# Patient Record
Sex: FEMALE | Race: WHITE | NOT HISPANIC OR LATINO | Employment: OTHER | URBAN - METROPOLITAN AREA
[De-identification: names, ages, dates, MRNs, and addresses within clinical notes are randomized per-mention and may not be internally consistent; named-entity substitution may affect disease eponyms.]

---

## 2017-01-16 ENCOUNTER — HOSPITAL ENCOUNTER (OUTPATIENT)
Dept: MAMMOGRAPHY | Facility: HOSPITAL | Age: 82
Discharge: HOME | End: 2017-01-16
Admitting: INTERNAL MEDICINE

## 2017-01-16 DIAGNOSIS — Z12.31 VISIT FOR SCREENING MAMMOGRAM: ICD-10-CM

## 2017-01-16 PROCEDURE — G0202 SCR MAMMO BI INCL CAD: HCPCS

## 2017-01-16 PROCEDURE — 77063 BREAST TOMOSYNTHESIS BI: CPT | Performed by: RADIOLOGY

## 2017-01-16 PROCEDURE — G0202 SCR MAMMO BI INCL CAD: HCPCS | Performed by: RADIOLOGY

## 2017-01-16 PROCEDURE — 77063 BREAST TOMOSYNTHESIS BI: CPT

## 2017-06-09 ENCOUNTER — OFFICE VISIT (OUTPATIENT)
Dept: ORTHOPEDIC SURGERY | Facility: CLINIC | Age: 82
End: 2017-06-09

## 2017-06-09 VITALS
HEART RATE: 82 BPM | SYSTOLIC BLOOD PRESSURE: 141 MMHG | BODY MASS INDEX: 36.96 KG/M2 | HEIGHT: 66 IN | WEIGHT: 230 LBS | DIASTOLIC BLOOD PRESSURE: 83 MMHG

## 2017-06-09 DIAGNOSIS — E66.9 OBESITY (BMI 30-39.9): ICD-10-CM

## 2017-06-09 DIAGNOSIS — M17.0 PRIMARY OSTEOARTHRITIS OF BOTH KNEES: ICD-10-CM

## 2017-06-09 DIAGNOSIS — M25.561 RIGHT KNEE PAIN, UNSPECIFIED CHRONICITY: Primary | ICD-10-CM

## 2017-06-09 PROCEDURE — 20610 DRAIN/INJ JOINT/BURSA W/O US: CPT | Performed by: ORTHOPAEDIC SURGERY

## 2017-06-09 PROCEDURE — 99204 OFFICE O/P NEW MOD 45 MIN: CPT | Performed by: ORTHOPAEDIC SURGERY

## 2017-06-09 RX ORDER — BUPIVACAINE HYDROCHLORIDE 2.5 MG/ML
3 INJECTION, SOLUTION INFILTRATION; PERINEURAL
Status: COMPLETED | OUTPATIENT
Start: 2017-06-09 | End: 2017-06-09

## 2017-06-09 RX ORDER — LIDOCAINE HYDROCHLORIDE 10 MG/ML
3 INJECTION, SOLUTION INFILTRATION; PERINEURAL
Status: COMPLETED | OUTPATIENT
Start: 2017-06-09 | End: 2017-06-09

## 2017-06-09 RX ORDER — TRIAMCINOLONE ACETONIDE 40 MG/ML
80 INJECTION, SUSPENSION INTRA-ARTICULAR; INTRAMUSCULAR
Status: COMPLETED | OUTPATIENT
Start: 2017-06-09 | End: 2017-06-09

## 2017-06-09 RX ADMIN — BUPIVACAINE HYDROCHLORIDE 3 ML: 2.5 INJECTION, SOLUTION INFILTRATION; PERINEURAL at 10:39

## 2017-06-09 RX ADMIN — LIDOCAINE HYDROCHLORIDE 3 ML: 10 INJECTION, SOLUTION INFILTRATION; PERINEURAL at 10:39

## 2017-06-09 RX ADMIN — TRIAMCINOLONE ACETONIDE 80 MG: 40 INJECTION, SUSPENSION INTRA-ARTICULAR; INTRAMUSCULAR at 10:39

## 2017-06-09 NOTE — PROGRESS NOTES
Procedure   Large Joint Arthrocentesis  Date/Time: 6/9/2017 10:39 AM  Consent given by: patient  Site marked: site marked  Timeout: Immediately prior to procedure a time out was called to verify the correct patient, procedure, equipment, support staff and site/side marked as required   Supporting Documentation  Indications: pain   Procedure Details  Location: knee - R knee  Preparation: Patient was prepped and draped in the usual sterile fashion  Needle size: 22 G  Approach: anterolateral  Medications administered: 3 mL bupivacaine; 3 mL lidocaine 1 %; 80 mg triamcinolone acetonide 40 MG/ML  Patient tolerance: patient tolerated the procedure well with no immediate complications

## 2017-06-09 NOTE — PROGRESS NOTES
"Orthopaedic Clinic Note: Knee New Patient    Chief Complaint   Patient presents with   • Right Knee - Pain        HPI    Vonnie Coppola is a 81 y.o. female who presents with right knee pain for 6 month(s). Onset Began back in January when she was maneuvering a car and hit her knee on the car seat and resulting in pain in the knee.  She was subsequently referred to physical therapy and was noticing significant improvement in her right knee pain up until she was performing squats and felt a sharp pain in her knee.  She states it \"hasn't been the same since\".. Pain is localized to the medial joint line, anterior knee, popliteal region and is a 6/10 on the pain scale. The pain is worse with walking and standing for long periods; resting, sitting and elevating the extremity make it better. Previous treatments have included: bracing, walker and physical therapy since symptom onset. Although some transient relief was reported with these interventions, these conservative measures have failed and symptoms have persisted. The patient is limited in daily activities and has had a significant decrease in quality of life as a result. She denies fevers, chills, or constitutional symptoms.    Past Medical History:   Diagnosis Date   • Arthritis    • Asthma    • Breast cancer 1999    LEFT BREAST CA, LUMPECTOMY & RADS   • Hx of radiation therapy 1999    APPROXIMATELY 40 TREATMENTS FOR LEFT BREAST CA   • Hypertension    • Pneumonia    • Stroke       Past Surgical History:   Procedure Laterality Date   • APPENDECTOMY     • BLADDER SURGERY     • BREAST BIOPSY Left 1999    MALIGNANT   • BREAST LUMPECTOMY Left 1999    MALIGNANT   • GALLBLADDER SURGERY     • HYSTERECTOMY        Social History     Social History   • Marital status:      Spouse name: N/A   • Number of children: N/A   • Years of education: N/A     Occupational History   • Not on file.     Social History Main Topics   • Smoking status: Former Smoker     Packs/day: " "0.50     Years: 2.00     Types: Cigarettes   • Smokeless tobacco: Never Used   • Alcohol use No   • Drug use: No   • Sexual activity: Defer     Other Topics Concern   • Not on file     Social History Narrative      Current Outpatient Prescriptions on File Prior to Visit   Medication Sig Dispense Refill   • Ascorbic Acid (VITAMIN C ER PO) Take  by mouth.     • Cholecalciferol (VITAMIN D-3 PO) Take  by mouth.     • coenzyme Q10 50 MG capsule capsule Take  by mouth Daily.     • Cyanocobalamin (VITAMIN B 12 PO) Take  by mouth.     • glucosamine-chondroitin 500-400 MG capsule capsule Take  by mouth 3 (Three) Times a Day With Meals.     • losartan-hydrochlorothiazide (HYZAAR) 100-12.5 MG per tablet      • LYCOPENE PO Take  by mouth.     • meclizine (ANTIVERT) 32 MG tablet Take 32 mg by mouth 3 (Three) Times a Day As Needed for dizziness.     • OLIVE LEAF EXTRACT PO Take  by mouth.     • Selenium (SELENIMIN PO) Take  by mouth.     • Zinc Sulfate (ZINC 15 PO) Take  by mouth.       No current facility-administered medications on file prior to visit.       Allergies   Allergen Reactions   • Cortisone Hives   • Penicillins Hives        Review of Systems   Constitutional: Negative.    HENT: Negative.    Eyes: Negative.    Respiratory: Negative.    Cardiovascular: Negative.    Gastrointestinal: Negative.    Endocrine: Negative.    Genitourinary: Negative.    Musculoskeletal: Positive for arthralgias.   Skin: Negative.    Allergic/Immunologic: Negative.    Neurological: Negative.    Hematological: Negative.    Psychiatric/Behavioral: Negative.         The following portions of the patient's history were reviewed and updated as appropriate: allergies, current medications, past family history, past medical history, past social history, past surgical history and problem list.    Physical Exam  Blood pressure 141/83, pulse 82, height 65.5\" (166.4 cm), weight 230 lb (104 kg).    Body mass index is 37.69 kg/(m^2).    GENERAL " APPEARANCE: awake, alert & oriented x 3, in no acute distress, well developed, well nourished and obese  PSYCH: normal affect  LUNGS:  breathing nonlabored  EYES: sclera anicteric  CARDIOVASCULAR: palpable dorsalis pedis, palpable posterior tibial bilaterally. Capillary refill less than 2 seconds  EXTREMITIES: no clubbing, cyanosis  GAIT:  Not assessed, patient presented to clinic in wheelchair             Right Lower Extremity Exam:   ----------  Hip Exam  ----------  FLEXION CONTRACTURE: None  FLEXION: 110 degrees  INTERNAL ROTATION: 20 degrees at 90 degrees of flexion   EXTERNAL ROTATION: 40 degrees at 90 degrees of flexion    PAIN WITH HIP MOTION: no  ----------  Knee Exam  ----------  ALIGNMENT: 3 degrees varus, correctible to neutral    RANGE OF MOTION:  Decreased (5 - 110 degrees)   LIGAMENTOUS STABILITY:   stable to varus and valgus stress at 0 and 30 degrees; slight retensioning of the MCL is appreciated with valgus stress at 30 degrees consistent with medial compartment degeneration     STRENGTH:  4/5 knee flexion, extension. 5/5 ankle dorsiflexion and plantarflexion.     PAIN WITH PALPATION: medial joint line, pes bursa, anterior knee and popliteal region  KNEE EFFUSION: yes, mild effusion  PAIN WITH KNEE ROM: yes, medially and anteriorly   PATELLAR CREPITUS: yes, painful and symptomatic  SPECIAL EXAM FINDINGS:  painful patellar compression    REFLEXES:  PATELLAR 2+/4  ACHILLES 2+/4    CLONUS: no  STRAIGHT LEG TEST:   negative    SENSATION TO LIGHT TOUCH:  DEEP PERONEAL/SUPERFICIAL PERONEAL/SURAL/SAPHENOUS/TIBIAL:   intact    EDEMA:  no  ERYTHEMA:  no  WOUNDS/INCISIONS: no        Left Lower Extremity Exam:   ----------  Hip Exam  ----------  FLEXION CONTRACTURE: None  FLEXION: 110 degrees  INTERNAL ROTATION: 20 degrees at 90 degrees of flexion   EXTERNAL ROTATION: 40 degrees at 90 degrees of flexion    PAIN WITH HIP MOTION: no  ----------  Knee Exam  ----------  ALIGNMENT: 3 degrees varus, correctible to  neutral    RANGE OF MOTION:  Decreased (2 - 120 degrees)   LIGAMENTOUS STABILITY:   stable to varus and valgus stress at 0 and 30 degrees; slight retensioning of the MCL is appreciated with valgus stress at 30 degrees consistent with medial compartment degeneration     STRENGTH:  4/5 knee flexion, extension. 5/5 ankle dorsiflexion and plantarflexion.     PAIN WITH PALPATION: medial joint line and pes bursa  KNEE EFFUSION: yes, mild effusion  PAIN WITH KNEE ROM: yes, mild medial joint line pain   PATELLAR CREPITUS: yes, asymptomatic  SPECIAL EXAM FINDINGS:  none    REFLEXES:  PATELLAR 2+/4  ACHILLES 2+/4    CLONUS: no  STRAIGHT LEG TEST:   negative    SENSATION TO LIGHT TOUCH:  DEEP PERONEAL/SUPERFICIAL PERONEAL/SURAL/SAPHENOUS/TIBIAL:   intact    EDEMA:  no  ERYTHEMA:  no  WOUNDS/INCISIONS: no      ______________________________________________________________________  ______________________________________________________________________    RADIOGRAPHIC FINDINGS:   Outside radiographs from 4/10/2017 4 views of bilateral knees were reviewed by myself.  Radiographs reveal the following: Right:  advanced varus osteoarthritis with medial compartment bone on bone articulation, subchondral sclerosis, and periarticular osteophytes; left:  advanced varus osteoarthritis with medial compartment bone on bone articulation, subchondral sclerosis, and periarticular osteophytes    Assessment/Plan:   Diagnosis Plan   1. Right knee pain, unspecified chronicity  Large Joint Arthrocentesis   2. Primary osteoarthritis of both knees     3. Obesity (BMI 30-39.9)       I discussed with patient the findings of end-stage osteoarthritis of the bilateral knees.  Currently, she has a 5° flexion contracture of the right knee.  I explained that this flexion contracture secondary to the arthritis.  Given the advanced stage of her arthritis and limitations in mobility and range of motion, I recommended a total joint arthroplasty area however, the  patient is wishing to exhaust all conservative measures and wishes to avoid surgery at all costs secondary to her medical comorbidities including severe asthma and lung issues.  As a result, I will offer her a corticosteroid injection into the right knee.  She was agreeable to this today.  I explained to her that these injections could be performed every 3 months but not more frequently.  I cannot guarantee her the duration of symptom relief with the injection.  I could also not guarantee her any improvement in knee range of motion with the injection.  She understood but wished to proceed with the injection.  I also talked to her about the importance of weight loss and decreasing joint reaction forces through the knee which could improve knee pain.  I will see her back in 3 months for repeat assessment.     Procedure Note:  I discussed with the patient the potential benefits of performing a therapeutic injection of the right knee as well as potential risks including but not limited to infection, swelling, pain, bleeding, bruising, nerve/vessel damage, skin color changes, transient elevation in blood glucose levels, and fat atrophy. After informed consent and after the area was prepped with alcohol, ethyl chloride was used to numb the skin. Via the superolateral approach, 3cc of 1% lidocaine, 3cc of 0.25% marcaine and 2 cc of 40mg/ml of Kenalog were injected into the right knee. The patient tolerated the procedure well. There were no complications. A sterile dressing was placed over the injection site.        Christopher Anderson MD  06/09/17  10:51 AM

## 2017-10-24 ENCOUNTER — CLINICAL SUPPORT (OUTPATIENT)
Dept: ORTHOPEDIC SURGERY | Facility: CLINIC | Age: 82
End: 2017-10-24

## 2017-10-24 VITALS
WEIGHT: 230 LBS | BODY MASS INDEX: 38.32 KG/M2 | SYSTOLIC BLOOD PRESSURE: 137 MMHG | HEART RATE: 90 BPM | HEIGHT: 65 IN | DIASTOLIC BLOOD PRESSURE: 89 MMHG

## 2017-10-24 DIAGNOSIS — E66.9 OBESITY (BMI 30-39.9): ICD-10-CM

## 2017-10-24 DIAGNOSIS — M17.0 PRIMARY OSTEOARTHRITIS OF BOTH KNEES: ICD-10-CM

## 2017-10-24 DIAGNOSIS — M25.561 RIGHT KNEE PAIN, UNSPECIFIED CHRONICITY: Primary | ICD-10-CM

## 2017-10-24 PROCEDURE — 20610 DRAIN/INJ JOINT/BURSA W/O US: CPT | Performed by: ORTHOPAEDIC SURGERY

## 2017-10-24 PROCEDURE — 99213 OFFICE O/P EST LOW 20 MIN: CPT | Performed by: ORTHOPAEDIC SURGERY

## 2017-10-24 RX ORDER — BUPIVACAINE HYDROCHLORIDE 2.5 MG/ML
3 INJECTION, SOLUTION INFILTRATION; PERINEURAL
Status: COMPLETED | OUTPATIENT
Start: 2017-10-24 | End: 2017-10-24

## 2017-10-24 RX ORDER — TRIAMCINOLONE ACETONIDE 40 MG/ML
80 INJECTION, SUSPENSION INTRA-ARTICULAR; INTRAMUSCULAR
Status: COMPLETED | OUTPATIENT
Start: 2017-10-24 | End: 2017-10-24

## 2017-10-24 RX ORDER — DIAZEPAM 2 MG/1
TABLET ORAL
COMMUNITY
Start: 2017-10-05

## 2017-10-24 RX ORDER — LIDOCAINE HYDROCHLORIDE 10 MG/ML
3 INJECTION, SOLUTION INFILTRATION; PERINEURAL
Status: COMPLETED | OUTPATIENT
Start: 2017-10-24 | End: 2017-10-24

## 2017-10-24 RX ADMIN — LIDOCAINE HYDROCHLORIDE 3 ML: 10 INJECTION, SOLUTION INFILTRATION; PERINEURAL at 11:01

## 2017-10-24 RX ADMIN — TRIAMCINOLONE ACETONIDE 80 MG: 40 INJECTION, SUSPENSION INTRA-ARTICULAR; INTRAMUSCULAR at 11:01

## 2017-10-24 RX ADMIN — BUPIVACAINE HYDROCHLORIDE 3 ML: 2.5 INJECTION, SOLUTION INFILTRATION; PERINEURAL at 11:01

## 2017-10-24 NOTE — PROGRESS NOTES
Procedure   Large Joint Arthrocentesis  Date/Time: 10/24/2017 11:01 AM  Consent given by: patient  Site marked: site marked  Timeout: Immediately prior to procedure a time out was called to verify the correct patient, procedure, equipment, support staff and site/side marked as required   Supporting Documentation  Indications: pain   Procedure Details  Location: knee - R knee  Preparation: Patient was prepped and draped in the usual sterile fashion  Needle size: 22 G  Approach: anterolateral  Medications administered: 3 mL bupivacaine; 3 mL lidocaine 1 %; 80 mg triamcinolone acetonide 40 MG/ML  Patient tolerance: patient tolerated the procedure well with no immediate complications

## 2017-10-24 NOTE — PROGRESS NOTES
Orthopaedic Clinic Note: Knee Established Patient    Chief Complaint   Patient presents with   • Left Knee - Follow-up     4 month   • Right Knee - Follow-up     4 month        HPI    It has been 4  month(s) since Ms. Coppola's last visit. She returns to clinic today for Follow-up of bilateral knee pain.  She received corticosteroid injection 4 months ago in the bilateral knees.  She states these injections provided significant pain relief, however her right knee pain is gradually returned.  Her pain has been ongoing now at his current level for approximately 2 weeks in the right knee.  She describes the pain as a global source of pain about the knee and rates it a 7/10 on the pain scale.  She is ambulating with the assistance of a walker today.  She continues a home exercise program and attempt to lose weight.  Overall she states she is doing better, but her pain has returned in the right knee.    Past Medical History:   Diagnosis Date   • Arthritis    • Asthma    • Breast cancer 1999    LEFT BREAST CA, LUMPECTOMY & RADS   • Hx of radiation therapy 1999    APPROXIMATELY 40 TREATMENTS FOR LEFT BREAST CA   • Hypertension    • Pneumonia    • Stroke       Past Surgical History:   Procedure Laterality Date   • APPENDECTOMY     • BLADDER SURGERY     • BREAST BIOPSY Left 1999    MALIGNANT   • BREAST LUMPECTOMY Left 1999    MALIGNANT   • GALLBLADDER SURGERY     • HYSTERECTOMY        Family History   Problem Relation Age of Onset   • Ovarian cancer Sister      70'S   • Cancer Sister    • Diabetes Sister    • Hypertension Sister    • Osteoarthritis Sister    • Colon cancer Maternal Grandmother    • Osteoarthritis Mother    • Hypertension Father    • Cancer Brother    • Hypertension Brother    • Osteoarthritis Brother    • Cancer Other    • Stroke Other    • Breast cancer Neg Hx      Social History     Social History   • Marital status:      Spouse name: N/A   • Number of children: N/A   • Years of education: N/A  "    Occupational History   • Not on file.     Social History Main Topics   • Smoking status: Former Smoker     Packs/day: 0.50     Years: 2.00     Types: Cigarettes   • Smokeless tobacco: Never Used   • Alcohol use No   • Drug use: No   • Sexual activity: Defer     Other Topics Concern   • Not on file     Social History Narrative      Current Outpatient Prescriptions on File Prior to Visit   Medication Sig Dispense Refill   • Ascorbic Acid (VITAMIN C ER PO) Take  by mouth.     • Cholecalciferol (VITAMIN D-3 PO) Take  by mouth.     • coenzyme Q10 50 MG capsule capsule Take  by mouth Daily.     • Cyanocobalamin (VITAMIN B 12 PO) Take  by mouth.     • glucosamine-chondroitin 500-400 MG capsule capsule Take  by mouth 3 (Three) Times a Day With Meals.     • losartan-hydrochlorothiazide (HYZAAR) 100-12.5 MG per tablet      • LYCOPENE PO Take  by mouth.     • meclizine (ANTIVERT) 32 MG tablet Take 32 mg by mouth 3 (Three) Times a Day As Needed for dizziness.     • OLIVE LEAF EXTRACT PO Take  by mouth.     • Selenium (SELENIMIN PO) Take  by mouth.     • Zinc Sulfate (ZINC 15 PO) Take  by mouth.       No current facility-administered medications on file prior to visit.       Allergies   Allergen Reactions   • Cortisone Hives   • Penicillins Hives        Review of Systems     Physical Exam  Blood pressure 137/89, pulse 90, height 65\" (165.1 cm), weight 230 lb (104 kg).    Body mass index is 38.27 kg/(m^2).    GENERAL APPEARANCE: awake, alert, oriented, in no acute distress  LUNGS:  breathing nonlabored  EXTREMITIES: no clubbing, cyanosis  PERIPHERAL PULSES: palpable dorsalis pedis and posterior tibial pulses bilaterally.    GAIT:  Antalgic with cane  ----------  Right Knee Exam:  ----------  ALIGNMENT: 3 degrees varus, correctible to neutral  ----------  RANGE OF MOTION:  Decreased (2 - 120 degrees)   LIGAMENTOUS STABILITY:   stable to varus and valgus stress at terminal extension and 30 degrees; slight retensioning of the " MCL is appreciated with valgus stress at 30 degrees consistent with medial compartment degeneration  ----------  STRENGTH:  KNEE FLEXION 4/5  KNEE EXTENSION  4/5  ANKLE DORSIFLEXION  5/5  ANKLE PLANTARFLEXION  5/5  ----------  PAIN WITH PALPATION:medial joint line  KNEE EFFUSION: yes, mild effusion  PAIN WITH KNEE ROM: yes  PATELLAR CREPITUS:  yes, asymptomatic  ----------  SENSATION TO LIGHT TOUCH:  DEEP PERONEAL/SUPERFICIAL PERONEAL/SURAL/SAPHENOUS/TIBIAL:    intact  ----------  EDEMA:  no  ERYTHEMA:    no  WOUNDS/INCISIONS:  no  _____________________________________________________________________  _____________________________________________________________________    RADIOGRAPHIC FINDINGS:   No new imaging today    Assessment/Plan:   Diagnosis Plan   1. Right knee pain, unspecified chronicity  Large Joint Arthrocentesis   2. Primary osteoarthritis of both knees     3. Obesity (BMI 30-39.9)       I discussed with patient that she has failed conservative treatment with the right knee steroid injection in her pain has now returned.  I discussed the possibility of proceeding with total knee arthroplasty.  Patient declined surgical intervention at this time after extensive discussion regarding what surgery entails including the recovery process.  Instead she wishes to continue with conservative treatment.  Therefore we'll proceed with repeat corticosteroid injection in the right knee today and have her follow-up in 3 months for repeat assessment.    Procedure Note:  I discussed with the patient the potential benefits of performing a therapeutic injection of the right knee as well as potential risks including but not limited to infection, swelling, pain, bleeding, bruising, nerve/vessel damage, skin color changes, transient elevation in blood glucose levels, and fat atrophy. After informed consent and after the area was prepped with alcohol, ethyl chloride was used to numb the skin. Via the superolateral approach,  3cc of 1% lidocaine, 3cc of 0.25% marcaine and 2 cc of 40mg/ml of Kenalog were injected into the right knee. The patient tolerated the procedure well. There were no complications. A sterile dressing was placed over the injection site.      Christopher Anderson MD  10/24/17  5:00 PM

## 2018-02-26 ENCOUNTER — TRANSCRIBE ORDERS (OUTPATIENT)
Dept: ADMINISTRATIVE | Facility: HOSPITAL | Age: 83
End: 2018-02-26

## 2018-02-26 DIAGNOSIS — Z12.31 VISIT FOR SCREENING MAMMOGRAM: Primary | ICD-10-CM

## 2018-03-23 ENCOUNTER — HOSPITAL ENCOUNTER (OUTPATIENT)
Dept: MAMMOGRAPHY | Facility: HOSPITAL | Age: 83
Discharge: HOME | End: 2018-03-23

## 2018-04-20 ENCOUNTER — HOSPITAL ENCOUNTER (OUTPATIENT)
Age: 83
End: 2018-04-20
Payer: MEDICARE

## 2018-04-20 DIAGNOSIS — Z12.31: Primary | ICD-10-CM

## 2018-04-20 PROCEDURE — 77067 SCR MAMMO BI INCL CAD: CPT

## 2019-03-27 ENCOUNTER — TRANSCRIBE ORDERS (OUTPATIENT)
Dept: ADMINISTRATIVE | Facility: HOSPITAL | Age: 84
End: 2019-03-27

## 2019-03-27 DIAGNOSIS — Z12.31 VISIT FOR SCREENING MAMMOGRAM: Primary | ICD-10-CM

## 2019-05-06 ENCOUNTER — HOSPITAL ENCOUNTER (OUTPATIENT)
Dept: MAMMOGRAPHY | Facility: HOSPITAL | Age: 84
Discharge: HOME | End: 2019-05-06
Admitting: INTERNAL MEDICINE

## 2019-05-06 ENCOUNTER — APPOINTMENT (OUTPATIENT)
Dept: OTHER | Facility: HOSPITAL | Age: 84
End: 2019-05-06

## 2019-05-06 DIAGNOSIS — Z92.89 HISTORY OF MAMMOGRAM: ICD-10-CM

## 2019-05-06 DIAGNOSIS — Z12.31 VISIT FOR SCREENING MAMMOGRAM: ICD-10-CM

## 2019-05-06 PROCEDURE — 77067 SCR MAMMO BI INCL CAD: CPT

## 2019-05-06 PROCEDURE — 77063 BREAST TOMOSYNTHESIS BI: CPT | Performed by: RADIOLOGY

## 2019-05-06 PROCEDURE — 77067 SCR MAMMO BI INCL CAD: CPT | Performed by: RADIOLOGY

## 2019-05-06 PROCEDURE — 77063 BREAST TOMOSYNTHESIS BI: CPT

## 2019-11-14 ENCOUNTER — OFFICE VISIT (OUTPATIENT)
Dept: PULMONOLOGY | Facility: CLINIC | Age: 84
End: 2019-11-14

## 2019-11-14 VITALS
HEART RATE: 94 BPM | DIASTOLIC BLOOD PRESSURE: 80 MMHG | SYSTOLIC BLOOD PRESSURE: 160 MMHG | OXYGEN SATURATION: 95 % | TEMPERATURE: 98.2 F | WEIGHT: 246 LBS | BODY MASS INDEX: 40.98 KG/M2 | HEIGHT: 65 IN

## 2019-11-14 DIAGNOSIS — K21.9 GERD WITHOUT ESOPHAGITIS: ICD-10-CM

## 2019-11-14 DIAGNOSIS — J42 CHRONIC BRONCHITIS, UNSPECIFIED CHRONIC BRONCHITIS TYPE (HCC): ICD-10-CM

## 2019-11-14 DIAGNOSIS — J43.9 PULMONARY EMPHYSEMA, UNSPECIFIED EMPHYSEMA TYPE (HCC): ICD-10-CM

## 2019-11-14 DIAGNOSIS — J45.40 MODERATE PERSISTENT ASTHMA WITHOUT COMPLICATION: Primary | ICD-10-CM

## 2019-11-14 DIAGNOSIS — J30.9 ALLERGIC RHINITIS, UNSPECIFIED SEASONALITY, UNSPECIFIED TRIGGER: ICD-10-CM

## 2019-11-14 PROCEDURE — 99205 OFFICE O/P NEW HI 60 MIN: CPT | Performed by: INTERNAL MEDICINE

## 2019-11-14 RX ORDER — FLUTICASONE PROPIONATE 50 MCG
2 SPRAY, SUSPENSION (ML) NASAL 2 TIMES DAILY
Qty: 18.2 ML | Refills: 11 | Status: SHIPPED | OUTPATIENT
Start: 2019-11-14

## 2019-11-14 RX ORDER — OMEPRAZOLE 40 MG/1
40 CAPSULE, DELAYED RELEASE ORAL DAILY
Qty: 30 CAPSULE | Refills: 11 | Status: SHIPPED | OUTPATIENT
Start: 2019-11-14

## 2019-11-14 RX ORDER — BUDESONIDE 0.5 MG/2ML
0.5 INHALANT ORAL 2 TIMES DAILY
Qty: 120 ML | Refills: 11 | Status: SHIPPED | OUTPATIENT
Start: 2019-11-14 | End: 2019-12-09 | Stop reason: SDUPTHER

## 2019-11-14 RX ORDER — ARFORMOTEROL TARTRATE 15 UG/2ML
15 SOLUTION RESPIRATORY (INHALATION)
Qty: 120 ML | Refills: 11 | Status: SHIPPED | OUTPATIENT
Start: 2019-11-14

## 2019-11-14 NOTE — PROGRESS NOTES
CC:    Shortness of breath    HPI:    85 y/o WF w/ h/o lifetime non-smoking, GERD, allergic rhinitis, asthma, DDD, osteoarthritis, prior CVA, h/o Left Sided Breast CA treated w/ surgery and radiation in 2000 (reportedly had radiation pneumonitis) referred for evaluation of SOA.  Patient also has a h/o of NICHOL and is unable to tolerate CPAP.  She worked at the [employer] for many years and was exposed to dust, chemicals, etc.  Patient reports years of intermittent dyspnea.  She gets triggered by changes in weather, smoke, and strong chemical smells.  Bronchodilators relieve her symptoms.  Does report some ongoing symptomatic acid reflux and post nasal gtt.  Using albuterol and duoneb prn.  No chest pain.    PMH:    Past Medical History:   Diagnosis Date   • Arthritis    • Asthma    • Breast cancer (CMS/HCC) 1999    LEFT BREAST CA, LUMPECTOMY & RADS   • Hx of radiation therapy 1999    APPROXIMATELY 40 TREATMENTS FOR LEFT BREAST CA   • Hypertension    • Pneumonia    • Stroke (CMS/HCC)      PSH:    Past Surgical History:   Procedure Laterality Date   • APPENDECTOMY     • BLADDER SURGERY     • BREAST BIOPSY Left 1999    MALIGNANT   • BREAST LUMPECTOMY Left 1999    MALIGNANT   • GALLBLADDER SURGERY     • HYSTERECTOMY     • OOPHORECTOMY       FH:    Family History   Problem Relation Age of Onset   • Ovarian cancer Sister         70'S   • Cancer Sister    • Diabetes Sister    • Hypertension Sister    • Osteoarthritis Sister    • Colon cancer Maternal Grandmother    • Osteoarthritis Mother    • Hypertension Father    • Cancer Brother    • Hypertension Brother    • Osteoarthritis Brother    • Cancer Other    • Stroke Other    • Ovarian cancer Paternal Grandmother         unknown   • Breast cancer Neg Hx      SH:    Social History     Socioeconomic History   • Marital status:      Spouse name: Not on file   • Number of children: Not on file   • Years of education: Not on file   • Highest education level: Not on  "file   Tobacco Use   • Smoking status: Former Smoker     Packs/day: 0.50     Years: 2.00     Pack years: 1.00     Types: Cigarettes   • Smokeless tobacco: Never Used   • Tobacco comment: quit 40 years ago   Substance and Sexual Activity   • Alcohol use: No   • Drug use: No   • Sexual activity: Defer     ALLERGIES:    Allergies   Allergen Reactions   • Cortisone Hives   • Penicillins Hives     MEDICATIONS:      Current Outpatient Medications:   •  Ascorbic Acid (VITAMIN C ER PO), Take  by mouth., Disp: , Rfl:   •  Cholecalciferol (VITAMIN D-3 PO), Take  by mouth., Disp: , Rfl:   •  coenzyme Q10 50 MG capsule capsule, Take  by mouth Daily., Disp: , Rfl:   •  Cyanocobalamin (VITAMIN B 12 PO), Take  by mouth., Disp: , Rfl:   •  diazePAM (VALIUM) 2 MG tablet, , Disp: , Rfl:   •  glucosamine-chondroitin 500-400 MG capsule capsule, Take  by mouth 3 (Three) Times a Day With Meals., Disp: , Rfl:   •  losartan-hydrochlorothiazide (HYZAAR) 100-12.5 MG per tablet, , Disp: , Rfl:   •  LYCOPENE PO, Take  by mouth., Disp: , Rfl:   •  meclizine (ANTIVERT) 32 MG tablet, Take 32 mg by mouth 3 (Three) Times a Day As Needed for dizziness., Disp: , Rfl:   •  OLIVE LEAF EXTRACT PO, Take  by mouth., Disp: , Rfl:   •  Selenium (SELENIMIN PO), Take  by mouth., Disp: , Rfl:   •  Zinc Sulfate (ZINC 15 PO), Take  by mouth., Disp: , Rfl:   ROS:  Per HPI, otherwise all systems reviewed and negative.    DIAGNOSTIC DATA (Reviewed and interpreted by me unless otherwise specified):    PFT 10/22/19 - no obstruction by strict ATS criteria, no restriction, +air trapping, moderate reduction in DLCO overcorrects for alveolar volume, elevated Raw    CT Chest 10/22/19 (report only, no images, done at [facility]) - \"basilar bronchiectasis, w/ associated atelectasis versus scarring, mild infiltrative type changes are seen with the lateral left upper lobe which could be infectious or inflammatory, 1.3 cm pre-carinal LN, small hiatal " "hernia\".    Vitals:    11/14/19 0924   BP: 160/80   Pulse: 94   Temp: 98.2 °F (36.8 °C)   SpO2: 95%       Physical Exam   Constitutional: Oriented to person, place, and time. Appears well-developed and well-nourished.   Head: Normocephalic and atraumatic.   Nose: Nose normal.   Mouth/Throat: Oropharynx is clear and moist.   Eyes: Conjunctivae are normal.  Pupils normal.  Neck: No tracheal deviation present.   Cardiovascular: Normal rate, regular rhythm, normal heart sounds and intact distal pulses.  Exam reveals no gallop and no friction rub.  No thrill.  No JVD.  No edema.  No murmur heard.  Pulmonary/Chest: Effort normal and breath sounds normal.  No tenderness to palpation.  No clubbing.   Abdominal: Soft. Bowel sounds are normal. No distension. No tenderness. There is no guarding.   Musculoskeletal: Normal range of motion.  No tenderness.  Lymphadenopathy:  No cervical adenopathy.   Neurological:  No new focal neurological deficits observed   Skin: Skin is warm and dry. No rash noted.   Psychiatric: Normal mood and affect.  Behavior is normal. Judgment normal.    Assessment/Plan     1)  Moderate Persistent Asthma - add nebulized pulmicort and brovana.  Continue prn duoneb.  She is worried about the cardiac effects of brovana, I told her she could try half a vial.  Pulmicort is the more important medicine.  Check markers of allergic inflammation (eos, ige, allergy panel).    2) GERD - start prilosec 40 mg daily    3) Allergic Rhinitis - start flonase 2 sprays each nostril bid    4) Abnormal CT - request images, I only have the report    RTC 3 months w/ alejandro    Lalito Webb MD  Pulmonology and Critical Care Medicine  11/14/19 10:28 AM  Electronically Signed    C.C.:  Christopher Leyva,*, Christopher Leyva, DO        "

## 2019-12-09 ENCOUNTER — TELEPHONE (OUTPATIENT)
Dept: PULMONOLOGY | Facility: CLINIC | Age: 84
End: 2019-12-09

## 2019-12-09 RX ORDER — BUDESONIDE 0.5 MG/2ML
0.5 INHALANT ORAL 2 TIMES DAILY
Qty: 360 ML | Refills: 2 | Status: SHIPPED | OUTPATIENT
Start: 2019-12-09

## 2020-02-13 ENCOUNTER — OFFICE (OUTPATIENT)
Dept: URBAN - METROPOLITAN AREA CLINIC 4 | Facility: CLINIC | Age: 85
End: 2020-02-13

## 2020-02-13 VITALS — WEIGHT: 239 LBS | DIASTOLIC BLOOD PRESSURE: 75 MMHG | SYSTOLIC BLOOD PRESSURE: 126 MMHG | HEIGHT: 65 IN

## 2020-02-13 DIAGNOSIS — R10.30 LOWER ABDOMINAL PAIN, UNSPECIFIED: ICD-10-CM

## 2020-02-13 DIAGNOSIS — R11.0 NAUSEA: ICD-10-CM

## 2020-02-13 DIAGNOSIS — R13.10 DYSPHAGIA, UNSPECIFIED: ICD-10-CM

## 2020-02-13 PROCEDURE — 99203 OFFICE O/P NEW LOW 30 MIN: CPT | Performed by: INTERNAL MEDICINE

## 2020-03-11 ENCOUNTER — TRANSCRIBE ORDERS (OUTPATIENT)
Dept: NUTRITION | Facility: HOSPITAL | Age: 85
End: 2020-03-11

## 2020-03-11 DIAGNOSIS — E74.31 SUCRASE-ISOMALTASE DEFICIENCY: Primary | ICD-10-CM

## 2020-06-18 ENCOUNTER — HOSPITAL ENCOUNTER (OUTPATIENT)
Age: 85
End: 2020-06-18
Payer: MEDICARE

## 2020-06-18 DIAGNOSIS — Z01.818: Primary | ICD-10-CM

## 2020-06-18 PROCEDURE — 88305 TISSUE EXAM BY PATHOLOGIST: CPT

## 2020-06-18 PROCEDURE — 36415 COLL VENOUS BLD VENIPUNCTURE: CPT

## 2020-06-18 PROCEDURE — 86328 IA NFCT AB SARSCOV2 COVID19: CPT

## 2020-06-19 ENCOUNTER — HOSPITAL ENCOUNTER (OUTPATIENT)
Dept: HOSPITAL 22 - OUTP | Age: 85
Discharge: HOME | End: 2020-06-19
Payer: MEDICARE

## 2020-06-19 ENCOUNTER — ON CAMPUS - OUTPATIENT (OUTPATIENT)
Dept: RURAL HOSPITAL 6 | Facility: HOSPITAL | Age: 85
End: 2020-06-19
Payer: MEDICARE

## 2020-06-19 VITALS
SYSTOLIC BLOOD PRESSURE: 105 MMHG | OXYGEN SATURATION: 100 % | RESPIRATION RATE: 18 BRPM | DIASTOLIC BLOOD PRESSURE: 78 MMHG | HEART RATE: 74 BPM

## 2020-06-19 VITALS
OXYGEN SATURATION: 92 % | HEART RATE: 89 BPM | DIASTOLIC BLOOD PRESSURE: 78 MMHG | SYSTOLIC BLOOD PRESSURE: 150 MMHG | RESPIRATION RATE: 18 BRPM | TEMPERATURE: 98 F

## 2020-06-19 VITALS
SYSTOLIC BLOOD PRESSURE: 120 MMHG | HEART RATE: 71 BPM | OXYGEN SATURATION: 94 % | DIASTOLIC BLOOD PRESSURE: 68 MMHG | RESPIRATION RATE: 18 BRPM

## 2020-06-19 VITALS
HEART RATE: 71 BPM | OXYGEN SATURATION: 92 % | DIASTOLIC BLOOD PRESSURE: 80 MMHG | RESPIRATION RATE: 18 BRPM | SYSTOLIC BLOOD PRESSURE: 139 MMHG

## 2020-06-19 VITALS
HEART RATE: 71 BPM | SYSTOLIC BLOOD PRESSURE: 122 MMHG | DIASTOLIC BLOOD PRESSURE: 71 MMHG | OXYGEN SATURATION: 92 % | RESPIRATION RATE: 18 BRPM

## 2020-06-19 VITALS
SYSTOLIC BLOOD PRESSURE: 102 MMHG | RESPIRATION RATE: 18 BRPM | DIASTOLIC BLOOD PRESSURE: 68 MMHG | HEART RATE: 74 BPM | OXYGEN SATURATION: 98 % | TEMPERATURE: 98.2 F

## 2020-06-19 VITALS
OXYGEN SATURATION: 94 % | RESPIRATION RATE: 18 BRPM | DIASTOLIC BLOOD PRESSURE: 66 MMHG | SYSTOLIC BLOOD PRESSURE: 131 MMHG | HEART RATE: 72 BPM

## 2020-06-19 VITALS — BODY MASS INDEX: 38.6 KG/M2

## 2020-06-19 VITALS — OXYGEN SATURATION: 97 %

## 2020-06-19 DIAGNOSIS — Z79.899: ICD-10-CM

## 2020-06-19 DIAGNOSIS — Z88.0: ICD-10-CM

## 2020-06-19 DIAGNOSIS — R11.0 NAUSEA: ICD-10-CM

## 2020-06-19 DIAGNOSIS — K22.2: ICD-10-CM

## 2020-06-19 DIAGNOSIS — K21.0 GASTRO-ESOPHAGEAL REFLUX DISEASE WITH ESOPHAGITIS: ICD-10-CM

## 2020-06-19 DIAGNOSIS — K44.9 DIAPHRAGMATIC HERNIA WITHOUT OBSTRUCTION OR GANGRENE: ICD-10-CM

## 2020-06-19 DIAGNOSIS — K21.0: Primary | ICD-10-CM

## 2020-06-19 DIAGNOSIS — Z85.3: ICD-10-CM

## 2020-06-19 DIAGNOSIS — Z86.73: ICD-10-CM

## 2020-06-19 DIAGNOSIS — D64.9: ICD-10-CM

## 2020-06-19 DIAGNOSIS — I49.9: ICD-10-CM

## 2020-06-19 DIAGNOSIS — I10: ICD-10-CM

## 2020-06-19 DIAGNOSIS — K31.9: ICD-10-CM

## 2020-06-19 DIAGNOSIS — Z79.82: ICD-10-CM

## 2020-06-19 DIAGNOSIS — K44.9: ICD-10-CM

## 2020-06-19 DIAGNOSIS — K29.70 GASTRITIS, UNSPECIFIED, WITHOUT BLEEDING: ICD-10-CM

## 2020-06-19 PROCEDURE — 43239 EGD BIOPSY SINGLE/MULTIPLE: CPT | Mod: 59 | Performed by: INTERNAL MEDICINE

## 2020-06-19 PROCEDURE — 43249 ESOPH EGD DILATION <30 MM: CPT | Performed by: INTERNAL MEDICINE

## 2020-06-19 PROCEDURE — 43239 EGD BIOPSY SINGLE/MULTIPLE: CPT

## 2020-06-19 PROCEDURE — 0DJ08ZZ INSPECTION OF UPPER INTESTINAL TRACT, VIA NATURAL OR ARTIFICIAL OPENING ENDOSCOPIC: ICD-10-PCS | Performed by: INTERNAL MEDICINE

## 2020-06-19 PROCEDURE — 43249 ESOPH EGD DILATION <30 MM: CPT

## 2020-06-19 PROCEDURE — 88305 TISSUE EXAM BY PATHOLOGIST: CPT

## 2020-06-19 PROCEDURE — C1726 CATH, BAL DIL, NON-VASCULAR: HCPCS

## 2021-05-10 ENCOUNTER — HOSPITAL ENCOUNTER (EMERGENCY)
Facility: HOSPITAL | Age: 86
Discharge: HOME | End: 2021-05-10
Attending: EMERGENCY MEDICINE | Admitting: EMERGENCY MEDICINE

## 2021-05-10 VITALS
WEIGHT: 235 LBS | BODY MASS INDEX: 39.15 KG/M2 | RESPIRATION RATE: 18 BRPM | OXYGEN SATURATION: 92 % | DIASTOLIC BLOOD PRESSURE: 90 MMHG | HEIGHT: 65 IN | HEART RATE: 91 BPM | TEMPERATURE: 98.5 F | SYSTOLIC BLOOD PRESSURE: 153 MMHG

## 2021-05-10 DIAGNOSIS — S40.021A CONTUSION OF RIGHT UPPER ARM, INITIAL ENCOUNTER: Primary | ICD-10-CM

## 2021-05-10 DIAGNOSIS — Z04.1 ENCOUNTER FOR EXAMINATION FOLLOWING MOTOR VEHICLE COLLISION (MVC): ICD-10-CM

## 2021-05-10 PROCEDURE — 99283 EMERGENCY DEPT VISIT LOW MDM: CPT

## 2021-05-10 PROCEDURE — 99282 EMERGENCY DEPT VISIT SF MDM: CPT

## 2021-07-09 ENCOUNTER — TRANSCRIBE ORDERS (OUTPATIENT)
Dept: ADMINISTRATIVE | Facility: HOSPITAL | Age: 86
End: 2021-07-09

## 2021-07-09 DIAGNOSIS — Z12.31 VISIT FOR SCREENING MAMMOGRAM: Primary | ICD-10-CM

## 2021-07-12 ENCOUNTER — HOSPITAL ENCOUNTER (OUTPATIENT)
Dept: MAMMOGRAPHY | Facility: HOSPITAL | Age: 86
Discharge: HOME | End: 2021-07-12
Admitting: INTERNAL MEDICINE

## 2021-07-12 DIAGNOSIS — Z12.31 VISIT FOR SCREENING MAMMOGRAM: ICD-10-CM

## 2021-07-12 PROCEDURE — 77067 SCR MAMMO BI INCL CAD: CPT

## 2021-07-12 PROCEDURE — 77063 BREAST TOMOSYNTHESIS BI: CPT | Performed by: RADIOLOGY

## 2021-07-12 PROCEDURE — 77063 BREAST TOMOSYNTHESIS BI: CPT

## 2021-07-12 PROCEDURE — 77067 SCR MAMMO BI INCL CAD: CPT | Performed by: RADIOLOGY

## 2021-08-04 ENCOUNTER — APPOINTMENT (OUTPATIENT)
Dept: MAMMOGRAPHY | Facility: HOSPITAL | Age: 86
End: 2021-08-04

## 2022-09-09 ENCOUNTER — TRANSCRIBE ORDERS (OUTPATIENT)
Dept: ADMINISTRATIVE | Facility: HOSPITAL | Age: 87
End: 2022-09-09

## 2022-09-09 DIAGNOSIS — Z12.31 VISIT FOR SCREENING MAMMOGRAM: Primary | ICD-10-CM

## 2022-09-22 ENCOUNTER — HOSPITAL ENCOUNTER (OUTPATIENT)
Dept: MAMMOGRAPHY | Facility: HOSPITAL | Age: 87
End: 2022-09-22

## 2022-11-04 ENCOUNTER — APPOINTMENT (OUTPATIENT)
Dept: MAMMOGRAPHY | Facility: HOSPITAL | Age: 87
End: 2022-11-04

## 2022-12-07 ENCOUNTER — APPOINTMENT (OUTPATIENT)
Dept: MAMMOGRAPHY | Facility: HOSPITAL | Age: 87
End: 2022-12-07

## 2024-01-16 ENCOUNTER — HOSPITAL ENCOUNTER (INPATIENT)
Facility: HOSPITAL | Age: 89
LOS: 8 days | Discharge: HOME HEALTH | DRG: 377 | End: 2024-01-28
Attending: STUDENT IN AN ORGANIZED HEALTH CARE EDUCATION/TRAINING PROGRAM | Admitting: INTERNAL MEDICINE
Payer: MEDICARE

## 2024-01-16 ENCOUNTER — APPOINTMENT (OUTPATIENT)
Dept: CT IMAGING | Facility: HOSPITAL | Age: 89
DRG: 377 | End: 2024-01-16
Payer: MEDICARE

## 2024-01-16 DIAGNOSIS — I26.94 MULTIPLE SUBSEGMENTAL PULMONARY EMBOLI WITHOUT ACUTE COR PULMONALE: ICD-10-CM

## 2024-01-16 DIAGNOSIS — D64.9 ANEMIA, UNSPECIFIED TYPE: ICD-10-CM

## 2024-01-16 DIAGNOSIS — R11.0 NAUSEA: ICD-10-CM

## 2024-01-16 DIAGNOSIS — R91.1 PULMONARY NODULE: ICD-10-CM

## 2024-01-16 DIAGNOSIS — N83.209 CYST OF OVARY, UNSPECIFIED LATERALITY: ICD-10-CM

## 2024-01-16 DIAGNOSIS — G47.34 NOCTURNAL HYPOXIA: ICD-10-CM

## 2024-01-16 DIAGNOSIS — K92.2 GASTROINTESTINAL HEMORRHAGE, UNSPECIFIED GASTROINTESTINAL HEMORRHAGE TYPE: Primary | ICD-10-CM

## 2024-01-16 DIAGNOSIS — R09.02 HYPOXIA: ICD-10-CM

## 2024-01-16 DIAGNOSIS — K92.1 GASTROINTESTINAL HEMORRHAGE WITH MELENA: ICD-10-CM

## 2024-01-16 PROBLEM — Z85.3 HISTORY OF BREAST CANCER: Status: ACTIVE | Noted: 2024-01-16

## 2024-01-16 PROBLEM — I10 HTN (HYPERTENSION): Status: ACTIVE | Noted: 2024-01-16

## 2024-01-16 PROBLEM — J45.909 ASTHMA: Status: ACTIVE | Noted: 2024-01-16

## 2024-01-16 LAB
ALBUMIN SERPL-MCNC: 3.5 G/DL (ref 3.5–5.2)
ALBUMIN/GLOB SERPL: 1.7 G/DL
ALP SERPL-CCNC: 58 U/L (ref 39–117)
ALT SERPL W P-5'-P-CCNC: 17 U/L (ref 1–33)
ANION GAP SERPL CALCULATED.3IONS-SCNC: 9 MMOL/L (ref 5–15)
APTT PPP: 26.7 SECONDS (ref 22.7–35.4)
AST SERPL-CCNC: 17 U/L (ref 1–32)
BASOPHILS # BLD AUTO: 0.04 10*3/MM3 (ref 0–0.2)
BASOPHILS NFR BLD AUTO: 0.4 % (ref 0–1.5)
BILIRUB SERPL-MCNC: 0.3 MG/DL (ref 0–1.2)
BILIRUB UR QL STRIP: NEGATIVE
BUN SERPL-MCNC: 17 MG/DL (ref 8–23)
BUN/CREAT SERPL: 16.8 (ref 7–25)
CALCIUM SPEC-SCNC: 9.1 MG/DL (ref 8.6–10.5)
CHLORIDE SERPL-SCNC: 109 MMOL/L (ref 98–107)
CLARITY UR: CLEAR
CO2 SERPL-SCNC: 28 MMOL/L (ref 22–29)
COLOR UR: YELLOW
CREAT SERPL-MCNC: 1.01 MG/DL (ref 0.57–1)
DEPRECATED RDW RBC AUTO: 45.6 FL (ref 37–54)
EGFRCR SERPLBLD CKD-EPI 2021: 54.3 ML/MIN/1.73
EOSINOPHIL # BLD AUTO: 0.13 10*3/MM3 (ref 0–0.4)
EOSINOPHIL NFR BLD AUTO: 1.4 % (ref 0.3–6.2)
ERYTHROCYTE [DISTWIDTH] IN BLOOD BY AUTOMATED COUNT: 13.5 % (ref 12.3–15.4)
FERRITIN SERPL-MCNC: 37.8 NG/ML (ref 13–150)
FOLATE SERPL-MCNC: >20 NG/ML (ref 4.78–24.2)
GLOBULIN UR ELPH-MCNC: 2.1 GM/DL
GLUCOSE SERPL-MCNC: 86 MG/DL (ref 65–99)
GLUCOSE UR STRIP-MCNC: NEGATIVE MG/DL
HCT VFR BLD AUTO: 33.4 % (ref 34–46.6)
HGB BLD-MCNC: 10.8 G/DL (ref 12–15.9)
HGB UR QL STRIP.AUTO: NEGATIVE
IMM GRANULOCYTES # BLD AUTO: 0.03 10*3/MM3 (ref 0–0.05)
IMM GRANULOCYTES NFR BLD AUTO: 0.3 % (ref 0–0.5)
INR PPP: 1.1 (ref 0.9–1.1)
IRON 24H UR-MRATE: 43 MCG/DL (ref 37–145)
IRON SATN MFR SERPL: 13 % (ref 20–50)
KETONES UR QL STRIP: NEGATIVE
LEUKOCYTE ESTERASE UR QL STRIP.AUTO: NEGATIVE
LIPASE SERPL-CCNC: 16 U/L (ref 13–60)
LYMPHOCYTES # BLD AUTO: 1.74 10*3/MM3 (ref 0.7–3.1)
LYMPHOCYTES NFR BLD AUTO: 19 % (ref 19.6–45.3)
MCH RBC QN AUTO: 29.5 PG (ref 26.6–33)
MCHC RBC AUTO-ENTMCNC: 32.3 G/DL (ref 31.5–35.7)
MCV RBC AUTO: 91.3 FL (ref 79–97)
MONOCYTES # BLD AUTO: 1 10*3/MM3 (ref 0.1–0.9)
MONOCYTES NFR BLD AUTO: 10.9 % (ref 5–12)
NEUTROPHILS NFR BLD AUTO: 6.22 10*3/MM3 (ref 1.7–7)
NEUTROPHILS NFR BLD AUTO: 68 % (ref 42.7–76)
NITRITE UR QL STRIP: NEGATIVE
NRBC BLD AUTO-RTO: 0 /100 WBC (ref 0–0.2)
PH UR STRIP.AUTO: 8 [PH] (ref 5–8)
PLATELET # BLD AUTO: 177 10*3/MM3 (ref 140–450)
PMV BLD AUTO: 9.6 FL (ref 6–12)
POTASSIUM SERPL-SCNC: 3.6 MMOL/L (ref 3.5–5.2)
PROT SERPL-MCNC: 5.6 G/DL (ref 6–8.5)
PROT UR QL STRIP: NEGATIVE
PROTHROMBIN TIME: 14.4 SECONDS (ref 11.7–14.2)
RBC # BLD AUTO: 3.66 10*6/MM3 (ref 3.77–5.28)
RETICS # AUTO: 0.09 10*6/MM3 (ref 0.02–0.13)
RETICS/RBC NFR AUTO: 2.45 % (ref 0.7–1.9)
SODIUM SERPL-SCNC: 146 MMOL/L (ref 136–145)
SP GR UR STRIP: 1.02 (ref 1–1.03)
TIBC SERPL-MCNC: 340 MCG/DL (ref 298–536)
TRANSFERRIN SERPL-MCNC: 228 MG/DL (ref 200–360)
UROBILINOGEN UR QL STRIP: NORMAL
VIT B12 BLD-MCNC: 1060 PG/ML (ref 211–946)
WBC NRBC COR # BLD AUTO: 9.16 10*3/MM3 (ref 3.4–10.8)

## 2024-01-16 PROCEDURE — 82746 ASSAY OF FOLIC ACID SERUM: CPT | Performed by: INTERNAL MEDICINE

## 2024-01-16 PROCEDURE — 85025 COMPLETE CBC W/AUTO DIFF WBC: CPT | Performed by: STUDENT IN AN ORGANIZED HEALTH CARE EDUCATION/TRAINING PROGRAM

## 2024-01-16 PROCEDURE — 25510000001 IOPAMIDOL 61 % SOLUTION: Performed by: STUDENT IN AN ORGANIZED HEALTH CARE EDUCATION/TRAINING PROGRAM

## 2024-01-16 PROCEDURE — 25810000003 SODIUM CHLORIDE 0.9 % SOLUTION: Performed by: INTERNAL MEDICINE

## 2024-01-16 PROCEDURE — 83690 ASSAY OF LIPASE: CPT | Performed by: STUDENT IN AN ORGANIZED HEALTH CARE EDUCATION/TRAINING PROGRAM

## 2024-01-16 PROCEDURE — 81003 URINALYSIS AUTO W/O SCOPE: CPT | Performed by: STUDENT IN AN ORGANIZED HEALTH CARE EDUCATION/TRAINING PROGRAM

## 2024-01-16 PROCEDURE — 36415 COLL VENOUS BLD VENIPUNCTURE: CPT

## 2024-01-16 PROCEDURE — 80053 COMPREHEN METABOLIC PANEL: CPT | Performed by: STUDENT IN AN ORGANIZED HEALTH CARE EDUCATION/TRAINING PROGRAM

## 2024-01-16 PROCEDURE — 84466 ASSAY OF TRANSFERRIN: CPT | Performed by: INTERNAL MEDICINE

## 2024-01-16 PROCEDURE — G0378 HOSPITAL OBSERVATION PER HR: HCPCS

## 2024-01-16 PROCEDURE — 82728 ASSAY OF FERRITIN: CPT | Performed by: INTERNAL MEDICINE

## 2024-01-16 PROCEDURE — 85045 AUTOMATED RETICULOCYTE COUNT: CPT | Performed by: INTERNAL MEDICINE

## 2024-01-16 PROCEDURE — 85730 THROMBOPLASTIN TIME PARTIAL: CPT | Performed by: STUDENT IN AN ORGANIZED HEALTH CARE EDUCATION/TRAINING PROGRAM

## 2024-01-16 PROCEDURE — 83540 ASSAY OF IRON: CPT | Performed by: INTERNAL MEDICINE

## 2024-01-16 PROCEDURE — 85018 HEMOGLOBIN: CPT | Performed by: INTERNAL MEDICINE

## 2024-01-16 PROCEDURE — 74177 CT ABD & PELVIS W/CONTRAST: CPT

## 2024-01-16 PROCEDURE — 85610 PROTHROMBIN TIME: CPT | Performed by: STUDENT IN AN ORGANIZED HEALTH CARE EDUCATION/TRAINING PROGRAM

## 2024-01-16 PROCEDURE — 85014 HEMATOCRIT: CPT | Performed by: INTERNAL MEDICINE

## 2024-01-16 PROCEDURE — 82607 VITAMIN B-12: CPT | Performed by: INTERNAL MEDICINE

## 2024-01-16 PROCEDURE — 99285 EMERGENCY DEPT VISIT HI MDM: CPT

## 2024-01-16 RX ORDER — POTASSIUM CHLORIDE 750 MG/1
40 TABLET, FILM COATED, EXTENDED RELEASE ORAL EVERY 4 HOURS
Status: COMPLETED | OUTPATIENT
Start: 2024-01-16 | End: 2024-01-16

## 2024-01-16 RX ORDER — ONDANSETRON 2 MG/ML
4 INJECTION INTRAMUSCULAR; INTRAVENOUS EVERY 6 HOURS PRN
Status: DISCONTINUED | OUTPATIENT
Start: 2024-01-16 | End: 2024-01-28 | Stop reason: HOSPADM

## 2024-01-16 RX ORDER — ASPIRIN 81 MG/1
81 TABLET, CHEWABLE ORAL DAILY
COMMUNITY
End: 2024-01-28 | Stop reason: HOSPADM

## 2024-01-16 RX ORDER — ACETAMINOPHEN 650 MG/1
650 SUPPOSITORY RECTAL EVERY 4 HOURS PRN
Status: DISCONTINUED | OUTPATIENT
Start: 2024-01-16 | End: 2024-01-28 | Stop reason: HOSPADM

## 2024-01-16 RX ORDER — SODIUM CHLORIDE 0.9 % (FLUSH) 0.9 %
10 SYRINGE (ML) INJECTION EVERY 12 HOURS SCHEDULED
Status: DISCONTINUED | OUTPATIENT
Start: 2024-01-16 | End: 2024-01-28 | Stop reason: HOSPADM

## 2024-01-16 RX ORDER — ACETAMINOPHEN 325 MG/1
650 TABLET ORAL EVERY 4 HOURS PRN
Status: DISCONTINUED | OUTPATIENT
Start: 2024-01-16 | End: 2024-01-28 | Stop reason: HOSPADM

## 2024-01-16 RX ORDER — LOSARTAN POTASSIUM 100 MG/1
100 TABLET ORAL
Status: DISCONTINUED | OUTPATIENT
Start: 2024-01-16 | End: 2024-01-22

## 2024-01-16 RX ORDER — PANTOPRAZOLE SODIUM 40 MG/10ML
40 INJECTION, POWDER, LYOPHILIZED, FOR SOLUTION INTRAVENOUS EVERY 12 HOURS SCHEDULED
Status: DISCONTINUED | OUTPATIENT
Start: 2024-01-16 | End: 2024-01-19

## 2024-01-16 RX ORDER — SODIUM CHLORIDE 0.9 % (FLUSH) 0.9 %
10 SYRINGE (ML) INJECTION AS NEEDED
Status: DISCONTINUED | OUTPATIENT
Start: 2024-01-16 | End: 2024-01-28 | Stop reason: HOSPADM

## 2024-01-16 RX ORDER — HYDROCHLOROTHIAZIDE 12.5 MG/1
12.5 TABLET ORAL
Status: DISCONTINUED | OUTPATIENT
Start: 2024-01-16 | End: 2024-01-17

## 2024-01-16 RX ORDER — FUROSEMIDE 20 MG/1
20 TABLET ORAL DAILY
COMMUNITY
End: 2024-01-28 | Stop reason: HOSPADM

## 2024-01-16 RX ORDER — SODIUM CHLORIDE 9 MG/ML
100 INJECTION, SOLUTION INTRAVENOUS CONTINUOUS
Status: DISCONTINUED | OUTPATIENT
Start: 2024-01-16 | End: 2024-01-19

## 2024-01-16 RX ORDER — ACETAMINOPHEN 160 MG/5ML
650 SOLUTION ORAL EVERY 4 HOURS PRN
Status: DISCONTINUED | OUTPATIENT
Start: 2024-01-16 | End: 2024-01-28 | Stop reason: HOSPADM

## 2024-01-16 RX ORDER — NITROGLYCERIN 0.4 MG/1
0.4 TABLET SUBLINGUAL
Status: DISCONTINUED | OUTPATIENT
Start: 2024-01-16 | End: 2024-01-28 | Stop reason: HOSPADM

## 2024-01-16 RX ORDER — DOCUSATE SODIUM 100 MG/1
100 CAPSULE, LIQUID FILLED ORAL DAILY
COMMUNITY
Start: 2024-01-29

## 2024-01-16 RX ORDER — DIAZEPAM 2 MG/1
2 TABLET ORAL 2 TIMES DAILY
Status: DISCONTINUED | OUTPATIENT
Start: 2024-01-16 | End: 2024-01-22

## 2024-01-16 RX ORDER — ONDANSETRON 4 MG/1
2 TABLET, FILM COATED ORAL DAILY
COMMUNITY
Start: 2024-01-29

## 2024-01-16 RX ORDER — POLYETHYLENE GLYCOL 3350 17 G/17G
17 POWDER, FOR SOLUTION ORAL DAILY
COMMUNITY
Start: 2024-01-29

## 2024-01-16 RX ORDER — SODIUM CHLORIDE 9 MG/ML
40 INJECTION, SOLUTION INTRAVENOUS AS NEEDED
Status: DISCONTINUED | OUTPATIENT
Start: 2024-01-16 | End: 2024-01-28 | Stop reason: HOSPADM

## 2024-01-16 RX ADMIN — POTASSIUM CHLORIDE 40 MEQ: 750 TABLET, EXTENDED RELEASE ORAL at 19:52

## 2024-01-16 RX ADMIN — SODIUM CHLORIDE 100 ML/HR: 9 INJECTION, SOLUTION INTRAVENOUS at 19:52

## 2024-01-16 RX ADMIN — HYDROCHLOROTHIAZIDE 12.5 MG: 12.5 TABLET ORAL at 20:56

## 2024-01-16 RX ADMIN — DIAZEPAM 2 MG: 2 TABLET ORAL at 20:56

## 2024-01-16 RX ADMIN — IOPAMIDOL 85 ML: 612 INJECTION, SOLUTION INTRAVENOUS at 13:07

## 2024-01-16 RX ADMIN — PANTOPRAZOLE SODIUM 40 MG: 40 INJECTION, POWDER, FOR SOLUTION INTRAVENOUS at 20:56

## 2024-01-16 RX ADMIN — POTASSIUM CHLORIDE 40 MEQ: 750 TABLET, EXTENDED RELEASE ORAL at 23:09

## 2024-01-16 RX ADMIN — LOSARTAN POTASSIUM 100 MG: 100 TABLET, FILM COATED ORAL at 20:56

## 2024-01-16 NOTE — H&P
Internal medicine history and physical  INTERNAL MEDICINE   [facility]       Patient Identification:  Name: Vonnie Coppola  Age: 88 y.o.  Sex: female  :  1935  MRN: [id number]                   Primary Care Physician: Christopher Leyva DO                               Date of admission:2024    Chief Complaint: Came to the emergency room after passing bright red blood in the stool today.    History of Present Illness:   Patient is an 88-year-old female with past medical history markable for breast cancer, history of hypertension, prior history of stroke for which she is taking small dose of aspirin, history of appendectomy when she was 15 followed by formation of adhesions requiring bowel surgery 25 years ago as well as prior history of gallbladder surgery hysterectomy nephrectomy was in her usual state of her health until  patient developed significant abdominal cramps consider constipation.  Patient went to the emergency room where she was admitted overnight for severe constipation and was treated with bowel management program with improvement in her constipation.  Patient was discharged on MiraLAX and laxatives and was doing well until this past Thursday when she had bloody bowel movements without any abdominal pain.  She had 3 bowel movements with blood in the stool at that time.  She had few more episodes afterwards but was doing okay until today when she had couple of dark bloody stools with abdominal cramps.  Because of that EMS was called and patient was brought to the emergency room for further evaluation.  Patient denies any fever or chills.  She denies any symptoms of dizziness or passing out spell.      Past Medical History:  Past Medical History:   Diagnosis Date    Arthritis     Asthma     Breast cancer     LEFT BREAST CA, LUMPECTOMY & RADS    Hx of radiation therapy     APPROXIMATELY 40 TREATMENTS FOR LEFT BREAST CA    Hypertension      Pneumonia     Stroke      Past Surgical History:  Past Surgical History:   Procedure Laterality Date    APPENDECTOMY      BLADDER SURGERY      BREAST BIOPSY Left 1999    MALIGNANT    BREAST LUMPECTOMY Left 1999    MALIGNANT    GALLBLADDER SURGERY      HYSTERECTOMY      OOPHORECTOMY        Home Meds:  (Not in a hospital admission)    Current Meds:   No current facility-administered medications for this encounter.    Current Outpatient Medications:     Ascorbic Acid (VITAMIN C ER PO), Take 1 tablet by mouth Daily., Disp: , Rfl:     Cholecalciferol (VITAMIN D-3 PO), Take 1 tablet by mouth Daily., Disp: , Rfl:     coenzyme Q10 50 MG capsule capsule, Take 1 capsule by mouth Daily., Disp: , Rfl:     Cyanocobalamin (VITAMIN B 12 PO), Take 1 tablet by mouth Daily., Disp: , Rfl:     diazePAM (VALIUM) 2 MG tablet, Take 1 tablet by mouth 2 (Two) Times a Day., Disp: , Rfl:     docusate sodium (COLACE) 100 MG capsule, Take 1 capsule by mouth Daily., Disp: , Rfl:     glucosamine-chondroitin 500-400 MG capsule capsule, Take 1 capsule by mouth 3 (Three) Times a Day With Meals., Disp: , Rfl:     losartan-hydrochlorothiazide (HYZAAR) 100-12.5 MG per tablet, Take 1 tablet by mouth Daily., Disp: , Rfl:     ondansetron (ZOFRAN) 4 MG tablet, Take 2 tablets by mouth Daily., Disp: , Rfl:     polyethylene glycol (MIRALAX) 17 g packet, Take 17 g by mouth Daily., Disp: , Rfl:     arformoterol (BROVANA) 15 MCG/2ML nebulizer solution, Take 2 mL by nebulization 2 (Two) Times a Day., Disp: 120 mL, Rfl: 11    budesonide (PULMICORT) 0.5 MG/2ML nebulizer solution, Take 2 mL by nebulization 2 (Two) Times a Day., Disp: 360 mL, Rfl: 2    fluticasone (FLONASE) 50 MCG/ACT nasal spray, 2 sprays by Each Nare route 2 (Two) Times a Day., Disp: 18.2 mL, Rfl: 11    furosemide (LASIX) 20 MG tablet, Take 1 tablet by mouth Daily., Disp: , Rfl:     LYCOPENE PO, Take  by mouth., Disp: , Rfl:     meclizine (ANTIVERT) 32 MG tablet, Take 1 tablet by mouth 3 (Three)  Times a Day As Needed for Dizziness., Disp: , Rfl:     OLIVE LEAF EXTRACT PO, Take  by mouth., Disp: , Rfl:     omeprazole (priLOSEC) 40 MG capsule, Take 1 capsule by mouth Daily., Disp: 30 capsule, Rfl: 11    Selenium (SELENIMIN PO), Take  by mouth., Disp: , Rfl:     Zinc Sulfate (ZINC 15 PO), Take  by mouth., Disp: , Rfl:   Allergies:  Allergies   Allergen Reactions    Cortisone Hives    Penicillins Hives     Social History:   Social History     Tobacco Use    Smoking status: Former     Packs/day: 0.50     Years: 2.00     Additional pack years: 0.00     Total pack years: 1.00     Types: Cigarettes    Smokeless tobacco: Never    Tobacco comments:     quit 40 years ago   Substance Use Topics    Alcohol use: No      Family History:  Family History   Problem Relation Age of Onset    Ovarian cancer Sister         70'S    Cancer Sister     Diabetes Sister     Hypertension Sister     Osteoarthritis Sister     Colon cancer Maternal Grandmother     Osteoarthritis Mother     Hypertension Father     Cancer Brother     Hypertension Brother     Osteoarthritis Brother     Cancer Other     Stroke Other     Ovarian cancer Paternal Grandmother         unknown    Breast cancer Neg Hx           Review of Systems  See history of present illness and past medical history.  Constitutional: Remarkable for no fever or chills  Cardiovascular: Remarkable for no chest pain or shortness of breath  GI: Remarkable for what has been described in history of present illness patient denies any hematemesis or change in appetite or vomiting.  : Remarkable for urination frequency urgency  Musculoskeletal: Remarkable for no new joint aches and pain  Neurological: Remarkable for no loss of consciousness or continence.      Vitals:   /77   Pulse 71   Temp 97.7 °F (36.5 °C) (Oral)   Resp 16   SpO2 96%   I/O: No intake or output data in the 24 hours ending 01/16/24 1531  Exam:  Patient is examined using the personal protective equipment as per  guidelines from infection control for this particular patient as enacted.  Hand washing was performed before and after patient interaction.  General Appearance:    Alert, cooperative, no distress, appears stated age   Head:    Normocephalic, without obvious abnormality, atraumatic   Eyes:    PERRL, conjunctiva/corneas clear, EOM's intact, both eyes   Ears:    Normal external ear canals, both ears   Nose:   Nares normal, septum midline, mucosa normal, no drainage    or sinus tenderness   Throat:   Lips, tongue, gums normal; oral mucosa pink and moist   Neck: Supple and no adenopathy noted   Back:     Symmetric, no curvature, ROM normal, no CVA tenderness   Lungs:     Clear to auscultation bilaterally, respirations unlabored   Chest Wall:    No tenderness or deformity    Heart:    Regular rate and rhythm, S1 and S2 normal, no murmur, rub   or gallop   Abdomen:   Morbidly obese with mild generalized tenderness but no guarding or rebound noted  Rectal examination performed in the emergency room revealed external hemorrhoids no gross blood present and fecal Hemoccult positive.   Extremities:   Extremities normal, atraumatic, no cyanosis or edema   Pulses:   Pulses palpable in all extremities; symmetric all extremities   Skin:   Skin color normal, Skin is warm and dry,  no rashes or palpable lesions   Neurologic: Alert and oriented  Nonfocal examination       Data Review:      I reviewed the patient's new clinical results.  Results from last 7 days   Lab Units 01/16/24  1221   WBC 10*3/mm3 9.16   HEMOGLOBIN g/dL 10.8*   PLATELETS 10*3/mm3 177     Results from last 7 days   Lab Units 01/16/24  1221   SODIUM mmol/L 146*   POTASSIUM mmol/L 3.6   CHLORIDE mmol/L 109*   CO2 mmol/L 28.0   BUN mg/dL 17   CREATININE mg/dL 1.01*   CALCIUM mg/dL 9.1   GLUCOSE mg/dL 86     CT Abdomen Pelvis With Contrast    Result Date: 1/16/2024  1. Very extensive diverticulosis throughout the length of the colon. No acute inflammatory process is  identified to account for the reported history of pain. Likewise, no lesion is identified to account for GI bleeding other than the numerous diverticula. 2. Incidentally noted noncalcified pulmonary nodules in the peripleural right lung base are not evident on previous imaging. Follow up with CT of the entire chest for survey of the chest is suggested before pursuing further management of this finding. 3. A 26 mm left adnexal cyst appears to be simple but further characterization with pelvic ultrasound is suggested. This may be technically challenging due to its location high in the pelvis.   Radiation dose reduction techniques were utilized, including automated exposure control and exposure modulation based on body size.   This report was finalized on 1/16/2024 2:34 PM by Dr. Adán Acosta M.D on Workstation: [id number]       Assessment:  Active Hospital Problems    Diagnosis  POA    **GI bleed [K92.2]  Yes    Anemia [D64.9]  Unknown    HTN (hypertension) [I10]  Unknown    History of breast cancer [Z85.3]  Not Applicable    Asthma [J45.909]  Unknown       Medical decision making/care plan: See admitting orders  GI bleed with description of melena as well as bright red blood in the stool in the setting of ongoing use of aspirin with mild anemia and no hemodynamic instability because of this presentation could very well be upper GI bleed due to duodenal or gastric ulcer versus diverticular bleed versus hemorrhoidal bleed.  Plan is to admit the patient for serial H&H transfuse if hemoglobin is 7 or less and if the start her on IV Protonix.  Check basic anemia studies.  Severe constipation requiring ER visit with hemorrhoids-continue with her home regimen for constipation after seen by GI service while monitoring her H&H.  Hypertension-continue antihypertensive regimen and avoid hypotensive episodes.  History of asthma-continue as needed nebulizer treatment and provide her with pulse ox monitoring.  Renal  insufficiency-monitor, provide nephrotoxic agents and hypotensive episodes.  Abnormal CT scan of the abdomen and pelvis revealing extensive diverticulosis involving the entire colon explaining her pain is daily, incidental pulmonary nodule and 2.6 cm left adnexal cyst.    Tiffani Arnold MD   1/16/2024  15:31 EST    Parts of this note may be an electronic transcription/translation of spoken language to printed text using the Dragon dictation system.

## 2024-01-16 NOTE — ED NOTES
.Nursing report ED to floor  Vonnie Coppola  88 y.o.  female    HPI :   Chief Complaint   Patient presents with    Abdominal Pain    Black or Bloody Stool    Knee Pain       Admitting doctor:   Tiffani Arnold MD    Admitting diagnosis:   The primary encounter diagnosis was Gastrointestinal hemorrhage, unspecified gastrointestinal hemorrhage type. Diagnoses of Pulmonary nodule and Cyst of ovary, unspecified laterality were also pertinent to this visit.    Code status:   Current Code Status       Date Active Code Status Order ID Comments User Context       Not on file            Allergies:   Cortisone and Penicillins    Isolation:   No active isolations    Intake and Output  No intake or output data in the 24 hours ending 01/16/24 1524    Weight:   There were no vitals filed for this visit.    Most recent vitals:   Vitals:    01/16/24 1142 01/16/24 1201 01/16/24 1503   BP: 136/66 135/75 157/77   Pulse: 77 71 71   Resp: 16     Temp: 97.7 °F (36.5 °C)     TempSrc: Oral     SpO2: 94% 95% 96%       Active LDAs/IV Access:   Lines, Drains & Airways       Active LDAs       Name Placement date Placement time Site Days    Peripheral IV   Right Antecubital --  unknown  --  unknown  Antecubital  --                    Labs (abnormal labs have a star):   Labs Reviewed   COMPREHENSIVE METABOLIC PANEL - Abnormal; Notable for the following components:       Result Value    Creatinine 1.01 (*)     Sodium 146 (*)     Chloride 109 (*)     Total Protein 5.6 (*)     eGFR 54.3 (*)     All other components within normal limits    Narrative:     GFR Normal >60  Chronic Kidney Disease <60  Kidney Failure <15    The GFR formula is only valid for adults with stable renal function between ages 18 and 70.   PROTIME-INR - Abnormal; Notable for the following components:    Protime 14.4 (*)     All other components within normal limits   CBC WITH AUTO DIFFERENTIAL - Abnormal; Notable for the following components:    RBC 3.66 (*)     Hemoglobin  10.8 (*)     Hematocrit 33.4 (*)     Lymphocyte % 19.0 (*)     Monocytes, Absolute 1.00 (*)     All other components within normal limits   APTT - Normal   URINALYSIS W/ MICROSCOPIC IF INDICATED (NO CULTURE) - Normal    Narrative:     Urine microscopic not indicated.   LIPASE - Normal   CBC AND DIFFERENTIAL    Narrative:     The following orders were created for panel order CBC & Differential.  Procedure                               Abnormality         Status                     ---------                               -----------         ------                     CBC Auto Differential[[id number]]        Abnormal            Final result                 Please view results for these tests on the individual orders.       EKG:   No orders to display       Meds given in ED:   Medications   iopamidol (ISOVUE-300) 61 % injection 85 mL (85 mL Intravenous Given 1/16/24 1307)       Imaging results:  CT Abdomen Pelvis With Contrast    Result Date: 1/16/2024  1. Very extensive diverticulosis throughout the length of the colon. No acute inflammatory process is identified to account for the reported history of pain. Likewise, no lesion is identified to account for GI bleeding other than the numerous diverticula. 2. Incidentally noted noncalcified pulmonary nodules in the peripleural right lung base are not evident on previous imaging. Follow up with CT of the entire chest for survey of the chest is suggested before pursuing further management of this finding. 3. A 26 mm left adnexal cyst appears to be simple but further characterization with pelvic ultrasound is suggested. This may be technically challenging due to its location high in the pelvis.   Radiation dose reduction techniques were utilized, including automated exposure control and exposure modulation based on body size.   This report was finalized on 1/16/2024 2:34 PM by Dr. Adán Acosta M.D on Workstation: [id number]       Ambulatory status:   - Assist  1-2    Social issues:   Social History     Socioeconomic History    Marital status:    Tobacco Use    Smoking status: Former     Packs/day: 0.50     Years: 2.00     Additional pack years: 0.00     Total pack years: 1.00     Types: Cigarettes    Smokeless tobacco: Never    Tobacco comments:     quit 40 years ago   Substance and Sexual Activity    Alcohol use: No    Drug use: No    Sexual activity: Hudson Gibson RN  01/16/24 15:24 EST

## 2024-01-16 NOTE — PROGRESS NOTES
Clinical Pharmacy Services: Medication History    Vonnie Coppola is a 88 y.o. female presenting to [facility] for   Chief Complaint   Patient presents with    Abdominal Pain    Black or Bloody Stool    Knee Pain       She  has a past medical history of Arthritis, Asthma, Breast cancer (1999), radiation therapy (1999), Hypertension, Pneumonia, and Stroke.    Allergies as of 01/16/2024 - Reviewed 01/16/2024   Allergen Reaction Noted    Cortisone Hives 05/30/2017    Penicillins Hives 05/30/2017       Medication information was obtained from: Patient   Pharmacy and Phone Number:     Prior to Admission Medications       Prescriptions Last Dose Informant Patient Reported? Taking?    Ascorbic Acid (VITAMIN C ER PO)  Self Yes Yes    Take 1 tablet by mouth Daily.    Cholecalciferol (VITAMIN D-3 PO)  Self Yes Yes    Take 1 tablet by mouth Daily.    coenzyme Q10 50 MG capsule capsule  Self Yes Yes    Take 1 capsule by mouth Daily.    Cyanocobalamin (VITAMIN B 12 PO)  Self Yes Yes    Take 1 tablet by mouth Daily.    diazePAM (VALIUM) 2 MG tablet 1/15/2024 Self Yes Yes    Take 1 tablet by mouth 2 (Two) Times a Day.    docusate sodium (COLACE) 100 MG capsule  Self Yes Yes    Take 1 capsule by mouth Daily.    glucosamine-chondroitin 500-400 MG capsule capsule  Self Yes Yes    Take 1 capsule by mouth 3 (Three) Times a Day With Meals.    losartan-hydrochlorothiazide (HYZAAR) 100-12.5 MG per tablet 1/15/2024 Self Yes Yes    Take 1 tablet by mouth Daily.    ondansetron (ZOFRAN) 4 MG tablet  Self Yes Yes    Take 2 tablets by mouth Daily.    polyethylene glycol (MIRALAX) 17 g packet  Self Yes Yes    Take 17 g by mouth Daily.    arformoterol (BROVANA) 15 MCG/2ML nebulizer solution   No No    Take 2 mL by nebulization 2 (Two) Times a Day.    budesonide (PULMICORT) 0.5 MG/2ML nebulizer solution   No No    Take 2 mL by nebulization 2 (Two) Times a Day.    fluticasone (FLONASE) 50 MCG/ACT nasal spray   No No    2 sprays by  Each Nare route 2 (Two) Times a Day.    furosemide (LASIX) 20 MG tablet  Self Yes No    Take 1 tablet by mouth Daily.    LYCOPENE PO   Yes No    Take  by mouth.    meclizine (ANTIVERT) 32 MG tablet  Self Yes No    Take 1 tablet by mouth 3 (Three) Times a Day As Needed for Dizziness.    OLIVE LEAF EXTRACT PO   Yes No    Take  by mouth.    omeprazole (priLOSEC) 40 MG capsule   No No    Take 1 capsule by mouth Daily.    Selenium (SELENIMIN PO)   Yes No    Take  by mouth.    Zinc Sulfate (ZINC 15 PO)   Yes No    Take  by mouth.              Medication notes: Patient stated she is prescribed lasix but has not started taking medication. Apt also stated she uses a compound cream for her knee but was able to remember the name of medication. I was unable to locate the name of cream in pt chart.     This medication list is complete to the best of my knowledge as of 1/16/2024    Please call if questions.    Prosper Friedman  Medication History Technician   [phone]    1/16/2024 14:59 EST

## 2024-01-16 NOTE — ED PROVIDER NOTES
EMERGENCY DEPARTMENT ENCOUNTER  Room Number:  13/13  PCP: Christopher Leyva DO  Independent Historians: Patient      HPI:  Chief Complaint: had concerns including Abdominal Pain, Black or Bloody Stool, and Knee Pain.     Context: Vonnie Coppola is a 88 y.o. female with a medical history of CVA, asthma history of breast cancer who presents to the ED c/o acute GI bleed.  Patient states starting last Thursday she began noticing bright red blood in her stools.  Patient states this was on again off again event over the last week.  Starting this morning she had copious amounts of dark tarry stools this morning with mild associated abdominal pain.  Patient denies use of blood thinners.      Review of prior external notes (non-ED) -and- Review of prior external test results outside of this encounter: Office visit with pulmonology from 11/14/2019 reviewed and notable for history of GERD, rhinitis, asthma, osteoarthritis, previous CVA, left-sided breast cancer.  She was therefore shortness of air.  Persistent asthma.  Was to treat Brovon I and return to clinic in 3 months    PAST MEDICAL HISTORY  Active Ambulatory Problems     Diagnosis Date Noted    No Active Ambulatory Problems     Resolved Ambulatory Problems     Diagnosis Date Noted    No Resolved Ambulatory Problems     Past Medical History:   Diagnosis Date    Arthritis     Asthma     Breast cancer 1999    Hx of radiation therapy 1999    Hypertension     Pneumonia     Stroke          PAST SURGICAL HISTORY  Past Surgical History:   Procedure Laterality Date    APPENDECTOMY      BLADDER SURGERY      BREAST BIOPSY Left 1999    MALIGNANT    BREAST LUMPECTOMY Left 1999    MALIGNANT    GALLBLADDER SURGERY      HYSTERECTOMY      OOPHORECTOMY           FAMILY HISTORY  Family History   Problem Relation Age of Onset    Ovarian cancer Sister         70'S    Cancer Sister     Diabetes Sister     Hypertension Sister     Osteoarthritis Sister     Colon cancer Maternal  Grandmother     Osteoarthritis Mother     Hypertension Father     Cancer Brother     Hypertension Brother     Osteoarthritis Brother     Cancer Other     Stroke Other     Ovarian cancer Paternal Grandmother         unknown    Breast cancer Neg Hx          SOCIAL HISTORY  Social History     Socioeconomic History    Marital status:    Tobacco Use    Smoking status: Former     Packs/day: 0.50     Years: 2.00     Additional pack years: 0.00     Total pack years: 1.00     Types: Cigarettes    Smokeless tobacco: Never    Tobacco comments:     quit 40 years ago   Substance and Sexual Activity    Alcohol use: No    Drug use: No    Sexual activity: Defer         ALLERGIES  Cortisone and Penicillins      REVIEW OF SYSTEMS  Review of Systems  Included in HPI  All systems reviewed and negative except for those discussed in HPI.      PHYSICAL EXAM    I have reviewed the triage vital signs and nursing notes.    ED Triage Vitals [01/16/24 1142]   Temp Heart Rate Resp BP SpO2   97.7 °F (36.5 °C) 77 16 136/66 94 %      Temp src Heart Rate Source Patient Position BP Location FiO2 (%)   Oral -- -- -- --       Physical Exam  GENERAL: alert, no acute distress  SKIN: Warm, dry  HENT: Normocephalic, atraumatic  EYES: no scleral icterus  CV: regular rhythm, regular rate  RESPIRATORY: normal effort, lungs clear  ABDOMEN: soft, nontender, nondistended  MUSCULOSKELETAL: no deformity  NEURO: alert, moves all extremities, follows commands  Rectal: External hemorrhoids noted, no gross blood present on exam with positive fecal occult blood test    LAB RESULTS  Recent Results (from the past 24 hour(s))   Comprehensive Metabolic Panel    Collection Time: 01/16/24 12:21 PM    Specimen: Blood   Result Value Ref Range    Glucose 86 65 - 99 mg/dL    BUN 17 8 - 23 mg/dL    Creatinine 1.01 (H) 0.57 - 1.00 mg/dL    Sodium 146 (H) 136 - 145 mmol/L    Potassium 3.6 3.5 - 5.2 mmol/L    Chloride 109 (H) 98 - 107 mmol/L    CO2 28.0 22.0 - 29.0 mmol/L     Calcium 9.1 8.6 - 10.5 mg/dL    Total Protein 5.6 (L) 6.0 - 8.5 g/dL    Albumin 3.5 3.5 - 5.2 g/dL    ALT (SGPT) 17 1 - 33 U/L    AST (SGOT) 17 1 - 32 U/L    Alkaline Phosphatase 58 39 - 117 U/L    Total Bilirubin 0.3 0.0 - 1.2 mg/dL    Globulin 2.1 gm/dL    A/G Ratio 1.7 g/dL    BUN/Creatinine Ratio 16.8 7.0 - 25.0    Anion Gap 9.0 5.0 - 15.0 mmol/L    eGFR 54.3 (L) >60.0 mL/min/1.73   aPTT    Collection Time: 01/16/24 12:21 PM    Specimen: Blood   Result Value Ref Range    PTT 26.7 22.7 - 35.4 seconds   Protime-INR    Collection Time: 01/16/24 12:21 PM    Specimen: Blood   Result Value Ref Range    Protime 14.4 (H) 11.7 - 14.2 Seconds    INR 1.10 0.90 - 1.10   Lipase    Collection Time: 01/16/24 12:21 PM    Specimen: Blood   Result Value Ref Range    Lipase 16 13 - 60 U/L   CBC Auto Differential    Collection Time: 01/16/24 12:21 PM    Specimen: Blood   Result Value Ref Range    WBC 9.16 3.40 - 10.80 10*3/mm3    RBC 3.66 (L) 3.77 - 5.28 10*6/mm3    Hemoglobin 10.8 (L) 12.0 - 15.9 g/dL    Hematocrit 33.4 (L) 34.0 - 46.6 %    MCV 91.3 79.0 - 97.0 fL    MCH 29.5 26.6 - 33.0 pg    MCHC 32.3 31.5 - 35.7 g/dL    RDW 13.5 12.3 - 15.4 %    RDW-SD 45.6 37.0 - 54.0 fl    MPV 9.6 6.0 - 12.0 fL    Platelets 177 140 - 450 10*3/mm3    Neutrophil % 68.0 42.7 - 76.0 %    Lymphocyte % 19.0 (L) 19.6 - 45.3 %    Monocyte % 10.9 5.0 - 12.0 %    Eosinophil % 1.4 0.3 - 6.2 %    Basophil % 0.4 0.0 - 1.5 %    Immature Grans % 0.3 0.0 - 0.5 %    Neutrophils, Absolute 6.22 1.70 - 7.00 10*3/mm3    Lymphocytes, Absolute 1.74 0.70 - 3.10 10*3/mm3    Monocytes, Absolute 1.00 (H) 0.10 - 0.90 10*3/mm3    Eosinophils, Absolute 0.13 0.00 - 0.40 10*3/mm3    Basophils, Absolute 0.04 0.00 - 0.20 10*3/mm3    Immature Grans, Absolute 0.03 0.00 - 0.05 10*3/mm3    nRBC 0.0 0.0 - 0.2 /100 WBC   Urinalysis With Microscopic If Indicated (No Culture) - Urine, Clean Catch    Collection Time: 01/16/24  1:37 PM    Specimen: Urine, Clean Catch   Result Value Ref  Range    Color, UA Yellow Yellow, Straw    Appearance, UA Clear Clear    pH, UA 8.0 5.0 - 8.0    Specific Gravity, UA 1.018 1.005 - 1.030    Glucose, UA Negative Negative    Ketones, UA Negative Negative    Bilirubin, UA Negative Negative    Blood, UA Negative Negative    Protein, UA Negative Negative    Leuk Esterase, UA Negative Negative    Nitrite, UA Negative Negative    Urobilinogen, UA 0.2 E.U./dL 0.2 - 1.0 E.U./dL         RADIOLOGY  CT Abdomen Pelvis With Contrast    Result Date: 1/16/2024  ABDOMEN AND PELVIS CT WITH CONTRAST  HISTORY: Abdominal pain, GI bleeding. Prior appendectomy, hysterectomy with oophorectomy and cholecystectomy as well as bladder surgery.  TECHNIQUE: Abdomen and pelvis CT was performed with 85 mL Isovue-300 IV contrast. Correlation with remote prior abdomen and pelvis CT 03/02/2010 and chest CT 10/22/2019.  FINDINGS: There is a small sliding hiatal hernia. Otherwise, the stomach and the small bowel appears normal. There is very extensive diverticulosis throughout the entire length of the colon. No focal inflammation or mass lesion is identified.  A 24 mm fluid density lesion in the spleen is unchanged since 2019. There is a, second, 13 mm low-density lesion along the lateral aspect of the spleen. The adrenals, pancreas, liver, and kidneys appear normal. The gallbladder is absent. There is no abnormal biliary or pancreatic ductal dilatation. No hydronephrosis. The urinary bladder appears normal.  The uterus and the right ovary are absent. The left ovary lies in the upper left pelvis anterior to the psoas muscle and contains a 26 mm x 25 mm homogeneous fluid density focus.  The urinary bladder appears normal. There is a right inguinal hernia through which protrudes normal-appearing omental fat into the upper portion of the right inguinal canal. There is no involved bowel.  Lower medial structures are remarkable for a small sliding hiatal hernia. There are 2 noncalcified pulmonary nodules  along the caudad aspect of the right middle lobe on image 13, 7 mm and image 15, 14 mm.  There is degenerative disc and facet change in the lumbar spine and mild degenerative change at the hips. No bone lesion.  The abdominal aorta is partly calcified but normal in caliber. There is no lymphadenopathy or free fluid.      1. Very extensive diverticulosis throughout the length of the colon. No acute inflammatory process is identified to account for the reported history of pain. Likewise, no lesion is identified to account for GI bleeding other than the numerous diverticula. 2. Incidentally noted noncalcified pulmonary nodules in the peripleural right lung base are not evident on previous imaging. Follow up with CT of the entire chest for survey of the chest is suggested before pursuing further management of this finding. 3. A 26 mm left adnexal cyst appears to be simple but further characterization with pelvic ultrasound is suggested. This may be technically challenging due to its location high in the pelvis.   Radiation dose reduction techniques were utilized, including automated exposure control and exposure modulation based on body size.   This report was finalized on 1/16/2024 2:34 PM by Dr. Adán Acosta M.D on Workstation: [id number]         MEDICATIONS GIVEN IN ER  Medications   iopamidol (ISOVUE-300) 61 % injection 85 mL (85 mL Intravenous Given 1/16/24 1307)         ORDERS PLACED DURING THIS VISIT:  Orders Placed This Encounter   Procedures    CT Abdomen Pelvis With Contrast    Comprehensive Metabolic Panel    aPTT    Protime-INR    Urinalysis With Microscopic If Indicated (No Culture) - Urine, Clean Catch    Lipase    CBC Auto Differential    [facility] (on-call MD unless specified) Details    Initiate Observation Status    CBC & Differential         OUTPATIENT MEDICATION MANAGEMENT:  No current Epic-ordered facility-administered medications on file.     Current Outpatient Medications Ordered in Epic    Medication Sig Dispense Refill    arformoterol (BROVANA) 15 MCG/2ML nebulizer solution Take 2 mL by nebulization 2 (Two) Times a Day. 120 mL 11    Ascorbic Acid (VITAMIN C ER PO) Take  by mouth.      budesonide (PULMICORT) 0.5 MG/2ML nebulizer solution Take 2 mL by nebulization 2 (Two) Times a Day. 360 mL 2    Cholecalciferol (VITAMIN D-3 PO) Take  by mouth.      coenzyme Q10 50 MG capsule capsule Take  by mouth Daily.      Cyanocobalamin (VITAMIN B 12 PO) Take  by mouth.      diazePAM (VALIUM) 2 MG tablet       fluticasone (FLONASE) 50 MCG/ACT nasal spray 2 sprays by Each Nare route 2 (Two) Times a Day. 18.2 mL 11    glucosamine-chondroitin 500-400 MG capsule capsule Take  by mouth 3 (Three) Times a Day With Meals.      losartan-hydrochlorothiazide (HYZAAR) 100-12.5 MG per tablet       LYCOPENE PO Take  by mouth.      meclizine (ANTIVERT) 32 MG tablet Take 32 mg by mouth 3 (Three) Times a Day As Needed for dizziness.      OLIVE LEAF EXTRACT PO Take  by mouth.      omeprazole (priLOSEC) 40 MG capsule Take 1 capsule by mouth Daily. 30 capsule 11    Selenium (SELENIMIN PO) Take  by mouth.      Zinc Sulfate (ZINC 15 PO) Take  by mouth.             PROGRESS, DATA ANALYSIS, CONSULTS, AND MEDICAL DECISION MAKING  All labs have been independently interpreted by me.  All radiology studies have been reviewed by me. All EKG's have been independently viewed and interpreted by me.  Discussion below represents my analysis of pertinent findings related to patient's condition, differential diagnosis, treatment plan and final disposition.    Differential diagnosis includes but is not limited to diverticular bleed, upper GI bleed, hemorrhoids.    ED Course as of 01/16/24 1449   Tue Jan 16, 2024   1343 Hemoglobin(!): 10.8 [MW]   1413 Fecal occult blood test positive [MW]   1449 Fecal occult blood test is positive, patient noted to have decreased hemoglobin.  CT abdomen is overall unremarkable outside of incidental findings such as  pulmonary nodule and ovarian cyst.  Will admit for further evaluation and management of GI bleed. [MW]   1443 Discussed with Dr. Arnold who agrees to admit [MW]      ED Course User Index  [MW] Christopher Crocker MD             AS OF 14:49 EST VITALS:    BP - 135/75  HR - 71  TEMP - 97.7 °F (36.5 °C) (Oral)  O2 SATS - 95%    COMPLEXITY OF CARE  The patient requires admission.      DIAGNOSIS  Final diagnoses:   Gastrointestinal hemorrhage, unspecified gastrointestinal hemorrhage type   Pulmonary nodule   Cyst of ovary, unspecified laterality         DISPOSITION  ED Disposition       ED Disposition   Decision to Admit    Condition   --    Comment   Level of Care: Telemetry [5]   Diagnosis: GI bleed [[id number]]   Admitting Physician: VALENTINE ARNOLD [[id number]]   Attending Physician: VALENTINE ARNOLD [[id number]]                  Please note that portions of this document were completed with a voice recognition program.    Note Disclaimer: At [facility], we believe that sharing information builds trust and better relationships. You are receiving this note because you recently visited [facility]. It is possible you will see health information before a provider has talked with you about it. This kind of information can be easy to misunderstand. To help you fully understand what it means for your health, we urge you to discuss this note with your provider.         Christopher Crocker MD  01/16/24 6007

## 2024-01-16 NOTE — ED TRIAGE NOTES
Patient to er from home per [facility] ems with c/o dark mary and noticed some bright red blood in the stool that started a few days ago. Patient reported chronic knee  pain. Patient alert x 4. No blood thinners reported.

## 2024-01-17 ENCOUNTER — APPOINTMENT (OUTPATIENT)
Dept: CT IMAGING | Facility: HOSPITAL | Age: 89
DRG: 377 | End: 2024-01-17
Payer: MEDICARE

## 2024-01-17 ENCOUNTER — APPOINTMENT (OUTPATIENT)
Dept: ULTRASOUND IMAGING | Facility: HOSPITAL | Age: 89
DRG: 377 | End: 2024-01-17
Payer: MEDICARE

## 2024-01-17 PROBLEM — R11.0 NAUSEA: Status: ACTIVE | Noted: 2024-01-16

## 2024-01-17 PROBLEM — Z86.73 HISTORY OF CVA (CEREBROVASCULAR ACCIDENT): Status: ACTIVE | Noted: 2024-01-17

## 2024-01-17 PROBLEM — R91.1 PULMONARY NODULE: Status: ACTIVE | Noted: 2024-01-17

## 2024-01-17 PROBLEM — E86.0 DEHYDRATION: Status: ACTIVE | Noted: 2024-01-17

## 2024-01-17 PROBLEM — N94.9 ADNEXAL CYST: Status: ACTIVE | Noted: 2024-01-17

## 2024-01-17 PROBLEM — N28.9 RENAL INSUFFICIENCY: Status: ACTIVE | Noted: 2024-01-17

## 2024-01-17 LAB
ALBUMIN SERPL-MCNC: 3.1 G/DL (ref 3.5–5.2)
ALBUMIN/GLOB SERPL: 1.2 G/DL
ALP SERPL-CCNC: 57 U/L (ref 39–117)
ALT SERPL W P-5'-P-CCNC: 14 U/L (ref 1–33)
ANION GAP SERPL CALCULATED.3IONS-SCNC: 9.4 MMOL/L (ref 5–15)
AST SERPL-CCNC: 16 U/L (ref 1–32)
BASOPHILS # BLD AUTO: 0.05 10*3/MM3 (ref 0–0.2)
BASOPHILS NFR BLD AUTO: 0.6 % (ref 0–1.5)
BILIRUB SERPL-MCNC: 0.4 MG/DL (ref 0–1.2)
BUN SERPL-MCNC: 16 MG/DL (ref 8–23)
BUN/CREAT SERPL: 17.8 (ref 7–25)
CALCIUM SPEC-SCNC: 8.3 MG/DL (ref 8.6–10.5)
CHLORIDE SERPL-SCNC: 110 MMOL/L (ref 98–107)
CO2 SERPL-SCNC: 21.6 MMOL/L (ref 22–29)
CREAT SERPL-MCNC: 0.9 MG/DL (ref 0.57–1)
DEPRECATED RDW RBC AUTO: 43.3 FL (ref 37–54)
EGFRCR SERPLBLD CKD-EPI 2021: 62.4 ML/MIN/1.73
EOSINOPHIL # BLD AUTO: 0.28 10*3/MM3 (ref 0–0.4)
EOSINOPHIL NFR BLD AUTO: 3.3 % (ref 0.3–6.2)
ERYTHROCYTE [DISTWIDTH] IN BLOOD BY AUTOMATED COUNT: 13.2 % (ref 12.3–15.4)
GLOBULIN UR ELPH-MCNC: 2.5 GM/DL
GLUCOSE SERPL-MCNC: 69 MG/DL (ref 65–99)
HCT VFR BLD AUTO: 32.2 % (ref 34–46.6)
HCT VFR BLD AUTO: 32.5 % (ref 34–46.6)
HCT VFR BLD AUTO: 32.9 % (ref 34–46.6)
HCT VFR BLD AUTO: 33.2 % (ref 34–46.6)
HGB BLD-MCNC: 10.3 G/DL (ref 12–15.9)
HGB BLD-MCNC: 10.5 G/DL (ref 12–15.9)
HGB BLD-MCNC: 10.6 G/DL (ref 12–15.9)
HGB BLD-MCNC: 10.6 G/DL (ref 12–15.9)
IMM GRANULOCYTES # BLD AUTO: 0.02 10*3/MM3 (ref 0–0.05)
IMM GRANULOCYTES NFR BLD AUTO: 0.2 % (ref 0–0.5)
LYMPHOCYTES # BLD AUTO: 2.18 10*3/MM3 (ref 0.7–3.1)
LYMPHOCYTES NFR BLD AUTO: 25.3 % (ref 19.6–45.3)
MCH RBC QN AUTO: 29.4 PG (ref 26.6–33)
MCHC RBC AUTO-ENTMCNC: 32.9 G/DL (ref 31.5–35.7)
MCV RBC AUTO: 89.4 FL (ref 79–97)
MONOCYTES # BLD AUTO: 1.01 10*3/MM3 (ref 0.1–0.9)
MONOCYTES NFR BLD AUTO: 11.7 % (ref 5–12)
NEUTROPHILS NFR BLD AUTO: 5.07 10*3/MM3 (ref 1.7–7)
NEUTROPHILS NFR BLD AUTO: 58.9 % (ref 42.7–76)
NRBC BLD AUTO-RTO: 0 /100 WBC (ref 0–0.2)
PLATELET # BLD AUTO: 188 10*3/MM3 (ref 140–450)
PMV BLD AUTO: 10.1 FL (ref 6–12)
POTASSIUM SERPL-SCNC: 3.9 MMOL/L (ref 3.5–5.2)
PROT SERPL-MCNC: 5.6 G/DL (ref 6–8.5)
RBC # BLD AUTO: 3.6 10*6/MM3 (ref 3.77–5.28)
SODIUM SERPL-SCNC: 141 MMOL/L (ref 136–145)
WBC NRBC COR # BLD AUTO: 8.61 10*3/MM3 (ref 3.4–10.8)

## 2024-01-17 PROCEDURE — 25810000003 SODIUM CHLORIDE 0.9 % SOLUTION: Performed by: INTERNAL MEDICINE

## 2024-01-17 PROCEDURE — 85025 COMPLETE CBC W/AUTO DIFF WBC: CPT | Performed by: INTERNAL MEDICINE

## 2024-01-17 PROCEDURE — 85018 HEMOGLOBIN: CPT | Performed by: INTERNAL MEDICINE

## 2024-01-17 PROCEDURE — 97166 OT EVAL MOD COMPLEX 45 MIN: CPT

## 2024-01-17 PROCEDURE — 76856 US EXAM PELVIC COMPLETE: CPT

## 2024-01-17 PROCEDURE — 94761 N-INVAS EAR/PLS OXIMETRY MLT: CPT

## 2024-01-17 PROCEDURE — 94664 DEMO&/EVAL PT USE INHALER: CPT

## 2024-01-17 PROCEDURE — 94640 AIRWAY INHALATION TREATMENT: CPT

## 2024-01-17 PROCEDURE — 94799 UNLISTED PULMONARY SVC/PX: CPT

## 2024-01-17 PROCEDURE — G0378 HOSPITAL OBSERVATION PER HR: HCPCS

## 2024-01-17 PROCEDURE — 99204 OFFICE O/P NEW MOD 45 MIN: CPT

## 2024-01-17 PROCEDURE — 97535 SELF CARE MNGMENT TRAINING: CPT

## 2024-01-17 PROCEDURE — 85014 HEMATOCRIT: CPT | Performed by: INTERNAL MEDICINE

## 2024-01-17 PROCEDURE — 25510000001 IOPAMIDOL 61 % SOLUTION: Performed by: INTERNAL MEDICINE

## 2024-01-17 PROCEDURE — 97162 PT EVAL MOD COMPLEX 30 MIN: CPT

## 2024-01-17 PROCEDURE — 97110 THERAPEUTIC EXERCISES: CPT

## 2024-01-17 PROCEDURE — 71260 CT THORAX DX C+: CPT

## 2024-01-17 PROCEDURE — 80053 COMPREHEN METABOLIC PANEL: CPT | Performed by: INTERNAL MEDICINE

## 2024-01-17 RX ORDER — DOCUSATE SODIUM 100 MG/1
100 CAPSULE, LIQUID FILLED ORAL 2 TIMES DAILY
Status: DISCONTINUED | OUTPATIENT
Start: 2024-01-17 | End: 2024-01-28 | Stop reason: HOSPADM

## 2024-01-17 RX ORDER — POLYETHYLENE GLYCOL 3350 17 G/17G
17 POWDER, FOR SOLUTION ORAL DAILY
Status: DISCONTINUED | OUTPATIENT
Start: 2024-01-17 | End: 2024-01-17

## 2024-01-17 RX ORDER — POLYETHYLENE GLYCOL 3350 17 G/17G
0.5 POWDER, FOR SOLUTION ORAL 2 TIMES DAILY
Status: DISCONTINUED | OUTPATIENT
Start: 2024-01-17 | End: 2024-01-17

## 2024-01-17 RX ORDER — POLYETHYLENE GLYCOL 3350 17 G/17G
17 POWDER, FOR SOLUTION ORAL 2 TIMES DAILY
Status: DISCONTINUED | OUTPATIENT
Start: 2024-01-17 | End: 2024-01-18

## 2024-01-17 RX ORDER — BISACODYL 5 MG/1
10 TABLET, DELAYED RELEASE ORAL ONCE
Status: COMPLETED | OUTPATIENT
Start: 2024-01-17 | End: 2024-01-17

## 2024-01-17 RX ORDER — BUDESONIDE 0.5 MG/2ML
0.5 INHALANT ORAL
Status: DISCONTINUED | OUTPATIENT
Start: 2024-01-17 | End: 2024-01-28 | Stop reason: HOSPADM

## 2024-01-17 RX ORDER — IPRATROPIUM BROMIDE AND ALBUTEROL SULFATE 2.5; .5 MG/3ML; MG/3ML
3 SOLUTION RESPIRATORY (INHALATION)
Status: DISCONTINUED | OUTPATIENT
Start: 2024-01-17 | End: 2024-01-18

## 2024-01-17 RX ADMIN — IPRATROPIUM BROMIDE AND ALBUTEROL SULFATE 3 ML: 2.5; .5 SOLUTION RESPIRATORY (INHALATION) at 09:20

## 2024-01-17 RX ADMIN — BISACODYL 10 MG: 5 TABLET, COATED ORAL at 12:40

## 2024-01-17 RX ADMIN — PANTOPRAZOLE SODIUM 40 MG: 40 INJECTION, POWDER, FOR SOLUTION INTRAVENOUS at 09:29

## 2024-01-17 RX ADMIN — POLYETHYLENE GLYCOL 3350 17 G: 17 POWDER, FOR SOLUTION ORAL at 09:28

## 2024-01-17 RX ADMIN — IPRATROPIUM BROMIDE AND ALBUTEROL SULFATE 3 ML: 2.5; .5 SOLUTION RESPIRATORY (INHALATION) at 13:46

## 2024-01-17 RX ADMIN — ACETAMINOPHEN 650 MG: 325 TABLET, FILM COATED ORAL at 20:57

## 2024-01-17 RX ADMIN — IOPAMIDOL 75 ML: 612 INJECTION, SOLUTION INTRAVENOUS at 11:58

## 2024-01-17 RX ADMIN — IPRATROPIUM BROMIDE AND ALBUTEROL SULFATE 3 ML: 2.5; .5 SOLUTION RESPIRATORY (INHALATION) at 20:01

## 2024-01-17 RX ADMIN — Medication 10 ML: at 20:59

## 2024-01-17 RX ADMIN — DOCUSATE SODIUM 100 MG: 100 CAPSULE, LIQUID FILLED ORAL at 20:57

## 2024-01-17 RX ADMIN — ACETAMINOPHEN 650 MG: 325 TABLET, FILM COATED ORAL at 06:15

## 2024-01-17 RX ADMIN — PANTOPRAZOLE SODIUM 40 MG: 40 INJECTION, POWDER, FOR SOLUTION INTRAVENOUS at 20:58

## 2024-01-17 RX ADMIN — DIAZEPAM 2 MG: 2 TABLET ORAL at 09:29

## 2024-01-17 RX ADMIN — BUDESONIDE 0.5 MG: 0.5 INHALANT ORAL at 20:01

## 2024-01-17 RX ADMIN — DIAZEPAM 2 MG: 2 TABLET ORAL at 20:57

## 2024-01-17 RX ADMIN — LOSARTAN POTASSIUM 100 MG: 100 TABLET, FILM COATED ORAL at 09:28

## 2024-01-17 RX ADMIN — POLYETHYLENE GLYCOL 3350 17 G: 17 POWDER, FOR SOLUTION ORAL at 20:57

## 2024-01-17 RX ADMIN — SODIUM CHLORIDE 125 ML/HR: 9 INJECTION, SOLUTION INTRAVENOUS at 17:51

## 2024-01-17 RX ADMIN — DOCUSATE SODIUM 100 MG: 100 CAPSULE, LIQUID FILLED ORAL at 09:32

## 2024-01-17 RX ADMIN — Medication 10 ML: at 09:29

## 2024-01-17 RX ADMIN — BUDESONIDE 0.5 MG: 0.5 INHALANT ORAL at 09:19

## 2024-01-17 NOTE — PLAN OF CARE
"  Problem: Adult Inpatient Plan of Care  Goal: Plan of Care Review  Outcome: Ongoing, Progressing   Goal Outcome Evaluation:   Vss.Had CT and US pelvis today.Up to bsc, very weak, pt states that her left knee \"gives out\" so pt requests purewick to remain in place.No c/o pain or n/v.                                           "

## 2024-01-17 NOTE — PROGRESS NOTES
Name: Vonnie Coppola ADMIT: 2024   : 1935  PCP: Christopher Leyva DO    MRN: [id number] LOS: 0 days   AGE/SEX: 88 y.o. female  ROOM: [facility]     Subjective   Subjective   Positive left-sided lower abdominal pain that has resolved now.  No bowel movement since admission.  No nausea or vomiting.  No dysphagia or odynophagia.    Review of Systems  Cardiovascular/respiratory.  Positive shortness of breath and left-sided chest pain that has both resolved.  No cough.  Positive occasional wheeze.  No hemoptysis.  No palpitation.  .  No dysuria or hematuria     Objective   Objective   Vital Signs  Temp:  [97.5 °F (36.4 °C)-98.4 °F (36.9 °C)] 97.5 °F (36.4 °C)  Heart Rate:  [67-77] 70  Resp:  [16-18] 18  BP: (109-157)/(66-93) 129/92  SpO2:  [94 %-99 %] 98 %  on  Flow (L/min):  [5] 5;   Device (Oxygen Therapy): nasal cannula    Intake/Output Summary (Last 24 hours) at 2024 0854  Last data filed at 2024 0500  Gross per 24 hour   Intake 0 ml   Output 1000 ml   Net -1000 ml     Body mass index is 39.36 kg/m².      24  1643   Weight: 107 kg (236 lb 8.9 oz)     Physical Exam  General.  Elderly female.  Obese.  Alert and oriented x 3.  No apparent pain/distress/diaphoresis.  Normal mood and affect.  Eyes.  Pupils equal round and reactive.  Intact extraocular musculature.  No pallor or jaundice.  Oral cavity.  Dry mucous membrane.  Neck.  Supple.  No JVD.  No lymphadenopathy or thyromegaly.  Cardiovascular.  Regular rate and rhythm with no gallops or murmurs.  Chest.  Clear to auscultation bilaterally with bilateral expiratory wheeze.  Abdomen.  Soft lax.  No tenderness.  No organomegaly.  No guarding or rebound.  Normal bowel sounds.  Extremities.  No clubbing/cyanosis/edema.  CNS.  No acute focal neurological deficits.    Results Review:      Results from last 7 days   Lab Units 24  0357 24  1221   SODIUM mmol/L 141 146*   POTASSIUM mmol/L 3.9 3.6   CHLORIDE mmol/L 110* 109*  "  CO2 mmol/L 21.6* 28.0   BUN mg/dL 16 17   CREATININE mg/dL 0.90 1.01*   GLUCOSE mg/dL 69 86   CALCIUM mg/dL 8.3* 9.1   AST (SGOT) U/L 16 17   ALT (SGPT) U/L 14 17     Estimated Creatinine Clearance: 54.5 mL/min (by C-G formula based on SCr of 0.9 mg/dL).                                Invalid input(s): \"LDLCALC\"  Results from last 7 days   Lab Units 01/17/24  0737 01/17/24  0357 01/16/24  2346 01/16/24  1221   WBC 10*3/mm3  --  8.61  --  9.16   HEMOGLOBIN g/dL 10.5* 10.6* 10.3* 10.8*   HEMATOCRIT % 32.9* 32.2* 32.5* 33.4*   PLATELETS 10*3/mm3  --  188  --  177   MCV fL  --  89.4  --  91.3   MCH pg  --  29.4  --  29.5   MCHC g/dL  --  32.9  --  32.3   RDW %  --  13.2  --  13.5   RDW-SD fl  --  43.3  --  45.6   MPV fL  --  10.1  --  9.6   NEUTROPHIL % %  --  58.9  --  68.0   LYMPHOCYTE % %  --  25.3  --  19.0*   MONOCYTES % %  --  11.7  --  10.9   EOSINOPHIL % %  --  3.3  --  1.4   BASOPHIL % %  --  0.6  --  0.4   IMM GRAN % %  --  0.2  --  0.3   NEUTROS ABS 10*3/mm3  --  5.07  --  6.22   LYMPHS ABS 10*3/mm3  --  2.18  --  1.74   MONOS ABS 10*3/mm3  --  1.01*  --  1.00*   EOS ABS 10*3/mm3  --  0.28  --  0.13   BASOS ABS 10*3/mm3  --  0.05  --  0.04   IMMATURE GRANS (ABS) 10*3/mm3  --  0.02  --  0.03   NRBC /100 WBC  --  0.0  --  0.0     Results from last 7 days   Lab Units 01/16/24  1221   INR  1.10   APTT seconds 26.7                 Results from last 7 days   Lab Units 01/16/24  1221   LIPASE U/L 16             Results from last 7 days   Lab Units 01/16/24  1337   NITRITE UA  Negative           Imaging:  Imaging Results (Last 24 Hours)       Procedure Component Value Units Date/Time    CT Abdomen Pelvis With Contrast [[id number]] Collected: 01/16/24 1401     Updated: 01/16/24 1437    Narrative:      ABDOMEN AND PELVIS CT WITH CONTRAST     HISTORY: Abdominal pain, GI bleeding. Prior appendectomy, hysterectomy  with oophorectomy and cholecystectomy as well as bladder surgery.     TECHNIQUE: Abdomen and pelvis CT was " performed with 85 mL Isovue-300 IV  contrast. Correlation with remote prior abdomen and pelvis CT 03/02/2010  and chest CT 10/22/2019.     FINDINGS: There is a small sliding hiatal hernia. Otherwise, the stomach  and the small bowel appears normal. There is very extensive  diverticulosis throughout the entire length of the colon. No focal  inflammation or mass lesion is identified.     A 24 mm fluid density lesion in the spleen is unchanged since 2019.  There is a, second, 13 mm low-density lesion along the lateral aspect of  the spleen. The adrenals, pancreas, liver, and kidneys appear normal.  The gallbladder is absent. There is no abnormal biliary or pancreatic  ductal dilatation. No hydronephrosis. The urinary bladder appears  normal.     The uterus and the right ovary are absent. The left ovary lies in the  upper left pelvis anterior to the psoas muscle and contains a 26 mm x 25  mm homogeneous fluid density focus.     The urinary bladder appears normal. There is a right inguinal hernia  through which protrudes normal-appearing omental fat into the upper  portion of the right inguinal canal. There is no involved bowel.     Lower medial structures are remarkable for a small sliding hiatal  hernia. There are 2 noncalcified pulmonary nodules along the caudad  aspect of the right middle lobe on image 13, 7 mm and image 15, 14 mm.     There is degenerative disc and facet change in the lumbar spine and mild  degenerative change at the hips. No bone lesion.     The abdominal aorta is partly calcified but normal in caliber. There is  no lymphadenopathy or free fluid.       Impression:      1. Very extensive diverticulosis throughout the length of the colon. No  acute inflammatory process is identified to account for the reported  history of pain. Likewise, no lesion is identified to account for GI  bleeding other than the numerous diverticula.  2. Incidentally noted noncalcified pulmonary nodules in the  peripleural  right lung base are not evident on previous imaging. Follow up with CT  of the entire chest for survey of the chest is suggested before pursuing  further management of this finding.  3. A 26 mm left adnexal cyst appears to be simple but further  characterization with pelvic ultrasound is suggested. This may be  technically challenging due to its location high in the pelvis.        Radiation dose reduction techniques were utilized, including automated  exposure control and exposure modulation based on body size.        This report was finalized on 1/16/2024 2:34 PM by Dr. Adán Acosta M.D on Workstation: [id number]                  I reviewed the patient's new clinical results / labs / tests / procedures      Assessment/Plan     Active Hospital Problems    Diagnosis  POA    **GI bleed [K92.2]  Yes    Anemia [D64.9]  Unknown    HTN (hypertension) [I10]  Unknown    History of breast cancer [Z85.3]  Not Applicable    Asthma [J45.909]  Unknown      Resolved Hospital Problems   No resolved problems to display.           GI bleed in a patient with a history of peptic ulcer disease/diverticular disease/hemorrhoids/constipation.  Differential diet peptic ulcer disease/ diverticular bleed/hemorrhoidal bleed.  Patient had EGD 2 years ago with peptic ulcer disease and colonoscopy 3 years ago with diverticular disease and hemorrhoids.  Hemodynamically stable.  Hemoglobin is stable.  N.p.o. and IV Protonix and consult GI.  Continue Colace and MiraLAX.  Monitor hemoglobin and transfuse if hemoglobin is less than 7.  Hold aspirin.  GI blood loss anemia/iron deficiency anemia.  Possibly secondary to GI bleed.  Monitor hemoglobin and transfuse if hemoglobin is less than 7.  Discharge.  Hypertension.  Good blood pressure controlled continue Cozaar.  Stop diuresis dehydration.  Dehydration/renal insufficiency/hypernatremia.  Resolved renal insufficiency and hypernatremia on IV fluid.  Increase IV fluid.  Stop  diuretics.  Acute asthma exacerbation/hypoxemia/pulmonary nodule.  Will check CT scan of the chest and continue Symbicort and DuoNebs.  History of CVA.  Holding aspirin secondary to GI bleed..  Left adnexal cyst.  Check pelvic ultrasound.  GYN follow-up as an outpatient.  VTE prophylaxis.  Sequential compression devices.      Discussed my findings and plan of treatment with the patient/nurses at multidisciplinary rounds.  Disposition.  To be determined based on clinical course        Jak Zhong MD  [facility]  01/17/24  08:54 EST

## 2024-01-17 NOTE — PLAN OF CARE
Problem: Adult Inpatient Plan of Care  Goal: Plan of Care Review  Outcome: Ongoing, Progressing  Flowsheets (Taken 1/17/2024 0415)  Plan of Care Reviewed With: patient  Outcome Evaluation: Pt in bed at lowest position, brakes on, 2 side rails up, call light within reach, bed alarm on.  Goal: Patient-Specific Goal (Individualized)  Outcome: Ongoing, Progressing  Goal: Absence of Hospital-Acquired Illness or Injury  Outcome: Ongoing, Progressing  Intervention: Identify and Manage Fall Risk  Recent Flowsheet Documentation  Taken 1/17/2024 0201 by Cuco Fitzgerald RN  Safety Promotion/Fall Prevention: safety round/check completed  Taken 1/17/2024 0010 by Cuco Fitzgerald RN  Safety Promotion/Fall Prevention: safety round/check completed  Taken 1/16/2024 2210 by Cuco Fitzgerald RN  Safety Promotion/Fall Prevention: safety round/check completed  Taken 1/16/2024 2010 by Cuco Fitzgerald RN  Safety Promotion/Fall Prevention: safety round/check completed  Intervention: Prevent Skin Injury  Recent Flowsheet Documentation  Taken 1/17/2024 0201 by Cuco Fitzgerald RN  Body Position: supine, legs elevated  Taken 1/17/2024 0010 by Cuco Fitzgerald RN  Body Position: supine, legs elevated  Taken 1/16/2024 2210 by Cuco Fitzgerald RN  Body Position: supine, legs elevated  Taken 1/16/2024 2010 by Cuco Fitzgerald RN  Body Position:   supine   legs elevated  Intervention: Prevent and Manage VTE (Venous Thromboembolism) Risk  Recent Flowsheet Documentation  Taken 1/17/2024 0201 by Cuco Fitzgerald RN  Activity Management: activity encouraged  Taken 1/17/2024 0010 by Cuco Fitzgerald RN  Activity Management: activity encouraged  Taken 1/16/2024 2210 by Cuco Ftizgerald RN  Activity Management: activity encouraged  Taken 1/16/2024 2010 by Cuco Fitzgerald RN  Activity Management: activity encouraged  VTE Prevention/Management: sequential compression devices off  Range of Motion: active ROM (range of motion) encouraged  Intervention: Prevent  Infection  Recent Flowsheet Documentation  Taken 1/17/2024 0201 by Cuco Fitzgerald RN  Infection Prevention: rest/sleep promoted  Taken 1/17/2024 0010 by Cuco Fitzgerald RN  Infection Prevention: rest/sleep promoted  Taken 1/16/2024 2210 by Cuco Fitzgerald RN  Infection Prevention: rest/sleep promoted  Taken 1/16/2024 2010 by Cuco Fitzgerald RN  Infection Prevention: rest/sleep promoted  Goal: Optimal Comfort and Wellbeing  Outcome: Ongoing, Progressing  Intervention: Provide Person-Centered Care  Recent Flowsheet Documentation  Taken 1/17/2024 0010 by Cuco Fitzgerald RN  Trust Relationship/Rapport:   choices provided   care explained  Taken 1/16/2024 2010 by Cuco Fitzgerald RN  Trust Relationship/Rapport:   care explained   choices provided  Goal: Readiness for Transition of Care  Outcome: Ongoing, Progressing  Goal: Plan of Care Review  Outcome: Ongoing, Progressing  Flowsheets (Taken 1/17/2024 0415)  Plan of Care Reviewed With: patient  Outcome Evaluation: Pt in bed at lowest position, brakes on, 2 side rails up, call light within reach, bed alarm on.  Goal: Patient-Specific Goal (Individualized)  Outcome: Ongoing, Progressing  Goal: Absence of Hospital-Acquired Illness or Injury  Outcome: Ongoing, Progressing  Intervention: Identify and Manage Fall Risk  Recent Flowsheet Documentation  Taken 1/17/2024 0201 by Cuco Fitzgerald RN  Safety Promotion/Fall Prevention: safety round/check completed  Taken 1/17/2024 0010 by Cuco Fitzgerald RN  Safety Promotion/Fall Prevention: safety round/check completed  Taken 1/16/2024 2210 by Cuco Fitzgerald RN  Safety Promotion/Fall Prevention: safety round/check completed  Taken 1/16/2024 2010 by Cuco Fitzgerald RN  Safety Promotion/Fall Prevention: safety round/check completed  Intervention: Prevent Skin Injury  Recent Flowsheet Documentation  Taken 1/17/2024 0201 by Cuco Fitzgerald RN  Body Position: supine, legs elevated  Taken 1/17/2024 0010 by Cuco Fitzgerald RN  Body Position: supine,  legs elevated  Taken 1/16/2024 2210 by Cuco Fitzgerald RN  Body Position: supine, legs elevated  Taken 1/16/2024 2010 by Cuco Fitzgerald RN  Body Position:   supine   legs elevated  Intervention: Prevent and Manage VTE (Venous Thromboembolism) Risk  Recent Flowsheet Documentation  Taken 1/17/2024 0201 by Cuco Fitzgerald RN  Activity Management: activity encouraged  Taken 1/17/2024 0010 by Cuco Fitzgerald RN  Activity Management: activity encouraged  Taken 1/16/2024 2210 by Cuco Fitzgerald RN  Activity Management: activity encouraged  Taken 1/16/2024 2010 by Cuco Fitzgerald RN  Activity Management: activity encouraged  VTE Prevention/Management: sequential compression devices off  Range of Motion: active ROM (range of motion) encouraged  Intervention: Prevent Infection  Recent Flowsheet Documentation  Taken 1/17/2024 0201 by Cuco Fitzgerald RN  Infection Prevention: rest/sleep promoted  Taken 1/17/2024 0010 by Cuco Fitzgerald RN  Infection Prevention: rest/sleep promoted  Taken 1/16/2024 2210 by Cuco Fitzgerald RN  Infection Prevention: rest/sleep promoted  Taken 1/16/2024 2010 by Cuco Fitzgerald RN  Infection Prevention: rest/sleep promoted  Goal: Optimal Comfort and Wellbeing  Outcome: Ongoing, Progressing  Intervention: Provide Person-Centered Care  Recent Flowsheet Documentation  Taken 1/17/2024 0010 by Cuco Fitzgerald RN  Trust Relationship/Rapport:   choices provided   care explained  Taken 1/16/2024 2010 by Cuco Fitzgerald RN  Trust Relationship/Rapport:   care explained   choices provided  Goal: Readiness for Transition of Care  Outcome: Ongoing, Progressing     Problem: Skin Injury Risk Increased  Goal: Skin Health and Integrity  Outcome: Ongoing, Progressing  Intervention: Optimize Skin Protection  Recent Flowsheet Documentation  Taken 1/17/2024 0201 by Cuco Fitzgerald RN  Head of Bed (HOB) Positioning: HOB at 30-45 degrees  Taken 1/17/2024 0010 by Cuco Fitzgerald RN  Head of Bed (HOB) Positioning: HOB at 30-45  degrees  Taken 1/16/2024 2210 by Cuco Fitzgerald RN  Head of Bed (HOB) Positioning: HOB at 30-45 degrees  Taken 1/16/2024 2010 by Cuco Fitzgerald RN  Head of Bed (HOB) Positioning: HOB at 30-45 degrees     Problem: Fall Injury Risk  Goal: Absence of Fall and Fall-Related Injury  Outcome: Ongoing, Progressing  Intervention: Identify and Manage Contributors  Recent Flowsheet Documentation  Taken 1/17/2024 0201 by Cuco Fitzgerald RN  Medication Review/Management: medications reviewed  Taken 1/17/2024 0010 by Cuco Fitzgerald RN  Medication Review/Management: medications reviewed  Taken 1/16/2024 2210 by Cuco Fitzgerald RN  Medication Review/Management: medications reviewed  Taken 1/16/2024 2010 by Cuco Fitzgerald RN  Medication Review/Management: medications reviewed  Intervention: Promote Injury-Free Environment  Recent Flowsheet Documentation  Taken 1/17/2024 0201 by Cuco Fitzgerald RN  Safety Promotion/Fall Prevention: safety round/check completed  Taken 1/17/2024 0010 by Cuco Fitzgerald RN  Safety Promotion/Fall Prevention: safety round/check completed  Taken 1/16/2024 2210 by Cuco Fitzgerald RN  Safety Promotion/Fall Prevention: safety round/check completed  Taken 1/16/2024 2010 by Cuco Fitzgerald RN  Safety Promotion/Fall Prevention: safety round/check completed     Problem: Adjustment to Illness (Gastrointestinal Bleeding)  Goal: Optimal Coping with Acute Illness  Outcome: Ongoing, Progressing     Problem: Bleeding (Gastrointestinal Bleeding)  Goal: Hemostasis  Outcome: Ongoing, Progressing   Goal Outcome Evaluation:  Plan of Care Reviewed With: patient           Outcome Evaluation: Pt in bed at lowest position, brakes on, 2 side rails up, call light within reach, bed alarm on.

## 2024-01-17 NOTE — PLAN OF CARE
Goal Outcome Evaluation:  Plan of Care Reviewed With: (P) patient           Outcome Evaluation: (P) Vonnie is a pleasant, 88 y.o. female, admitted following GI bleed, with findings of diverticulosis. She has a hx of HTN, breast cancer, asthma, pneumonia and CVA. Pt supine in bed and agreeable for therapy at arrival of PT for evaluation this AM. She currently is living with her daughter in a home with a ramp to enter. She reports being independent with household mobility and ADLs with the use of a rollator. She uses her rollator all the time. She also reports receiving PT the past 6 weeks at home. She experienced a fall approx. 6 months prior to admission and has trouble with knee pain bilaterally along with L foot pain. Today, she was able to transition supine > EOB with SBA, STS with CGA/Susan x 1 and walk 15' to the chair with use of RWx and CGA. She remained UIC at end of session with call light in reach, chair alarm activated and nursing notified. Recommend DC to home with assist. She would benefit from continued skilled acute PT in order to improve activity tolerance, strength and independence with transfers. PT will continue to follow and progress plan as omar.      Anticipated Discharge Disposition (PT): (P) home with assist, home with home health, home with outpatient therapy services

## 2024-01-17 NOTE — CONSULTS
Gastroenterology   Initial Inpatient Consult Note    Referring Provider: Dr. Arnold    Reason for Consultation: GI Bleed    Subjective     History of present illness:      88 y.o. f pt unfamiliar to our service who we are asked to see for rectal bleeding. she has a significant past medical history of diverticulosis, constipation, GERD hypertension, bilateral knee osteoarthritis, and breast cancer s/p mastectomy who presented to the ER on 1/16/2024 with concerns of copious amounts of dark tarry stools.     Prior to seeking ER evaluation states that she had a single firm bowel movement yesterday morning that was melanotic in appearance mixed with bright red blood.  Within 2 hours she experiences subsequent bowel movement with severe lower abdominal pain that was watery in consistency and described as black in color.  Denies bowel movement since this time.    She has a longstanding history of heartburn however over the past couple months she is experienced intermittent nausea without vomiting.  Denies dysphagia or upper abdominal pain.  Previous EGD approximately 3 years ago at [facility] was negative per patient.    Denies recent NSAID use however was prescribed oral corticosteroids by orthopedist for bilateral knee pain.    On 12/25/2023 patient was admitted to [facility] with similar concerns as well as lower abdominal pain and rectum.  Review of [facility] documentation states that she has had a recent colonoscopy within the past 2 years that was overall unremarkable.  CT completed during this admission revealed significant colonic and rectal predominant stool burden with evidence of mild rectal wall thickening.    She was treated conservatively with lactulose enemas, MiraLAX, and oral lactulose every 8 hours which helped to pass large stool ball.  Stool remained watery/loose since this time except for the above-mentioned episode.    Subsequent CT imaging the abdomen and pelvis completed during  116/2024 ER evaluation at [facility] revealed extensive diverticulosis throughout the length of the colon, no evidence of acute inflammatory processes, additionally, no evidence of rectal wall thickening on this exam.    Labs Significant for:    Hgb 10.8, FOBT (+), iron 43, vitamin B12 1060, folate > 20    Family history includes non-Hodgkin's lymphoma in brother, pancreatic cancer and second brother    Past Medical History:  Past Medical History:   Diagnosis Date    Arthritis     Asthma     Breast cancer 1999    LEFT BREAST CA, LUMPECTOMY & RADS    Hx of radiation therapy 1999    APPROXIMATELY 40 TREATMENTS FOR LEFT BREAST CA    Hypertension     Pneumonia     Stroke      Past Surgical History:  Past Surgical History:   Procedure Laterality Date    APPENDECTOMY      BLADDER SURGERY      BREAST BIOPSY Left 1999    MALIGNANT    BREAST LUMPECTOMY Left 1999    MALIGNANT    GALLBLADDER SURGERY      HYSTERECTOMY      OOPHORECTOMY        Social History:   Social History     Tobacco Use    Smoking status: Former     Packs/day: 0.50     Years: 2.00     Additional pack years: 0.00     Total pack years: 1.00     Types: Cigarettes    Smokeless tobacco: Never    Tobacco comments:     quit 40 years ago   Substance Use Topics    Alcohol use: No      Family History:  Family History   Problem Relation Age of Onset    Ovarian cancer Sister         70'S    Cancer Sister     Diabetes Sister     Hypertension Sister     Osteoarthritis Sister     Colon cancer Maternal Grandmother     Osteoarthritis Mother     Hypertension Father     Cancer Brother     Hypertension Brother     Osteoarthritis Brother     Cancer Other     Stroke Other     Ovarian cancer Paternal Grandmother         unknown    Breast cancer Neg Hx        Home Meds:  Medications Prior to Admission   Medication Sig Dispense Refill Last Dose    Ascorbic Acid (VITAMIN C ER PO) Take 1 tablet by mouth Daily.   1/15/2024    aspirin 81 MG chewable tablet Chew 1 tablet  Daily.   1/15/2024    Cholecalciferol (VITAMIN D-3 PO) Take 1 tablet by mouth Daily.   1/15/2024    coenzyme Q10 50 MG capsule capsule Take 1 capsule by mouth Daily.   1/15/2024    Cyanocobalamin (VITAMIN B 12 PO) Take 1 tablet by mouth Daily.   1/15/2024    diazePAM (VALIUM) 2 MG tablet Take 1 tablet by mouth 2 (Two) Times a Day.   1/15/2024    furosemide (LASIX) 20 MG tablet Take 1 tablet by mouth Daily.   Past Month    glucosamine-chondroitin 500-400 MG capsule capsule Take 1 capsule by mouth 3 (Three) Times a Day With Meals.   1/15/2024    losartan-hydrochlorothiazide (HYZAAR) 100-12.5 MG per tablet Take 1 tablet by mouth Daily.   1/15/2024    OLIVE LEAF EXTRACT PO Take  by mouth.   1/15/2024    ondansetron (ZOFRAN) 4 MG tablet Take 2 tablets by mouth Daily.   1/15/2024    polyethylene glycol (MIRALAX) 17 g packet Take 17 g by mouth Daily.   1/15/2024    Selenium (SELENIMIN PO) Take  by mouth.   Past Week    Zinc Sulfate (ZINC 15 PO) Take  by mouth.   Past Week    arformoterol (BROVANA) 15 MCG/2ML nebulizer solution Take 2 mL by nebulization 2 (Two) Times a Day. 120 mL 11 Unknown    budesonide (PULMICORT) 0.5 MG/2ML nebulizer solution Take 2 mL by nebulization 2 (Two) Times a Day. 360 mL 2 Unknown    docusate sodium (COLACE) 100 MG capsule Take 1 capsule by mouth Daily. (Patient not taking: Reported on 1/16/2024)   Not Taking    fluticasone (FLONASE) 50 MCG/ACT nasal spray 2 sprays by Each Nare route 2 (Two) Times a Day. 18.2 mL 11 More than a month    LYCOPENE PO Take  by mouth. (Patient not taking: Reported on 1/16/2024)   Not Taking    meclizine (ANTIVERT) 32 MG tablet Take 1 tablet by mouth 3 (Three) Times a Day As Needed for Dizziness.   More than a month    omeprazole (priLOSEC) 40 MG capsule Take 1 capsule by mouth Daily. (Patient not taking: Reported on 1/16/2024) 30 capsule 11 Not Taking     Current Meds:   budesonide, 0.5 mg, Nebulization, BID - RT  diazePAM, 2 mg, Oral, BID  losartan, 100 mg, Oral,  Q24H   And  hydroCHLOROthiazide, 12.5 mg, Oral, Q24H  pantoprazole, 40 mg, Intravenous, Q12H  sodium chloride, 10 mL, Intravenous, Q12H      Allergies:  Allergies   Allergen Reactions    Cortisone Hives    Penicillins Hives     Review of Systems  Pertinent items are noted in HPI, all other systems reviewed and negative    Objective     Vital Signs  Temp:  [97.5 °F (36.4 °C)-98.4 °F (36.9 °C)] 97.5 °F (36.4 °C)  Heart Rate:  [67-77] 70  Resp:  [16-18] 18  BP: (109-157)/(66-93) 129/92    Physical Exam:  CONSTITUTIONAL:  today's vital signs reviewed  EARS NOSE THROAT: trachea midline and no deformity of the nares  EYES: no scleral icterus  GASTROINTESTINAL: abdomen is soft, nontender, nondistended with normal active bowel sounds, no masses are appreciated  PSYCHIATRIC: appropriate mood and affect  RESPIRATORY: normal inspiratory effort with no increased work of breathing  NEUROLOGIC: patient is awake and alert  DERMATOLOGIC: skin is warm with no cyanosis  LYMPHATIC: no periumbilical lymphadenopathy     Results Review:              I reviewed the patient's new clinical results.    Results from last 7 days   Lab Units 01/17/24  0737 01/17/24  0357 01/16/24  2346 01/16/24  1221   WBC 10*3/mm3  --  8.61  --  9.16   HEMOGLOBIN g/dL 10.5* 10.6* 10.3* 10.8*   HEMATOCRIT % 32.9* 32.2* 32.5* 33.4*   PLATELETS 10*3/mm3  --  188  --  177     Results from last 7 days   Lab Units 01/17/24  0357 01/16/24  1221   SODIUM mmol/L 141 146*   POTASSIUM mmol/L 3.9 3.6   CHLORIDE mmol/L 110* 109*   CO2 mmol/L 21.6* 28.0   BUN mg/dL 16 17   CREATININE mg/dL 0.90 1.01*   CALCIUM mg/dL 8.3* 9.1   BILIRUBIN mg/dL 0.4 0.3   ALK PHOS U/L 57 58   ALT (SGPT) U/L 14 17   AST (SGOT) U/L 16 17   GLUCOSE mg/dL 69 86     Results from last 7 days   Lab Units 01/16/24  1221   INR  1.10     Lab Results   Lab Value Date/Time    LIPASE 16 01/16/2024 1221    LIPASE 26 12/25/2023 1517       Radiology:  CT Abdomen Pelvis With Contrast   Final Result   1. Very  extensive diverticulosis throughout the length of the colon. No   acute inflammatory process is identified to account for the reported   history of pain. Likewise, no lesion is identified to account for GI   bleeding other than the numerous diverticula.   2. Incidentally noted noncalcified pulmonary nodules in the peripleural   right lung base are not evident on previous imaging. Follow up with CT   of the entire chest for survey of the chest is suggested before pursuing   further management of this finding.   3. A 26 mm left adnexal cyst appears to be simple but further   characterization with pelvic ultrasound is suggested. This may be   technically challenging due to its location high in the pelvis.           Radiation dose reduction techniques were utilized, including automated   exposure control and exposure modulation based on body size.           This report was finalized on 1/16/2024 2:34 PM by Dr. Adán Acosta M.D on Workstation: [id number]              Assessment & Plan   Active Hospital Problems    Diagnosis     **GI bleed     Anemia     HTN (hypertension)     History of breast cancer     Asthma        Assessment:  GI bleed   Acute blood loss anemia  melena and hematochezia  Evidence of rectal wall thickening on 12/25/2023 CT A/P  Osteoarthritis with recent oral corticosteroid use  Nausea without vomiting  Hypertension  History of breast cancer    These problems are new to me    Plan:  Nursing staff to notify GI service on call if any change in clinical status.  Continue to monitor H&H and transfuse PRN  Check Iron studies, B12, Folate  Pantoprazole IV every 12 hours  Clear liquid diet now  Bowel prep with plans to complete bidirectional endoscopy tomorrow 1/18 to assess for GI source to anemia as well as further assessment of nausea and melena  N.p.o. after midnight    Informed patient of possible differential that includes gastritis, peptic ulcer or gastric ulcer secondary to history of aspirin  use as well as recent corticosteroids.  At time of EGD we will also plan for colonoscopy evaluation to assess for hemorrhoidal versus diverticular versus angioectasia etiology to anemia.  Patient is agreeable with plan.     Thank you for allowing us to participate in the care of this patient.   Please call us with questions or if we can be of further assistance.     I discussed the patients findings and my recommendations with patient and nursing staff.             CHAPO Friedman  [facility]  [address] [place] [id number]

## 2024-01-17 NOTE — DISCHARGE PLACEMENT REQUEST
"Vonnie Hitchcock (88 y.o. Female)       Date of Birth   1935    Social Security Number       Address   [address] [facility] [address]    Home Phone       MRN   [id number]       Scientology   None    Marital Status                               Admission Date   1/16/24    Admission Type   Emergency    Admitting Provider   Tiffani Arnold MD    Attending Provider   Jak Zhong MD    Department, Room/Bed   [address], [address]       Discharge Date       Discharge Disposition       Discharge Destination                                 Attending Provider: Jak Zhong MD    Allergies: Cortisone, Penicillins    Isolation: None   Infection: None   Code Status: CPR    Ht: 165.1 cm (65\")   Wt: 107 kg (236 lb 8.9 oz)    Admission Cmt: None   Principal Problem: GI bleed [K92.2]                   Active Insurance as of 1/16/2024       Primary Coverage       Payor Plan Insurance Group Employer/Plan Group    HUMANA MEDICARE REPLACEMENT HUMANA MED [id number] PPO [id number]       Payor Plan Address Payor Plan Phone Number Payor Plan Fax Number Effective Dates    [address] [address] [phone]  3/1/2023 - None Entered    [facility] [address]         Subscriber Name Subscriber Birth Date Member ID       VONNIE HITCHCOCK 1935 [id number]                     Emergency Contacts        (Rel.) Home Phone Work Phone Mobile Phone    KELLY ZUÑIGA (Sister) [phone] -- --    PAULETTEMARY (Daughter) -- -- [phone]              "

## 2024-01-17 NOTE — THERAPY EVALUATION
Patient Name: Vonnie Coppola  : 1935    MRN: [id number]                              Today's Date: 2024       Admit Date: 2024    Visit Dx:     ICD-10-CM ICD-9-CM   1. Gastrointestinal hemorrhage, unspecified gastrointestinal hemorrhage type  K92.2 578.9   2. Pulmonary nodule  R91.1 793.11   3. Cyst of ovary, unspecified laterality  N83.209 620.2   4. Anemia, unspecified type  D64.9 285.9   5. Gastrointestinal hemorrhage with melena  K92.1 578.1   6. Nausea  R11.0 787.02     Patient Active Problem List   Diagnosis    GI bleed    Anemia    HTN (hypertension)    History of breast cancer    Asthma    History of CVA (cerebrovascular accident)    Dehydration    Renal insufficiency    Pulmonary nodule    Adnexal cyst    Nausea     Past Medical History:   Diagnosis Date    Arthritis     Asthma     Breast cancer     LEFT BREAST CA, LUMPECTOMY & RADS    Hx of radiation therapy     APPROXIMATELY 40 TREATMENTS FOR LEFT BREAST CA    Hypertension     Pneumonia     Stroke      Past Surgical History:   Procedure Laterality Date    APPENDECTOMY      BLADDER SURGERY      BREAST BIOPSY Left     MALIGNANT    BREAST LUMPECTOMY Left     MALIGNANT    GALLBLADDER SURGERY      HYSTERECTOMY      OOPHORECTOMY        General Information       Row Name 24 0951          Physical Therapy Time and Intention    Document Type evaluation (P)   -AK     Mode of Treatment physical therapy;individual therapy (P)   -AK       Row Name 24 0951          General Information    Patient Profile Reviewed yes (P)   -AK     Prior Level of Function independent:;ADL's;all household mobility (P)   Pt reported independence with ADLs/mobility with use of rollator. She has been living with her daughter.  -AK     Existing Precautions/Restrictions fall;oxygen therapy device and L/min (P)   hx of fall 6 months prior  -AK       Row Name 24 0951          Living Environment    People in Home child(christine), adult (P)   -AK        Row Name 01/17/24 0951          Home Main Entrance    Number of Stairs, Main Entrance none (P)   Ramp available and used by pt.  -AK       Row Name 01/17/24 0951          Stairs Within Home, Primary    Number of Stairs, Within Home, Primary none (P)   -AK       Row Name 01/17/24 0951          Cognition    Orientation Status (Cognition) oriented x 3 (P)   -AK       Row Name 01/17/24 0951          Safety Issues, Functional Mobility    Impairments Affecting Function (Mobility) endurance/activity tolerance;strength;shortness of breath (P)   -AK               User Key  (r) = Recorded By, (t) = Taken By, (c) = Cosigned By      Initials Name Provider Type    Erica Champagne, PT Student PT Student                   Mobility       Row Name 01/17/24 0953          Bed Mobility    Bed Mobility supine-sit (P)   -AK     Supine-Sit [place] (Bed Mobility) standby assist;1 person assist (P)   -AK     Assistive Device (Bed Mobility) head of bed elevated;bed rails (P)   -AK       Row Name 01/17/24 0953          Sit-Stand Transfer    Sit-Stand [place] (Transfers) contact guard;minimum assist (75% patient effort);1 person assist (P)   -AK     Assistive Device (Sit-Stand Transfers) walker, front-wheeled (P)   -AK       Row Name 01/17/24 0953          Gait/Stairs (Locomotion)    [place] Level (Gait) contact guard;1 person assist (P)   -AK     Assistive Device (Gait) walker, front-wheeled (P)   -AK     Distance in Feet (Gait) 15 (P)   -AK     Deviations/Abnormal Patterns (Gait) gait speed decreased;stride length decreased (P)   -AK     Bilateral Gait Deviations heel strike decreased (P)   -AK     [place] Level (Stairs) not tested (P)   -AK     Comment, (Gait/Stairs) Pt walked 15' with CGA and RWx. Demonstrated minor SOB once UIC. (P)   -AK               User Key  (r) = Recorded By, (t) = Taken By, (c) = Cosigned By      Initials Name Provider Type    Erica Champagne PT Student PT Student                    Obj/Interventions       Row Name 01/17/24 0956          Range of Motion Comprehensive    General Range of Motion no range of motion deficits identified (P)   -AK       Row Name 01/17/24 0956          Strength Comprehensive (MMT)    General Manual Muscle Testing (MMT) Assessment lower extremity strength deficits identified (P)   -AK     Comment, General Manual Muscle Testing (MMT) Assessment Pt demonstrated 4/5 strength with ariel knee extension, 3+/5 strength with hip flexion and 4/5 strength ankle DF/PF with DF being painful on L. (P)   -AK       Row Name 01/17/24 0956          Motor Skills    Motor Skills functional endurance (P)   -AK     Functional Endurance Fair functional endurance. Pt demonstrated slight SOB following walking 15' with CGA and RWx. (P)   -AK       Row Name 01/17/24 0956          Balance    Balance Assessment sitting static balance;standing static balance (P)   -AK     Static Sitting Balance standby assist (P)   -AK     Position, Sitting Balance unsupported;sitting edge of bed (P)   -AK     Static Standing Balance standby assist (P)   -AK     Position/Device Used, Standing Balance supported;walker, front-wheeled (P)   -AK       [facility] Name 01/17/24 0956          Lower Extremity (Manual Muscle Testing)    Lower Extremity: Manual Muscle Testing (MMT) -- (P)   -AK               User Key  (r) = Recorded By, (t) = Taken By, (c) = Cosigned By      Initials Name Provider Type    Erica Champagne, PT Student PT Student                   Goals/Plan       Row Name 01/17/24 1004          Bed Mobility Goal 1 (PT)    Activity/Assistive Device (Bed Mobility Goal 1, PT) bed mobility activities, all (P)   -AK     [place] Level/Cues Needed (Bed Mobility Goal 1, PT) independent (P)   -AK     Time Frame (Bed Mobility Goal 1, PT) 2 weeks (P)   -AK       Row Name 01/17/24 1004          Transfer Goal 1 (PT)    Activity/Assistive Device (Transfer Goal 1, PT) transfers, all;walker, rolling (P)   -AK     [place]  Level/Cues Needed (Transfer Goal 1, PT) independent;modified independence (P)   -AK     Time Frame (Transfer Goal 1, PT) 2 weeks (P)   -AK       Row Name 01/17/24 1004          Gait Training Goal 1 (PT)    Activity/Assistive Device (Gait Training Goal 1, PT) gait (walking locomotion);walker, rolling;assistive device use (P)   walker or rollator  -AK     [place] Level (Gait Training Goal 1, PT) modified independence;independent (P)   -AK     Distance (Gait Training Goal 1, PT) 150 (P)   -AK     Time Frame (Gait Training Goal 1, PT) 2 weeks (P)   -AK       Row Name 01/17/24 1004          Therapy Assessment/Plan (PT)    Planned Therapy Interventions (PT) balance training;motor coordination training;bed mobility training;gait training;home exercise program;patient/family education;neuromuscular re-education;ROM (range of motion);strengthening;stretching;transfer training (P)   -AK               User Key  (r) = Recorded By, (t) = Taken By, (c) = Cosigned By      Initials Name Provider Type    Erica Champagne, PT Student PT Student                   Clinical Impression       Row Name 01/17/24 0958          Pain    Pretreatment Pain Rating 0/10 - no pain (P)   -AK     Posttreatment Pain Rating 0/10 - no pain (P)   -AK       Row Name 01/17/24 0958          Plan of Care Review    Plan of Care Reviewed With patient (P)   -AK     Outcome Evaluation Vonnie is a pleasant, 88 y.o. female, admitted following GI bleed, with findings of diverticulosis. She has a hx of HTN, breast cancer, asthma, pneumonia and CVA. Pt supine in bed and agreeable for therapy at arrival of PT for evaluation this AM. She currently is living with her daughter in a home with a ramp to enter. She reports being independent with household mobility and ADLs with the use of a rollator. She uses her rollator all the time. She also reports receiving PT the past 6 weeks at home. She experienced a fall approx. 6 months prior to admission and has trouble with  knee pain bilaterally along with L foot pain. Today, she was able to transition supine > EOB with SBA, STS with CGA/Susan x 1 and walk 15' to the chair with use of RWx and CGA. She remained UIC at end of session with call light in reach, chair alarm activated and nursing notified. Recommend DC to home with assist. She would benefit from continued skilled acute PT in order to improve activity tolerance, strength and independence with transfers. PT will continue to follow and progress plan as omar. (P)   -AK       Row Name 01/17/24 0958          Therapy Assessment/Plan (PT)    Rehab Potential (PT) good, to achieve stated therapy goals (P)   -AK     Criteria for Skilled Interventions Met (PT) skilled treatment is necessary (P)   -AK     Therapy Frequency (PT) 5 times/wk (P)   -AK       Row Name 01/17/24 0958          Vital Signs    Pre SpO2 (%) 100 (P)   -AK     O2 Delivery Pre Treatment supplemental O2 (P)   -AK       Row Name 01/17/24 0958          Positioning and Restraints    Pre-Treatment Position in bed (P)   -AK     Post Treatment Position chair (P)   -AK     In Chair notified nsg;reclined;call light within reach;encouraged to call for assist;exit alarm on (P)   -AK               User Key  (r) = Recorded By, (t) = Taken By, (c) = Cosigned By      Initials Name Provider Type    Erica Champagne, PT Student PT Student                   Outcome Measures       Row Name 01/17/24 1005          How much help from another person do you currently need...    Turning from your back to your side while in flat bed without using bedrails? 3 (P)   -AK     Moving from lying on back to sitting on the side of a flat bed without bedrails? 3 (P)   -AK     Moving to and from a bed to a chair (including a wheelchair)? 3 (P)   -AK     Standing up from a chair using your arms (e.g., wheelchair, bedside chair)? 3 (P)   -AK     Climbing 3-5 steps with a railing? 2 (P)   -AK     To walk in hospital room? 3 (P)   -AK     AM-PAC 6 Clicks Score  (PT) 17 (P)   -AK     Highest Level of Mobility Goal 5 --> Static standing (P)   -AK       Row Name 01/17/24 1005          Functional Assessment    Outcome Measure Options AM-PAC 6 Clicks Basic Mobility (PT) (P)   -AK               User Key  (r) = Recorded By, (t) = Taken By, (c) = Cosigned By      Initials Name Provider Type    AK Erica Alexis, PT Student PT Student                                 Physical Therapy Education       Title: PT OT SLP Therapies (In Progress)       Topic: Physical Therapy (In Progress)       Point: Mobility training (Done)       Learning Progress Summary             Patient Acceptance, E,TB,D, VU,DU by AK at 1/17/2024 1006                         Point: Home exercise program (Not Started)       Learner Progress:  Not documented in this visit.              Point: Body mechanics (Not Started)       Learner Progress:  Not documented in this visit.              Point: Precautions (Not Started)       Learner Progress:  Not documented in this visit.                              User Key       Initials Effective Dates Name Provider Type Discipline    AK 12/28/23 -  Erica Alexis, PT Student PT Student PT                  PT Recommendation and Plan  Planned Therapy Interventions (PT): (P) balance training, motor coordination training, bed mobility training, gait training, home exercise program, patient/family education, neuromuscular re-education, ROM (range of motion), strengthening, stretching, transfer training  Plan of Care Reviewed With: (P) patient  Outcome Evaluation: (P) Vonnie is a pleasant, 88 y.o. female, admitted following GI bleed, with findings of diverticulosis. She has a hx of HTN, breast cancer, asthma, pneumonia and CVA. Pt supine in bed and agreeable for therapy at arrival of PT for evaluation this AM. She currently is living with her daughter in a home with a ramp to enter. She reports being independent with household mobility and ADLs with the use of a rollator. She uses her  rollator all the time. She also reports receiving PT the past 6 weeks at home. She experienced a fall approx. 6 months prior to admission and has trouble with knee pain bilaterally along with L foot pain. Today, she was able to transition supine > EOB with SBA, STS with CGA/Susan x 1 and walk 15' to the chair with use of RWx and CGA. She remained UIC at end of session with call light in reach, chair alarm activated and nursing notified. Recommend DC to home with assist. She would benefit from continued skilled acute PT in order to improve activity tolerance, strength and independence with transfers. PT will continue to follow and progress plan as omar.     Time Calculation:         PT Charges       Row Name 01/17/24 1006             Time Calculation    Start Time 0848 (P)   -AK      Stop Time 0907 (P)   -AK      Time Calculation (min) 19 min (P)   -AK      PT Received On 01/17/24 (P)   -AK      PT - Next Appointment 01/18/24 (P)   -AK      PT Goal Re-Cert Due Date 01/31/24 (P)   -AK         Time Calculation- PT    Total Timed Code Minutes- PT 12 minute(s) (P)   -AK         Timed Charges    98350 - PT Therapeutic Exercise Minutes 12 (P)   -AK         Total Minutes    Timed Charges Total Minutes 12 (P)   -AK       Total Minutes 12 (P)   -AK                User Key  (r) = Recorded By, (t) = Taken By, (c) = Cosigned By      Initials Name Provider Type    Erica Champagne, PT Student PT Student                  Therapy Charges for Today       Code Description Service Date Service Provider Modifiers Qty    82392595719 HC PT THER PROC EA 15 MIN 1/17/2024 Erica Alexis, PT Student GP 1    22002052404 HC PT EVAL MOD COMPLEXITY 3 1/17/2024 Erica Alexis, PT Student GP 1            PT G-Codes  Outcome Measure Options: (P) AM-PAC 6 Clicks Basic Mobility (PT)  AM-PAC 6 Clicks Score (PT): (P) 17  PT Discharge Summary  Anticipated Discharge Disposition (PT): (P) home with assist, home with home health, home with outpatient therapy  services    Erica Alexis, PT Student  1/17/2024

## 2024-01-17 NOTE — PLAN OF CARE
Goal Outcome Evaluation:  Plan of Care Reviewed With: patient           Outcome Evaluation: Pt is a 88 year old female admitted to [facility] on 1/16 for GI bleed. Pt reports that she lives with her daughter, grandchildren, and son in law. Pt reports that she is independent with ADLs at baseline and uses a rollator for mobility. Pt reports that she is generally seated in the shower however she does have to step over to get in. I educated pt on the possiblity of getting a tub tf bench to improve safety within the bathroom. Today pt UIC upon arrival to room following PT session. Pt require MCKEON A for LB dressing, CGA for grooming task, and CGA for STS using RW. Pt will benefit from skilled OT services to improve overall safety to DC home.      Anticipated Discharge Disposition (OT): home with home health, home with assist

## 2024-01-17 NOTE — THERAPY EVALUATION
Patient Name: Vonnie Coppola  : 1935    MRN: [id number]                              Today's Date: 2024       Admit Date: 2024    Visit Dx:     ICD-10-CM ICD-9-CM   1. Gastrointestinal hemorrhage, unspecified gastrointestinal hemorrhage type  K92.2 578.9   2. Pulmonary nodule  R91.1 793.11   3. Cyst of ovary, unspecified laterality  N83.209 620.2   4. Anemia, unspecified type  D64.9 285.9   5. Gastrointestinal hemorrhage with melena  K92.1 578.1   6. Nausea  R11.0 787.02     Patient Active Problem List   Diagnosis    GI bleed    Anemia    HTN (hypertension)    History of breast cancer    Asthma    History of CVA (cerebrovascular accident)    Dehydration    Renal insufficiency    Pulmonary nodule    Adnexal cyst    Nausea     Past Medical History:   Diagnosis Date    Arthritis     Asthma     Breast cancer     LEFT BREAST CA, LUMPECTOMY & RADS    Hx of radiation therapy     APPROXIMATELY 40 TREATMENTS FOR LEFT BREAST CA    Hypertension     Pneumonia     Stroke      Past Surgical History:   Procedure Laterality Date    APPENDECTOMY      BLADDER SURGERY      BREAST BIOPSY Left     MALIGNANT    BREAST LUMPECTOMY Left     MALIGNANT    GALLBLADDER SURGERY      HYSTERECTOMY      OOPHORECTOMY        General Information       Row Name 24 1007          OT Time and Intention    Document Type evaluation  -KN     Mode of Treatment individual therapy;occupational therapy  -KN       Row Name 24 1007          General Information    Patient Profile Reviewed yes  -KN     Prior Level of Function independent:;ADL's  requires help with shower tf and don/doff L sock/shoe  -KN     Existing Precautions/Restrictions fall  -KN       Row Name 24 1007          Living Environment    People in Home child(christine), adult;grandchild(christine)  -KN       Row Name 24 1007          Cognition    Orientation Status (Cognition) oriented x 3  -KN               User Key  (r) = Recorded By, (t) = Taken  By, (c) = Cosigned By      Initials Name Provider Type    Chay Christianson OT Occupational Therapist                     Mobility/ADL's       Row Name 01/17/24 1327          Bed Mobility    Comment, (Bed Mobility) UIC at arrival  -       Row Name 01/17/24 1327          Transfers    Transfers sit-stand transfer;stand-sit transfer  -       Row Name 01/17/24 1327          Sit-Stand Transfer    Sit-Stand [place] (Transfers) contact guard;1 person assist  -     Assistive Device (Sit-Stand Transfers) walker, front-wheeled  -       Row Name 01/17/24 1327          Stand-Sit Transfer    Stand-Sit [place] (Transfers) contact guard;1 person assist  -     Assistive Device (Stand-Sit Transfers) walker, front-wheeled  -KN       Row Name 01/17/24 1327          Activities of Daily Living    BADL Assessment/Intervention lower body dressing;grooming  -KN       Row Name 01/17/24 1327          Lower Body Dressing Assessment/Training    [place] Level (Lower Body Dressing) lower body dressing skills;doff;don;socks;set up  -KN     Position (Lower Body Dressing) supported sitting  -[facility] Name 01/17/24 1327          Grooming Assessment/Training    [place] Level (Grooming) grooming skills;oral care regimen;wash face, hands  -KN     Position (Grooming) supported sitting  -               User Key  (r) = Recorded By, (t) = Taken By, (c) = Cosigned By      Initials Name Provider Type    Chay Christianson OT Occupational Therapist                   Obj/Interventions       Row Name 01/17/24 1331          Sensory Assessment (Somatosensory)    Sensory Assessment (Somatosensory) UE sensation intact  -       Row Name 01/17/24 1331          Range of Motion Comprehensive    General Range of Motion no range of motion deficits identified  -       Row Name 01/17/24 1331          Strength Comprehensive (MMT)    General Manual Muscle Testing (MMT) Assessment no strength deficits identified  -               User Key   (r) = Recorded By, (t) = Taken By, (c) = Cosigned By      Initials Name Provider Type    Chay Christianson, OT Occupational Therapist                   Goals/Plan       Row Name 01/17/24 1336          Bed Mobility Goal 1 (OT)    Activity/Assistive Device (Bed Mobility Goal 1, OT) bed mobility activities, all  -KN     [facility] Level/Cues Needed (Bed Mobility Goal 1, OT) modified independence  -KN     Time Frame (Bed Mobility Goal 1, OT) short term goal (STG);2 weeks  -       Row Name 01/17/24 1336          Transfer Goal 1 (OT)    Activity/Assistive Device (Transfer Goal 1, OT) transfers, all  -KN     [facility] Level/Cues Needed (Transfer Goal 1, OT) modified independence  -KN     Time Frame (Transfer Goal 1, OT) short term goal (STG);2 weeks  -       Row Name 01/17/24 1336          Bathing Goal 1 (OT)    Activity/Device (Bathing Goal 1, OT) bathing skills, all  -KN     [facility] Level/Cues Needed (Bathing Goal 1, OT) modified independence  -KN     Time Frame (Bathing Goal 1, OT) 2 weeks;short term goal (STG)  -       Row Name 01/17/24 1336          Dressing Goal 1 (OT)    Activity/Device (Dressing Goal 1, OT) dressing skills, all  -KN     [facility]/Cues Needed (Dressing Goal 1, OT) modified independence  -KN     Time Frame (Dressing Goal 1, OT) short term goal (STG);2 weeks  -       Row Name 01/17/24 1336          Therapy Assessment/Plan (OT)    Planned Therapy Interventions (OT) activity tolerance training;BADL retraining;occupation/activity based interventions;patient/caregiver education/training;strengthening exercise;transfer/mobility retraining;ROM/therapeutic exercise  -KN               User Key  (r) = Recorded By, (t) = Taken By, (c) = Cosigned By      Initials Name Provider Type    Chay Christianson OT Occupational Therapist                   Clinical Impression       Row Name 01/17/24 1331          Pain Assessment    Pretreatment Pain Rating 0/10 - no pain  -KN     Posttreatment Pain  Rating 0/10 - no pain  -       Row Name 01/17/24 1331          Plan of Care Review    Plan of Care Reviewed With patient  -     Outcome Evaluation Pt is a 88 year old female admitted to [facility] on 1/16 for GI bleed. Pt reports that she lives with her daughter, grandchildren, and son in law. Pt reports that she is independent with ADLs at baseline and uses a rollator for mobility. Pt reports that she is generally seated in the shower however she does have to step over to get in. I educated pt on the possiblity of getting a tub tf bench to improve safety within the bathroom. Today pt UIC upon arrival to room following PT session. Pt require MCKEON A for LB dressing, CGA for grooming task, and CGA for STS using RW. Pt will benefit from skilled OT services to improve overall safety to [place] home.  -       Row Name 01/17/24 0726          Therapy Assessment/Plan (OT)    Rehab Potential (OT) good, to achieve stated therapy goals  -     Criteria for Skilled Therapeutic Interventions Met (OT) yes;meets criteria;skilled treatment is necessary  -KALA     Therapy Frequency (OT) 3 times/wk  -KALA       Row Name 01/17/24 4785          Therapy Plan Review/Discharge Plan (OT)    Equipment Needs Upon Discharge (OT) other (see comments)  tub transfer bench  -     Anticipated Discharge Disposition (OT) home with home health;home with assist  -       Row Name 01/17/24 6100          Positioning and Restraints    Pre-Treatment Position sitting in chair/recliner  -     Post Treatment Position chair  -KN     In Chair with nsg;reclined;exit alarm on;encouraged to call for assist;call light within reach  -KALA               User Key  (r) = Recorded By, (t) = Taken By, (c) = Cosigned By      Initials Name Provider Type    Chay Christianson, MARTIN Occupational Therapist                   Outcome Measures       Row Name 01/17/24 8411          How much help from another is currently needed...    Putting on and taking off regular lower body clothing? 3   -KN     Bathing (including washing, rinsing, and drying) 3  -KN     Toileting (which includes using toilet bed pan or urinal) 3  -KN     Putting on and taking off regular upper body clothing 4  -KN     Taking care of personal grooming (such as brushing teeth) 4  -KN     Eating meals 4  -KN     AM-PAC 6 Clicks Score (OT) 21  -KN       Row Name 01/17/24 1005          How much help from another person do you currently need...    Turning from your back to your side while in flat bed without using bedrails? 3 (P)   -AK     Moving from lying on back to sitting on the side of a flat bed without bedrails? 3 (P)   -AK     Moving to and from a bed to a chair (including a wheelchair)? 3 (P)   -AK     Standing up from a chair using your arms (e.g., wheelchair, bedside chair)? 3 (P)   -AK     Climbing 3-5 steps with a railing? 2 (P)   -AK     To walk in hospital room? 3 (P)   -AK     AM-PAC 6 Clicks Score (PT) 17 (P)   -AK     Highest Level of Mobility Goal 5 --> Static standing (P)   -AK       Row Name 01/17/24 1337 01/17/24 1005       Functional Assessment    Outcome Measure Options AM-PAC 6 Clicks Daily Activity (OT)  -KN AM-PAC 6 Clicks Basic Mobility (PT) (P)   -AK              User Key  (r) = Recorded By, (t) = Taken By, (c) = Cosigned By      Initials Name Provider Type    Chay Christianson, OT Occupational Therapist    Erica Champagne, PT Student PT Student                    Occupational Therapy Education       Title: PT OT SLP Therapies (Done)       Topic: Occupational Therapy (Done)       Point: ADL training (Done)       Description:   Instruct learner(s) on proper safety adaptation and remediation techniques during self care or transfers.   Instruct in proper use of assistive devices.                  Learning Progress Summary             Patient Acceptance, E, VU by KALA at 1/17/2024 1337                                         User Key       Initials Effective Dates Name Provider Type Johnson ARMENDARIZ 08/02/22 -   Chay Moss, OT Occupational Therapist OT                  OT Recommendation and Plan  Planned Therapy Interventions (OT): activity tolerance training, BADL retraining, occupation/activity based interventions, patient/caregiver education/training, strengthening exercise, transfer/mobility retraining, ROM/therapeutic exercise  Therapy Frequency (OT): 3 times/wk  Plan of Care Review  Plan of Care Reviewed With: patient  Outcome Evaluation: Pt is a 88 year old female admitted to [facility] on 1/16 for GI bleed. Pt reports that she lives with her daughter, grandchildren, and son in law. Pt reports that she is independent with ADLs at baseline and uses a rollator for mobility. Pt reports that she is generally seated in the shower however she does have to step over to get in. I educated pt on the possiblity of getting a tub tf bench to improve safety within the bathroom. Today pt UIC upon arrival to room following PT session. Pt require MCKEON A for LB dressing, CGA for grooming task, and CGA for STS using RW. Pt will benefit from skilled OT services to improve overall safety to [place] home.     Time Calculation:   Evaluation Complexity (OT)  Review Occupational Profile/Medical/Therapy History Complexity: expanded/moderate complexity  Assessment, Occupational Performance/Identification of Deficit Complexity: 3-5 performance deficits  Clinical Decision Making Complexity (OT): detailed assessment/moderate complexity  Overall Complexity of Evaluation (OT): moderate complexity     Time Calculation- OT       Row Name 01/17/24 1338             Time Calculation- OT    OT Start Time 0916  -KN      OT Stop Time 0934  -KN      OT Time Calculation (min) 18 min  -KN      OT Received On 01/17/24  -KN      OT - Next Appointment 01/19/24  -KN      OT Goal Re-Cert Due Date 01/31/24  -KN         Timed Charges    11149 - OT Self Care/Mgmt Minutes 10  -KN         Untimed Charges    OT Eval/Re-eval Minutes 8  -KN         Total Minutes    Timed Charges  Total Minutes 10  -KN      Untimed Charges Total Minutes 8  -KN       Total Minutes 18  -KN                User Key  (r) = Recorded By, (t) = Taken By, (c) = Cosigned By      Initials Name Provider Type    Chay Christianson OT Occupational Therapist                  Therapy Charges for Today       Code Description Service Date Service Provider Modifiers Qty    38279234484 HC OT SELF CARE/MGMT/TRAIN EA 15 MIN 1/17/2024 Chay Moss OT GO 1    [id number]  OT EVAL MOD COMPLEXITY 2 1/17/2024 Chay Moss OT GO 1                 Chay Moss OT  1/17/2024

## 2024-01-17 NOTE — PROGRESS NOTES
Discharge Planning Assessment  [facility]     Patient Name: Vonnie Coppola  MRN: [id number]  Today's Date: 1/17/2024    Admit Date: 1/16/2024    Plan: Home with daughter pending PT eval and progress   Discharge Needs Assessment       Row Name 01/17/24 1100       Living Environment    People in Home child(christine), adult;other (see comments)  son in law    Potentially Unsafe Housing Conditions none    Primary Care Provided by self;child(christine)    Provides Primary Care For no one    Family Caregiver if Needed child(christine), adult    Family Caregiver Names Elizabeth Rodríguez daughter [phone]    Quality of Family Relationships supportive;involved;helpful    Able to Return to Prior Arrangements yes       Resource/Environmental Concerns    Resource/Environmental Concerns none       Transition Planning    Patient/Family Anticipates Transition to home with family    Transportation Anticipated family or friend will provide       Discharge Needs Assessment    Equipment Currently Used at Home rollator;nebulizer                   Discharge Plan       Row Name 01/17/24 1102       Plan    Plan Home with daughter pending PT eval and progress    Plan Comments Spoke with pt at bedside and pt's daughter  by phone [phone] to screen for DCp/needs.  Pt confirmed that she lives with ehr daughter  at [facility] address.  Pt stated that she felipe suse a rollator to assist with ambulation which is limited by her knees. Pt stated that she  recently used [facility] for in home PT which helps her keep her mobility.  Pt's daughter stated that pt is able to get up and down the 1 step to enter the home.  Pt's daughter also stated that pt does use a shower chair inside the tub/shower but is able to bath her self.  Pt's daughter stated pt is indpenedent  but does have some limitation on days when her knees are really bad.  Informed pt and pt's daughter that PT/OT evals have been ordered and CCP would be following pt's progress  to assist with any referrals needed at DC.  Pt denies ever being in a SNF for rehab in the past.  Pt reports that someone in thier family is usually always home with her.  CCP will follow pt's progress for on going assess ment of pt's DC needs.                  Continued Care and Services - Admitted Since 1/16/2024    Coordination has not been started for this encounter.          Demographic Summary       Row Name 01/17/24 1059       General Information    Admission Type observation    Arrived From home    Referral Source admission list    Reason for Consult discharge planning    Preferred Language English                   Functional Status       Row Name 01/17/24 1059       Functional Status    Usual Activity Tolerance fair    Current Activity Tolerance fair       Functional Status, IADL    Medications independent;assistive person    Meal Preparation assistive person;independent    Housekeeping assistive person    Laundry assistive person;independent    Shopping assistive person                   Psychosocial    No documentation.                  Abuse/Neglect    No documentation.                  Legal    No documentation.                  Substance Abuse    No documentation.                  Patient Forms    No documentation.                     SARAH Garcia

## 2024-01-18 ENCOUNTER — APPOINTMENT (OUTPATIENT)
Dept: GENERAL RADIOLOGY | Facility: HOSPITAL | Age: 89
DRG: 377 | End: 2024-01-18
Payer: MEDICARE

## 2024-01-18 LAB
ANION GAP SERPL CALCULATED.3IONS-SCNC: 9 MMOL/L (ref 5–15)
BASOPHILS # BLD AUTO: 0.05 10*3/MM3 (ref 0–0.2)
BASOPHILS NFR BLD AUTO: 0.7 % (ref 0–1.5)
BUN SERPL-MCNC: 12 MG/DL (ref 8–23)
BUN/CREAT SERPL: 14.3 (ref 7–25)
CALCIUM SPEC-SCNC: 8 MG/DL (ref 8.6–10.5)
CHLORIDE SERPL-SCNC: 112 MMOL/L (ref 98–107)
CO2 SERPL-SCNC: 21 MMOL/L (ref 22–29)
CREAT SERPL-MCNC: 0.84 MG/DL (ref 0.57–1)
DEPRECATED RDW RBC AUTO: 44.4 FL (ref 37–54)
EGFRCR SERPLBLD CKD-EPI 2021: 67.8 ML/MIN/1.73
EOSINOPHIL # BLD AUTO: 0.32 10*3/MM3 (ref 0–0.4)
EOSINOPHIL NFR BLD AUTO: 4.2 % (ref 0.3–6.2)
ERYTHROCYTE [DISTWIDTH] IN BLOOD BY AUTOMATED COUNT: 13.4 % (ref 12.3–15.4)
GLUCOSE SERPL-MCNC: 72 MG/DL (ref 65–99)
HCT VFR BLD AUTO: 31 % (ref 34–46.6)
HCT VFR BLD AUTO: 33.5 % (ref 34–46.6)
HGB BLD-MCNC: 10.1 G/DL (ref 12–15.9)
HGB BLD-MCNC: 10.1 G/DL (ref 12–15.9)
HGB BLD-MCNC: 10.3 G/DL (ref 12–15.9)
HGB BLD-MCNC: 10.9 G/DL (ref 12–15.9)
IMM GRANULOCYTES # BLD AUTO: 0.02 10*3/MM3 (ref 0–0.05)
IMM GRANULOCYTES NFR BLD AUTO: 0.3 % (ref 0–0.5)
LYMPHOCYTES # BLD AUTO: 2.12 10*3/MM3 (ref 0.7–3.1)
LYMPHOCYTES NFR BLD AUTO: 28.2 % (ref 19.6–45.3)
MCH RBC QN AUTO: 29.6 PG (ref 26.6–33)
MCHC RBC AUTO-ENTMCNC: 32.6 G/DL (ref 31.5–35.7)
MCV RBC AUTO: 90.9 FL (ref 79–97)
MONOCYTES # BLD AUTO: 0.93 10*3/MM3 (ref 0.1–0.9)
MONOCYTES NFR BLD AUTO: 12.4 % (ref 5–12)
NEUTROPHILS NFR BLD AUTO: 4.09 10*3/MM3 (ref 1.7–7)
NEUTROPHILS NFR BLD AUTO: 54.2 % (ref 42.7–76)
NRBC BLD AUTO-RTO: 0 /100 WBC (ref 0–0.2)
PLATELET # BLD AUTO: 157 10*3/MM3 (ref 140–450)
PMV BLD AUTO: 10 FL (ref 6–12)
POTASSIUM SERPL-SCNC: 4 MMOL/L (ref 3.5–5.2)
RBC # BLD AUTO: 3.41 10*6/MM3 (ref 3.77–5.28)
SODIUM SERPL-SCNC: 142 MMOL/L (ref 136–145)
WBC NRBC COR # BLD AUTO: 7.53 10*3/MM3 (ref 3.4–10.8)

## 2024-01-18 PROCEDURE — 85018 HEMOGLOBIN: CPT | Performed by: INTERNAL MEDICINE

## 2024-01-18 PROCEDURE — 74018 RADEX ABDOMEN 1 VIEW: CPT

## 2024-01-18 PROCEDURE — 85025 COMPLETE CBC W/AUTO DIFF WBC: CPT | Performed by: INTERNAL MEDICINE

## 2024-01-18 PROCEDURE — 99232 SBSQ HOSP IP/OBS MODERATE 35: CPT

## 2024-01-18 PROCEDURE — 80048 BASIC METABOLIC PNL TOTAL CA: CPT | Performed by: INTERNAL MEDICINE

## 2024-01-18 PROCEDURE — 85014 HEMATOCRIT: CPT | Performed by: INTERNAL MEDICINE

## 2024-01-18 PROCEDURE — 94799 UNLISTED PULMONARY SVC/PX: CPT

## 2024-01-18 PROCEDURE — 94664 DEMO&/EVAL PT USE INHALER: CPT

## 2024-01-18 PROCEDURE — 25810000003 SODIUM CHLORIDE 0.9 % SOLUTION: Performed by: INTERNAL MEDICINE

## 2024-01-18 PROCEDURE — 94761 N-INVAS EAR/PLS OXIMETRY MLT: CPT

## 2024-01-18 PROCEDURE — 97110 THERAPEUTIC EXERCISES: CPT

## 2024-01-18 RX ORDER — IPRATROPIUM BROMIDE AND ALBUTEROL SULFATE 2.5; .5 MG/3ML; MG/3ML
3 SOLUTION RESPIRATORY (INHALATION) 3 TIMES DAILY
Status: DISCONTINUED | OUTPATIENT
Start: 2024-01-18 | End: 2024-01-28 | Stop reason: HOSPADM

## 2024-01-18 RX ORDER — POLYETHYLENE GLYCOL 3350 17 G/17G
0.5 POWDER, FOR SOLUTION ORAL 2 TIMES DAILY
Status: COMPLETED | OUTPATIENT
Start: 2024-01-18 | End: 2024-01-18

## 2024-01-18 RX ADMIN — SODIUM CHLORIDE 100 ML/HR: 9 INJECTION, SOLUTION INTRAVENOUS at 11:32

## 2024-01-18 RX ADMIN — DOCUSATE SODIUM 100 MG: 100 CAPSULE, LIQUID FILLED ORAL at 11:22

## 2024-01-18 RX ADMIN — POLYETHYLENE GLYCOL 3350 0.5 BOTTLE: 17 POWDER, FOR SOLUTION ORAL at 16:43

## 2024-01-18 RX ADMIN — BUDESONIDE 0.5 MG: 0.5 INHALANT ORAL at 19:07

## 2024-01-18 RX ADMIN — PANTOPRAZOLE SODIUM 40 MG: 40 INJECTION, POWDER, FOR SOLUTION INTRAVENOUS at 11:22

## 2024-01-18 RX ADMIN — IPRATROPIUM BROMIDE AND ALBUTEROL SULFATE 3 ML: 2.5; .5 SOLUTION RESPIRATORY (INHALATION) at 06:33

## 2024-01-18 RX ADMIN — IPRATROPIUM BROMIDE AND ALBUTEROL SULFATE 3 ML: 2.5; .5 SOLUTION RESPIRATORY (INHALATION) at 11:16

## 2024-01-18 RX ADMIN — PANTOPRAZOLE SODIUM 40 MG: 40 INJECTION, POWDER, FOR SOLUTION INTRAVENOUS at 21:52

## 2024-01-18 RX ADMIN — IPRATROPIUM BROMIDE AND ALBUTEROL SULFATE 3 ML: 2.5; .5 SOLUTION RESPIRATORY (INHALATION) at 16:24

## 2024-01-18 RX ADMIN — POLYETHYLENE GLYCOL 3350 0.5 BOTTLE: 17 POWDER, FOR SOLUTION ORAL at 21:52

## 2024-01-18 RX ADMIN — BUDESONIDE 0.5 MG: 0.5 INHALANT ORAL at 06:34

## 2024-01-18 RX ADMIN — Medication 10 ML: at 11:24

## 2024-01-18 RX ADMIN — IPRATROPIUM BROMIDE AND ALBUTEROL SULFATE 3 ML: 2.5; .5 SOLUTION RESPIRATORY (INHALATION) at 19:07

## 2024-01-18 RX ADMIN — DIAZEPAM 2 MG: 2 TABLET ORAL at 21:51

## 2024-01-18 RX ADMIN — DIAZEPAM 2 MG: 2 TABLET ORAL at 11:22

## 2024-01-18 RX ADMIN — ACETAMINOPHEN 650 MG: 325 TABLET, FILM COATED ORAL at 21:51

## 2024-01-18 RX ADMIN — LOSARTAN POTASSIUM 100 MG: 100 TABLET, FILM COATED ORAL at 11:22

## 2024-01-18 RX ADMIN — Medication 10 ML: at 21:52

## 2024-01-18 RX ADMIN — DOCUSATE SODIUM 100 MG: 100 CAPSULE, LIQUID FILLED ORAL at 21:51

## 2024-01-18 NOTE — THERAPY TREATMENT NOTE
Patient Name: Vonnie Coppola  : 1935    MRN: [id number]                              Today's Date: 2024       Admit Date: 2024    Visit Dx:     ICD-10-CM ICD-9-CM   1. Gastrointestinal hemorrhage, unspecified gastrointestinal hemorrhage type  K92.2 578.9   2. Pulmonary nodule  R91.1 793.11   3. Cyst of ovary, unspecified laterality  N83.209 620.2   4. Anemia, unspecified type  D64.9 285.9   5. Gastrointestinal hemorrhage with melena  K92.1 578.1   6. Nausea  R11.0 787.02     Patient Active Problem List   Diagnosis    GI bleed    Anemia    HTN (hypertension)    History of breast cancer    Asthma    History of CVA (cerebrovascular accident)    Dehydration    Renal insufficiency    Pulmonary nodule    Adnexal cyst    Nausea     Past Medical History:   Diagnosis Date    Arthritis     Asthma     Breast cancer     LEFT BREAST CA, LUMPECTOMY & RADS    Hx of radiation therapy     APPROXIMATELY 40 TREATMENTS FOR LEFT BREAST CA    Hypertension     Pneumonia     Stroke      Past Surgical History:   Procedure Laterality Date    APPENDECTOMY      BLADDER SURGERY      BREAST BIOPSY Left     MALIGNANT    BREAST LUMPECTOMY Left     MALIGNANT    GALLBLADDER SURGERY      HYSTERECTOMY      OOPHORECTOMY        General Information       Row Name 24 1621          Physical Therapy Time and Intention    Document Type therapy note (daily note)  -EJ     Mode of Treatment physical therapy  -EJ       Row Name 24 162          General Information    Existing Precautions/Restrictions fall  -EJ               User Key  (r) = Recorded By, (t) = Taken By, (c) = Cosigned By      Initials Name Provider Type    EJ Ivory Adams, PT Physical Therapist                   Mobility       Row Name 24 1623          Bed Mobility    Supine-Sit [place] (Bed Mobility) verbal cues;contact guard  -EJ     Assistive Device (Bed Mobility) head of bed elevated;bed rails  -       Row Name 24 1622           Sit-Stand Transfer    Sit-Stand [place] (Transfers) verbal cues;minimum assist (75% patient effort)  -EJ     Assistive Device (Sit-Stand Transfers) walker, front-wheeled  -EJ       Row Name 01/18/24 1623          Gait/Stairs (Locomotion)    [place] Level (Gait) verbal cues;contact guard;minimum assist (75% patient effort)  -EJ     Assistive Device (Gait) walker, front-wheeled  -EJ     Distance in Feet (Gait) 15  -EJ     Deviations/Abnormal Patterns (Gait) gait speed decreased;stride length decreased;prashanth decreased  -EJ     Bilateral Gait Deviations forward flexed posture;heel strike decreased  -EJ     Comment, (Gait/Stairs) slow pace, forward posture, limited by fatigue  -EJ               User Key  (r) = Recorded By, (t) = Taken By, (c) = Cosigned By      Initials Name Provider Type    Ivory Velasquez, PT Physical Therapist                   Obj/Interventions    No documentation.                  Goals/Plan    No documentation.                  Clinical Impression       Row Name 01/18/24 1629          Pain    Pretreatment Pain Rating 0/10 - no pain  -EJ       Row Name 01/18/24 1629          Plan of Care Review    Plan of Care Reviewed With patient  -EJ     Outcome Evaluation Pt agreeable to PT this afternoon. She states she feels tired and weak, but agreeable.Pt able to transition to EOB w CGA. She stood w min A using Rwx and was able to ambulate approx 15 ft in room w CGA/min A and Rwx. She does exhibit forward posture and decreased prashanth. She is limited by fatigue. Pt agreeable to sit up in chair at end of session. Plans for colonoscopy and EGD tomorrow. Will continue to progress activity as tolerated.  -EJ       Row Name 01/18/24 1629          Positioning and Restraints    Pre-Treatment Position in bed  -EJ     Post Treatment Position chair  -EJ     In Chair notified nsg;reclined;call light within reach;encouraged to call for assist;exit alarm on  -EJ               User Key  (r) =  Recorded By, (t) = Taken By, (c) = Cosigned By      Initials Name Provider Type    Ivory Velasquez, PT Physical Therapist                   Outcome Measures       Row Name 01/18/24 1632          How much help from another person do you currently need...    Turning from your back to your side while in flat bed without using bedrails? 3  -EJ     Moving from lying on back to sitting on the side of a flat bed without bedrails? 3  -EJ     Moving to and from a bed to a chair (including a wheelchair)? 3  -EJ     Standing up from a chair using your arms (e.g., wheelchair, bedside chair)? 3  -EJ     Climbing 3-5 steps with a railing? 1  -EJ     To walk in hospital room? 3  -EJ     AM-PAC 6 Clicks Score (PT) 16  -EJ     Highest Level of Mobility Goal 5 --> Static standing  -EJ               User Key  (r) = Recorded By, (t) = Taken By, (c) = Cosigned By      Initials Name Provider Type    Ivory Velasquez, PT Physical Therapist                                 Physical Therapy Education       Title: PT OT SLP Therapies (Done)       Topic: Physical Therapy (Done)       Point: Mobility training (Done)       Learning Progress Summary             Patient Acceptance, E, VU by KALA at 1/17/2024 1337    Acceptance, E,TB,D, VU,DU by AK at 1/17/2024 1006                         Point: Home exercise program (Done)       Learning Progress Summary             Patient Acceptance, E, VU by KALA at 1/17/2024 1337                         Point: Body mechanics (Done)       Learning Progress Summary             Patient Acceptance, E, VU by KALA at 1/17/2024 1337                         Point: Precautions (Done)       Learning Progress Summary             Patient Acceptance, E, VU by KALA at 1/17/2024 1337                                         User Key       Initials Effective Dates Name Provider Type Discipline    KALA 08/02/22 -  Chay Moss, OT Occupational Therapist OT    AK 12/28/23 -  Erica Alexis, PT Student PT Student PT                   PT Recommendation and Plan     Plan of Care Reviewed With: patient  Outcome Evaluation: Pt agreeable to PT this afternoon. She states she feels tired and weak, but agreeable.Pt able to transition to EOB w CGA. She stood w min A using Rwx and was able to ambulate approx 15 ft in room w CGA/min A and Rwx. She does exhibit forward posture and decreased prashanth. She is limited by fatigue. Pt agreeable to sit up in chair at end of session. Plans for colonoscopy and EGD tomorrow. Will continue to progress activity as tolerated.     Time Calculation:         PT Charges       Row Name 01/18/24 1632             Time Calculation    Start Time 1544  -EJ      Stop Time 1600  -EJ      Time Calculation (min) 16 min  -EJ      PT Received On 01/18/24  -EJ      PT - Next Appointment 01/19/24  -EJ                User Key  (r) = Recorded By, (t) = Taken By, (c) = Cosigned By      Initials Name Provider Type    Ivory Velasquez, PT Physical Therapist                  Therapy Charges for Today       Code Description Service Date Service Provider Modifiers Qty    35537371317  PT THER PROC EA 15 MIN 1/18/2024 Ivory Adams, PT GP 1            PT G-Codes  Outcome Measure Options: AM-PAC 6 Clicks Daily Activity (OT)  AM-PAC 6 Clicks Score (PT): 16  AM-PAC 6 Clicks Score (OT): 21       Ivory Adams PT  1/18/2024

## 2024-01-18 NOTE — PLAN OF CARE
Goal Outcome Evaluation:  Plan of Care Reviewed With: patient        Progress: no change    Outcome Evaluation: Pt admitted 1/16/2024 for GI bleed. Vital stable . Pt have generalized weakness. Pt's denies GI bleed over night. NPO from mid night. Pt going  today for EGD and colonoscopy to assess for hemorrhoidal versus diverticular.No new compliant or concerns per pt. Safety maintained.

## 2024-01-18 NOTE — NURSING NOTE
GI paged due to ineffective bowel prep. Pt states she has not had a BM all night despite finishing prep. MD notified. KUB ordered per Dr. Gaston. Keep pt NPO until xray obtained.

## 2024-01-18 NOTE — CONSULTS
Patient Identification:  Vonnie Coppola  88 y.o.  female  1935  [id number]          LOS 0    Requesting physician:   Jak Zhong MD    Reason for Consultation:    Pulmonary nodules    History of Present Illness:   88-year-old female with a history of breast cancer (1989), hypertension, history of CVA who presented to the hospital with bright red blood per rectum.  Ongoing evaluation by gastroenterology with plans for bidirectional endoscopy tomorrow.  Additionally patient had a CT chest performed for dyspnea and found to have pulmonary nodules in the right middle and right lower lobe.      On discussion with patient states that her breathing is stable.  Is on room air tolerating well.  Denies any cough or sputum production.  Denies any recent pulmonary infection.  Minimal smoking history of half a pack a day for 2 years.  Quit over 4 decades ago.  Does not follow with a pulmonologist.  Remote history of frequent lung infections for few years, this does not occur anymore.  Is on Pulmicort at home.    Past Medical History:  Past Medical History:   Diagnosis Date    Arthritis     Asthma     Breast cancer 1999    LEFT BREAST CA, LUMPECTOMY & RADS    Hx of radiation therapy 1999    APPROXIMATELY 40 TREATMENTS FOR LEFT BREAST CA    Hypertension     Pneumonia     Stroke        Past Surgical History:  Past Surgical History:   Procedure Laterality Date    APPENDECTOMY      BLADDER SURGERY      BREAST BIOPSY Left 1999    MALIGNANT    BREAST LUMPECTOMY Left 1999    MALIGNANT    GALLBLADDER SURGERY      HYSTERECTOMY      OOPHORECTOMY          Home Meds:  Medications Prior to Admission   Medication Sig Dispense Refill Last Dose    Ascorbic Acid (VITAMIN C ER PO) Take 1 tablet by mouth Daily.   1/15/2024    aspirin 81 MG chewable tablet Chew 1 tablet Daily.   1/15/2024    Cholecalciferol (VITAMIN D-3 PO) Take 1 tablet by mouth Daily.   1/15/2024    coenzyme Q10 50 MG capsule capsule Take 1 capsule by mouth  Daily.   1/15/2024    Cyanocobalamin (VITAMIN B 12 PO) Take 1 tablet by mouth Daily.   1/15/2024    diazePAM (VALIUM) 2 MG tablet Take 1 tablet by mouth 2 (Two) Times a Day.   1/15/2024    furosemide (LASIX) 20 MG tablet Take 1 tablet by mouth Daily.   Past Month    glucosamine-chondroitin 500-400 MG capsule capsule Take 1 capsule by mouth 3 (Three) Times a Day With Meals.   1/15/2024    losartan-hydrochlorothiazide (HYZAAR) 100-12.5 MG per tablet Take 1 tablet by mouth Daily.   1/15/2024    OLIVE LEAF EXTRACT PO Take  by mouth.   1/15/2024    ondansetron (ZOFRAN) 4 MG tablet Take 2 tablets by mouth Daily.   1/15/2024    polyethylene glycol (MIRALAX) 17 g packet Take 17 g by mouth Daily.   1/15/2024    Selenium (SELENIMIN PO) Take  by mouth.   Past Week    Zinc Sulfate (ZINC 15 PO) Take  by mouth.   Past Week    arformoterol (BROVANA) 15 MCG/2ML nebulizer solution Take 2 mL by nebulization 2 (Two) Times a Day. 120 mL 11 Unknown    budesonide (PULMICORT) 0.5 MG/2ML nebulizer solution Take 2 mL by nebulization 2 (Two) Times a Day. 360 mL 2 Unknown    docusate sodium (COLACE) 100 MG capsule Take 1 capsule by mouth Daily. (Patient not taking: Reported on 1/16/2024)   Not Taking    fluticasone (FLONASE) 50 MCG/ACT nasal spray 2 sprays by Each Nare route 2 (Two) Times a Day. 18.2 mL 11 More than a month    LYCOPENE PO Take  by mouth. (Patient not taking: Reported on 1/16/2024)   Not Taking    meclizine (ANTIVERT) 32 MG tablet Take 1 tablet by mouth 3 (Three) Times a Day As Needed for Dizziness.   More than a month    omeprazole (priLOSEC) 40 MG capsule Take 1 capsule by mouth Daily. (Patient not taking: Reported on 1/16/2024) 30 capsule 11 Not Taking         Allergies:  Allergies   Allergen Reactions    Cortisone Hives    Penicillins Hives       Social History:   Social History     Socioeconomic History    Marital status:    Tobacco Use    Smoking status: Former     Packs/day: 0.50     Years: 2.00     Additional  "pack years: 0.00     Total pack years: 1.00     Types: Cigarettes    Smokeless tobacco: Never    Tobacco comments:     quit 40 years ago   Vaping Use    Vaping Use: Never used   Substance and Sexual Activity    Alcohol use: No    Drug use: No    Sexual activity: Defer       Family History:  Family History   Problem Relation Age of Onset    Ovarian cancer Sister         70'S    Cancer Sister     Diabetes Sister     Hypertension Sister     Osteoarthritis Sister     Colon cancer Maternal Grandmother     Osteoarthritis Mother     Hypertension Father     Cancer Brother     Hypertension Brother     Osteoarthritis Brother     Cancer Other     Stroke Other     Ovarian cancer Paternal Grandmother         unknown    Breast cancer Neg Hx        Review of Systems:  12 point review of systems performed and all else negative except as per HPI above.    Objective:    PHYSICAL EXAM:    /73 (BP Location: Right arm, Patient Position: Lying)   Pulse 86   Temp 97.9 °F (36.6 °C) (Oral)   Resp 18   Ht 165.1 cm (65\")   Wt 107 kg (236 lb 8.9 oz)   SpO2 98%   BMI 39.36 kg/m²  Body mass index is 39.36 kg/m². 98% 107 kg (236 lb 8.9 oz)    General: Alert, nontoxic, NAD, elderly  HEENT: NC/AT, EOMI, MMM  Neck: Supple, trachea midline  Cardiac: RRR, no murmur, gallops, rubs  Pulmonary: Clear to auscultation bilaterally, no adventitious breath sounds, normal respiratory effort  GI: Soft, non-tender, non-distended, normal bowel sounds  Extremities: Warm, well perfused, no LE edema  Skin: no visible rash  Neuro: CN II - XII grossly intact  Psychiatry: Normal mood and affect    Lab Review:   Results from last 7 days   Lab Units 01/18/24  0752 01/18/24  0010 01/17/24  1609 01/17/24  0737 01/17/24  0357 01/16/24  2346 01/16/24  1221   WBC 10*3/mm3 7.53  --   --   --  8.61  --  9.16   HEMOGLOBIN g/dL 10.1*  10.1* 10.3* 10.6* 10.5* 10.6* 10.3* 10.8*   PLATELETS 10*3/mm3 157  --   --   --  188  --  177     Results from last 7 days   Lab " Units 01/18/24  0752 01/17/24  0357 01/16/24  1221   SODIUM mmol/L 142 141 146*   POTASSIUM mmol/L 4.0 3.9 3.6   CHLORIDE mmol/L 112* 110* 109*   CO2 mmol/L 21.0* 21.6* 28.0   BUN mg/dL 12 16 17   CREATININE mg/dL 0.84 0.90 1.01*   GLUCOSE mg/dL 72 69 86   CALCIUM mg/dL 8.0* 8.3* 9.1   Estimated Creatinine Clearance: 58.4 mL/min (by C-G formula based on SCr of 0.84 mg/dL).    Results from last 7 days   Lab Units 01/18/24  0752 01/17/24  0357 01/16/24 2023 01/16/24  1221   AST (SGOT) U/L  --  16  --  17   ALT (SGPT) U/L  --  14  --  17   FERRITIN ng/mL  --   --  37.80  --    PLATELETS 10*3/mm3 157 188  --  177              Imaging reviewed  Chest imaging from this hospitalization reviewed.       Assessment / Recommendations:    Pulmonary nodules  Asthma  Gastrointestinal bleed  Acute blood loss anemia  Osteoarthritis  Hypertension  History of breast cancer    -Patient found to have very small pulmonary nodules in the right middle lobe and right lower lobe which are not accessible by bronchoscopy.  Patient is overall asymptomatic.  My suspicion for malignancy for these remains low however will need continued follow-up.  She does have a remote history of breast cancer however it is almost 25 years ago.   patient will need repeat CT chest in 3 months to evaluate pulmonary nodules.  We will set her up for follow-up appointment in clinic.  -Stable respiratory status on room air, can continue home Pulmicort and DuoNebs while inpatient.  -Remainder of care as per hospitalist primary.    Thank you for allowing me to participate in the care of this patient.  Pulmonary will sign off at this time.  Please contact us if further questions or concerns arise.    Blaine Toscano MD  [facility], [facility]  Pulmonary and Critical Care Medicine, Interventional Pulmonology    1/18/2024  15:58 EST    Parts of this note may be an electronic transcription/translation of spoken language to printed text using the Dragon dictation  system.

## 2024-01-18 NOTE — PROGRESS NOTES
Gastroenterology   Inpatient Progress Note    Reason for Follow Up: GI bleed    Subjective  Interval History:   No bowel movement overnight.  Reports intermittent left lower quadrant abdominal pain described as a cramping nonradiating sensation.  Agreeable to repeating bowel prep and proceeding with EGD and colonoscopy tomorrow to further assess anemia.    Hgb stable at 10.1    Current Facility-Administered Medications:     acetaminophen (TYLENOL) tablet 650 mg, 650 mg, Oral, Q4H PRN, 650 mg at 01/17/24 2057 **OR** acetaminophen (TYLENOL) 160 MG/5ML oral solution 650 mg, 650 mg, Oral, Q4H PRN **OR** acetaminophen (TYLENOL) suppository 650 mg, 650 mg, Rectal, Q4H PRN, Tiffani Arnold MD    budesonide (PULMICORT) nebulizer solution 0.5 mg, 0.5 mg, Nebulization, BID - RT, Jak Zhong MD, 0.5 mg at 01/18/24 0634    Calcium Replacement - Follow Nurse / BPA Driven Protocol, , Does not apply, PRN, Tiffani Arnold MD    diazePAM (VALIUM) tablet 2 mg, 2 mg, Oral, BID, Tiffani Arnold MD, 2 mg at 01/17/24 2057    docusate sodium (COLACE) capsule 100 mg, 100 mg, Oral, BID, Jak Zhong MD, 100 mg at 01/17/24 2057    ipratropium-albuterol (DUO-NEB) nebulizer solution 3 mL, 3 mL, Nebulization, 4x Daily - RT, Jak Zhong MD, 3 mL at 01/18/24 0633    losartan (COZAAR) tablet 100 mg, 100 mg, Oral, Q24H, 100 mg at 01/17/24 0928 **AND** [DISCONTINUED] hydroCHLOROthiazide (HYDRODIURIL) tablet 12.5 mg, 12.5 mg, Oral, Q24H, Tiffani Arnold MD, 12.5 mg at 01/16/24 2056    Magnesium Standard Dose Replacement - Follow Nurse / BPA Driven Protocol, , Does not apply, PRN, Tiffani Arnold MD    nitroglycerin (NITROSTAT) SL tablet 0.4 mg, 0.4 mg, Sublingual, Q5 Min PRN, Clayton, Jawed, MD    ondansetron (ZOFRAN) injection 4 mg, 4 mg, Intravenous, Q6H PRN, Tiffani Arnold MD    pantoprazole (PROTONIX) injection 40 mg, 40 mg, Intravenous, Q12H, Tiffani Arnold MD, 40 mg at 01/17/24 2058    Phosphorus Replacement - Follow Nurse / BPA Driven  Protocol, , Does not apply, PRNClayton Jawed, MD    polyethylene glycol (MIRALAX) packet 17 g, 17 g, Oral, BID, Jak Zhong MD, 17 g at 01/17/24 2057    Potassium Replacement - Follow Nurse / BPA Driven Protocol, , Does not apply, PRNClayton Jawed, MD    sodium chloride 0.9 % flush 10 mL, 10 mL, Intravenous, Q12H, Tiffani Arnold MD, 10 mL at 01/17/24 2059    sodium chloride 0.9 % flush 10 mL, 10 mL, Intravenous, PRNClayton Jawed, MD    sodium chloride 0.9 % infusion 40 mL, 40 mL, Intravenous, PRNClayton Jawed, MD    sodium chloride 0.9 % infusion, 100 mL/hr, Intravenous, Continuous, Jak Zhong MD, Last Rate: 125 mL/hr at 01/17/24 1751, 125 mL/hr at 01/17/24 1751  Review of Systems:               All systems were reviewed and negative except for:  Constitution:  positive for See HPI  Gastrointestinal: positive for  See HPI    Objective     Vital Signs  Temp:  [97.5 °F (36.4 °C)-98.4 °F (36.9 °C)] 98.4 °F (36.9 °C)  Heart Rate:  [62-81] 75  Resp:  [18-20] 18  BP: (126-144)/(69-83) 143/75  Body mass index is 39.36 kg/m².                  General Appearance:  awake, alert, oriented, in no acute distress  Abdomen:  Soft, non-tender, normal bowel sounds; no bruits, organomegaly or masses.                Results Review:                I reviewed the patient's new clinical results.    Results from last 7 days   Lab Units 01/18/24  0752 01/18/24  0010 01/17/24  1609 01/17/24  0737 01/17/24  0357 01/16/24  2346 01/16/24  1221   WBC 10*3/mm3 7.53  --   --   --  8.61  --  9.16   HEMOGLOBIN g/dL 10.1*  10.1* 10.3* 10.6*   < > 10.6*   < > 10.8*   HEMATOCRIT % 31.0*  31.0* 31.0* 33.2*   < > 32.2*   < > 33.4*   PLATELETS 10*3/mm3 157  --   --   --  188  --  177    < > = values in this interval not displayed.     Results from last 7 days   Lab Units 01/18/24  0752 01/17/24  0357 01/16/24  1221   SODIUM mmol/L 142 141 146*   POTASSIUM mmol/L 4.0 3.9 3.6   CHLORIDE mmol/L 112* 110* 109*   CO2 mmol/L 21.0* 21.6* 28.0    BUN mg/dL 12 16 17   CREATININE mg/dL 0.84 0.90 1.01*   CALCIUM mg/dL 8.0* 8.3* 9.1   BILIRUBIN mg/dL  --  0.4 0.3   ALK PHOS U/L  --  57 58   ALT (SGPT) U/L  --  14 17   AST (SGOT) U/L  --  16 17   GLUCOSE mg/dL 72 69 86     Results from last 7 days   Lab Units 01/16/24  1221   INR  1.10     Lab Results   Lab Value Date/Time    LIPASE 16 01/16/2024 1221    LIPASE 26 12/25/2023 1517       Radiology:  US Pelvis Complete   Final Result   FINDINGS/impression: The uterus and ovaries are surgically absent. A   left adnexal cystic lesion measures 2.8 x 2.5 x 2.3 cm, indeterminate.   Consider 3-month follow-up if clinically indicated.           This report was finalized on 1/17/2024 12:31 PM by Dr. Jasmeet Morrison M.D   on Workstation: [id number]          CT Chest With Contrast Diagnostic   Final Result       Right middle and lower lobe lobe nodules, possibility of neoplasm not   excluded, PET/CT correlation can be obtained for further evaluation of   the largest, continued CT follow-up is also advised in 3 months.       This report was finalized on 1/17/2024 12:59 PM by Dr. Rod Villavicencio M.D on Workstation: [id number]          CT Abdomen Pelvis With Contrast   Final Result   1. Very extensive diverticulosis throughout the length of the colon. No   acute inflammatory process is identified to account for the reported   history of pain. Likewise, no lesion is identified to account for GI   bleeding other than the numerous diverticula.   2. Incidentally noted noncalcified pulmonary nodules in the peripleural   right lung base are not evident on previous imaging. Follow up with CT   of the entire chest for survey of the chest is suggested before pursuing   further management of this finding.   3. A 26 mm left adnexal cyst appears to be simple but further   characterization with pelvic ultrasound is suggested. This may be   technically challenging due to its location high in the pelvis.           Radiation dose reduction  techniques were utilized, including automated   exposure control and exposure modulation based on body size.           This report was finalized on 1/16/2024 2:34 PM by Dr. Adán Acosta M.D on Workstation: [id number]          XR Abdomen KUB    (Results Pending)       Assessment & Plan     Active Hospital Problems    Diagnosis     **GI bleed     History of CVA (cerebrovascular accident)     Dehydration     Renal insufficiency     Pulmonary nodule     Adnexal cyst     Anemia     HTN (hypertension)     History of breast cancer     Asthma     Nausea        Assessment:  GI bleed   Acute blood loss anemia  melena and hematochezia  Evidence of rectal wall thickening on 12/25/2023 CT A/P  Osteoarthritis with recent oral corticosteroid use  Nausea without vomiting  Hypertension  History of breast cancer      Plan:  Nursing staff to notify GI service on call if any change in clinical status.  Abdominal KUB today to assess current stool burden.  If evidence of rectal fecal burden would recommend administration of Dulcolax suppository as I have concerns over her ability to retain an enema.  Clear liquid diet now  Complete subsequent bowel prep later today  Bidirectional endoscopy tomorrow 1/19    I discussed the patients findings and my recommendations with patient, nursing staff, primary care team, and DR. Gaston  .          CHAPO Friedman  [facility] [id number] [place] [address]

## 2024-01-18 NOTE — PLAN OF CARE
Goal Outcome Evaluation:  Plan of Care Reviewed With: patient           Outcome Evaluation: Pt agreeable to PT this afternoon. She states she feels tired and weak, but agreeable.Pt able to transition to EOB w CGA. She stood w min A using Rwx and was able to ambulate approx 15 ft in room w CGA/min A and Rwx. She does exhibit forward posture and decreased prashanth. She is limited by fatigue. Pt agreeable to sit up in chair at end of session. Plans for colonoscopy and EGD tomorrow. Will continue to progress activity as tolerated.

## 2024-01-18 NOTE — PROGRESS NOTES
Name: Vonnie Coppola ADMIT: 2024   : 1935  PCP: Christopher Leyva DO    MRN: [id number] LOS: 0 days   AGE/SEX: 88 y.o. female  ROOM: [facility]     Subjective   Subjective   No abdominal pain.  No bowel movement Still nince admission (despite  bowel prep.).  No nausea or vomiting.  No dysphagia or odynophagia.    Review of Systems  Cardiovascular/respiratory.  No chest pain.  No shortness of breath.  Positive occasional cough productive of clear sputum.  No wheezing.  No hemoptysis.  No palpitation..    .  No dysuria or hematuria     Objective   Objective   Vital Signs  Temp:  [97.5 °F (36.4 °C)-98.4 °F (36.9 °C)] 98.4 °F (36.9 °C)  Heart Rate:  [62-81] 75  Resp:  [18-20] 18  BP: (126-144)/(69-83) 143/75  SpO2:  [96 %-99 %] 98 %  on  Flow (L/min):  [1-3] 1;   Device (Oxygen Therapy): nasal cannula    Intake/Output Summary (Last 24 hours) at 2024 0856  Last data filed at 2024 0747  Gross per 24 hour   Intake 1000 ml   Output 2550 ml   Net -1550 ml     Body mass index is 39.36 kg/m².      24  1643   Weight: 107 kg (236 lb 8.9 oz)     Physical Exam  General.  Elderly female.  Obese.  Alert and oriented x 3.  No apparent pain/distress/diaphoresis.  Normal mood and affect.  Eyes.  Pupils equal round and reactive.  Intact extraocular musculature.  No pallor or jaundice.  Oral cavity.  Moist mucous membrane.  Neck.  Supple.  No JVD.  No lymphadenopathy or thyromegaly.  Cardiovascular.  Regular rate and rhythm with no gallops or murmurs.  Chest.  Poor bilateral air entry with scattered bilateral rhonchi and resolved bilateral expiratory wheeze.  Abdomen.  Soft lax.  No tenderness.  No organomegaly.  No guarding or rebound.  Normal bowel sounds.  Extremities.  No clubbing/cyanosis/edema.  CNS.  No acute focal neurological deficits.    Results Review:      Results from last 7 days   Lab Units 24  0752 24  0357 24  1221   SODIUM mmol/L 142 141 146*   POTASSIUM mmol/L  "4.0 3.9 3.6   CHLORIDE mmol/L 112* 110* 109*   CO2 mmol/L 21.0* 21.6* 28.0   BUN mg/dL 12 16 17   CREATININE mg/dL 0.84 0.90 1.01*   GLUCOSE mg/dL 72 69 86   CALCIUM mg/dL 8.0* 8.3* 9.1   AST (SGOT) U/L  --  16 17   ALT (SGPT) U/L  --  14 17     Estimated Creatinine Clearance: 58.4 mL/min (by C-G formula based on SCr of 0.84 mg/dL).                                Invalid input(s): \"LDLCALC\"  Results from last 7 days   Lab Units 01/18/24  0752 01/18/24  0010 01/17/24  1609 01/17/24  0737 01/17/24  0357 01/16/24  2346 01/16/24  1221   WBC 10*3/mm3 7.53  --   --   --  8.61  --  9.16   HEMOGLOBIN g/dL 10.1*  10.1* 10.3* 10.6* 10.5* 10.6* 10.3* 10.8*   HEMATOCRIT % 31.0*  31.0* 31.0* 33.2* 32.9* 32.2* 32.5* 33.4*   PLATELETS 10*3/mm3 157  --   --   --  188  --  177   MCV fL 90.9  --   --   --  89.4  --  91.3   MCH pg 29.6  --   --   --  29.4  --  29.5   MCHC g/dL 32.6  --   --   --  32.9  --  32.3   RDW % 13.4  --   --   --  13.2  --  13.5   RDW-SD fl 44.4  --   --   --  43.3  --  45.6   MPV fL 10.0  --   --   --  10.1  --  9.6   NEUTROPHIL % % 54.2  --   --   --  58.9  --  68.0   LYMPHOCYTE % % 28.2  --   --   --  25.3  --  19.0*   MONOCYTES % % 12.4*  --   --   --  11.7  --  10.9   EOSINOPHIL % % 4.2  --   --   --  3.3  --  1.4   BASOPHIL % % 0.7  --   --   --  0.6  --  0.4   IMM GRAN % % 0.3  --   --   --  0.2  --  0.3   NEUTROS ABS 10*3/mm3 4.09  --   --   --  5.07  --  6.22   LYMPHS ABS 10*3/mm3 2.12  --   --   --  2.18  --  1.74   MONOS ABS 10*3/mm3 0.93*  --   --   --  1.01*  --  1.00*   EOS ABS 10*3/mm3 0.32  --   --   --  0.28  --  0.13   BASOS ABS 10*3/mm3 0.05  --   --   --  0.05  --  0.04   IMMATURE GRANS (ABS) 10*3/mm3 0.02  --   --   --  0.02  --  0.03   NRBC /100 WBC 0.0  --   --   --  0.0  --  0.0     Results from last 7 days   Lab Units 01/16/24  1221   INR  1.10   APTT seconds 26.7                 Results from last 7 days   Lab Units 01/16/24  1221   LIPASE U/L 16             Results from last 7 days "   Lab Units 01/16/24  1337   NITRITE UA  Negative           Imaging:  Imaging Results (Last 24 Hours)       Procedure Component Value Units Date/Time    CT Chest With Contrast Diagnostic [[id number]] Collected: 01/17/24 1238     Updated: 01/17/24 1303    Narrative:      CT CHEST W CONTRAST DIAGNOSTIC-     INDICATIONS: Dyspnea, pulmonary nodule     TECHNIQUE: Radiation dose reduction techniques were utilized, including  automated exposure control and exposure modulation based on body size.  ENHANCED CHEST CT     COMPARISON: 10/22/2019           FINDINGS:           The heart size is borderline without pericardial effusion. A few small  subcentimeter short axis mediastinal lymph nodes are seen that are not  significant by size criteria.     The airways appear clear.     No pleural effusion or pneumothorax.     The lungs show no focal pulmonary consolidation or mass. Mild subpleural  fibrotic changes are seen, predominantly at the lung bases. Right middle  lobe nodules are noted on axial image 64, 1 more laterally measuring 7  mm (not definitely present on the prior exam), one more medially  measuring 1.1 cm, previously 8 mm. Small nodules in the right lower lobe  do not appear significantly changed. Small pleural-based nodularity at  the right lower lobe, for example axial image 62, axial image 65 may be  nodular atelectasis, scarring, nodules, attention to this area on  follow-up advised.        Upper abdominal structures show no acute findings. The gallbladder is  surgically absent. Cystic foci in the spleen appear stable in the  abdomen and pelvis CT from 1/16/2024. Colonic diverticula are seen that  do not appear inflamed.     Degenerative changes are seen in the spine. No acute fracture is  identified.       Impression:         Right middle and lower lobe lobe nodules, possibility of neoplasm not  excluded, PET/CT correlation can be obtained for further evaluation of  the largest, continued CT follow-up is also  advised in 3 months.     This report was finalized on 1/17/2024 12:59 PM by Dr. Rod Villavicencio M.D on Workstation: [id number]       US Pelvis Complete [[id number]] Collected: 01/17/24 1230     Updated: 01/17/24 1234    Narrative:      Examination: Transabdominal pelvic sonogram        TECHNIQUE: Sonographic images of the pelvis were obtained from the  transabdominal approach     HISTORY: Left adnexal cyst     COMPARISON: CT of 1/16/2024       Impression:      FINDINGS/impression: The uterus and ovaries are surgically absent. A  left adnexal cystic lesion measures 2.8 x 2.5 x 2.3 cm, indeterminate.  Consider 3-month follow-up if clinically indicated.        This report was finalized on 1/17/2024 12:31 PM by Dr. Jasmeet Morrison M.D  on Workstation: [id number]                  I reviewed the patient's new clinical results / labs / tests / procedures      Assessment/Plan     Active Hospital Problems    Diagnosis  POA    **GI bleed [K92.2]  Yes    History of CVA (cerebrovascular accident) [Z86.73]  Not Applicable    Dehydration [E86.0]  Yes    Renal insufficiency [N28.9]  Yes    Pulmonary nodule [R91.1]  Yes    Adnexal cyst [N94.9]  Yes    Anemia [D64.9]  Yes    HTN (hypertension) [I10]  Yes    History of breast cancer [Z85.3]  Not Applicable    Asthma [J45.909]  Yes    Nausea [R11.0]  Unknown      Resolved Hospital Problems   No resolved problems to display.           GI bleed in a patient with a history of peptic ulcer disease/diverticular disease/hemorrhoids/constipation.  Differential diet peptic ulcer disease/ diverticular bleed/hemorrhoidal bleed.  .  Hemodynamically stable.  Hemoglobin is stable.  Tolerating clear liquids well.  On IV Protonix.  GI planning bidirectional endoscopies today but since no bowel movement colonoscopy might be postponed at the.  Monitor hemoglobin and transfuse if hemoglobin is less than 7.  Hold aspirin.  GI blood loss anemia/iron deficiency anemia.  Possibly secondary to GI bleed.   Monitor hemoglobin and transfuse if hemoglobin is less than 7.  P.o. iron at discharge.  Hypertension.  Good blood pressure controlled continue Cozaar.  Diuretics on hold.    Dehydration/renal insufficiency/hypernatremia.  Resolved renal insufficiency and hypernatremia on IV fluid.  Continue holding diuretics.  Resolved dehydration and I will decrease IV fluids.    Acute asthma exacerbation/hypoxemia/pulmonary nodule.  CT of the chest with enlarging pulmonary nodules on the right middle and lower lobe.  Improving asthma exacerbation on Pulmicort and DuoNebs.  Consult pulmonary for enlarging pulmonary nodules.    History of CVA.  Holding aspirin secondary to GI bleed..  Left adnexal cyst.  Pelvic ultrasound with absent uterus and ovaries.  GYN follow-up as an outpatient with repeat pelvic ultrasound..  VTE prophylaxis.  Sequential compression devices.      Discussed my findings and plan of treatment with the patient/nurses at multidisciplinary rounds.  Disposition.  To be determined based on clinical course.  Anticipate discharge home with home health in 2 days.        Jak Zhong MD  [facility]  01/18/24  08:56 EST

## 2024-01-18 NOTE — CASE MANAGEMENT/SOCIAL WORK
Continued Stay Note  [facility]     Patient Name: Vonnie Coppola  MRN: [id number]  Today's Date: 1/18/2024    Admit Date: 1/16/2024    Plan: Home with daughter.  [facility] has accepted for nursing and PT/OT. Follow for possible home O2 need. Family transport at D/C.   Discharge Plan       Row Name 01/18/24 1331       Plan    Plan Home with daughter.  [facility] has accepted for nursing and PT/OT. Follow for possible home O2 need. Family transport at D/C.    Patient/Family in Agreement with Plan yes    Plan Comments No BM with prep given yesterday. Receiving prep again today with plans for EGD and C-scope tomorrow. [facility] has accepted for nursing and PT/OT. Israel Kitchen RN-BC                   Discharge Codes    No documentation.                 Expected Discharge Date and Time       Expected Discharge Date Expected Discharge Time    Jan 19, 2024               Israel Kitchen RN

## 2024-01-18 NOTE — PLAN OF CARE
Goal Outcome Evaluation:              Outcome Evaluation: VSS. Plan for upper and lower scopes tomorrow. Start bowel prep tonight. NPO at midnight. No signs of bleeding this shift. No BM so far. Daughter at bedside. Needs met at this time.

## 2024-01-19 ENCOUNTER — ANESTHESIA EVENT (OUTPATIENT)
Dept: GASTROENTEROLOGY | Facility: HOSPITAL | Age: 89
End: 2024-01-19
Payer: MEDICARE

## 2024-01-19 ENCOUNTER — ANESTHESIA (OUTPATIENT)
Dept: GASTROENTEROLOGY | Facility: HOSPITAL | Age: 89
End: 2024-01-19
Payer: MEDICARE

## 2024-01-19 LAB
ANION GAP SERPL CALCULATED.3IONS-SCNC: 11 MMOL/L (ref 5–15)
BASOPHILS # BLD AUTO: 0.04 10*3/MM3 (ref 0–0.2)
BASOPHILS NFR BLD AUTO: 0.5 % (ref 0–1.5)
BUN SERPL-MCNC: 11 MG/DL (ref 8–23)
BUN/CREAT SERPL: 12.1 (ref 7–25)
CALCIUM SPEC-SCNC: 8.5 MG/DL (ref 8.6–10.5)
CHLORIDE SERPL-SCNC: 112 MMOL/L (ref 98–107)
CO2 SERPL-SCNC: 22 MMOL/L (ref 22–29)
CREAT SERPL-MCNC: 0.91 MG/DL (ref 0.57–1)
DEPRECATED RDW RBC AUTO: 43.2 FL (ref 37–54)
EGFRCR SERPLBLD CKD-EPI 2021: 61.6 ML/MIN/1.73
EOSINOPHIL # BLD AUTO: 0.32 10*3/MM3 (ref 0–0.4)
EOSINOPHIL NFR BLD AUTO: 4.3 % (ref 0.3–6.2)
ERYTHROCYTE [DISTWIDTH] IN BLOOD BY AUTOMATED COUNT: 13.3 % (ref 12.3–15.4)
GLUCOSE SERPL-MCNC: 91 MG/DL (ref 65–99)
HCT VFR BLD AUTO: 31.6 % (ref 34–46.6)
HCT VFR BLD AUTO: 31.8 % (ref 34–46.6)
HCT VFR BLD AUTO: 34.2 % (ref 34–46.6)
HCT VFR BLD AUTO: 34.2 % (ref 34–46.6)
HGB BLD-MCNC: 10.5 G/DL (ref 12–15.9)
HGB BLD-MCNC: 10.6 G/DL (ref 12–15.9)
HGB BLD-MCNC: 11.1 G/DL (ref 12–15.9)
HGB BLD-MCNC: 11.1 G/DL (ref 12–15.9)
IMM GRANULOCYTES # BLD AUTO: 0.04 10*3/MM3 (ref 0–0.05)
IMM GRANULOCYTES NFR BLD AUTO: 0.5 % (ref 0–0.5)
LYMPHOCYTES # BLD AUTO: 1.79 10*3/MM3 (ref 0.7–3.1)
LYMPHOCYTES NFR BLD AUTO: 24.1 % (ref 19.6–45.3)
MCH RBC QN AUTO: 29.1 PG (ref 26.6–33)
MCHC RBC AUTO-ENTMCNC: 32.5 G/DL (ref 31.5–35.7)
MCV RBC AUTO: 89.5 FL (ref 79–97)
MONOCYTES # BLD AUTO: 0.94 10*3/MM3 (ref 0.1–0.9)
MONOCYTES NFR BLD AUTO: 12.7 % (ref 5–12)
NEUTROPHILS NFR BLD AUTO: 4.3 10*3/MM3 (ref 1.7–7)
NEUTROPHILS NFR BLD AUTO: 57.9 % (ref 42.7–76)
NRBC BLD AUTO-RTO: 0 /100 WBC (ref 0–0.2)
PLATELET # BLD AUTO: 182 10*3/MM3 (ref 140–450)
PMV BLD AUTO: 10.2 FL (ref 6–12)
POTASSIUM SERPL-SCNC: 3.6 MMOL/L (ref 3.5–5.2)
POTASSIUM SERPL-SCNC: 4.3 MMOL/L (ref 3.5–5.2)
RBC # BLD AUTO: 3.82 10*6/MM3 (ref 3.77–5.28)
SODIUM SERPL-SCNC: 145 MMOL/L (ref 136–145)
WBC NRBC COR # BLD AUTO: 7.43 10*3/MM3 (ref 3.4–10.8)

## 2024-01-19 PROCEDURE — 0DBK8ZZ EXCISION OF ASCENDING COLON, VIA NATURAL OR ARTIFICIAL OPENING ENDOSCOPIC: ICD-10-PCS | Performed by: INTERNAL MEDICINE

## 2024-01-19 PROCEDURE — 0DB68ZX EXCISION OF STOMACH, VIA NATURAL OR ARTIFICIAL OPENING ENDOSCOPIC, DIAGNOSTIC: ICD-10-PCS | Performed by: INTERNAL MEDICINE

## 2024-01-19 PROCEDURE — 43239 EGD BIOPSY SINGLE/MULTIPLE: CPT | Performed by: INTERNAL MEDICINE

## 2024-01-19 PROCEDURE — 25010000002 PROPOFOL 10 MG/ML EMULSION: Performed by: REGISTERED NURSE

## 2024-01-19 PROCEDURE — 85025 COMPLETE CBC W/AUTO DIFF WBC: CPT | Performed by: INTERNAL MEDICINE

## 2024-01-19 PROCEDURE — 87081 CULTURE SCREEN ONLY: CPT | Performed by: INTERNAL MEDICINE

## 2024-01-19 PROCEDURE — 85014 HEMATOCRIT: CPT | Performed by: INTERNAL MEDICINE

## 2024-01-19 PROCEDURE — 45385 COLONOSCOPY W/LESION REMOVAL: CPT | Performed by: INTERNAL MEDICINE

## 2024-01-19 PROCEDURE — 94799 UNLISTED PULMONARY SVC/PX: CPT

## 2024-01-19 PROCEDURE — 84132 ASSAY OF SERUM POTASSIUM: CPT | Performed by: INTERNAL MEDICINE

## 2024-01-19 PROCEDURE — 94761 N-INVAS EAR/PLS OXIMETRY MLT: CPT

## 2024-01-19 PROCEDURE — 85018 HEMOGLOBIN: CPT | Performed by: INTERNAL MEDICINE

## 2024-01-19 PROCEDURE — 94664 DEMO&/EVAL PT USE INHALER: CPT

## 2024-01-19 PROCEDURE — 88305 TISSUE EXAM BY PATHOLOGIST: CPT | Performed by: INTERNAL MEDICINE

## 2024-01-19 PROCEDURE — 0DBM8ZZ EXCISION OF DESCENDING COLON, VIA NATURAL OR ARTIFICIAL OPENING ENDOSCOPIC: ICD-10-PCS | Performed by: INTERNAL MEDICINE

## 2024-01-19 PROCEDURE — 25010000002 PHENYLEPHRINE 10 MG/ML SOLUTION: Performed by: REGISTERED NURSE

## 2024-01-19 PROCEDURE — 80048 BASIC METABOLIC PNL TOTAL CA: CPT | Performed by: INTERNAL MEDICINE

## 2024-01-19 RX ORDER — LIDOCAINE HYDROCHLORIDE 20 MG/ML
INJECTION, SOLUTION INFILTRATION; PERINEURAL AS NEEDED
Status: DISCONTINUED | OUTPATIENT
Start: 2024-01-19 | End: 2024-01-19 | Stop reason: SURG

## 2024-01-19 RX ORDER — PANTOPRAZOLE SODIUM 40 MG/1
40 TABLET, DELAYED RELEASE ORAL
Status: DISCONTINUED | OUTPATIENT
Start: 2024-01-19 | End: 2024-01-28 | Stop reason: HOSPADM

## 2024-01-19 RX ORDER — SODIUM CHLORIDE 9 MG/ML
1000 INJECTION, SOLUTION INTRAVENOUS CONTINUOUS
Status: DISCONTINUED | OUTPATIENT
Start: 2024-01-19 | End: 2024-01-19

## 2024-01-19 RX ORDER — PHENYLEPHRINE HYDROCHLORIDE 10 MG/ML
INJECTION INTRAVENOUS AS NEEDED
Status: DISCONTINUED | OUTPATIENT
Start: 2024-01-19 | End: 2024-01-19 | Stop reason: SURG

## 2024-01-19 RX ORDER — ONDANSETRON 2 MG/ML
4 INJECTION INTRAMUSCULAR; INTRAVENOUS ONCE AS NEEDED
Status: DISCONTINUED | OUTPATIENT
Start: 2024-01-19 | End: 2024-01-19 | Stop reason: HOSPADM

## 2024-01-19 RX ORDER — PROPOFOL 10 MG/ML
VIAL (ML) INTRAVENOUS AS NEEDED
Status: DISCONTINUED | OUTPATIENT
Start: 2024-01-19 | End: 2024-01-19 | Stop reason: SURG

## 2024-01-19 RX ORDER — POTASSIUM CHLORIDE 750 MG/1
40 TABLET, FILM COATED, EXTENDED RELEASE ORAL EVERY 4 HOURS
Status: COMPLETED | OUTPATIENT
Start: 2024-01-19 | End: 2024-01-19

## 2024-01-19 RX ADMIN — POTASSIUM CHLORIDE 40 MEQ: 750 TABLET, EXTENDED RELEASE ORAL at 13:03

## 2024-01-19 RX ADMIN — LIDOCAINE HYDROCHLORIDE 100 MG: 20 INJECTION, SOLUTION INFILTRATION; PERINEURAL at 11:37

## 2024-01-19 RX ADMIN — POTASSIUM CHLORIDE 40 MEQ: 750 TABLET, EXTENDED RELEASE ORAL at 17:21

## 2024-01-19 RX ADMIN — ACETAMINOPHEN 650 MG: 325 TABLET, FILM COATED ORAL at 20:45

## 2024-01-19 RX ADMIN — Medication 10 ML: at 08:15

## 2024-01-19 RX ADMIN — PHENYLEPHRINE HYDROCHLORIDE 100 MCG: 10 INJECTION INTRAVENOUS at 12:12

## 2024-01-19 RX ADMIN — BUDESONIDE 0.5 MG: 0.5 INHALANT ORAL at 21:12

## 2024-01-19 RX ADMIN — IPRATROPIUM BROMIDE AND ALBUTEROL SULFATE 3 ML: 2.5; .5 SOLUTION RESPIRATORY (INHALATION) at 16:44

## 2024-01-19 RX ADMIN — LOSARTAN POTASSIUM 100 MG: 100 TABLET, FILM COATED ORAL at 08:15

## 2024-01-19 RX ADMIN — PROPOFOL 140 MCG/KG/MIN: 10 INJECTION, EMULSION INTRAVENOUS at 11:37

## 2024-01-19 RX ADMIN — PANTOPRAZOLE SODIUM 40 MG: 40 TABLET, DELAYED RELEASE ORAL at 17:21

## 2024-01-19 RX ADMIN — PANTOPRAZOLE SODIUM 40 MG: 40 INJECTION, POWDER, FOR SOLUTION INTRAVENOUS at 08:15

## 2024-01-19 RX ADMIN — DOCUSATE SODIUM 100 MG: 100 CAPSULE, LIQUID FILLED ORAL at 20:45

## 2024-01-19 RX ADMIN — DIAZEPAM 2 MG: 2 TABLET ORAL at 08:15

## 2024-01-19 RX ADMIN — Medication 10 ML: at 20:46

## 2024-01-19 RX ADMIN — BUDESONIDE 0.5 MG: 0.5 INHALANT ORAL at 07:48

## 2024-01-19 RX ADMIN — PROPOFOL 100 MG: 10 INJECTION, EMULSION INTRAVENOUS at 11:37

## 2024-01-19 RX ADMIN — IPRATROPIUM BROMIDE AND ALBUTEROL SULFATE 3 ML: 2.5; .5 SOLUTION RESPIRATORY (INHALATION) at 07:47

## 2024-01-19 RX ADMIN — IPRATROPIUM BROMIDE AND ALBUTEROL SULFATE 3 ML: 2.5; .5 SOLUTION RESPIRATORY (INHALATION) at 21:12

## 2024-01-19 RX ADMIN — DIAZEPAM 2 MG: 2 TABLET ORAL at 20:46

## 2024-01-19 NOTE — CASE MANAGEMENT/SOCIAL WORK
Continued Stay Note  [facility]     Patient Name: Vonnie Coppola  MRN: [id number]  Today's Date: 1/19/2024    Admit Date: 1/16/2024    Plan: Home with daughter. [facility] to see for nursing and PT/OT. Family transport. Follow for possible home O2 need.   Discharge Plan       Row Name 01/19/24 1725       Plan    Plan Home with daughter. [facility] to see for nursing and PT/OT. Family transport. Follow for possible home O2 need.    Patient/Family in Agreement with Plan yes    Plan Comments C-scope and EGD completed showing gastritis and 2 colon polyps. Pt currently on O2@3LNC (does not use home O2). Will need to follow for possible home O2 at D/C. [facility] to see for nursing and PT/OT. Israel Kitchen RN-BC                   Discharge Codes    No documentation.                 Expected Discharge Date and Time       Expected Discharge Date Expected Discharge Time    Jan 20, 2024               Israel Kitchen RN

## 2024-01-19 NOTE — PLAN OF CARE
Problem: Adult Inpatient Plan of Care  Goal: Plan of Care Review  Outcome: Ongoing, Progressing  Goal: Patient-Specific Goal (Individualized)  Outcome: Ongoing, Progressing  Goal: Absence of Hospital-Acquired Illness or Injury  Outcome: Ongoing, Progressing  Intervention: Prevent Skin Injury  Description: Perform a screening for skin injury risk, such as pressure or moisture associated skin damage on admission and at regular intervals throughout hospital stay.  Keep all areas of skin (especially folds) clean and dry.  Maintain adequate skin hydration.  Relieve and redistribute pressure and protect bony prominences; implement measures based on patient-specific risk factors.  Match turning and repositioning schedule to clinical condition.  Encourage weight shift frequently; assist with reposition if unable to complete independently.  Float heels off bed; avoid pressure on the Achilles tendon.  Keep skin free from extended contact with medical devices.  Encourage functional activity and mobility, as early as tolerated.  Use aids (e.g., slide boards, mechanical lift) during transfer.  Recent Flowsheet Documentation  Taken 1/18/2024 2002 by Radha Lugo, RN  Skin Protection: incontinence pads utilized  Intervention: Prevent and Manage VTE (Venous Thromboembolism) Risk  Description: Assess for VTE (venous thromboembolism) risk.  Encourage and assist with early ambulation.  Initiate and maintain compression or other therapy, as indicated, based on identified risk in accordance with organizational protocol and provider order.  Encourage both active and passive leg exercises while in bed, if unable to ambulate.  Recent Flowsheet Documentation  Taken 1/18/2024 2002 by Radha Lugo, RN  Range of Motion:   active ROM (range of motion) encouraged   ROM (range of motion) performed  Intervention: Prevent Infection  Description: Maintain skin and mucous membrane integrity; promote hand, oral and pulmonary  hygiene.  Optimize fluid balance, nutrition, sleep and glycemic control to maximize infection resistance.  Identify potential sources of infection early to prevent or mitigate progression of infection (e.g., wound, lines, devices).  Evaluate ongoing need for invasive devices; remove promptly when no longer indicated.  Recent Flowsheet Documentation  Taken 1/18/2024 2002 by Radha Lugo, RN  Infection Prevention:   single patient room provided   hand hygiene promoted   equipment surfaces disinfected  Goal: Optimal Comfort and Wellbeing  Outcome: Ongoing, Progressing  Goal: Readiness for Transition of Care  Outcome: Ongoing, Progressing  Goal: Plan of Care Review  Outcome: Ongoing, Progressing  Goal: Patient-Specific Goal (Individualized)  Outcome: Ongoing, Progressing  Goal: Absence of Hospital-Acquired Illness or Injury  Outcome: Ongoing, Progressing  Intervention: Prevent Skin Injury  Description: Perform a screening for skin injury risk, such as pressure or moisture associated skin damage on admission and at regular intervals throughout hospital stay.  Keep all areas of skin (especially folds) clean and dry.  Maintain adequate skin hydration.  Relieve and redistribute pressure and protect bony prominences; implement measures based on patient-specific risk factors.  Match turning and repositioning schedule to clinical condition.  Encourage weight shift frequently; assist with reposition if unable to complete independently.  Float heels off bed; avoid pressure on the Achilles tendon.  Keep skin free from extended contact with medical devices.  Encourage functional activity and mobility, as early as tolerated.  Use aids (e.g., slide boards, mechanical lift) during transfer.  Recent Flowsheet Documentation  Taken 1/18/2024 2002 by Radha Lugo, RN  Skin Protection: incontinence pads utilized  Intervention: Prevent and Manage VTE (Venous Thromboembolism) Risk  Description: Assess for VTE (venous  thromboembolism) risk.  Encourage and assist with early ambulation.  Initiate and maintain compression or other therapy, as indicated, based on identified risk in accordance with organizational protocol and provider order.  Encourage both active and passive leg exercises while in bed, if unable to ambulate.  Recent Flowsheet Documentation  Taken 1/18/2024 2002 by Radha Lugo, RN  Range of Motion:   active ROM (range of motion) encouraged   ROM (range of motion) performed  Intervention: Prevent Infection  Description: Maintain skin and mucous membrane integrity; promote hand, oral and pulmonary hygiene.  Optimize fluid balance, nutrition, sleep and glycemic control to maximize infection resistance.  Identify potential sources of infection early to prevent or mitigate progression of infection (e.g., wound, lines, devices).  Evaluate ongoing need for invasive devices; remove promptly when no longer indicated.  Recent Flowsheet Documentation  Taken 1/18/2024 2002 by Radha Lugo, RN  Infection Prevention:   single patient room provided   hand hygiene promoted   equipment surfaces disinfected  Goal: Optimal Comfort and Wellbeing  Outcome: Ongoing, Progressing  Goal: Readiness for Transition of Care  Outcome: Ongoing, Progressing   Goal Outcome Evaluation:

## 2024-01-19 NOTE — ANESTHESIA POSTPROCEDURE EVALUATION
"Patient: Vonnie Coppola    Procedure Summary       Date: 01/19/24 Room / Location:  [facility] 5 /  [facility]    Anesthesia Start: 1131 Anesthesia Stop: 1211    Procedures:       COLONOSCOPY to cecum with cold snare polypectomies      ESOPHAGOGASTRODUODENOSCOPY with biospy (Esophagus) Diagnosis:       Gastrointestinal hemorrhage, unspecified gastrointestinal hemorrhage type      Anemia, unspecified type      Gastrointestinal hemorrhage with melena      Nausea      (Gastrointestinal hemorrhage, unspecified gastrointestinal hemorrhage type [K92.2])      (Anemia, unspecified type [D64.9])      (Gastrointestinal hemorrhage with melena [K92.1])      (Nausea [R11.0])    Surgeons: Harshad Gaston MD Provider: Carter Taylor MD    Anesthesia Type: MAC ASA Status: 3            Anesthesia Type: MAC    Vitals  Vitals Value Taken Time   /71 01/19/24 1220   Temp     Pulse 95 01/19/24 1224   Resp 18 01/19/24 1218   SpO2 91 % 01/19/24 1224   Vitals shown include unfiled device data.        Post Anesthesia Care and Evaluation    Patient location during evaluation: bedside  Patient participation: complete - patient participated  Level of consciousness: awake and alert  Pain management: adequate    Airway patency: patent  Anesthetic complications: No anesthetic complications    Cardiovascular status: acceptable  Respiratory status: acceptable  Hydration status: acceptable    Comments: /71 (BP Location: Left arm, Patient Position: Lying)   Pulse 96   Temp 36.5 °C (97.7 °F) (Oral)   Resp 18   Ht 165.1 cm (65\")   Wt 107 kg (236 lb 8.9 oz)   SpO2 96%   BMI 39.36 kg/m²     "

## 2024-01-19 NOTE — PLAN OF CARE
Goal Outcome Evaluation:     Patient A&Ox4. Complaints of intermittent knee pain from a fall x3 months ago. EGD and Colonoscopy done this shift. Medicated per orders. Plan of care ongoing.

## 2024-01-19 NOTE — SIGNIFICANT NOTE
01/19/24 1048   OTHER   Discipline physical therapist   Rehab Time/Intention   Session Not Performed patient unavailable for treatment  (pt just left floor for ENDO for colonoscopy and EGD, will follow up tomorrow)   Recommendation   PT - Next Appointment 01/20/24

## 2024-01-19 NOTE — PROGRESS NOTES
Name: Vonnie Coppola ADMIT: 2024   : 1935  PCP: Christopher Leyva DO    MRN: [id number] LOS: 1 days   AGE/SEX: 88 y.o. female  ROOM: [facility]     Subjective   Subjective   Patient seen after bidirectional endoscopy.  No abdominal pain.  Loose bowel movement today because of the bowel prep prior to endoscopy without fresh bright blood per rectum or melena.  No nausea or vomiting.  No dysphagia or odynophagia.    Review of Systems  Cardiovascular/respiratory.  No chest pain.  No shortness of breath.  Positive occasional cough productive of clear sputum (old)..  No wheezing.  No hemoptysis.  No palpitation..    .  No dysuria or hematuria     Objective   Objective   Vital Signs  Temp:  [97.7 °F (36.5 °C)-98.7 °F (37.1 °C)] 97.9 °F (36.6 °C)  Heart Rate:  [76-96] 93  Resp:  [18-22] 18  BP: ()/(50-88) 141/87  SpO2:  [90 %-97 %] 95 %  on  Flow (L/min):  [2-10] 3;   Device (Oxygen Therapy): nasal cannula    Intake/Output Summary (Last 24 hours) at 2024 1351  Last data filed at 2024 1202  Gross per 24 hour   Intake 200 ml   Output 1000 ml   Net -800 ml     Body mass index is 39.36 kg/m².      24  1643   Weight: 107 kg (236 lb 8.9 oz)     Physical Exam  General.  Elderly female.  Obese.  Alert and oriented x 4.  No apparent pain/distress/diaphoresis.  Normal mood and affect.  Eyes.  Pupils equal round and reactive.  Intact extraocular musculature.  No pallor or jaundice.  Oral cavity.  Moist mucous membrane.  Neck.  Supple.  No JVD.  No lymphadenopathy or thyromegaly.  Cardiovascular.  Regular rate and rhythm with no gallops or murmurs.  Distant S1 and S2  Chest.  Poor bilateral air entry with scattered bilateral expiratory wheeze.  Abdomen.  Soft lax.  No tenderness.  No organomegaly.  No guarding or rebound.  Normal bowel sounds.  Extremities.  No clubbing/cyanosis/edema.  CNS.  No acute focal neurological deficits.    Results Review:      Results from last 7 days   Lab  "Units 01/19/24  0542 01/18/24  0752 01/17/24  0357 01/16/24  1221   SODIUM mmol/L 145 142 141 146*   POTASSIUM mmol/L 3.6 4.0 3.9 3.6   CHLORIDE mmol/L 112* 112* 110* 109*   CO2 mmol/L 22.0 21.0* 21.6* 28.0   BUN mg/dL 11 12 16 17   CREATININE mg/dL 0.91 0.84 0.90 1.01*   GLUCOSE mg/dL 91 72 69 86   CALCIUM mg/dL 8.5* 8.0* 8.3* 9.1   AST (SGOT) U/L  --   --  16 17   ALT (SGPT) U/L  --   --  14 17     Estimated Creatinine Clearance: 53.9 mL/min (by C-G formula based on SCr of 0.91 mg/dL).                                Invalid input(s): \"LDLCALC\"  Results from last 7 days   Lab Units 01/19/24  0542 01/18/24  2358 01/18/24  1616 01/18/24  0752 01/18/24  0010 01/17/24  1609 01/17/24  0737 01/17/24  0357 01/16/24  2346 01/16/24  1221   WBC 10*3/mm3 7.43  --   --  7.53  --   --   --  8.61  --  9.16   HEMOGLOBIN g/dL 11.1*  11.1* 10.5* 10.9* 10.1*  10.1* 10.3* 10.6* 10.5* 10.6*   < > 10.8*   HEMATOCRIT % 34.2  34.2 31.6* 33.5* 31.0*  31.0* 31.0* 33.2* 32.9* 32.2*   < > 33.4*   PLATELETS 10*3/mm3 182  --   --  157  --   --   --  188  --  177   MCV fL 89.5  --   --  90.9  --   --   --  89.4  --  91.3   MCH pg 29.1  --   --  29.6  --   --   --  29.4  --  29.5   MCHC g/dL 32.5  --   --  32.6  --   --   --  32.9  --  32.3   RDW % 13.3  --   --  13.4  --   --   --  13.2  --  13.5   RDW-SD fl 43.2  --   --  44.4  --   --   --  43.3  --  45.6   MPV fL 10.2  --   --  10.0  --   --   --  10.1  --  9.6   NEUTROPHIL % % 57.9  --   --  54.2  --   --   --  58.9  --  68.0   LYMPHOCYTE % % 24.1  --   --  28.2  --   --   --  25.3  --  19.0*   MONOCYTES % % 12.7*  --   --  12.4*  --   --   --  11.7  --  10.9   EOSINOPHIL % % 4.3  --   --  4.2  --   --   --  3.3  --  1.4   BASOPHIL % % 0.5  --   --  0.7  --   --   --  0.6  --  0.4   IMM GRAN % % 0.5  --   --  0.3  --   --   --  0.2  --  0.3   NEUTROS ABS 10*3/mm3 4.30  --   --  4.09  --   --   --  5.07  --  6.22   LYMPHS ABS 10*3/mm3 1.79  --   --  2.12  --   --   --  2.18  --  1.74 "   MONOS ABS 10*3/mm3 0.94*  --   --  0.93*  --   --   --  1.01*  --  1.00*   EOS ABS 10*3/mm3 0.32  --   --  0.32  --   --   --  0.28  --  0.13   BASOS ABS 10*3/mm3 0.04  --   --  0.05  --   --   --  0.05  --  0.04   IMMATURE GRANS (ABS) 10*3/mm3 0.04  --   --  0.02  --   --   --  0.02  --  0.03   NRBC /100 WBC 0.0  --   --  0.0  --   --   --  0.0  --  0.0    < > = values in this interval not displayed.     Results from last 7 days   Lab Units 01/16/24  1221   INR  1.10   APTT seconds 26.7                 Results from last 7 days   Lab Units 01/16/24  1221   LIPASE U/L 16             Results from last 7 days   Lab Units 01/16/24  1337   NITRITE UA  Negative           Imaging:  Imaging Results (Last 24 Hours)       ** No results found for the last 24 hours. **               I reviewed the patient's new clinical results / labs / tests / procedures      Assessment/Plan     Active Hospital Problems    Diagnosis  POA    **GI bleed [K92.2]  Yes    History of CVA (cerebrovascular accident) [Z86.73]  Not Applicable    Dehydration [E86.0]  Yes    Renal insufficiency [N28.9]  Yes    Pulmonary nodule [R91.1]  Yes    Adnexal cyst [N94.9]  Yes    Anemia [D64.9]  Yes    HTN (hypertension) [I10]  Yes    History of breast cancer [Z85.3]  Not Applicable    Asthma [J45.909]  Yes    Nausea [R11.0]  Unknown      Resolved Hospital Problems   No resolved problems to display.           GI bleed in a patient with a history of peptic ulcer disease/diverticular disease/hemorrhoids/constipation.  EGD with hiatal hernia and gastritis status post sleeve.  Colonoscopy with in the ascending and descending colon status post removal and diverticulosis.  No active or recent bleeding stigmata Differential diet peptic ulcer disease with a transient bleed/transient diverticular bleed/hemorrhoidal bleed.  .  Hemodynamically stable.  Hemoglobin is stable.  Switch to p.o. Protonix.  Advance diet.   Monitor hemoglobin and transfuse if hemoglobin is less  than 7.  Continue holding aspirin.  Will need GI follow-up as an outpatient for capsule enterography.  GI blood loss anemia/iron deficiency anemia.  Possibly secondary to GI bleed.  Monitor hemoglobin and transfuse if hemoglobin is less than 7.  P.o. iron at discharge.  Hypertension.  Good blood pressure controlled continue Cozaar.  Diuretics on hold.    Dehydration/renal insufficiency/hypernatremia.  Resolved renal insufficiency and hypernatremia on IV fluid.  Continue holding diuretics.  Resolved dehydration.  DC IV fluid.  Acute asthma exacerbation/hypoxemia/pulmonary nodule.  CT of the chest with enlarging pulmonary nodules on the right middle and lower lobe.  Improving asthma exacerbation on Pulmicort and DuoNebs.  Pulmonary saw the patient recommends chest CT follow-up on the pulmonary nodules in 3 months.   History of CVA.  Holding aspirin secondary to GI bleed..  Left adnexal cyst.  Pelvic ultrasound with absent uterus and ovaries.  GYN follow-up as an outpatient with repeat pelvic ultrasound..  VTE prophylaxis.  Sequential compression devices.      Discussed my findings and plan of treatment with the patient/nurses at multidisciplinary rounds.  Disposition.  Anticipate discharge tomorrow if hemodynamically stable and no further drop in the hemoglobin      Jak Zhong MD  [facility]  01/19/24  13:51 EST

## 2024-01-19 NOTE — ANESTHESIA PREPROCEDURE EVALUATION
Anesthesia Evaluation                  Airway   Mallampati: II  TM distance: >3 FB  Neck ROM: full  Dental - normal exam     Pulmonary    (+) pneumonia , asthma,  (-) decreased breath sounds, wheezes  Cardiovascular - normal exam    (+) hypertension      Neuro/Psych  (+) CVA  GI/Hepatic/Renal/Endo    (+) obesity, GI bleeding , renal disease-    Musculoskeletal     Abdominal    Substance History      OB/GYN          Other   arthritis,   history of cancer                  Anesthesia Plan    ASA 3     MAC       Anesthetic plan, risks, benefits, and alternatives have been provided, discussed and informed consent has been obtained with: patient.      CODE STATUS:    Code Status (Patient has no pulse and is not breathing): CPR (Attempt to Resuscitate)  Medical Interventions (Patient has pulse or is breathing): Full Support

## 2024-01-20 ENCOUNTER — APPOINTMENT (OUTPATIENT)
Dept: GENERAL RADIOLOGY | Facility: HOSPITAL | Age: 89
DRG: 377 | End: 2024-01-20
Payer: MEDICARE

## 2024-01-20 LAB
ANION GAP SERPL CALCULATED.3IONS-SCNC: 7 MMOL/L (ref 5–15)
BASOPHILS # BLD AUTO: 0.03 10*3/MM3 (ref 0–0.2)
BASOPHILS NFR BLD AUTO: 0.3 % (ref 0–1.5)
BUN SERPL-MCNC: 11 MG/DL (ref 8–23)
BUN/CREAT SERPL: 12.8 (ref 7–25)
CALCIUM SPEC-SCNC: 8.1 MG/DL (ref 8.6–10.5)
CHLORIDE SERPL-SCNC: 113 MMOL/L (ref 98–107)
CO2 SERPL-SCNC: 22 MMOL/L (ref 22–29)
CREAT SERPL-MCNC: 0.86 MG/DL (ref 0.57–1)
DEPRECATED RDW RBC AUTO: 43.7 FL (ref 37–54)
EGFRCR SERPLBLD CKD-EPI 2021: 65.9 ML/MIN/1.73
EOSINOPHIL # BLD AUTO: 0.33 10*3/MM3 (ref 0–0.4)
EOSINOPHIL NFR BLD AUTO: 3.7 % (ref 0.3–6.2)
ERYTHROCYTE [DISTWIDTH] IN BLOOD BY AUTOMATED COUNT: 13.5 % (ref 12.3–15.4)
GLUCOSE SERPL-MCNC: 82 MG/DL (ref 65–99)
HCT VFR BLD AUTO: 30.2 % (ref 34–46.6)
HGB BLD-MCNC: 9.9 G/DL (ref 12–15.9)
IMM GRANULOCYTES # BLD AUTO: 0.05 10*3/MM3 (ref 0–0.05)
IMM GRANULOCYTES NFR BLD AUTO: 0.6 % (ref 0–0.5)
LYMPHOCYTES # BLD AUTO: 1.82 10*3/MM3 (ref 0.7–3.1)
LYMPHOCYTES NFR BLD AUTO: 20.6 % (ref 19.6–45.3)
MCH RBC QN AUTO: 29.2 PG (ref 26.6–33)
MCHC RBC AUTO-ENTMCNC: 32.8 G/DL (ref 31.5–35.7)
MCV RBC AUTO: 89.1 FL (ref 79–97)
MONOCYTES # BLD AUTO: 1.05 10*3/MM3 (ref 0.1–0.9)
MONOCYTES NFR BLD AUTO: 11.9 % (ref 5–12)
NEUTROPHILS NFR BLD AUTO: 5.55 10*3/MM3 (ref 1.7–7)
NEUTROPHILS NFR BLD AUTO: 62.9 % (ref 42.7–76)
NRBC BLD AUTO-RTO: 0 /100 WBC (ref 0–0.2)
PLATELET # BLD AUTO: 151 10*3/MM3 (ref 140–450)
PMV BLD AUTO: 10 FL (ref 6–12)
POTASSIUM SERPL-SCNC: 4.1 MMOL/L (ref 3.5–5.2)
RBC # BLD AUTO: 3.39 10*6/MM3 (ref 3.77–5.28)
SODIUM SERPL-SCNC: 142 MMOL/L (ref 136–145)
UREASE TISS QL: NEGATIVE
WBC NRBC COR # BLD AUTO: 8.83 10*3/MM3 (ref 3.4–10.8)

## 2024-01-20 PROCEDURE — 93010 ELECTROCARDIOGRAM REPORT: CPT | Performed by: INTERNAL MEDICINE

## 2024-01-20 PROCEDURE — 93005 ELECTROCARDIOGRAM TRACING: CPT | Performed by: INTERNAL MEDICINE

## 2024-01-20 PROCEDURE — 71046 X-RAY EXAM CHEST 2 VIEWS: CPT

## 2024-01-20 PROCEDURE — 99213 OFFICE O/P EST LOW 20 MIN: CPT | Performed by: INTERNAL MEDICINE

## 2024-01-20 PROCEDURE — 94799 UNLISTED PULMONARY SVC/PX: CPT

## 2024-01-20 PROCEDURE — 85025 COMPLETE CBC W/AUTO DIFF WBC: CPT | Performed by: INTERNAL MEDICINE

## 2024-01-20 PROCEDURE — 80048 BASIC METABOLIC PNL TOTAL CA: CPT | Performed by: INTERNAL MEDICINE

## 2024-01-20 PROCEDURE — 94664 DEMO&/EVAL PT USE INHALER: CPT

## 2024-01-20 PROCEDURE — G0378 HOSPITAL OBSERVATION PER HR: HCPCS

## 2024-01-20 RX ADMIN — ACETAMINOPHEN 650 MG: 325 TABLET, FILM COATED ORAL at 20:49

## 2024-01-20 RX ADMIN — DOCUSATE SODIUM 100 MG: 100 CAPSULE, LIQUID FILLED ORAL at 20:50

## 2024-01-20 RX ADMIN — Medication 10 ML: at 08:59

## 2024-01-20 RX ADMIN — LOSARTAN POTASSIUM 100 MG: 100 TABLET, FILM COATED ORAL at 08:58

## 2024-01-20 RX ADMIN — PANTOPRAZOLE SODIUM 40 MG: 40 TABLET, DELAYED RELEASE ORAL at 08:58

## 2024-01-20 RX ADMIN — PANTOPRAZOLE SODIUM 40 MG: 40 TABLET, DELAYED RELEASE ORAL at 17:35

## 2024-01-20 RX ADMIN — DIAZEPAM 2 MG: 2 TABLET ORAL at 20:50

## 2024-01-20 RX ADMIN — DIAZEPAM 2 MG: 2 TABLET ORAL at 08:58

## 2024-01-20 RX ADMIN — IPRATROPIUM BROMIDE AND ALBUTEROL SULFATE 3 ML: 2.5; .5 SOLUTION RESPIRATORY (INHALATION) at 21:53

## 2024-01-20 RX ADMIN — DOCUSATE SODIUM 100 MG: 100 CAPSULE, LIQUID FILLED ORAL at 08:58

## 2024-01-20 RX ADMIN — IPRATROPIUM BROMIDE AND ALBUTEROL SULFATE 3 ML: 2.5; .5 SOLUTION RESPIRATORY (INHALATION) at 15:24

## 2024-01-20 RX ADMIN — ACETAMINOPHEN 650 MG: 325 TABLET, FILM COATED ORAL at 14:03

## 2024-01-20 RX ADMIN — BUDESONIDE 0.5 MG: 0.5 INHALANT ORAL at 21:53

## 2024-01-20 RX ADMIN — Medication 10 ML: at 20:50

## 2024-01-20 NOTE — SIGNIFICANT NOTE
01/20/24 1554   OTHER   Discipline physical therapist   Rehab Time/Intention   Session Not Performed patient/family declined, not feeling well  (Declined therapy this afternoon, states she had just gotten back from X-ray and did not feel like she could participate. PT will attempt f/u next service date as time permits.)   Therapy Assessment/Plan (PT)   Criteria for Skilled Interventions Met (PT) skilled treatment is necessary   Recommendation   PT - Next Appointment 01/21/24

## 2024-01-20 NOTE — PLAN OF CARE
Problem: Adult Inpatient Plan of Care  Goal: Plan of Care Review  Outcome: Ongoing, Progressing   Goal Outcome Evaluation:      Vss.Had cxr today.Remains on o2 @ 3l n/c.To have walking oximetry.Prn tylenol given for c/o abd pain.Up to chair.No c/o n/v.

## 2024-01-20 NOTE — PROGRESS NOTES
Pt refused to do a six minute walking oximetry test. Pt only use four wheel rollator while waking. Pt c/o of not liking the walker that we have. Pt states her daughter will be bringing her rollator. Pt not sure about what  time her daughter will be bringing the rollater.  [place]. Pt is willing to perform 6 minute walking test after her rollator arrives. RN notified about pt refusing the walking oximetry. RN will call RT when the rollator arrives and RT will perform the walking oximetry. Pt is placed on RA at this time. NC on bedside. Breathing tx given.   Sat 93 HR 90

## 2024-01-20 NOTE — PROGRESS NOTES
Name: Vonnie Coppola ADMIT: 2024   : 1935  PCP: Christopher Leyva DO    MRN: [id number] LOS: 2 days   AGE/SEX: 88 y.o. female  ROOM: [facility]     Subjective.  Subjective.  Positive for right-sided abdominal pain.  No nausea or vomiting.  No bowel movement since yesterday.  No fever or chills.  Positive wheezing and dry cough for more than yesterday.  Positive shortness of breath.  No chest pain.  No palpitation.  No hemoptysis    Review of Systems  .  No dysuria or hematuria     Objective   Objective   Vital Signs  Temp:  [97.2 °F (36.2 °C)-98.1 °F (36.7 °C)] 97.9 °F (36.6 °C)  Heart Rate:  [84-88] 84  Resp:  [18-20] 18  BP: (116-148)/(77-83) 116/78  SpO2:  [91 %-97 %] 93 %  on  Flow (L/min):  [1-3] 3;   Device (Oxygen Therapy): nasal cannula;humidified    Intake/Output Summary (Last 24 hours) at 2024 1400  Last data filed at 2024 0928  Gross per 24 hour   Intake 240 ml   Output 1150 ml   Net -910 ml     Body mass index is 40.47 kg/m².      24  1643 24  0448   Weight: 107 kg (236 lb 8.9 oz) 110 kg (243 lb 2.7 oz)     Physical Exam  General.  Elderly female.  Obese.  Alert and oriented x 4.  No apparent pain/distress/diaphoresis.  Normal mood and affect.  Eyes.  Pupils equal round and reactive.  Intact extraocular musculature.  No pallor or jaundice.  Oral cavity.  Moist mucous membrane.  Neck.  Supple.  No JVD.  No lymphadenopathy or thyromegaly.  Cardiovascular.  Regular rate and rhythm with no gallops or murmurs.  Distant S1 and S2  Chest.  Poor bilateral air entry with scattered bilateral expiratory wheeze.    Abdomen.  Soft lax.  No tenderness.  No organomegaly.  No guarding or rebound.  Normal bowel sounds.  Extremities.  No clubbing/cyanosis/edema.  CNS.  No acute focal neurological deficits.    Results Review:      Results from last 7 days   Lab Units 24  0349 24  2243 24  0542 24  0752 24  0357 24  1221   SODIUM mmol/L 142   "--  145 142 141 146*   POTASSIUM mmol/L 4.1 4.3 3.6 4.0 3.9 3.6   CHLORIDE mmol/L 113*  --  112* 112* 110* 109*   CO2 mmol/L 22.0  --  22.0 21.0* 21.6* 28.0   BUN mg/dL 11  --  11 12 16 17   CREATININE mg/dL 0.86  --  0.91 0.84 0.90 1.01*   GLUCOSE mg/dL 82  --  91 72 69 86   CALCIUM mg/dL 8.1*  --  8.5* 8.0* 8.3* 9.1   AST (SGOT) U/L  --   --   --   --  16 17   ALT (SGPT) U/L  --   --   --   --  14 17     Estimated Creatinine Clearance: 58 mL/min (by C-G formula based on SCr of 0.86 mg/dL).                                Invalid input(s): \"LDLCALC\"  Results from last 7 days   Lab Units 01/20/24  0349 01/19/24  1559 01/19/24  0542 01/18/24  2358 01/18/24  1616 01/18/24  0752 01/18/24  0010 01/17/24  0737 01/17/24  0357 01/16/24  2346 01/16/24  1221   WBC 10*3/mm3 8.83  --  7.43  --   --  7.53  --   --  8.61  --  9.16   HEMOGLOBIN g/dL 9.9* 10.6* 11.1*  11.1* 10.5* 10.9* 10.1*  10.1* 10.3*   < > 10.6*   < > 10.8*   HEMATOCRIT % 30.2* 31.8* 34.2  34.2 31.6* 33.5* 31.0*  31.0* 31.0*   < > 32.2*   < > 33.4*   PLATELETS 10*3/mm3 151  --  182  --   --  157  --   --  188  --  177   MCV fL 89.1  --  89.5  --   --  90.9  --   --  89.4  --  91.3   MCH pg 29.2  --  29.1  --   --  29.6  --   --  29.4  --  29.5   MCHC g/dL 32.8  --  32.5  --   --  32.6  --   --  32.9  --  32.3   RDW % 13.5  --  13.3  --   --  13.4  --   --  13.2  --  13.5   RDW-SD fl 43.7  --  43.2  --   --  44.4  --   --  43.3  --  45.6   MPV fL 10.0  --  10.2  --   --  10.0  --   --  10.1  --  9.6   NEUTROPHIL % % 62.9  --  57.9  --   --  54.2  --   --  58.9  --  68.0   LYMPHOCYTE % % 20.6  --  24.1  --   --  28.2  --   --  25.3  --  19.0*   MONOCYTES % % 11.9  --  12.7*  --   --  12.4*  --   --  11.7  --  10.9   EOSINOPHIL % % 3.7  --  4.3  --   --  4.2  --   --  3.3  --  1.4   BASOPHIL % % 0.3  --  0.5  --   --  0.7  --   --  0.6  --  0.4   IMM GRAN % % 0.6*  --  0.5  --   --  0.3  --   --  0.2  --  0.3   NEUTROS ABS 10*3/mm3 5.55  --  4.30  --   --  " 4.09  --   --  5.07  --  6.22   LYMPHS ABS 10*3/mm3 1.82  --  1.79  --   --  2.12  --   --  2.18  --  1.74   MONOS ABS 10*3/mm3 1.05*  --  0.94*  --   --  0.93*  --   --  1.01*  --  1.00*   EOS ABS 10*3/mm3 0.33  --  0.32  --   --  0.32  --   --  0.28  --  0.13   BASOS ABS 10*3/mm3 0.03  --  0.04  --   --  0.05  --   --  0.05  --  0.04   IMMATURE GRANS (ABS) 10*3/mm3 0.05  --  0.04  --   --  0.02  --   --  0.02  --  0.03   NRBC /100 WBC 0.0  --  0.0  --   --  0.0  --   --  0.0  --  0.0    < > = values in this interval not displayed.     Results from last 7 days   Lab Units 01/16/24  1221   INR  1.10   APTT seconds 26.7                 Results from last 7 days   Lab Units 01/16/24  1221   LIPASE U/L 16             Results from last 7 days   Lab Units 01/16/24  1337   NITRITE UA  Negative           Imaging:  Imaging Results (Last 24 Hours)       ** No results found for the last 24 hours. **               I reviewed the patient's new clinical results / labs / tests / procedures      Assessment/Plan     Active Hospital Problems    Diagnosis  POA    **GI bleed [K92.2]  Yes    History of CVA (cerebrovascular accident) [Z86.73]  Not Applicable    Dehydration [E86.0]  Yes    Renal insufficiency [N28.9]  Yes    Pulmonary nodule [R91.1]  Yes    Adnexal cyst [N94.9]  Yes    Anemia [D64.9]  Yes    HTN (hypertension) [I10]  Yes    History of breast cancer [Z85.3]  Not Applicable    Asthma [J45.909]  Yes    Nausea [R11.0]  Unknown      Resolved Hospital Problems   No resolved problems to display.           GI bleed in a patient with a history of peptic ulcer disease/diverticular disease/hemorrhoids/constipation.  EGD with hiatal hernia and gastritis status post biopsy.  Colonoscopy with in the ascending and descending colon status post removal and diverticulosis.  No active or recent bleeding stigmata Differential diagnoses include peptic ulcer disease with a transient bleed/transient diverticular bleed/hemorrhoidal bleed.  .   Hemodynamically stable.  Hemoglobin is stable but with mild drop today..  Continue p.o. Protonix.  Tolerating regular diet well.  Monitor hemoglobin and transfuse if hemoglobin is less than 7.  Continue holding aspirin.  Will need GI follow-up as an outpatient for capsule enterography.  GI blood loss anemia/iron deficiency anemia.  Possibly secondary to GI bleed.  Monitor hemoglobin and transfuse if hemoglobin is less than 7.  P.o. iron at discharge.  Hypertension.  Good blood pressure controlled continue Cozaar.  Diuretics on hold.    Dehydration/renal insufficiency/hypernatremia.  Resolved renal insufficiency and hypernatremia on IV fluid.  Continue holding diuretics.  Resolved dehydration.  DC IV fluid.  Acute asthma exacerbation/hypoxemia/pulmonary nodule.  CT of the chest with enlarging pulmonary nodules on the right middle and lower lobe.  More symptomatic today with increasing FiO2 demands.  Continue Pulmicort and DuoNebs.  Check chest x-ray and add incentive spirometry.  Check walking pulse oximetry.  Pulmonary saw the patient recommends chest CT follow-up on the pulmonary nodules in 3 months.   History of CVA.  Holding aspirin secondary to GI bleed..  Left adnexal cyst.  Pelvic ultrasound with absent uterus and ovaries.  GYN follow-up as an outpatient with repeat pelvic ultrasound..  VTE prophylaxis.  Sequential compression devices.      Discussed my findings and plan of treatment with the patient/nurses at multidisciplinary rounds.  Disposition.  Anticipate discharge tomorrow if hemodynamically stable and no further drop in the hemoglobin and her pulmonary status improves.  She will be discharged home with home health physical therapy and nursing staff      Jak Zhong MD  [facility]  01/20/24  14:00 EST

## 2024-01-20 NOTE — PROGRESS NOTES
[facility]  Inpatient Progress Note    Reason for Follow Up:  hematochezia    Subjective     Interval History:   Pt feeling better, hgb stable. No acute events overnight. Eating.     Current Facility-Administered Medications:     acetaminophen (TYLENOL) tablet 650 mg, 650 mg, Oral, Q4H PRN, 650 mg at 01/20/24 1403 **OR** acetaminophen (TYLENOL) 160 MG/5ML oral solution 650 mg, 650 mg, Oral, Q4H PRN **OR** acetaminophen (TYLENOL) suppository 650 mg, 650 mg, Rectal, Q4H PRN, Harshad Gaston MD    budesonide (PULMICORT) nebulizer solution 0.5 mg, 0.5 mg, Nebulization, BID - RT, Harshad Gaston MD, 0.5 mg at 01/19/24 2112    Calcium Replacement - Follow Nurse / BPA Driven Protocol, , Does not apply, PRMarilin KWAN Frederick J, MD    diazePAM (VALIUM) tablet 2 mg, 2 mg, Oral, BID, Harshad Gaston MD, 2 mg at 01/20/24 0858    docusate sodium (COLACE) capsule 100 mg, 100 mg, Oral, BID, Harshad Gaston MD, 100 mg at 01/20/24 0858    ipratropium-albuterol (DUO-NEB) nebulizer solution 3 mL, 3 mL, Nebulization, TID, Harshad Gaston MD, 3 mL at 01/20/24 1524    losartan (COZAAR) tablet 100 mg, 100 mg, Oral, Q24H, 100 mg at 01/20/24 0858 **AND** [DISCONTINUED] hydroCHLOROthiazide (HYDRODIURIL) tablet 12.5 mg, 12.5 mg, Oral, Q24H, Tiffani Arnold MD, 12.5 mg at 01/16/24 2056    Magnesium Standard Dose Replacement - Follow Nurse / BPA Driven Protocol, , Does not apply, Marilin RODRIGUEZ Frederick J, MD    nitroglycerin (NITROSTAT) SL tablet 0.4 mg, 0.4 mg, Sublingual, Q5 Min PRN, Harshad Gaston MD    ondansetron (ZOFRAN) injection 4 mg, 4 mg, Intravenous, Q6H PRN, Harshad Gaston MD    pantoprazole (PROTONIX) EC tablet 40 mg, 40 mg, Oral, BID AC, Jak Zhong MD, 40 mg at 01/20/24 6472    Phosphorus Replacement - Follow Nurse / BPA Driven Protocol, , Does not apply, PRN, Harshad Gaston MD    Potassium Replacement - Follow Nurse / BPA Driven Protocol, , Does not apply,  Marilin RODRIGUEZ Frederick J, MD    sodium chloride 0.9 % flush 10 mL, 10 mL, Intravenous, Q12H, Harshad Gaston MD, 10 mL at 01/20/24 0859    sodium chloride 0.9 % flush 10 mL, 10 mL, Intravenous, Marilin RODRIGUEZ Frederick J, MD    sodium chloride 0.9 % infusion 40 mL, 40 mL, Intravenous, Marilin RODRIGUEZ Frederick J, MD  Review of Systems:    The following systems were reviewed and negative;  gastrointestinal    Objective     Vital Signs  Temp:  [97.2 °F (36.2 °C)-98.2 °F (36.8 °C)] 98.2 °F (36.8 °C)  Heart Rate:  [84-88] 87  Resp:  [18-20] 18  BP: (103-148)/(72-83) 103/72  Body mass index is 40.47 kg/m².    Intake/Output Summary (Last 24 hours) at 1/20/2024 1848  Last data filed at 1/20/2024 1300  Gross per 24 hour   Intake 480 ml   Output 1150 ml   Net -670 ml     I/O this shift:  In: 480 [P.O.:480]  Out: 850 [Urine:850]     Physical Exam:   General: patient awake, alert and cooperative   Eyes: Normal lids and lashes, no scleral icterus   Neck: supple, normal ROM   Skin: warm and dry, not jaundiced   Cardiovascular: regular rhythm and rate, no murmurs auscultated   Pulm: clear to auscultation bilaterally, regular and unlabored   Abdomen: soft, nontender, nondistended; normal bowel sounds   Extremities: no rash or edema   Psychiatric: Normal mood and behavior; memory intact     Results Review:     I reviewed the patient's new clinical results.    Results from last 7 days   Lab Units 01/20/24  0349 01/19/24  1559 01/19/24  0542 01/18/24  1616 01/18/24  0752   WBC 10*3/mm3 8.83  --  7.43  --  7.53   HEMOGLOBIN g/dL 9.9* 10.6* 11.1*  11.1*   < > 10.1*  10.1*   HEMATOCRIT % 30.2* 31.8* 34.2  34.2   < > 31.0*  31.0*   PLATELETS 10*3/mm3 151  --  182  --  157    < > = values in this interval not displayed.     Results from last 7 days   Lab Units 01/20/24  0349 01/19/24  2243 01/19/24  0542 01/18/24  0752 01/17/24  0357 01/16/24  1221   SODIUM mmol/L 142  --  145 142 141 146*   POTASSIUM mmol/L 4.1 4.3 3.6 4.0 3.9 3.6    CHLORIDE mmol/L 113*  --  112* 112* 110* 109*   CO2 mmol/L 22.0  --  22.0 21.0* 21.6* 28.0   BUN mg/dL 11  --  11 12 16 17   CREATININE mg/dL 0.86  --  0.91 0.84 0.90 1.01*   CALCIUM mg/dL 8.1*  --  8.5* 8.0* 8.3* 9.1   BILIRUBIN mg/dL  --   --   --   --  0.4 0.3   ALK PHOS U/L  --   --   --   --  57 58   ALT (SGPT) U/L  --   --   --   --  14 17   AST (SGOT) U/L  --   --   --   --  16 17   GLUCOSE mg/dL 82  --  91 72 69 86     Results from last 7 days   Lab Units 01/16/24  1221   INR  1.10     Lab Results   Lab Value Date/Time    LIPASE 16 01/16/2024 1221    LIPASE 26 12/25/2023 1517       Radiology:  XR Chest PA & Lateral   Final Result   Negative.       This report was finalized on 1/20/2024 2:35 PM by Dr. Adán Acosta M.D on Workstation: [id number]          XR Abdomen KUB   Final Result   There is some fecal material in the colon but not an unusual   volume. No abnormality identified to account for pain.       This report was finalized on 1/18/2024 11:41 AM by Dr. Adán Acosta M.D on Workstation: [id number]          US Pelvis Complete   Final Result   FINDINGS/impression: The uterus and ovaries are surgically absent. A   left adnexal cystic lesion measures 2.8 x 2.5 x 2.3 cm, indeterminate.   Consider 3-month follow-up if clinically indicated.           This report was finalized on 1/17/2024 12:31 PM by Dr. Jasmeet Morrison M.D   on Workstation: [id number]          CT Chest With Contrast Diagnostic   Final Result       Right middle and lower lobe lobe nodules, possibility of neoplasm not   excluded, PET/CT correlation can be obtained for further evaluation of   the largest, continued CT follow-up is also advised in 3 months.       This report was finalized on 1/17/2024 12:59 PM by Dr. Rod Villavicencio M.D on Workstation: [id number]          CT Abdomen Pelvis With Contrast   Final Result   1. Very extensive diverticulosis throughout the length of the colon. No   acute inflammatory process is identified  to account for the reported   history of pain. Likewise, no lesion is identified to account for GI   bleeding other than the numerous diverticula.   2. Incidentally noted noncalcified pulmonary nodules in the peripleural   right lung base are not evident on previous imaging. Follow up with CT   of the entire chest for survey of the chest is suggested before pursuing   further management of this finding.   3. A 26 mm left adnexal cyst appears to be simple but further   characterization with pelvic ultrasound is suggested. This may be   technically challenging due to its location high in the pelvis.           Radiation dose reduction techniques were utilized, including automated   exposure control and exposure modulation based on body size.           This report was finalized on 1/16/2024 2:34 PM by Dr. Adán Acosta M.D on Workstation: [id number]              Assessment & Plan     Active Hospital Problems    Diagnosis     **GI bleed     History of CVA (cerebrovascular accident)     Dehydration     Renal insufficiency     Pulmonary nodule     Adnexal cyst     Anemia     HTN (hypertension)     History of breast cancer     Asthma     Nausea        Assessment:  GI bleed   Acute blood loss anemia  melena and hematochezia  Evidence of rectal wall thickening on 12/25/2023 CT A/P  Osteoarthritis with recent oral corticosteroid use  Nausea without vomiting  Hypertension  History of breast cancer     -EGD/Colon 01/19, gastric erosion, two polyps removed, poor prep otherwise.      Plan:   Stable hgb, no further overt bleeding, brown stool on colonoscopy. GI will sign off, please call back if any additional questions. If shows evidence of recurrent GI bleed will recommend Video Capsule for further evaluation.       I discussed the patients findings and my recommendations with patient.    Danisha Recinos MD

## 2024-01-21 ENCOUNTER — APPOINTMENT (OUTPATIENT)
Dept: CARDIOLOGY | Facility: HOSPITAL | Age: 89
DRG: 377 | End: 2024-01-21
Payer: MEDICARE

## 2024-01-21 PROBLEM — I48.91 ATRIAL FIBRILLATION: Status: ACTIVE | Noted: 2024-01-21

## 2024-01-21 LAB
ANION GAP SERPL CALCULATED.3IONS-SCNC: 8 MMOL/L (ref 5–15)
BASOPHILS # BLD AUTO: 0.05 10*3/MM3 (ref 0–0.2)
BASOPHILS NFR BLD AUTO: 0.5 % (ref 0–1.5)
BUN SERPL-MCNC: 11 MG/DL (ref 8–23)
BUN/CREAT SERPL: 13.6 (ref 7–25)
CALCIUM SPEC-SCNC: 8 MG/DL (ref 8.6–10.5)
CHLORIDE SERPL-SCNC: 109 MMOL/L (ref 98–107)
CO2 SERPL-SCNC: 22 MMOL/L (ref 22–29)
CREAT SERPL-MCNC: 0.81 MG/DL (ref 0.57–1)
D DIMER PPP FEU-MCNC: 18.15 MCGFEU/ML (ref 0–0.86)
DEPRECATED RDW RBC AUTO: 45.5 FL (ref 37–54)
EGFRCR SERPLBLD CKD-EPI 2021: 70.8 ML/MIN/1.73
EOSINOPHIL # BLD AUTO: 0.39 10*3/MM3 (ref 0–0.4)
EOSINOPHIL NFR BLD AUTO: 4.1 % (ref 0.3–6.2)
ERYTHROCYTE [DISTWIDTH] IN BLOOD BY AUTOMATED COUNT: 13.6 % (ref 12.3–15.4)
GLUCOSE SERPL-MCNC: 89 MG/DL (ref 65–99)
HCT VFR BLD AUTO: 33 % (ref 34–46.6)
HGB BLD-MCNC: 10.6 G/DL (ref 12–15.9)
IMM GRANULOCYTES # BLD AUTO: 0.05 10*3/MM3 (ref 0–0.05)
IMM GRANULOCYTES NFR BLD AUTO: 0.5 % (ref 0–0.5)
LYMPHOCYTES # BLD AUTO: 2.15 10*3/MM3 (ref 0.7–3.1)
LYMPHOCYTES NFR BLD AUTO: 22.6 % (ref 19.6–45.3)
MAGNESIUM SERPL-MCNC: 2 MG/DL (ref 1.6–2.4)
MCH RBC QN AUTO: 29.3 PG (ref 26.6–33)
MCHC RBC AUTO-ENTMCNC: 32.1 G/DL (ref 31.5–35.7)
MCV RBC AUTO: 91.2 FL (ref 79–97)
MONOCYTES # BLD AUTO: 1.08 10*3/MM3 (ref 0.1–0.9)
MONOCYTES NFR BLD AUTO: 11.3 % (ref 5–12)
NEUTROPHILS NFR BLD AUTO: 5.81 10*3/MM3 (ref 1.7–7)
NEUTROPHILS NFR BLD AUTO: 61 % (ref 42.7–76)
NRBC BLD AUTO-RTO: 0 /100 WBC (ref 0–0.2)
NT-PROBNP SERPL-MCNC: 1099 PG/ML (ref 0–1800)
PLATELET # BLD AUTO: 139 10*3/MM3 (ref 140–450)
PMV BLD AUTO: 10.4 FL (ref 6–12)
POTASSIUM SERPL-SCNC: 4.1 MMOL/L (ref 3.5–5.2)
QT INTERVAL: 341 MS
QTC INTERVAL: 433 MS
RBC # BLD AUTO: 3.62 10*6/MM3 (ref 3.77–5.28)
SODIUM SERPL-SCNC: 139 MMOL/L (ref 136–145)
TSH SERPL DL<=0.05 MIU/L-ACNC: 2.1 UIU/ML (ref 0.27–4.2)
WBC NRBC COR # BLD AUTO: 9.53 10*3/MM3 (ref 3.4–10.8)

## 2024-01-21 PROCEDURE — 84443 ASSAY THYROID STIM HORMONE: CPT | Performed by: INTERNAL MEDICINE

## 2024-01-21 PROCEDURE — 93306 TTE W/DOPPLER COMPLETE: CPT | Performed by: INTERNAL MEDICINE

## 2024-01-21 PROCEDURE — 25010000002 FUROSEMIDE PER 20 MG: Performed by: INTERNAL MEDICINE

## 2024-01-21 PROCEDURE — 94664 DEMO&/EVAL PT USE INHALER: CPT

## 2024-01-21 PROCEDURE — 99222 1ST HOSP IP/OBS MODERATE 55: CPT | Performed by: INTERNAL MEDICINE

## 2024-01-21 PROCEDURE — 83735 ASSAY OF MAGNESIUM: CPT | Performed by: INTERNAL MEDICINE

## 2024-01-21 PROCEDURE — 85379 FIBRIN DEGRADATION QUANT: CPT | Performed by: INTERNAL MEDICINE

## 2024-01-21 PROCEDURE — 80048 BASIC METABOLIC PNL TOTAL CA: CPT | Performed by: INTERNAL MEDICINE

## 2024-01-21 PROCEDURE — 94799 UNLISTED PULMONARY SVC/PX: CPT

## 2024-01-21 PROCEDURE — 25010000002 DIGOXIN PER 500 MCG: Performed by: INTERNAL MEDICINE

## 2024-01-21 PROCEDURE — 25510000001 PERFLUTREN (DEFINITY) 8.476 MG IN SODIUM CHLORIDE (PF) 0.9 % 10 ML INJECTION: Performed by: INTERNAL MEDICINE

## 2024-01-21 PROCEDURE — 85025 COMPLETE CBC W/AUTO DIFF WBC: CPT | Performed by: INTERNAL MEDICINE

## 2024-01-21 PROCEDURE — 94761 N-INVAS EAR/PLS OXIMETRY MLT: CPT

## 2024-01-21 PROCEDURE — 83880 ASSAY OF NATRIURETIC PEPTIDE: CPT | Performed by: INTERNAL MEDICINE

## 2024-01-21 PROCEDURE — 93306 TTE W/DOPPLER COMPLETE: CPT

## 2024-01-21 PROCEDURE — G0378 HOSPITAL OBSERVATION PER HR: HCPCS

## 2024-01-21 RX ORDER — FUROSEMIDE 10 MG/ML
40 INJECTION INTRAMUSCULAR; INTRAVENOUS ONCE
Status: COMPLETED | OUTPATIENT
Start: 2024-01-21 | End: 2024-01-21

## 2024-01-21 RX ORDER — DIGOXIN 0.25 MG/ML
250 INJECTION INTRAMUSCULAR; INTRAVENOUS ONCE
Status: COMPLETED | OUTPATIENT
Start: 2024-01-21 | End: 2024-01-21

## 2024-01-21 RX ADMIN — IPRATROPIUM BROMIDE AND ALBUTEROL SULFATE 3 ML: 2.5; .5 SOLUTION RESPIRATORY (INHALATION) at 07:42

## 2024-01-21 RX ADMIN — LOSARTAN POTASSIUM 100 MG: 100 TABLET, FILM COATED ORAL at 08:36

## 2024-01-21 RX ADMIN — FUROSEMIDE 40 MG: 10 INJECTION, SOLUTION INTRAMUSCULAR; INTRAVENOUS at 11:58

## 2024-01-21 RX ADMIN — IPRATROPIUM BROMIDE AND ALBUTEROL SULFATE 3 ML: 2.5; .5 SOLUTION RESPIRATORY (INHALATION) at 20:04

## 2024-01-21 RX ADMIN — BUDESONIDE 0.5 MG: 0.5 INHALANT ORAL at 20:08

## 2024-01-21 RX ADMIN — PANTOPRAZOLE SODIUM 40 MG: 40 TABLET, DELAYED RELEASE ORAL at 08:36

## 2024-01-21 RX ADMIN — IPRATROPIUM BROMIDE AND ALBUTEROL SULFATE 3 ML: 2.5; .5 SOLUTION RESPIRATORY (INHALATION) at 15:38

## 2024-01-21 RX ADMIN — BUDESONIDE 0.5 MG: 0.5 INHALANT ORAL at 07:45

## 2024-01-21 RX ADMIN — DOCUSATE SODIUM 100 MG: 100 CAPSULE, LIQUID FILLED ORAL at 08:36

## 2024-01-21 RX ADMIN — ACETAMINOPHEN 325 MG: 325 TABLET, FILM COATED ORAL at 13:22

## 2024-01-21 RX ADMIN — METOPROLOL TARTRATE 2.5 MG: 1 INJECTION, SOLUTION INTRAVENOUS at 10:22

## 2024-01-21 RX ADMIN — Medication 10 ML: at 20:44

## 2024-01-21 RX ADMIN — DIAZEPAM 2 MG: 2 TABLET ORAL at 20:44

## 2024-01-21 RX ADMIN — DOCUSATE SODIUM 100 MG: 100 CAPSULE, LIQUID FILLED ORAL at 20:44

## 2024-01-21 RX ADMIN — ACETAMINOPHEN 650 MG: 325 TABLET, FILM COATED ORAL at 20:44

## 2024-01-21 RX ADMIN — PERFLUTREN 2 ML: 6.52 INJECTION, SUSPENSION INTRAVENOUS at 16:05

## 2024-01-21 RX ADMIN — DIGOXIN 250 MCG: 0.25 INJECTION INTRAMUSCULAR; INTRAVENOUS at 11:27

## 2024-01-21 RX ADMIN — Medication 10 ML: at 08:37

## 2024-01-21 RX ADMIN — PANTOPRAZOLE SODIUM 40 MG: 40 TABLET, DELAYED RELEASE ORAL at 16:32

## 2024-01-21 RX ADMIN — DIAZEPAM 2 MG: 2 TABLET ORAL at 08:36

## 2024-01-21 NOTE — NURSING NOTE
Dr. Zhong aware pt c/o pain under right rib.Aware she states it's 3/10 pain now but states it was a 10/10 early this morning.No new orders rec'd at this time.

## 2024-01-21 NOTE — NURSING NOTE
Spoke with pt's daughter Elizabeth Rodríguez, updated on pt's current status. Pt's daughter states pt has hx of a-fib in the past when stressed or tired. Dr. Sullivan here and aware. MD aware pt rec'd 2.5 mg iv lopressor x's one and hr now 80- low 100's.

## 2024-01-21 NOTE — CONSULTS
Cardiology History & Physical / Consultation      Patient Name: Vonnie Coppola  Age/Sex: 88 y.o. female  : 1935  MRN: [id number]    Date of Admission: 2024  Date of Encounter Visit: 24  Encounter Provider: Benita Khan RN  Referring Provider: Tiffani Arnold MD  Place of Service: [facility]  Patient Care Team:  Christopher Leyva DO as PCP - General  Christopher Leyva DO as PCP - Family Medicine          Subjective:     Chief Complaint: GI bleed    Reason for consultation: atrial fibrillation     History of Present Illness:  Vonnie Coppola is a 88 y.o. female with a past medical history significant for breast cancer, hypertension, and prior stroke.    She presented to the emergency department on 2024 with complaints of GI Bleed. She said that about a week prior to arrival, she noticed some bright red blood in her stool that would come and go. The morning she decided to come to the ER, she had copious amount of dark tarry stools with associated abdominal pain.   On 2024, she had an upper and lower GI scope. It showed gastitc erosion and 2 polyps were removed, overall she had poor bowel prep, but brown stool noted on colonoscopy, so GI signed off.     Patient said she is very short of breath.  Earlier today she was in the hallway she said she could not get her breath at all and she felt like she was going to die.  Currently she looks ill.  She was wheezing bilaterally on physical exam but she was not using accessory muscles to breathe.  Daughter told her nurse that she had atrial fibrillation in the past when she has gotten very stressed.      She went into atrial fibrillation on 2024. With any type exertion, she goes into rapid atrial fibrillation, and with this, she is symptomatic. She gets extremely short of breath and her oxygen requirements increase. Potassium 4.1, magnesium 2.0, calcium 8.0. Hemoglobin is  now stable at 10.6        Cardiology has been asked to see this patient for atrial fibrillation.         Past Medical History:  Past Medical History:   Diagnosis Date    Arthritis     Asthma     Breast cancer 1999    LEFT BREAST CA, LUMPECTOMY & RADS    Hx of radiation therapy 1999    APPROXIMATELY 40 TREATMENTS FOR LEFT BREAST CA    Hypertension     Pneumonia     Stroke        Past Surgical History:   Procedure Laterality Date    APPENDECTOMY      BLADDER SURGERY      BREAST BIOPSY Left 1999    MALIGNANT    BREAST LUMPECTOMY Left 1999    MALIGNANT    COLONOSCOPY N/A 1/19/2024    Procedure: COLONOSCOPY to cecum with cold snare polypectomies;  Surgeon: Harshad Gaston MD;  Location: [facility] ENDOSCOPY;  Service: Gastroenterology;  Laterality: N/A;  pre- gi bleed, anemia  post- diverticulosis, polyps    ENDOSCOPY N/A 1/19/2024    Procedure: ESOPHAGOGASTRODUODENOSCOPY with biospy;  Surgeon: Harshad Gaston MD;  Location: [facility] ENDOSCOPY;  Service: Gastroenterology;  Laterality: N/A;  pre- gi bleed, anemia  post- erosive gastirits    GALLBLADDER SURGERY      HYSTERECTOMY      OOPHORECTOMY         Home Medications:   Medications Prior to Admission   Medication Sig Dispense Refill Last Dose    Ascorbic Acid (VITAMIN C ER PO) Take 1 tablet by mouth Daily.   1/15/2024    aspirin 81 MG chewable tablet Chew 1 tablet Daily.   1/15/2024    Cholecalciferol (VITAMIN D-3 PO) Take 1 tablet by mouth Daily.   1/15/2024    coenzyme Q10 50 MG capsule capsule Take 1 capsule by mouth Daily.   1/15/2024    Cyanocobalamin (VITAMIN B 12 PO) Take 1 tablet by mouth Daily.   1/15/2024    diazePAM (VALIUM) 2 MG tablet Take 1 tablet by mouth 2 (Two) Times a Day.   1/15/2024    furosemide (LASIX) 20 MG tablet Take 1 tablet by mouth Daily.   Past Month    glucosamine-chondroitin 500-400 MG capsule capsule Take 1 capsule by mouth 3 (Three) Times a Day With Meals.   1/15/2024    losartan-hydrochlorothiazide (HYZAAR) 100-12.5 MG per tablet  Take 1 tablet by mouth Daily.   1/15/2024    OLIVE LEAF EXTRACT PO Take  by mouth.   1/15/2024    ondansetron (ZOFRAN) 4 MG tablet Take 2 tablets by mouth Daily.   1/15/2024    polyethylene glycol (MIRALAX) 17 g packet Take 17 g by mouth Daily.   1/15/2024    Selenium (SELENIMIN PO) Take  by mouth.   Past Week    Zinc Sulfate (ZINC 15 PO) Take  by mouth.   Past Week    arformoterol (BROVANA) 15 MCG/2ML nebulizer solution Take 2 mL by nebulization 2 (Two) Times a Day. 120 mL 11 Unknown    budesonide (PULMICORT) 0.5 MG/2ML nebulizer solution Take 2 mL by nebulization 2 (Two) Times a Day. 360 mL 2 Unknown    docusate sodium (COLACE) 100 MG capsule Take 1 capsule by mouth Daily. (Patient not taking: Reported on 1/16/2024)   Not Taking    fluticasone (FLONASE) 50 MCG/ACT nasal spray 2 sprays by Each Nare route 2 (Two) Times a Day. 18.2 mL 11 More than a month    LYCOPENE PO Take  by mouth. (Patient not taking: Reported on 1/16/2024)   Not Taking    meclizine (ANTIVERT) 32 MG tablet Take 1 tablet by mouth 3 (Three) Times a Day As Needed for Dizziness.   More than a month    omeprazole (priLOSEC) 40 MG capsule Take 1 capsule by mouth Daily. (Patient not taking: Reported on 1/16/2024) 30 capsule 11 Not Taking       Allergies:  Allergies   Allergen Reactions    Cortisone Hives    Penicillins Hives       Past Social History:  Social History     Socioeconomic History    Marital status:    Tobacco Use    Smoking status: Former     Packs/day: 0.50     Years: 2.00     Additional pack years: 0.00     Total pack years: 1.00     Types: Cigarettes    Smokeless tobacco: Never    Tobacco comments:     quit 40 years ago   Vaping Use    Vaping Use: Never used   Substance and Sexual Activity    Alcohol use: No    Drug use: No    Sexual activity: Defer       Past Family History: History reviewed. No pertinent family history.   Family History   Problem Relation Age of Onset    Ovarian cancer Sister         70'S    Cancer Sister      Diabetes Sister     Hypertension Sister     Osteoarthritis Sister     Colon cancer Maternal Grandmother     Osteoarthritis Mother     Hypertension Father     Cancer Brother     Hypertension Brother     Osteoarthritis Brother     Cancer Other     Stroke Other     Ovarian cancer Paternal Grandmother         unknown    Breast cancer Neg Hx        Review of Systems        Objective:     Objective:  Temp:  [97.7 °F (36.5 °C)-98.2 °F (36.8 °C)] 97.7 °F (36.5 °C)  Heart Rate:  [76-99] 91  Resp:  [16-20] 18  BP: (103-145)/() 125/75    Intake/Output Summary (Last 24 hours) at 1/21/2024 0919  Last data filed at 1/21/2024 0734  Gross per 24 hour   Intake 690 ml   Output 1650 ml   Net -960 ml     Body mass index is 40.47 kg/m².      01/16/24  1643 01/20/24  0448   Weight: 107 kg (236 lb 8.9 oz) 110 kg (243 lb 2.7 oz)           Physical Exam:   Constitutional:       Appearance: Acutely ill-appearing.   Pulmonary:      Breath sounds: Scattered Wheezing present.   Cardiovascular:      PMI at left midclavicular line. Normal rate. Irregularly irregular rhythm. Normal S1. Normal S2.       Murmurs: There is no murmur.      No gallop.  No click. No rub.   Pulses:     Intact distal pulses.   Edema:     Peripheral edema absent.   Neurological:      Mental Status: Alert.          Labs:   Lab Review:     Results from last 7 days   Lab Units 01/21/24  0812 01/20/24  0349 01/19/24  2243 01/19/24  0542 01/18/24  0752 01/17/24  0357 01/16/24  1221   SODIUM mmol/L 139 142  --  145 142 141 146*   POTASSIUM mmol/L 4.1 4.1 4.3 3.6 4.0 3.9 3.6   CHLORIDE mmol/L 109* 113*  --  112* 112* 110* 109*   CO2 mmol/L 22.0 22.0  --  22.0 21.0* 21.6* 28.0   BUN mg/dL 11 11  --  11 12 16 17   CREATININE mg/dL 0.81 0.86  --  0.91 0.84 0.90 1.01*   GLUCOSE mg/dL 89 82  --  91 72 69 86   CALCIUM mg/dL 8.0* 8.1*  --  8.5* 8.0* 8.3* 9.1   AST (SGOT) U/L  --   --   --   --   --  16 17   ALT (SGPT) U/L  --   --   --   --   --  14 17         Results from last 7  days   Lab Units 24  0812   WBC 10*3/mm3 9.53   HEMOGLOBIN g/dL 10.6*   HEMATOCRIT % 33.0*   PLATELETS 10*3/mm3 139*     Results from last 7 days   Lab Units 24  1221   INR  1.10   APTT seconds 26.7         Results from last 7 days   Lab Units 24  0812   MAGNESIUM mg/dL 2.0         Results from last 7 days   Lab Units 24  0812   PROBNP pg/mL 1,099.0         Results from last 7 days   Lab Units 24  0812   TSH uIU/mL 2.100         [id number]                  Assessment:       GI bleed    Anemia    HTN (hypertension)    History of breast cancer    Asthma    History of CVA (cerebrovascular accident)    Dehydration    Renal insufficiency    Pulmonary nodule    Adnexal cyst    Nausea    Atrial fibrillation        Plan:     1.  Paroxysmal atrial fibrillation.  Initially this was thought to be a new diagnosis but daughter confirms she has had this in the past.  Heart rates are a little bit better after receiving some IV Lopressor.  2.  Patient is very short of breath.  She is wheezing bilaterally and is not moving very much air.  3.  GI bleed  4.  History of CVA [facility] in .  5.  Patient is very short of breath and wheezing.  Echocardiogram has been ordered.  Will also give some IV Lasix to see if it helps her clinical status.  Would also give her dose of digoxin to see if we can rate control a little bit better and hopefully she will convert back to sinus rhythm.    Thank you for allowing me to participate in the care of Vonnie Coppola. Feel free to contact me directly with any further questions or concerns.    Benita Khan RN  [facility]  24  09:19 EST

## 2024-01-21 NOTE — PROGRESS NOTES
Name: Vonnie Coppola ADMIT: 2024   : 1935  PCP: Christopher Leyva DO    MRN: [id number] LOS: 2 days   AGE/SEX: 88 y.o. female  ROOM: [facility]     Subjective.  Subjective.  Noted by nursing on the cardiac monitor to have new onset rate controlled atrial fibrillation confirmed by EKG.  Patient herself denies any chest pain or palpitation.  Continues with old shortness of breath/dry cough/wheezing.  Positive old lower extremity edema.  No hemoptysis.  Positive for occasional right-sided abdominal pain.  Positive nausea that resolves with Zofran.  No vomiting.  Tolerating regular diet well..  No bowel movement since yesterday without fresh bright blood per rectum or melena..  No fever or chills.     Review of Systems  .  No dysuria or hematuria     Objective   Objective   Vital Signs  Temp:  [97.7 °F (36.5 °C)-98.2 °F (36.8 °C)] 97.7 °F (36.5 °C)  Heart Rate:  [76-99] 91  Resp:  [16-20] 18  BP: (103-145)/() 125/75  SpO2:  [91 %-98 %] 96 %  on  Flow (L/min):  [2-3] 2;   Device (Oxygen Therapy): humidified;nasal cannula    Intake/Output Summary (Last 24 hours) at 2024 0842  Last data filed at 2024 0734  Gross per 24 hour   Intake 690 ml   Output 1650 ml   Net -960 ml     Body mass index is 40.47 kg/m².      24  1643 24  0448   Weight: 107 kg (236 lb 8.9 oz) 110 kg (243 lb 2.7 oz)     Physical Exam  General.  Elderly female.  Obese.  Alert and oriented x 4.  No apparent pain/distress/diaphoresis.  Normal mood and affect.  Eyes.  Pupils equal round and reactive.  Intact extraocular musculature.  No pallor or jaundice.  Oral cavity.  Moist mucous membrane.  Neck.  Supple.  No JVD.  No lymphadenopathy or thyromegaly.  Cardiovascular.  Controlled rate atrial fibrillation and grade 2 systolic murmur.    Chest.  Poor bilateral air entry with scattered bilateral expiratory wheeze.    Abdomen.  Soft lax.  No tenderness.  No organomegaly.  No guarding or rebound.  Normal bowel  "sounds.  Extremities.  No clubbing/cyanosis/edema.  CNS.  No acute focal neurological deficits.    Results Review:      Results from last 7 days   Lab Units 01/20/24  0349 01/19/24  2243 01/19/24  0542 01/18/24  0752 01/17/24  0357 01/16/24  1221   SODIUM mmol/L 142  --  145 142 141 146*   POTASSIUM mmol/L 4.1 4.3 3.6 4.0 3.9 3.6   CHLORIDE mmol/L 113*  --  112* 112* 110* 109*   CO2 mmol/L 22.0  --  22.0 21.0* 21.6* 28.0   BUN mg/dL 11  --  11 12 16 17   CREATININE mg/dL 0.86  --  0.91 0.84 0.90 1.01*   GLUCOSE mg/dL 82  --  91 72 69 86   CALCIUM mg/dL 8.1*  --  8.5* 8.0* 8.3* 9.1   AST (SGOT) U/L  --   --   --   --  16 17   ALT (SGPT) U/L  --   --   --   --  14 17     Estimated Creatinine Clearance: 58 mL/min (by C-G formula based on SCr of 0.86 mg/dL).                                Invalid input(s): \"LDLCALC\"  Results from last 7 days   Lab Units 01/21/24  0812 01/20/24  0349 01/19/24  1559 01/19/24  0542 01/18/24  2358 01/18/24  1616 01/18/24  0752 01/17/24  0737 01/17/24  0357 01/16/24  2346 01/16/24  1221   WBC 10*3/mm3 9.53 8.83  --  7.43  --   --  7.53  --  8.61  --  9.16   HEMOGLOBIN g/dL 10.6* 9.9* 10.6* 11.1*  11.1* 10.5* 10.9* 10.1*  10.1*   < > 10.6*   < > 10.8*   HEMATOCRIT % 33.0* 30.2* 31.8* 34.2  34.2 31.6* 33.5* 31.0*  31.0*   < > 32.2*   < > 33.4*   PLATELETS 10*3/mm3 139* 151  --  182  --   --  157  --  188  --  177   MCV fL 91.2 89.1  --  89.5  --   --  90.9  --  89.4  --  91.3   MCH pg 29.3 29.2  --  29.1  --   --  29.6  --  29.4  --  29.5   MCHC g/dL 32.1 32.8  --  32.5  --   --  32.6  --  32.9  --  32.3   RDW % 13.6 13.5  --  13.3  --   --  13.4  --  13.2  --  13.5   RDW-SD fl 45.5 43.7  --  43.2  --   --  44.4  --  43.3  --  45.6   MPV fL 10.4 10.0  --  10.2  --   --  10.0  --  10.1  --  9.6   NEUTROPHIL % % 61.0 62.9  --  57.9  --   --  54.2  --  58.9  --  68.0   LYMPHOCYTE % % 22.6 20.6  --  24.1  --   --  28.2  --  25.3  --  19.0*   MONOCYTES % % 11.3 11.9  --  12.7*  --   --  12.4*  " --  11.7  --  10.9   EOSINOPHIL % % 4.1 3.7  --  4.3  --   --  4.2  --  3.3  --  1.4   BASOPHIL % % 0.5 0.3  --  0.5  --   --  0.7  --  0.6  --  0.4   IMM GRAN % % 0.5 0.6*  --  0.5  --   --  0.3  --  0.2  --  0.3   NEUTROS ABS 10*3/mm3 5.81 5.55  --  4.30  --   --  4.09  --  5.07  --  6.22   LYMPHS ABS 10*3/mm3 2.15 1.82  --  1.79  --   --  2.12  --  2.18  --  1.74   MONOS ABS 10*3/mm3 1.08* 1.05*  --  0.94*  --   --  0.93*  --  1.01*  --  1.00*   EOS ABS 10*3/mm3 0.39 0.33  --  0.32  --   --  0.32  --  0.28  --  0.13   BASOS ABS 10*3/mm3 0.05 0.03  --  0.04  --   --  0.05  --  0.05  --  0.04   IMMATURE GRANS (ABS) 10*3/mm3 0.05 0.05  --  0.04  --   --  0.02  --  0.02  --  0.03   NRBC /100 WBC 0.0 0.0  --  0.0  --   --  0.0  --  0.0  --  0.0    < > = values in this interval not displayed.     Results from last 7 days   Lab Units 01/16/24  1221   INR  1.10   APTT seconds 26.7                 Results from last 7 days   Lab Units 01/16/24  1221   LIPASE U/L 16             Results from last 7 days   Lab Units 01/16/24  1337   NITRITE UA  Negative           Imaging:  Imaging Results (Last 24 Hours)       Procedure Component Value Units Date/Time    XR Chest PA & Lateral [[id number]] Collected: 01/20/24 1434     Updated: 01/20/24 1438    Narrative:      PA AND LATERAL CHEST X-RAY     HISTORY: Hypoxemia and wheezing prior radiation therapy for breast  cancer.     Chest x-ray consisting of 4 images is provided. Correlation: Chest CT  January 17, 2024.     FINDINGS: Clips at the left axilla. The cardiac silhouette is mildly  prominent but unchanged. The lungs are clear. The costophrenic sulci are  dry and the bones appear normal. There is no pneumothorax.       Impression:      Negative.     This report was finalized on 1/20/2024 2:35 PM by Dr. Adán Acosta M.D on Workstation: [id number]                  I reviewed the patient's new clinical results / labs / tests / procedures      Assessment/Plan     Active Hospital  Problems    Diagnosis  POA    **GI bleed [K92.2]  Yes    Atrial fibrillation [I48.91]  No    History of CVA (cerebrovascular accident) [Z86.73]  Not Applicable    Dehydration [E86.0]  Yes    Renal insufficiency [N28.9]  Yes    Pulmonary nodule [R91.1]  Yes    Adnexal cyst [N94.9]  Yes    Anemia [D64.9]  Yes    HTN (hypertension) [I10]  Yes    History of breast cancer [Z85.3]  Not Applicable    Asthma [J45.909]  Yes    Nausea [R11.0]  Yes      Resolved Hospital Problems   No resolved problems to display.           GI bleed in a patient with a history of peptic ulcer disease/diverticular disease/hemorrhoids/constipation.  EGD with hiatal hernia and gastritis status post biopsy.  Colonoscopy with in the ascending and descending colon status post removal and diverticulosis.  No active or recent bleeding stigmata Differential diagnoses include peptic ulcer disease with a transient bleed/transient diverticular bleed/hemorrhoidal bleed.  .  Hemodynamically stable.  Hemoglobin is stable   Continue p.o. Protonix.  Tolerating regular diet well.  Monitor hemoglobin and transfuse if hemoglobin is less than 7.  Continue holding aspirin.  Will need GI follow-up as an outpatient for capsule enterography.  GI signed off.  GI blood loss anemia/iron deficiency anemia.  Possibly secondary to GI bleed.  Monitor hemoglobin and transfuse if hemoglobin is less than 7.  P.o. iron at discharge.  Hypertension/new onset A-fib.  Good blood pressure control.  A-fib rate is controlled.  Ike vascular is 4.  Continue Cozaar.  Awaiting potassium level.  Will check magnesium and TSH.  Will check 2D echo and D-dimer.  Reluctant to start anticoagulation in view of recent GI bleed.  Consult cardiology.  Dehydration/renal insufficiency/hypernatremia.  Resolved renal insufficiency and hypernatremia on IV fluid.  Continue holding diuretics.  Resolved dehydration.  IV fluid DC'd.  Acute asthma exacerbation/hypoxemia/pulmonary nodule.  CT of the chest with  enlarging pulmonary nodules on the right middle and lower lobe.  Continues to be unable to wean her off the oxygen.  Chest x-ray with no acute disease.  Continue Pulmicort and DuoNebs.  And spirometry.  Awaiting walking pulse oximetry (patient refused yesterday).    Pulmonary saw the patient recommends chest CT follow-up on the pulmonary nodules in 3 months.   History of CVA.  Holding aspirin secondary to GI bleed..  Left adnexal cyst.  Pelvic ultrasound with absent uterus and ovaries.  GYN follow-up as an outpatient with repeat pelvic ultrasound..  VTE prophylaxis.  Sequential compression devices.      Discussed my findings and plan of treatment with the patient/nurse  Disposition.  Was supposed to be discharged today however in view of new onset atrial fibrillation this will be postponed awaiting cardiology consultation and workup for the A-fib.    Jak Zhong MD  [facility]  01/21/24  08:42 EST

## 2024-01-21 NOTE — PLAN OF CARE
Goal Outcome Evaluation:  Plan of Care Reviewed With: patient        Progress: no change  Outcome Evaluation: Pt admitted 1/16/2024 for GI bleed. Vital stable . A & O X 4 . Pt's use 2 li NC. Denies chest pain . Denies N/V . No complaints or concerns per pt . Safety maintained.

## 2024-01-21 NOTE — SIGNIFICANT NOTE
01/21/24 1424   OTHER   Discipline physical therapist   Rehab Time/Intention   Session Not Performed unable to treat, medical status change  (Per chart and NSG patient had an episode of rapid afib this morning while txfr'ing to transport bed. Requested PT hold this date. PT will attempt follow-up when appropriate.)   Therapy Assessment/Plan (PT)   Criteria for Skilled Interventions Met (PT) skilled treatment is necessary;yes   Recommendation   PT - Next Appointment 01/22/24

## 2024-01-21 NOTE — PLAN OF CARE
Problem: Adult Inpatient Plan of Care  Goal: Plan of Care Review  Outcome: Ongoing, Progressing   Goal Outcome Evaluation:   Vss.Pt had episode of rapid a-fib, see previous notes.On o2 @ 3 liters humidified n/c.Rec'd iv lopressor, iv digoxin, and iv lasix as ordered.Will continue to monitor.

## 2024-01-21 NOTE — NURSING NOTE
Dr. Zhong aware pt went into rapid a-fib when up to stretcher for 2D echo. Hr as high as 160's. Pt on o2 @3 l n/c at this time, o2 sat 91%.MD aware that this RN notified cardiology RN who is to notify Dr. Sullivan. No new orders rec'd at this time.

## 2024-01-21 NOTE — NURSING NOTE
Pt had A new A-fib a day shift -  [facility] (Sina caldwell ) called me around 2000. new order to consult cardiology. Pt denies chest pain . No N/V. Asymptomatic.

## 2024-01-21 NOTE — NURSING NOTE
Pt went into rapid a-fib when up to stretcher for 2D echo. Hr as high as 160's. Pt on o2 @4 l n/c at this time, 02 sat 93%.Bp 118/102.Assisted pt from stretcher back to bed (slid with assist x's 4).Will notify MD and will continue to monitor.

## 2024-01-21 NOTE — NURSING NOTE
Spoke with Benita RN ( Cardiology),  aware pt went into rapid a-fib when up to stretcher for 2D echo.Aware hr as high as 160's.Aware pt on o2 @ 3 l n/c at this time, o2 sat 91%.Benita states will notify Dr. Sullivan.

## 2024-01-22 ENCOUNTER — APPOINTMENT (OUTPATIENT)
Dept: CT IMAGING | Facility: HOSPITAL | Age: 89
DRG: 377 | End: 2024-01-22
Payer: MEDICARE

## 2024-01-22 PROBLEM — I26.99 PULMONARY EMBOLUS: Status: ACTIVE | Noted: 2024-01-22

## 2024-01-22 LAB
ANION GAP SERPL CALCULATED.3IONS-SCNC: 10.8 MMOL/L (ref 5–15)
AORTIC ARCH: 3.2 CM
AORTIC DIMENSIONLESS INDEX: 0.8 (DI)
APTT PPP: <20 SECONDS (ref 22.7–35.4)
ASCENDING AORTA: 3.6 CM
BASOPHILS # BLD AUTO: 0.07 10*3/MM3 (ref 0–0.2)
BASOPHILS NFR BLD AUTO: 0.6 % (ref 0–1.5)
BH CV ECHO MEAS - ACS: 1.63 CM
BH CV ECHO MEAS - AO MAX PG: 8.6 MMHG
BH CV ECHO MEAS - AO MEAN PG: 5.7 MMHG
BH CV ECHO MEAS - AO ROOT DIAM: 3.3 CM
BH CV ECHO MEAS - AO V2 MAX: 146.8 CM/SEC
BH CV ECHO MEAS - AO V2 VTI: 25.2 CM
BH CV ECHO MEAS - AVA(I,D): 2.31 CM2
BH CV ECHO MEAS - EDV(CUBED): 40.8 ML
BH CV ECHO MEAS - EDV(MOD-SP2): 48 ML
BH CV ECHO MEAS - EDV(MOD-SP4): 41 ML
BH CV ECHO MEAS - EF(MOD-BP): 78.7 %
BH CV ECHO MEAS - EF(MOD-SP2): 81.3 %
BH CV ECHO MEAS - EF(MOD-SP4): 78 %
BH CV ECHO MEAS - ESV(CUBED): 13.8 ML
BH CV ECHO MEAS - ESV(MOD-SP2): 9 ML
BH CV ECHO MEAS - ESV(MOD-SP4): 9 ML
BH CV ECHO MEAS - FS: 30.4 %
BH CV ECHO MEAS - IVS/LVPW: 1.08 CM
BH CV ECHO MEAS - IVSD: 1.03 CM
BH CV ECHO MEAS - LV DIASTOLIC VOL/BSA (35-75): 19.1 CM2
BH CV ECHO MEAS - LV MASS(C)D: 100.1 GRAMS
BH CV ECHO MEAS - LV MAX PG: 6.8 MMHG
BH CV ECHO MEAS - LV MEAN PG: 3.9 MMHG
BH CV ECHO MEAS - LV SYSTOLIC VOL/BSA (12-30): 4.2 CM2
BH CV ECHO MEAS - LV V1 MAX: 130.1 CM/SEC
BH CV ECHO MEAS - LV V1 VTI: 19.6 CM
BH CV ECHO MEAS - LVIDD: 3.4 CM
BH CV ECHO MEAS - LVIDS: 2.4 CM
BH CV ECHO MEAS - LVOT AREA: 3 CM2
BH CV ECHO MEAS - LVOT DIAM: 1.95 CM
BH CV ECHO MEAS - LVPWD: 0.96 CM
BH CV ECHO MEAS - MV A DUR: 0.13 SEC
BH CV ECHO MEAS - MV A MAX VEL: 94.5 CM/SEC
BH CV ECHO MEAS - MV DEC SLOPE: 179.6 CM/SEC2
BH CV ECHO MEAS - MV DEC TIME: 0.24 SEC
BH CV ECHO MEAS - MV E MAX VEL: 44.6 CM/SEC
BH CV ECHO MEAS - MV E/A: 0.47
BH CV ECHO MEAS - MV MAX PG: 3.2 MMHG
BH CV ECHO MEAS - MV MEAN PG: 1.63 MMHG
BH CV ECHO MEAS - MV P1/2T: 70.8 MSEC
BH CV ECHO MEAS - MV V2 VTI: 15.4 CM
BH CV ECHO MEAS - MVA(P1/2T): 3.1 CM2
BH CV ECHO MEAS - MVA(VTI): 3.8 CM2
BH CV ECHO MEAS - PA ACC TIME: 0.05 SEC
BH CV ECHO MEAS - PA V2 MAX: 108.6 CM/SEC
BH CV ECHO MEAS - RAP SYSTOLE: 3 MMHG
BH CV ECHO MEAS - RV MAX PG: 3.6 MMHG
BH CV ECHO MEAS - RV V1 MAX: 94.5 CM/SEC
BH CV ECHO MEAS - RV V1 VTI: 16 CM
BH CV ECHO MEAS - RVSP: 74.7 MMHG
BH CV ECHO MEAS - SI(MOD-SP2): 18.1 ML/M2
BH CV ECHO MEAS - SI(MOD-SP4): 14.9 ML/M2
BH CV ECHO MEAS - SUP REN AO DIAM: 2 CM
BH CV ECHO MEAS - SV(LVOT): 58.3 ML
BH CV ECHO MEAS - SV(MOD-SP2): 39 ML
BH CV ECHO MEAS - SV(MOD-SP4): 32 ML
BH CV ECHO MEAS - TAPSE (>1.6): 1.22 CM
BH CV ECHO MEAS - TR MAX PG: 71.7 MMHG
BH CV ECHO MEAS - TR MAX VEL: 423.5 CM/SEC
BH CV XLRA - RV BASE: 3.2 CM
BH CV XLRA - RV LENGTH: 6.6 CM
BH CV XLRA - RV MID: 2.15 CM
BUN SERPL-MCNC: 15 MG/DL (ref 8–23)
BUN/CREAT SERPL: 17.2 (ref 7–25)
CALCIUM SPEC-SCNC: 8.2 MG/DL (ref 8.6–10.5)
CHLORIDE SERPL-SCNC: 107 MMOL/L (ref 98–107)
CO2 SERPL-SCNC: 23.2 MMOL/L (ref 22–29)
CREAT SERPL-MCNC: 0.87 MG/DL (ref 0.57–1)
DEPRECATED RDW RBC AUTO: 43.8 FL (ref 37–54)
EGFRCR SERPLBLD CKD-EPI 2021: 65 ML/MIN/1.73
EOSINOPHIL # BLD AUTO: 0.39 10*3/MM3 (ref 0–0.4)
EOSINOPHIL NFR BLD AUTO: 3.6 % (ref 0.3–6.2)
ERYTHROCYTE [DISTWIDTH] IN BLOOD BY AUTOMATED COUNT: 13.4 % (ref 12.3–15.4)
GLUCOSE SERPL-MCNC: 93 MG/DL (ref 65–99)
HCT VFR BLD AUTO: 31.8 % (ref 34–46.6)
HGB BLD-MCNC: 10.7 G/DL (ref 12–15.9)
IMM GRANULOCYTES # BLD AUTO: 0.09 10*3/MM3 (ref 0–0.05)
IMM GRANULOCYTES NFR BLD AUTO: 0.8 % (ref 0–0.5)
INR PPP: 1.19 (ref 0.9–1.1)
LAB AP CASE REPORT: NORMAL
LAB AP CLINICAL INFORMATION: NORMAL
LEFT ATRIUM VOLUME INDEX: 16.2 ML/M2
LYMPHOCYTES # BLD AUTO: 1.81 10*3/MM3 (ref 0.7–3.1)
LYMPHOCYTES NFR BLD AUTO: 16.6 % (ref 19.6–45.3)
MCH RBC QN AUTO: 29.8 PG (ref 26.6–33)
MCHC RBC AUTO-ENTMCNC: 33.6 G/DL (ref 31.5–35.7)
MCV RBC AUTO: 88.6 FL (ref 79–97)
MONOCYTES # BLD AUTO: 1.37 10*3/MM3 (ref 0.1–0.9)
MONOCYTES NFR BLD AUTO: 12.6 % (ref 5–12)
NEUTROPHILS NFR BLD AUTO: 65.8 % (ref 42.7–76)
NEUTROPHILS NFR BLD AUTO: 7.16 10*3/MM3 (ref 1.7–7)
NRBC BLD AUTO-RTO: 0 /100 WBC (ref 0–0.2)
PATH REPORT.FINAL DX SPEC: NORMAL
PATH REPORT.GROSS SPEC: NORMAL
PLATELET # BLD AUTO: 155 10*3/MM3 (ref 140–450)
PMV BLD AUTO: 10.5 FL (ref 6–12)
POTASSIUM SERPL-SCNC: 3.6 MMOL/L (ref 3.5–5.2)
POTASSIUM SERPL-SCNC: 4.5 MMOL/L (ref 3.5–5.2)
PROTHROMBIN TIME: 15.3 SECONDS (ref 11.7–14.2)
RBC # BLD AUTO: 3.59 10*6/MM3 (ref 3.77–5.28)
SINUS: 3 CM
SODIUM SERPL-SCNC: 141 MMOL/L (ref 136–145)
STJ: 2.9 CM
WBC NRBC COR # BLD AUTO: 10.89 10*3/MM3 (ref 3.4–10.8)

## 2024-01-22 PROCEDURE — 94799 UNLISTED PULMONARY SVC/PX: CPT

## 2024-01-22 PROCEDURE — 94761 N-INVAS EAR/PLS OXIMETRY MLT: CPT

## 2024-01-22 PROCEDURE — 80048 BASIC METABOLIC PNL TOTAL CA: CPT | Performed by: INTERNAL MEDICINE

## 2024-01-22 PROCEDURE — 99232 SBSQ HOSP IP/OBS MODERATE 35: CPT | Performed by: INTERNAL MEDICINE

## 2024-01-22 PROCEDURE — 25010000002 HEPARIN (PORCINE) 25000-0.45 UT/250ML-% SOLUTION: Performed by: STUDENT IN AN ORGANIZED HEALTH CARE EDUCATION/TRAINING PROGRAM

## 2024-01-22 PROCEDURE — 25010000002 FUROSEMIDE PER 20 MG: Performed by: INTERNAL MEDICINE

## 2024-01-22 PROCEDURE — 85730 THROMBOPLASTIN TIME PARTIAL: CPT | Performed by: STUDENT IN AN ORGANIZED HEALTH CARE EDUCATION/TRAINING PROGRAM

## 2024-01-22 PROCEDURE — 85610 PROTHROMBIN TIME: CPT | Performed by: STUDENT IN AN ORGANIZED HEALTH CARE EDUCATION/TRAINING PROGRAM

## 2024-01-22 PROCEDURE — 25010000002 HEPARIN (PORCINE) PER 1000 UNITS: Performed by: STUDENT IN AN ORGANIZED HEALTH CARE EDUCATION/TRAINING PROGRAM

## 2024-01-22 PROCEDURE — 85025 COMPLETE CBC W/AUTO DIFF WBC: CPT | Performed by: INTERNAL MEDICINE

## 2024-01-22 PROCEDURE — 94664 DEMO&/EVAL PT USE INHALER: CPT

## 2024-01-22 PROCEDURE — 94760 N-INVAS EAR/PLS OXIMETRY 1: CPT

## 2024-01-22 PROCEDURE — 25510000001 IOPAMIDOL PER 1 ML: Performed by: STUDENT IN AN ORGANIZED HEALTH CARE EDUCATION/TRAINING PROGRAM

## 2024-01-22 PROCEDURE — 71275 CT ANGIOGRAPHY CHEST: CPT

## 2024-01-22 PROCEDURE — 84132 ASSAY OF SERUM POTASSIUM: CPT | Performed by: STUDENT IN AN ORGANIZED HEALTH CARE EDUCATION/TRAINING PROGRAM

## 2024-01-22 RX ORDER — HEPARIN SODIUM 10000 [USP'U]/100ML
18 INJECTION, SOLUTION INTRAVENOUS
Status: DISCONTINUED | OUTPATIENT
Start: 2024-01-22 | End: 2024-01-23

## 2024-01-22 RX ORDER — POTASSIUM CHLORIDE 750 MG/1
40 TABLET, FILM COATED, EXTENDED RELEASE ORAL EVERY 4 HOURS
Status: COMPLETED | OUTPATIENT
Start: 2024-01-22 | End: 2024-01-22

## 2024-01-22 RX ORDER — HYDROCODONE POLISTIREX AND CHLORPHENIRAMINE POLISTIREX 10; 8 MG/5ML; MG/5ML
5 SUSPENSION, EXTENDED RELEASE ORAL EVERY 12 HOURS PRN
Status: ACTIVE | OUTPATIENT
Start: 2024-01-22 | End: 2024-01-27

## 2024-01-22 RX ORDER — HEPARIN SODIUM 5000 [USP'U]/ML
80 INJECTION, SOLUTION INTRAVENOUS; SUBCUTANEOUS ONCE
Status: COMPLETED | OUTPATIENT
Start: 2024-01-22 | End: 2024-01-22

## 2024-01-22 RX ORDER — LOSARTAN POTASSIUM 50 MG/1
50 TABLET ORAL
Status: DISCONTINUED | OUTPATIENT
Start: 2024-01-23 | End: 2024-01-26

## 2024-01-22 RX ORDER — DILTIAZEM HYDROCHLORIDE 120 MG/1
120 CAPSULE, COATED, EXTENDED RELEASE ORAL
Status: DISCONTINUED | OUTPATIENT
Start: 2024-01-22 | End: 2024-01-28 | Stop reason: HOSPADM

## 2024-01-22 RX ORDER — HEPARIN SODIUM 5000 [USP'U]/ML
40-80 INJECTION, SOLUTION INTRAVENOUS; SUBCUTANEOUS EVERY 6 HOURS PRN
Status: DISCONTINUED | OUTPATIENT
Start: 2024-01-22 | End: 2024-01-23

## 2024-01-22 RX ORDER — DIAZEPAM 2 MG/1
2 TABLET ORAL 2 TIMES DAILY PRN
Status: DISCONTINUED | OUTPATIENT
Start: 2024-01-22 | End: 2024-01-28 | Stop reason: HOSPADM

## 2024-01-22 RX ORDER — FUROSEMIDE 10 MG/ML
40 INJECTION INTRAMUSCULAR; INTRAVENOUS ONCE
Status: COMPLETED | OUTPATIENT
Start: 2024-01-22 | End: 2024-01-22

## 2024-01-22 RX ADMIN — IPRATROPIUM BROMIDE AND ALBUTEROL SULFATE 3 ML: 2.5; .5 SOLUTION RESPIRATORY (INHALATION) at 07:42

## 2024-01-22 RX ADMIN — DIAZEPAM 2 MG: 2 TABLET ORAL at 09:22

## 2024-01-22 RX ADMIN — DIAZEPAM 2 MG: 2 TABLET ORAL at 23:16

## 2024-01-22 RX ADMIN — PANTOPRAZOLE SODIUM 40 MG: 40 TABLET, DELAYED RELEASE ORAL at 17:16

## 2024-01-22 RX ADMIN — Medication 10 ML: at 09:22

## 2024-01-22 RX ADMIN — PANTOPRAZOLE SODIUM 40 MG: 40 TABLET, DELAYED RELEASE ORAL at 09:22

## 2024-01-22 RX ADMIN — FUROSEMIDE 40 MG: 10 INJECTION, SOLUTION INTRAMUSCULAR; INTRAVENOUS at 14:46

## 2024-01-22 RX ADMIN — BUDESONIDE 0.5 MG: 0.5 INHALANT ORAL at 07:43

## 2024-01-22 RX ADMIN — POTASSIUM CHLORIDE 40 MEQ: 750 TABLET, EXTENDED RELEASE ORAL at 14:47

## 2024-01-22 RX ADMIN — HEPARIN SODIUM 8800 UNITS: 5000 INJECTION INTRAVENOUS; SUBCUTANEOUS at 15:02

## 2024-01-22 RX ADMIN — POTASSIUM CHLORIDE 40 MEQ: 750 TABLET, EXTENDED RELEASE ORAL at 09:22

## 2024-01-22 RX ADMIN — BUDESONIDE 0.5 MG: 0.5 INHALANT ORAL at 19:56

## 2024-01-22 RX ADMIN — IPRATROPIUM BROMIDE AND ALBUTEROL SULFATE 3 ML: 2.5; .5 SOLUTION RESPIRATORY (INHALATION) at 19:56

## 2024-01-22 RX ADMIN — DILTIAZEM HYDROCHLORIDE 120 MG: 120 CAPSULE, EXTENDED RELEASE ORAL at 14:46

## 2024-01-22 RX ADMIN — Medication 10 ML: at 20:54

## 2024-01-22 RX ADMIN — LOSARTAN POTASSIUM 100 MG: 100 TABLET, FILM COATED ORAL at 09:22

## 2024-01-22 RX ADMIN — HEPARIN SODIUM 18 UNITS/KG/HR: 10000 INJECTION, SOLUTION INTRAVENOUS at 15:00

## 2024-01-22 RX ADMIN — DOCUSATE SODIUM 100 MG: 100 CAPSULE, LIQUID FILLED ORAL at 09:22

## 2024-01-22 RX ADMIN — IPRATROPIUM BROMIDE AND ALBUTEROL SULFATE 3 ML: 2.5; .5 SOLUTION RESPIRATORY (INHALATION) at 15:41

## 2024-01-22 RX ADMIN — IOPAMIDOL 95 ML: 755 INJECTION, SOLUTION INTRAVENOUS at 13:34

## 2024-01-22 NOTE — SIGNIFICANT NOTE
Pt reports that she is not feeling well. Expressed that she is upset due to no one letting her tf to bedside commode yesterday to have bowel movement

## 2024-01-22 NOTE — PLAN OF CARE
Goal Outcome Evaluation:  Plan of Care Reviewed With: patient        Progress: improving  Outcome Evaluation: Pt admitted 1/16/2024 for GI bleed. Pt A&O X4 . 3 lit NC with humidifier . Pt suppose to DC yesterday but pt had episode of rapid A-fib about 0900- new order Iv digoxin , Iv lasix and Iv lopressor. Pt denies chest pain. No N/V .Pt converted  sinus rhythm over night . Pt wheezing bilaterally .  Right hand pain managed with one time tylenol. Safety maintained.

## 2024-01-22 NOTE — PROGRESS NOTES
"      [facility]         Dr Rangel Sayied   LOS: 2 days   Patient Care Team:  Christopher Leyva DO as PCP - General  Christopher Leyva DO as PCP - Family Medicine    Chief Complaint: Bilateral PE with acute pulmonary nodule history of asthma other issues as listed below    Interval History: Patient seen by my partner Dr. Thompson 1/18/2023.  Noted acute bilateral PE with GI bleed status post EGD with hiatal hernia and gastritis.  Currently on heparin drip no active GI bleed reported.  Complains of ongoing cough.    REVIEW OF SYSTEMS:   CARDIOVASCULAR: No chest pain, chest pressure or chest discomfort. No palpitations or edema.   RESPIRATORY: Shortness of breath with activity with ongoing cough   GASTROINTESTINAL: No anorexia, nausea, vomiting or diarrhea. No abdominal pain or blood.   HEMATOLOGIC: No bleeding or bruising.     Ventilator/Non-Invasive Ventilation Settings (From admission, onward)      None              Vital Signs  Temp:  [97.7 °F (36.5 °C)-98.8 °F (37.1 °C)] 97.9 °F (36.6 °C)  Heart Rate:  [87-97] 87  Resp:  [16-20] 17  BP: (113-130)/() 130/104    Intake/Output Summary (Last 24 hours) at 1/22/2024 1617  Last data filed at 1/22/2024 0922  Gross per 24 hour   Intake --   Output 800 ml   Net -800 ml     Flowsheet Rows      Flowsheet Row First Filed Value   Admission Height 165.1 cm (65\") Documented at 01/16/2024 1643   Admission Weight 107 kg (236 lb 8.9 oz) Documented at 01/16/2024 1643            Physical Exam:  Patient is examined using the personal protective equipment as per guidelines from infection control for this particular patient as enacted.  Hand hygiene was performed before and after patient interaction.   General Appearance:    Alert, cooperative, in no acute distress.  Following simple commands  ENT Mallampati between 3 and 4 no nasal congestion  Neck midline trachea, no thyromegaly   Lungs:   Diminished breath sounds bilaterally    Heart:    Regular " rhythm and normal rate, normal S1 and S2, no            murmur, no gallop, no rub, no click   Chest Wall:    No abnormalities observed   Abdomen:     Normal bowel sounds, no masses, no organomegaly, soft        nontender, nondistended, no guarding, no rebound                tenderness   Extremities:   Moves all extremities well, no edema, no cyanosis, no             redness  CNS no focal neurological deficits normal sensory exam  Skin no rashes no nodules  Musculoskeletal no cyanosis no clubbing normal range of motion     Results Review:        Results from last 7 days   Lab Units 01/22/24 0417 01/21/24  0812 01/20/24  0349   SODIUM mmol/L 141 139 142   POTASSIUM mmol/L 3.6 4.1 4.1   CHLORIDE mmol/L 107 109* 113*   CO2 mmol/L 23.2 22.0 22.0   BUN mg/dL 15 11 11   CREATININE mg/dL 0.87 0.81 0.86   GLUCOSE mg/dL 93 89 82   CALCIUM mg/dL 8.2* 8.0* 8.1*         Results from last 7 days   Lab Units 01/22/24 0417 01/21/24  0812 01/20/24  0349   WBC 10*3/mm3 10.89* 9.53 8.83   HEMOGLOBIN g/dL 10.7* 10.6* 9.9*   HEMATOCRIT % 31.8* 33.0* 30.2*   PLATELETS 10*3/mm3 155 139* 151     Results from last 7 days   Lab Units 01/22/24  1428 01/16/24  1221   INR  1.19* 1.10   APTT seconds <20.0* 26.7         Results from last 7 days   Lab Units 01/21/24  0812   MAGNESIUM mg/dL 2.0               I reviewed the patient's new clinical results.  I personally viewed and interpreted the patient's chest x-ray.        Medication Review:   budesonide, 0.5 mg, Nebulization, BID - RT  dilTIAZem CD, 120 mg, Oral, Q24H  docusate sodium, 100 mg, Oral, BID  ipratropium-albuterol, 3 mL, Nebulization, TID  [START ON 1/23/2024] losartan, 50 mg, Oral, Q24H  pantoprazole, 40 mg, Oral, BID AC  sodium chloride, 10 mL, Intravenous, Q12H        heparin, 18 Units/kg/hr, Last Rate: 18 Units/kg/hr (01/22/24 1500)        ASSESSMENT:   Pulmonary nodules  Bilateral PE  Asthma  Gastrointestinal bleed  A-fib RVR  Acute blood loss  anemia  Osteoarthritis  Hypertension  History of breast cancer    PLAN:  Heparin drip to be continued per protocol.  Transition to Eliquis at some point making sure no active GI bleed  Will check Doppler lower extremity rule out DVT  Pulmonary nodule will need follow-up CT chest in 3 months.  Continue bronchodilators for asthma.  Clinically stable with no active wheezing.  As needed cough medicine  Will need outpatient PFTs  Continue to monitor clinical course closely.      Juan Carlos Lam MD  01/22/24  16:17 EST

## 2024-01-22 NOTE — SIGNIFICANT NOTE
01/22/24 1423   OTHER   Discipline physical therapy assistant   Rehab Time/Intention   Session Not Performed unable to treat, medical status change  (PT will hold today by RN request. RN stated that PEs have been discovered and pt will be placed on heparin drip soon. PT will follow up tomorrow.)   Recommendation   PT - Next Appointment 01/23/24

## 2024-01-22 NOTE — PROGRESS NOTES
"   LOS: 2 days   Patient Care Team:  Christopher Leyva DO as PCP - General  Christopher Leyva DO as PCP - Family Medicine    Chief Complaint: PAF     Interval History:  Converted to SR. Still weak, chronically SOA, wheezy. Constipated.  Echo showed normal LVSF and severe PHTN.     Objective   Vital Signs  Temp:  [97.7 °F (36.5 °C)-98.8 °F (37.1 °C)] 97.7 °F (36.5 °C)  Heart Rate:  [] 87  Resp:  [16-] 18  BP: (113-142)/(64-87) 113/64    Intake/Output Summary (Last 24 hours) at 2024 1300  Last data filed at 2024 0922  Gross per 24 hour   Intake 240 ml   Output 1350 ml   Net -1110 ml       Last Weight and Admission Weight        24  1605   Weight: 110 kg (243 lb)     Flowsheet Rows      Flowsheet Row First Filed Value   Admission Height 165.1 cm (65\") Documented at 2024 1643   Admission Weight 107 kg (236 lb 8.9 oz) Documented at 2024 1643                    Physical Exam  Constitutional:       General: She is not in acute distress.  HENT:      Head: Normocephalic.      Mouth/Throat:      Pharynx: Oropharynx is clear.   Eyes:      Conjunctiva/sclera: Conjunctivae normal.   Cardiovascular:      Rate and Rhythm: Normal rate. Occasional      Heart sounds: Heart sounds are distant.   Pulmonary:      Effort: Pulmonary effort is normal.      Breath sounds: Wheezing present.   Abdominal:      Palpations: Abdomen is soft.      Tenderness: There is no abdominal tenderness.   Musculoskeletal:      Right lower le+      Left lower le+   Skin:     Coloration: Skin is pale.   Neurological:      General: No focal deficit present.         Results Review:      Results from last 7 days   Lab Units 24  0417 24  0812 24  0349   SODIUM mmol/L 141 139 142   POTASSIUM mmol/L 3.6 4.1 4.1   CHLORIDE mmol/L 107 109* 113*   CO2 mmol/L 23.2 22.0 22.0   BUN mg/dL 15 11 11   CREATININE mg/dL 0.87 0.81 0.86   GLUCOSE mg/dL 93 89 82   CALCIUM mg/dL 8.2* 8.0* 8.1*       " "  Results from last 7 days   Lab Units 01/22/24  0417 01/21/24  0812 01/20/24  0349   WBC 10*3/mm3 10.89* 9.53 8.83   HEMOGLOBIN g/dL 10.7* 10.6* 9.9*   HEMATOCRIT % 31.8* 33.0* 30.2*   PLATELETS 10*3/mm3 155 139* 151     Results from last 7 days   Lab Units 01/16/24  1221   INR  1.10   APTT seconds 26.7         Results from last 7 days   Lab Units 01/21/24  0812   MAGNESIUM mg/dL 2.0           I reviewed the patient's new clinical results.  I personally viewed and interpreted the patient's EKG/Telemetry data        Medication Review:   budesonide, 0.5 mg, Nebulization, BID - RT  diazePAM, 2 mg, Oral, BID  docusate sodium, 100 mg, Oral, BID  ipratropium-albuterol, 3 mL, Nebulization, TID  losartan, 100 mg, Oral, Q24H  pantoprazole, 40 mg, Oral, BID AC  potassium chloride ER, 40 mEq, Oral, Q4H  sodium chloride, 10 mL, Intravenous, Q12H           Assessment & Plan     1. PAF -- known history of PAF, now in SR with PACs  2. Acute hypoxemic respiratory distress  3. Acute GIB  4. History of stroke  5. HTN    *Tough situation, as CHADSVASC = 7, but with recent GIB, OAC seems very tricky. GI has siged off, and Hgb is stable at 10; may need to consider starting apiaxban.    *She's back in SR, will decrease losartan and add CCB (avoiding BB due to wheezing). TSH is normal.     *She reports a history of lung cancer and COPD and chest radiation. Her wheezing is diffuse and seems too prominent to be \"cardiac wheeze.\" Consider bronchodilators, will defer to primary team.    *Consider thiazide vs spironolactone vs loop diuretic at discharge for PHTN (which is multifactorial) and presumed diastolic dysfunction. Will give another dose of IV furosemide today.     Will follow.     ADD:    CT shows bilateral PEs, mostly segmental/subsegmental. Her echo does not show right heart strain. I suspect the appearance of the RV on the CT is due to chronic PHTN. At this point, there is no indication for intervention for her PEs. Agree with " heparin.     Saulo Gonzalez MD  01/22/24  13:00 EST       Treatment Number (Optional): 2

## 2024-01-22 NOTE — PROGRESS NOTES
"    Name: Vonnie Coppola ADMIT: 2024   : 1935  PCP: Christopher Leyva DO    MRN: [id number] LOS: 2 days   AGE/SEX: 88 y.o. female  ROOM: [facility] [place]     Subjective.  Subjective.  Went into Afib with RVR yesterday. She felt like she was 'going to die\"       Objective   Objective   Vital Signs  Temp:  [97.7 °F (36.5 °C)-98.8 °F (37.1 °C)] 97.7 °F (36.5 °C)  Heart Rate:  [] 87  Resp:  [16-20] 17  BP: (113-142)/(64-87) 113/64  SpO2:  [90 %-95 %] 95 %  on  Flow (L/min):  [2-3] 2;   Device (Oxygen Therapy): humidified;nasal cannula    Intake/Output Summary (Last 24 hours) at 2024 1551  Last data filed at 2024 0922  Gross per 24 hour   Intake 240 ml   Output 800 ml   Net -560 ml     Body mass index is 40.44 kg/m².      24  1643 24  0448 24  1605   Weight: 107 kg (236 lb 8.9 oz) 110 kg (243 lb 2.7 oz) 110 kg (243 lb)     Physical Exam  Constitutional:       General: She is not in acute distress.  HENT:      Head: Normocephalic and atraumatic.   Cardiovascular:      Rate and Rhythm: Normal rate and regular rhythm.   Pulmonary:      Effort: Pulmonary effort is normal. No respiratory distress.   Abdominal:      General: There is no distension.      Palpations: Abdomen is soft.      Tenderness: There is no abdominal tenderness.   Neurological:      Mental Status: She is alert.           Results Review:      Results from last 7 days   Lab Units 24  0417 24  0812 24  0349 24  2243 24  0542 24  0752 24  0357 24  1221   SODIUM mmol/L 141 139 142  --  145 142 141 146*   POTASSIUM mmol/L 3.6 4.1 4.1 4.3 3.6 4.0 3.9 3.6   CHLORIDE mmol/L 107 109* 113*  --  112* 112* 110* 109*   CO2 mmol/L 23.2 22.0 22.0  --  22.0 21.0* 21.6* 28.0   BUN mg/dL 15 11 11  --  11 12 16 17   CREATININE mg/dL 0.87 0.81 0.86  --  0.91 0.84 0.90 1.01*   GLUCOSE mg/dL 93 89 82  --  91 72 69 86   CALCIUM mg/dL 8.2* 8.0* 8.1*  --  8.5* 8.0* 8.3* 9.1   AST (SGOT) " "U/L  --   --   --   --   --   --  16 17   ALT (SGPT) U/L  --   --   --   --   --   --  14 17     Estimated Creatinine Clearance: 57.3 mL/min (by C-G formula based on SCr of 0.87 mg/dL).              Results from last 7 days   Lab Units 01/21/24  0812   PROBNP pg/mL 1,099.0     Results from last 7 days   Lab Units 01/21/24  0812   TSH uIU/mL 2.100     Results from last 7 days   Lab Units 01/21/24  0812   MAGNESIUM mg/dL 2.0           Invalid input(s): \"LDLCALC\"  Results from last 7 days   Lab Units 01/22/24  0417 01/21/24  0812 01/20/24  0349 01/19/24  1559 01/19/24  0542 01/18/24  2358 01/18/24  1616 01/18/24  0752 01/17/24  0737 01/17/24  0357 01/16/24  2346 01/16/24  1221   WBC 10*3/mm3 10.89* 9.53 8.83  --  7.43  --   --  7.53  --  8.61  --  9.16   HEMOGLOBIN g/dL 10.7* 10.6* 9.9* 10.6* 11.1*  11.1* 10.5* 10.9* 10.1*  10.1*   < > 10.6*   < > 10.8*   HEMATOCRIT % 31.8* 33.0* 30.2* 31.8* 34.2  34.2 31.6* 33.5* 31.0*  31.0*   < > 32.2*   < > 33.4*   PLATELETS 10*3/mm3 155 139* 151  --  182  --   --  157  --  188  --  177   MCV fL 88.6 91.2 89.1  --  89.5  --   --  90.9  --  89.4  --  91.3   MCH pg 29.8 29.3 29.2  --  29.1  --   --  29.6  --  29.4  --  29.5   MCHC g/dL 33.6 32.1 32.8  --  32.5  --   --  32.6  --  32.9  --  32.3   RDW % 13.4 13.6 13.5  --  13.3  --   --  13.4  --  13.2  --  13.5   RDW-SD fl 43.8 45.5 43.7  --  43.2  --   --  44.4  --  43.3  --  45.6   MPV fL 10.5 10.4 10.0  --  10.2  --   --  10.0  --  10.1  --  9.6   NEUTROPHIL % % 65.8 61.0 62.9  --  57.9  --   --  54.2  --  58.9  --  68.0   LYMPHOCYTE % % 16.6* 22.6 20.6  --  24.1  --   --  28.2  --  25.3  --  19.0*   MONOCYTES % % 12.6* 11.3 11.9  --  12.7*  --   --  12.4*  --  11.7  --  10.9   EOSINOPHIL % % 3.6 4.1 3.7  --  4.3  --   --  4.2  --  3.3  --  1.4   BASOPHIL % % 0.6 0.5 0.3  --  0.5  --   --  0.7  --  0.6  --  0.4   IMM GRAN % % 0.8* 0.5 0.6*  --  0.5  --   --  0.3  --  0.2  --  0.3   NEUTROS ABS 10*3/mm3 7.16* 5.81 5.55  --  " 4.30  --   --  4.09  --  5.07  --  6.22   LYMPHS ABS 10*3/mm3 1.81 2.15 1.82  --  1.79  --   --  2.12  --  2.18  --  1.74   MONOS ABS 10*3/mm3 1.37* 1.08* 1.05*  --  0.94*  --   --  0.93*  --  1.01*  --  1.00*   EOS ABS 10*3/mm3 0.39 0.39 0.33  --  0.32  --   --  0.32  --  0.28  --  0.13   BASOS ABS 10*3/mm3 0.07 0.05 0.03  --  0.04  --   --  0.05  --  0.05  --  0.04   IMMATURE GRANS (ABS) 10*3/mm3 0.09* 0.05 0.05  --  0.04  --   --  0.02  --  0.02  --  0.03   NRBC /100 WBC 0.0 0.0 0.0  --  0.0  --   --  0.0  --  0.0  --  0.0    < > = values in this interval not displayed.     Results from last 7 days   Lab Units 01/22/24  1428 01/16/24  1221   INR  1.19* 1.10   APTT seconds <20.0* 26.7                 Results from last 7 days   Lab Units 01/16/24  1221   LIPASE U/L 16             Results from last 7 days   Lab Units 01/16/24  1337   NITRITE UA  Negative           Imaging:  Imaging Results (Last 24 Hours)       Procedure Component Value Units Date/Time    CT Angiogram Chest [[id number]] Collected: 01/22/24 1358     Updated: 01/22/24 1515    Narrative:      CT ANGIOGRAM OF THE CHEST. MULTIPLE CORONAL, SAGITTAL, AND 3-D  RECONSTRUCTIONS.     HISTORY: Shortness of air.     TECHNIQUE: Radiation dose reduction techniques were utilized, including  automated exposure control and exposure modulation based on body size.   CT angiogram of the chest was performed following the administration of  IV contrast. Coronal, sagittal, and 3-D reconstruction images were  obtained.     COMPARISON: CT chest with IV contrast 01/17/2024.     FINDINGS: There are pulmonary emboli within both upper and lower lobe  and right middle lobe segmental and subsegmental pulmonary arteries. The  largest, most central embolus is present within the right interlobar  pulmonary artery at the level of the origin of the right middle lobe  pulmonary artery and this embolus also extends into the right middle  lobe segmental artery. There is also an embolus  within the left main  pulmonary artery extending into left upper and lower lobe segmental  arteries. There is evidence for right heart strain with elevated RV:LV  ratio.     No acute aortic abnormality is evident. There is no evidence for  mediastinal kale enlargement. There is bilateral lower lobe  atelectasis. Mild basilar peripheral prominent pulm interstitial disease  is present. Within the anterior inferior right middle lobe there is a 5  mm pulmonary nodule on image 100. There is also a 1.1 cm nodular opacity  within the far anterior inferior right middle lobe. Recommend followup  with chest CT.     Imaging to the upper abdomen demonstrates a low-density lesion within  the central, inferior spleen measuring 2.2 cm and this is without  change. Cholecystectomy clips are present.       Impression:      1. Bilateral pulmonary thromboembolic disease with evidence for right  heart strain.  2. Dependent lower lobe atelectasis.  3. 5 mm, 11 mm right middle lobe pulmonary nodules for which followup  with chest CT is recommended to evaluate for stability.     Findings pertaining to the pulmonary emboli and right heart strain  discussed with Sydnie, the nurse caring for the patient on [address] on  01/22/2024 at 1:50 p.m.     This report was finalized on 1/22/2024 3:12 PM by Dr. Archie Ortiz M.D on Workstation: [id number]                  I reviewed the patient's new clinical results / labs / tests / procedures      Assessment/Plan     Active Hospital Problems    Diagnosis  POA    **GI bleed [K92.2]  Yes    Pulmonary embolus [I26.99]  Yes    Atrial fibrillation [I48.91]  No    History of CVA (cerebrovascular accident) [Z86.73]  Not Applicable    Dehydration [E86.0]  Yes    Renal insufficiency [N28.9]  Yes    Pulmonary nodule [R91.1]  Yes    Adnexal cyst [N94.9]  Yes    Anemia [D64.9]  Yes    HTN (hypertension) [I10]  Yes    History of breast cancer [Z85.3]  Not Applicable    Asthma [J45.909]  Yes    Nausea [R11.0]   Yes      Resolved Hospital Problems   No resolved problems to display.           GI bleed in a patient with a history of peptic ulcer disease/diverticular disease/hemorrhoids/constipation.  EGD with hiatal hernia and gastritis status post biopsy.  Colonoscopy with in the ascending and descending colon status post removal and diverticulosis.  No active or recent bleeding stigmata Monitor hemoglobin and transfuse if hemoglobin is less than 7.  Continue holding aspirin.  Will need GI follow-up as an outpatient for capsule enterography.      Hypertension/new onset A-fib.  Now back in sinus. Cards following. Starting Heparin due to new dx of bilateral Pes    Bilateral PE- on heparin ggt. No RV strain, no need for any intervention presently    Dehydration/renal insufficiency/hypernatremia.  Resolved renal insufficiency and hypernatremia on IV fluid.  Continue holding diuretics.  Resolved dehydration.  IV fluid DC'd.    Acute asthma exacerbation/hypoxemia/pulmonary nodule.  CT of the chest with enlarging pulmonary nodules on the right middle and lower lobe.  Continues to be unable to wean her off the oxygen.  Chest x-ray with no acute disease.  Continue Pulmicort and DuoNebs.  And spirometry.  Awaiting walking pulse oximetry (patient refused yesterday).    Pulmonary saw the patient recommends chest CT follow-up on the pulmonary nodules in 3 months.    History of CVA.  Holding aspirin secondary to GI bleed.    Left adnexal cyst.  Pelvic ultrasound with absent uterus and ovaries.  GYN follow-up as an outpatient with repeat pelvic ultrasound.    VTE prophylaxis.  Sequential compression devices.    Heparin GGT for dvt ppx  Full Code    Edilberto Patel MD  [facility]  01/22/24  15:51 EST

## 2024-01-22 NOTE — PLAN OF CARE
AAOX4. SR. 2L NC. Elevated D-dimer reported to MD Patel. CTA chest completed. Radiologist notified this RN of Bilateral PE. MD Patel notified. Cardiology notified. Started on IV Heparin. Next pTT at 2100. IV lasix given. Adequate urine output observed. Large BM this shift. No blood observed. Hgb stable. BLLE doppler ordered. Not completed this shift.  Patient is very upset and tearful. Was able to offer some comfort. Offered PO valium but patient decided she wanted to wait and take it at bedtime. PO Cardizem started. Daughter at bedside and given updates.    Goal Outcome Evaluation:  Plan of Care Reviewed With: patient, daughter        Progress: no change         Problem: Adult Inpatient Plan of Care  Goal: Plan of Care Review  Outcome: Ongoing, Progressing  Flowsheets (Taken 1/22/2024 1845)  Progress: no change  Plan of Care Reviewed With:   patient   daughter  Goal: Patient-Specific Goal (Individualized)  Outcome: Ongoing, Progressing  Goal: Absence of Hospital-Acquired Illness or Injury  Outcome: Ongoing, Progressing  Intervention: Identify and Manage Fall Risk  Recent Flowsheet Documentation  Taken 1/22/2024 1800 by Sydnie Webb, RN  Safety Promotion/Fall Prevention:   safety round/check completed   room organization consistent   nonskid shoes/slippers when out of bed   lighting adjusted   clutter free environment maintained   assistive device/personal items within reach   activity supervised  Taken 1/22/2024 1630 by Sydnie Webb, RN  Safety Promotion/Fall Prevention:   safety round/check completed   room organization consistent   nonskid shoes/slippers when out of bed   lighting adjusted   clutter free environment maintained   assistive device/personal items within reach   activity supervised  Taken 1/22/2024 1445 by Sydnie Webb, RN  Safety Promotion/Fall Prevention:   safety round/check completed   room organization consistent   nonskid shoes/slippers when out of bed   lighting adjusted   clutter  free environment maintained   assistive device/personal items within reach   activity supervised  Taken 1/22/2024 1205 by Sydnie Webb RN  Safety Promotion/Fall Prevention:   safety round/check completed   room organization consistent   nonskid shoes/slippers when out of bed   lighting adjusted   clutter free environment maintained   assistive device/personal items within reach   activity supervised  Taken 1/22/2024 1030 by Sydnie Webb RN  Safety Promotion/Fall Prevention:   safety round/check completed   room organization consistent   nonskid shoes/slippers when out of bed   lighting adjusted   assistive device/personal items within reach   clutter free environment maintained   activity supervised  Taken 1/22/2024 0922 by Sydnie Webb RN  Safety Promotion/Fall Prevention:   room organization consistent   safety round/check completed   nonskid shoes/slippers when out of bed   lighting adjusted   assistive device/personal items within reach   clutter free environment maintained   activity supervised  Taken 1/22/2024 0810 by Sydnie Webb RN  Safety Promotion/Fall Prevention:   safety round/check completed   room organization consistent   nonskid shoes/slippers when out of bed   lighting adjusted   clutter free environment maintained   assistive device/personal items within reach   activity supervised  Intervention: Prevent Skin Injury  Recent Flowsheet Documentation  Taken 1/22/2024 1805 by Sydnie Webb RN  Body Position: supine, legs elevated  Taken 1/22/2024 1630 by Sydnie Webb RN  Body Position: position changed independently  Taken 1/22/2024 1445 by Sydnie Webb RN  Body Position: supine, legs elevated  Skin Protection:   incontinence pads utilized   adhesive use limited   tubing/devices free from skin contact   transparent dressing maintained   skin-to-skin areas padded   skin-to-device areas padded   protective footwear used  Taken 1/22/2024 1205 by Sydnie Webb RN  Body  Position: (over to stretcher)   weight shifting   other (see comments)  Taken 1/22/2024 1030 by Sydnie Webb RN  Body Position: position changed independently  Taken 1/22/2024 0922 by Sydnie Webb RN  Body Position:   weight shifting   supine, legs elevated  Skin Protection:   adhesive use limited   incontinence pads utilized   tubing/devices free from skin contact   transparent dressing maintained   skin-to-skin areas padded   skin-to-device areas padded   pulse oximeter probe site changed   protective footwear used  Taken 1/22/2024 0810 by Sydnie Webb RN  Body Position: supine, legs elevated  Intervention: Prevent and Manage VTE (Venous Thromboembolism) Risk  Recent Flowsheet Documentation  Taken 1/22/2024 1805 by Sydnie Webb RN  Activity Management: back to bed  Taken 1/22/2024 1800 by Sydnie Webb RN  Activity Management: up to bedside commode  Taken 1/22/2024 1630 by Sydnie Webb RN  Activity Management: bedrest  Taken 1/22/2024 1445 by Sydnie Webb RN  Activity Management: bedrest  VTE Prevention/Management:   bilateral   sequential compression devices off   patient refused intervention  Range of Motion: active ROM (range of motion) encouraged  Taken 1/22/2024 1205 by Sydnie Webb RN  Activity Management: (up to stretcher)   ambulated in room   other (see comments)  Taken 1/22/2024 1030 by Sydnie Webb RN  Activity Management: activity encouraged  Taken 1/22/2024 0922 by Sydnie Webb RN  Activity Management: activity encouraged  VTE Prevention/Management:   bilateral   sequential compression devices off  Range of Motion: active ROM (range of motion) encouraged  Intervention: Prevent Infection  Recent Flowsheet Documentation  Taken 1/22/2024 1800 by Sydnie Webb RN  Infection Prevention:   single patient room provided   rest/sleep promoted   personal protective equipment utilized   hand hygiene promoted  Taken 1/22/2024 1630 by Sydnie Webb  RN  Infection Prevention:   single patient room provided   rest/sleep promoted   personal protective equipment utilized   hand hygiene promoted  Taken 1/22/2024 1445 by Sydnie Webb RN  Infection Prevention:   single patient room provided   rest/sleep promoted   personal protective equipment utilized   hand hygiene promoted  Taken 1/22/2024 1205 by Sydnie Webb RN  Infection Prevention:   single patient room provided   rest/sleep promoted   personal protective equipment utilized   hand hygiene promoted  Taken 1/22/2024 1030 by Sydnie Webb RN  Infection Prevention:   single patient room provided   rest/sleep promoted   hand hygiene promoted   personal protective equipment utilized  Taken 1/22/2024 0922 by Sydnie Webb RN  Infection Prevention:   single patient room provided   rest/sleep promoted   personal protective equipment utilized   hand hygiene promoted  Taken 1/22/2024 0810 by Sydnie Webb RN  Infection Prevention:   single patient room provided   rest/sleep promoted   personal protective equipment utilized   hand hygiene promoted  Goal: Optimal Comfort and Wellbeing  Outcome: Ongoing, Progressing  Intervention: Monitor Pain and Promote Comfort  Recent Flowsheet Documentation  Taken 1/22/2024 0922 by Sydnie Webb RN  Pain Management Interventions:   pillow support provided   care clustered  Intervention: Provide Person-Centered Care  Recent Flowsheet Documentation  Taken 1/22/2024 1445 by Sydnie Webb RN  Trust Relationship/Rapport:   care explained   choices provided   emotional support provided   empathic listening provided   questions answered   reassurance provided   questions encouraged   thoughts/feelings acknowledged  Taken 1/22/2024 0922 by Sydnie Webb RN  Trust Relationship/Rapport:   care explained   choices provided   emotional support provided   empathic listening provided   questions answered   questions encouraged   reassurance provided    thoughts/feelings acknowledged  Goal: Readiness for Transition of Care  Outcome: Ongoing, Progressing  Goal: Plan of Care Review  Outcome: Ongoing, Progressing  Flowsheets (Taken 1/22/2024 1845)  Progress: no change  Plan of Care Reviewed With:   patient   daughter  Goal: Patient-Specific Goal (Individualized)  Outcome: Ongoing, Progressing  Goal: Absence of Hospital-Acquired Illness or Injury  Outcome: Ongoing, Progressing  Intervention: Identify and Manage Fall Risk  Recent Flowsheet Documentation  Taken 1/22/2024 1800 by ySdnie Webb RN  Safety Promotion/Fall Prevention:   safety round/check completed   room organization consistent   nonskid shoes/slippers when out of bed   lighting adjusted   clutter free environment maintained   assistive device/personal items within reach   activity supervised  Taken 1/22/2024 1630 by Sydnie Webb, RN  Safety Promotion/Fall Prevention:   safety round/check completed   room organization consistent   nonskid shoes/slippers when out of bed   lighting adjusted   clutter free environment maintained   assistive device/personal items within reach   activity supervised  Taken 1/22/2024 1445 by Sydnie Webb, RN  Safety Promotion/Fall Prevention:   safety round/check completed   room organization consistent   nonskid shoes/slippers when out of bed   lighting adjusted   clutter free environment maintained   assistive device/personal items within reach   activity supervised  Taken 1/22/2024 1205 by Sydnie Webb, RN  Safety Promotion/Fall Prevention:   safety round/check completed   room organization consistent   nonskid shoes/slippers when out of bed   lighting adjusted   clutter free environment maintained   assistive device/personal items within reach   activity supervised  Taken 1/22/2024 1030 by Sydnie Webb, RN  Safety Promotion/Fall Prevention:   safety round/check completed   room organization consistent   nonskid shoes/slippers when out of bed   lighting  adjusted   assistive device/personal items within reach   clutter free environment maintained   activity supervised  Taken 1/22/2024 0922 by Sydnie Webb RN  Safety Promotion/Fall Prevention:   room organization consistent   safety round/check completed   nonskid shoes/slippers when out of bed   lighting adjusted   assistive device/personal items within reach   clutter free environment maintained   activity supervised  Taken 1/22/2024 0810 by Sydnie Webb RN  Safety Promotion/Fall Prevention:   safety round/check completed   room organization consistent   nonskid shoes/slippers when out of bed   lighting adjusted   clutter free environment maintained   assistive device/personal items within reach   activity supervised  Intervention: Prevent Skin Injury  Recent Flowsheet Documentation  Taken 1/22/2024 1805 by Sydnie Webb RN  Body Position: supine, legs elevated  Taken 1/22/2024 1630 by Sydnie Webb RN  Body Position: position changed independently  Taken 1/22/2024 1445 by Sydnie Webb RN  Body Position: supine, legs elevated  Skin Protection:   incontinence pads utilized   adhesive use limited   tubing/devices free from skin contact   transparent dressing maintained   skin-to-skin areas padded   skin-to-device areas padded   protective footwear used  Taken 1/22/2024 1205 by Sydnie Webb RN  Body Position: (over to stretcher)   weight shifting   other (see comments)  Taken 1/22/2024 1030 by Sydnie Webb RN  Body Position: position changed independently  Taken 1/22/2024 0922 by Sydnie Webb RN  Body Position:   weight shifting   supine, legs elevated  Skin Protection:   adhesive use limited   incontinence pads utilized   tubing/devices free from skin contact   transparent dressing maintained   skin-to-skin areas padded   skin-to-device areas padded   pulse oximeter probe site changed   protective footwear used  Taken 1/22/2024 0810 by Sydnie Webb RN  Body Position: supine,  legs elevated  Intervention: Prevent and Manage VTE (Venous Thromboembolism) Risk  Recent Flowsheet Documentation  Taken 1/22/2024 1805 by Sydnie Webb RN  Activity Management: back to bed  Taken 1/22/2024 1800 by Sydnie Webb RN  Activity Management: up to bedside commode  Taken 1/22/2024 1630 by Sydnie Webb RN  Activity Management: bedrest  Taken 1/22/2024 1445 by Sydnie Webb RN  Activity Management: bedrest  VTE Prevention/Management:   bilateral   sequential compression devices off   patient refused intervention  Range of Motion: active ROM (range of motion) encouraged  Taken 1/22/2024 1205 by Sydnie Webb RN  Activity Management: (up to stretcher)   ambulated in room   other (see comments)  Taken 1/22/2024 1030 by Sydnie Webb RN  Activity Management: activity encouraged  Taken 1/22/2024 0922 by Sydnie Webb RN  Activity Management: activity encouraged  VTE Prevention/Management:   bilateral   sequential compression devices off  Range of Motion: active ROM (range of motion) encouraged  Intervention: Prevent Infection  Recent Flowsheet Documentation  Taken 1/22/2024 1800 by Sydnie Webb RN  Infection Prevention:   single patient room provided   rest/sleep promoted   personal protective equipment utilized   hand hygiene promoted  Taken 1/22/2024 1630 by Sydnie Webb RN  Infection Prevention:   single patient room provided   rest/sleep promoted   personal protective equipment utilized   hand hygiene promoted  Taken 1/22/2024 1445 by Sydnie Webb RN  Infection Prevention:   single patient room provided   rest/sleep promoted   personal protective equipment utilized   hand hygiene promoted  Taken 1/22/2024 1205 by Sydnie Webb RN  Infection Prevention:   single patient room provided   rest/sleep promoted   personal protective equipment utilized   hand hygiene promoted  Taken 1/22/2024 1030 by Sydnie Webb RN  Infection Prevention:   single patient room  provided   rest/sleep promoted   hand hygiene promoted   personal protective equipment utilized  Taken 1/22/2024 0922 by Sdynie Webb RN  Infection Prevention:   single patient room provided   rest/sleep promoted   personal protective equipment utilized   hand hygiene promoted  Taken 1/22/2024 0810 by Sydnie Webb RN  Infection Prevention:   single patient room provided   rest/sleep promoted   personal protective equipment utilized   hand hygiene promoted  Goal: Optimal Comfort and Wellbeing  Outcome: Ongoing, Progressing  Intervention: Monitor Pain and Promote Comfort  Recent Flowsheet Documentation  Taken 1/22/2024 0922 by Sydnie Webb RN  Pain Management Interventions:   pillow support provided   care clustered  Intervention: Provide Person-Centered Care  Recent Flowsheet Documentation  Taken 1/22/2024 1445 by Sydnie Webb RN  Trust Relationship/Rapport:   care explained   choices provided   emotional support provided   empathic listening provided   questions answered   reassurance provided   questions encouraged   thoughts/feelings acknowledged  Taken 1/22/2024 0922 by Sydnie Webb RN  Trust Relationship/Rapport:   care explained   choices provided   emotional support provided   empathic listening provided   questions answered   questions encouraged   reassurance provided   thoughts/feelings acknowledged  Goal: Readiness for Transition of Care  Outcome: Ongoing, Progressing     Problem: Skin Injury Risk Increased  Goal: Skin Health and Integrity  Outcome: Ongoing, Progressing  Intervention: Optimize Skin Protection  Recent Flowsheet Documentation  Taken 1/22/2024 1805 by Sydnie Webb RN  Head of Bed (HOB) Positioning: HOB lowered  Taken 1/22/2024 1630 by Sydnie Webb RN  Head of Bed (HOB) Positioning: HOB lowered  Taken 1/22/2024 1445 by Sydnie Webb RN  Pressure Reduction Techniques:   frequent weight shift encouraged   heels elevated off bed   pressure points protected    weight shift assistance provided  Head of Bed (\[facility]\"") Positioning: \[facility]\"" elevated  Pressure Reduction Devices:   alternating pressure pump (ADD)   pressure-redistributing mattress utilized  Skin Protection:   incontinence pads utilized   adhesive use limited   tubing/devices free from skin contact   transparent dressing maintained   skin-to-skin areas padded   skin-to-device areas padded   protective footwear used  Taken 1/22/2024 1205 by Sydnie Webb RN  Head of Bed (\[facility]\"") Positioning: HOB elevated  Taken 1/22/2024 1030 by Sydnie Webb RN  Head of Bed (\[facility]\"") Positioning: HOB lowered  Taken 1/22/2024 0922 by Sydnie Webb RN  Pressure Reduction Techniques:   heels elevated off bed   weight shift assistance provided  Head of Bed (\[facility]\"") Positioning: \[facility]\"" elevated  Pressure Reduction Devices:   pressure-redistributing mattress utilized   alternating pressure pump (ADD)  Skin Protection:   adhesive use limited   incontinence pads utilized   tubing/devices free from skin contact   transparent dressing maintained   skin-to-skin areas padded   skin-to-device areas padded   pulse oximeter probe site changed   protective footwear used  Taken 1/22/2024 0810 by Sydnie Webb RN  Head of Bed (\[facility]\"") Positioning: HOB lowered     Problem: Fall Injury Risk  Goal: Absence of Fall and Fall-Related Injury  Outcome: Ongoing, Progressing  Intervention: Identify and Manage Contributors  Recent Flowsheet Documentation  Taken 1/22/2024 1800 by Sydnie Webb RN  Medication Review/Management: medications reviewed  Taken 1/22/2024 1630 by Sydnie Webb RN  Medication Review/Management: medications reviewed  Taken 1/22/2024 1445 by Sydnie Webb RN  Medication Review/Management: medications reviewed  Taken 1/22/2024 1205 by Sydnie Webb RN  Medication Review/Management: medications reviewed  Taken 1/22/2024 1030 by Sydnie Webb RN  Medication Review/Management: medications reviewed  Taken 1/22/2024 0922 by  Sydnie Webb RN  Medication Review/Management: medications reviewed  Taken 1/22/2024 0810 by Sydnie Webb RN  Medication Review/Management: medications reviewed  Intervention: Promote Injury-Free Environment  Recent Flowsheet Documentation  Taken 1/22/2024 1800 by Sydnie Webb RN  Safety Promotion/Fall Prevention:   safety round/check completed   room organization consistent   nonskid shoes/slippers when out of bed   lighting adjusted   clutter free environment maintained   assistive device/personal items within reach   activity supervised  Taken 1/22/2024 1630 by Sydnie Webb RN  Safety Promotion/Fall Prevention:   safety round/check completed   room organization consistent   nonskid shoes/slippers when out of bed   lighting adjusted   clutter free environment maintained   assistive device/personal items within reach   activity supervised  Taken 1/22/2024 1445 by Sydnie Webb RN  Safety Promotion/Fall Prevention:   safety round/check completed   room organization consistent   nonskid shoes/slippers when out of bed   lighting adjusted   clutter free environment maintained   assistive device/personal items within reach   activity supervised  Taken 1/22/2024 1205 by Sydnie Webb RN  Safety Promotion/Fall Prevention:   safety round/check completed   room organization consistent   nonskid shoes/slippers when out of bed   lighting adjusted   clutter free environment maintained   assistive device/personal items within reach   activity supervised  Taken 1/22/2024 1030 by Sydnie Webb RN  Safety Promotion/Fall Prevention:   safety round/check completed   room organization consistent   nonskid shoes/slippers when out of bed   lighting adjusted   assistive device/personal items within reach   clutter free environment maintained   activity supervised  Taken 1/22/2024 0922 by Sydnie Webb RN  Safety Promotion/Fall Prevention:   room organization consistent   safety round/check completed    nonskid shoes/slippers when out of bed   lighting adjusted   assistive device/personal items within reach   clutter free environment maintained   activity supervised  Taken 1/22/2024 0810 by Sydnie Webb, RN  Safety Promotion/Fall Prevention:   safety round/check completed   room organization consistent   nonskid shoes/slippers when out of bed   lighting adjusted   clutter free environment maintained   assistive device/personal items within reach   activity supervised     Problem: Adjustment to Illness (Gastrointestinal Bleeding)  Goal: Optimal Coping with Acute Illness  Outcome: Ongoing, Progressing  Intervention: Optimize Psychosocial Response  Recent Flowsheet Documentation  Taken 1/22/2024 1445 by Sydnie Webb, RN  Supportive Measures:   active listening utilized   decision-making supported   goal-setting facilitated   verbalization of feelings encouraged  Taken 1/22/2024 0922 by Sydnie Webb, RN  Supportive Measures:   active listening utilized   decision-making supported   goal-setting facilitated   verbalization of feelings encouraged     Problem: Bleeding (Gastrointestinal Bleeding)  Goal: Hemostasis  Outcome: Ongoing, Progressing  Intervention: Manage Gastrointestinal Bleeding  Recent Flowsheet Documentation  Taken 1/22/2024 0922 by Sydnie Webb, RN  Environmental Support: calm environment promoted

## 2024-01-23 ENCOUNTER — APPOINTMENT (OUTPATIENT)
Dept: CARDIOLOGY | Facility: HOSPITAL | Age: 89
DRG: 377 | End: 2024-01-23
Payer: MEDICARE

## 2024-01-23 LAB
ANION GAP SERPL CALCULATED.3IONS-SCNC: 15 MMOL/L (ref 5–15)
APTT PPP: 142.9 SECONDS (ref 22.7–35.4)
APTT PPP: 74.4 SECONDS (ref 22.7–35.4)
BASOPHILS # BLD AUTO: 0.11 10*3/MM3 (ref 0–0.2)
BASOPHILS NFR BLD AUTO: 1 % (ref 0–1.5)
BH CV LOW VAS LEFT GASTRONEMIUS VESSEL: 1
BH CV LOW VAS LEFT PERONEAL VESSEL: 1
BH CV LOW VAS LEFT POPLITEAL SPONT: 1
BH CV LOW VAS LEFT POSTERIOR TIBIAL VESSEL: 1
BH CV LOW VAS LEFT SOLEAL VESSEL: 1
BH CV LOW VAS RIGHT PERONEAL VESSEL: 1
BH CV LOW VAS RIGHT POSTERIOR TIBIAL VESSEL: 1
BH CV LOW VAS RIGHT SOLEAL VESSEL: 1
BH CV LOWER VASCULAR LEFT COMMON FEMORAL AUGMENT: NORMAL
BH CV LOWER VASCULAR LEFT COMMON FEMORAL COMPETENT: NORMAL
BH CV LOWER VASCULAR LEFT COMMON FEMORAL COMPRESS: NORMAL
BH CV LOWER VASCULAR LEFT COMMON FEMORAL PHASIC: NORMAL
BH CV LOWER VASCULAR LEFT COMMON FEMORAL SPONT: NORMAL
BH CV LOWER VASCULAR LEFT DISTAL FEMORAL COMPRESS: NORMAL
BH CV LOWER VASCULAR LEFT GASTRONEMIUS COMPRESS: NORMAL
BH CV LOWER VASCULAR LEFT GASTRONEMIUS THROMBUS: NORMAL
BH CV LOWER VASCULAR LEFT GREATER SAPH AK COMPRESS: NORMAL
BH CV LOWER VASCULAR LEFT GREATER SAPH BK COMPRESS: NORMAL
BH CV LOWER VASCULAR LEFT LESSER SAPH COMPRESS: NORMAL
BH CV LOWER VASCULAR LEFT MID FEMORAL AUGMENT: NORMAL
BH CV LOWER VASCULAR LEFT MID FEMORAL COMPETENT: NORMAL
BH CV LOWER VASCULAR LEFT MID FEMORAL COMPRESS: NORMAL
BH CV LOWER VASCULAR LEFT MID FEMORAL PHASIC: NORMAL
BH CV LOWER VASCULAR LEFT MID FEMORAL SPONT: NORMAL
BH CV LOWER VASCULAR LEFT PERONEAL COMPRESS: NORMAL
BH CV LOWER VASCULAR LEFT PERONEAL THROMBUS: NORMAL
BH CV LOWER VASCULAR LEFT POPLITEAL AUGMENT: NORMAL
BH CV LOWER VASCULAR LEFT POPLITEAL COMPETENT: NORMAL
BH CV LOWER VASCULAR LEFT POPLITEAL COMPRESS: NORMAL
BH CV LOWER VASCULAR LEFT POPLITEAL THROMBUS: NORMAL
BH CV LOWER VASCULAR LEFT POSTERIOR TIBIAL COMPRESS: NORMAL
BH CV LOWER VASCULAR LEFT POSTERIOR TIBIAL THROMBUS: NORMAL
BH CV LOWER VASCULAR LEFT PROFUNDA FEMORAL COMPRESS: NORMAL
BH CV LOWER VASCULAR LEFT PROXIMAL FEMORAL COMPRESS: NORMAL
BH CV LOWER VASCULAR LEFT SAPHENOFEMORAL JUNCTION COMPRESS: NORMAL
BH CV LOWER VASCULAR LEFT SOLEAL COMPRESS: NORMAL
BH CV LOWER VASCULAR LEFT SOLEAL THROMBUS: NORMAL
BH CV LOWER VASCULAR RIGHT COMMON FEMORAL AUGMENT: NORMAL
BH CV LOWER VASCULAR RIGHT COMMON FEMORAL COMPETENT: NORMAL
BH CV LOWER VASCULAR RIGHT COMMON FEMORAL COMPRESS: NORMAL
BH CV LOWER VASCULAR RIGHT COMMON FEMORAL PHASIC: NORMAL
BH CV LOWER VASCULAR RIGHT COMMON FEMORAL SPONT: NORMAL
BH CV LOWER VASCULAR RIGHT DISTAL FEMORAL COMPRESS: NORMAL
BH CV LOWER VASCULAR RIGHT GASTRONEMIUS COMPRESS: NORMAL
BH CV LOWER VASCULAR RIGHT GREATER SAPH AK COMPRESS: NORMAL
BH CV LOWER VASCULAR RIGHT GREATER SAPH BK COMPRESS: NORMAL
BH CV LOWER VASCULAR RIGHT LESSER SAPH COMPRESS: NORMAL
BH CV LOWER VASCULAR RIGHT MID FEMORAL AUGMENT: NORMAL
BH CV LOWER VASCULAR RIGHT MID FEMORAL COMPETENT: NORMAL
BH CV LOWER VASCULAR RIGHT MID FEMORAL COMPRESS: NORMAL
BH CV LOWER VASCULAR RIGHT MID FEMORAL PHASIC: NORMAL
BH CV LOWER VASCULAR RIGHT MID FEMORAL SPONT: NORMAL
BH CV LOWER VASCULAR RIGHT PERONEAL COMPRESS: NORMAL
BH CV LOWER VASCULAR RIGHT PERONEAL THROMBUS: NORMAL
BH CV LOWER VASCULAR RIGHT POPLITEAL AUGMENT: NORMAL
BH CV LOWER VASCULAR RIGHT POPLITEAL COMPETENT: NORMAL
BH CV LOWER VASCULAR RIGHT POPLITEAL COMPRESS: NORMAL
BH CV LOWER VASCULAR RIGHT POPLITEAL PHASIC: NORMAL
BH CV LOWER VASCULAR RIGHT POPLITEAL SPONT: NORMAL
BH CV LOWER VASCULAR RIGHT POSTERIOR TIBIAL COMPRESS: NORMAL
BH CV LOWER VASCULAR RIGHT POSTERIOR TIBIAL THROMBUS: NORMAL
BH CV LOWER VASCULAR RIGHT PROFUNDA FEMORAL COMPRESS: NORMAL
BH CV LOWER VASCULAR RIGHT PROXIMAL FEMORAL COMPRESS: NORMAL
BH CV LOWER VASCULAR RIGHT SAPHENOFEMORAL JUNCTION COMPRESS: NORMAL
BH CV LOWER VASCULAR RIGHT SOLEAL COMPRESS: NORMAL
BH CV LOWER VASCULAR RIGHT SOLEAL THROMBUS: NORMAL
BH CV VAS PRELIMINARY FINDINGS SCRIPTING: 1
BUN SERPL-MCNC: 16 MG/DL (ref 8–23)
BUN/CREAT SERPL: 14.8 (ref 7–25)
CALCIUM SPEC-SCNC: 8.6 MG/DL (ref 8.6–10.5)
CHLORIDE SERPL-SCNC: 107 MMOL/L (ref 98–107)
CO2 SERPL-SCNC: 23 MMOL/L (ref 22–29)
CREAT SERPL-MCNC: 1.08 MG/DL (ref 0.57–1)
DEPRECATED RDW RBC AUTO: 45.6 FL (ref 37–54)
EGFRCR SERPLBLD CKD-EPI 2021: 50.1 ML/MIN/1.73
EOSINOPHIL # BLD AUTO: 0.32 10*3/MM3 (ref 0–0.4)
EOSINOPHIL NFR BLD AUTO: 2.8 % (ref 0.3–6.2)
ERYTHROCYTE [DISTWIDTH] IN BLOOD BY AUTOMATED COUNT: 14.1 % (ref 12.3–15.4)
GLUCOSE SERPL-MCNC: 73 MG/DL (ref 65–99)
HCT VFR BLD AUTO: 32.9 % (ref 34–46.6)
HGB BLD-MCNC: 11 G/DL (ref 12–15.9)
IMM GRANULOCYTES # BLD AUTO: 0.1 10*3/MM3 (ref 0–0.05)
IMM GRANULOCYTES NFR BLD AUTO: 0.9 % (ref 0–0.5)
LYMPHOCYTES # BLD AUTO: 2.01 10*3/MM3 (ref 0.7–3.1)
LYMPHOCYTES NFR BLD AUTO: 17.6 % (ref 19.6–45.3)
MCH RBC QN AUTO: 29.9 PG (ref 26.6–33)
MCHC RBC AUTO-ENTMCNC: 33.4 G/DL (ref 31.5–35.7)
MCV RBC AUTO: 89.4 FL (ref 79–97)
MONOCYTES # BLD AUTO: 1.6 10*3/MM3 (ref 0.1–0.9)
MONOCYTES NFR BLD AUTO: 14 % (ref 5–12)
NEUTROPHILS NFR BLD AUTO: 63.7 % (ref 42.7–76)
NEUTROPHILS NFR BLD AUTO: 7.28 10*3/MM3 (ref 1.7–7)
NRBC BLD AUTO-RTO: 0 /100 WBC (ref 0–0.2)
PLATELET # BLD AUTO: 149 10*3/MM3 (ref 140–450)
PMV BLD AUTO: 11.3 FL (ref 6–12)
POTASSIUM SERPL-SCNC: 5 MMOL/L (ref 3.5–5.2)
RBC # BLD AUTO: 3.68 10*6/MM3 (ref 3.77–5.28)
SODIUM SERPL-SCNC: 145 MMOL/L (ref 136–145)
WBC NRBC COR # BLD AUTO: 11.42 10*3/MM3 (ref 3.4–10.8)

## 2024-01-23 PROCEDURE — 25010000002 ONDANSETRON PER 1 MG: Performed by: INTERNAL MEDICINE

## 2024-01-23 PROCEDURE — 25010000002 HEPARIN (PORCINE) 25000-0.45 UT/250ML-% SOLUTION: Performed by: STUDENT IN AN ORGANIZED HEALTH CARE EDUCATION/TRAINING PROGRAM

## 2024-01-23 PROCEDURE — 97530 THERAPEUTIC ACTIVITIES: CPT

## 2024-01-23 PROCEDURE — 25010000002 FUROSEMIDE PER 20 MG: Performed by: NURSE PRACTITIONER

## 2024-01-23 PROCEDURE — 94799 UNLISTED PULMONARY SVC/PX: CPT

## 2024-01-23 PROCEDURE — 80048 BASIC METABOLIC PNL TOTAL CA: CPT | Performed by: INTERNAL MEDICINE

## 2024-01-23 PROCEDURE — 85730 THROMBOPLASTIN TIME PARTIAL: CPT | Performed by: STUDENT IN AN ORGANIZED HEALTH CARE EDUCATION/TRAINING PROGRAM

## 2024-01-23 PROCEDURE — 94760 N-INVAS EAR/PLS OXIMETRY 1: CPT

## 2024-01-23 PROCEDURE — 99232 SBSQ HOSP IP/OBS MODERATE 35: CPT | Performed by: NURSE PRACTITIONER

## 2024-01-23 PROCEDURE — 94664 DEMO&/EVAL PT USE INHALER: CPT

## 2024-01-23 PROCEDURE — 85025 COMPLETE CBC W/AUTO DIFF WBC: CPT | Performed by: INTERNAL MEDICINE

## 2024-01-23 PROCEDURE — 93970 EXTREMITY STUDY: CPT

## 2024-01-23 RX ORDER — FUROSEMIDE 10 MG/ML
40 INJECTION INTRAMUSCULAR; INTRAVENOUS ONCE
Status: COMPLETED | OUTPATIENT
Start: 2024-01-23 | End: 2024-01-23

## 2024-01-23 RX ORDER — BENZONATATE 100 MG/1
200 CAPSULE ORAL 3 TIMES DAILY PRN
Status: DISCONTINUED | OUTPATIENT
Start: 2024-01-23 | End: 2024-01-28 | Stop reason: HOSPADM

## 2024-01-23 RX ADMIN — BENZONATATE 200 MG: 100 CAPSULE ORAL at 14:19

## 2024-01-23 RX ADMIN — FUROSEMIDE 40 MG: 40 INJECTION, SOLUTION INTRAMUSCULAR; INTRAVENOUS at 11:36

## 2024-01-23 RX ADMIN — IPRATROPIUM BROMIDE AND ALBUTEROL SULFATE 3 ML: 2.5; .5 SOLUTION RESPIRATORY (INHALATION) at 11:17

## 2024-01-23 RX ADMIN — Medication 10 ML: at 08:07

## 2024-01-23 RX ADMIN — APIXABAN 5 MG: 5 TABLET, FILM COATED ORAL at 10:29

## 2024-01-23 RX ADMIN — ACETAMINOPHEN 650 MG: 325 TABLET, FILM COATED ORAL at 10:28

## 2024-01-23 RX ADMIN — APIXABAN 5 MG: 5 TABLET, FILM COATED ORAL at 20:51

## 2024-01-23 RX ADMIN — BUDESONIDE 0.5 MG: 0.5 INHALANT ORAL at 07:13

## 2024-01-23 RX ADMIN — ACETAMINOPHEN 650 MG: 325 TABLET, FILM COATED ORAL at 20:54

## 2024-01-23 RX ADMIN — Medication 10 ML: at 20:51

## 2024-01-23 RX ADMIN — DOCUSATE SODIUM 100 MG: 100 CAPSULE, LIQUID FILLED ORAL at 08:07

## 2024-01-23 RX ADMIN — LOSARTAN POTASSIUM 50 MG: 50 TABLET, FILM COATED ORAL at 08:07

## 2024-01-23 RX ADMIN — PANTOPRAZOLE SODIUM 40 MG: 40 TABLET, DELAYED RELEASE ORAL at 06:43

## 2024-01-23 RX ADMIN — BUDESONIDE 0.5 MG: 0.5 INHALANT ORAL at 19:43

## 2024-01-23 RX ADMIN — DIAZEPAM 2 MG: 2 TABLET ORAL at 20:56

## 2024-01-23 RX ADMIN — IPRATROPIUM BROMIDE AND ALBUTEROL SULFATE 3 ML: 2.5; .5 SOLUTION RESPIRATORY (INHALATION) at 07:13

## 2024-01-23 RX ADMIN — DOCUSATE SODIUM 100 MG: 100 CAPSULE, LIQUID FILLED ORAL at 20:51

## 2024-01-23 RX ADMIN — PANTOPRAZOLE SODIUM 40 MG: 40 TABLET, DELAYED RELEASE ORAL at 17:30

## 2024-01-23 RX ADMIN — ONDANSETRON HYDROCHLORIDE 4 MG: 2 INJECTION, SOLUTION INTRAMUSCULAR; INTRAVENOUS at 09:23

## 2024-01-23 RX ADMIN — HEPARIN SODIUM 15 UNITS/KG/HR: 10000 INJECTION, SOLUTION INTRAVENOUS at 07:14

## 2024-01-23 RX ADMIN — DILTIAZEM HYDROCHLORIDE 120 MG: 120 CAPSULE, EXTENDED RELEASE ORAL at 08:07

## 2024-01-23 RX ADMIN — IPRATROPIUM BROMIDE AND ALBUTEROL SULFATE 3 ML: 2.5; .5 SOLUTION RESPIRATORY (INHALATION) at 19:42

## 2024-01-23 RX ADMIN — ONDANSETRON HYDROCHLORIDE 4 MG: 2 INJECTION, SOLUTION INTRAMUSCULAR; INTRAVENOUS at 21:03

## 2024-01-23 NOTE — PLAN OF CARE
Goal Outcome Evaluation:  Plan of Care Reviewed With: patient        Progress: improving  Outcome Evaluation: Tried to wean off NC, pt doing ok on room air when awake, 2L for sleep. No afib, still in SR. VSS, PRN valium given per request. Hep gtt maintained, rate adjusted per PTT level. All needs met.

## 2024-01-23 NOTE — PLAN OF CARE
Goal Outcome Evaluation:  Plan of Care Reviewed With: patient        Progress: no change  Outcome Evaluation: Vitals stable. BLE Doppler completed this am. Pt worked w PT this afternoon. Heparin gtt dc, pt started on eliquis this am. Pt needs met and questions answered. Will continue to monitor.

## 2024-01-23 NOTE — THERAPY TREATMENT NOTE
Patient Name: Vonnie Coppola  : 1935    MRN: [id number]                              Today's Date: 2024       Admit Date: 2024    Visit Dx:     ICD-10-CM ICD-9-CM   1. Gastrointestinal hemorrhage, unspecified gastrointestinal hemorrhage type  K92.2 578.9   2. Pulmonary nodule  R91.1 793.11   3. Cyst of ovary, unspecified laterality  N83.209 620.2   4. Anemia, unspecified type  D64.9 285.9   5. Gastrointestinal hemorrhage with melena  K92.1 578.1   6. Nausea  R11.0 787.02     Patient Active Problem List   Diagnosis    GI bleed    Anemia    HTN (hypertension)    History of breast cancer    Asthma    History of CVA (cerebrovascular accident)    Dehydration    Renal insufficiency    Pulmonary nodule    Adnexal cyst    Nausea    Atrial fibrillation    Pulmonary embolus     Past Medical History:   Diagnosis Date    Arthritis     Asthma     Atrial fibrillation     per pt's daughter.States hx of a-fib in the past when stressed or tired.    Breast cancer     LEFT BREAST CA, LUMPECTOMY & RADS    Hx of radiation therapy     APPROXIMATELY 40 TREATMENTS FOR LEFT BREAST CA    Hypertension     Pneumonia     Stroke      Past Surgical History:   Procedure Laterality Date    APPENDECTOMY      BLADDER SURGERY      BREAST BIOPSY Left     MALIGNANT    BREAST LUMPECTOMY Left     MALIGNANT    COLONOSCOPY N/A 2024    Procedure: COLONOSCOPY to cecum with cold snare polypectomies;  Surgeon: Harshad Gaston MD;  Location: [facility] ENDOSCOPY;  Service: Gastroenterology;  Laterality: N/A;  pre- gi bleed, anemia  post- diverticulosis, polyps    ENDOSCOPY N/A 2024    Procedure: ESOPHAGOGASTRODUODENOSCOPY with biospy;  Surgeon: Harshad Gaston MD;  Location: [facility] ENDOSCOPY;  Service: Gastroenterology;  Laterality: N/A;  pre- gi bleed, anemia  post- erosive gastirits    GALLBLADDER SURGERY      HYSTERECTOMY      OOPHORECTOMY        General Information       Row Name 24 [id number]           Physical Therapy Time and Intention    Document Type therapy note (daily note)  -EJ     Mode of Treatment physical therapy  -       Row Name 01/23/24 1550          General Information    Existing Precautions/Restrictions fall;oxygen therapy device and L/min  -EJ               User Key  (r) = Recorded By, (t) = Taken By, (c) = Cosigned By      Initials Name Provider Type    EJ Ivory Adams, PT Physical Therapist                   Mobility       Row Name 01/23/24 1551          Bed Mobility    Bed Mobility sit-supine  -EJ     Supine-Sit [place] (Bed Mobility) verbal cues;minimum assist (75% patient effort);moderate assist (50% patient effort);2 person assist  -EJ     Sit-Supine [place] (Bed Mobility) moderate assist (50% patient effort);2 person assist  -EJ     Assistive Device (Bed Mobility) head of bed elevated;bed rails  -       Row Name 01/23/24 1551          Sit-Stand Transfer    Sit-Stand [place] (Transfers) verbal cues;nonverbal cues (demo/gesture);moderate assist (50% patient effort);maximum assist (25% patient effort);2 person assist  -EJ     Assistive Device (Sit-Stand Transfers) --  HHA x 2  -EJ     Comment, (Sit-Stand Transfer) performed x 2, required more assist on 2nd attempt, unable to take steps  -       Row Name 01/23/24 1551          Gait/Stairs (Locomotion)    [place] Level (Gait) unable to assess  -               User Key  (r) = Recorded By, (t) = Taken By, (c) = Cosigned By      Initials Name Provider Type     Ivory Adams, PT Physical Therapist                   Obj/Interventions    No documentation.                  Goals/Plan    No documentation.                  Clinical Impression       Row Name 01/23/24 1552          Pain    Pretreatment Pain Rating 0/10 - no pain  -       Row Name 01/23/24 1552          Plan of Care Review    Plan of Care Reviewed With patient;family  -     Outcome Evaluation Pt agreeable to PT this afternoon. She is sleepy upon  entry to room, but able to awaken and agreeable to therapy. Pt w B PE's as well as BLE DVTs.  Pt does reports some nausea. She is requiring mod A x 2 for supine <> sit. SHe needs assistance positioning herself at EOB. She did tolerate 2 standing trials requiring mod A x 2 on 1st attempt and max A x 2 on 2nd attempt. She is limited by fatigue and weakness. Unable to take steps at this time. Pt assisted back to bed at end of session. Pt may benefit from SNU upon DC pending progress. Will continue to progress activity as tolerated.  -EJ       Row Name 01/23/24 0292          Positioning and Restraints    Pre-Treatment Position in bed  -EJ     Post Treatment Position bed  -EJ     In Bed notified nsg;supine;call light within reach;encouraged to call for assist;with family/caregiver;with nsg  -EJ               User Key  (r) = Recorded By, (t) = Taken By, (c) = Cosigned By      Initials Name Provider Type    Ivory Velasquez, PT Physical Therapist                   Outcome Measures       Row Name 01/23/24 4969          How much help from another person do you currently need...    Turning from your back to your side while in flat bed without using bedrails? 3  -EJ     Moving from lying on back to sitting on the side of a flat bed without bedrails? 2  -EJ     Moving to and from a bed to a chair (including a wheelchair)? 2  -EJ     Standing up from a chair using your arms (e.g., wheelchair, bedside chair)? 2  -EJ     Climbing 3-5 steps with a railing? 1  -EJ     To walk in hospital room? 1  -EJ     AM-PAC 6 Clicks Score (PT) 11  -EJ     Highest Level of Mobility Goal 4 --> Transfer to chair/commode  -EJ               User Key  (r) = Recorded By, (t) = Taken By, (c) = Cosigned By      Initials Name Provider Type    Ivory Velasquez, PT Physical Therapist                                 Physical Therapy Education       Title: PT OT SLP Therapies (Done)       Topic: Physical Therapy (Done)       Point: Mobility training  (Done)       Learning Progress Summary             Patient Acceptance, E, VU by KALA at 1/17/2024 1337    Acceptance, E,TB,D, VU,DU by AK at 1/17/2024 1006                         Point: Home exercise program (Done)       Learning Progress Summary             Patient Acceptance, E, VU by KALA at 1/17/2024 1337                         Point: Body mechanics (Done)       Learning Progress Summary             Patient Acceptance, E, VU by KALA at 1/17/2024 1337                         Point: Precautions (Done)       Learning Progress Summary             Patient Acceptance, E, VU by KALA at 1/17/2024 1337                                         User Key       Initials Effective Dates Name Provider Type Discipline    KALA 08/02/22 -  Chay Moss, OT Occupational Therapist OT    AK 12/28/23 -  Erica Alexis, PT Student PT Student PT                  PT Recommendation and Plan     Plan of Care Reviewed With: patient, family  Outcome Evaluation: Pt agreeable to PT this afternoon. She is sleepy upon entry to room, but able to awaken and agreeable to therapy. Pt w B PE's as well as BLE DVTs.  Pt does reports some nausea. She is requiring mod A x 2 for supine <> sit. SHe needs assistance positioning herself at EOB. She did tolerate 2 standing trials requiring mod A x 2 on 1st attempt and max A x 2 on 2nd attempt. She is limited by fatigue and weakness. Unable to take steps at this time. Pt assisted back to bed at end of session. Pt may benefit from SNU upon DC pending progress. Will continue to progress activity as tolerated.     Time Calculation:         PT Charges       Row Name 01/23/24 1556 01/23/24 1331          Time Calculation    Start Time 1524  -EJ --     Stop Time 1541  -EJ --     Time Calculation (min) 17 min  -EJ --     PT Received On 01/23/24  -EJ --     PT - Next Appointment 01/24/24  -EJ 01/24/24  -EJ               User Key  (r) = Recorded By, (t) = Taken By, (c) = Cosigned By      Initials Name Provider Type    EJ  Ivory Adams, PT Physical Therapist                  Therapy Charges for Today       Code Description Service Date Service Provider Modifiers Qty    86019862030 HC PT THER SUPP EA 15 MIN 1/23/2024 Ivory Adams, PT GP 1    13294425377 HC PT THERAPEUTIC ACT EA 15 MIN 1/23/2024 Ivory Adams, PT GP 1            PT G-Codes  Outcome Measure Options: AM-PAC 6 Clicks Daily Activity (OT)  AM-PAC 6 Clicks Score (PT): 11  AM-PAC 6 Clicks Score (OT): 21  PT Discharge Summary  Anticipated Discharge Disposition (PT): skilled nursing facility    Ivory Adams, PT  1/23/2024

## 2024-01-23 NOTE — PROGRESS NOTES
Name: Vonnie Coppola ADMIT: 2024   : 1935  PCP: Christopher Leyva DO    MRN: [id number] LOS: 3 days   AGE/SEX: 88 y.o. female  ROOM: [address]      CT angiogram yesterday revealed bilateral pulmonary emboli.  Venous duplex also showed bilateral DVTs.  She had close started on heparin drip yesterday and is now on Eliquis.     Objective   Objective   Vital Signs  Temp:  [97.9 °F (36.6 °C)-99.3 °F (37.4 °C)] 98.8 °F (37.1 °C)  Heart Rate:  [77-99] 77  Resp:  [17-20] 18  BP: (108-152)/() 152/78  SpO2:  [86 %-100 %] 92 %  on  Flow (L/min):  [2-3] 3;   Device (Oxygen Therapy): nasal cannula    Intake/Output Summary (Last 24 hours) at 2024 1230  Last data filed at 2024 0807  Gross per 24 hour   Intake 240 ml   Output 2100 ml   Net -1860 ml     Body mass index is 40.44 kg/m².      24  1643 24  0448 24  1605   Weight: 107 kg (236 lb 8.9 oz) 110 kg (243 lb 2.7 oz) 110 kg (243 lb)     Physical Exam  Constitutional:       General: She is not in acute distress.  HENT:      Head: Normocephalic and atraumatic.   Eyes:      Extraocular Movements: Extraocular movements intact.      Pupils: Pupils are equal, round, and reactive to light.   Cardiovascular:      Rate and Rhythm: Normal rate and regular rhythm.   Pulmonary:      Effort: Pulmonary effort is normal. No respiratory distress.   Abdominal:      General: There is no distension.      Palpations: Abdomen is soft.      Tenderness: There is no abdominal tenderness.   Neurological:      Mental Status: She is alert.           Results Review:      Results from last 7 days   Lab Units 24  0353 24  1634 24  0417 24  0812 24  0349 24  2243 24  0542 24  0752 24  0357   SODIUM mmol/L 145  --  141 139 142  --  145 142 141   POTASSIUM mmol/L 5.0 4.5 3.6 4.1 4.1 4.3 3.6 4.0 3.9   CHLORIDE mmol/L 107  --  107 109* 113*  --  112* 112* 110*   CO2 mmol/L 23.0  --   "23.2 22.0 22.0  --  22.0 21.0* 21.6*   BUN mg/dL 16  --  15 11 11  --  11 12 16   CREATININE mg/dL 1.08*  --  0.87 0.81 0.86  --  0.91 0.84 0.90   GLUCOSE mg/dL 73  --  93 89 82  --  91 72 69   CALCIUM mg/dL 8.6  --  8.2* 8.0* 8.1*  --  8.5* 8.0* 8.3*   AST (SGOT) U/L  --   --   --   --   --   --   --   --  16   ALT (SGPT) U/L  --   --   --   --   --   --   --   --  14     Estimated Creatinine Clearance: 46.2 mL/min (A) (by C-G formula based on SCr of 1.08 mg/dL (H)).              Results from last 7 days   Lab Units 01/21/24  0812   PROBNP pg/mL 1,099.0     Results from last 7 days   Lab Units 01/21/24  0812   TSH uIU/mL 2.100     Results from last 7 days   Lab Units 01/21/24  0812   MAGNESIUM mg/dL 2.0           Invalid input(s): \"LDLCALC\"  Results from last 7 days   Lab Units 01/23/24  0353 01/22/24  0417 01/21/24  0812 01/20/24  0349 01/19/24  1559 01/19/24  0542 01/18/24  2358 01/18/24  1616 01/18/24  0752 01/17/24  0737 01/17/24  0357   WBC 10*3/mm3 11.42* 10.89* 9.53 8.83  --  7.43  --   --  7.53  --  8.61   HEMOGLOBIN g/dL 11.0* 10.7* 10.6* 9.9* 10.6* 11.1*  11.1* 10.5*   < > 10.1*  10.1*   < > 10.6*   HEMATOCRIT % 32.9* 31.8* 33.0* 30.2* 31.8* 34.2  34.2 31.6*   < > 31.0*  31.0*   < > 32.2*   PLATELETS 10*3/mm3 149 155 139* 151  --  182  --   --  157  --  188   MCV fL 89.4 88.6 91.2 89.1  --  89.5  --   --  90.9  --  89.4   MCH pg 29.9 29.8 29.3 29.2  --  29.1  --   --  29.6  --  29.4   MCHC g/dL 33.4 33.6 32.1 32.8  --  32.5  --   --  32.6  --  32.9   RDW % 14.1 13.4 13.6 13.5  --  13.3  --   --  13.4  --  13.2   RDW-SD fl 45.6 43.8 45.5 43.7  --  43.2  --   --  44.4  --  43.3   MPV fL 11.3 10.5 10.4 10.0  --  10.2  --   --  10.0  --  10.1   NEUTROPHIL % % 63.7 65.8 61.0 62.9  --  57.9  --   --  54.2  --  58.9   LYMPHOCYTE % % 17.6* 16.6* 22.6 20.6  --  24.1  --   --  28.2  --  25.3   MONOCYTES % % 14.0* 12.6* 11.3 11.9  --  12.7*  --   --  12.4*  --  11.7   EOSINOPHIL % % 2.8 3.6 4.1 3.7  --  4.3  --  "  --  4.2  --  3.3   BASOPHIL % % 1.0 0.6 0.5 0.3  --  0.5  --   --  0.7  --  0.6   IMM GRAN % % 0.9* 0.8* 0.5 0.6*  --  0.5  --   --  0.3  --  0.2   NEUTROS ABS 10*3/mm3 7.28* 7.16* 5.81 5.55  --  4.30  --   --  4.09  --  5.07   LYMPHS ABS 10*3/mm3 2.01 1.81 2.15 1.82  --  1.79  --   --  2.12  --  2.18   MONOS ABS 10*3/mm3 1.60* 1.37* 1.08* 1.05*  --  0.94*  --   --  0.93*  --  1.01*   EOS ABS 10*3/mm3 0.32 0.39 0.39 0.33  --  0.32  --   --  0.32  --  0.28   BASOS ABS 10*3/mm3 0.11 0.07 0.05 0.03  --  0.04  --   --  0.05  --  0.05   IMMATURE GRANS (ABS) 10*3/mm3 0.10* 0.09* 0.05 0.05  --  0.04  --   --  0.02  --  0.02   NRBC /100 WBC 0.0 0.0 0.0 0.0  --  0.0  --   --  0.0  --  0.0    < > = values in this interval not displayed.     Results from last 7 days   Lab Units 01/23/24  0846 01/22/24  2318 01/22/24  1428   INR   --   --  1.19*   APTT seconds 74.4* 142.9* <20.0*                               Results from last 7 days   Lab Units 01/16/24  1337   NITRITE UA  Negative           Imaging:  Imaging Results (Last 24 Hours)       Procedure Component Value Units Date/Time    CT Angiogram Chest [[id number]] Collected: 01/22/24 1358     Updated: 01/22/24 1515    Narrative:      CT ANGIOGRAM OF THE CHEST. MULTIPLE CORONAL, SAGITTAL, AND 3-D  RECONSTRUCTIONS.     HISTORY: Shortness of air.     TECHNIQUE: Radiation dose reduction techniques were utilized, including  automated exposure control and exposure modulation based on body size.   CT angiogram of the chest was performed following the administration of  IV contrast. Coronal, sagittal, and 3-D reconstruction images were  obtained.     COMPARISON: CT chest with IV contrast 01/17/2024.     FINDINGS: There are pulmonary emboli within both upper and lower lobe  and right middle lobe segmental and subsegmental pulmonary arteries. The  largest, most central embolus is present within the right interlobar  pulmonary artery at the level of the origin of the right middle  lobe  pulmonary artery and this embolus also extends into the right middle  lobe segmental artery. There is also an embolus within the left main  pulmonary artery extending into left upper and lower lobe segmental  arteries. There is evidence for right heart strain with elevated RV:LV  ratio.     No acute aortic abnormality is evident. There is no evidence for  mediastinal kael enlargement. There is bilateral lower lobe  atelectasis. Mild basilar peripheral prominent pulm interstitial disease  is present. Within the anterior inferior right middle lobe there is a 5  mm pulmonary nodule on image 100. There is also a 1.1 cm nodular opacity  within the far anterior inferior right middle lobe. Recommend followup  with chest CT.     Imaging to the upper abdomen demonstrates a low-density lesion within  the central, inferior spleen measuring 2.2 cm and this is without  change. Cholecystectomy clips are present.       Impression:      1. Bilateral pulmonary thromboembolic disease with evidence for right  heart strain.  2. Dependent lower lobe atelectasis.  3. 5 mm, 11 mm right middle lobe pulmonary nodules for which followup  with chest CT is recommended to evaluate for stability.     Findings pertaining to the pulmonary emboli and right heart strain  discussed with Sydnie, the nurse caring for the patient on [address] on  01/22/2024 at 1:50 p.m.     This report was finalized on 1/22/2024 3:12 PM by Dr. Archie Ortiz M.D on Workstation: [id number]                  I reviewed the patient's new clinical results / labs / tests / procedures      Assessment/Plan     Active Hospital Problems    Diagnosis  POA    **GI bleed [K92.2]  Yes    Pulmonary embolus [I26.99]  Yes    Atrial fibrillation [I48.91]  No    History of CVA (cerebrovascular accident) [Z86.73]  Not Applicable    Dehydration [E86.0]  Yes    Renal insufficiency [N28.9]  Yes    Pulmonary nodule [R91.1]  Yes    Adnexal cyst [N94.9]  Yes    Anemia [D64.9]  Yes    HTN  (hypertension) [I10]  Yes    History of breast cancer [Z85.3]  Not Applicable    Asthma [J45.909]  Yes    Nausea [R11.0]  Yes      Resolved Hospital Problems   No resolved problems to display.           GI bleed in a patient with a history of peptic ulcer disease/diverticular disease/hemorrhoids/constipation.  EGD with hiatal hernia and gastritis status post biopsy.  Colonoscopy with in the ascending and descending colon status post removal and diverticulosis.  No active or recent bleeding stigmata Monitor hemoglobin and transfuse if hemoglobin is less than 7.  Continue holding aspirin.  Will need GI follow-up as an outpatient for capsule enterography.      Hypertension/new onset A-fib.  Now back in sinus. Cards following. Starting Heparin due to new dx of bilateral Pes    Bilateral PE/DVT - Transitioned to eliquis 5mg bid.  No RV strain, no need for any intervention presently.  Cards managing AC    Dehydration/renal insufficiency/hypernatremia.  Resolved renal insufficiency and hypernatremia on IV fluid.  Continue holding diuretics.  Resolved dehydration.  IV fluid DC'd.    Acute asthma exacerbation/hypoxemia/pulmonary nodule.  CT of the chest with enlarging pulmonary nodules on the right middle and lower lobe.  Continues to be unable to wean her off the oxygen.  Chest x-ray with no acute disease.  Continue Pulmicort and DuoNebs.  And spirometry.  Awaiting walking pulse oximetry (patient refused yesterday).    Pulmonary saw the patient recommends chest CT follow-up on the pulmonary nodules in 3 months.    History of CVA.  Holding aspirin secondary to GI bleed.    Left adnexal cyst.  Pelvic ultrasound with absent uterus and ovaries.  GYN follow-up as an outpatient with repeat pelvic ultrasound.    VTE prophylaxis.  Sequential compression devices.    Eliquis for dvt ppx  Full Code    Edilberto Patel MD  [facility]  01/23/24  12:30 EST

## 2024-01-23 NOTE — PROGRESS NOTES
"[facility]    Patient Name: Vonnie Coppola  :1935  88 y.o.  LOS: 3  Encounter Provider: CHAPO Lees      Patient Care Team:  Christopher Leyva DO as PCP - General  Christopher Leyva DO as PCP - Family Medicine    Chief Complaint: Follow-up bilateral PE, PAF, recent GI bleed    Interval History: Patient denies any further episodes of GI bleeding.  Reports that she just became very nauseous after eating breakfast.  Continues to have bilateral lower extremity edema.  Overall feels weak.       Objective   Vital Signs  Temp:  [97.9 °F (36.6 °C)-99.3 °F (37.4 °C)] 98.8 °F (37.1 °C)  Heart Rate:  [84-99] 87  Resp:  [17-20] 18  BP: (108-152)/() 152/78    Intake/Output Summary (Last 24 hours) at 2024 0835  Last data filed at 2024 0500  Gross per 24 hour   Intake 0 ml   Output 2300 ml   Net -2300 ml     Flowsheet Rows      Flowsheet Row First Filed Value   Admission Height 165.1 cm (65\") Documented at 2024 1643   Admission Weight 107 kg (236 lb 8.9 oz) Documented at 2024 1643              Vitals and nursing note reviewed.   Constitutional:       Appearance: Normal appearance. Well-developed. Obese.   Eyes:      Conjunctiva/sclera: Conjunctivae normal.   Neck:      Vascular: No carotid bruit.   Pulmonary:      Breath sounds: Normal breath sounds. Decreased breath sounds present.   Cardiovascular:      Normal rate. Regular rhythm. Normal S1 with normal intensity. Normal S2 with normal intensity.       Murmurs: There is no murmur.      No gallop.  No click. No rub.   Edema:     Pretibial: bilateral 1+ edema of the pretibial area.     Ankle: bilateral 1+ edema of the ankle.     Feet: bilateral 1+ edema of the feet.  Musculoskeletal: Normal range of motion. Skin:     General: Skin is warm and dry.   Neurological:      Mental Status: Alert and oriented to person, place, and time.      GCS: GCS eye subscore is 4. GCS verbal subscore is 5. GCS " Addended by: Ivonne Echeverria on: 6/1/2023 12:45 PM     Modules accepted: Orders "motor subscore is 6.   Psychiatric:         Speech: Speech normal.         Behavior: Behavior normal.         Thought Content: Thought content normal.         Judgment: Judgment normal.           Pertinent Test Results:  Results from last 7 days   Lab Units 01/23/24  0353 01/22/24  1634 01/22/24  0417 01/21/24  0812 01/20/24  0349 01/19/24  2243 01/19/24  0542 01/18/24  0752 01/17/24  0357 01/16/24  1221   SODIUM mmol/L 145  --  141 139 142  --  145 142 141 146*   POTASSIUM mmol/L 5.0 4.5 3.6 4.1 4.1 4.3 3.6 4.0 3.9 3.6   CHLORIDE mmol/L 107  --  107 109* 113*  --  112* 112* 110* 109*   CO2 mmol/L 23.0  --  23.2 22.0 22.0  --  22.0 21.0* 21.6* 28.0   BUN mg/dL 16  --  15 11 11  --  11 12 16 17   CREATININE mg/dL 1.08*  --  0.87 0.81 0.86  --  0.91 0.84 0.90 1.01*   GLUCOSE mg/dL 73  --  93 89 82  --  91 72 69 86   CALCIUM mg/dL 8.6  --  8.2* 8.0* 8.1*  --  8.5* 8.0* 8.3* 9.1   AST (SGOT) U/L  --   --   --   --   --   --   --   --  16 17   ALT (SGPT) U/L  --   --   --   --   --   --   --   --  14 17         Results from last 7 days   Lab Units 01/23/24  0353 01/22/24  0417 01/21/24  0812 01/20/24  0349 01/19/24  1559 01/19/24  0542 01/18/24  2358 01/18/24  1616 01/18/24  0752 01/17/24  0737 01/17/24  0357   WBC 10*3/mm3 11.42* 10.89* 9.53 8.83  --  7.43  --   --  7.53  --  8.61   HEMOGLOBIN g/dL 11.0* 10.7* 10.6* 9.9* 10.6* 11.1*  11.1* 10.5*   < > 10.1*  10.1*   < > 10.6*   HEMATOCRIT % 32.9* 31.8* 33.0* 30.2* 31.8* 34.2  34.2 31.6*   < > 31.0*  31.0*   < > 32.2*   PLATELETS 10*3/mm3 149 155 139* 151  --  182  --   --  157  --  188    < > = values in this interval not displayed.     Results from last 7 days   Lab Units 01/22/24  2318 01/22/24  1428 01/16/24  1221   INR   --  1.19* 1.10   APTT seconds 142.9* <20.0* 26.7     Results from last 7 days   Lab Units 01/21/24  0812   MAGNESIUM mg/dL 2.0           Invalid input(s): \"LDLCALC\"  Results from last 7 days   Lab Units 01/21/24  0812   PROBNP pg/mL 1,099.0 "     Results from last 7 days   Lab Units 01/21/24  0812   TSH uIU/mL 2.100           Medication Review:   budesonide, 0.5 mg, Nebulization, BID - RT  dilTIAZem CD, 120 mg, Oral, Q24H  docusate sodium, 100 mg, Oral, BID  ipratropium-albuterol, 3 mL, Nebulization, TID  losartan, 50 mg, Oral, Q24H  pantoprazole, 40 mg, Oral, BID AC  sodium chloride, 10 mL, Intravenous, Q12H         heparin, 18 Units/kg/hr, Last Rate: 15 Units/kg/hr (01/23/24 0714)        Assessment & Plan     Active Hospital Problems    Diagnosis  POA    **GI bleed [K92.2]  Yes    Pulmonary embolus [I26.99]  Yes    Atrial fibrillation [I48.91]  No    History of CVA (cerebrovascular accident) [Z86.73]  Not Applicable    Dehydration [E86.0]  Yes    Renal insufficiency [N28.9]  Yes    Pulmonary nodule [R91.1]  Yes    Adnexal cyst [N94.9]  Yes    Anemia [D64.9]  Yes    HTN (hypertension) [I10]  Yes    History of breast cancer [Z85.3]  Not Applicable    Asthma [J45.909]  Yes    Nausea [R11.0]  Yes      Resolved Hospital Problems   No resolved problems to display.        Bilateral PE: Mostly segmental/subsegmental.  No evidence of RV strain  PAF  Acute hypoxemic respiratory distress: History of lung cancer and COPD  Pulmonary hypertension  Acute GI bleed  History of CVA  HTN    Patient currently on heparin drip for treatment of PE.  She has no further evidence of bleeding.  GI has signed off.  Hemoglobin stable.  Would attempt to transition to apixaban, would start with 5 mg BID given recent GI bleeding.  Monitor for bleeding and trend Hgb.  ASA on hold given recent GIB  Will give additional dose of furosemide 40 mg IV x 1 today.  Patient remains with bilateral lower extremity edema.  Hopeful that we can transition to oral furosemide tomorrow  Transport at bedside to take patient for lower extremity venous Doppler      CHAPO Lees  [facility]  01/23/24  08:35 EST

## 2024-01-23 NOTE — PROGRESS NOTES
"      [facility]         Dr Rangel Sayied   LOS: 3 days   Patient Care Team:  Christopher Leyva DO as PCP - General  Christopher Leyva DO as PCP - Family Medicine    Chief Complaint: Bilateral PE with acute pulmonary nodule history of asthma other issues as listed below    Interval History: Heparin drip ongoing tolerating well.  No reports of GI bleed with anticoagulation reported.  Remains stable with 2 L oxygen nasal cannula.    REVIEW OF SYSTEMS:   CARDIOVASCULAR: No chest pain, chest pressure or chest discomfort. No palpitations or edema.   RESPIRATORY: Shortness of breath with activity with ongoing cough   GASTROINTESTINAL: No anorexia, nausea, vomiting or diarrhea. No abdominal pain or blood.   HEMATOLOGIC: No bleeding or bruising.     Ventilator/Non-Invasive Ventilation Settings (From admission, onward)      None              Vital Signs  Temp:  [97.9 °F (36.6 °C)-99.3 °F (37.4 °C)] 98.8 °F (37.1 °C)  Heart Rate:  [77-99] 77  Resp:  [17-20] 18  BP: (108-152)/() 152/78    Intake/Output Summary (Last 24 hours) at 1/23/2024 1230  Last data filed at 1/23/2024 0807  Gross per 24 hour   Intake 240 ml   Output 2100 ml   Net -1860 ml     Flowsheet Rows      Flowsheet Row First Filed Value   Admission Height 165.1 cm (65\") Documented at 01/16/2024 1643   Admission Weight 107 kg (236 lb 8.9 oz) Documented at 01/16/2024 1643            Physical Exam:  Patient is examined using the personal protective equipment as per guidelines from infection control for this particular patient as enacted.  Hand hygiene was performed before and after patient interaction.   General Appearance:    Alert, cooperative, in no acute distress.  Following simple commands  ENT Mallampati between 3 and 4 no nasal congestion  Neck midline trachea, no thyromegaly   Lungs:   Diminished breath sounds bilaterally    Heart:    Regular rhythm and normal rate, normal S1 and S2, no            murmur, no gallop, no rub, " no click   Chest Wall:    No abnormalities observed   Abdomen:     Normal bowel sounds, no masses, no organomegaly, soft        nontender, nondistended, no guarding, no rebound                tenderness   Extremities:   Moves all extremities well, no edema, no cyanosis, no             redness  CNS no focal neurological deficits normal sensory exam  Skin no rashes no nodules  Musculoskeletal no cyanosis no clubbing normal range of motion     Results Review:        Results from last 7 days   Lab Units 01/23/24  0353 01/22/24  1634 01/22/24  0417 01/21/24  0812   SODIUM mmol/L 145  --  141 139   POTASSIUM mmol/L 5.0 4.5 3.6 4.1   CHLORIDE mmol/L 107  --  107 109*   CO2 mmol/L 23.0  --  23.2 22.0   BUN mg/dL 16  --  15 11   CREATININE mg/dL 1.08*  --  0.87 0.81   GLUCOSE mg/dL 73  --  93 89   CALCIUM mg/dL 8.6  --  8.2* 8.0*         Results from last 7 days   Lab Units 01/23/24  0353 01/22/24  0417 01/21/24  0812   WBC 10*3/mm3 11.42* 10.89* 9.53   HEMOGLOBIN g/dL 11.0* 10.7* 10.6*   HEMATOCRIT % 32.9* 31.8* 33.0*   PLATELETS 10*3/mm3 149 155 139*     Results from last 7 days   Lab Units 01/23/24  0846 01/22/24  2318 01/22/24  1428   INR   --   --  1.19*   APTT seconds 74.4* 142.9* <20.0*         Results from last 7 days   Lab Units 01/21/24  0812   MAGNESIUM mg/dL 2.0               I reviewed the patient's new clinical results.  I personally viewed and interpreted the patient's chest x-ray.        Medication Review:   apixaban, 5 mg, Oral, Q12H  budesonide, 0.5 mg, Nebulization, BID - RT  dilTIAZem CD, 120 mg, Oral, Q24H  docusate sodium, 100 mg, Oral, BID  ipratropium-albuterol, 3 mL, Nebulization, TID  losartan, 50 mg, Oral, Q24H  pantoprazole, 40 mg, Oral, BID AC  sodium chloride, 10 mL, Intravenous, Q12H               ASSESSMENT:   Pulmonary nodules  Bilateral PE  Bilateral DVT  Asthma  Gastrointestinal bleed  A-fib RVR  Acute blood loss anemia  Osteoarthritis  Hypertension  History of breast  cancer    PLAN:  Heparin drip to be continued per protocol.  Transition to Eliquis at some point at this point no active GI bleed reported.  Doppler confirms bilateral lower extremity DVT.  Pulmonary nodule will need follow-up CT chest in 3 months.  Continue bronchodilators for asthma.  Clinically stable with no active wheezing.  Mobilize ambulate  As needed cough medicine  Will need outpatient PFTs  Continue to monitor clinical course closely.        Juan Carlos Lam MD  01/23/24  12:30 EST

## 2024-01-23 NOTE — CASE MANAGEMENT/SOCIAL WORK
Continued Stay Note  [facility]     Patient Name: Vonnie Coppola  MRN: [id number]  Today's Date: 1/23/2024    Admit Date: 1/16/2024    Plan: Home with daughter. Uses rollator at baseline. [facility] to see for nursing and PT/OT. Refuses SNF. Follow for possible Home O2 need   Discharge Plan       Row Name 01/23/24 1282       Plan    Plan Home with daughter. Uses rollator at baseline. [facility] to see for nursing and PT/OT. Refuses SNF. Follow for possible Home O2 need    Patient/Family in Agreement with Plan yes    Plan Comments Met with pt, daughter and sister at bedside. Discussed that PT recommends SNF at D/C. Refuses SNF but agreeable to . [facility] has accepted and will see at D/C. Daughter states she thinks pt will do better at home and she has been caring for her at home prior to admission. On O2@2LNC. Will need to follow for possible home O2 need at D/C. Israel Kitchen RN-BC                   Discharge Codes    No documentation.                 Expected Discharge Date and Time       Expected Discharge Date Expected Discharge Time    Jan 26, 2024               Israel Kitchen RN

## 2024-01-23 NOTE — PLAN OF CARE
Goal Outcome Evaluation:  Plan of Care Reviewed With: patient, family           Outcome Evaluation: Pt agreeable to PT this afternoon. She is sleepy upon entry to room, but able to awaken and agreeable to therapy. Pt w B PE's as well as BLE DVTs.  Pt does reports some nausea. She is requiring mod A x 2 for supine <> sit. SHe needs assistance positioning herself at EOB. She did tolerate 2 standing trials requiring mod A x 2 on 1st attempt and max A x 2 on 2nd attempt. She is limited by fatigue and weakness. Unable to take steps at this time. Pt assisted back to bed at end of session. Pt may benefit from SNU upon DC pending progress. Will continue to progress activity as tolerated.      Anticipated Discharge Disposition (PT): skilled nursing facility

## 2024-01-24 LAB
ANION GAP SERPL CALCULATED.3IONS-SCNC: 8.2 MMOL/L (ref 5–15)
BASOPHILS # BLD AUTO: 0.03 10*3/MM3 (ref 0–0.2)
BASOPHILS NFR BLD AUTO: 0.2 % (ref 0–1.5)
BUN SERPL-MCNC: 30 MG/DL (ref 8–23)
BUN/CREAT SERPL: 16.2 (ref 7–25)
CALCIUM SPEC-SCNC: 8.2 MG/DL (ref 8.6–10.5)
CHLORIDE SERPL-SCNC: 103 MMOL/L (ref 98–107)
CO2 SERPL-SCNC: 25.8 MMOL/L (ref 22–29)
CREAT SERPL-MCNC: 1.85 MG/DL (ref 0.57–1)
DEPRECATED RDW RBC AUTO: 43.3 FL (ref 37–54)
EGFRCR SERPLBLD CKD-EPI 2021: 26.3 ML/MIN/1.73
EOSINOPHIL # BLD AUTO: 0.58 10*3/MM3 (ref 0–0.4)
EOSINOPHIL NFR BLD AUTO: 4.8 % (ref 0.3–6.2)
ERYTHROCYTE [DISTWIDTH] IN BLOOD BY AUTOMATED COUNT: 13.6 % (ref 12.3–15.4)
GLUCOSE SERPL-MCNC: 97 MG/DL (ref 65–99)
HCT VFR BLD AUTO: 29.8 % (ref 34–46.6)
HGB BLD-MCNC: 9.9 G/DL (ref 12–15.9)
IMM GRANULOCYTES # BLD AUTO: 0.12 10*3/MM3 (ref 0–0.05)
IMM GRANULOCYTES NFR BLD AUTO: 1 % (ref 0–0.5)
LYMPHOCYTES # BLD AUTO: 1.63 10*3/MM3 (ref 0.7–3.1)
LYMPHOCYTES NFR BLD AUTO: 13.5 % (ref 19.6–45.3)
MCH RBC QN AUTO: 29.4 PG (ref 26.6–33)
MCHC RBC AUTO-ENTMCNC: 33.2 G/DL (ref 31.5–35.7)
MCV RBC AUTO: 88.4 FL (ref 79–97)
MONOCYTES # BLD AUTO: 1.47 10*3/MM3 (ref 0.1–0.9)
MONOCYTES NFR BLD AUTO: 12.2 % (ref 5–12)
NEUTROPHILS NFR BLD AUTO: 68.3 % (ref 42.7–76)
NEUTROPHILS NFR BLD AUTO: 8.26 10*3/MM3 (ref 1.7–7)
NRBC BLD AUTO-RTO: 0 /100 WBC (ref 0–0.2)
PLATELET # BLD AUTO: 173 10*3/MM3 (ref 140–450)
PMV BLD AUTO: 10.3 FL (ref 6–12)
POTASSIUM SERPL-SCNC: 4.4 MMOL/L (ref 3.5–5.2)
RBC # BLD AUTO: 3.37 10*6/MM3 (ref 3.77–5.28)
SODIUM SERPL-SCNC: 137 MMOL/L (ref 136–145)
WBC NRBC COR # BLD AUTO: 12.09 10*3/MM3 (ref 3.4–10.8)

## 2024-01-24 PROCEDURE — 99232 SBSQ HOSP IP/OBS MODERATE 35: CPT | Performed by: INTERNAL MEDICINE

## 2024-01-24 PROCEDURE — 85025 COMPLETE CBC W/AUTO DIFF WBC: CPT | Performed by: INTERNAL MEDICINE

## 2024-01-24 PROCEDURE — 94664 DEMO&/EVAL PT USE INHALER: CPT

## 2024-01-24 PROCEDURE — 25010000002 ONDANSETRON PER 1 MG: Performed by: INTERNAL MEDICINE

## 2024-01-24 PROCEDURE — 97530 THERAPEUTIC ACTIVITIES: CPT

## 2024-01-24 PROCEDURE — 94799 UNLISTED PULMONARY SVC/PX: CPT

## 2024-01-24 PROCEDURE — 94760 N-INVAS EAR/PLS OXIMETRY 1: CPT

## 2024-01-24 PROCEDURE — 25810000003 SODIUM CHLORIDE 0.9 % SOLUTION: Performed by: STUDENT IN AN ORGANIZED HEALTH CARE EDUCATION/TRAINING PROGRAM

## 2024-01-24 PROCEDURE — 94761 N-INVAS EAR/PLS OXIMETRY MLT: CPT

## 2024-01-24 PROCEDURE — 80048 BASIC METABOLIC PNL TOTAL CA: CPT | Performed by: INTERNAL MEDICINE

## 2024-01-24 RX ORDER — SODIUM CHLORIDE 9 MG/ML
100 INJECTION, SOLUTION INTRAVENOUS CONTINUOUS
Status: ACTIVE | OUTPATIENT
Start: 2024-01-24 | End: 2024-01-24

## 2024-01-24 RX ADMIN — PANTOPRAZOLE SODIUM 40 MG: 40 TABLET, DELAYED RELEASE ORAL at 16:36

## 2024-01-24 RX ADMIN — APIXABAN 5 MG: 5 TABLET, FILM COATED ORAL at 09:15

## 2024-01-24 RX ADMIN — DOCUSATE SODIUM 100 MG: 100 CAPSULE, LIQUID FILLED ORAL at 21:36

## 2024-01-24 RX ADMIN — SODIUM CHLORIDE 100 ML/HR: 9 INJECTION, SOLUTION INTRAVENOUS at 13:50

## 2024-01-24 RX ADMIN — Medication 10 ML: at 09:16

## 2024-01-24 RX ADMIN — BUDESONIDE 0.5 MG: 0.5 INHALANT ORAL at 07:02

## 2024-01-24 RX ADMIN — IPRATROPIUM BROMIDE AND ALBUTEROL SULFATE 3 ML: 2.5; .5 SOLUTION RESPIRATORY (INHALATION) at 07:00

## 2024-01-24 RX ADMIN — DILTIAZEM HYDROCHLORIDE 120 MG: 120 CAPSULE, EXTENDED RELEASE ORAL at 09:15

## 2024-01-24 RX ADMIN — LOSARTAN POTASSIUM 50 MG: 50 TABLET, FILM COATED ORAL at 09:15

## 2024-01-24 RX ADMIN — BENZONATATE 200 MG: 100 CAPSULE ORAL at 13:52

## 2024-01-24 RX ADMIN — ONDANSETRON HYDROCHLORIDE 4 MG: 2 INJECTION, SOLUTION INTRAMUSCULAR; INTRAVENOUS at 13:56

## 2024-01-24 RX ADMIN — PANTOPRAZOLE SODIUM 40 MG: 40 TABLET, DELAYED RELEASE ORAL at 06:17

## 2024-01-24 RX ADMIN — IPRATROPIUM BROMIDE AND ALBUTEROL SULFATE 3 ML: 2.5; .5 SOLUTION RESPIRATORY (INHALATION) at 21:10

## 2024-01-24 RX ADMIN — APIXABAN 5 MG: 5 TABLET, FILM COATED ORAL at 21:36

## 2024-01-24 RX ADMIN — BUDESONIDE 0.5 MG: 0.5 INHALANT ORAL at 21:10

## 2024-01-24 RX ADMIN — Medication 10 ML: at 21:36

## 2024-01-24 RX ADMIN — IPRATROPIUM BROMIDE AND ALBUTEROL SULFATE 3 ML: 2.5; .5 SOLUTION RESPIRATORY (INHALATION) at 11:54

## 2024-01-24 RX ADMIN — ACETAMINOPHEN 650 MG: 325 TABLET, FILM COATED ORAL at 11:00

## 2024-01-24 RX ADMIN — BENZONATATE 200 MG: 100 CAPSULE ORAL at 06:17

## 2024-01-24 NOTE — THERAPY TREATMENT NOTE
Patient Name: Vonnie Coppola  : 1935    MRN: [id number]                              Today's Date: 2024       Admit Date: 2024    Visit Dx:     ICD-10-CM ICD-9-CM   1. Gastrointestinal hemorrhage, unspecified gastrointestinal hemorrhage type  K92.2 578.9   2. Pulmonary nodule  R91.1 793.11   3. Cyst of ovary, unspecified laterality  N83.209 620.2   4. Anemia, unspecified type  D64.9 285.9   5. Gastrointestinal hemorrhage with melena  K92.1 578.1   6. Nausea  R11.0 787.02     Patient Active Problem List   Diagnosis    GI bleed    Anemia    HTN (hypertension)    History of breast cancer    Asthma    History of CVA (cerebrovascular accident)    Dehydration    Renal insufficiency    Pulmonary nodule    Adnexal cyst    Nausea    Atrial fibrillation    Pulmonary embolus     Past Medical History:   Diagnosis Date    Arthritis     Asthma     Atrial fibrillation     per pt's daughter.States hx of a-fib in the past when stressed or tired.    Breast cancer     LEFT BREAST CA, LUMPECTOMY & RADS    Hx of radiation therapy     APPROXIMATELY 40 TREATMENTS FOR LEFT BREAST CA    Hypertension     Pneumonia     Stroke      Past Surgical History:   Procedure Laterality Date    APPENDECTOMY      BLADDER SURGERY      BREAST BIOPSY Left     MALIGNANT    BREAST LUMPECTOMY Left     MALIGNANT    COLONOSCOPY N/A 2024    Procedure: COLONOSCOPY to cecum with cold snare polypectomies;  Surgeon: Harshad Gaston MD;  Location: [facility] ENDOSCOPY;  Service: Gastroenterology;  Laterality: N/A;  pre- gi bleed, anemia  post- diverticulosis, polyps    ENDOSCOPY N/A 2024    Procedure: ESOPHAGOGASTRODUODENOSCOPY with biospy;  Surgeon: Harshad Gaston MD;  Location: [facility] ENDOSCOPY;  Service: Gastroenterology;  Laterality: N/A;  pre- gi bleed, anemia  post- erosive gastirits    GALLBLADDER SURGERY      HYSTERECTOMY      OOPHORECTOMY        General Information       Row Name 24 1611          OT  Time and Intention    Document Type therapy note (daily note)  -LINUS     Mode of Treatment occupational therapy;physical therapy;co-treatment  -LINUS       Row Name 01/24/24 1611          General Information    Patient Profile Reviewed yes  -LINUS     Existing Precautions/Restrictions fall;oxygen therapy device and L/min  -LINUS       Row Name 01/24/24 1611          Living Environment    People in Home child(christine), adult  -LINUS       Row Name 01/24/24 1611          Cognition    Orientation Status (Cognition) oriented x 3  -LINUS       Row Name 01/24/24 1611          Safety Issues, Functional Mobility    Impairments Affecting Function (Mobility) endurance/activity tolerance;strength;shortness of breath  -LINUS     Comment, Safety Issues/Impairments (Mobility) PT/OT cotreatment medically appropriate and necessary due to patient acuity level, to maximize therapeutic benefit due to impaired act tolerance, and for safety of patient and staff. Treatment focused on progression of care and goals established in POC. Gait belt and non skid socks worn  -LINUS               User Key  (r) = Recorded By, (t) = Taken By, (c) = Cosigned By      Initials Name Provider Type    LINUS Genevieve Crabtree OT Occupational Therapist                     Mobility/ADL's       Row Name 01/24/24 1612          Bed Mobility    Bed Mobility supine-sit  -LINUS     Supine-Sit [place] (Bed Mobility) verbal cues;nonverbal cues (demo/gesture);minimum assist (75% patient effort);moderate assist (50% patient effort);2 person assist  -LINUS     Assistive Device (Bed Mobility) head of bed elevated;bed rails  -LINUS     Comment, (Bed Mobility) UIC on departure  -LINUS       Row Name 01/24/24 1612          Transfers    Transfers sit-stand transfer;bed-chair transfer  -LINUS       Row Name 01/24/24 1612          Bed-Chair Transfer    Bed-Chair [place] (Transfers) minimum assist (75% patient effort);verbal cues;2 person assist  -LINUS     Assistive Device (Bed-Chair Transfers) walker, 4-wheeled   -LINUS     Comment, (Bed-Chair Transfer) Cues for sequencing  -LINUS       Row Name 01/24/24 1612          Sit-Stand Transfer    Sit-Stand [place] (Transfers) verbal cues;nonverbal cues (demo/gesture);moderate assist (50% patient effort);2 person assist  -LINUS     Assistive Device (Sit-Stand Transfers) walker, 4-wheeled  -LINUS       Row Name 01/24/24 1612          Functional Mobility    Functional Mobility- Ind. Level minimum assist (75% patient effort);moderate assist (50% patient effort);verbal cues required;2 person assist required  -LINUS     Functional Mobility- Device walker, 4-wheeled  -LINUS     Functional Mobility- Safety Issues sequencing ability decreased  -LINUS     Functional Mobility- Comment took few steps from EOB to chair in room  -LINUS       Row Name 01/24/24 1612          Activities of Daily Living    BADL Assessment/Intervention grooming  -LINUS       Row Name 01/24/24 1612          Grooming Assessment/Training    [place] Level (Grooming) grooming skills;wash face, hands;set up  -LINUS     Position (Grooming) edge of bed sitting  -LINUS               User Key  (r) = Recorded By, (t) = Taken By, (c) = Cosigned By      Initials Name Provider Type    LINUSGenevieve Cullen OT Occupational Therapist                   Obj/Interventions       Row Name 01/24/24 1615          Sensory Assessment (Somatosensory)    Sensory Assessment (Somatosensory) sensation intact  -LINUS       Row Name 01/24/24 1615          Vision Assessment/Intervention    Visual Impairment/Limitations WFL  -LINUS       Row Name 01/24/24 1615          Motor Skills    Motor Skills functional endurance  -LINUS     Functional Endurance Fair-; fatigues quickly with act  -LINUS       Row Name 01/24/24 1615          Balance    Balance Assessment sitting static balance;sitting dynamic balance;standing static balance;standing dynamic balance  -LINUS     Static Sitting Balance standby assist  -LINUS     Dynamic Sitting Balance contact guard  -LINUS     Position, Sitting Balance  supported;sitting edge of bed  -LINUS     Static Standing Balance minimal assist;moderate assist;verbal cues;2-person assist  -LINUS     Dynamic Standing Balance 2-person assist;minimal assist;moderate assist;verbal cues  -LINUS     Position/Device Used, Standing Balance supported;walker, 4-wheeled  -LINUS     Balance Interventions sitting;standing;occupation based/functional task;sit to stand;supported  -LINUS               User Key  (r) = Recorded By, (t) = Taken By, (c) = Cosigned By      Initials Name Provider Type    Genevieve Foss OT Occupational Therapist                   Goals/Plan    No documentation.                  Clinical Impression       Row Name 01/24/24 1616          Pain Assessment    Pretreatment Pain Rating 0/10 - no pain  -LINUS     Posttreatment Pain Rating 0/10 - no pain  -LINUS       Row Name 01/24/24 1616          Plan of Care Review    Plan of Care Reviewed With patient  -LINUS     Outcome Evaluation Pt seen for OT/PT co-tx this AM and fowlers in bed on arrival. Pt MIN/MOD A x 2 bed mobility to sit EOB and wash face with set up and cues for initiation. Pt requires increased time and rest breaks. Pt MOD A x2 STS from EOB and took steps to chair MIN A x2 with rollator wx. Pt fatigues quickly and UIC on departure. OT will continue to progress pt as omar.  -LINUS       Row Name 01/24/24 1616          Therapy Plan Review/Discharge Plan (OT)    Anticipated Discharge Disposition (OT) home with home health;home with assist  -LINUS       Row Name 01/24/24 1616          Vital Signs    Pre SpO2 (%) 100  -LINUS     O2 Delivery Pre Treatment supplemental O2  -LINUS     O2 Delivery Intra Treatment supplemental O2  -LINUS     Post SpO2 (%) 95  -LINUS     O2 Delivery Post Treatment supplemental O2  -LINUS       Row Name 01/24/24 1616          Positioning and Restraints    Pre-Treatment Position in bed  -LINUS     Post Treatment Position chair  -LINUS     In Chair notified nsg;reclined;call light within reach;encouraged to call for assist;exit alarm  on;legs elevated  -LINUS               User Key  (r) = Recorded By, (t) = Taken By, (c) = Cosigned By      Initials Name Provider Type    Genevieve Foss OT Occupational Therapist                   Outcome Measures       Row Name 01/24/24 1035 01/24/24 0751       How much help from another person do you currently need...    Turning from your back to your side while in flat bed without using bedrails? 3  -EJ 3  -KS    Moving from lying on back to sitting on the side of a flat bed without bedrails? 2  -EJ 2  -KS    Moving to and from a bed to a chair (including a wheelchair)? 3  -EJ 2  -KS    Standing up from a chair using your arms (e.g., wheelchair, bedside chair)? 2  -EJ 2  -KS    Climbing 3-5 steps with a railing? 1  -EJ 1  -KS    To walk in hospital room? 3  -EJ 1  -KS    AM-PAC 6 Clicks Score (PT) 14  -EJ 11  -KS    Highest Level of Mobility Goal 4 --> Transfer to chair/commode  -EJ 4 --> Transfer to chair/commode  -KS              User Key  (r) = Recorded By, (t) = Taken By, (c) = Cosigned By      Initials Name Provider Type    Ivory Velasquez, PT Physical Therapist    Katarina Mccabe, RN Registered Nurse                    Occupational Therapy Education       Title: PT OT SLP Therapies (Done)       Topic: Occupational Therapy (Done)       Point: ADL training (Done)       Description:   Instruct learner(s) on proper safety adaptation and remediation techniques during self care or transfers.   Instruct in proper use of assistive devices.                  Learning Progress Summary             Patient Acceptance, E, VU by AB at 1/23/2024 1619    Acceptance, E, VU by KALA at 1/17/2024 1337                                         User Key       Initials Effective Dates Name Provider Type Johnson ARMENDARIZ 08/02/22 -  Chay Moss OT Occupational Therapist OT    AB 12/15/23 -  Jessi Cano, RN Registered Nurse Nurse                  OT Recommendation and Plan     Plan of Care Review  Plan of Care Reviewed  With: patient  Outcome Evaluation: Pt seen for OT/PT co-tx this AM and fowlers in bed on arrival. Pt MIN/MOD A x 2 bed mobility to sit EOB and wash face with set up and cues for initiation. Pt requires increased time and rest breaks. Pt MOD A x2 STS from EOB and took steps to chair MIN A x2 with rollator wx. Pt fatigues quickly and UIC on departure. OT will continue to progress pt as omar.     Time Calculation:         Time Calculation- OT       Row Name 01/24/24 1618             Time Calculation- OT    OT Start Time 0952  -LINUS      OT Stop Time 1005  -LINUS      OT Time Calculation (min) 13 min  -LINUS      Total Timed Code Minutes- OT 13 minute(s)  -LINUS      OT Received On 01/24/24  -LINUS      OT - Next Appointment 01/25/24  -LINUS         Timed Charges    59184 - OT Therapeutic Activity Minutes 13  -LINUS         Total Minutes    Timed Charges Total Minutes 13  -LINUS       Total Minutes 13  -LINUS                User Key  (r) = Recorded By, (t) = Taken By, (c) = Cosigned By      Initials Name Provider Type    LINUSGenevieve Cullen OT Occupational Therapist                  Therapy Charges for Today       Code Description Service Date Service Provider Modifiers Qty    [id number] HC OT THERAPEUTIC ACT EA 15 MIN 1/24/2024 Genevieve Crabtree OT GO 1                 Genevieve Crabtree OT  1/24/2024

## 2024-01-24 NOTE — THERAPY TREATMENT NOTE
Patient Name: Vonnie Coppola  : 1935    MRN: [id number]                              Today's Date: 2024       Admit Date: 2024    Visit Dx:     ICD-10-CM ICD-9-CM   1. Gastrointestinal hemorrhage, unspecified gastrointestinal hemorrhage type  K92.2 578.9   2. Pulmonary nodule  R91.1 793.11   3. Cyst of ovary, unspecified laterality  N83.209 620.2   4. Anemia, unspecified type  D64.9 285.9   5. Gastrointestinal hemorrhage with melena  K92.1 578.1   6. Nausea  R11.0 787.02     Patient Active Problem List   Diagnosis    GI bleed    Anemia    HTN (hypertension)    History of breast cancer    Asthma    History of CVA (cerebrovascular accident)    Dehydration    Renal insufficiency    Pulmonary nodule    Adnexal cyst    Nausea    Atrial fibrillation    Pulmonary embolus     Past Medical History:   Diagnosis Date    Arthritis     Asthma     Atrial fibrillation     per pt's daughter.States hx of a-fib in the past when stressed or tired.    Breast cancer     LEFT BREAST CA, LUMPECTOMY & RADS    Hx of radiation therapy     APPROXIMATELY 40 TREATMENTS FOR LEFT BREAST CA    Hypertension     Pneumonia     Stroke      Past Surgical History:   Procedure Laterality Date    APPENDECTOMY      BLADDER SURGERY      BREAST BIOPSY Left     MALIGNANT    BREAST LUMPECTOMY Left     MALIGNANT    COLONOSCOPY N/A 2024    Procedure: COLONOSCOPY to cecum with cold snare polypectomies;  Surgeon: Harshad Gaston MD;  Location: [facility] ENDOSCOPY;  Service: Gastroenterology;  Laterality: N/A;  pre- gi bleed, anemia  post- diverticulosis, polyps    ENDOSCOPY N/A 2024    Procedure: ESOPHAGOGASTRODUODENOSCOPY with biospy;  Surgeon: Harshad Gaston MD;  Location: [facility] ENDOSCOPY;  Service: Gastroenterology;  Laterality: N/A;  pre- gi bleed, anemia  post- erosive gastirits    GALLBLADDER SURGERY      HYSTERECTOMY      OOPHORECTOMY        General Information       Row Name 24 1031           Physical Therapy Time and Intention    Document Type therapy note (daily note)  -EJ     Mode of Treatment co-treatment;occupational therapy;physical therapy;other (see comments)  -       Row Name 01/24/24 1031          General Information    Existing Precautions/Restrictions fall;oxygen therapy device and L/min  -       Row Name 01/24/24 1031          Safety Issues, Functional Mobility    Comment, Safety Issues/Impairments (Mobility) Co treatment medically appropriate and necessary due to patient acuity level, activity tolerance and safety of patient and staff. Treatment is focusing on progression of care and goals established in the POC.  -EJ               User Key  (r) = Recorded By, (t) = Taken By, (c) = Cosigned By      Initials Name Provider Type     Ivory Adams, PT Physical Therapist                   Mobility       Row Name 01/24/24 1031          Bed Mobility    Supine-Sit Reagan (Bed Mobility) verbal cues;nonverbal cues (demo/gesture);minimum assist (75% patient effort);moderate assist (50% patient effort);2 person assist  -EJ     Sit-Supine Reagan (Bed Mobility) not tested  -EJ     Assistive Device (Bed Mobility) head of bed elevated;bed rails  -       Row Name 01/24/24 1031          Sit-Stand Transfer    Sit-Stand Reagan (Transfers) verbal cues;nonverbal cues (demo/gesture);moderate assist (50% patient effort);2 person assist  -EJ     Assistive Device (Sit-Stand Transfers) walker, 4-wheeled  -EJ       Row Name 01/24/24 1031          Gait/Stairs (Locomotion)    Reagan Level (Gait) verbal cues;nonverbal cues (demo/gesture);minimum assist (75% patient effort);2 person assist  -EJ     Assistive Device (Gait) walker, 4-wheeled  -EJ     Distance in Feet (Gait) 3  -EJ     Deviations/Abnormal Patterns (Gait) prashanth decreased;stride length decreased  -EJ     Bilateral Gait Deviations forward flexed posture;heel strike decreased  -EJ     Comment, (Gait/Stairs) several steps  from bed to chair, slow pace, limited by fatigue and weakness, no overt LOB noted  -EJ               User Key  (r) = Recorded By, (t) = Taken By, (c) = Cosigned By      Initials Name Provider Type    Ivory Velasquez, PT Physical Therapist                   Obj/Interventions    No documentation.                  Goals/Plan    No documentation.                  Clinical Impression       Row Name 01/24/24 1032          Pain    Pretreatment Pain Rating 0/10 - no pain  -EJ     Posttreatment Pain Rating 0/10 - no pain  -EJ       Row Name 01/24/24 1032          Plan of Care Review    Plan of Care Reviewed With patient  -EJ     Progress improving  -EJ     Outcome Evaluation Pt agreeable to PT this am. She states she isn't doing great, but is agreeable to try to get up. Pt is showing some improvement w mobility today as compared to yesterday.  She is requiring min/mod A x 2 to transition to EOB. She stood w mod A x 2 using rollator and then was able to take several small steps from bed to chair. SHe exhibits slow pace, but no overt LOB noted. Pt is limited by fatigue and weakness. SHe plans home at [place] and does not want to go to SNU at [place]. Will continue to progress activity as tolerated.  -EJ       Row Name 01/24/24 1032          Vital Signs    Pre SpO2 (%) 100  -EJ     O2 Delivery Pre Treatment supplemental O2  -EJ     O2 Delivery Intra Treatment supplemental O2  -EJ     Post SpO2 (%) 95  -EJ     O2 Delivery Post Treatment supplemental O2  -EJ     Pre Patient Position Supine  -EJ     Intra Patient Position Standing  -EJ     Post Patient Position Sitting  -EJ       Row Name 01/24/24 1032          Positioning and Restraints    Pre-Treatment Position in bed  -EJ     Post Treatment Position chair  -EJ     In Chair notified nsg;reclined;call light within reach;encouraged to call for assist;exit alarm on  -EJ               User Key  (r) = Recorded By, (t) = Taken By, (c) = Cosigned By      Initials Name Provider Type    VERONICA  Ivory Adams, PT Physical Therapist                   Outcome Measures       Row Name 01/24/24 1035 01/24/24 0751       How much help from another person do you currently need...    Turning from your back to your side while in flat bed without using bedrails? 3  -EJ 3  -KS    Moving from lying on back to sitting on the side of a flat bed without bedrails? 2  -EJ 2  -KS    Moving to and from a bed to a chair (including a wheelchair)? 3  -EJ 2  -KS    Standing up from a chair using your arms (e.g., wheelchair, bedside chair)? 2  -EJ 2  -KS    Climbing 3-5 steps with a railing? 1  -EJ 1  -KS    To walk in hospital room? 3  -EJ 1  -KS    AM-PAC 6 Clicks Score (PT) 14  -EJ 11  -KS    Highest Level of Mobility Goal 4 --> Transfer to chair/commode  -EJ 4 --> Transfer to chair/commode  -KS      Row Name 01/24/24 0141          How much help from another person do you currently need...    Turning from your back to your side while in flat bed without using bedrails? 3  -ZL     Moving from lying on back to sitting on the side of a flat bed without bedrails? 2  -ZL     Moving to and from a bed to a chair (including a wheelchair)? 2  -ZL     Standing up from a chair using your arms (e.g., wheelchair, bedside chair)? 2  -ZL     Climbing 3-5 steps with a railing? 1  -ZL     To walk in hospital room? 1  -ZL     AM-PAC 6 Clicks Score (PT) 11  -ZL     Highest Level of Mobility Goal 4 --> Transfer to chair/commode  -ZL               User Key  (r) = Recorded By, (t) = Taken By, (c) = Cosigned By      Initials Name Provider Type    Ivory Velasquez, PT Physical Therapist    Katarina Mccabe, RN Registered Nurse    Sonny Madison RN Registered Nurse                                 Physical Therapy Education       Title: PT OT SLP Therapies (Done)       Topic: Physical Therapy (Done)       Point: Mobility training (Done)       Learning Progress Summary             Patient Acceptance, E, VU by AB at 1/23/2024 5789    Acceptance,  E, VU by KN at 1/17/2024 1337    Acceptance, E,TB,D, VU,DU by AK at 1/17/2024 1006                         Point: Home exercise program (Done)       Learning Progress Summary             Patient Acceptance, E, VU by AB at 1/23/2024 1619    Acceptance, E, VU by KN at 1/17/2024 1337                         Point: Body mechanics (Done)       Learning Progress Summary             Patient Acceptance, E, VU by AB at 1/23/2024 1619    Acceptance, E, VU by KN at 1/17/2024 1337                         Point: Precautions (Done)       Learning Progress Summary             Patient Acceptance, E, VU by AB at 1/23/2024 1619    Acceptance, E, VU by KN at 1/17/2024 1337                                         User Key       Initials Effective Dates Name Provider Type Discipline    KALA 08/02/22 -  Chay Moss OT Occupational Therapist OT    AB 12/15/23 -  Jessi Cano RN Registered Nurse Nurse    AK 12/28/23 -  Erica Alexis, ROLO Student PT Student PT                  PT Recommendation and Plan     Plan of Care Reviewed With: patient  Progress: improving  Outcome Evaluation: Pt agreeable to PT this am. She states she isn't doing great, but is agreeable to try to get up. Pt is showing some improvement w mobility today as compared to yesterday.  She is requiring min/mod A x 2 to transition to EOB. She stood w mod A x 2 using rollator and then was able to take several small steps from bed to chair. SHe exhibits slow pace, but no overt LOB noted. Pt is limited by fatigue and weakness. SHe plans home at [place] and does not want to go to SNU at [place]. Will continue to progress activity as tolerated.     Time Calculation:         PT Charges       Row Name 01/24/24 1036             Time Calculation    Start Time 0953  -EJ      Stop Time 1005  -EJ      Time Calculation (min) 12 min  -EJ      PT Received On 01/24/24  -EJ      PT - Next Appointment 01/25/24  -EJ                User Key  (r) = Recorded By, (t) = Taken By, (c) = Cosigned By       Initials Name Provider Type    EJ Ivory Adams, PT Physical Therapist                  Therapy Charges for Today       Code Description Service Date Service Provider Modifiers Qty    60230097305 HC PT THER SUPP EA 15 MIN 1/23/2024 Ivory Adams, PT GP 1    49963256447 HC PT THERAPEUTIC ACT EA 15 MIN 1/23/2024 Ivory Adams, PT GP 1    67359237225 HC PT THERAPEUTIC ACT EA 15 MIN 1/24/2024 Ivory Adams, PT GP 1            PT G-Codes  Outcome Measure Options: AM-PAC 6 Clicks Daily Activity (OT)  AM-PAC 6 Clicks Score (PT): 14  AM-PAC 6 Clicks Score (OT): 21  PT Discharge Summary  Anticipated Discharge Disposition (PT): home with 24/7 care, home with home health, skilled nursing facility    Ivory Adams, PT  1/24/2024

## 2024-01-24 NOTE — PROGRESS NOTES
"      [facility]         Dr Rangel Sayied   LOS: 4 days   Patient Care Team:  Christopher Leyva DO as PCP - General  Christopher Leyva DO as PCP - Family Medicine    Chief Complaint: Bilateral PE with acute pulmonary nodule history of asthma other issues as listed below    Interval History: Off heparin drip sitting up in a chair on nasal cannula oxygen.  Resting comfortably.  No overnight issues reported.    REVIEW OF SYSTEMS:   CARDIOVASCULAR: No chest pain, chest pressure or chest discomfort. No palpitations or edema.   RESPIRATORY: Shortness of breath with activity with ongoing cough   GASTROINTESTINAL: No anorexia, nausea, vomiting or diarrhea. No abdominal pain or blood.   HEMATOLOGIC: No bleeding or bruising.     Ventilator/Non-Invasive Ventilation Settings (From admission, onward)      None              Vital Signs  Temp:  [97.7 °F (36.5 °C)-98.6 °F (37 °C)] 98.2 °F (36.8 °C)  Heart Rate:  [77-80] 78  Resp:  [16-18] 17  BP: ()/() 115/58    Intake/Output Summary (Last 24 hours) at 1/24/2024 1121  Last data filed at 1/24/2024 0300  Gross per 24 hour   Intake 360 ml   Output --   Net 360 ml     Flowsheet Rows      Flowsheet Row First Filed Value   Admission Height 165.1 cm (65\") Documented at 01/16/2024 1643   Admission Weight 107 kg (236 lb 8.9 oz) Documented at 01/16/2024 1643            Physical Exam:  Patient is examined using the personal protective equipment as per guidelines from infection control for this particular patient as enacted.  Hand hygiene was performed before and after patient interaction.   General Appearance:    Alert, cooperative, in no acute distress.  Following simple commands  ENT Mallampati between 3 and 4 no nasal congestion  Neck midline trachea, no thyromegaly   Lungs:   Diminished breath sounds bilaterally    Heart:    Regular rhythm and normal rate, normal S1 and S2, no            murmur, no gallop, no rub, no click   Chest Wall:    No " abnormalities observed   Abdomen:     Normal bowel sounds, no masses, no organomegaly, soft        nontender, nondistended, no guarding, no rebound                tenderness   Extremities:   Moves all extremities well, no edema, no cyanosis, no             redness  CNS no focal neurological deficits normal sensory exam  Skin no rashes no nodules  Musculoskeletal no cyanosis no clubbing normal range of motion     Results Review:        Results from last 7 days   Lab Units 01/24/24  0418 01/23/24  0353 01/22/24  1634 01/22/24  0417   SODIUM mmol/L 137 145  --  141   POTASSIUM mmol/L 4.4 5.0 4.5 3.6   CHLORIDE mmol/L 103 107  --  107   CO2 mmol/L 25.8 23.0  --  23.2   BUN mg/dL 30* 16  --  15   CREATININE mg/dL 1.85* 1.08*  --  0.87   GLUCOSE mg/dL 97 73  --  93   CALCIUM mg/dL 8.2* 8.6  --  8.2*         Results from last 7 days   Lab Units 01/24/24  0418 01/23/24  0353 01/22/24  0417   WBC 10*3/mm3 12.09* 11.42* 10.89*   HEMOGLOBIN g/dL 9.9* 11.0* 10.7*   HEMATOCRIT % 29.8* 32.9* 31.8*   PLATELETS 10*3/mm3 173 149 155     Results from last 7 days   Lab Units 01/23/24  0846 01/22/24  2318 01/22/24  1428   INR   --   --  1.19*   APTT seconds 74.4* 142.9* <20.0*         Results from last 7 days   Lab Units 01/21/24  0812   MAGNESIUM mg/dL 2.0               I reviewed the patient's new clinical results.  I personally viewed and interpreted the patient's chest x-ray.        Medication Review:   apixaban, 5 mg, Oral, Q12H  budesonide, 0.5 mg, Nebulization, BID - RT  dilTIAZem CD, 120 mg, Oral, Q24H  docusate sodium, 100 mg, Oral, BID  ipratropium-albuterol, 3 mL, Nebulization, TID  losartan, 50 mg, Oral, Q24H  pantoprazole, 40 mg, Oral, BID AC  sodium chloride, 10 mL, Intravenous, Q12H        sodium chloride, 100 mL/hr          ASSESSMENT:   Pulmonary nodules  Bilateral PE  Bilateral DVT  Asthma  Gastrointestinal bleed  A-fib RVR  Acute blood loss anemia  Osteoarthritis  Hypertension  History of breast  cancer    PLAN:  Patient has been transitioned to Eliquis currently tolerating well no GI bleed reported.  Doppler confirms bilateral lower extremity DVT.  Pulmonary nodule will need follow-up CT chest in 3 months.  Continue bronchodilators for asthma.  Clinically stable with no active wheezing.  Mobilize ambulate  As needed cough medicine  Will need outpatient PFTs  I will arrange follow-up with me in the office 6 to 8 weeks.  Continue to monitor clinical course closely.        Juan Carlos Lam MD  01/24/24  11:21 EST

## 2024-01-24 NOTE — PROGRESS NOTES
Hospital Follow Up    LOS: 4  Patient Name: Vonnie Coppola  Age/Sex: 88 y.o. female  : 1935  MRN: [id number]    Day of Service: 24   Length of Stay: 4  Encounter Provider: Gray Sullivan MD  Place of Service: [facility] CARDIOLOGY  Patient Care Team:  Christopher Leyva DO as PCP - General  Christopher Leyva DO as PCP - Family Medicine    Subjective:     Chief Complaint: Paroxysmal atrial fibrillation/pulmonary embolism    Interval History: Patient does say she is feeling some better today less abdominal discomfort.    Objective:     Objective:  Temp:  [97.7 °F (36.5 °C)-98.6 °F (37 °C)] 98.2 °F (36.8 °C)  Heart Rate:  [77-80] 78  Resp:  [16-18] 17  BP: ()/() 115/58     Intake/Output Summary (Last 24 hours) at 2024 0831  Last data filed at 2024 0300  Gross per 24 hour   Intake 360 ml   Output --   Net 360 ml     Body mass index is 40.44 kg/m².      24  1643 24  0448 24  1605   Weight: 107 kg (236 lb 8.9 oz) 110 kg (243 lb 2.7 oz) 110 kg (243 lb)     Weight change:       Physical Exam:   General :  Alert, cooperative, in no acute distress.  Neuro:   Alert,cooperative and oriented.  Lungs:  CTAB. Normal respiratory effort and rate.  CV:  Regular rate and rhythm, normal S1 and S2, no murmurs, gallops or rubs.   ABD:  Soft, nontender, nondistended. Positive bowel sounds.  Extr:  No edema or cyanosis, moves all extremities.    Lab Review:   Results from last 7 days   Lab Units 24  0418 24  0353   SODIUM mmol/L 137 145   POTASSIUM mmol/L 4.4 5.0   CHLORIDE mmol/L 103 107   CO2 mmol/L 25.8 23.0   BUN mg/dL 30* 16   CREATININE mg/dL 1.85* 1.08*   GLUCOSE mg/dL 97 73   CALCIUM mg/dL 8.2* 8.6         Results from last 7 days   Lab Units 24  0418 24  0353   WBC 10*3/mm3 12.09* 11.42*   HEMOGLOBIN g/dL 9.9* 11.0*   HEMATOCRIT % 29.8* 32.9*   PLATELETS 10*3/mm3 173 149     Results from last 7 days  "  Lab Units 01/23/24  0846 01/22/24  2318 01/22/24  1428   INR   --   --  1.19*   APTT seconds 74.4* 142.9* <20.0*     Results from last 7 days   Lab Units 01/21/24  0812   MAGNESIUM mg/dL 2.0           Invalid input(s): \"LDLCALC\"  Results from last 7 days   Lab Units 01/21/24  0812   PROBNP pg/mL 1,099.0     Results from last 7 days   Lab Units 01/21/24  0812   TSH uIU/mL 2.100       Current Medications:   Scheduled Meds:apixaban, 5 mg, Oral, Q12H  budesonide, 0.5 mg, Nebulization, BID - RT  dilTIAZem CD, 120 mg, Oral, Q24H  docusate sodium, 100 mg, Oral, BID  ipratropium-albuterol, 3 mL, Nebulization, TID  losartan, 50 mg, Oral, Q24H  pantoprazole, 40 mg, Oral, BID AC  sodium chloride, 10 mL, Intravenous, Q12H      Continuous Infusions:     Allergies:  Allergies   Allergen Reactions    Cortisone Hives    Penicillins Hives       Assessment:       GI bleed    Anemia    HTN (hypertension)    History of breast cancer    Asthma    History of CVA (cerebrovascular accident)    Dehydration    Renal insufficiency    Pulmonary nodule    Adnexal cyst    Nausea    Atrial fibrillation    Pulmonary embolus        Plan:      Bilateral PE: Mostly segmental/subsegmental.  No evidence of RV strain  PAF  Acute hypoxemic respiratory distress: History of lung cancer and COPD  Pulmonary hypertension  Acute GI bleed.  Patient is now back on apixaban due to problem #1 and problem #2.  Hemoglobin did drop a little we will continue to monitor.  History of CVA  HTN   Patient is doing better with the Lasix her creatinine however increased overnight.  Will not give any further for now and monitor creatinine.    Gray Sullivan MD  01/24/24  08:31 EST      "

## 2024-01-24 NOTE — PLAN OF CARE
Goal Outcome Evaluation:  Plan of Care Reviewed With: patient        Progress: improving  Outcome Evaluation: Pt agreeable to PT this am. She states she isn't doing great, but is agreeable to try to get up. Pt is showing some improvement w mobility today as compared to yesterday.  She is requiring min/mod A x 2 to transition to EOB. She stood w mod A x 2 using rollator and then was able to take several small steps from bed to chair. SHe exhibits slow pace, but no overt LOB noted. Pt is limited by fatigue and weakness. SHe plans home at [place] and does not want to go to SNU at [place]. Will continue to progress activity as tolerated.      Anticipated Discharge Disposition (PT): home with 24/7 care, home with home health, skilled nursing facility

## 2024-01-24 NOTE — PROGRESS NOTES
Name: Vonnie Coppola ADMIT: 2024   : 1935  PCP: Christopher Leyva DO    MRN: [id number] LOS: 4 days   AGE/SEX: 88 y.o. female  ROOM: [address]      She is on Eliquis now.  Creatinine increased today from 1.08 yesterday to 1.85.  Baseline appears to be around 0.8.     Objective   Objective   Vital Signs  Temp:  [97.7 °F (36.5 °C)-98.6 °F (37 °C)] 98.2 °F (36.8 °C)  Heart Rate:  [77-80] 78  Resp:  [16-18] 17  BP: ()/() 115/58  SpO2:  [90 %-98 %] 97 %  on  Flow (L/min):  [2-3] 2;   Device (Oxygen Therapy): nasal cannula    Intake/Output Summary (Last 24 hours) at 2024 1114  Last data filed at 2024 0300  Gross per 24 hour   Intake 360 ml   Output --   Net 360 ml     Body mass index is 40.44 kg/m².      24  1643 24  0448 24  1605   Weight: 107 kg (236 lb 8.9 oz) 110 kg (243 lb 2.7 oz) 110 kg (243 lb)     Physical Exam  Constitutional:       General: She is not in acute distress.  HENT:      Head: Normocephalic and atraumatic.   Eyes:      Extraocular Movements: Extraocular movements intact.      Pupils: Pupils are equal, round, and reactive to light.   Cardiovascular:      Rate and Rhythm: Normal rate and regular rhythm.   Pulmonary:      Effort: Pulmonary effort is normal. No respiratory distress.   Abdominal:      General: There is no distension.      Palpations: Abdomen is soft.      Tenderness: There is no abdominal tenderness.   Neurological:      General: No focal deficit present.      Mental Status: She is alert and oriented to person, place, and time.      Motor: Weakness present.           Results Review:      Results from last 7 days   Lab Units 24  0418 24  0353 24  1634 24  0417 24  0812 24  0349 24  2243 24  0542 24  0752   SODIUM mmol/L 137 145  --  141 139 142  --  145 142   POTASSIUM mmol/L 4.4 5.0 4.5 3.6 4.1 4.1 4.3 3.6 4.0   CHLORIDE mmol/L 103 107  --  107 109* 113*   "--  112* 112*   CO2 mmol/L 25.8 23.0  --  23.2 22.0 22.0  --  22.0 21.0*   BUN mg/dL 30* 16  --  15 11 11  --  11 12   CREATININE mg/dL 1.85* 1.08*  --  0.87 0.81 0.86  --  0.91 0.84   GLUCOSE mg/dL 97 73  --  93 89 82  --  91 72   CALCIUM mg/dL 8.2* 8.6  --  8.2* 8.0* 8.1*  --  8.5* 8.0*     Estimated Creatinine Clearance: 26.9 mL/min (A) (by C-G formula based on SCr of 1.85 mg/dL (H)).              Results from last 7 days   Lab Units 01/21/24  0812   PROBNP pg/mL 1,099.0     Results from last 7 days   Lab Units 01/21/24  0812   TSH uIU/mL 2.100     Results from last 7 days   Lab Units 01/21/24  0812   MAGNESIUM mg/dL 2.0           Invalid input(s): \"LDLCALC\"  Results from last 7 days   Lab Units 01/24/24  0418 01/23/24  0353 01/22/24  0417 01/21/24  0812 01/20/24  0349 01/19/24  1559 01/19/24  0542 01/18/24  1616 01/18/24  0752   WBC 10*3/mm3 12.09* 11.42* 10.89* 9.53 8.83  --  7.43  --  7.53   HEMOGLOBIN g/dL 9.9* 11.0* 10.7* 10.6* 9.9* 10.6* 11.1*  11.1*   < > 10.1*  10.1*   HEMATOCRIT % 29.8* 32.9* 31.8* 33.0* 30.2* 31.8* 34.2  34.2   < > 31.0*  31.0*   PLATELETS 10*3/mm3 173 149 155 139* 151  --  182  --  157   MCV fL 88.4 89.4 88.6 91.2 89.1  --  89.5  --  90.9   MCH pg 29.4 29.9 29.8 29.3 29.2  --  29.1  --  29.6   MCHC g/dL 33.2 33.4 33.6 32.1 32.8  --  32.5  --  32.6   RDW % 13.6 14.1 13.4 13.6 13.5  --  13.3  --  13.4   RDW-SD fl 43.3 45.6 43.8 45.5 43.7  --  43.2  --  44.4   MPV fL 10.3 11.3 10.5 10.4 10.0  --  10.2  --  10.0   NEUTROPHIL % % 68.3 63.7 65.8 61.0 62.9  --  57.9  --  54.2   LYMPHOCYTE % % 13.5* 17.6* 16.6* 22.6 20.6  --  24.1  --  28.2   MONOCYTES % % 12.2* 14.0* 12.6* 11.3 11.9  --  12.7*  --  12.4*   EOSINOPHIL % % 4.8 2.8 3.6 4.1 3.7  --  4.3  --  4.2   BASOPHIL % % 0.2 1.0 0.6 0.5 0.3  --  0.5  --  0.7   IMM GRAN % % 1.0* 0.9* 0.8* 0.5 0.6*  --  0.5  --  0.3   NEUTROS ABS 10*3/mm3 8.26* 7.28* 7.16* 5.81 5.55  --  4.30  --  4.09   LYMPHS ABS 10*3/mm3 1.63 2.01 1.81 2.15 1.82  --  " 1.79  --  2.12   MONOS ABS 10*3/mm3 1.47* 1.60* 1.37* 1.08* 1.05*  --  0.94*  --  0.93*   EOS ABS 10*3/mm3 0.58* 0.32 0.39 0.39 0.33  --  0.32  --  0.32   BASOS ABS 10*3/mm3 0.03 0.11 0.07 0.05 0.03  --  0.04  --  0.05   IMMATURE GRANS (ABS) 10*3/mm3 0.12* 0.10* 0.09* 0.05 0.05  --  0.04  --  0.02   NRBC /100 WBC 0.0 0.0 0.0 0.0 0.0  --  0.0  --  0.0    < > = values in this interval not displayed.     Results from last 7 days   Lab Units 01/23/24  0846 01/22/24  2318 01/22/24  1428   INR   --   --  1.19*   APTT seconds 74.4* 142.9* <20.0*                                           Imaging:  Imaging Results (Last 24 Hours)       ** No results found for the last 24 hours. **               I reviewed the patient's new clinical results / labs / tests / procedures      Assessment/Plan     Active Hospital Problems    Diagnosis  POA    **GI bleed [K92.2]  Yes    Pulmonary embolus [I26.99]  Yes    Atrial fibrillation [I48.91]  No    History of CVA (cerebrovascular accident) [Z86.73]  Not Applicable    Dehydration [E86.0]  Yes    Renal insufficiency [N28.9]  Yes    Pulmonary nodule [R91.1]  Yes    Adnexal cyst [N94.9]  Yes    Anemia [D64.9]  Yes    HTN (hypertension) [I10]  Yes    History of breast cancer [Z85.3]  Not Applicable    Asthma [J45.909]  Yes    Nausea [R11.0]  Yes      Resolved Hospital Problems   No resolved problems to display.     JAVI  Most likely due to overdiuresis.  Diuretics have been discontinued.  So administrating IV fluids for 500 cc.  Monitoring creatinine.      GI bleed in a patient with a history of peptic ulcer disease/diverticular disease/hemorrhoids/constipation.  EGD with hiatal hernia and gastritis status post biopsy.  Colonoscopy with in the ascending and descending colon status post removal and diverticulosis.  No active or recent bleeding stigmata Monitor hemoglobin and transfuse if hemoglobin is less than 7.  Continue holding aspirin.  Will need GI follow-up as an outpatient for capsule  enterography.      Hypertension/new onset A-fib.  Now back in sinus. Cards following.   Transition from heparin to Eliquis    Bilateral PE/DVT - Transitioned to eliquis 5mg bid.  No RV strain, no need for any intervention presently.  Cards managing AC    Dehydration/renal insufficiency/hypernatremia.  Resolved renal insufficiency and hypernatremia on IV fluid.  Continue holding diuretics.  Resolved dehydration.  IV fluid DC'd.    Acute asthma exacerbation/hypoxemia/pulmonary nodule.  CT of the chest with enlarging pulmonary nodules on the right middle and lower lobe.  Continues to be unable to wean her off the oxygen.  Chest x-ray with no acute disease.  Continue Pulmicort and DuoNebs.  And spirometry.  Awaiting walking pulse oximetry (patient refused yesterday).    Pulmonary saw the patient recommends chest CT follow-up on the pulmonary nodules in 3 months.    History of CVA.  Holding aspirin secondary to GI bleed.    Left adnexal cyst.  Pelvic ultrasound with absent uterus and ovaries.  GYN follow-up as an outpatient with repeat pelvic ultrasound.    VTE prophylaxis.  Sequential compression devices.    Eliquis for dvt ppx  Full Code    Edilberto Patel MD  [facility]  01/24/24  11:14 EST

## 2024-01-24 NOTE — PLAN OF CARE
Goal Outcome Evaluation:  Plan of Care Reviewed With: patient        Progress: improving  Outcome Evaluation: Tylenol given for arm pain due to multiple lab draw/ IV attempts. Purewick in place, skin care done. VSS, stable on 2L. All needs met, call light within reach.

## 2024-01-24 NOTE — PLAN OF CARE
Goal Outcome Evaluation:  Plan of Care Reviewed With: patient, family        Progress: no change  Outcome Evaluation: AOx4, [place].  Medicated for nausea and pain in right arm due to being stuck for lab/IVs (right arm very bruised).  IV RN used ultrasound to get new IV site.  Up to chair with assist.  Oxygen on.

## 2024-01-24 NOTE — PLAN OF CARE
Goal Outcome Evaluation:  Plan of Care Reviewed With: patient           Outcome Evaluation: Pt seen for OT/PT co-tx this AM and fowlers in bed on arrival. Pt MIN/MOD A x 2 bed mobility to sit EOB and wash face with set up and cues for initiation. Pt requires increased time and rest breaks. Pt MOD A x2 STS from EOB and took steps to chair MIN A x2 with rollator wx. Pt fatigues quickly and UIC on departure. OT will continue to progress pt as omar.      Anticipated Discharge Disposition (OT): home with home health, home with assist

## 2024-01-25 LAB
ANION GAP SERPL CALCULATED.3IONS-SCNC: 8 MMOL/L (ref 5–15)
BASOPHILS # BLD AUTO: 0.04 10*3/MM3 (ref 0–0.2)
BASOPHILS NFR BLD AUTO: 0.4 % (ref 0–1.5)
BUN SERPL-MCNC: 33 MG/DL (ref 8–23)
BUN/CREAT SERPL: 21.2 (ref 7–25)
CALCIUM SPEC-SCNC: 8 MG/DL (ref 8.6–10.5)
CHLORIDE SERPL-SCNC: 105 MMOL/L (ref 98–107)
CO2 SERPL-SCNC: 24 MMOL/L (ref 22–29)
CREAT SERPL-MCNC: 1.56 MG/DL (ref 0.57–1)
DEPRECATED RDW RBC AUTO: 42.8 FL (ref 37–54)
EGFRCR SERPLBLD CKD-EPI 2021: 32.2 ML/MIN/1.73
EOSINOPHIL # BLD AUTO: 0.41 10*3/MM3 (ref 0–0.4)
EOSINOPHIL NFR BLD AUTO: 3.9 % (ref 0.3–6.2)
ERYTHROCYTE [DISTWIDTH] IN BLOOD BY AUTOMATED COUNT: 13.4 % (ref 12.3–15.4)
GLUCOSE SERPL-MCNC: 85 MG/DL (ref 65–99)
HCT VFR BLD AUTO: 27.6 % (ref 34–46.6)
HGB BLD-MCNC: 9.1 G/DL (ref 12–15.9)
IMM GRANULOCYTES # BLD AUTO: 0.07 10*3/MM3 (ref 0–0.05)
IMM GRANULOCYTES NFR BLD AUTO: 0.7 % (ref 0–0.5)
LYMPHOCYTES # BLD AUTO: 1.32 10*3/MM3 (ref 0.7–3.1)
LYMPHOCYTES NFR BLD AUTO: 12.6 % (ref 19.6–45.3)
MCH RBC QN AUTO: 29.1 PG (ref 26.6–33)
MCHC RBC AUTO-ENTMCNC: 33 G/DL (ref 31.5–35.7)
MCV RBC AUTO: 88.2 FL (ref 79–97)
MONOCYTES # BLD AUTO: 1.3 10*3/MM3 (ref 0.1–0.9)
MONOCYTES NFR BLD AUTO: 12.4 % (ref 5–12)
NEUTROPHILS NFR BLD AUTO: 7.32 10*3/MM3 (ref 1.7–7)
NEUTROPHILS NFR BLD AUTO: 70 % (ref 42.7–76)
NRBC BLD AUTO-RTO: 0 /100 WBC (ref 0–0.2)
PLATELET # BLD AUTO: 175 10*3/MM3 (ref 140–450)
PMV BLD AUTO: 10.8 FL (ref 6–12)
POTASSIUM SERPL-SCNC: 4.4 MMOL/L (ref 3.5–5.2)
RBC # BLD AUTO: 3.13 10*6/MM3 (ref 3.77–5.28)
SODIUM SERPL-SCNC: 137 MMOL/L (ref 136–145)
WBC NRBC COR # BLD AUTO: 10.46 10*3/MM3 (ref 3.4–10.8)

## 2024-01-25 PROCEDURE — 94664 DEMO&/EVAL PT USE INHALER: CPT

## 2024-01-25 PROCEDURE — 94761 N-INVAS EAR/PLS OXIMETRY MLT: CPT

## 2024-01-25 PROCEDURE — 94760 N-INVAS EAR/PLS OXIMETRY 1: CPT

## 2024-01-25 PROCEDURE — 85025 COMPLETE CBC W/AUTO DIFF WBC: CPT | Performed by: INTERNAL MEDICINE

## 2024-01-25 PROCEDURE — 25810000003 SODIUM CHLORIDE 0.9 % SOLUTION: Performed by: NURSE PRACTITIONER

## 2024-01-25 PROCEDURE — 80048 BASIC METABOLIC PNL TOTAL CA: CPT | Performed by: INTERNAL MEDICINE

## 2024-01-25 PROCEDURE — 94799 UNLISTED PULMONARY SVC/PX: CPT

## 2024-01-25 PROCEDURE — 25810000003 SODIUM CHLORIDE 0.9 % SOLUTION: Performed by: STUDENT IN AN ORGANIZED HEALTH CARE EDUCATION/TRAINING PROGRAM

## 2024-01-25 PROCEDURE — 99232 SBSQ HOSP IP/OBS MODERATE 35: CPT | Performed by: NURSE PRACTITIONER

## 2024-01-25 RX ORDER — SODIUM CHLORIDE 9 MG/ML
125 INJECTION, SOLUTION INTRAVENOUS CONTINUOUS
Status: DISCONTINUED | OUTPATIENT
Start: 2024-01-25 | End: 2024-01-26

## 2024-01-25 RX ADMIN — IPRATROPIUM BROMIDE AND ALBUTEROL SULFATE 3 ML: 2.5; .5 SOLUTION RESPIRATORY (INHALATION) at 21:22

## 2024-01-25 RX ADMIN — BUDESONIDE 0.5 MG: 0.5 INHALANT ORAL at 08:03

## 2024-01-25 RX ADMIN — APIXABAN 5 MG: 5 TABLET, FILM COATED ORAL at 20:18

## 2024-01-25 RX ADMIN — BUDESONIDE 0.5 MG: 0.5 INHALANT ORAL at 21:26

## 2024-01-25 RX ADMIN — DOCUSATE SODIUM 100 MG: 100 CAPSULE, LIQUID FILLED ORAL at 09:21

## 2024-01-25 RX ADMIN — SODIUM CHLORIDE 500 ML: 9 INJECTION, SOLUTION INTRAVENOUS at 14:35

## 2024-01-25 RX ADMIN — SODIUM CHLORIDE 125 ML/HR: 9 INJECTION, SOLUTION INTRAVENOUS at 20:18

## 2024-01-25 RX ADMIN — APIXABAN 5 MG: 5 TABLET, FILM COATED ORAL at 09:21

## 2024-01-25 RX ADMIN — SODIUM CHLORIDE 500 ML: 9 INJECTION, SOLUTION INTRAVENOUS at 09:18

## 2024-01-25 RX ADMIN — Medication 10 ML: at 20:18

## 2024-01-25 RX ADMIN — PANTOPRAZOLE SODIUM 40 MG: 40 TABLET, DELAYED RELEASE ORAL at 07:52

## 2024-01-25 RX ADMIN — Medication 10 ML: at 09:18

## 2024-01-25 RX ADMIN — IPRATROPIUM BROMIDE AND ALBUTEROL SULFATE 3 ML: 2.5; .5 SOLUTION RESPIRATORY (INHALATION) at 08:01

## 2024-01-25 RX ADMIN — DOCUSATE SODIUM 100 MG: 100 CAPSULE, LIQUID FILLED ORAL at 20:18

## 2024-01-25 RX ADMIN — IPRATROPIUM BROMIDE AND ALBUTEROL SULFATE 3 ML: 2.5; .5 SOLUTION RESPIRATORY (INHALATION) at 15:39

## 2024-01-25 RX ADMIN — PANTOPRAZOLE SODIUM 40 MG: 40 TABLET, DELAYED RELEASE ORAL at 16:21

## 2024-01-25 RX ADMIN — ACETAMINOPHEN 650 MG: 325 TABLET, FILM COATED ORAL at 20:18

## 2024-01-25 NOTE — PLAN OF CARE
Goal Outcome Evaluation:   Received on O2 at 2 lpm but the RT Increased it to 3 lpm.. Alert and oriented  , cooperative with her care. Discontinue the fluid as ordered. No signed of active bleeding noted. Will continue to monitor.

## 2024-01-25 NOTE — PROGRESS NOTES
"[facility]    Patient Name: Vonnie Coppola  :1935  88 y.o.  LOS: 5  Encounter Provider: CHAPO Lees      Patient Care Team:  Christopher Leyva DO as PCP - General  Christopher Leyva DO as PCP - Family Medicine    Chief Complaint: Follow-up bilateral PE, PAF, recent GI bleed    Interval History: Patient denies any further episodes of GI bleeding.  BP low this am.  No signs of volume overload.  Patient complains of constipation.        Objective   Vital Signs  Temp:  [97.7 °F (36.5 °C)-98.4 °F (36.9 °C)] 98.1 °F (36.7 °C)  Heart Rate:  [71-83] 81  Resp:  [16-20] 18  BP: ()/(42-64) 78/53    Intake/Output Summary (Last 24 hours) at 2024 0816  Last data filed at 2024 0622  Gross per 24 hour   Intake 740 ml   Output 700 ml   Net 40 ml     Flowsheet Rows      Flowsheet Row First Filed Value   Admission Height 165.1 cm (65\") Documented at 2024 1643   Admission Weight 107 kg (236 lb 8.9 oz) Documented at 2024 1643              Vitals and nursing note reviewed.   Constitutional:       Appearance: Normal appearance. Well-developed. Obese.   Eyes:      Conjunctiva/sclera: Conjunctivae normal.   Neck:      Vascular: No carotid bruit.   Pulmonary:      Breath sounds: Decreased breath sounds present. Wheezing present.   Cardiovascular:      Normal rate. Regular rhythm. Normal S1 with normal intensity. Normal S2 with normal intensity.       Murmurs: There is no murmur.      No gallop.  No click. No rub.   Edema:     Pretibial: bilateral 1+ edema of the pretibial area.     Ankle: bilateral 1+ edema of the ankle.     Feet: bilateral 1+ edema of the feet.  Musculoskeletal: Normal range of motion. Skin:     General: Skin is warm and dry.   Neurological:      Mental Status: Alert and oriented to person, place, and time.      GCS: GCS eye subscore is 4. GCS verbal subscore is 5. GCS motor subscore is 6.   Psychiatric:         Speech: Speech normal.    " "     Behavior: Behavior normal.         Thought Content: Thought content normal.         Judgment: Judgment normal.           Pertinent Test Results:  Results from last 7 days   Lab Units 01/25/24  0334 01/24/24  0418 01/23/24  0353 01/22/24  1634 01/22/24  0417 01/21/24  0812 01/20/24  0349 01/19/24  2243 01/19/24  0542   SODIUM mmol/L 137 137 145  --  141 139 142  --  145   POTASSIUM mmol/L 4.4 4.4 5.0 4.5 3.6 4.1 4.1   < > 3.6   CHLORIDE mmol/L 105 103 107  --  107 109* 113*  --  112*   CO2 mmol/L 24.0 25.8 23.0  --  23.2 22.0 22.0  --  22.0   BUN mg/dL 33* 30* 16  --  15 11 11  --  11   CREATININE mg/dL 1.56* 1.85* 1.08*  --  0.87 0.81 0.86  --  0.91   GLUCOSE mg/dL 85 97 73  --  93 89 82  --  91   CALCIUM mg/dL 8.0* 8.2* 8.6  --  8.2* 8.0* 8.1*  --  8.5*    < > = values in this interval not displayed.         Results from last 7 days   Lab Units 01/25/24  0334 01/24/24  0418 01/23/24  0353 01/22/24  0417 01/21/24  0812 01/20/24  0349 01/19/24  1559 01/19/24  0542   WBC 10*3/mm3 10.46 12.09* 11.42* 10.89* 9.53 8.83  --  7.43   HEMOGLOBIN g/dL 9.1* 9.9* 11.0* 10.7* 10.6* 9.9* 10.6* 11.1*  11.1*   HEMATOCRIT % 27.6* 29.8* 32.9* 31.8* 33.0* 30.2* 31.8* 34.2  34.2   PLATELETS 10*3/mm3 175 173 149 155 139* 151  --  182     Results from last 7 days   Lab Units 01/23/24  0846 01/22/24  2318 01/22/24  1428   INR   --   --  1.19*   APTT seconds 74.4* 142.9* <20.0*     Results from last 7 days   Lab Units 01/21/24  0812   MAGNESIUM mg/dL 2.0           Invalid input(s): \"LDLCALC\"  Results from last 7 days   Lab Units 01/21/24  0812   PROBNP pg/mL 1,099.0     Results from last 7 days   Lab Units 01/21/24  0812   TSH uIU/mL 2.100           Medication Review:   apixaban, 5 mg, Oral, Q12H  budesonide, 0.5 mg, Nebulization, BID - RT  dilTIAZem CD, 120 mg, Oral, Q24H  docusate sodium, 100 mg, Oral, BID  ipratropium-albuterol, 3 mL, Nebulization, TID  losartan, 50 mg, Oral, Q24H  pantoprazole, 40 mg, Oral, BID AC  sodium " chloride, 10 mL, Intravenous, Q12H                Assessment & Plan     Active Hospital Problems    Diagnosis  POA    **GI bleed [K92.2]  Yes    Pulmonary embolus [I26.99]  Yes    Atrial fibrillation [I48.91]  No    History of CVA (cerebrovascular accident) [Z86.73]  Not Applicable    Dehydration [E86.0]  Yes    Renal insufficiency [N28.9]  Yes    Pulmonary nodule [R91.1]  Yes    Adnexal cyst [N94.9]  Yes    Anemia [D64.9]  Yes    HTN (hypertension) [I10]  Yes    History of breast cancer [Z85.3]  Not Applicable    Asthma [J45.909]  Yes    Nausea [R11.0]  Yes      Resolved Hospital Problems   No resolved problems to display.        Bilateral PE: Mostly segmental/subsegmental.  No evidence of RV strain  PAF  Acute hypoxemic respiratory distress: History of lung cancer and COPD  Pulmonary hypertension  Acute GI bleed  History of CVA  HTN      ASA on hold given recent GIB  Tolerating apixiban with no further episodes of GIB  Creatinine is improving from yesterday, diuretics seems to be elevating her creatinine.  Hold for now..   BP is low, parameters added to antihypertensive regimen.  Give NS 500cc bolus over 2 hours        CHAPO Lees  [facility]  01/25/24  08:35 EST

## 2024-01-25 NOTE — PLAN OF CARE
Goal Outcome Evaluation:  Plan of Care Reviewed With: patient, daughter, sibling        Progress: no change  Outcome Evaluation: AOx4.  Daughter and sister at bedside.  BP low today 78/53 & 86/66.  Received NS boluses.  Up to BSC with much help of 2 assist (very weak).   Appears short of breath with this exertion.  /63 after 2nd bolus.

## 2024-01-25 NOTE — PROGRESS NOTES
"      [facility]         Dr Rangel Sayied   LOS: 5 days   Patient Care Team:  Christopher Leyva DO as PCP - General  Christopher Leyva DO as PCP - Family Medicine    Chief Complaint: Bilateral PE with acute pulmonary nodule history of asthma other issues as listed below    Interval History: Tolerating p.o. Eliquis well.  No overt bleeding reported.  Breathing remained stable.  Currently on 3 L oxygen nasal cannula.    REVIEW OF SYSTEMS:   CARDIOVASCULAR: No chest pain, chest pressure or chest discomfort. No palpitations or edema.   RESPIRATORY: Shortness of breath with activity with ongoing cough   GASTROINTESTINAL: No anorexia, nausea, vomiting or diarrhea. No abdominal pain or blood.   HEMATOLOGIC: No bleeding or bruising.     Ventilator/Non-Invasive Ventilation Settings (From admission, onward)      None              Vital Signs  Temp:  [97.7 °F (36.5 °C)-98.4 °F (36.9 °C)] 98.1 °F (36.7 °C)  Heart Rate:  [71-83] 71  Resp:  [18-20] 18  BP: ()/(42-64) 90/58    Intake/Output Summary (Last 24 hours) at 1/25/2024 1237  Last data filed at 1/25/2024 0918  Gross per 24 hour   Intake 540 ml   Output 700 ml   Net -160 ml     Flowsheet Rows      Flowsheet Row First Filed Value   Admission Height 165.1 cm (65\") Documented at 01/16/2024 1643   Admission Weight 107 kg (236 lb 8.9 oz) Documented at 01/16/2024 1643            Physical Exam:  Patient is examined using the personal protective equipment as per guidelines from infection control for this particular patient as enacted.  Hand hygiene was performed before and after patient interaction.   General Appearance:    Alert, cooperative, in no acute distress.  Following simple commands  ENT Mallampati between 3 and 4 no nasal congestion  Neck midline trachea, no thyromegaly   Lungs:   Diminished breath sounds bilaterally    Heart:    Regular rhythm and normal rate, normal S1 and S2, no            murmur, no gallop, no rub, no click   Chest " June 26TH Tuesday AT 3:15PM  DR LAUREN HENDRICKSON    Wall:    No abnormalities observed   Abdomen:     Normal bowel sounds, no masses, no organomegaly, soft        nontender, nondistended, no guarding, no rebound                tenderness   Extremities:   Moves all extremities well, no edema, no cyanosis, no             redness  CNS no focal neurological deficits normal sensory exam  Skin no rashes no nodules  Musculoskeletal no cyanosis no clubbing normal range of motion     Results Review:        Results from last 7 days   Lab Units 01/25/24  0334 01/24/24  0418 01/23/24  0353   SODIUM mmol/L 137 137 145   POTASSIUM mmol/L 4.4 4.4 5.0   CHLORIDE mmol/L 105 103 107   CO2 mmol/L 24.0 25.8 23.0   BUN mg/dL 33* 30* 16   CREATININE mg/dL 1.56* 1.85* 1.08*   GLUCOSE mg/dL 85 97 73   CALCIUM mg/dL 8.0* 8.2* 8.6         Results from last 7 days   Lab Units 01/25/24  0334 01/24/24  0418 01/23/24  0353   WBC 10*3/mm3 10.46 12.09* 11.42*   HEMOGLOBIN g/dL 9.1* 9.9* 11.0*   HEMATOCRIT % 27.6* 29.8* 32.9*   PLATELETS 10*3/mm3 175 173 149     Results from last 7 days   Lab Units 01/23/24  0846 01/22/24  2318 01/22/24  1428   INR   --   --  1.19*   APTT seconds 74.4* 142.9* <20.0*         Results from last 7 days   Lab Units 01/21/24  0812   MAGNESIUM mg/dL 2.0               I reviewed the patient's new clinical results.  I personally viewed and interpreted the patient's chest x-ray.        Medication Review:   apixaban, 5 mg, Oral, Q12H  budesonide, 0.5 mg, Nebulization, BID - RT  dilTIAZem CD, 120 mg, Oral, Q24H  docusate sodium, 100 mg, Oral, BID  ipratropium-albuterol, 3 mL, Nebulization, TID  losartan, 50 mg, Oral, Q24H  pantoprazole, 40 mg, Oral, BID AC  sodium chloride, 10 mL, Intravenous, Q12H                 ASSESSMENT:   Pulmonary nodules  Bilateral PE  Bilateral DVT  Asthma  Gastrointestinal bleed  A-fib RVR  Acute blood loss anemia  Osteoarthritis  Hypertension  History of breast cancer    PLAN:  Patient has been transitioned to Eliquis currently tolerating well no GI  bleed reported.  Doppler confirms bilateral lower extremity DVT.  Pulmonary nodule will need follow-up CT chest in 3 months.  Continue bronchodilators for asthma.  Clinically stable with no active wheezing.  Mobilize ambulate  As needed cough medicine  Will need outpatient PFTs  I have arranged follow-up with me in the office 6 to 8 weeks.  Continue to monitor clinical course closely.        Juan Carlos Lam MD  01/25/24  12:37 EST

## 2024-01-26 LAB
ANION GAP SERPL CALCULATED.3IONS-SCNC: 8.1 MMOL/L (ref 5–15)
BASOPHILS # BLD AUTO: 0.03 10*3/MM3 (ref 0–0.2)
BASOPHILS NFR BLD AUTO: 0.4 % (ref 0–1.5)
BUN SERPL-MCNC: 26 MG/DL (ref 8–23)
BUN/CREAT SERPL: 23.9 (ref 7–25)
CALCIUM SPEC-SCNC: 7.9 MG/DL (ref 8.6–10.5)
CHLORIDE SERPL-SCNC: 111 MMOL/L (ref 98–107)
CO2 SERPL-SCNC: 22.9 MMOL/L (ref 22–29)
CREAT SERPL-MCNC: 1.09 MG/DL (ref 0.57–1)
DEPRECATED RDW RBC AUTO: 46.2 FL (ref 37–54)
EGFRCR SERPLBLD CKD-EPI 2021: 49.6 ML/MIN/1.73
EOSINOPHIL # BLD AUTO: 0.37 10*3/MM3 (ref 0–0.4)
EOSINOPHIL NFR BLD AUTO: 4.5 % (ref 0.3–6.2)
ERYTHROCYTE [DISTWIDTH] IN BLOOD BY AUTOMATED COUNT: 13.6 % (ref 12.3–15.4)
GLUCOSE SERPL-MCNC: 96 MG/DL (ref 65–99)
HCT VFR BLD AUTO: 27.4 % (ref 34–46.6)
HGB BLD-MCNC: 8.6 G/DL (ref 12–15.9)
IMM GRANULOCYTES # BLD AUTO: 0.06 10*3/MM3 (ref 0–0.05)
IMM GRANULOCYTES NFR BLD AUTO: 0.7 % (ref 0–0.5)
LYMPHOCYTES # BLD AUTO: 1.29 10*3/MM3 (ref 0.7–3.1)
LYMPHOCYTES NFR BLD AUTO: 15.8 % (ref 19.6–45.3)
MCH RBC QN AUTO: 29.1 PG (ref 26.6–33)
MCHC RBC AUTO-ENTMCNC: 31.4 G/DL (ref 31.5–35.7)
MCV RBC AUTO: 92.6 FL (ref 79–97)
MONOCYTES # BLD AUTO: 1.08 10*3/MM3 (ref 0.1–0.9)
MONOCYTES NFR BLD AUTO: 13.2 % (ref 5–12)
NEUTROPHILS NFR BLD AUTO: 5.35 10*3/MM3 (ref 1.7–7)
NEUTROPHILS NFR BLD AUTO: 65.4 % (ref 42.7–76)
NRBC BLD AUTO-RTO: 0.1 /100 WBC (ref 0–0.2)
PLATELET # BLD AUTO: 179 10*3/MM3 (ref 140–450)
PMV BLD AUTO: 10.4 FL (ref 6–12)
POTASSIUM SERPL-SCNC: 4.2 MMOL/L (ref 3.5–5.2)
RBC # BLD AUTO: 2.96 10*6/MM3 (ref 3.77–5.28)
SODIUM SERPL-SCNC: 142 MMOL/L (ref 136–145)
WBC NRBC COR # BLD AUTO: 8.18 10*3/MM3 (ref 3.4–10.8)

## 2024-01-26 PROCEDURE — 85025 COMPLETE CBC W/AUTO DIFF WBC: CPT | Performed by: INTERNAL MEDICINE

## 2024-01-26 PROCEDURE — 80048 BASIC METABOLIC PNL TOTAL CA: CPT | Performed by: INTERNAL MEDICINE

## 2024-01-26 PROCEDURE — 25810000003 SODIUM CHLORIDE 0.9 % SOLUTION: Performed by: STUDENT IN AN ORGANIZED HEALTH CARE EDUCATION/TRAINING PROGRAM

## 2024-01-26 PROCEDURE — 94799 UNLISTED PULMONARY SVC/PX: CPT

## 2024-01-26 PROCEDURE — 94761 N-INVAS EAR/PLS OXIMETRY MLT: CPT

## 2024-01-26 PROCEDURE — 94664 DEMO&/EVAL PT USE INHALER: CPT

## 2024-01-26 PROCEDURE — 97530 THERAPEUTIC ACTIVITIES: CPT

## 2024-01-26 PROCEDURE — 99232 SBSQ HOSP IP/OBS MODERATE 35: CPT | Performed by: NURSE PRACTITIONER

## 2024-01-26 PROCEDURE — 94760 N-INVAS EAR/PLS OXIMETRY 1: CPT

## 2024-01-26 RX ADMIN — PANTOPRAZOLE SODIUM 40 MG: 40 TABLET, DELAYED RELEASE ORAL at 17:46

## 2024-01-26 RX ADMIN — DILTIAZEM HYDROCHLORIDE 120 MG: 120 CAPSULE, EXTENDED RELEASE ORAL at 08:13

## 2024-01-26 RX ADMIN — Medication 10 ML: at 21:05

## 2024-01-26 RX ADMIN — DOCUSATE SODIUM 100 MG: 100 CAPSULE, LIQUID FILLED ORAL at 21:05

## 2024-01-26 RX ADMIN — ACETAMINOPHEN 650 MG: 325 TABLET, FILM COATED ORAL at 21:05

## 2024-01-26 RX ADMIN — IPRATROPIUM BROMIDE AND ALBUTEROL SULFATE 3 ML: 2.5; .5 SOLUTION RESPIRATORY (INHALATION) at 07:40

## 2024-01-26 RX ADMIN — BUDESONIDE 0.5 MG: 0.5 INHALANT ORAL at 19:05

## 2024-01-26 RX ADMIN — IPRATROPIUM BROMIDE AND ALBUTEROL SULFATE 3 ML: 2.5; .5 SOLUTION RESPIRATORY (INHALATION) at 19:05

## 2024-01-26 RX ADMIN — BENZONATATE 200 MG: 100 CAPSULE ORAL at 17:53

## 2024-01-26 RX ADMIN — IPRATROPIUM BROMIDE AND ALBUTEROL SULFATE 3 ML: 2.5; .5 SOLUTION RESPIRATORY (INHALATION) at 14:59

## 2024-01-26 RX ADMIN — PANTOPRAZOLE SODIUM 40 MG: 40 TABLET, DELAYED RELEASE ORAL at 08:14

## 2024-01-26 RX ADMIN — SODIUM CHLORIDE 125 ML/HR: 9 INJECTION, SOLUTION INTRAVENOUS at 12:04

## 2024-01-26 RX ADMIN — BUDESONIDE 0.5 MG: 0.5 INHALANT ORAL at 07:43

## 2024-01-26 RX ADMIN — APIXABAN 5 MG: 5 TABLET, FILM COATED ORAL at 21:05

## 2024-01-26 RX ADMIN — DIAZEPAM 2 MG: 2 TABLET ORAL at 21:04

## 2024-01-26 RX ADMIN — DOCUSATE SODIUM 100 MG: 100 CAPSULE, LIQUID FILLED ORAL at 08:14

## 2024-01-26 RX ADMIN — APIXABAN 5 MG: 5 TABLET, FILM COATED ORAL at 08:13

## 2024-01-26 RX ADMIN — Medication 10 ML: at 08:14

## 2024-01-26 NOTE — PROGRESS NOTES
Name: Vonnie Coppola ADMIT: 2024   : 1935  PCP: Christopher Leyva DO    MRN: [id number] LOS: 6 days   AGE/SEX: 88 y.o. female  ROOM: [facility]     Subjective.      Pressures improved with fluids. O2 requirement is also improving.     Objective   Objective   Vital Signs  Temp:  [97.7 °F (36.5 °C)-98.8 °F (37.1 °C)] 97.7 °F (36.5 °C)  Heart Rate:  [69-85] 69  Resp:  [16-20] 16  BP: ()/(54-68) 110/68  SpO2:  [94 %-100 %] 94 %  on  Flow (L/min):  [1.5-3] 1.5;   Device (Oxygen Therapy): nasal cannula;humidified    Intake/Output Summary (Last 24 hours) at 2024 1309  Last data filed at 2024 0814  Gross per 24 hour   Intake 540 ml   Output 1050 ml   Net -510 ml     Body mass index is 40.44 kg/m².      24  1643 24  0448 24  1605   Weight: 107 kg (236 lb 8.9 oz) 110 kg (243 lb 2.7 oz) 110 kg (243 lb)     Physical Exam  Constitutional:       General: She is not in acute distress.  HENT:      Head: Normocephalic and atraumatic.   Eyes:      Extraocular Movements: Extraocular movements intact.      Pupils: Pupils are equal, round, and reactive to light.   Cardiovascular:      Rate and Rhythm: Normal rate and regular rhythm.   Pulmonary:      Effort: Pulmonary effort is normal. No respiratory distress.   Abdominal:      General: There is no distension.      Palpations: Abdomen is soft.      Tenderness: There is no abdominal tenderness.   Neurological:      General: No focal deficit present.      Mental Status: She is alert and oriented to person, place, and time.      Motor: Weakness present.       Exam unchanged form 24    Results Review:      Results from last 7 days   Lab Units 24  0355 24  0334 24  0418 24  0353 24  1634 24  0417 24  0812 24  0349   SODIUM mmol/L 142 137 137 145  --  141 139 142   POTASSIUM mmol/L 4.2 4.4 4.4 5.0 4.5 3.6 4.1 4.1   CHLORIDE mmol/L 111* 105 103 107  --  107 109* 113*   CO2 mmol/L 22.9  "24.0 25.8 23.0  --  23.2 22.0 22.0   BUN mg/dL 26* 33* 30* 16  --  15 11 11   CREATININE mg/dL 1.09* 1.56* 1.85* 1.08*  --  0.87 0.81 0.86   GLUCOSE mg/dL 96 85 97 73  --  93 89 82   CALCIUM mg/dL 7.9* 8.0* 8.2* 8.6  --  8.2* 8.0* 8.1*     Estimated Creatinine Clearance: 45.7 mL/min (A) (by C-G formula based on SCr of 1.09 mg/dL (H)).              Results from last 7 days   Lab Units 01/21/24  0812   PROBNP pg/mL 1,099.0     Results from last 7 days   Lab Units 01/21/24  0812   TSH uIU/mL 2.100     Results from last 7 days   Lab Units 01/21/24  0812   MAGNESIUM mg/dL 2.0           Invalid input(s): \"LDLCALC\"  Results from last 7 days   Lab Units 01/26/24  0355 01/25/24  0334 01/24/24  0418 01/23/24  0353 01/22/24  0417 01/21/24  0812 01/20/24  0349   WBC 10*3/mm3 8.18 10.46 12.09* 11.42* 10.89* 9.53 8.83   HEMOGLOBIN g/dL 8.6* 9.1* 9.9* 11.0* 10.7* 10.6* 9.9*   HEMATOCRIT % 27.4* 27.6* 29.8* 32.9* 31.8* 33.0* 30.2*   PLATELETS 10*3/mm3 179 175 173 149 155 139* 151   MCV fL 92.6 88.2 88.4 89.4 88.6 91.2 89.1   MCH pg 29.1 29.1 29.4 29.9 29.8 29.3 29.2   MCHC g/dL 31.4* 33.0 33.2 33.4 33.6 32.1 32.8   RDW % 13.6 13.4 13.6 14.1 13.4 13.6 13.5   RDW-SD fl 46.2 42.8 43.3 45.6 43.8 45.5 43.7   MPV fL 10.4 10.8 10.3 11.3 10.5 10.4 10.0   NEUTROPHIL % % 65.4 70.0 68.3 63.7 65.8 61.0 62.9   LYMPHOCYTE % % 15.8* 12.6* 13.5* 17.6* 16.6* 22.6 20.6   MONOCYTES % % 13.2* 12.4* 12.2* 14.0* 12.6* 11.3 11.9   EOSINOPHIL % % 4.5 3.9 4.8 2.8 3.6 4.1 3.7   BASOPHIL % % 0.4 0.4 0.2 1.0 0.6 0.5 0.3   IMM GRAN % % 0.7* 0.7* 1.0* 0.9* 0.8* 0.5 0.6*   NEUTROS ABS 10*3/mm3 5.35 7.32* 8.26* 7.28* 7.16* 5.81 5.55   LYMPHS ABS 10*3/mm3 1.29 1.32 1.63 2.01 1.81 2.15 1.82   MONOS ABS 10*3/mm3 1.08* 1.30* 1.47* 1.60* 1.37* 1.08* 1.05*   EOS ABS 10*3/mm3 0.37 0.41* 0.58* 0.32 0.39 0.39 0.33   BASOS ABS 10*3/mm3 0.03 0.04 0.03 0.11 0.07 0.05 0.03   IMMATURE GRANS (ABS) 10*3/mm3 0.06* 0.07* 0.12* 0.10* 0.09* 0.05 0.05   NRBC /100 WBC 0.1 0.0 0.0 0.0 " 0.0 0.0 0.0     Results from last 7 days   Lab Units 01/23/24  0846 01/22/24  2318 01/22/24  1428   INR   --   --  1.19*   APTT seconds 74.4* 142.9* <20.0*                                           Imaging:  Imaging Results (Last 24 Hours)       ** No results found for the last 24 hours. **               I reviewed the patient's new clinical results / labs / tests / procedures      Assessment/Plan     Active Hospital Problems    Diagnosis  POA    **GI bleed [K92.2]  Yes    Pulmonary embolus [I26.99]  Yes    Atrial fibrillation [I48.91]  No    History of CVA (cerebrovascular accident) [Z86.73]  Not Applicable    Dehydration [E86.0]  Yes    Renal insufficiency [N28.9]  Yes    Pulmonary nodule [R91.1]  Yes    Adnexal cyst [N94.9]  Yes    Anemia [D64.9]  Yes    HTN (hypertension) [I10]  Yes    History of breast cancer [Z85.3]  Not Applicable    Asthma [J45.909]  Yes    Nausea [R11.0]  Yes      Resolved Hospital Problems   No resolved problems to display.     Hypotension  -Improved with holding losartan and administrating fluids.  Diuretics currently held as well.    JAVI  From overdiuresis.  Diuretics discontinued and IV fluids administered with improvement in creatinine.    GI bleed in a patient with a history of peptic ulcer disease/diverticular disease/hemorrhoids/constipation.    -EGD with hiatal hernia and gastritis status post biopsy.  Colonoscopy with polyps in the ascending and descending colon status post removal and diverticulosis.    -Continue holding aspirin.  Will need GI follow-up as an outpatient for capsule enterography.      Hypertension/new onset A-fib.    - Now back in sinus. Cards following. Transitioned from heparin to Eliquis. On PO dilt.     Bilateral PE/DVT - Transitioned to eliquis 5mg bid.  No RV strain, no need for any intervention presently.     Acute asthma exacerbation/hypoxemia/pulmonary nodule.  CT of the chest with enlarging pulmonary nodules on the right middle and lower lobe.  Continues  to be unable to wean her off the oxygen.  Chest x-ray with no acute disease.  Continue Pulmicort and DuoNebs.  And spirometry.  Awaiting walking pulse oximetry (patient refused yesterday).    Pulmonary saw the patient recommends chest CT follow-up on the pulmonary nodules in 3 months.    History of CVA.  Holding aspirin secondary to GI bleed.    Left adnexal cyst.  Pelvic ultrasound with absent uterus and ovaries.  GYN follow-up as an outpatient with repeat pelvic ultrasound.    VTE prophylaxis.  Sequential compression devices.    Eliquis for dvt ppx  Full Code    Edilberto Patel MD  [facility]  01/26/24  13:09 EST

## 2024-01-26 NOTE — PLAN OF CARE
Goal Outcome Evaluation:  Plan of Care Reviewed With: patient, family        Progress: no change  Outcome Evaluation: Pt seen for OT/PT co-tx this AM and agreeable to treatment with richar at bedside. Pt  noted to improve bed moblity this AM and MIN A supine to sit EOB and wash face with good balance. Pt MIN/MOD A x2 STS EOB and MIN A x2 to take a few steps to chair using rollator, with cues for upright posture, safety, and hand placement when reaching back to sit in chair. Pt continues to fatigue quickly and requires frequent rest breaks and PLB cues, desatting to 82% after act. Pt back up to 96% after resting in chair. OT demonstrated BUE AROM ther ex for pt to perform while sitting UIC or in bed throughout the day and pt performed 1 set to demo understanding. OT/PT discussed DC plans with pt and family and family continues to hope for home with HH at DC vs SNF. OT will continue to progress pt as tolerated.      Anticipated Discharge Disposition (OT): home with home health, home with assist

## 2024-01-26 NOTE — THERAPY TREATMENT NOTE
Patient Name: Vonnie Cpopola  : 1935    MRN: [id number]                              Today's Date: 2024       Admit Date: 2024    Visit Dx:     ICD-10-CM ICD-9-CM   1. Gastrointestinal hemorrhage, unspecified gastrointestinal hemorrhage type  K92.2 578.9   2. Pulmonary nodule  R91.1 793.11   3. Cyst of ovary, unspecified laterality  N83.209 620.2   4. Anemia, unspecified type  D64.9 285.9   5. Gastrointestinal hemorrhage with melena  K92.1 578.1   6. Nausea  R11.0 787.02     Patient Active Problem List   Diagnosis    GI bleed    Anemia    HTN (hypertension)    History of breast cancer    Asthma    History of CVA (cerebrovascular accident)    Dehydration    Renal insufficiency    Pulmonary nodule    Adnexal cyst    Nausea    Atrial fibrillation    Pulmonary embolus     Past Medical History:   Diagnosis Date    Arthritis     Asthma     Atrial fibrillation     per pt's daughter.States hx of a-fib in the past when stressed or tired.    Breast cancer     LEFT BREAST CA, LUMPECTOMY & RADS    Hx of radiation therapy     APPROXIMATELY 40 TREATMENTS FOR LEFT BREAST CA    Hypertension     Pneumonia     Stroke      Past Surgical History:   Procedure Laterality Date    APPENDECTOMY      BLADDER SURGERY      BREAST BIOPSY Left     MALIGNANT    BREAST LUMPECTOMY Left     MALIGNANT    COLONOSCOPY N/A 2024    Procedure: COLONOSCOPY to cecum with cold snare polypectomies;  Surgeon: Harshad Gaston MD;  Location: [facility];  Service: Gastroenterology;  Laterality: N/A;  pre- gi bleed, anemia  post- diverticulosis, polyps    ENDOSCOPY N/A 2024    Procedure: ESOPHAGOGASTRODUODENOSCOPY with biospy;  Surgeon: Harshad Gaston MD;  Location: [facility];  Service: Gastroenterology;  Laterality: N/A;  pre- gi bleed, anemia  post- erosive gastirits    GALLBLADDER SURGERY      HYSTERECTOMY      OOPHORECTOMY        General Information       Row Name 24 1051           Physical Therapy Time and Intention    Document Type therapy note (daily note)  -EJ     Mode of Treatment occupational therapy;physical therapy;co-treatment  -       Row Name 01/26/24 1051          General Information    Existing Precautions/Restrictions fall;oxygen therapy device and L/min  -       Row Name 01/26/24 1051          Safety Issues, Functional Mobility    Comment, Safety Issues/Impairments (Mobility) Co treatment medically appropriate and necessary due to patient acuity level, activity tolerance and safety of patient and staff. Treatment is focusing on progression of care and goals established in the POC.  -EJ               User Key  (r) = Recorded By, (t) = Taken By, (c) = Cosigned By      Initials Name Provider Type     Ivory Adams, PT Physical Therapist                   Mobility       Row Name 01/26/24 1051          Bed Mobility    Supine-Sit Allen (Bed Mobility) verbal cues;nonverbal cues (demo/gesture);minimum assist (75% patient effort)  -EJ     Sit-Supine Allen (Bed Mobility) not tested  -EJ     Assistive Device (Bed Mobility) head of bed elevated;bed rails  -       Row Name 01/26/24 1051          Sit-Stand Transfer    Sit-Stand Allen (Transfers) verbal cues;nonverbal cues (demo/gesture);minimum assist (75% patient effort);moderate assist (50% patient effort)  -EJ     Assistive Device (Sit-Stand Transfers) walker, 4-wheeled  -EJ     Comment, (Sit-Stand Transfer) cues for hand placement and upright posture  -       Row Name 01/26/24 1051          Gait/Stairs (Locomotion)    Allen Level (Gait) nonverbal cues (demo/gesture);verbal cues;minimum assist (75% patient effort);2 person assist  -EJ     Assistive Device (Gait) walker, 4-wheeled  -EJ     Distance in Feet (Gait) 3  -EJ     Deviations/Abnormal Patterns (Gait) prashanth decreased;stride length decreased  -EJ     Bilateral Gait Deviations forward flexed posture;heel strike decreased  -EJ     Comment,  (Gait/Stairs) several small steps from bed to chair, cues for upright posture, limited by weakness and fatigue; pt declines attempt to ambulate further due to fatigue  -               User Key  (r) = Recorded By, (t) = Taken By, (c) = Cosigned By      Initials Name Provider Type    Ivory Velasquez, PT Physical Therapist                   Obj/Interventions    No documentation.                  Goals/Plan    No documentation.                  Clinical Impression       Row Name 01/26/24 1052          Pain    Pretreatment Pain Rating 0/10 - no pain  -       Row Name 01/26/24 1052          Plan of Care Review    Plan of Care Reviewed With patient;family  -     Outcome Evaluation Pt seen for PT this am. She is doing well with no complaints upon entry to room. Pt continues to be limited by weakness and fatigue, but is requiring less assistance w mobility today. She was able to transition to EOB w min A. She stood w min/mod a using rollator. Cues for proper hand placement w transfers. She tolerated taking several small steps from bed to chair w min A x 2. Pt fatigues quickly. Pt's O2 drops to 82%. After resting and cues for breathing, she recoverd to 96%. Asked pt if she would like to attempt ambulation again after seated rest, but she reports she is still too worn out at this time. Encouraged pt to perform BUE/BLE while in chair and in bed. She verbalizes understanding. Discussed DC plans w pt and family. Family hoping to take pt home at DC w HHPT. Will continue to progress activity as tolerated.  -       Row Name 01/26/24 1052          Therapy Assessment/Plan (PT)    Therapy Frequency (PT) 6 times/wk  -       Row Name 01/26/24 1052          Vital Signs    O2 Delivery Pre Treatment supplemental O2  -EJ     Intra SpO2 (%) 82  -EJ     O2 Delivery Intra Treatment supplemental O2  -EJ     Post SpO2 (%) 96  -EJ     O2 Delivery Post Treatment supplemental O2  -EJ     Pre Patient Position Supine  -EJ     Intra  Patient Position Standing  -EJ     Post Patient Position Sitting  -EJ       Row Name 01/26/24 1052          Positioning and Restraints    Pre-Treatment Position in bed  -EJ     Post Treatment Position chair  -EJ     In Chair notified nsg;reclined;call light within reach;encouraged to call for assist;exit alarm on;with family/caregiver  -EJ               User Key  (r) = Recorded By, (t) = Taken By, (c) = Cosigned By      Initials Name Provider Type    Ivory Velasquez, PT Physical Therapist                   Outcome Measures       Row Name 01/26/24 1056          How much help from another person do you currently need...    Turning from your back to your side while in flat bed without using bedrails? 3  -EJ     Moving from lying on back to sitting on the side of a flat bed without bedrails? 3  -EJ     Moving to and from a bed to a chair (including a wheelchair)? 2  -EJ     Standing up from a chair using your arms (e.g., wheelchair, bedside chair)? 2  -EJ     Climbing 3-5 steps with a railing? 1  -EJ     To walk in hospital room? 2  -EJ     AM-PAC 6 Clicks Score (PT) 13  -EJ     Highest Level of Mobility Goal 4 --> Transfer to chair/commode  -EJ               User Key  (r) = Recorded By, (t) = Taken By, (c) = Cosigned By      Initials Name Provider Type    Ivory Velasquez, PT Physical Therapist                                 Physical Therapy Education       Title: PT OT SLP Therapies (Done)       Topic: Physical Therapy (Done)       Point: Mobility training (Done)       Learning Progress Summary             Patient Acceptance, E, VU by AB at 1/23/2024 1619    Acceptance, E, VU by KALA at 1/17/2024 1337    Acceptance, E,TB,D, VU,DU by AK at 1/17/2024 1006                         Point: Home exercise program (Done)       Learning Progress Summary             Patient Acceptance, E, VU by AB at 1/23/2024 1619    Acceptance, E, VU by KALA at 1/17/2024 1337                         Point: Body mechanics (Done)        Learning Progress Summary             Patient Acceptance, E, VU by AB at 1/23/2024 1619    Acceptance, E, VU by KALA at 1/17/2024 1337                         Point: Precautions (Done)       Learning Progress Summary             Patient Acceptance, E, VU by AB at 1/23/2024 1619    Acceptance, E, VU by KALA at 1/17/2024 1337                                         User Key       Initials Effective Dates Name Provider Type Discipline    KALA 08/02/22 -  Chay Moss OT Occupational Therapist OT    AB 12/15/23 -  Jessi Cano, RN Registered Nurse Nurse    AK 12/28/23 -  Erica Alexis, PT Student PT Student PT                  PT Recommendation and Plan     Plan of Care Reviewed With: patient, family  Progress: improving  Outcome Evaluation: Pt seen for PT this am. She is doing well with no complaints upon entry to room. Pt continues to be limited by weakness and fatigue, but is requiring less assistance w mobility today. She was able to transition to EOB w min A. She stood w min/mod a using rollator. Cues for proper hand placement w transfers. She tolerated taking several small steps from bed to chair w min A x 2. Pt fatigues quickly. Pt's O2 drops to 82%. After resting and cues for breathing, she recoverd to 96%. Asked pt if she would like to attempt ambulation again after seated rest, but she reports she is still too worn out at this time. Encouraged pt to perform BUE/BLE while in chair and in bed. She verbalizes understanding. Discussed DC plans w pt and family. Family hoping to take pt home at DC w HHPT. Will continue to progress activity as tolerated.     Time Calculation:         PT Charges       Row Name 01/26/24 1058             Time Calculation    Start Time 1019  -EJ      Stop Time 1043  -EJ      Time Calculation (min) 24 min  -EJ      PT Received On 01/26/24  -EJ      PT - Next Appointment 01/27/24  -EJ         Time Calculation- PT    Total Timed Code Minutes- PT 24 minute(s)  -EJ         Timed Charges     90165 - PT Therapeutic Activity Minutes 24  -EJ         Total Minutes    Timed Charges Total Minutes 24  -EJ       Total Minutes 24  -EJ                User Key  (r) = Recorded By, (t) = Taken By, (c) = Cosigned By      Initials Name Provider Type    Ivory Velasquez, PT Physical Therapist                  Therapy Charges for Today       Code Description Service Date Service Provider Modifiers Qty    62917339220 HC PT THERAPEUTIC ACT EA 15 MIN 1/26/2024 Ivory Adams, PT GP 2            PT G-Codes  Outcome Measure Options: AM-PAC 6 Clicks Daily Activity (OT)  AM-PAC 6 Clicks Score (PT): 13  AM-PAC 6 Clicks Score (OT): 21  PT Discharge Summary  Anticipated Discharge Disposition (PT): home with 24/7 care, home with home health, skilled nursing facility    Ivory Adams, PT  1/26/2024

## 2024-01-26 NOTE — THERAPY TREATMENT NOTE
Patient Name: Vonnie Coppola  : 1935    MRN: [id number]                              Today's Date: 2024       Admit Date: 2024    Visit Dx:     ICD-10-CM ICD-9-CM   1. Gastrointestinal hemorrhage, unspecified gastrointestinal hemorrhage type  K92.2 578.9   2. Pulmonary nodule  R91.1 793.11   3. Cyst of ovary, unspecified laterality  N83.209 620.2   4. Anemia, unspecified type  D64.9 285.9   5. Gastrointestinal hemorrhage with melena  K92.1 578.1   6. Nausea  R11.0 787.02     Patient Active Problem List   Diagnosis    GI bleed    Anemia    HTN (hypertension)    History of breast cancer    Asthma    History of CVA (cerebrovascular accident)    Dehydration    Renal insufficiency    Pulmonary nodule    Adnexal cyst    Nausea    Atrial fibrillation    Pulmonary embolus     Past Medical History:   Diagnosis Date    Arthritis     Asthma     Atrial fibrillation     per pt's daughter.States hx of a-fib in the past when stressed or tired.    Breast cancer     LEFT BREAST CA, LUMPECTOMY & RADS    Hx of radiation therapy     APPROXIMATELY 40 TREATMENTS FOR LEFT BREAST CA    Hypertension     Pneumonia     Stroke      Past Surgical History:   Procedure Laterality Date    APPENDECTOMY      BLADDER SURGERY      BREAST BIOPSY Left     MALIGNANT    BREAST LUMPECTOMY Left     MALIGNANT    COLONOSCOPY N/A 2024    Procedure: COLONOSCOPY to cecum with cold snare polypectomies;  Surgeon: Harshad Gaston MD;  Location: [facility] ENDOSCOPY;  Service: Gastroenterology;  Laterality: N/A;  pre- gi bleed, anemia  post- diverticulosis, polyps    ENDOSCOPY N/A 2024    Procedure: ESOPHAGOGASTRODUODENOSCOPY with biospy;  Surgeon: Harshad Gaston MD;  Location: [facility] ENDOSCOPY;  Service: Gastroenterology;  Laterality: N/A;  pre- gi bleed, anemia  post- erosive gastirits    GALLBLADDER SURGERY      HYSTERECTOMY      OOPHORECTOMY        General Information       Row Name 24 1540          OT  Time and Intention    Document Type therapy note (daily note)  -LINUS     Mode of Treatment occupational therapy;physical therapy;co-treatment  -LINUS       Row Name 01/26/24 1540          General Information    Patient Profile Reviewed yes  -LINUS     Prior Level of Function independent:;ADL's  -LINUS     Existing Precautions/Restrictions fall;oxygen therapy device and L/min  -LINUS       Row Name 01/26/24 1540          Living Environment    People in Home child(christine), adult  -LINUS       Row Name 01/26/24 1540          Cognition    Orientation Status (Cognition) oriented x 3  -LINUS       Row Name 01/26/24 1540          Safety Issues, Functional Mobility    Impairments Affecting Function (Mobility) endurance/activity tolerance;strength;shortness of breath  -LINUS     Comment, Safety Issues/Impairments (Mobility) PT/OT co-treatment medically necessary and appropriate due to patient's acuity level and impaired act tolerance, in order to maximize therapeutic benefit, and for safety of patient and staff. Treatment focused on progression of care and goals established in POC.  -LINUS               User Key  (r) = Recorded By, (t) = Taken By, (c) = Cosigned By      Initials Name Provider Type    LINUS Genevieve Crabtree OT Occupational Therapist                     Mobility/ADL's       Row Name 01/26/24 1543          Bed Mobility    Bed Mobility supine-sit  -LINUS     Supine-Sit Swain (Bed Mobility) verbal cues;nonverbal cues (demo/gesture);minimum assist (75% patient effort)  -LINUS     Sit-Supine Swain (Bed Mobility) not tested  -LINUS     Assistive Device (Bed Mobility) head of bed elevated;bed rails  -LINUS     Comment, (Bed Mobility) UIC on departure  -LINUS       Row Name 01/26/24 1543          Transfers    Transfers bed-chair transfer;sit-stand transfer  -LINUS     Comment, (Transfers) STS from EOB  -LINUS       Row Name 01/26/24 1543          Bed-Chair Transfer    Bed-Chair Swain (Transfers) moderate assist (50% patient effort);verbal  cues;nonverbal cues (demo/gesture);2 person assist;minimum assist (75% patient effort)  -LINUS     Assistive Device (Bed-Chair Transfers) walker, 4-wheeled  -LINUS     Comment, (Bed-Chair Transfer) Cues for body mechanics and hand placement during transfers; fatigues quickly with activity  -LINUS       Row Name 01/26/24 1543          Sit-Stand Transfer    Sit-Stand [place] (Transfers) verbal cues;nonverbal cues (demo/gesture);minimum assist (75% patient effort);moderate assist (50% patient effort)  -LINUS     Assistive Device (Sit-Stand Transfers) walker, 4-wheeled  -LINUS     Comment, (Sit-Stand Transfer) Cues for hand placement and maintaining upright posture.  -LINUS       Row Name 01/26/24 1543          Functional Mobility    Functional Mobility- Ind. Level minimum assist (75% patient effort);verbal cues required;2 person assist required;nonverbal cues required (demo/gesture)  -LINUS     Functional Mobility- Device walker, 4-wheeled  -LINUS     Functional Mobility- Comment took a few steps from EOB to chair with improved seq noted this AM. Cues for PLB throughtout  -LINUS       Row Name 01/26/24 1543          Activities of Daily Living    BADL Assessment/Intervention grooming  -LINUS       Row Name 01/26/24 1543          Grooming Assessment/Training    [place]born Level (Grooming) grooming skills;wash face, hands;set up  -LINUS     Position (Grooming) edge of bed sitting  -LINUS               User Key  (r) = Recorded By, (t) = Taken By, (c) = Cosigned By      Initials Name Provider Type    Genevieve Foss OT Occupational Therapist                   Obj/Interventions       Row Name 01/26/24 1550          Sensory Assessment (Somatosensory)    Sensory Assessment (Somatosensory) sensation intact  -LINUS       Row Name 01/26/24 1550          Vision Assessment/Intervention    Visual Impairment/Limitations WFL  -LINUS       Row Name 01/26/24 1550          Shoulder (Therapeutic Exercise)    Shoulder (Therapeutic Exercise) AROM (active range of motion)   OT demonstrated shoulder AROM ther ex for pt to perform throughout the day to promote increased movement and endurance and pt demonstrates understanding and  performed 3 reps sitting UIC  -LINUS     Shoulder AROM (Therapeutic Exercise) bilateral;flexion;extension;3 repetitions;sitting  -LINUS       Row Name 01/26/24 1550          Elbow/Forearm (Therapeutic Exercise)    Elbow/Forearm (Therapeutic Exercise) AROM (active range of motion)  -LINUS     Elbow/Forearm AROM (Therapeutic Exercise) bilateral;flexion;extension;10 repetitions  -LINUS       Row Name 01/26/24 1550          Motor Skills    Motor Skills functional endurance  -LINUS     Functional Endurance Fair; fatigues quickly with act and does well with freq rest breaks  -LINUS     Therapeutic Exercise elbow/forearm;shoulder  -LINUS       Row Name 01/26/24 1550          Balance    Balance Assessment sitting static balance;sitting dynamic balance;standing static balance;standing dynamic balance  -LINUS     Static Sitting Balance standby assist  -LINUS     Dynamic Sitting Balance standby assist  -LINUS     Position, Sitting Balance unsupported;sitting edge of bed  -LINUS     Static Standing Balance minimal assist;moderate assist;verbal cues;non-verbal cues (demo/gesture);2-person assist  -LINUS     Dynamic Standing Balance 2-person assist;minimal assist;verbal cues  -LINUS     Position/Device Used, Standing Balance supported;walker, 4-wheeled  -LINUS     Balance Interventions sitting;standing;sit to stand;dynamic;static  -LINUS     Comment, Balance No LOB noted with EOB sitting and txfers  -LINUS               User Key  (r) = Recorded By, (t) = Taken By, (c) = Cosigned By      Initials Name Provider Type    Genevieve Foss OT Occupational Therapist                   Goals/Plan    No documentation.                  Clinical Impression       Row Name 01/26/24 1552          Pain Assessment    Pretreatment Pain Rating 0/10 - no pain  -LINUS       Row Name 01/26/24 1557          Plan of Care Review    Plan of Care  Reviewed With patient;family  -LINUS     Progress no change  -LINUS     Outcome Evaluation Pt seen for OT/PT co-tx this AM and agreeable to treatment with famliy at bedside. Pt  noted to improve bed moblity this AM and MIN A supine to sit EOB and wash face with good balance. Pt MIN/MOD A x2 STS EOB and MIN A x2 to take a few steps to chair using rollator, with cues for upright posture, safety, and hand placement when reaching back to sit in chair. Pt continues to fatigue quickly and requires frequent rest breaks and PLB cues, desatting to 82% after act. Pt back up to 96% after resting in chair. OT demonstrated BUE AROM ther ex for pt to perform while sitting UIC or in bed throughout the day and pt performed 1 set to demo understanding. OT/PT discussed DC plans with pt and family and family continues to hope for home with HH at DC vs SNF. OT will continue to progress pt as tolerated.  -LINUS       Row Name 01/26/24 8358          Therapy Plan Review/Discharge Plan (OT)    Anticipated Discharge Disposition (OT) home with home health;home with assist  -LINUS       Row Name 01/26/24 8296          Vital Signs    O2 Delivery Pre Treatment supplemental O2  -LINUS     Intra SpO2 (%) 82  -LINUS     O2 Delivery Intra Treatment supplemental O2  -LINUS     Post SpO2 (%) 96  -LINUS     O2 Delivery Post Treatment supplemental O2  -LINUS       Row Name 01/26/24 4801          Positioning and Restraints    Pre-Treatment Position in bed  -LINUS     Post Treatment Position chair  -LINUS     In Chair notified nsg;reclined;call light within reach;encouraged to call for assist;exit alarm on;with family/caregiver  -LINUS               User Key  (r) = Recorded By, (t) = Taken By, (c) = Cosigned By      Initials Name Provider Type    Genevieve Foss OT Occupational Therapist                   Outcome Measures       Row Name 01/26/24 7711          How much help from another is currently needed...    Putting on and taking off regular lower body clothing? 3  -LINUS     Bathing  (including washing, rinsing, and drying) 3  -LINUS     Toileting (which includes using toilet bed pan or urinal) 3  -LINUS     Putting on and taking off regular upper body clothing 4  -LINUS     Taking care of personal grooming (such as brushing teeth) 4  -LINUS     Eating meals 4  -LINUS     AM-PAC 6 Clicks Score (OT) 21  -LINUS       Row Name 01/26/24 1056          How much help from another person do you currently need...    Turning from your back to your side while in flat bed without using bedrails? 3  -EJ     Moving from lying on back to sitting on the side of a flat bed without bedrails? 3  -EJ     Moving to and from a bed to a chair (including a wheelchair)? 2  -EJ     Standing up from a chair using your arms (e.g., wheelchair, bedside chair)? 2  -EJ     Climbing 3-5 steps with a railing? 1  -EJ     To walk in hospital room? 2  -EJ     AM-PAC 6 Clicks Score (PT) 13  -EJ     Highest Level of Mobility Goal 4 --> Transfer to chair/commode  -EJ       Row Name 01/26/24 1604          Functional Assessment    Outcome Measure Options AM-PAC 6 Clicks Daily Activity (OT)  -LINUS               User Key  (r) = Recorded By, (t) = Taken By, (c) = Cosigned By      Initials Name Provider Type    Ivory Velasquez, PT Physical Therapist    Genevieve Foss OT Occupational Therapist                    Occupational Therapy Education       Title: PT OT SLP Therapies (Done)       Topic: Occupational Therapy (Done)       Point: ADL training (Done)       Description:   Instruct learner(s) on proper safety adaptation and remediation techniques during self care or transfers.   Instruct in proper use of assistive devices.                  Learning Progress Summary             Patient Acceptance, E, VU by AB at 1/23/2024 1619    Acceptance, E, VU by KALA at 1/17/2024 1337                                         User Key       Initials Effective Dates Name Provider Type Johnson ARMENDARIZ 08/02/22 -  Chay Moss OT Occupational Therapist OT    AB  12/15/23 -  Jessi Cano, RN Registered Nurse Nurse                  OT Recommendation and Plan     Plan of Care Review  Plan of Care Reviewed With: patient, family  Progress: no change  Outcome Evaluation: Pt seen for OT/PT co-tx this AM and agreeable to treatment with famliy at bedside. Pt  noted to improve bed moblity this AM and MIN A supine to sit EOB and wash face with good balance. Pt MIN/MOD A x2 STS EOB and MIN A x2 to take a few steps to chair using rollator, with cues for upright posture, safety, and hand placement when reaching back to sit in chair. Pt continues to fatigue quickly and requires frequent rest breaks and PLB cues, desatting to 82% after act. Pt back up to 96% after resting in chair. OT demonstrated BUE AROM ther ex for pt to perform while sitting UIC or in bed throughout the day and pt performed 1 set to demo understanding. OT/PT discussed DC plans with pt and family and family continues to hope for home with HH at DC vs SNF. OT will continue to progress pt as tolerated.     Time Calculation:         Time Calculation- OT       Row Name 01/26/24 1604             Time Calculation- OT    OT Start Time 1019  -LINUS      OT Stop Time 1043  -LINUS      OT Time Calculation (min) 24 min  -LINUS      Total Timed Code Minutes- OT 24 minute(s)  -LINUS      OT Received On 01/26/24  -LINUS      OT - Next Appointment 01/29/24  -LINUS         Timed Charges    57035 - OT Therapeutic Activity Minutes 24  -LINUS         Total Minutes    Timed Charges Total Minutes 24  -LINUS       Total Minutes 24  -LINUS                User Key  (r) = Recorded By, (t) = Taken By, (c) = Cosigned By      Initials Name Provider Type    Genevieve Foss OT Occupational Therapist                  Therapy Charges for Today       Code Description Service Date Service Provider Modifiers Qty    32906014380 HC OT THERAPEUTIC ACT EA 15 MIN 1/26/2024 Genevieve Crabtree OT GO 2                 Genevieve Crabtree OT  1/26/2024

## 2024-01-26 NOTE — PROGRESS NOTES
"[facility]    Patient Name: Vonnie Coppola  :1935  88 y.o.  LOS: 6  Encounter Provider: CHAPO Lees      Patient Care Team:  Christopher Leyva DO as PCP - General  Christopher Leyva DO as PCP - Family Medicine    Chief Complaint: Follow-up bilateral PE, PAF, recent GI bleed    Interval History: Doing better today.  Up to side of the bed.  BP more controlled.        Objective   Vital Signs  Temp:  [97.7 °F (36.5 °C)-98.8 °F (37.1 °C)] 97.7 °F (36.5 °C)  Heart Rate:  [69-85] 69  Resp:  [16-20] 16  BP: ()/(54-68) 110/68    Intake/Output Summary (Last 24 hours) at 2024 0816  Last data filed at 2024 0721  Gross per 24 hour   Intake 960 ml   Output 1050 ml   Net -90 ml     Flowsheet Rows      Flowsheet Row First Filed Value   Admission Height 165.1 cm (65\") Documented at 2024 1643   Admission Weight 107 kg (236 lb 8.9 oz) Documented at 2024 1643              Vitals and nursing note reviewed.   Constitutional:       Appearance: Normal appearance. Well-developed. Obese.   Eyes:      Conjunctiva/sclera: Conjunctivae normal.   Neck:      Vascular: No carotid bruit.   Pulmonary:      Breath sounds: No wheezing.   Cardiovascular:      Normal rate. Regular rhythm. Normal S1 with normal intensity. Normal S2 with normal intensity.       Murmurs: There is no murmur.      No gallop.  No click. No rub.   Edema:     Pretibial: bilateral 1+ edema of the pretibial area.     Ankle: bilateral 1+ edema of the ankle.     Feet: bilateral 1+ edema of the feet.  Musculoskeletal: Normal range of motion. Skin:     General: Skin is warm and dry.   Neurological:      Mental Status: Alert and oriented to person, place, and time.      GCS: GCS eye subscore is 4. GCS verbal subscore is 5. GCS motor subscore is 6.   Psychiatric:         Speech: Speech normal.         Behavior: Behavior normal.         Thought Content: Thought content normal.         Judgment: " "Judgment normal.           Pertinent Test Results:  Results from last 7 days   Lab Units 01/26/24  0355 01/25/24  0334 01/24/24  0418 01/23/24  0353 01/22/24  1634 01/22/24  0417 01/21/24  0812 01/20/24  0349   SODIUM mmol/L 142 137 137 145  --  141 139 142   POTASSIUM mmol/L 4.2 4.4 4.4 5.0 4.5 3.6 4.1 4.1   CHLORIDE mmol/L 111* 105 103 107  --  107 109* 113*   CO2 mmol/L 22.9 24.0 25.8 23.0  --  23.2 22.0 22.0   BUN mg/dL 26* 33* 30* 16  --  15 11 11   CREATININE mg/dL 1.09* 1.56* 1.85* 1.08*  --  0.87 0.81 0.86   GLUCOSE mg/dL 96 85 97 73  --  93 89 82   CALCIUM mg/dL 7.9* 8.0* 8.2* 8.6  --  8.2* 8.0* 8.1*         Results from last 7 days   Lab Units 01/26/24  0355 01/25/24  0334 01/24/24  0418 01/23/24  0353 01/22/24  0417 01/21/24  0812 01/20/24  0349   WBC 10*3/mm3 8.18 10.46 12.09* 11.42* 10.89* 9.53 8.83   HEMOGLOBIN g/dL 8.6* 9.1* 9.9* 11.0* 10.7* 10.6* 9.9*   HEMATOCRIT % 27.4* 27.6* 29.8* 32.9* 31.8* 33.0* 30.2*   PLATELETS 10*3/mm3 179 175 173 149 155 139* 151     Results from last 7 days   Lab Units 01/23/24  0846 01/22/24  2318 01/22/24  1428   INR   --   --  1.19*   APTT seconds 74.4* 142.9* <20.0*     Results from last 7 days   Lab Units 01/21/24  0812   MAGNESIUM mg/dL 2.0           Invalid input(s): \"LDLCALC\"  Results from last 7 days   Lab Units 01/21/24  0812   PROBNP pg/mL 1,099.0     Results from last 7 days   Lab Units 01/21/24  0812   TSH uIU/mL 2.100           Medication Review:   apixaban, 5 mg, Oral, Q12H  budesonide, 0.5 mg, Nebulization, BID - RT  dilTIAZem CD, 120 mg, Oral, Q24H  docusate sodium, 100 mg, Oral, BID  ipratropium-albuterol, 3 mL, Nebulization, TID  [Held by provider] losartan, 50 mg, Oral, Q24H  pantoprazole, 40 mg, Oral, BID AC  sodium chloride, 10 mL, Intravenous, Q12H         sodium chloride, 125 mL/hr, Last Rate: 125 mL/hr (01/25/24 2018)          Assessment & Plan     Active Hospital Problems    Diagnosis  POA    **GI bleed [K92.2]  Yes    Pulmonary embolus [I26.99]  " Yes    Atrial fibrillation [I48.91]  No    History of CVA (cerebrovascular accident) [Z86.73]  Not Applicable    Dehydration [E86.0]  Yes    Renal insufficiency [N28.9]  Yes    Pulmonary nodule [R91.1]  Yes    Adnexal cyst [N94.9]  Yes    Anemia [D64.9]  Yes    HTN (hypertension) [I10]  Yes    History of breast cancer [Z85.3]  Not Applicable    Asthma [J45.909]  Yes    Nausea [R11.0]  Yes      Resolved Hospital Problems   No resolved problems to display.        Bilateral PE: Mostly segmental/subsegmental.  No evidence of RV strain  PAF  Acute hypoxemic respiratory distress: History of lung cancer and COPD  Pulmonary hypertension  Acute GI bleed  History of CVA  HTN      ASA on hold given recent GIB  Tolerating apixiban with no further episodes of GIB  Creatinine is improving after holding diuretics and receiving IV fluid bolus, now on maintenance fluids,.   Patient received am diltiazem, given soft blood pressures we will stop losartan.  This may need to be restarted in the future if BP elevates after patient recovers from acute illness.   CV services will be prn over the weekend.  If patient is discharged, she will need a follow up in clinic with me in 2 weeks, I will have my office arrange.         CHAPO Lees  [facility]  01/26/24  08:35 EST

## 2024-01-26 NOTE — PLAN OF CARE
Goal Outcome Evaluation:  Plan of Care Reviewed With: patient        Progress: improving  Outcome Evaluation: Vitals stable. Pt worked w PT and OT today. Pt ambulating in [place] w nursing staff and walker. Pt maintaing O2 sat on room air at rest and required 2L during ambulation. Pt needs met and questions answered. Will continue to monitor.

## 2024-01-26 NOTE — PLAN OF CARE
Goal Outcome Evaluation:  Plan of Care Reviewed With: patient, family        Progress: improving  Outcome Evaluation: Pt seen for PT this am. She is doing well with no complaints upon entry to room. Pt continues to be limited by weakness and fatigue, but is requiring less assistance w mobility today. She was able to transition to EOB w min A. She stood w min/mod a using rollator. Cues for proper hand placement w transfers. She tolerated taking several small steps from bed to chair w min A x 2. Pt fatigues quickly. Pt's O2 drops to 82%. After resting and cues for breathing, she recoverd to 96%. Asked pt if she would like to attempt ambulation again after seated rest, but she reports she is still too worn out at this time. Encouraged pt to perform BUE/BLE while in chair and in bed. She verbalizes understanding. Discussed DC plans w pt and family. Family hoping to take pt home at DC w HHPT. Will continue to progress activity as tolerated.      Anticipated Discharge Disposition (PT): home with 24/7 care, home with home health, skilled nursing facility

## 2024-01-26 NOTE — PROGRESS NOTES
"      [facility]         Dr Rangel Sayied   LOS: 6 days   Patient Care Team:  Christopher Leyva DO as PCP - General  Christopher Leyva DO as PCP - Family Medicine    Chief Complaint: Bilateral PE with acute pulmonary nodule history of asthma other issues as listed below    Interval History: Remains on p.o. Eliquis.  Still requiring 4 L oxygen nasal cannula and desats with minimal activity.    REVIEW OF SYSTEMS:   CARDIOVASCULAR: No chest pain, chest pressure or chest discomfort. No palpitations or edema.   RESPIRATORY: Shortness of breath with activity with ongoing cough   GASTROINTESTINAL: No anorexia, nausea, vomiting or diarrhea. No abdominal pain or blood.   HEMATOLOGIC: No bleeding or bruising.     Ventilator/Non-Invasive Ventilation Settings (From admission, onward)      None              Vital Signs  Temp:  [97.7 °F (36.5 °C)-98.8 °F (37.1 °C)] 97.7 °F (36.5 °C)  Heart Rate:  [69-85] 69  Resp:  [16-20] 16  BP: ()/(54-68) 110/68    Intake/Output Summary (Last 24 hours) at 1/26/2024 1217  Last data filed at 1/26/2024 0814  Gross per 24 hour   Intake 840 ml   Output 1050 ml   Net -210 ml     Flowsheet Rows      Flowsheet Row First Filed Value   Admission Height 165.1 cm (65\") Documented at 01/16/2024 1643   Admission Weight 107 kg (236 lb 8.9 oz) Documented at 01/16/2024 1643            Physical Exam:  Patient is examined using the personal protective equipment as per guidelines from infection control for this particular patient as enacted.  Hand hygiene was performed before and after patient interaction.   General Appearance:    Alert, cooperative, in no acute distress.  Following simple commands  ENT Mallampati between 3 and 4 no nasal congestion  Neck midline trachea, no thyromegaly   Lungs:   Diminished breath sounds bilaterally    Heart:    Regular rhythm and normal rate, normal S1 and S2, no            murmur, no gallop, no rub, no click   Chest Wall:    No abnormalities " observed   Abdomen:     Normal bowel sounds, no masses, no organomegaly, soft        nontender, nondistended, no guarding, no rebound                tenderness   Extremities:   Moves all extremities well, no edema, no cyanosis, no             redness  CNS no focal neurological deficits normal sensory exam  Skin no rashes no nodules  Musculoskeletal no cyanosis no clubbing normal range of motion     Results Review:        Results from last 7 days   Lab Units 01/26/24  0355 01/25/24  0334 01/24/24  0418   SODIUM mmol/L 142 137 137   POTASSIUM mmol/L 4.2 4.4 4.4   CHLORIDE mmol/L 111* 105 103   CO2 mmol/L 22.9 24.0 25.8   BUN mg/dL 26* 33* 30*   CREATININE mg/dL 1.09* 1.56* 1.85*   GLUCOSE mg/dL 96 85 97   CALCIUM mg/dL 7.9* 8.0* 8.2*         Results from last 7 days   Lab Units 01/26/24  0355 01/25/24  0334 01/24/24  0418   WBC 10*3/mm3 8.18 10.46 12.09*   HEMOGLOBIN g/dL 8.6* 9.1* 9.9*   HEMATOCRIT % 27.4* 27.6* 29.8*   PLATELETS 10*3/mm3 179 175 173     Results from last 7 days   Lab Units 01/23/24  0846 01/22/24  2318 01/22/24  1428   INR   --   --  1.19*   APTT seconds 74.4* 142.9* <20.0*         Results from last 7 days   Lab Units 01/21/24  0812   MAGNESIUM mg/dL 2.0               I reviewed the patient's new clinical results.  I personally viewed and interpreted the patient's chest x-ray.        Medication Review:   apixaban, 5 mg, Oral, Q12H  budesonide, 0.5 mg, Nebulization, BID - RT  dilTIAZem CD, 120 mg, Oral, Q24H  docusate sodium, 100 mg, Oral, BID  ipratropium-albuterol, 3 mL, Nebulization, TID  pantoprazole, 40 mg, Oral, BID AC  sodium chloride, 10 mL, Intravenous, Q12H        sodium chloride, 125 mL/hr, Last Rate: 125 mL/hr (01/26/24 1204)            ASSESSMENT:   Pulmonary nodules  Bilateral PE  Bilateral DVT  Asthma  Gastrointestinal bleed  A-fib RVR  Acute blood loss anemia  Osteoarthritis  Hypertension  History of breast cancer    PLAN:  Patient has been transitioned to Eliquis currently tolerating  well no GI bleed reported.  Doppler confirms bilateral lower extremity DVT.  Pulmonary nodule will need follow-up CT chest in 3 months.  Continue bronchodilators for asthma.  Clinically stable with no active wheezing.  Mobilize ambulate  As needed cough medicine  Will need outpatient PFTs  I have arranged follow-up with me in the office 6 to 8 weeks.  Continue to monitor clinical course closely.  Discussed plan of care with patient and daughter at bedside        Juan Carlos Lam MD  01/26/24  12:17 EST

## 2024-01-26 NOTE — CASE MANAGEMENT/SOCIAL WORK
Continued Stay Note  [facility]     Patient Name: Vonnie Coppola  MRN: [id number]  Today's Date: 1/26/2024    Admit Date: 1/16/2024    Plan: Home with daughter.    [facility] to see for nursing and PT/OT. Refuses SNF. Follow for possible Home O2 need  Family states they can transport home   Discharge Plan       Row Name 01/26/24 1847       Plan    Plan Home with daughter.    [facility] to see for nursing and PT/OT. Refuses SNF. Follow for possible Home O2 need  Family states they can transport home    Patient/Family in Agreement with Plan yes    Plan Comments May need O2 at D/C, does not use home O2. [facility] will see at D/C. Pt lives with her daughter and will return at D/C. Denies need for any additional DME. Israel Kitchen RN-BC                   Discharge Codes    No documentation.                 Expected Discharge Date and Time       Expected Discharge Date Expected Discharge Time    Jan 26, 2024               Israel Kitchen RN

## 2024-01-26 NOTE — PROGRESS NOTES
Name: Vonnie Coppola ADMIT: 2024   : 1935  PCP: Christopher Leyva DO    MRN: [id number] LOS: 5 days   AGE/SEX: 88 y.o. female  ROOM: [facility]     Subjective.      Blood Pressures have been soft. Given 500cc bolus.     Objective   Objective   Vital Signs  Temp:  [97.7 °F (36.5 °C)-98.4 °F (36.9 °C)] 97.7 °F (36.5 °C)  Heart Rate:  [71-85] 80  Resp:  [16-20] 20  BP: ()/(42-66) 104/63  SpO2:  [93 %-100 %] 97 %  on  Flow (L/min):  [3] 3;   Device (Oxygen Therapy): nasal cannula;humidified    Intake/Output Summary (Last 24 hours) at 2024 1904  Last data filed at 2024 1825  Gross per 24 hour   Intake 840 ml   Output 800 ml   Net 40 ml     Body mass index is 40.44 kg/m².      24  1643 24  0448 24  1605   Weight: 107 kg (236 lb 8.9 oz) 110 kg (243 lb 2.7 oz) 110 kg (243 lb)     Physical Exam  Constitutional:       General: She is not in acute distress.  HENT:      Head: Normocephalic and atraumatic.   Eyes:      Extraocular Movements: Extraocular movements intact.      Pupils: Pupils are equal, round, and reactive to light.   Cardiovascular:      Rate and Rhythm: Normal rate and regular rhythm.   Pulmonary:      Effort: Pulmonary effort is normal. No respiratory distress.   Abdominal:      General: There is no distension.      Palpations: Abdomen is soft.      Tenderness: There is no abdominal tenderness.   Neurological:      General: No focal deficit present.      Mental Status: She is alert and oriented to person, place, and time.      Motor: Weakness present.           Results Review:      Results from last 7 days   Lab Units 24  0334 24  0418 24  0353 24  1634 24  0417 24  0812 24  0349 24  2243 24  0542   SODIUM mmol/L 137 137 145  --  141 139 142  --  145   POTASSIUM mmol/L 4.4 4.4 5.0 4.5 3.6 4.1 4.1   < > 3.6   CHLORIDE mmol/L 105 103 107  --  107 109* 113*  --  112*   CO2 mmol/L 24.0 25.8 23.0  --  23.2  "22.0 22.0  --  22.0   BUN mg/dL 33* 30* 16  --  15 11 11  --  11   CREATININE mg/dL 1.56* 1.85* 1.08*  --  0.87 0.81 0.86  --  0.91   GLUCOSE mg/dL 85 97 73  --  93 89 82  --  91   CALCIUM mg/dL 8.0* 8.2* 8.6  --  8.2* 8.0* 8.1*  --  8.5*    < > = values in this interval not displayed.     Estimated Creatinine Clearance: 32 mL/min (A) (by C-G formula based on SCr of 1.56 mg/dL (H)).              Results from last 7 days   Lab Units 01/21/24  0812   PROBNP pg/mL 1,099.0     Results from last 7 days   Lab Units 01/21/24  0812   TSH uIU/mL 2.100     Results from last 7 days   Lab Units 01/21/24  0812   MAGNESIUM mg/dL 2.0           Invalid input(s): \"LDLCALC\"  Results from last 7 days   Lab Units 01/25/24  0334 01/24/24  0418 01/23/24  0353 01/22/24  0417 01/21/24  0812 01/20/24  0349 01/19/24  1559 01/19/24  0542   WBC 10*3/mm3 10.46 12.09* 11.42* 10.89* 9.53 8.83  --  7.43   HEMOGLOBIN g/dL 9.1* 9.9* 11.0* 10.7* 10.6* 9.9* 10.6* 11.1*  11.1*   HEMATOCRIT % 27.6* 29.8* 32.9* 31.8* 33.0* 30.2* 31.8* 34.2  34.2   PLATELETS 10*3/mm3 175 173 149 155 139* 151  --  182   MCV fL 88.2 88.4 89.4 88.6 91.2 89.1  --  89.5   MCH pg 29.1 29.4 29.9 29.8 29.3 29.2  --  29.1   MCHC g/dL 33.0 33.2 33.4 33.6 32.1 32.8  --  32.5   RDW % 13.4 13.6 14.1 13.4 13.6 13.5  --  13.3   RDW-SD fl 42.8 43.3 45.6 43.8 45.5 43.7  --  43.2   MPV fL 10.8 10.3 11.3 10.5 10.4 10.0  --  10.2   NEUTROPHIL % % 70.0 68.3 63.7 65.8 61.0 62.9  --  57.9   LYMPHOCYTE % % 12.6* 13.5* 17.6* 16.6* 22.6 20.6  --  24.1   MONOCYTES % % 12.4* 12.2* 14.0* 12.6* 11.3 11.9  --  12.7*   EOSINOPHIL % % 3.9 4.8 2.8 3.6 4.1 3.7  --  4.3   BASOPHIL % % 0.4 0.2 1.0 0.6 0.5 0.3  --  0.5   IMM GRAN % % 0.7* 1.0* 0.9* 0.8* 0.5 0.6*  --  0.5   NEUTROS ABS 10*3/mm3 7.32* 8.26* 7.28* 7.16* 5.81 5.55  --  4.30   LYMPHS ABS 10*3/mm3 1.32 1.63 2.01 1.81 2.15 1.82  --  1.79   MONOS ABS 10*3/mm3 1.30* 1.47* 1.60* 1.37* 1.08* 1.05*  --  0.94*   EOS ABS 10*3/mm3 0.41* 0.58* 0.32 0.39 " 0.39 0.33  --  0.32   BASOS ABS 10*3/mm3 0.04 0.03 0.11 0.07 0.05 0.03  --  0.04   IMMATURE GRANS (ABS) 10*3/mm3 0.07* 0.12* 0.10* 0.09* 0.05 0.05  --  0.04   NRBC /100 WBC 0.0 0.0 0.0 0.0 0.0 0.0  --  0.0     Results from last 7 days   Lab Units 01/23/24  0846 01/22/24  2318 01/22/24  1428   INR   --   --  1.19*   APTT seconds 74.4* 142.9* <20.0*                                           Imaging:  Imaging Results (Last 24 Hours)       ** No results found for the last 24 hours. **               I reviewed the patient's new clinical results / labs / tests / procedures      Assessment/Plan     Active Hospital Problems    Diagnosis  POA    **GI bleed [K92.2]  Yes    Pulmonary embolus [I26.99]  Yes    Atrial fibrillation [I48.91]  No    History of CVA (cerebrovascular accident) [Z86.73]  Not Applicable    Dehydration [E86.0]  Yes    Renal insufficiency [N28.9]  Yes    Pulmonary nodule [R91.1]  Yes    Adnexal cyst [N94.9]  Yes    Anemia [D64.9]  Yes    HTN (hypertension) [I10]  Yes    History of breast cancer [Z85.3]  Not Applicable    Asthma [J45.909]  Yes    Nausea [R11.0]  Yes      Resolved Hospital Problems   No resolved problems to display.     JAVI  Most likely due to overdiuresis.  Diuretics have been discontinued.  Receiving IVF. Monitor renal function.     GI bleed in a patient with a history of peptic ulcer disease/diverticular disease/hemorrhoids/constipation.  EGD with hiatal hernia and gastritis status post biopsy.  Colonoscopy with in the ascending and descending colon status post removal and diverticulosis.  No active or recent bleeding stigmata Monitor hemoglobin and transfuse if hemoglobin is less than 7.  Continue holding aspirin.  Will need GI follow-up as an outpatient for capsule enterography.      Hypertension/new onset A-fib.  Now back in sinus. Cards following.   Transitioned from heparin to Eliquis    Bilateral PE/DVT - Transitioned to eliquis 5mg bid.  No RV strain, no need for any intervention  presently.     Acute asthma exacerbation/hypoxemia/pulmonary nodule.  CT of the chest with enlarging pulmonary nodules on the right middle and lower lobe.  Continues to be unable to wean her off the oxygen.  Chest x-ray with no acute disease.  Continue Pulmicort and DuoNebs.  And spirometry.  Awaiting walking pulse oximetry (patient refused yesterday).    Pulmonary saw the patient recommends chest CT follow-up on the pulmonary nodules in 3 months.    History of CVA.  Holding aspirin secondary to GI bleed.    Left adnexal cyst.  Pelvic ultrasound with absent uterus and ovaries.  GYN follow-up as an outpatient with repeat pelvic ultrasound.    VTE prophylaxis.  Sequential compression devices.    Eliquis for dvt ppx  Full Code    Edilberto Patel MD  [facility]  01/25/24  19:04 EST

## 2024-01-26 NOTE — PLAN OF CARE
Goal Outcome Evaluation:  Plan of Care Reviewed With: patient        Progress: no change  Outcome Evaluation: VSS. 3L NC. Pt c/o itching, lotion applied to back. PRN tylenol given for 4/10 pain after getting up w/assist x2 to the B[facility]. Small BM this shift. Voiding via purewick/brief. Started NS @ 125mL/hr.

## 2024-01-27 ENCOUNTER — TRANSCRIBE ORDERS (OUTPATIENT)
Dept: HOME HEALTH SERVICES | Facility: HOME HEALTHCARE | Age: 89
End: 2024-01-27
Payer: MEDICARE

## 2024-01-27 ENCOUNTER — HOME HEALTH ADMISSION (OUTPATIENT)
Dept: HOME HEALTH SERVICES | Facility: HOME HEALTHCARE | Age: 89
End: 2024-01-27
Payer: MEDICARE

## 2024-01-27 ENCOUNTER — DOCUMENTATION (OUTPATIENT)
Dept: HOME HEALTH SERVICES | Facility: HOME HEALTHCARE | Age: 89
End: 2024-01-27
Payer: MEDICARE

## 2024-01-27 DIAGNOSIS — K92.2 GASTROINTESTINAL HEMORRHAGE, UNSPECIFIED GASTROINTESTINAL HEMORRHAGE TYPE: ICD-10-CM

## 2024-01-27 DIAGNOSIS — I48.0 PAROXYSMAL ATRIAL FIBRILLATION: ICD-10-CM

## 2024-01-27 DIAGNOSIS — I26.99 PULMONARY EMBOLISM, BILATERAL: Primary | ICD-10-CM

## 2024-01-27 DIAGNOSIS — D62 ABLA (ACUTE BLOOD LOSS ANEMIA): ICD-10-CM

## 2024-01-27 DIAGNOSIS — R09.02 HYPOXEMIA: ICD-10-CM

## 2024-01-27 DIAGNOSIS — J45.901 ASTHMA WITH ACUTE EXACERBATION, UNSPECIFIED ASTHMA SEVERITY, UNSPECIFIED WHETHER PERSISTENT: ICD-10-CM

## 2024-01-27 DIAGNOSIS — I82.403 ACUTE DEEP VEIN THROMBOSIS (DVT) OF BOTH LOWER EXTREMITIES, UNSPECIFIED VEIN: ICD-10-CM

## 2024-01-27 LAB
ANION GAP SERPL CALCULATED.3IONS-SCNC: 7 MMOL/L (ref 5–15)
BASOPHILS # BLD AUTO: 0.04 10*3/MM3 (ref 0–0.2)
BASOPHILS NFR BLD AUTO: 0.6 % (ref 0–1.5)
BUN SERPL-MCNC: 21 MG/DL (ref 8–23)
BUN/CREAT SERPL: 22.3 (ref 7–25)
CALCIUM SPEC-SCNC: 8.3 MG/DL (ref 8.6–10.5)
CHLORIDE SERPL-SCNC: 112 MMOL/L (ref 98–107)
CO2 SERPL-SCNC: 24 MMOL/L (ref 22–29)
CREAT SERPL-MCNC: 0.94 MG/DL (ref 0.57–1)
DEPRECATED RDW RBC AUTO: 44.1 FL (ref 37–54)
EGFRCR SERPLBLD CKD-EPI 2021: 59.2 ML/MIN/1.73
EOSINOPHIL # BLD AUTO: 0.39 10*3/MM3 (ref 0–0.4)
EOSINOPHIL NFR BLD AUTO: 5.5 % (ref 0.3–6.2)
ERYTHROCYTE [DISTWIDTH] IN BLOOD BY AUTOMATED COUNT: 13.4 % (ref 12.3–15.4)
GLUCOSE SERPL-MCNC: 91 MG/DL (ref 65–99)
HCT VFR BLD AUTO: 26.1 % (ref 34–46.6)
HGB BLD-MCNC: 8.3 G/DL (ref 12–15.9)
IMM GRANULOCYTES # BLD AUTO: 0.04 10*3/MM3 (ref 0–0.05)
IMM GRANULOCYTES NFR BLD AUTO: 0.6 % (ref 0–0.5)
LYMPHOCYTES # BLD AUTO: 1.37 10*3/MM3 (ref 0.7–3.1)
LYMPHOCYTES NFR BLD AUTO: 19.4 % (ref 19.6–45.3)
MCH RBC QN AUTO: 28.7 PG (ref 26.6–33)
MCHC RBC AUTO-ENTMCNC: 31.8 G/DL (ref 31.5–35.7)
MCV RBC AUTO: 90.3 FL (ref 79–97)
MONOCYTES # BLD AUTO: 0.83 10*3/MM3 (ref 0.1–0.9)
MONOCYTES NFR BLD AUTO: 11.7 % (ref 5–12)
NEUTROPHILS NFR BLD AUTO: 4.4 10*3/MM3 (ref 1.7–7)
NEUTROPHILS NFR BLD AUTO: 62.2 % (ref 42.7–76)
NRBC BLD AUTO-RTO: 0 /100 WBC (ref 0–0.2)
PLATELET # BLD AUTO: 198 10*3/MM3 (ref 140–450)
PMV BLD AUTO: 10.3 FL (ref 6–12)
POTASSIUM SERPL-SCNC: 4.2 MMOL/L (ref 3.5–5.2)
RBC # BLD AUTO: 2.89 10*6/MM3 (ref 3.77–5.28)
SODIUM SERPL-SCNC: 143 MMOL/L (ref 136–145)
WBC NRBC COR # BLD AUTO: 7.07 10*3/MM3 (ref 3.4–10.8)

## 2024-01-27 PROCEDURE — 85025 COMPLETE CBC W/AUTO DIFF WBC: CPT | Performed by: INTERNAL MEDICINE

## 2024-01-27 PROCEDURE — 80048 BASIC METABOLIC PNL TOTAL CA: CPT | Performed by: INTERNAL MEDICINE

## 2024-01-27 PROCEDURE — 94799 UNLISTED PULMONARY SVC/PX: CPT

## 2024-01-27 PROCEDURE — 97110 THERAPEUTIC EXERCISES: CPT

## 2024-01-27 PROCEDURE — 94761 N-INVAS EAR/PLS OXIMETRY MLT: CPT

## 2024-01-27 PROCEDURE — 94664 DEMO&/EVAL PT USE INHALER: CPT

## 2024-01-27 PROCEDURE — 94760 N-INVAS EAR/PLS OXIMETRY 1: CPT

## 2024-01-27 RX ORDER — PANTOPRAZOLE SODIUM 40 MG/1
40 TABLET, DELAYED RELEASE ORAL
Qty: 60 TABLET | Refills: 0 | Status: SHIPPED | OUTPATIENT
Start: 2024-01-27

## 2024-01-27 RX ORDER — DILTIAZEM HYDROCHLORIDE 120 MG/1
120 CAPSULE, COATED, EXTENDED RELEASE ORAL
Qty: 30 CAPSULE | Refills: 0 | Status: SHIPPED | OUTPATIENT
Start: 2024-01-28

## 2024-01-27 RX ADMIN — PANTOPRAZOLE SODIUM 40 MG: 40 TABLET, DELAYED RELEASE ORAL at 08:15

## 2024-01-27 RX ADMIN — APIXABAN 5 MG: 5 TABLET, FILM COATED ORAL at 08:14

## 2024-01-27 RX ADMIN — APIXABAN 5 MG: 5 TABLET, FILM COATED ORAL at 21:21

## 2024-01-27 RX ADMIN — IPRATROPIUM BROMIDE AND ALBUTEROL SULFATE 3 ML: 2.5; .5 SOLUTION RESPIRATORY (INHALATION) at 20:47

## 2024-01-27 RX ADMIN — BUDESONIDE 0.5 MG: 0.5 INHALANT ORAL at 20:51

## 2024-01-27 RX ADMIN — Medication 10 ML: at 21:21

## 2024-01-27 RX ADMIN — PANTOPRAZOLE SODIUM 40 MG: 40 TABLET, DELAYED RELEASE ORAL at 21:21

## 2024-01-27 RX ADMIN — DILTIAZEM HYDROCHLORIDE 120 MG: 120 CAPSULE, EXTENDED RELEASE ORAL at 08:15

## 2024-01-27 RX ADMIN — IPRATROPIUM BROMIDE AND ALBUTEROL SULFATE 3 ML: 2.5; .5 SOLUTION RESPIRATORY (INHALATION) at 07:24

## 2024-01-27 RX ADMIN — DOCUSATE SODIUM 100 MG: 100 CAPSULE, LIQUID FILLED ORAL at 21:21

## 2024-01-27 RX ADMIN — IPRATROPIUM BROMIDE AND ALBUTEROL SULFATE 3 ML: 2.5; .5 SOLUTION RESPIRATORY (INHALATION) at 14:43

## 2024-01-27 RX ADMIN — BUDESONIDE 0.5 MG: 0.5 INHALANT ORAL at 07:26

## 2024-01-27 RX ADMIN — Medication 10 ML: at 09:00

## 2024-01-27 RX ADMIN — DOCUSATE SODIUM 100 MG: 100 CAPSULE, LIQUID FILLED ORAL at 08:15

## 2024-01-27 NOTE — PLAN OF CARE
Goal Outcome Evaluation:  Plan of Care Reviewed With: patient        Progress: improving  Outcome Evaluation: Pt admitted 1/16/2024 for GI bleed. A & O X 4 . Room air till midnight and use 1.5 li NC.  Denies chest pain . No complaints or concerns per pt. Pt family member at the bed side . Safety maintained.

## 2024-01-27 NOTE — DISCHARGE SUMMARY
Patient Name: Vonnie Coppola  : 1935  MRN: [id number]    Date of Admission: 2024  Date of Discharge:  2024  Primary Care Physician: Christopher Leyva DO      Chief Complaint:   Abdominal Pain, Black or Bloody Stool, and Knee Pain      Discharge Diagnoses     Active Hospital Problems    Diagnosis  POA    **GI bleed [K92.2]  Yes    Pulmonary embolus [I26.99]  Yes    Atrial fibrillation [I48.91]  No    History of CVA (cerebrovascular accident) [Z86.73]  Not Applicable    Dehydration [E86.0]  Yes    Renal insufficiency [N28.9]  Yes    Pulmonary nodule [R91.1]  Yes    Adnexal cyst [N94.9]  Yes    Anemia [D64.9]  Yes    HTN (hypertension) [I10]  Yes    History of breast cancer [Z85.3]  Not Applicable    Asthma [J45.909]  Yes    Nausea [R11.0]  Yes      Resolved Hospital Problems   No resolved problems to display.        Hospital Course     Ms. Coppola is a 88 y.o. female with a history of atrial fibrillation, prior stroke, hypertension, prior breast cancer, asthma who presented to [facility] initially complaining of abdominal pain and black stools.  Please see the admitting history and physical for further details.  She was found to have melena and GI bleeding and was admitted to the hospital for further evaluation and treatment.  She was admitted with a GI bleed.  An EGD was performed that showed hiatal hernia with gastritis.  She had a colonoscopy performed with diverticular disease and polyps that were removed.  Bleeding is stopped and hemoglobin stabilized.  GI signed off the case.  She was continued on Protonix twice daily given the gastritis with plans to treat for 30 days and follow-up with GI in 6 weeks.  She did develop rapid atrial fibrillation during the hospitalization.  Cardiology was consulted and she had a transthoracic echocardiogram performed.  She did develop hypoxemia and pulmonary was consulted.  Further workup with CTA of the chest revealed bilateral  pulmonary emboli and ultrasounds of the legs showed bilateral DVTs.  Given the atrial fibrillation and now blood clots she was started on heparin and transition to direct oral anticoagulant.  Her hemoglobin did drift down some but has stabilized around 8.3-8.6 over the last 48 hours.  She still requiring supplemental oxygen at times with O2 saturations 88% or less at times during the day and oxygen has been ordered for home.  Family has some concerns about her mobility being well enough to go home however they are adamant that she cannot go to a skilled nursing facility.  She will work with physical therapy again today and should be stable to discharge home    1650:  Discussed with Dr Alas and he did relay the families concerns about her mobility for DC home.  Many including myself have recommended she go to SNF at DC to work on these things.  I again recommend the family pursue SNF; if this is desired we can cancel DC and plan for that level of care on Monday when referrals can be placed.      Day of Discharge     Subjective:  Rested decently overnight.  This morning family is present at the bedside with lots of concerns.  After further discussion they seem understanding of what is going on.  Awaiting physical therapy evaluations and pulmonology recommendations.  Following this they be able to take her home this afternoon.  The patient denies any other issues and adamantly declines skilled nursing facility    Physical Exam:  Temp:  [97.5 °F (36.4 °C)-99 °F (37.2 °C)] 98.4 °F (36.9 °C)  Heart Rate:  [59-79] 67  Resp:  [16-20] 20  BP: ()/(47-65) 113/65  Body mass index is 40.44 kg/m².  Physical Exam  Vitals and nursing note reviewed.   Constitutional:       General: She is not in acute distress.     Appearance: She is obese. She is ill-appearing.   Cardiovascular:      Rate and Rhythm: Normal rate and regular rhythm.   Pulmonary:      Effort: No respiratory distress.      Breath sounds: Normal breath sounds.    Abdominal:      General: Bowel sounds are normal. There is no distension.      Palpations: Abdomen is soft.   Neurological:      General: No focal deficit present.      Mental Status: She is alert. Mental status is at baseline.         Consultants     Consult Orders (all) (From admission, onward)       Start     Ordered    01/22/24 1402  Inpatient Pulmonology Consult  Once        Specialty:  Pulmonary Disease  Provider:  Mitchell Martin MD    01/22/24 1404    01/21/24 0657  Inpatient Cardiology Consult  Once        Specialty:  Cardiology  Provider:  Carter Gomez MD    01/21/24 0659    01/18/24 0903  Inpatient Pulmonology Consult  Once        Specialty:  Pulmonary Disease  Provider:  Tristan Harris MD    01/18/24 0902    01/16/24 2003  Inpatient Gastroenterology Consult  Once        Specialty:  Gastroenterology  Provider:  Arturo Chong MD    01/16/24 2003 01/16/24 1424  [facility] (on-call MD unless specified) Details  Once        Specialty:  Hospitalist  Provider:  Tiffani Arnold MD    01/16/24 1423                  Procedures     COLONOSCOPY to cecum with cold snare polypectomies, ESOPHAGOGASTRODUODENOSCOPY with biospy    Imaging Results (All)       Procedure Component Value Units Date/Time    CT Angiogram Chest [[id number]] Collected: 01/22/24 1358     Updated: 01/22/24 1515    Narrative:      CT ANGIOGRAM OF THE CHEST. MULTIPLE CORONAL, SAGITTAL, AND 3-D  RECONSTRUCTIONS.     HISTORY: Shortness of air.     TECHNIQUE: Radiation dose reduction techniques were utilized, including  automated exposure control and exposure modulation based on body size.   CT angiogram of the chest was performed following the administration of  IV contrast. Coronal, sagittal, and 3-D reconstruction images were  obtained.     COMPARISON: CT chest with IV contrast 01/17/2024.     FINDINGS: There are pulmonary emboli within both upper and lower lobe  and right middle lobe segmental and subsegmental pulmonary arteries. The  largest, most  central embolus is present within the right interlobar  pulmonary artery at the level of the origin of the right middle lobe  pulmonary artery and this embolus also extends into the right middle  lobe segmental artery. There is also an embolus within the left main  pulmonary artery extending into left upper and lower lobe segmental  arteries. There is evidence for right heart strain with elevated RV:LV  ratio.     No acute aortic abnormality is evident. There is no evidence for  mediastinal kale enlargement. There is bilateral lower lobe  atelectasis. Mild basilar peripheral prominent pulm interstitial disease  is present. Within the anterior inferior right middle lobe there is a 5  mm pulmonary nodule on image 100. There is also a 1.1 cm nodular opacity  within the far anterior inferior right middle lobe. Recommend followup  with chest CT.     Imaging to the upper abdomen demonstrates a low-density lesion within  the central, inferior spleen measuring 2.2 cm and this is without  change. Cholecystectomy clips are present.       Impression:      1. Bilateral pulmonary thromboembolic disease with evidence for right  heart strain.  2. Dependent lower lobe atelectasis.  3. 5 mm, 11 mm right middle lobe pulmonary nodules for which followup  with chest CT is recommended to evaluate for stability.     Findings pertaining to the pulmonary emboli and right heart strain  discussed with Sydnie, the nurse caring for the patient on [address] on  01/22/2024 at 1:50 p.m.     This report was finalized on 1/22/2024 3:12 PM by Dr. Archie Ortiz M.D on Workstation: [id number]       XR Chest PA & Lateral [[id number]] Collected: 01/20/24 1434     Updated: 01/20/24 1438    Narrative:      PA AND LATERAL CHEST X-RAY     HISTORY: Hypoxemia and wheezing prior radiation therapy for breast  cancer.     Chest x-ray consisting of 4 images is provided. Correlation: Chest CT  January 17, 2024.     FINDINGS: Clips at the left axilla. The cardiac  silhouette is mildly  prominent but unchanged. The lungs are clear. The costophrenic sulci are  dry and the bones appear normal. There is no pneumothorax.       Impression:      Negative.     This report was finalized on 1/20/2024 2:35 PM by Dr. Adán Acosta M.D on Workstation: [id number]       XR Abdomen KUB [[id number]] Collected: 01/18/24 1137     Updated: 01/18/24 1144    Narrative:      KUB     HISTORY: Diffuse abdominal pain and distention, no bowel movement for  several weeks.     3 x-rays of the abdomen and pelvis are provided.     FINDINGS: Lung bases are clear. Clips from cholecystectomy. There is  some fecal material in the transverse and descending colon and in the  rectum. No abnormally dilated bowel. Normal amount of air in the  ascending colon. No free air or abnormal soft tissue opacity.  Atheromatous calcification of the abdominal aorta. Degenerative change  in the spine.       Impression:      There is some fecal material in the colon but not an unusual  volume. No abnormality identified to account for pain.     This report was finalized on 1/18/2024 11:41 AM by Dr. Adán Acosta M.D on Workstation: [id number]       CT Chest With Contrast Diagnostic [[id number]] Collected: 01/17/24 1238     Updated: 01/17/24 1303    Narrative:      CT CHEST W CONTRAST DIAGNOSTIC-     INDICATIONS: Dyspnea, pulmonary nodule     TECHNIQUE: Radiation dose reduction techniques were utilized, including  automated exposure control and exposure modulation based on body size.  ENHANCED CHEST CT     COMPARISON: 10/22/2019           FINDINGS:           The heart size is borderline without pericardial effusion. A few small  subcentimeter short axis mediastinal lymph nodes are seen that are not  significant by size criteria.     The airways appear clear.     No pleural effusion or pneumothorax.     The lungs show no focal pulmonary consolidation or mass. Mild subpleural  fibrotic changes are seen, predominantly at the lung  bases. Right middle  lobe nodules are noted on axial image 64, 1 more laterally measuring 7  mm (not definitely present on the prior exam), one more medially  measuring 1.1 cm, previously 8 mm. Small nodules in the right lower lobe  do not appear significantly changed. Small pleural-based nodularity at  the right lower lobe, for example axial image 62, axial image 65 may be  nodular atelectasis, scarring, nodules, attention to this area on  follow-up advised.        Upper abdominal structures show no acute findings. The gallbladder is  surgically absent. Cystic foci in the spleen appear stable in the  abdomen and pelvis CT from 1/16/2024. Colonic diverticula are seen that  do not appear inflamed.     Degenerative changes are seen in the spine. No acute fracture is  identified.       Impression:         Right middle and lower lobe lobe nodules, possibility of neoplasm not  excluded, PET/CT correlation can be obtained for further evaluation of  the largest, continued CT follow-up is also advised in 3 months.     This report was finalized on 1/17/2024 12:59 PM by Dr. Rod Villavicencio M.D on Workstation: [id number]       US Pelvis Complete [[id number]] Collected: 01/17/24 1230     Updated: 01/17/24 1234    Narrative:      Examination: Transabdominal pelvic sonogram        TECHNIQUE: Sonographic images of the pelvis were obtained from the  transabdominal approach     HISTORY: Left adnexal cyst     COMPARISON: CT of 1/16/2024       Impression:      FINDINGS/impression: The uterus and ovaries are surgically absent. A  left adnexal cystic lesion measures 2.8 x 2.5 x 2.3 cm, indeterminate.  Consider 3-month follow-up if clinically indicated.        This report was finalized on 1/17/2024 12:31 PM by Dr. Jasmeet Morrison M.D  on Workstation: [id number]       CT Abdomen Pelvis With Contrast [[id number]] Collected: 01/16/24 1401     Updated: 01/16/24 1437    Narrative:      ABDOMEN AND PELVIS CT WITH CONTRAST     HISTORY: Abdominal  pain, GI bleeding. Prior appendectomy, hysterectomy  with oophorectomy and cholecystectomy as well as bladder surgery.     TECHNIQUE: Abdomen and pelvis CT was performed with 85 mL Isovue-300 IV  contrast. Correlation with remote prior abdomen and pelvis CT 03/02/2010  and chest CT 10/22/2019.     FINDINGS: There is a small sliding hiatal hernia. Otherwise, the stomach  and the small bowel appears normal. There is very extensive  diverticulosis throughout the entire length of the colon. No focal  inflammation or mass lesion is identified.     A 24 mm fluid density lesion in the spleen is unchanged since 2019.  There is a, second, 13 mm low-density lesion along the lateral aspect of  the spleen. The adrenals, pancreas, liver, and kidneys appear normal.  The gallbladder is absent. There is no abnormal biliary or pancreatic  ductal dilatation. No hydronephrosis. The urinary bladder appears  normal.     The uterus and the right ovary are absent. The left ovary lies in the  upper left pelvis anterior to the psoas muscle and contains a 26 mm x 25  mm homogeneous fluid density focus.     The urinary bladder appears normal. There is a right inguinal hernia  through which protrudes normal-appearing omental fat into the upper  portion of the right inguinal canal. There is no involved bowel.     Lower medial structures are remarkable for a small sliding hiatal  hernia. There are 2 noncalcified pulmonary nodules along the caudad  aspect of the right middle lobe on image 13, 7 mm and image 15, 14 mm.     There is degenerative disc and facet change in the lumbar spine and mild  degenerative change at the hips. No bone lesion.     The abdominal aorta is partly calcified but normal in caliber. There is  no lymphadenopathy or free fluid.       Impression:      1. Very extensive diverticulosis throughout the length of the colon. No  acute inflammatory process is identified to account for the reported  history of pain. Likewise, no  lesion is identified to account for GI  bleeding other than the numerous diverticula.  2. Incidentally noted noncalcified pulmonary nodules in the peripleural  right lung base are not evident on previous imaging. Follow up with CT  of the entire chest for survey of the chest is suggested before pursuing  further management of this finding.  3. A 26 mm left adnexal cyst appears to be simple but further  characterization with pelvic ultrasound is suggested. This may be  technically challenging due to its location high in the pelvis.        Radiation dose reduction techniques were utilized, including automated  exposure control and exposure modulation based on body size.        This report was finalized on 1/16/2024 2:34 PM by Dr. Adán Acosta M.D on Workstation: [id number]             Results for orders placed during the hospital encounter of 01/16/24    Duplex Venous Lower Extremity - Bilateral CAR    Interpretation Summary    Acute right lower extremity deep vein thrombosis noted in the posterior tibial, peroneal and soleal.    Acute left lower extremity deep vein thrombosis noted in the posterial tibial, peroneal, gastrocnemius and soleal.    Chronic left lower extremity deep vein thrombosis noted in the popliteal.    All other veins appeared normal bilaterally.    Results for orders placed during the hospital encounter of 01/16/24    Adult Transthoracic Echo Complete W/ Cont if Necessary Per Protocol    Interpretation Summary    Left ventricular systolic function is hyperdynamic (EF > 70%). Calculated left ventricular EF = 78.7% Left ventricular ejection fraction appears to be 61 - 65%.    Left ventricular diastolic function was normal.    The right ventricular cavity is borderline dilated.    The right atrial cavity is borderline dilated.    Estimated right ventricular systolic pressure from tricuspid regurgitation is markedly elevated (>55 mmHg).    Pertinent Labs     Results from last 7 days   Lab Units  "01/27/24  0409 01/26/24  0355 01/25/24 0334 01/24/24 0418   WBC 10*3/mm3 7.07 8.18 10.46 12.09*   HEMOGLOBIN g/dL 8.3* 8.6* 9.1* 9.9*   PLATELETS 10*3/mm3 198 179 175 173     Results from last 7 days   Lab Units 01/27/24  0409 01/26/24  0355 01/25/24 0334 01/24/24 0418   SODIUM mmol/L 143 142 137 137   POTASSIUM mmol/L 4.2 4.2 4.4 4.4   CHLORIDE mmol/L 112* 111* 105 103   CO2 mmol/L 24.0 22.9 24.0 25.8   BUN mg/dL 21 26* 33* 30*   CREATININE mg/dL 0.94 1.09* 1.56* 1.85*   GLUCOSE mg/dL 91 96 85 97   EGFR mL/min/1.73 59.2* 49.6* 32.2* 26.3*       Results from last 7 days   Lab Units 01/27/24 0409 01/26/24 0355 01/25/24 0334 01/24/24 0418 01/22/24 0417 01/21/24  0812   CALCIUM mg/dL 8.3* 7.9* 8.0* 8.2*   < > 8.0*   MAGNESIUM mg/dL  --   --   --   --   --  2.0    < > = values in this interval not displayed.       Results from last 7 days   Lab Units 01/21/24  0847 01/21/24  0812   PROBNP pg/mL  --  1,099.0   D DIMER QUANT MCGFEU/mL 18.15*  --            Invalid input(s): \"LDLCALC\"          Test Results Pending at Discharge       Discharge Details        Discharge Medications        New Medications        Instructions Start Date   apixaban 5 MG tablet tablet  Commonly known as: ELIQUIS   5 mg, Oral, Every 12 Hours Scheduled      dilTIAZem  MG 24 hr capsule  Commonly known as: CARDIZEM CD   120 mg, Oral, Every 24 Hours Scheduled   Start Date: January 28, 2024     pantoprazole 40 MG EC tablet  Commonly known as: PROTONIX   40 mg, Oral, 2 Times Daily Before Meals             Continue These Medications        Instructions Start Date   arformoterol 15 MCG/2ML nebulizer solution  Commonly known as: Brovana   15 mcg, Nebulization, 2 Times Daily - RT      budesonide 0.5 MG/2ML nebulizer solution  Commonly known as: PULMICORT   0.5 mg, Nebulization, 2 Times Daily      coenzyme Q10 50 MG capsule capsule   50 mg, Oral, Daily      diazePAM 2 MG tablet  Commonly known as: VALIUM   2 mg, Oral, 2 Times Daily    "   docusate sodium 100 MG capsule  Commonly known as: COLACE   100 mg, Oral, Daily      fluticasone 50 MCG/ACT nasal spray  Commonly known as: Flonase   2 sprays, Each Nare, 2 Times Daily      glucosamine-chondroitin 500-400 MG capsule capsule   1 capsule, Oral, 3 Times Daily With Meals      ondansetron 4 MG tablet  Commonly known as: ZOFRAN   8 mg, Oral, Daily      polyethylene glycol 17 g packet  Commonly known as: MIRALAX   17 g, Oral, Daily      SELENIMIN PO   Oral      VITAMIN B 12 PO   1 tablet, Oral, Daily      VITAMIN C ER PO   1 tablet, Oral, Daily      VITAMIN D-3 PO   1 tablet, Oral, Daily      ZINC 15 PO   Oral             Stop These Medications      aspirin 81 MG chewable tablet     furosemide 20 MG tablet  Commonly known as: LASIX     losartan-hydrochlorothiazide 100-12.5 MG per tablet  Commonly known as: HYZAAR     LYCOPENE PO     meclizine 32 MG tablet  Commonly known as: ANTIVERT     OLIVE LEAF EXTRACT PO     omeprazole 40 MG capsule  Commonly known as: priLOSEC              Allergies   Allergen Reactions    Cortisone Hives    Penicillins Hives       Discharge Disposition:  Home-Health Care Svc      Discharge Diet:  Diet Order   Procedures    Diet: Cardiac Diets; Healthy Heart (2-3 Na+); Texture: Regular Texture (IDDSI 7); Fluid Consistency: Thin (IDDSI 0)       Discharge Activity:       CODE STATUS:    Code Status and Medical Interventions:   Ordered at: 01/16/24 1536     Code Status (Patient has no pulse and is not breathing):    CPR (Attempt to Resuscitate)     Medical Interventions (Patient has pulse or is breathing):    Full Support       No future appointments.  Additional Instructions for the Follow-ups that You Need to Schedule       Ambulatory Referral to Home Health   As directed      Face to Face Visit Date: 1/27/2024   Follow-up provider for Plan of Care?: I treated the patient in an acute care facility and will not continue treatment after discharge.   Follow-up provider: RACQUEL  JOVANY LEDESMA [[id number]]   Reason/Clinical Findings: gi bleed and weakness with anemia   Describe mobility limitations that make leaving home difficult: family for trasnportation   Nursing/Therapeutic Services Requested: Skilled Nursing Physical Therapy Occupational Therapy   Skilled nursing orders: Other (labs)   Frequency: 1 Week 1        Discharge Follow-up with PCP   As directed       Currently Documented PCP:    Jovany Leyva DO    PCP Phone Number:    [phone]     Follow Up Details: 2-3 weeks        Discharge Follow-up with Specified Provider: Cardiology as directed   As directed      To: Cardiology as directed        Discharge Follow-up with Specified Provider: Gastroenterology; 6 Weeks   As directed      To: Gastroenterology   Follow Up: 6 Weeks        CBC & Differential    Feb 01, 2024 (Approximate)      Manual Differential: No   Release to patient: Routine Release               Contact information for follow-up providers       Juan Carlos Lam MD Follow up in 6 week(s).    Specialties: Pulmonary Disease, Sleep Medicine, Intensive Care  Contact information:  [address]  [facility] [id number] [phone]               Jovany Leyva DO .    Specialty: Internal Medicine  Why: 2-3 weeks  Contact information:  [address] [facility] [facility] [address] [facility] [facility] [address]  [facility] [address] [phone]                       Contact information for after-discharge care       Home Medical Care       [facility] .    Service: Home Health Services  Contact information:  [address] [facility] [address]  [facility] [address] [phone]                                   Additional Instructions for the Follow-ups that You Need to Schedule       Ambulatory Referral to Home Health   As directed      Face to Face Visit Date: 1/27/2024   Follow-up provider for Plan of Care?: I treated the patient in an acute care facility and will not continue treatment after discharge.   Follow-up provider:  JOVANY CLEMENT [[id number]]   Reason/Clinical Findings: gi bleed and weakness with anemia   Describe mobility limitations that make leaving home difficult: family for trasnportation   Nursing/Therapeutic Services Requested: Skilled Nursing Physical Therapy Occupational Therapy   Skilled nursing orders: Other (labs)   Frequency: 1 Week 1        Discharge Follow-up with PCP   As directed       Currently Documented PCP:    Jovany Clement,     PCP Phone Number:    [phone]     Follow Up Details: 2-3 weeks        Discharge Follow-up with Specified Provider: Cardiology as directed   As directed      To: Cardiology as directed        Discharge Follow-up with Specified Provider: Gastroenterology; 6 Weeks   As directed      To: Gastroenterology   Follow Up: 6 Weeks        CBC & Differential    Feb 01, 2024 (Approximate)      Manual Differential: No   Release to patient: Routine Release            Time Spent on Discharge:  Greater than 30 minutes      Daniel Jean MD  [facility]  01/27/24  08:51 EST

## 2024-01-27 NOTE — NURSING NOTE
Patient continues to desat to 80-88% when on room air and asleep throughout the morning. Placed on 2L NC with an increase in O2 >90%, have been able to turn O2 down to 1.5L . Will continue to monitor. Also spoke with CM regarding insurance requirement for overnight oxygen oximetry study. Pulmonary paged to discuss O2 status,family concern and need for Oxygen equipment at home. Pending call back and discharge status.

## 2024-01-27 NOTE — CASE MANAGEMENT/SOCIAL WORK
Continued Stay Note  [facility]     Patient Name: Vonnie Coppola  MRN: [id number]  Today's Date: 1/27/2024    Admit Date: 1/16/2024    Plan: Home with daughter.    [facility] to see for nursing and PT/OT. Refuses SNF. Follow for possible Home O2 need  Family states they can transport home   Discharge Plan       Row Name 01/27/24 1801       Plan    Plan Comments Spoke w/ RN and [facility] oxygen [facility].                   Discharge Codes    No documentation.                 Expected Discharge Date and Time       Expected Discharge Date Expected Discharge Time    Jan 27, 2024               Lucrecia Hernandez RN

## 2024-01-27 NOTE — PROGRESS NOTES
[facility] will follow patient post hospital as requested. Patient/daughter agreeable to services. Contact information and PCP confirmed. Best to call daughter at [phone] to schedule home health visits. Verbal order received from Sydnee with PCP, Dr. Leyva, for home health care.

## 2024-01-27 NOTE — DISCHARGE PLACEMENT REQUEST
"Vonnie Hitchcock (88 y.o. Female)       Date of Birth   1935    Social Security Number       Address   [address] [facility] [address]    Home Phone       MRN   [id number]       Restorationist   None    Marital Status                               Admission Date   1/16/24    Admission Type   Emergency    Admitting Provider   Tiffani Arnold MD    Attending Provider   Daniel Jean MD    Department, Room/Bed   [address], [address]457/1       Discharge Date       Discharge Disposition   Home-Health Care [facility]    Discharge Destination                                 Attending Provider: Daniel Jean MD    Allergies: Cortisone, Penicillins    Isolation: None   Infection: None   Code Status: CPR    Ht: 165.1 cm (65\")   Wt: 110 kg (243 lb)    Admission Cmt: None   Principal Problem: GI bleed [K92.2]                   Active Insurance as of 1/16/2024       Primary Coverage       Payor Plan Insurance Group Employer/Plan Group    HUMANA MEDICARE REPLACEMENT HUMANA MED ADV PPO [id number]       Payor Plan Address Payor Plan Phone Number Payor Plan Fax Number Effective Dates    [address] [address] [phone]  3/1/2023 - None Entered    [facility] [id number]         Subscriber Name Subscriber Birth Date Member ID       VONNIE HITCHCOCK 1935 [id number]                     Emergency Contacts        (Rel.) Home Phone Work Phone Mobile Phone    KELLY ZUÑIGA (Sister) [phone] -- --    PAULETTEMARY (Daughter) -- -- [phone]            "

## 2024-01-27 NOTE — PROGRESS NOTES
O2 increased back to 2lpm when patient had a coughing episode during neb treatment and said she was going to pass out. Patient was better and coughing stopped post treatment.
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
## 2024-01-28 ENCOUNTER — READMISSION MANAGEMENT (OUTPATIENT)
Dept: CALL CENTER | Facility: HOSPITAL | Age: 89
End: 2024-01-28
Payer: MEDICARE

## 2024-01-28 VITALS
RESPIRATION RATE: 18 BRPM | HEIGHT: 65 IN | DIASTOLIC BLOOD PRESSURE: 80 MMHG | HEART RATE: 66 BPM | OXYGEN SATURATION: 97 % | SYSTOLIC BLOOD PRESSURE: 117 MMHG | TEMPERATURE: 98.4 F | WEIGHT: 243 LBS | BODY MASS INDEX: 40.48 KG/M2

## 2024-01-28 LAB
HCT VFR BLD AUTO: 29.4 % (ref 34–46.6)
HGB BLD-MCNC: 9.2 G/DL (ref 12–15.9)

## 2024-01-28 PROCEDURE — 94664 DEMO&/EVAL PT USE INHALER: CPT

## 2024-01-28 PROCEDURE — 97530 THERAPEUTIC ACTIVITIES: CPT

## 2024-01-28 PROCEDURE — 25010000002 ONDANSETRON PER 1 MG: Performed by: INTERNAL MEDICINE

## 2024-01-28 PROCEDURE — 94799 UNLISTED PULMONARY SVC/PX: CPT

## 2024-01-28 PROCEDURE — 85014 HEMATOCRIT: CPT | Performed by: HOSPITALIST

## 2024-01-28 PROCEDURE — 94760 N-INVAS EAR/PLS OXIMETRY 1: CPT

## 2024-01-28 PROCEDURE — 85018 HEMOGLOBIN: CPT | Performed by: HOSPITALIST

## 2024-01-28 RX ORDER — BENZONATATE 100 MG/1
100 CAPSULE ORAL 3 TIMES DAILY PRN
Qty: 30 CAPSULE | Refills: 0 | Status: SHIPPED | OUTPATIENT
Start: 2024-01-28

## 2024-01-28 RX ADMIN — BENZONATATE 200 MG: 100 CAPSULE ORAL at 09:36

## 2024-01-28 RX ADMIN — BUDESONIDE 0.5 MG: 0.5 INHALANT ORAL at 07:04

## 2024-01-28 RX ADMIN — Medication 10 ML: at 09:07

## 2024-01-28 RX ADMIN — ONDANSETRON HYDROCHLORIDE 4 MG: 2 INJECTION, SOLUTION INTRAMUSCULAR; INTRAVENOUS at 09:36

## 2024-01-28 RX ADMIN — DOCUSATE SODIUM 100 MG: 100 CAPSULE, LIQUID FILLED ORAL at 09:07

## 2024-01-28 RX ADMIN — IPRATROPIUM BROMIDE AND ALBUTEROL SULFATE 3 ML: 2.5; .5 SOLUTION RESPIRATORY (INHALATION) at 07:04

## 2024-01-28 RX ADMIN — PANTOPRAZOLE SODIUM 40 MG: 40 TABLET, DELAYED RELEASE ORAL at 09:07

## 2024-01-28 RX ADMIN — APIXABAN 5 MG: 5 TABLET, FILM COATED ORAL at 09:07

## 2024-01-28 RX ADMIN — DILTIAZEM HYDROCHLORIDE 120 MG: 120 CAPSULE, EXTENDED RELEASE ORAL at 09:07

## 2024-01-28 NOTE — THERAPY TREATMENT NOTE
Patient Name: Vonnie Coppola  : 1935    MRN: [id number]                              Today's Date: 2024       Admit Date: 2024    Visit Dx:     ICD-10-CM ICD-9-CM   1. Gastrointestinal hemorrhage, unspecified gastrointestinal hemorrhage type  K92.2 578.9   2. Pulmonary nodule  R91.1 793.11   3. Cyst of ovary, unspecified laterality  N83.209 620.2   4. Anemia, unspecified type  D64.9 285.9   5. Gastrointestinal hemorrhage with melena  K92.1 578.1   6. Nausea  R11.0 787.02   7. Nocturnal hypoxia  G47.34 327.24   8. Multiple subsegmental pulmonary emboli without acute cor pulmonale  I26.94 415.19   9. Hypoxia  R09.02 799.02     Patient Active Problem List   Diagnosis    GI bleed    Anemia    HTN (hypertension)    History of breast cancer    Asthma    History of CVA (cerebrovascular accident)    Dehydration    Renal insufficiency    Pulmonary nodule    Adnexal cyst    Nausea    Atrial fibrillation    Pulmonary embolus     Past Medical History:   Diagnosis Date    Arthritis     Asthma     Atrial fibrillation     per pt's daughter.States hx of a-fib in the past when stressed or tired.    Breast cancer     LEFT BREAST CA, LUMPECTOMY & RADS    Hx of radiation therapy     APPROXIMATELY 40 TREATMENTS FOR LEFT BREAST CA    Hypertension     Pneumonia     Stroke      Past Surgical History:   Procedure Laterality Date    APPENDECTOMY      BLADDER SURGERY      BREAST BIOPSY Left     MALIGNANT    BREAST LUMPECTOMY Left     MALIGNANT    COLONOSCOPY N/A 2024    Procedure: COLONOSCOPY to cecum with cold snare polypectomies;  Surgeon: Harshad Gaston MD;  Location: [facility] ENDOSCOPY;  Service: Gastroenterology;  Laterality: N/A;  pre- gi bleed, anemia  post- diverticulosis, polyps    ENDOSCOPY N/A 2024    Procedure: ESOPHAGOGASTRODUODENOSCOPY with biospy;  Surgeon: Harshad Gaston MD;  Location: [facility] ENDOSCOPY;  Service: Gastroenterology;  Laterality: N/A;  pre- gi bleed,  anemia  post- erosive gastirits    GALLBLADDER SURGERY      HYSTERECTOMY      OOPHORECTOMY        General Information       Row Name 01/28/24 1029          Physical Therapy Time and Intention    Document Type therapy note (daily note)  -SV               User Key  (r) = Recorded By, (t) = Taken By, (c) = Cosigned By      Initials Name Provider Type    Jil Carson, PT Physical Therapist                   Mobility       Row Name 01/28/24 1130          Bed Mobility    Supine-Sit [place] (Bed Mobility) contact guard  -SV     Sit-Supine [place] (Bed Mobility) minimum assist (75% patient effort);moderate assist (50% patient effort)  -SV     Assistive Device (Bed Mobility) head of bed elevated  -SV     Comment, (Bed Mobility) unable to lift BLE up into bed  -SV       Row Name 01/28/24 1130          Sit-Stand Transfer    Sit-Stand [place] (Transfers) contact guard;minimum assist (75% patient effort)  cga for 2 trials, min asst of 3rd trial  -SV     Assistive Device (Sit-Stand Transfers) walker, front-wheeled  -SV     Comment, (Sit-Stand Transfer) her rollator  -SV       Row Name 01/28/24 1130          Gait/Stairs (Locomotion)    [place] Level (Gait) contact guard;2 person assist  -SV     Assistive Device (Gait) walker, front-wheeled  -SV     Distance in Feet (Gait) 10' on RA with flexed posture shuffle pattern desat to 88%, placed back on 2 L for seated rest, pt amb 30' 2 on 2 L markedly flexed posture, shuffle pattern cues for PLB no desat noted  feet do not pass each other  -SV               User Key  (r) = Recorded By, (t) = Taken By, (c) = Cosigned By      Initials Name Provider Type    Jil Carson, PT Physical Therapist                   Obj/Interventions       Row Name 01/28/24 1134          Motor Skills    Therapeutic Exercise --  education performed on PLB purpose and technique. RA trial with O2 at 93% in at rest in sitting today.  -SV       Row Name 01/28/24 1134          Balance     Static Sitting Balance standby assist;supervision  -SV     Static Standing Balance contact guard  -SV     Position/Device Used, Standing Balance walker, rolling  -SV               User Key  (r) = Recorded By, (t) = Taken By, (c) = Cosigned By      Initials Name Provider Type    Jil Carson, PT Physical Therapist                   Goals/Plan    No documentation.                  Clinical Impression       Row Name 01/28/24 1135          Pain    Pretreatment Pain Rating 0/10 - no pain  -SV     Posttreatment Pain Rating 0/10 - no pain  -SV     Pre/Posttreatment Pain Comment reports pain left knee during amb  -SV       Row Name 01/28/24 1135          Plan of Care Review    Plan of Care Reviewed With patient;daughter  -SV       Row Name 01/28/24 1135          Vital Signs    Pre SpO2 (%) 96  93 % seated room air trial  -SV     O2 Delivery Pre Treatment supplemental O2  -SV     Intra SpO2 (%) 88  after amb 10' on RA  -SV     Post SpO2 (%) 95  -SV     O2 Delivery Post Treatment supplemental O2  -SV     Pre Patient Position Supine  -SV     Intra Patient Position Standing  -SV     Post Patient Position Supine  -SV       Row Name 01/28/24 1135          Positioning and Restraints    Pre-Treatment Position in bed  -SV     Post Treatment Position bed  -SV     In Bed notified nsg;call light within reach;encouraged to call for assist;exit alarm on;fowlers;with family/caregiver  -SV               User Key  (r) = Recorded By, (t) = Taken By, (c) = Cosigned By      Initials Name Provider Type    Jil Carson, PT Physical Therapist                   Outcome Measures       Row Name 01/28/24 1137 01/28/24 0800       How much help from another person do you currently need...    Turning from your back to your side while in flat bed without using bedrails? 4  -SV 4  -SJ    Moving from lying on back to sitting on the side of a flat bed without bedrails? 4  -SV 3  -SJ    Moving to and from a bed to a chair (including a  wheelchair)? 3  -SV 3  -SJ    Standing up from a chair using your arms (e.g., wheelchair, bedside chair)? 3  -SV 3  -SJ    Climbing 3-5 steps with a railing? 2  -SV 2  -SJ    To walk in hospital room? 3  -SV 3  -SJ    AM-PAC 6 Clicks Score (PT) 19  -SV 18  -SJ    Highest Level of Mobility Goal 6 --> Walk 10 steps or more  -SV 6 --> Walk 10 steps or more  -SJ              User Key  (r) = Recorded By, (t) = Taken By, (c) = Cosigned By      Initials Name Provider Type    SV Jil Rice, PT Physical Therapist    Sade Brennan, RN Registered Nurse                                 Physical Therapy Education       Title: PT OT SLP Therapies (In Progress)       Topic: Physical Therapy (In Progress)       Point: Mobility training (In Progress)       Learning Progress Summary             Patient Acceptance, E, NR by SV at 1/28/2024 1137    Acceptance, E, VU by AB at 1/26/2024 1702    Acceptance, E, VU by AB at 1/23/2024 1619    Acceptance, E, VU by KN at 1/17/2024 1337    Acceptance, E,TB,D, VU,DU by AK at 1/17/2024 1006                         Point: Home exercise program (In Progress)       Learning Progress Summary             Patient Acceptance, E, NR by SV at 1/28/2024 1137    Acceptance, E, VU by AB at 1/26/2024 1702    Acceptance, E, VU by AB at 1/23/2024 1619    Acceptance, E, VU by KN at 1/17/2024 1337                         Point: Body mechanics (In Progress)       Learning Progress Summary             Patient Acceptance, E, NR by SV at 1/28/2024 1137    Acceptance, E, VU by AB at 1/26/2024 1702    Acceptance, E, VU by AB at 1/23/2024 1619    Acceptance, E, VU by KN at 1/17/2024 1337                         Point: Precautions (In Progress)       Learning Progress Summary             Patient Acceptance, E, NR by SV at 1/28/2024 1137    Acceptance, E, VU by AB at 1/26/2024 1702    Acceptance, E, VU by AB at 1/23/2024 1619    Acceptance, E, VU by KN at 1/17/2024 1337                                         User  Key       Initials Effective Dates Name Provider Type Discipline    SV 07/11/23 -  Jil Rice, PT Physical Therapist PT    KN 08/02/22 -  Chay Moss OT Occupational Therapist OT    AB 12/15/23 -  Jessi Cano, RN Registered Nurse Nurse    AK 12/28/23 -  Erica Alexis PT Student PT Student PT                  PT Recommendation and Plan     Plan of Care Reviewed With: patient, daughter     Time Calculation:         PT Charges       Row Name 01/28/24 1143             Time Calculation    Start Time 1029  -SV      Stop Time 1053  -SV      Time Calculation (min) 24 min  -SV      PT Received On 01/28/24  -SV      PT - Next Appointment 01/29/24  -                User Key  (r) = Recorded By, (t) = Taken By, (c) = Cosigned By      Initials Name Provider Type    SV Jil Rice, PT Physical Therapist                  Therapy Charges for Today       Code Description Service Date Service Provider Modifiers Qty    03077079564 HC PT THERAPEUTIC ACT EA 15 MIN 1/28/2024 Jil Rice, PT GP 2    55440647276 HC PT THER SUPP EA 15 MIN 1/28/2024 Jil Rice, PT GP 1            PT G-Codes  Outcome Measure Options: AM-PAC 6 Clicks Daily Activity (OT)  AM-PAC 6 Clicks Score (PT): 19  AM-PAC 6 Clicks Score (OT): 21       Jil Rice PT  1/28/2024

## 2024-01-28 NOTE — DISCHARGE INSTR - APPOINTMENTS
Discharge Follow-up with Specified Provider: Gastroenterology; 6 Weeks    To: Gastroenterology  Follow Up: 6 Weeks    [facility]  [address] [place] [address]      Discharge Follow-up with PCP As directed   Currently Documented PCP:  Christopher Leyva DO  PCP Phone Number:  [phone]   Follow Up Details: 2-3 weeks

## 2024-01-28 NOTE — THERAPY TREATMENT NOTE
Patient Name: Vonnie Coppola  : 1935    MRN: [id number]                              Today's Date: 2024       Admit Date: 2024    Visit Dx:     ICD-10-CM ICD-9-CM   1. Gastrointestinal hemorrhage, unspecified gastrointestinal hemorrhage type  K92.2 578.9   2. Pulmonary nodule  R91.1 793.11   3. Cyst of ovary, unspecified laterality  N83.209 620.2   4. Anemia, unspecified type  D64.9 285.9   5. Gastrointestinal hemorrhage with melena  K92.1 578.1   6. Nausea  R11.0 787.02   7. Nocturnal hypoxia  G47.34 327.24   8. Multiple subsegmental pulmonary emboli without acute cor pulmonale  I26.94 415.19   9. Hypoxia  R09.02 799.02     Patient Active Problem List   Diagnosis    GI bleed    Anemia    HTN (hypertension)    History of breast cancer    Asthma    History of CVA (cerebrovascular accident)    Dehydration    Renal insufficiency    Pulmonary nodule    Adnexal cyst    Nausea    Atrial fibrillation    Pulmonary embolus     Past Medical History:   Diagnosis Date    Arthritis     Asthma     Atrial fibrillation     per pt's daughter.States hx of a-fib in the past when stressed or tired.    Breast cancer     LEFT BREAST CA, LUMPECTOMY & RADS    Hx of radiation therapy     APPROXIMATELY 40 TREATMENTS FOR LEFT BREAST CA    Hypertension     Pneumonia     Stroke      Past Surgical History:   Procedure Laterality Date    APPENDECTOMY      BLADDER SURGERY      BREAST BIOPSY Left     MALIGNANT    BREAST LUMPECTOMY Left     MALIGNANT    COLONOSCOPY N/A 2024    Procedure: COLONOSCOPY to cecum with cold snare polypectomies;  Surgeon: Harshad Gaston MD;  Location: [facility] ENDOSCOPY;  Service: Gastroenterology;  Laterality: N/A;  pre- gi bleed, anemia  post- diverticulosis, polyps    ENDOSCOPY N/A 2024    Procedure: ESOPHAGOGASTRODUODENOSCOPY with biospy;  Surgeon: Harshad Gaston MD;  Location: [facility] ENDOSCOPY;  Service: Gastroenterology;  Laterality: N/A;  pre- gi bleed,  anemia  post- erosive gastirits    GALLBLADDER SURGERY      HYSTERECTOMY      OOPHORECTOMY        General Information       Row Name 01/28/24 1029          Physical Therapy Time and Intention    Document Type therapy note (daily note)  -SV               User Key  (r) = Recorded By, (t) = Taken By, (c) = Cosigned By      Initials Name Provider Type    SV Jil Rice, PT Physical Therapist                   Mobility       Row Name 01/28/24 1405 01/28/24 1130       Bed Mobility    Bed Mobility supine-sit  -CS --    Supine-Sit [place] (Bed Mobility) contact guard  -CS contact guard  -SV    Sit-Supine [place] (Bed Mobility) moderate assist (50% patient effort)  -CS minimum assist (75% patient effort);moderate assist (50% patient effort)  -SV    Assistive Device (Bed Mobility) head of bed elevated  -CS head of bed elevated  -SV    Comment, (Bed Mobility) -- unable to lift BLE up into bed  -SV      Row Name 01/28/24 1405 01/28/24 1130       Sit-Stand Transfer    Sit-Stand [place] (Transfers) minimum assist (75% patient effort)  -CS contact guard;minimum assist (75% patient effort)  cga for 2 trials, min asst of 3rd trial  -SV    Assistive Device (Sit-Stand Transfers) walker, front-wheeled  -CS walker, front-wheeled  -SV    Comment, (Sit-Stand Transfer) had rollator  -CS her rollator  -SV      Row Name 01/28/24 1405 01/28/24 1130       Gait/Stairs (Locomotion)    [place] Level (Gait) moderate assist (50% patient effort)  -CS contact guard;2 person assist  -SV    Assistive Device (Gait) walker, front-wheeled  -CS walker, front-wheeled  -SV    Distance in Feet (Gait) 15- fwd steps and bwd steps. I asked her not to get too far away from the bed due to desat and shortness of breath.  -CS 10' on RA with flexed posture shuffle pattern desat to 88%, placed back on 2 L for seated rest, pt amb 30' 2 on 2 L markedly flexed posture, shuffle pattern cues for PLB no desat noted  feet do not pass each other  -SV     Deviations/Abnormal Patterns (Gait) prashanth decreased;stride length decreased  -CS --    Bilateral Gait Deviations forward flexed posture;heel strike decreased  -CS --    Comment, (Gait/Stairs) limited by weakness and fatigue  -CS --              User Key  (r) = Recorded By, (t) = Taken By, (c) = Cosigned By      Initials Name Provider Type    SV Jil Rice, PT Physical Therapist    CS Thaddeus Arshad, PT Physical Therapist                   Obj/Interventions       Row Name 01/28/24 1134          Motor Skills    Therapeutic Exercise --  education performed on PLB purpose and technique. RA trial with O2 at 93% in at rest in sitting today.  -SV       Row Name 01/28/24 1134          Balance    Static Sitting Balance standby assist;supervision  -SV     Static Standing Balance contact guard  -SV     Position/Device Used, Standing Balance walker, rolling  -SV               User Key  (r) = Recorded By, (t) = Taken By, (c) = Cosigned By      Initials Name Provider Type    SV Jil Rice, PT Physical Therapist                   Goals/Plan    No documentation.                  Clinical Impression       Row Name 01/28/24 1409 01/28/24 1135       Pain    Pretreatment Pain Rating 0/10 - no pain  -CS 0/10 - no pain  -SV    Posttreatment Pain Rating 0/10 - no pain  -CS 0/10 - no pain  -SV    Pre/Posttreatment Pain Comment -- reports pain left knee during amb  -SV      Row Name 01/28/24 1135          Plan of Care Review    Plan of Care Reviewed With patient;daughter  -[facility] Name 01/28/24 1409          Therapy Assessment/Plan (PT)    Therapy Frequency (PT) 6 times/wk  -       Row Name 01/28/24 1409 01/28/24 1135       Vital Signs    Pre SpO2 (%) -- 96  93 % seated room air trial  -SV    O2 Delivery Pre Treatment supplemental O2  -CS supplemental O2  -SV    Intra SpO2 (%) -- 88  after amb 10' on RA  -SV    O2 Delivery Intra Treatment supplemental O2  -CS --    Post SpO2 (%) -- 95  -SV    O2 Delivery Post  Treatment supplemental O2  -CS supplemental O2  -SV    Pre Patient Position -- Supine  -SV    Intra Patient Position -- Standing  -SV    Post Patient Position -- Supine  -SV      Row Name 01/28/24 1409 01/28/24 1135       Positioning and Restraints    Pre-Treatment Position in bed  -CS in bed  -SV    Post Treatment Position bed  -CS bed  -SV    In Bed supine;encouraged to call for assist;exit alarm on;call light within reach  -CS notified nsg;call light within reach;encouraged to call for assist;exit alarm on;fowlers;with family/caregiver  -SV              User Key  (r) = Recorded By, (t) = Taken By, (c) = Cosigned By      Initials Name Provider Type    SV Jil Rice, PT Physical Therapist    CS Thaddeus Arshad, PT Physical Therapist                   Outcome Measures       Row Name 01/28/24 1409 01/28/24 1137       How much help from another person do you currently need...    Turning from your back to your side while in flat bed without using bedrails? 4  -CS 4  -SV    Moving from lying on back to sitting on the side of a flat bed without bedrails? 4  -CS 4  -SV    Moving to and from a bed to a chair (including a wheelchair)? 3  -CS 3  -SV    Standing up from a chair using your arms (e.g., wheelchair, bedside chair)? 3  -CS 3  -SV    Climbing 3-5 steps with a railing? 2  -CS 2  -SV    To walk in hospital room? 3  -CS 3  -SV    AM-PAC 6 Clicks Score (PT) 19  -CS 19  -SV    Highest Level of Mobility Goal 6 --> Walk 10 steps or more  -CS 6 --> Walk 10 steps or more  -SV      Row Name 01/28/24 0800          How much help from another person do you currently need...    Turning from your back to your side while in flat bed without using bedrails? 4  -SJ     Moving from lying on back to sitting on the side of a flat bed without bedrails? 3  -SJ     Moving to and from a bed to a chair (including a wheelchair)? 3  -SJ     Standing up from a chair using your arms (e.g., wheelchair, bedside chair)? 3  -SJ     Climbing  3-5 steps with a railing? 2  -SJ     To walk in hospital room? 3  -SJ     AM-PAC 6 Clicks Score (PT) 18  -     Highest Level of Mobility Goal 6 --> Walk 10 steps or more  -       Row Name 01/28/24 1409          Functional Assessment    Outcome Measure Options AM-PAC 6 Clicks Basic Mobility (PT)  -               User Key  (r) = Recorded By, (t) = Taken By, (c) = Cosigned By      Initials Name Provider Type    SV Jil Rice, PT Physical Therapist    Thaddeus Arriaga, PT Physical Therapist    Sade Brennan, RN Registered Nurse                                 Physical Therapy Education       Title: PT OT SLP Therapies (In Progress)       Topic: Physical Therapy (Done)       Point: Mobility training (Done)       Learning Progress Summary             Patient Acceptance, E,TB, VU,NR by CS at 1/28/2024 1410    Acceptance, E, NR by SV at 1/28/2024 1137    Acceptance, E, VU by AB at 1/26/2024 1702    Acceptance, E, VU by AB at 1/23/2024 1619    Acceptance, E, VU by KN at 1/17/2024 1337    Acceptance, E,TB,D, VU,DU by AK at 1/17/2024 1006                         Point: Home exercise program (Done)       Learning Progress Summary             Patient Acceptance, E,TB, VU,NR by ELIGIO at 1/28/2024 1410    Acceptance, E, NR by SV at 1/28/2024 1137    Acceptance, E, VU by AB at 1/26/2024 1702    Acceptance, E, VU by AB at 1/23/2024 1619    Acceptance, E, VU by KN at 1/17/2024 1337                         Point: Body mechanics (Done)       Learning Progress Summary             Patient Acceptance, E,TB, VU,NR by CS at 1/28/2024 1410    Acceptance, E, NR by SV at 1/28/2024 1137    Acceptance, E, VU by AB at 1/26/2024 1702    Acceptance, E, VU by AB at 1/23/2024 1619    Acceptance, E, VU by KN at 1/17/2024 1337                         Point: Precautions (Done)       Learning Progress Summary             Patient Acceptance, E,TB, VU,NR by CS at 1/28/2024 1410    Acceptance, E, NR by SV at 1/28/2024 1137    Acceptance, E,  VU by AB at 1/26/2024 1702    Acceptance, E, VU by AB at 1/23/2024 1619    Acceptance, E, VU by KN at 1/17/2024 1337                                         User Key       Initials Effective Dates Name Provider Type Discipline    SV 07/11/23 -  Jil Rice, PT Physical Therapist PT    CS 07/11/23 -  Thaddeus Arshad, PT Physical Therapist PT    KN 08/02/22 -  Chay Moss, OT Occupational Therapist OT    AB 12/15/23 -  Jessi Cano, RN Registered Nurse Nurse    AK 12/28/23 -  Erica Alexis, ROLO Student PT Student PT                  PT Recommendation and Plan           Time Calculation:         PT Charges       Row Name 01/28/24 1413 01/28/24 1143          Time Calculation    Start Time -- 1029  -SV     Stop Time -- 1053  -SV     Time Calculation (min) -- 24 min  -SV     PT Received On -- 01/28/24  -     PT - Next Appointment -- 01/29/24  -        Time Calculation- PT    Total Timed Code Minutes- PT 15 minute(s)  -CS --               User Key  (r) = Recorded By, (t) = Taken By, (c) = Cosigned By      Initials Name Provider Type    SV Jil Rice, PT Physical Therapist    CS Thaddeus Arshad, PT Physical Therapist                  Therapy Charges for Today       Code Description Service Date Service Provider Modifiers Qty    [id number] HC PT THER PROC EA 15 MIN 1/27/2024 Thaddeus Arshad, PT GP 1            PT G-Codes  Outcome Measure Options: AM-PAC 6 Clicks Basic Mobility (PT)  AM-PAC 6 Clicks Score (PT): 19  AM-PAC 6 Clicks Score (OT): 21  PT Discharge Summary  Anticipated Discharge Disposition (PT): home with 24/7 care, home with home health, skilled nursing facility    Thaddeus Arshad, PT  1/28/2024

## 2024-01-28 NOTE — PLAN OF CARE
Goal Outcome Evaluation:      Pt able to walk about 10 steps fwd and bwd this visit. She was limited by fatigue and weakness. She had labored breathing with mobility as well.                Anticipated Discharge Disposition (PT): home with 24/7 care, home with home health, skilled nursing facility

## 2024-01-28 NOTE — OUTREACH NOTE
Prep Survey      Flowsheet Row Responses   [facility] facility patient discharged from? [facility]   Is LACE score < 7 ? No   Eligibility Readm Mgmt   Discharge diagnosis GI bleed   Does the patient have one of the following disease processes/diagnoses(primary or secondary)? Other   Does the patient have Home health ordered? Yes   What is the Home health agency?  [facility]   Is there a DME ordered? Yes   What DME was ordered? O2 from [employer]   Prep survey completed? Yes            Caren FREED - Registered Nurse

## 2024-01-28 NOTE — PLAN OF CARE
Goal Outcome Evaluation:                      VSS.Sats stable on 2L.No acute events this shift.Possible D/C today.Awaiting AM labs.WCTM.

## 2024-01-28 NOTE — PROGRESS NOTES
[facility]ed[facility] to Hospital Care    [phone]   LOS: 8 days     Name: Vonnie Coppola  Age/Sex: 88 y.o. female  :  1935        PCP: Christopher Leyva DO  Chief Complaint   Patient presents with    Abdominal Pain    Black or Bloody Stool    Knee Pain      Subjective   Denies new complaints rested well overnight  on 2-3L O2 at time due to RA O2 <88%  General: No Fever or Chills, Cardiac: No Chest Pain or Palpitations, Resp: No Cough or SOA, GI: No Nausea, Vomiting, or Diarrhea, and Other: No bleeding    apixaban, 5 mg, Oral, Q12H  budesonide, 0.5 mg, Nebulization, BID - RT  dilTIAZem CD, 120 mg, Oral, Q24H  docusate sodium, 100 mg, Oral, BID  ipratropium-albuterol, 3 mL, Nebulization, TID  pantoprazole, 40 mg, Oral, BID AC  sodium chloride, 10 mL, Intravenous, Q12H           Objective   Vital Signs  Temp:  [98.1 °F (36.7 °C)-98.6 °F (37 °C)] 98.4 °F (36.9 °C)  Heart Rate:  [62-75] 66  Resp:  [17-20] 18  BP: (105-117)/(59-80) 117/80  Body mass index is 40.44 kg/m².    Intake/Output Summary (Last 24 hours) at 2024 1025  Last data filed at 2024 0707  Gross per 24 hour   Intake 240 ml   Output 1500 ml   Net -1260 ml       Physical Exam  Vitals and nursing note reviewed.   Constitutional:       Appearance: She is obese. She is ill-appearing.   Pulmonary:      Effort: Pulmonary effort is normal. No respiratory distress.   Skin:     General: Skin is warm and dry.   Neurological:      General: No focal deficit present.      Mental Status: She is alert. Mental status is at baseline.           Results Review:       I reviewed the patient's new clinical results.  Results from last 7 days   Lab Units 24  0909 24  0409 24  0355 24  0334 24  0418 24  0353 24  0417   WBC 10*3/mm3  --  7.07 8.18 10.46 12.09* 11.42* 10.89*   HEMOGLOBIN g/dL 9.2* 8.3* 8.6* 9.1* 9.9* 11.0* 10.7*   PLATELETS 10*3/mm3  --  198 179 175 173 149 155     Results from last 7 days   Lab Units  01/27/24  0409 01/26/24  0355 01/25/24  0334 01/24/24  0418 01/23/24  0353 01/22/24  1634 01/22/24  0417   SODIUM mmol/L 143 142 137 137 145  --  141   POTASSIUM mmol/L 4.2 4.2 4.4 4.4 5.0 4.5 3.6   CHLORIDE mmol/L 112* 111* 105 103 107  --  107   CO2 mmol/L 24.0 22.9 24.0 25.8 23.0  --  23.2   BUN mg/dL 21 26* 33* 30* 16  --  15   CREATININE mg/dL 0.94 1.09* 1.56* 1.85* 1.08*  --  0.87   CALCIUM mg/dL 8.3* 7.9* 8.0* 8.2* 8.6  --  8.2*   Estimated Creatinine Clearance: 53 mL/min (by C-G formula based on SCr of 0.94 mg/dL).      Assessment & Plan   Active Hospital Problems    Diagnosis  POA    **GI bleed [K92.2]  Yes    Pulmonary embolus [I26.99]  Yes    Atrial fibrillation [I48.91]  No    History of CVA (cerebrovascular accident) [Z86.73]  Not Applicable    Dehydration [E86.0]  Yes    Renal insufficiency [N28.9]  Yes    Pulmonary nodule [R91.1]  Yes    Adnexal cyst [N94.9]  Yes    Anemia [D64.9]  Yes    HTN (hypertension) [I10]  Yes    History of breast cancer [Z85.3]  Not Applicable    Asthma [J45.909]  Yes    Nausea [R11.0]  Yes      Resolved Hospital Problems   No resolved problems to display.       PLAN  - remains stable to DC home today  - hgb 9.2 showing stability over the last 4 days  - again recommendation was to consider SNF but family wishes DC home with HH.    -No new complaints Discussed with Dr Alas; hopefully oxygen arranged and family takes her home today      Disposition  Expected Discharge Date: 1/27/2024; Expected Discharge Time:        Daniel Jean MD  [facility]  01/28/24  10:25 EST

## 2024-01-28 NOTE — PLAN OF CARE
"Goal Outcome Evaluation:  Plan of Care Reviewed With: patient, daughter         Pt alert and agreeable to PT. Per chart orders pt O2 sat titrated to 90-95%. Pt on 2 L with 96% O2. Pt sup to sit with bed rail and cga/sba. Pt educated on PLB purpose and technique. RA trial at rest in sitting pt 93%. STS to rwx with cga. Pt only able to amb 10' with rwx until desat to 88%. Quick return to >90% with seated rest on O2. Pt amb 30' with cga markedly flexed and shuffle pattern with cga of 2( tubing and for chair placed if needed). Seated rest with PLB for third trial 30'. Pt required min asst STS to rwx due to fatigue on 3rd trial. No desat with amb 30' on 2 L O2. Pt christina present at the end of session and was educated on 90-95%, RA trial for 10' with desat and O2 required for amb. Christina reports EMT friends \" state they will meet at house and get her in.\" Pt fatigued and resting prior to trip home. Pt current with HHPT.                                   "

## 2024-01-29 ENCOUNTER — HOME CARE VISIT (OUTPATIENT)
Dept: HOME HEALTH SERVICES | Facility: HOME HEALTHCARE | Age: 89
End: 2024-01-29
Payer: MEDICARE

## 2024-01-29 VITALS
TEMPERATURE: 97.4 F | HEART RATE: 68 BPM | OXYGEN SATURATION: 96 % | SYSTOLIC BLOOD PRESSURE: 130 MMHG | DIASTOLIC BLOOD PRESSURE: 80 MMHG | RESPIRATION RATE: 18 BRPM

## 2024-01-29 PROCEDURE — G0299 HHS/HOSPICE OF RN EA 15 MIN: HCPCS

## 2024-01-29 NOTE — Clinical Note
Patient is an 88F with history of a.fib, prior stroke, hypertension, prior breast cancer and asthma who presented to ED complaining of abdominal pain and black stools.  Patient was found to have melena and GI bleeding and was admitted.  EGD showed hiatal hernia and gastritis, colonoscopy showed diverticular disease and polyps that were removed.  Hemoglobin was stable for a few days after with no signs of GI bleeding and GI signed off in hospital and wants patient to follow up in clinic.  Patient had some rapid a.fib and acute SOA w/ increased o2 need, CTA showed bilateral PEs and BLE DVTs also found on further imaging.  Patient had to be started on heparin drip and then was transitioned to oral anticoags, hemoglobin was ensured to be stable prior to discharge.  She is being discharged with new home o2.  Due to weakness it was reccomended to go to SNF but family wished for patient to come home with home health instead.  Focus of care related to gastrointestinal hemmorhage for which skilled nursing will educate on disease process, CP assessments, medication management and collect labs.  Patient/family to be educated on signs of new GI bleed given patient must be re-anticoagulated for PEs/DVTs and also educate on new oxygen.  CBC with diff ordered for 2/1/24.  Family requesting MSW for information on additional day time help, I have sent a message to Karen to have her reach out to the family.  CP assessment WNL.  Meds in home.  See patient for 6 weeks.

## 2024-01-29 NOTE — CASE MANAGEMENT/SOCIAL WORK
Case Management Discharge Note      Final Note: Home with [facility]. Home oxygne with [employer]. Family did not want SNF placement.         Selected Continued Care - Discharged on 1/28/2024 Admission date: 1/16/2024 - Discharge disposition: Home-Health Care c      Destination    No services have been selected for the patient.                Durable Medical Equipment    No services have been selected for the patient.                Dialysis/Infusion    No services have been selected for the patient.                Home Medical Care Coordination complete.      Service Provider Selected Services Address Phone Fax Patient Preferred    [facility] [address] [address] [phone] --              Therapy    No services have been selected for the patient.                Community Resources    No services have been selected for the patient.                Community & DME    No services have been selected for the patient.                    Transportation Services  Private: Car    Final Discharge Disposition Code: 06 - home with home health care

## 2024-01-31 ENCOUNTER — READMISSION MANAGEMENT (OUTPATIENT)
Dept: CALL CENTER | Facility: HOSPITAL | Age: 89
End: 2024-01-31
Payer: MEDICARE

## 2024-01-31 NOTE — OUTREACH NOTE
Medical Week 1 Survey      Flowsheet Row Responses   [facility] patient discharged from? [facility]   Does the patient have one of the following disease processes/diagnoses(primary or secondary)? Other   Week 1 attempt successful? No   Unsuccessful attempts Attempt 1            Le Sifuentes Registered Nurse

## 2024-02-01 ENCOUNTER — HOME CARE VISIT (OUTPATIENT)
Dept: HOME HEALTH SERVICES | Facility: HOME HEALTHCARE | Age: 89
End: 2024-02-01
Payer: MEDICARE

## 2024-02-01 ENCOUNTER — LAB REQUISITION (OUTPATIENT)
Dept: LAB | Facility: HOSPITAL | Age: 89
End: 2024-02-01
Payer: MEDICARE

## 2024-02-01 VITALS
SYSTOLIC BLOOD PRESSURE: 118 MMHG | TEMPERATURE: 97.4 F | DIASTOLIC BLOOD PRESSURE: 78 MMHG | OXYGEN SATURATION: 98 % | RESPIRATION RATE: 20 BRPM | HEART RATE: 77 BPM

## 2024-02-01 VITALS
DIASTOLIC BLOOD PRESSURE: 78 MMHG | HEART RATE: 77 BPM | SYSTOLIC BLOOD PRESSURE: 118 MMHG | TEMPERATURE: 97.4 F | RESPIRATION RATE: 22 BRPM | OXYGEN SATURATION: 98 %

## 2024-02-01 DIAGNOSIS — K92.2 GASTROINTESTINAL HEMORRHAGE, UNSPECIFIED: ICD-10-CM

## 2024-02-01 LAB
BASOPHILS # BLD AUTO: 0.04 10*3/MM3 (ref 0–0.2)
BASOPHILS NFR BLD AUTO: 0.6 % (ref 0–1.5)
DEPRECATED RDW RBC AUTO: 43.8 FL (ref 37–54)
EOSINOPHIL # BLD AUTO: 0.3 10*3/MM3 (ref 0–0.4)
EOSINOPHIL NFR BLD AUTO: 4.9 % (ref 0.3–6.2)
ERYTHROCYTE [DISTWIDTH] IN BLOOD BY AUTOMATED COUNT: 13.4 % (ref 12.3–15.4)
HCT VFR BLD AUTO: 27.9 % (ref 34–46.6)
HGB BLD-MCNC: 9 G/DL (ref 12–15.9)
IMM GRANULOCYTES # BLD AUTO: 0.05 10*3/MM3 (ref 0–0.05)
IMM GRANULOCYTES NFR BLD AUTO: 0.8 % (ref 0–0.5)
LYMPHOCYTES # BLD AUTO: 1.53 10*3/MM3 (ref 0.7–3.1)
LYMPHOCYTES NFR BLD AUTO: 24.8 % (ref 19.6–45.3)
MCH RBC QN AUTO: 29.1 PG (ref 26.6–33)
MCHC RBC AUTO-ENTMCNC: 32.3 G/DL (ref 31.5–35.7)
MCV RBC AUTO: 90.3 FL (ref 79–97)
MONOCYTES # BLD AUTO: 0.61 10*3/MM3 (ref 0.1–0.9)
MONOCYTES NFR BLD AUTO: 9.9 % (ref 5–12)
NEUTROPHILS NFR BLD AUTO: 3.65 10*3/MM3 (ref 1.7–7)
NEUTROPHILS NFR BLD AUTO: 59 % (ref 42.7–76)
NRBC BLD AUTO-RTO: 0 /100 WBC (ref 0–0.2)
PLATELET # BLD AUTO: 298 10*3/MM3 (ref 140–450)
PMV BLD AUTO: 10 FL (ref 6–12)
RBC # BLD AUTO: 3.09 10*6/MM3 (ref 3.77–5.28)
WBC NRBC COR # BLD AUTO: 6.18 10*3/MM3 (ref 3.4–10.8)

## 2024-02-01 PROCEDURE — G0151 HHCP-SERV OF PT,EA 15 MIN: HCPCS

## 2024-02-01 PROCEDURE — 85025 COMPLETE CBC W/AUTO DIFF WBC: CPT | Performed by: INTERNAL MEDICINE

## 2024-02-01 PROCEDURE — G0299 HHS/HOSPICE OF RN EA 15 MIN: HCPCS

## 2024-02-01 NOTE — HOME HEALTH
REASON FOR REFERRAL: Decreased functional mobility in and out of the home due to recent hospital stay for GI bleed, hiatal hernia with gastritis, A-fib, bilateral pulmonary emboli, and bilateral DVT resulting in functional decline and LE weakness.    MEDICAL HISTORY: Atrial fibrillation, Stroke, Hypertension, History of breast cancer, Asthma    THERAPY IS MEDICALLY NECESSARY FOR: Instruction/education in lower extremity strengthening HEP, bed mobility/transfer training, gait training, balance training, fall prevention, and activity tolerance training to enable patient to safely exit home for medical appointments.    Patient lives at home with daughter, there are 3 steps with no rail to enter home, daughter had to have EMS help to get patient into home as she was unable to negotiate steps.  Patient demonstrated the ability to ambulate for about 35 feet with rollator walker and CGA, she demonstrates slow prashanth and unsteady gait.  Patient demonstrated the ability to perform bed mobility supine<->sitting with min assist, and transfers with CGA.  Patient scored an 11/28 on tinetti assessment which is a high falls risk. Patient was started on sitting HEP with use of handout provided.  Frequencing 1w1, 2w3.    Plan for next visit:  -transfer training  -gait training  -education on HEP with progressions as appropriate  -standing balance exercises

## 2024-02-01 NOTE — Clinical Note
Please forward to provider:     PT rosa completed on 2/01/24, skilled physical therapy services 1w1, 2w3.    Thanks,  Godfrey Don PT

## 2024-02-01 NOTE — HOME HEALTH
Routine Visit Note:  87YO Female seen today in her home today, daughter present. patient was lying in bed, A&Ox4, patient and daughter report RUE arm swelling is worse and so is her lower extremity edema--pcp office notified. Patient was in no respiratory distress but has crackles in bilateral lower lobes, on 2L O2. Lab work drawn, extra green and red tube drawn in case there needs to be add ons d/t swelling. notified PCP  Patients daughter reports patient is having alot of difficulty ambulating to restroom and back to bed.    Skill/education provided: SN provided education on edema, medication education, education on labwork being drawn, discussed oxygen with daughter    Patient/caregiver response: Patient and daughter verbalize their understanding of education provided     Plan for next visit: Full system assessment, assess swelling in extremities, education on pulmonary embolisms, education on oxygen use, education on DVTs, education on GI bleed symptoms.     Other pertinent info: Swelling worse in RUE, unable to lift RUE, Swelling worse in bilateral lower extremities. RN collected a green top and red top as well as the ordered CBC incase more labs needed d/t the swelling. RN called Dr. Leyva's office and spoke with nurse Rose to notify them of this. Milton stated she would pass the message along to the MD. RN left call back number for them to call back.

## 2024-02-01 NOTE — Clinical Note
Routine Visit Note:  87YO Female seen today in her home today, daughter present. patient was lying in bed, A&Ox4, patient and daughter report RUE arm swelling is worse and so is her lower extremity edema--pcp office notified. Patient was in no respiratory distress but has crackles in bilateral lower lobes, on 2L O2. Lab work drawn, extra green and red tube drawn in case there needs to be add ons d/t swelling. notified PCP  Patients daughter reports patient is having alot of difficulty ambulating to restroom and back to bed.    Other pertinent info:     --Swelling worse in RUE, unable to lift RUE, Swelling worse in bilateral lower extremities. RN collected a green top and red top as well as the ordered CBC incase more labs needed d/t the swelling. RN called Dr. Leyva's office and spoke with nurse Rose to notify them of this. Milton stated she would pass the message along to the MD. RN left call back number for them to call back.

## 2024-02-02 ENCOUNTER — HOME CARE VISIT (OUTPATIENT)
Dept: HOME HEALTH SERVICES | Facility: HOME HEALTHCARE | Age: 89
End: 2024-02-02
Payer: MEDICARE

## 2024-02-02 PROCEDURE — G0299 HHS/HOSPICE OF RN EA 15 MIN: HCPCS

## 2024-02-04 VITALS
TEMPERATURE: 98.2 F | HEART RATE: 77 BPM | OXYGEN SATURATION: 97 % | DIASTOLIC BLOOD PRESSURE: 78 MMHG | SYSTOLIC BLOOD PRESSURE: 132 MMHG

## 2024-02-05 ENCOUNTER — HOME CARE VISIT (OUTPATIENT)
Dept: HOME HEALTH SERVICES | Facility: HOME HEALTHCARE | Age: 89
End: 2024-02-05
Payer: MEDICARE

## 2024-02-05 NOTE — HOME HEALTH
"Routine Visit Note:    Skill/education provided: CP and general assessment, vital signs stable on 2l NC. Pt does present with swelling in BLE and RUE, see physical assessment for details. Daughter states that swelling is \"much better\" than yesterday and that they have been keeping extremities elevated. Educated pt and daughter on continuing with edema management and falls risks associtated with increased edema and weakness. Also, reviewed and instructed on high risk medications and ss of increased bleeding.     Patient/caregiver response: Pt and daughter able to verbalize understanding and able to agree to compliance with these teachings.    Plan for next visit: CP and general assessment, vital signs, goal based teaching per careplan.     Other pertinent info: Contacted Katarina from [facility] to inquire on status of patient admission. She states she is still waititng for confirmation from insurance to transfer patient to rehab but will keep us updated."

## 2024-02-05 NOTE — CASE COMMUNICATION
Dr. Christopher Leyva    Patient missed a PT visit from [facility] on 2-5-24    Reason: Patient placed at [facility] over the weekend      For your records only.   Per CMS Guidance, MD must be notified of missed/cancelled visits; therefore the prescribed frequency was not met.   Octavia Pickett [facility]

## 2024-02-06 ENCOUNTER — HOME CARE VISIT (OUTPATIENT)
Dept: HOME HEALTH SERVICES | Facility: HOME HEALTHCARE | Age: 89
End: 2024-02-06
Payer: MEDICARE

## 2024-02-07 ENCOUNTER — READMISSION MANAGEMENT (OUTPATIENT)
Dept: CALL CENTER | Facility: HOSPITAL | Age: 89
End: 2024-02-07
Payer: MEDICARE

## 2024-02-07 NOTE — OUTREACH NOTE
Medical Week 2 Survey      Flowsheet Row Responses   [facility] patient discharged from? [place]   Does the patient have one of the following disease processes/diagnoses(primary or secondary)? Other   Week 2 attempt successful? No   Unsuccessful attempts Attempt 1   Revoke Other  [pt went to rehab to learn who to walk again]   Meds reviewed with patient/caregiver? Yes   Is the patient having any side effects they believe may be caused by any medication additions or changes? No   Does the patient have all medications ordered at discharge? Yes   Is the patient taking all medications as directed (includes completed medication regime)? Yes            CAIT VERDE - Registered Nurse

## 2024-02-07 NOTE — HOME HEALTH
Katarina with [facility] was contacted and notified that patient was accepted to rehab facility and had already notified daughter. She states they were working on transport to get pt to the facility.

## 2024-02-12 ENCOUNTER — APPOINTMENT (OUTPATIENT)
Dept: GENERAL RADIOLOGY | Facility: HOSPITAL | Age: 89
End: 2024-02-12
Payer: MEDICARE

## 2024-02-12 ENCOUNTER — HOSPITAL ENCOUNTER (EMERGENCY)
Facility: HOSPITAL | Age: 89
Discharge: NURSING, REHAB OR LONG-TERM CARE | End: 2024-02-12
Attending: EMERGENCY MEDICINE | Admitting: EMERGENCY MEDICINE
Payer: MEDICARE

## 2024-02-12 VITALS
HEART RATE: 81 BPM | DIASTOLIC BLOOD PRESSURE: 77 MMHG | OXYGEN SATURATION: 94 % | RESPIRATION RATE: 18 BRPM | TEMPERATURE: 97.8 F | WEIGHT: 243 LBS | SYSTOLIC BLOOD PRESSURE: 139 MMHG | BODY MASS INDEX: 40.48 KG/M2 | HEIGHT: 65 IN

## 2024-02-12 DIAGNOSIS — R91.8 LEFT UPPER LOBE PULMONARY INFILTRATE: ICD-10-CM

## 2024-02-12 DIAGNOSIS — R05.1 ACUTE COUGH: Primary | ICD-10-CM

## 2024-02-12 DIAGNOSIS — J10.1 INFLUENZA A: ICD-10-CM

## 2024-02-12 LAB
ALBUMIN SERPL-MCNC: 3.2 G/DL (ref 3.5–5.2)
ALBUMIN/GLOB SERPL: 1.3 G/DL
ALP SERPL-CCNC: 60 U/L (ref 39–117)
ALT SERPL W P-5'-P-CCNC: <5 U/L (ref 1–33)
ANION GAP SERPL CALCULATED.3IONS-SCNC: 8 MMOL/L (ref 5–15)
AST SERPL-CCNC: 16 U/L (ref 1–32)
B PARAPERT DNA SPEC QL NAA+PROBE: NOT DETECTED
B PERT DNA SPEC QL NAA+PROBE: NOT DETECTED
BASOPHILS # BLD AUTO: 0.03 10*3/MM3 (ref 0–0.2)
BASOPHILS NFR BLD AUTO: 0.5 % (ref 0–1.5)
BILIRUB SERPL-MCNC: 0.4 MG/DL (ref 0–1.2)
BUN SERPL-MCNC: 15 MG/DL (ref 8–23)
BUN/CREAT SERPL: 14.7 (ref 7–25)
C PNEUM DNA NPH QL NAA+NON-PROBE: NOT DETECTED
CALCIUM SPEC-SCNC: 8.8 MG/DL (ref 8.6–10.5)
CHLORIDE SERPL-SCNC: 109 MMOL/L (ref 98–107)
CO2 SERPL-SCNC: 26 MMOL/L (ref 22–29)
CREAT SERPL-MCNC: 1.02 MG/DL (ref 0.57–1)
D-LACTATE SERPL-SCNC: 1.1 MMOL/L (ref 0.5–2)
DEPRECATED RDW RBC AUTO: 43.1 FL (ref 37–54)
EGFRCR SERPLBLD CKD-EPI 2021: 53.7 ML/MIN/1.73
EOSINOPHIL # BLD AUTO: 0.26 10*3/MM3 (ref 0–0.4)
EOSINOPHIL NFR BLD AUTO: 4 % (ref 0.3–6.2)
ERYTHROCYTE [DISTWIDTH] IN BLOOD BY AUTOMATED COUNT: 13.7 % (ref 12.3–15.4)
FLUAV H1 2009 PAND RNA NPH QL NAA+PROBE: DETECTED
FLUBV RNA ISLT QL NAA+PROBE: NOT DETECTED
GLOBULIN UR ELPH-MCNC: 2.5 GM/DL
GLUCOSE SERPL-MCNC: 86 MG/DL (ref 65–99)
HADV DNA SPEC NAA+PROBE: NOT DETECTED
HCOV 229E RNA SPEC QL NAA+PROBE: NOT DETECTED
HCOV HKU1 RNA SPEC QL NAA+PROBE: NOT DETECTED
HCOV NL63 RNA SPEC QL NAA+PROBE: NOT DETECTED
HCOV OC43 RNA SPEC QL NAA+PROBE: NOT DETECTED
HCT VFR BLD AUTO: 29.5 % (ref 34–46.6)
HGB BLD-MCNC: 9.4 G/DL (ref 12–15.9)
HMPV RNA NPH QL NAA+NON-PROBE: NOT DETECTED
HOLD SPECIMEN: NORMAL
HPIV1 RNA ISLT QL NAA+PROBE: NOT DETECTED
HPIV2 RNA SPEC QL NAA+PROBE: NOT DETECTED
HPIV3 RNA NPH QL NAA+PROBE: NOT DETECTED
HPIV4 P GENE NPH QL NAA+PROBE: NOT DETECTED
IMM GRANULOCYTES # BLD AUTO: 0.04 10*3/MM3 (ref 0–0.05)
IMM GRANULOCYTES NFR BLD AUTO: 0.6 % (ref 0–0.5)
LYMPHOCYTES # BLD AUTO: 0.92 10*3/MM3 (ref 0.7–3.1)
LYMPHOCYTES NFR BLD AUTO: 14.3 % (ref 19.6–45.3)
M PNEUMO IGG SER IA-ACNC: NOT DETECTED
MCH RBC QN AUTO: 27.9 PG (ref 26.6–33)
MCHC RBC AUTO-ENTMCNC: 31.9 G/DL (ref 31.5–35.7)
MCV RBC AUTO: 87.5 FL (ref 79–97)
MONOCYTES # BLD AUTO: 0.88 10*3/MM3 (ref 0.1–0.9)
MONOCYTES NFR BLD AUTO: 13.7 % (ref 5–12)
NEUTROPHILS NFR BLD AUTO: 4.29 10*3/MM3 (ref 1.7–7)
NEUTROPHILS NFR BLD AUTO: 66.9 % (ref 42.7–76)
NRBC BLD AUTO-RTO: 0 /100 WBC (ref 0–0.2)
NT-PROBNP SERPL-MCNC: 48 PG/ML (ref 0–1800)
PLATELET # BLD AUTO: 202 10*3/MM3 (ref 140–450)
PMV BLD AUTO: 10 FL (ref 6–12)
POTASSIUM SERPL-SCNC: 4 MMOL/L (ref 3.5–5.2)
PROCALCITONIN SERPL-MCNC: 0.06 NG/ML (ref 0–0.25)
PROT SERPL-MCNC: 5.7 G/DL (ref 6–8.5)
QT INTERVAL: 370 MS
QTC INTERVAL: 422 MS
RBC # BLD AUTO: 3.37 10*6/MM3 (ref 3.77–5.28)
RHINOVIRUS RNA SPEC NAA+PROBE: NOT DETECTED
RSV RNA NPH QL NAA+NON-PROBE: NOT DETECTED
SARS-COV-2 RNA NPH QL NAA+NON-PROBE: NOT DETECTED
SODIUM SERPL-SCNC: 143 MMOL/L (ref 136–145)
TROPONIN T SERPL HS-MCNC: 16 NG/L
TROPONIN T SERPL HS-MCNC: 18 NG/L
WBC NRBC COR # BLD AUTO: 6.42 10*3/MM3 (ref 3.4–10.8)
WHOLE BLOOD HOLD COAG: NORMAL
WHOLE BLOOD HOLD SPECIMEN: NORMAL

## 2024-02-12 PROCEDURE — 80053 COMPREHEN METABOLIC PANEL: CPT | Performed by: PHYSICIAN ASSISTANT

## 2024-02-12 PROCEDURE — 96365 THER/PROPH/DIAG IV INF INIT: CPT

## 2024-02-12 PROCEDURE — 94761 N-INVAS EAR/PLS OXIMETRY MLT: CPT

## 2024-02-12 PROCEDURE — 85025 COMPLETE CBC W/AUTO DIFF WBC: CPT | Performed by: PHYSICIAN ASSISTANT

## 2024-02-12 PROCEDURE — 93010 ELECTROCARDIOGRAM REPORT: CPT | Performed by: INTERNAL MEDICINE

## 2024-02-12 PROCEDURE — 84484 ASSAY OF TROPONIN QUANT: CPT | Performed by: PHYSICIAN ASSISTANT

## 2024-02-12 PROCEDURE — 25010000002 AZITHROMYCIN PER 500 MG: Performed by: PHYSICIAN ASSISTANT

## 2024-02-12 PROCEDURE — 84145 PROCALCITONIN (PCT): CPT | Performed by: PHYSICIAN ASSISTANT

## 2024-02-12 PROCEDURE — 94799 UNLISTED PULMONARY SVC/PX: CPT

## 2024-02-12 PROCEDURE — 83605 ASSAY OF LACTIC ACID: CPT | Performed by: PHYSICIAN ASSISTANT

## 2024-02-12 PROCEDURE — 94640 AIRWAY INHALATION TREATMENT: CPT

## 2024-02-12 PROCEDURE — 36415 COLL VENOUS BLD VENIPUNCTURE: CPT

## 2024-02-12 PROCEDURE — 93005 ELECTROCARDIOGRAM TRACING: CPT | Performed by: PHYSICIAN ASSISTANT

## 2024-02-12 PROCEDURE — 0202U NFCT DS 22 TRGT SARS-COV-2: CPT | Performed by: PHYSICIAN ASSISTANT

## 2024-02-12 PROCEDURE — 83880 ASSAY OF NATRIURETIC PEPTIDE: CPT | Performed by: PHYSICIAN ASSISTANT

## 2024-02-12 PROCEDURE — 25810000003 SODIUM CHLORIDE 0.9 % SOLUTION 250 ML FLEX CONT: Performed by: PHYSICIAN ASSISTANT

## 2024-02-12 PROCEDURE — 99284 EMERGENCY DEPT VISIT MOD MDM: CPT

## 2024-02-12 PROCEDURE — 94760 N-INVAS EAR/PLS OXIMETRY 1: CPT

## 2024-02-12 PROCEDURE — 71045 X-RAY EXAM CHEST 1 VIEW: CPT

## 2024-02-12 RX ORDER — AZITHROMYCIN 250 MG/1
TABLET, FILM COATED ORAL
Qty: 6 TABLET | Refills: 0 | Status: SHIPPED | OUTPATIENT
Start: 2024-02-12

## 2024-02-12 RX ORDER — BENZONATATE 100 MG/1
100 CAPSULE ORAL 3 TIMES DAILY PRN
Qty: 20 CAPSULE | Refills: 0 | Status: SHIPPED | OUTPATIENT
Start: 2024-02-12

## 2024-02-12 RX ORDER — OSELTAMIVIR PHOSPHATE 30 MG/1
30 CAPSULE ORAL EVERY 12 HOURS SCHEDULED
Qty: 10 CAPSULE | Refills: 0 | Status: SHIPPED | OUTPATIENT
Start: 2024-02-12

## 2024-02-12 RX ORDER — AZITHROMYCIN 250 MG/1
TABLET, FILM COATED ORAL
Qty: 6 TABLET | Refills: 0 | Status: SHIPPED | OUTPATIENT
Start: 2024-02-12 | End: 2024-02-12 | Stop reason: SDUPTHER

## 2024-02-12 RX ORDER — OSELTAMIVIR PHOSPHATE 30 MG/1
30 CAPSULE ORAL ONCE
Status: COMPLETED | OUTPATIENT
Start: 2024-02-12 | End: 2024-02-12

## 2024-02-12 RX ORDER — SODIUM CHLORIDE 0.9 % (FLUSH) 0.9 %
10 SYRINGE (ML) INJECTION AS NEEDED
Status: DISCONTINUED | OUTPATIENT
Start: 2024-02-12 | End: 2024-02-12 | Stop reason: HOSPADM

## 2024-02-12 RX ORDER — IPRATROPIUM BROMIDE AND ALBUTEROL SULFATE 2.5; .5 MG/3ML; MG/3ML
6 SOLUTION RESPIRATORY (INHALATION) ONCE
Status: COMPLETED | OUTPATIENT
Start: 2024-02-12 | End: 2024-02-12

## 2024-02-12 RX ORDER — BENZONATATE 100 MG/1
200 CAPSULE ORAL ONCE
Status: COMPLETED | OUTPATIENT
Start: 2024-02-12 | End: 2024-02-12

## 2024-02-12 RX ORDER — BENZONATATE 100 MG/1
100 CAPSULE ORAL 3 TIMES DAILY PRN
Qty: 20 CAPSULE | Refills: 0 | Status: SHIPPED | OUTPATIENT
Start: 2024-02-12 | End: 2024-02-12 | Stop reason: SDUPTHER

## 2024-02-12 RX ADMIN — IPRATROPIUM BROMIDE AND ALBUTEROL SULFATE 6 ML: .5; 3 SOLUTION RESPIRATORY (INHALATION) at 05:54

## 2024-02-12 RX ADMIN — BENZONATATE 200 MG: 100 CAPSULE ORAL at 06:05

## 2024-02-12 RX ADMIN — AZITHROMYCIN MONOHYDRATE 500 MG: 500 INJECTION, POWDER, LYOPHILIZED, FOR SOLUTION INTRAVENOUS at 06:04

## 2024-02-12 RX ADMIN — OSELTAMIVIR PHOSPHATE 30 MG: 30 CAPSULE ORAL at 06:44

## 2024-02-22 ENCOUNTER — TRANSCRIBE ORDERS (OUTPATIENT)
Dept: HOME HEALTH SERVICES | Facility: HOME HEALTHCARE | Age: 89
End: 2024-02-22
Payer: MEDICARE

## 2024-02-22 DIAGNOSIS — D62 ACUTE POSTHEMORRHAGIC ANEMIA: Primary | ICD-10-CM

## 2024-02-24 ENCOUNTER — HOME CARE VISIT (OUTPATIENT)
Dept: HOME HEALTH SERVICES | Facility: HOME HEALTHCARE | Age: 89
End: 2024-02-24
Payer: MEDICARE

## 2024-02-26 ENCOUNTER — HOME CARE VISIT (OUTPATIENT)
Dept: HOME HEALTH SERVICES | Facility: HOME HEALTHCARE | Age: 89
End: 2024-02-26
Payer: MEDICARE

## 2024-02-26 VITALS
RESPIRATION RATE: 16 BRPM | DIASTOLIC BLOOD PRESSURE: 72 MMHG | SYSTOLIC BLOOD PRESSURE: 112 MMHG | OXYGEN SATURATION: 93 % | HEART RATE: 62 BPM | TEMPERATURE: 97.6 F

## 2024-02-26 PROCEDURE — G0299 HHS/HOSPICE OF RN EA 15 MIN: HCPCS

## 2024-02-26 NOTE — HOME HEALTH
Resumption of Care Note:     Reason for hospitalization/new problems: Pt was transferred to [facility] for rehab     CHRISTIAN [place] Findings: No new findings.     New/changed medications: no new meds    New/changed orders: no new orders     Educated on Emergency Plan, steps to take prior to going to the ER and when to Call [facility]    Plan/Focus of Care and Skilled need: gastrointestinal hemmorhage for which skilled nursing will educate on disease process, CP assessments, medication management    Plan for next visit: CP and general exam, vital signs, goal based teaching per careplan.

## 2024-02-26 NOTE — Clinical Note
Please forward to MD Christopher Leyva    Resumption of Care Note:     Reason for hospitalization/new problems: Pt was transferred to [facility] for rehab     CHRISTIAN [place] Findings: No new findings.     New/changed medications: no new meds    New/changed orders: no new orders     Educated on Emergency Plan, steps to take prior to going to the ER and when to Call [facility]    Plan/Focus of Care and Skilled need: gastrointestinal hemmorhage for which skilled nursing will educate on disease process, CP assessments, medication management    Plan for next visit: CP and general exam, vital signs, goal based teaching per careplan.

## 2024-02-28 ENCOUNTER — HOME CARE VISIT (OUTPATIENT)
Dept: HOME HEALTH SERVICES | Facility: HOME HEALTHCARE | Age: 89
End: 2024-02-28
Payer: MEDICARE

## 2024-02-28 VITALS
RESPIRATION RATE: 18 BRPM | TEMPERATURE: 98.2 F | SYSTOLIC BLOOD PRESSURE: 138 MMHG | OXYGEN SATURATION: 95 % | DIASTOLIC BLOOD PRESSURE: 80 MMHG | HEART RATE: 88 BPM

## 2024-02-28 PROCEDURE — G0151 HHCP-SERV OF PT,EA 15 MIN: HCPCS

## 2024-02-28 NOTE — HOME HEALTH
REASON FOR REFERRAL: Decreased functional mobility in and out of the home due to recent hospital and rehab stays for GI bleed, hiatal hernia, gastritis, diverticular disease with polyps that were removed, A-fib, bilateral pulmonary emboli, and bilateral DVT, influenza A resulting in functional decline and weakness.     MEDICAL HISTORY: A-fib, history of CVA, Anemia, HTN, History of breast cancer, OA, Asthma    THERAPY IS MEDICALLY NECESSARY FOR: Instruction/education in lower extremity strengthening HEP, bed mobility/transfer training, gait training, balance training, fall prevention, and activity tolerance training to enable patient to safely exit home for medical appointments.

## 2024-02-28 NOTE — Clinical Note
Please forward to provider: Christopher Leyva, DO     PT reassessment completed on 2/28/24, patient requires continued skilled physical therapy services 2w4 for LE strengthening, pain management, transfer training, gait training, balance training to return patient to PLOF.    Thanks,  Godfrey Don PT

## 2024-02-29 ENCOUNTER — HOME CARE VISIT (OUTPATIENT)
Dept: HOME HEALTH SERVICES | Facility: HOME HEALTHCARE | Age: 89
End: 2024-02-29
Payer: MEDICARE

## 2024-02-29 PROCEDURE — G0152 HHCP-SERV OF OT,EA 15 MIN: HCPCS

## 2024-02-29 PROCEDURE — G0299 HHS/HOSPICE OF RN EA 15 MIN: HCPCS

## 2024-02-29 NOTE — Clinical Note
Dr Christopher Leyva,    OT evaluation completed, plan for 2x/week for 4 weeks to address adl training, functional transfers, mobility, safety, energy conservation, DME/adaptive equipment education, UE strength

## 2024-03-01 ENCOUNTER — HOME CARE VISIT (OUTPATIENT)
Dept: HOME HEALTH SERVICES | Facility: HOME HEALTHCARE | Age: 89
End: 2024-03-01
Payer: MEDICARE

## 2024-03-01 VITALS — HEART RATE: 85 BPM | OXYGEN SATURATION: 98 % | TEMPERATURE: 98.1 F

## 2024-03-02 NOTE — HOME HEALTH
Reason for Referral: Recent hospitalization and rehab due to GI bleed, Hiatal hernia, Gastritis, Afib, Bilateral pulmonary emboli, Bilateral DVT, Influenza A    Medical History: Afib, CVA, Anemia, HTN, OA, Asthma, Hx breast cancer    Subjective: Patient lying in bed, very tearful today. Wants to return to independence.    Home Environment: Patient lives with daughter, has walker, 2L of oxygen    PLOF: Independent    Medical Necessity: Patient requires skilled occupational therapy for remediation of deficits to improve safety in home and improve self care, functional transfers, mobility, strength, endurance, DME/adaptive equipment, energy conservation     Plan for Next Visit: Safety with bathing task, transfer to tub bench

## 2024-03-03 VITALS
RESPIRATION RATE: 18 BRPM | TEMPERATURE: 98.9 F | SYSTOLIC BLOOD PRESSURE: 128 MMHG | DIASTOLIC BLOOD PRESSURE: 88 MMHG | HEART RATE: 87 BPM | OXYGEN SATURATION: 98 %

## 2024-03-04 NOTE — HOME HEALTH
Routine Visit Note:    Skill/education provided: CP and general assessment, vital signs WDL. Pt sitting up on side of the bed during visit today and appears not as fatigued as last visit. Pt states she has been pushing herself with PT visits. Pt and daughter educated on continued oxygen safety and falls risks within the home     Patient/caregiver response: Pt and daughter able to verbalize understanding of teachings and agree to compliance with oxygen safety and falls risks.     Plan for next visit: CP and general assessment, vital signs, goal based teaching per careplan.

## 2024-03-05 ENCOUNTER — HOME CARE VISIT (OUTPATIENT)
Dept: HOME HEALTH SERVICES | Facility: HOME HEALTHCARE | Age: 89
End: 2024-03-05
Payer: MEDICARE

## 2024-03-05 VITALS — HEART RATE: 85 BPM | TEMPERATURE: 98.2 F | OXYGEN SATURATION: 97 %

## 2024-03-05 VITALS
OXYGEN SATURATION: 99 % | DIASTOLIC BLOOD PRESSURE: 82 MMHG | TEMPERATURE: 97.6 F | RESPIRATION RATE: 18 BRPM | SYSTOLIC BLOOD PRESSURE: 132 MMHG | HEART RATE: 72 BPM

## 2024-03-05 VITALS — HEART RATE: 71 BPM | OXYGEN SATURATION: 98 %

## 2024-03-05 PROCEDURE — G0299 HHS/HOSPICE OF RN EA 15 MIN: HCPCS

## 2024-03-05 PROCEDURE — G0152 HHCP-SERV OF OT,EA 15 MIN: HCPCS

## 2024-03-05 PROCEDURE — G0157 HHC PT ASSISTANT EA 15: HCPCS

## 2024-03-05 NOTE — HOME HEALTH
Subjective:I already had the other 2 girls today ( OT and nursing)    Falls- none  Medication Changes- none  Oxygen- uses 2 liters  Cognition- has noted deficits.    Assessment:  Patient was able to manuever with rollator in the home and walked from the bedroom to kitchen and rested and then returned to bedroom. She does not use the O2 when she walks most of the time and only uses as needed out of the bedroom. She initially states she was not doing her HEP and then after walking back to her bedroom she was agreeable and completed sitting on EOB. She was able to static stand at rollator for ~ 1 minute for upright posture and weight bearing.. Patient re-education on medication regimen, hydration and proper nutrition , O2 needs and self assessments with oximeter and used teach back for carryover and accuracy. Patient will benefit with continued PT for progression and reduce risk of decline.    Plan for next visit:  Gait training  Review and progress HEP  Balance/transfers/safety

## 2024-03-06 NOTE — HOME HEALTH
Routine Visit Note:    Skill/education provided: CP and general assessment, vital signs stable. Pt states she is no longer having issues with constipation since trying the miralax and has had a BM daily. Pt instructed she can try daily stool softner instead of miralax if needed to prevent diarrhea. Pt educated on pain management techniques and skin breakdown prevetnion.     Patient/caregiver response: Pt tolerated interventions well and able to verbalize understanding of teachings.     Plan for next visit: CP and general assessment, vital signs, goal based teaching per careplan. SUBJECTIVE:  Estrellita Alexander is seen today at the request of Huber Thomas MD.     Patient complained of diminished hearing sensitivity and occasional bilateral tinnitus. She also reported occasional aural fullness and ear pressure in both ears. She has a history of vertigo. She has a history of Mel-Danlos syndrome, as well as seizures, which she believes may contribute to the vertigo. She has a history of ear infections, however she has not had an ear infection recently. She denied a history of ear surgeries and denied a history of noise exposure.      OBJECTIVE:  AUDIOMETRIC RESULTS  Otoscopic Evaluation:  Examination reveals clear ear canals bilaterally    Audiogram:  Right ear:  Normal hearing sensitivity  Left ear:  Normal hearing sensitivity     Speech Audiometry:  Did not test - normal hearing sensitivity     Acoustic Immittance:  Right ear:  Middle ear pressure and eardrum mobility within defined limits  Normal equivalent ear canal volume    Left ear:  Middle ear pressure and eardrum mobility within defined limits  Normal equivalent ear canal volume    IMPRESSIONS:  Normal hearing sensitivity, bilaterally    RECOMMENDATIONS:  These results were reviewed with the patient and will be forwarded to Huber Thomas MD.     1) Follow up with ENT regarding today's test results    The patient indicated understanding of the diagnosis and was encouraged to contact our department with any questions or concerns.

## 2024-03-06 NOTE — HOME HEALTH
Patient sitting in bed, reports difficulty with breathing last night, better today.  Plan to address tub bench transfer next visit.

## 2024-03-07 ENCOUNTER — HOME CARE VISIT (OUTPATIENT)
Dept: HOME HEALTH SERVICES | Facility: HOME HEALTHCARE | Age: 89
End: 2024-03-07
Payer: MEDICARE

## 2024-03-07 PROCEDURE — G0152 HHCP-SERV OF OT,EA 15 MIN: HCPCS

## 2024-03-07 PROCEDURE — G0299 HHS/HOSPICE OF RN EA 15 MIN: HCPCS

## 2024-03-08 ENCOUNTER — HOME CARE VISIT (OUTPATIENT)
Dept: HOME HEALTH SERVICES | Facility: HOME HEALTHCARE | Age: 89
End: 2024-03-08
Payer: MEDICARE

## 2024-03-08 VITALS
SYSTOLIC BLOOD PRESSURE: 134 MMHG | OXYGEN SATURATION: 95 % | DIASTOLIC BLOOD PRESSURE: 84 MMHG | TEMPERATURE: 98.1 F | HEART RATE: 98 BPM

## 2024-03-08 PROCEDURE — G0157 HHC PT ASSISTANT EA 15: HCPCS

## 2024-03-08 NOTE — HOME HEALTH
Subjective: I got my shots in my knee and I am still sore, My ankle was swollen    Falls- none   Medication Changes- none   Oxygen- uses 2 liters  Cognition- has noted deficits.     Assessment: Patient agitated today with some family dynamics in the home and had to be re-directed to distract her.  Patient was able to manuever with rollator in the home and walked from the bedroom to kitchen and completed seated HEP in kitchen chair and then returned to bedroom to sit EOB. She states they are ordering her a lift chair so she can have assistance getting up... Patient re-education on medication regimen, hydration and proper nutrition , O2 needs and self assessments with oximeter and used teach back for carryover and accuracy. Patient will benefit with continued PT for progression and reduce risk of decline.     Plan for next visit:   Gait training   Review and progress HEP   Balance/transfers/safety  trial lift chair when it arrives

## 2024-03-09 VITALS
DIASTOLIC BLOOD PRESSURE: 88 MMHG | HEART RATE: 77 BPM | OXYGEN SATURATION: 97 % | TEMPERATURE: 98.4 F | RESPIRATION RATE: 18 BRPM | SYSTOLIC BLOOD PRESSURE: 142 MMHG

## 2024-03-09 NOTE — HOME HEALTH
Routine Visit Note:    Skill/education provided: CP and general assessment, vital signs stable. Pt and daughter educated on general and high risk medications and potential side effects. Pt and daughter instructed on ss of increased bleeding and when to contact PCP if these symptoms occur. Instructed on emergent plans and when to seek emergent care.     Patient/caregiver response: Pt tolerated assessment well and able to verbalize understanding of teachings.     Plan for next visit: CP and general assessment, vital signs, goal based teaching her careplan

## 2024-03-11 ENCOUNTER — HOME CARE VISIT (OUTPATIENT)
Dept: HOME HEALTH SERVICES | Facility: HOME HEALTHCARE | Age: 89
End: 2024-03-11
Payer: MEDICARE

## 2024-03-11 VITALS
HEART RATE: 95 BPM | SYSTOLIC BLOOD PRESSURE: 128 MMHG | DIASTOLIC BLOOD PRESSURE: 82 MMHG | TEMPERATURE: 98.2 F | OXYGEN SATURATION: 94 %

## 2024-03-11 VITALS — OXYGEN SATURATION: 97 % | HEART RATE: 76 BPM

## 2024-03-11 PROCEDURE — G0157 HHC PT ASSISTANT EA 15: HCPCS

## 2024-03-11 NOTE — HOME HEALTH
Subjective: That left knee is hurting    Falls- none   Medication Changes- none   Oxygen- uses 2 liters   Cognition- has noted deficits.   Edema- bilateral ankles more in the right than the left    Assessment:  Patient was able to manuever with rollator in the home and walked from the bedroom to kitchen and completed seated HEP in kitchen chair and then returned to bedroom to sit EOB. She improved on coming to stand from armless chair with pulling on second chair beside her and using table for support. She completed supine HEP prior to walking. Patient re-education on medication regimen, hydration and proper nutrition , O2 needs and self assessments with oximeter and used teach back for carryover and accuracy. Patient will benefit with continued PT for progression and reduce risk of decline. Update to PT Godfrey as needed    Plan for next visit:   Gait training   Review and progress HEP   Balance/transfers/safety   trial lift chair if it arrives

## 2024-03-11 NOTE — HOME HEALTH
Patient sitting on EOB on phone on arrival, reports having injection in knees yesterday and they are feeling better  Plan to address safety with adl tasks, transfers next visit

## 2024-03-12 ENCOUNTER — HOME CARE VISIT (OUTPATIENT)
Dept: HOME HEALTH SERVICES | Facility: HOME HEALTHCARE | Age: 89
End: 2024-03-12
Payer: MEDICARE

## 2024-03-12 PROCEDURE — G0152 HHCP-SERV OF OT,EA 15 MIN: HCPCS

## 2024-03-13 ENCOUNTER — HOME CARE VISIT (OUTPATIENT)
Dept: HOME HEALTH SERVICES | Facility: HOME HEALTHCARE | Age: 89
End: 2024-03-13
Payer: MEDICARE

## 2024-03-13 VITALS
SYSTOLIC BLOOD PRESSURE: 108 MMHG | DIASTOLIC BLOOD PRESSURE: 70 MMHG | OXYGEN SATURATION: 98 % | TEMPERATURE: 97.7 F | HEART RATE: 68 BPM

## 2024-03-13 VITALS
TEMPERATURE: 97.6 F | HEART RATE: 74 BPM | OXYGEN SATURATION: 97 % | SYSTOLIC BLOOD PRESSURE: 132 MMHG | DIASTOLIC BLOOD PRESSURE: 80 MMHG

## 2024-03-13 PROCEDURE — G0157 HHC PT ASSISTANT EA 15: HCPCS

## 2024-03-13 NOTE — HOME HEALTH
Subjective:  I got my shots in my knee today, She ordered me a knee brace for my left knee    Falls- none   Medication Changes- none   Oxygen- uses 2 liters at night and as needed during the day  Cognition- has noted deficits.   Edema- bilateral ankles more in the right than the left     Assessment: Patient went out to doctor with family today and daughter assisted in and out of the car and declines need to trial. Patient declined walking today however was agreeable to sit on edge of bed for seated HEP and performed supine HEP with rest breaks. Patient re-education on medication regimen, hydration and proper nutrition , O2 needs and self assessments with oximeter and used teach back for carryover and accuracy. Patient will benefit with continued PT for progression and reduce risk of decline. Update to PT Godfrey as needed     Plan for next visit:   Gait training   Review and progress HEP   Balance/transfers/safety   trial lift chair if it arrives

## 2024-03-13 NOTE — HOME HEALTH
Patient lying in bed on arrival, frustrated with cell phone. Reports she is getting 3rd round of knee injections tomorrow.  Plan to address UE ex, safety with adl tasks next visit.

## 2024-03-14 ENCOUNTER — APPOINTMENT (OUTPATIENT)
Dept: CARDIOLOGY | Facility: HOSPITAL | Age: 89
End: 2024-03-14
Payer: MEDICARE

## 2024-03-14 ENCOUNTER — APPOINTMENT (OUTPATIENT)
Dept: CT IMAGING | Facility: HOSPITAL | Age: 89
End: 2024-03-14
Payer: MEDICARE

## 2024-03-14 ENCOUNTER — HOSPITAL ENCOUNTER (EMERGENCY)
Facility: HOSPITAL | Age: 89
Discharge: HOME | End: 2024-03-14
Attending: EMERGENCY MEDICINE | Admitting: EMERGENCY MEDICINE
Payer: MEDICARE

## 2024-03-14 ENCOUNTER — HOME CARE VISIT (OUTPATIENT)
Dept: HOME HEALTH SERVICES | Facility: HOME HEALTHCARE | Age: 89
End: 2024-03-14
Payer: MEDICARE

## 2024-03-14 VITALS
HEART RATE: 64 BPM | TEMPERATURE: 97.8 F | DIASTOLIC BLOOD PRESSURE: 91 MMHG | RESPIRATION RATE: 20 BRPM | OXYGEN SATURATION: 97 % | SYSTOLIC BLOOD PRESSURE: 156 MMHG

## 2024-03-14 DIAGNOSIS — M79.89 LEG SWELLING: Primary | ICD-10-CM

## 2024-03-14 LAB
ALBUMIN SERPL-MCNC: 3.5 G/DL (ref 3.5–5.2)
ALBUMIN/GLOB SERPL: 1.8 G/DL
ALP SERPL-CCNC: 57 U/L (ref 39–117)
ALT SERPL W P-5'-P-CCNC: 16 U/L (ref 1–33)
ANION GAP SERPL CALCULATED.3IONS-SCNC: 9 MMOL/L (ref 5–15)
AST SERPL-CCNC: 12 U/L (ref 1–32)
BASOPHILS # BLD AUTO: 0.01 10*3/MM3 (ref 0–0.2)
BASOPHILS NFR BLD AUTO: 0.1 % (ref 0–1.5)
BH CV LOWER VASCULAR LEFT COMMON FEMORAL AUGMENT: NORMAL
BH CV LOWER VASCULAR LEFT COMMON FEMORAL COMPETENT: NORMAL
BH CV LOWER VASCULAR LEFT COMMON FEMORAL COMPRESS: NORMAL
BH CV LOWER VASCULAR LEFT COMMON FEMORAL PHASIC: NORMAL
BH CV LOWER VASCULAR LEFT COMMON FEMORAL SPONT: NORMAL
BH CV LOWER VASCULAR RIGHT COMMON FEMORAL AUGMENT: NORMAL
BH CV LOWER VASCULAR RIGHT COMMON FEMORAL COMPETENT: NORMAL
BH CV LOWER VASCULAR RIGHT COMMON FEMORAL COMPRESS: NORMAL
BH CV LOWER VASCULAR RIGHT COMMON FEMORAL PHASIC: NORMAL
BH CV LOWER VASCULAR RIGHT COMMON FEMORAL SPONT: NORMAL
BH CV LOWER VASCULAR RIGHT DISTAL FEMORAL COMPRESS: NORMAL
BH CV LOWER VASCULAR RIGHT GASTRONEMIUS COMPRESS: NORMAL
BH CV LOWER VASCULAR RIGHT GREATER SAPH AK COMPRESS: NORMAL
BH CV LOWER VASCULAR RIGHT GREATER SAPH BK COMPRESS: NORMAL
BH CV LOWER VASCULAR RIGHT LESSER SAPH COMPRESS: NORMAL
BH CV LOWER VASCULAR RIGHT MID FEMORAL AUGMENT: NORMAL
BH CV LOWER VASCULAR RIGHT MID FEMORAL COMPETENT: NORMAL
BH CV LOWER VASCULAR RIGHT MID FEMORAL COMPRESS: NORMAL
BH CV LOWER VASCULAR RIGHT MID FEMORAL PHASIC: NORMAL
BH CV LOWER VASCULAR RIGHT MID FEMORAL SPONT: NORMAL
BH CV LOWER VASCULAR RIGHT PERONEAL COMPRESS: NORMAL
BH CV LOWER VASCULAR RIGHT POPLITEAL AUGMENT: NORMAL
BH CV LOWER VASCULAR RIGHT POPLITEAL COMPETENT: NORMAL
BH CV LOWER VASCULAR RIGHT POPLITEAL COMPRESS: NORMAL
BH CV LOWER VASCULAR RIGHT POPLITEAL PHASIC: NORMAL
BH CV LOWER VASCULAR RIGHT POPLITEAL SPONT: NORMAL
BH CV LOWER VASCULAR RIGHT POSTERIOR TIBIAL COMPRESS: NORMAL
BH CV LOWER VASCULAR RIGHT PROFUNDA FEMORAL COMPRESS: NORMAL
BH CV LOWER VASCULAR RIGHT PROXIMAL FEMORAL COMPRESS: NORMAL
BH CV LOWER VASCULAR RIGHT SAPHENOFEMORAL JUNCTION COMPRESS: NORMAL
BILIRUB SERPL-MCNC: 0.3 MG/DL (ref 0–1.2)
BILIRUB UR QL STRIP: NEGATIVE
BUN SERPL-MCNC: 21 MG/DL (ref 8–23)
BUN/CREAT SERPL: 20.8 (ref 7–25)
CALCIUM SPEC-SCNC: 8.8 MG/DL (ref 8.6–10.5)
CHLORIDE SERPL-SCNC: 110 MMOL/L (ref 98–107)
CLARITY UR: CLEAR
CO2 SERPL-SCNC: 24 MMOL/L (ref 22–29)
COLOR UR: YELLOW
CREAT SERPL-MCNC: 1.01 MG/DL (ref 0.57–1)
D DIMER PPP FEU-MCNC: 0.28 MCGFEU/ML (ref 0–0.86)
DEPRECATED RDW RBC AUTO: 45.1 FL (ref 37–54)
EGFRCR SERPLBLD CKD-EPI 2021: 54.3 ML/MIN/1.73
EOSINOPHIL # BLD AUTO: 0 10*3/MM3 (ref 0–0.4)
EOSINOPHIL NFR BLD AUTO: 0 % (ref 0.3–6.2)
ERYTHROCYTE [DISTWIDTH] IN BLOOD BY AUTOMATED COUNT: 14.1 % (ref 12.3–15.4)
GLOBULIN UR ELPH-MCNC: 1.9 GM/DL
GLUCOSE SERPL-MCNC: 141 MG/DL (ref 65–99)
GLUCOSE UR STRIP-MCNC: NEGATIVE MG/DL
HCT VFR BLD AUTO: 34.9 % (ref 34–46.6)
HGB BLD-MCNC: 10.9 G/DL (ref 12–15.9)
HGB UR QL STRIP.AUTO: NEGATIVE
HOLD SPECIMEN: NORMAL
IMM GRANULOCYTES # BLD AUTO: 0.14 10*3/MM3 (ref 0–0.05)
IMM GRANULOCYTES NFR BLD AUTO: 1.6 % (ref 0–0.5)
INR PPP: 1.31 (ref 0.9–1.1)
KETONES UR QL STRIP: NEGATIVE
LEUKOCYTE ESTERASE UR QL STRIP.AUTO: NEGATIVE
LYMPHOCYTES # BLD AUTO: 0.92 10*3/MM3 (ref 0.7–3.1)
LYMPHOCYTES NFR BLD AUTO: 10.2 % (ref 19.6–45.3)
MCH RBC QN AUTO: 27.3 PG (ref 26.6–33)
MCHC RBC AUTO-ENTMCNC: 31.2 G/DL (ref 31.5–35.7)
MCV RBC AUTO: 87.5 FL (ref 79–97)
MONOCYTES # BLD AUTO: 1.21 10*3/MM3 (ref 0.1–0.9)
MONOCYTES NFR BLD AUTO: 13.4 % (ref 5–12)
NEUTROPHILS NFR BLD AUTO: 6.75 10*3/MM3 (ref 1.7–7)
NEUTROPHILS NFR BLD AUTO: 74.7 % (ref 42.7–76)
NITRITE UR QL STRIP: NEGATIVE
NRBC BLD AUTO-RTO: 0 /100 WBC (ref 0–0.2)
NT-PROBNP SERPL-MCNC: 193 PG/ML (ref 0–1800)
PH UR STRIP.AUTO: 7 [PH] (ref 5–8)
PLATELET # BLD AUTO: 162 10*3/MM3 (ref 140–450)
PMV BLD AUTO: 11.3 FL (ref 6–12)
POTASSIUM SERPL-SCNC: 3.9 MMOL/L (ref 3.5–5.2)
PROT SERPL-MCNC: 5.4 G/DL (ref 6–8.5)
PROT UR QL STRIP: NEGATIVE
PROTHROMBIN TIME: 16.5 SECONDS (ref 11.7–14.2)
QT INTERVAL: 434 MS
QTC INTERVAL: 408 MS
RBC # BLD AUTO: 3.99 10*6/MM3 (ref 3.77–5.28)
SODIUM SERPL-SCNC: 143 MMOL/L (ref 136–145)
SP GR UR STRIP: 1.02 (ref 1–1.03)
UROBILINOGEN UR QL STRIP: NORMAL
WBC NRBC COR # BLD AUTO: 9.03 10*3/MM3 (ref 3.4–10.8)
WHOLE BLOOD HOLD COAG: NORMAL
WHOLE BLOOD HOLD SPECIMEN: NORMAL

## 2024-03-14 PROCEDURE — 81003 URINALYSIS AUTO W/O SCOPE: CPT | Performed by: EMERGENCY MEDICINE

## 2024-03-14 PROCEDURE — P9612 CATHETERIZE FOR URINE SPEC: HCPCS

## 2024-03-14 PROCEDURE — 80053 COMPREHEN METABOLIC PANEL: CPT | Performed by: EMERGENCY MEDICINE

## 2024-03-14 PROCEDURE — 70450 CT HEAD/BRAIN W/O DYE: CPT

## 2024-03-14 PROCEDURE — 99284 EMERGENCY DEPT VISIT MOD MDM: CPT

## 2024-03-14 PROCEDURE — 83880 ASSAY OF NATRIURETIC PEPTIDE: CPT | Performed by: EMERGENCY MEDICINE

## 2024-03-14 PROCEDURE — G0299 HHS/HOSPICE OF RN EA 15 MIN: HCPCS

## 2024-03-14 PROCEDURE — 93010 ELECTROCARDIOGRAM REPORT: CPT | Performed by: INTERNAL MEDICINE

## 2024-03-14 PROCEDURE — 93971 EXTREMITY STUDY: CPT

## 2024-03-14 PROCEDURE — 85610 PROTHROMBIN TIME: CPT | Performed by: EMERGENCY MEDICINE

## 2024-03-14 PROCEDURE — 85379 FIBRIN DEGRADATION QUANT: CPT | Performed by: EMERGENCY MEDICINE

## 2024-03-14 PROCEDURE — 85025 COMPLETE CBC W/AUTO DIFF WBC: CPT | Performed by: EMERGENCY MEDICINE

## 2024-03-14 PROCEDURE — 93005 ELECTROCARDIOGRAM TRACING: CPT | Performed by: EMERGENCY MEDICINE

## 2024-03-14 NOTE — ED TRIAGE NOTES
Pt to ER from home via [facility].    Per report from home health nurse, pt is being sent in for confusion and RLE swelling since yesterday. Pt is on eliquis for dvt. Hx chf.    Pt aox4 in triage, denies any SOA or breathing difficulty currently.

## 2024-03-14 NOTE — ED PROVIDER NOTES
EMERGENCY DEPARTMENT ENCOUNTER    Room Number:  11/11  PCP: Christopher Leyva DO  Historian: Patient      HPI:  Chief Complaint: Leg swelling  A complete HPI/ROS/PMH/PSH/SH/FH are unobtainable due to: None  Context: Vonnie Coppola is a 88 y.o. female who presents to the ED c/o leg swelling.  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  Patient is on blood thinners.  States she has had some swelling in her right leg.  Patient apparently sent here for confusion from EMS as well.  Patient states she does not feel confused.  Patient is alert and oriented to person place and time.  Patient has no focal weakness or numbness.  No chest pain or shortness of breath.  No abdominal pain.  Has had some mild headache which is gone now.  Has had some nosebleeds that have resolved.            PAST MEDICAL HISTORY  Active Ambulatory Problems     Diagnosis Date Noted    GI bleed 01/16/2024    Anemia 01/16/2024    HTN (hypertension) 01/16/2024    History of breast cancer 01/16/2024    Asthma 01/16/2024    History of CVA (cerebrovascular accident) 01/17/2024    Dehydration 01/17/2024    Renal insufficiency 01/17/2024    Pulmonary nodule 01/17/2024    Adnexal cyst 01/17/2024    Nausea 01/16/2024    Atrial fibrillation 01/21/2024    Pulmonary embolus 01/22/2024     Resolved Ambulatory Problems     Diagnosis Date Noted    No Resolved Ambulatory Problems     Past Medical History:   Diagnosis Date    Arthritis     Breast cancer 1999    Hx of radiation therapy 1999    Hypertension     Pneumonia     Stroke          PAST SURGICAL HISTORY  Past Surgical History:   Procedure Laterality Date    APPENDECTOMY      BLADDER SURGERY      BREAST BIOPSY Left 1999    MALIGNANT    BREAST LUMPECTOMY Left 1999    MALIGNANT    COLONOSCOPY N/A 1/19/2024    Procedure: COLONOSCOPY to cecum with cold snare polypectomies;  Surgeon: Harshad Gaston MD;  Location: [facility] ENDOSCOPY;  Service: Gastroenterology;  Laterality: N/A;  pre- gi  bleed, anemia  post- diverticulosis, polyps    ENDOSCOPY N/A 1/19/2024    Procedure: ESOPHAGOGASTRODUODENOSCOPY with biospy;  Surgeon: Harshad Gaston MD;  Location: [facility] ENDOSCOPY;  Service: Gastroenterology;  Laterality: N/A;  pre- gi bleed, anemia  post- erosive gastirits    GALLBLADDER SURGERY      HYSTERECTOMY      OOPHORECTOMY           FAMILY HISTORY  Family History   Problem Relation Age of Onset    Ovarian cancer Sister         70'S    Cancer Sister     Diabetes Sister     Hypertension Sister     Osteoarthritis Sister     Colon cancer Maternal Grandmother     Osteoarthritis Mother     Hypertension Father     Cancer Brother     Hypertension Brother     Osteoarthritis Brother     Cancer Other     Stroke Other     Ovarian cancer Paternal Grandmother         unknown    Breast cancer Neg Hx          SOCIAL HISTORY  Social History     Socioeconomic History    Marital status:    Tobacco Use    Smoking status: Former     Current packs/day: 0.50     Average packs/day: 0.5 packs/day for 2.0 years (1.0 ttl pk-yrs)     Types: Cigarettes    Smokeless tobacco: Never    Tobacco comments:     quit 40 years ago   Vaping Use    Vaping status: Never Used   Substance and Sexual Activity    Alcohol use: No    Drug use: No    Sexual activity: Defer         ALLERGIES  Cortisone and Penicillins        REVIEW OF SYSTEMS  Review of Systems   Leg swelling      PHYSICAL EXAM  ED Triage Vitals [03/14/24 1314]   Temp Heart Rate Resp BP SpO2   97.8 °F (36.6 °C) 86 20 146/81 97 %      Temp src Heart Rate Source Patient Position BP Location FiO2 (%)   -- -- -- -- --       Physical Exam      GENERAL: no acute distress  HENT: nares patent  EYES: no scleral icterus  CV: regular rhythm, normal rate  RESPIRATORY: normal effort  ABDOMEN: soft  MUSCULOSKELETAL: no deformity.  Swelling bilateral lower extremity right greater than left.  Bruising to right leg.  NEURO: alert, moves all extremities, follows commands.  Able to hold full  conversation.  PSYCH:  calm, cooperative  SKIN: warm, dry    Vital signs and nursing notes reviewed.          LAB RESULTS  Recent Results (from the past 24 hour(s))   Green Top (Gel)    Collection Time: 03/14/24  2:08 PM   Result Value Ref Range    Extra Tube Hold for add-ons.    Lavender Top    Collection Time: 03/14/24  2:08 PM   Result Value Ref Range    Extra Tube hold for add-on    Gold Top - SST    Collection Time: 03/14/24  2:08 PM   Result Value Ref Range    Extra Tube Hold for add-ons.    Light Blue Top    Collection Time: 03/14/24  2:08 PM   Result Value Ref Range    Extra Tube Hold for add-ons.    Comprehensive Metabolic Panel    Collection Time: 03/14/24  2:08 PM    Specimen: Blood   Result Value Ref Range    Glucose 141 (H) 65 - 99 mg/dL    BUN 21 8 - 23 mg/dL    Creatinine 1.01 (H) 0.57 - 1.00 mg/dL    Sodium 143 136 - 145 mmol/L    Potassium 3.9 3.5 - 5.2 mmol/L    Chloride 110 (H) 98 - 107 mmol/L    CO2 24.0 22.0 - 29.0 mmol/L    Calcium 8.8 8.6 - 10.5 mg/dL    Total Protein 5.4 (L) 6.0 - 8.5 g/dL    Albumin 3.5 3.5 - 5.2 g/dL    ALT (SGPT) 16 1 - 33 U/L    AST (SGOT) 12 1 - 32 U/L    Alkaline Phosphatase 57 39 - 117 U/L    Total Bilirubin 0.3 0.0 - 1.2 mg/dL    Globulin 1.9 gm/dL    A/G Ratio 1.8 g/dL    BUN/Creatinine Ratio 20.8 7.0 - 25.0    Anion Gap 9.0 5.0 - 15.0 mmol/L    eGFR 54.3 (L) >60.0 mL/min/1.73   Protime-INR    Collection Time: 03/14/24  2:08 PM    Specimen: Blood   Result Value Ref Range    Protime 16.5 (H) 11.7 - 14.2 Seconds    INR 1.31 (H) 0.90 - 1.10   D-dimer, Quantitative    Collection Time: 03/14/24  2:08 PM    Specimen: Blood   Result Value Ref Range    D-Dimer, Quantitative 0.28 0.00 - 0.86 MCGFEU/mL   CBC Auto Differential    Collection Time: 03/14/24  2:08 PM    Specimen: Blood   Result Value Ref Range    WBC 9.03 3.40 - 10.80 10*3/mm3    RBC 3.99 3.77 - 5.28 10*6/mm3    Hemoglobin 10.9 (L) 12.0 - 15.9 g/dL    Hematocrit 34.9 34.0 - 46.6 %    MCV 87.5 79.0 - 97.0 fL    MCH  27.3 26.6 - 33.0 pg    MCHC 31.2 (L) 31.5 - 35.7 g/dL    RDW 14.1 12.3 - 15.4 %    RDW-SD 45.1 37.0 - 54.0 fl    MPV 11.3 6.0 - 12.0 fL    Platelets 162 140 - 450 10*3/mm3    Neutrophil % 74.7 42.7 - 76.0 %    Lymphocyte % 10.2 (L) 19.6 - 45.3 %    Monocyte % 13.4 (H) 5.0 - 12.0 %    Eosinophil % 0.0 (L) 0.3 - 6.2 %    Basophil % 0.1 0.0 - 1.5 %    Immature Grans % 1.6 (H) 0.0 - 0.5 %    Neutrophils, Absolute 6.75 1.70 - 7.00 10*3/mm3    Lymphocytes, Absolute 0.92 0.70 - 3.10 10*3/mm3    Monocytes, Absolute 1.21 (H) 0.10 - 0.90 10*3/mm3    Eosinophils, Absolute 0.00 0.00 - 0.40 10*3/mm3    Basophils, Absolute 0.01 0.00 - 0.20 10*3/mm3    Immature Grans, Absolute 0.14 (H) 0.00 - 0.05 10*3/mm3    nRBC 0.0 0.0 - 0.2 /100 WBC   BNP    Collection Time: 03/14/24  2:08 PM    Specimen: Blood   Result Value Ref Range    proBNP 193.0 0.0 - 1,800.0 pg/mL   Urinalysis With Culture If Indicated - Urine, Catheter    Collection Time: 03/14/24  2:39 PM    Specimen: Urine, Catheter   Result Value Ref Range    Color, UA Yellow Yellow, Straw    Appearance, UA Clear Clear    pH, UA 7.0 5.0 - 8.0    Specific Gravity, UA 1.018 1.005 - 1.030    Glucose, UA Negative Negative    Ketones, UA Negative Negative    Bilirubin, UA Negative Negative    Blood, UA Negative Negative    Protein, UA Negative Negative    Leuk Esterase, UA Negative Negative    Nitrite, UA Negative Negative    Urobilinogen, UA 0.2 E.U./dL 0.2 - 1.0 E.U./dL   ECG 12 Lead Altered Mental Status    Collection Time: 03/14/24  2:45 PM   Result Value Ref Range    QT Interval 434 ms    QTC Interval 408 ms   Duplex Venous Lower Extremity - RIGHT    Collection Time: 03/14/24  4:20 PM   Result Value Ref Range    Right Common Femoral Spont Y     Right Common Femoral Competent Y     Right Common Femoral Phasic Y     Right Common Femoral Compress C     Right Common Femoral Augment Y     Right Saphenofemoral Junction Compress C     Right Profunda Femoral Compress C     Right Proximal  Femoral Compress C     Right Mid Femoral Spont Y     Right Mid Femoral Competent Y     Right Mid Femoral Phasic Y     Right Mid Femoral Compress C     Right Mid Femoral Augment Y     Right Distal Femoral Compress C     Right Popliteal Spont Y     Right Popliteal Competent Y     Right Popliteal Phasic Y     Right Popliteal Compress C     Right Popliteal Augment Y     Right Posterior Tibial Compress C     Right Peroneal Compress C     Right Gastronemius Compress C     Right Greater Saph AK Compress C     Right Greater Saph BK Compress C     Right Lesser Saph Compress C     Left Common Femoral Spont Y     Left Common Femoral Competent Y     Left Common Femoral Phasic Y     Left Common Femoral Compress C     Left Common Femoral Augment Y        Ordered the above labs and reviewed the results.        RADIOLOGY  Duplex Venous Lower Extremity - RIGHT    Result Date: 3/14/2024    Normal right lower extremity venous duplex scan.     CT Head Without Contrast    Result Date: 3/14/2024  CT HEAD WO CONTRAST-  INDICATIONS: Confusion  TECHNIQUE: Radiation dose reduction techniques were utilized, including automated exposure control and exposure modulation based on body size. Noncontrast head CT  COMPARISON: 9/23/2014  FINDINGS:    No acute intracranial hemorrhage, midline shift or mass effect. No acute territorial infarct is identified.  Mild periventricular hypodensities suggest chronic small vessel ischemic change in a patient this age.  Arterial calcifications are seen at the base of the brain.  Ventricles, cisterns, cerebral sulci are unremarkable for patient age.  Increased prominence of the caliber of the superior ophthalmic veins, bilaterally, could be evidence of increased intracranial pressure, correlate clinically. The visualized paranasal sinuses, orbits, mastoid air cells are otherwise unremarkable.           No acute intracranial hemorrhage or hydrocephalus. Increased prominence of the caliber of the superior  ophthalmic veins, bilaterally, could be evidence of increased intracranial pressure, correlate clinically.  If further imaging evaluation is indicated, MRI could be considered.    This report was finalized on 3/14/2024 3:52 PM by Dr. Rod Villavicencio M.D on Workstation: [id number]       Ordered the above noted radiological studies.  CT head independently interpreted by me and shows no evidence of bleeding          PROCEDURES  Procedures              MEDICATIONS GIVEN IN ER  Medications - No data to display                MEDICAL DECISION MAKING, PROGRESS, and CONSULTS    All labs have been independently reviewed by me.  All radiology studies have been reviewed by me and I have also reviewed the radiology report.   EKG's independently viewed and interpreted by me.  Discussion below represents my analysis of pertinent findings related to patient's condition, differential diagnosis, treatment plan and final disposition.      Additional sources:  - Discussed/ obtained information from independent historians: None    - External (non-ED) record review: Epic reviewed and patient seen by primary provider 2/27/2024 for atrial fibrillation    - Chronic or social conditions impacting care: None    - Shared decision making: None      Orders placed during this visit:  Orders Placed This Encounter   Procedures    CT Head Without Contrast    [place] Draw    Comprehensive Metabolic Panel    Protime-INR    D-dimer, Quantitative    Urinalysis With Culture If Indicated - Urine, Catheter    CBC Auto Differential    BNP    ECG 12 Lead Altered Mental Status    Green Top (Gel)    Lavender Top    Gold Top - SST    Gray Top    Light Blue Top    CBC & Differential         Additional orders considered but not ordered:  None        Differential diagnosis includes but is not limited to:    DVT versus CHF versus peripheral edema      Independent interpretation of labs, radiology studies, and discussions with consultants:  ED Course as of 03/14/24  1739   u Mar 14, 2024   1658 16:58 EDT  Patient here for leg swelling.  Patient does not appear to be in congestive heart failure.  Patient has a Doppler that is negative.  Patient also was reportedly confused.  Patient is alert and oriented here.  She states she feels completely normal.  Her urine is negative her head CT is negative her labs are normal.  She would like to be discharged home. [SL]      ED Course User Index  [SL] Sundar Hamilton MD                 DIAGNOSIS  Final diagnoses:   Leg swelling         DISPOSITION  DISCHARGE    Patient discharged in stable condition.    Reviewed implications of results, diagnosis, meds, responsibility to follow up, warning signs and symptoms of possible worsening, potential complications and reasons to return to ER, including worsening symptoms      Patient/Family voiced understanding of above instructions.    Discussed plan for discharge, as there is no emergent indication for admission. Patient referred to primary care provider for BP management due to today's BP. Pt/family is agreeable and understands need for follow up and repeat testing.  Pt is aware that discharge does not mean that nothing is wrong but it indicates no emergency is present that requires admission and they must continue care with follow-up as given below or physician of their choice.     FOLLOW-UP  Christopher Leyva, DO  [address] [facility] [address]  [facility] [address] [phone]    Schedule an appointment as soon as possible for a visit            Medication List      No changes were made to your prescriptions during this visit.                  Latest Documented Vital Signs:  As of 17:39 EDT  BP- 156/91 HR- 64 Temp- 97.8 °F (36.6 °C) O2 sat- 97%              --    Please note that portions of this were completed with a voice recognition program.       Note Disclaimer: At [facility], we believe that sharing information builds trust and better relationships. You are receiving this  note because you are receiving care at [facility] or recently visited. It is possible you will see health information before a provider has talked with you about it. This kind of information can be easy to misunderstand. To help you fully understand what it means for your health, we urge you to discuss this note with your provider.            Sundar Hamilton MD  03/14/24 1451

## 2024-03-15 VITALS
SYSTOLIC BLOOD PRESSURE: 160 MMHG | HEART RATE: 65 BPM | DIASTOLIC BLOOD PRESSURE: 110 MMHG | RESPIRATION RATE: 22 BRPM | OXYGEN SATURATION: 97 % | TEMPERATURE: 99 F

## 2024-03-16 NOTE — HOME HEALTH
Routine Visit Note:    Skill/education provided: CP and general exam performed. Daughter concerned on increased confusion over the past week but worsening x2 days. Pt is a/o x3 during visit but does seem more forgetful and agitated than ususal. Daughter also concerned on increased swelling in RLE with mild redness/bruising that has worsened over the past 3 days. Daughter states swelling is a little better today than yesterday. Upon exam pt has 3+ pitting edema to RLE and has increased work of breathing with sitting to laying transfer in bed. Pt lung sounds diminished and tight with wheezing. Educated on nebulizer use. Daughter states pt has not been using nebulizer as recommended. Pulmicort treatment given by me, wheezing dimished post treatment. Instructed on medication usage and pulmonary disease management.     Patient/caregiver response: Pt tolerated tx well and breath sounds better after. Pt tearful when EMS arrived but complied with ER visit for further eval.     Plan for next visit: CP and general assessment, vital signs, goal based teaching per careplan.     Other pertinent info: Spoke with Sydnee RN with MD Leyva to notify of findings and get recommendations for further orders or if pt needs to be seen in ER. Based on pt status, she recommends to send pt back to ER fur further exam and no orders were given at this time. Pt sent to the ER via EMS per daughter request. Care hand off given to EMS crew

## 2024-03-18 ENCOUNTER — HOME CARE VISIT (OUTPATIENT)
Dept: HOME HEALTH SERVICES | Facility: HOME HEALTHCARE | Age: 89
End: 2024-03-18
Payer: MEDICARE

## 2024-03-18 VITALS
OXYGEN SATURATION: 97 % | TEMPERATURE: 97.4 F | HEART RATE: 116 BPM | SYSTOLIC BLOOD PRESSURE: 136 MMHG | DIASTOLIC BLOOD PRESSURE: 76 MMHG

## 2024-03-18 PROCEDURE — G0157 HHC PT ASSISTANT EA 15: HCPCS

## 2024-03-18 NOTE — HOME HEALTH
Subjective: The nurse sent me to the hospital- They didn't see anything wrong and they said I didn't have a blood clot    Falls- none   Medication Changes- none   Oxygen- uses 2 liters at night and as needed during the day   Cognition- has noted deficits.   Edema- bilateral ankles more in the right than the left - she has pillows for elevation and a wedge under the mattress     Assessment: Patient has to go to [place] to look at lift chairs before they will deliver one per daughter.Patient was able to walk to and from the bedroom to kitchen and performed seated HEP and static standing and then walked back to the edge of bed and rested and then stood 2:41 minutes.  Patient re-education on medication regimen, hydration and proper nutrition , O2 needs and self assessments with oximeter and used teach back for carryover and accuracy.. Update to PT [facility] for discharge next visit secondary to patient is at max potential at this time and does not appear able to improve or progress beyond what she is doing currently.Family present and a discussion on her ability and ways to assist and encourage.    Plan for next visit:   Gait training   Review  HEP   Balance/transfers/safety

## 2024-03-19 ENCOUNTER — HOME CARE VISIT (OUTPATIENT)
Dept: HOME HEALTH SERVICES | Facility: HOME HEALTHCARE | Age: 89
End: 2024-03-19
Payer: MEDICARE

## 2024-03-19 VITALS
TEMPERATURE: 97.7 F | HEART RATE: 86 BPM | SYSTOLIC BLOOD PRESSURE: 114 MMHG | DIASTOLIC BLOOD PRESSURE: 78 MMHG | OXYGEN SATURATION: 97 %

## 2024-03-19 PROCEDURE — G0152 HHCP-SERV OF OT,EA 15 MIN: HCPCS

## 2024-03-20 NOTE — HOME HEALTH
Patient lying in bed, reports right leg hurting today.  Plan to address safety with adl tasks, transfers next visit

## 2024-03-21 ENCOUNTER — APPOINTMENT (OUTPATIENT)
Dept: GENERAL RADIOLOGY | Facility: HOSPITAL | Age: 89
DRG: 603 | End: 2024-03-21
Payer: MEDICARE

## 2024-03-21 ENCOUNTER — HOME CARE VISIT (OUTPATIENT)
Dept: HOME HEALTH SERVICES | Facility: HOME HEALTHCARE | Age: 89
End: 2024-03-21
Payer: MEDICARE

## 2024-03-21 ENCOUNTER — HOSPITAL ENCOUNTER (INPATIENT)
Facility: HOSPITAL | Age: 89
LOS: 3 days | Discharge: HOME HEALTH | DRG: 603 | End: 2024-03-24
Attending: EMERGENCY MEDICINE | Admitting: INTERNAL MEDICINE
Payer: MEDICARE

## 2024-03-21 ENCOUNTER — APPOINTMENT (OUTPATIENT)
Dept: ULTRASOUND IMAGING | Facility: HOSPITAL | Age: 89
DRG: 603 | End: 2024-03-21
Payer: MEDICARE

## 2024-03-21 DIAGNOSIS — A49.9 UTI (URINARY TRACT INFECTION), BACTERIAL: ICD-10-CM

## 2024-03-21 DIAGNOSIS — N39.0 UTI (URINARY TRACT INFECTION), BACTERIAL: ICD-10-CM

## 2024-03-21 DIAGNOSIS — I82.411 ACUTE DEEP VEIN THROMBOSIS (DVT) OF FEMORAL VEIN OF RIGHT LOWER EXTREMITY: Primary | ICD-10-CM

## 2024-03-21 PROBLEM — I82.409 DVT (DEEP VENOUS THROMBOSIS): Status: ACTIVE | Noted: 2024-03-21

## 2024-03-21 LAB
ALBUMIN SERPL-MCNC: 3.1 G/DL (ref 3.5–5.2)
ALBUMIN/GLOB SERPL: 1.7 G/DL
ALP SERPL-CCNC: 60 U/L (ref 39–117)
ALT SERPL W P-5'-P-CCNC: 13 U/L (ref 1–33)
ANION GAP SERPL CALCULATED.3IONS-SCNC: 5 MMOL/L (ref 5–15)
APTT PPP: 24.4 SECONDS (ref 22.7–35.4)
AST SERPL-CCNC: 11 U/L (ref 1–32)
BASOPHILS # BLD AUTO: 0.01 10*3/MM3 (ref 0–0.2)
BASOPHILS NFR BLD AUTO: 0.1 % (ref 0–1.5)
BILIRUB SERPL-MCNC: 0.3 MG/DL (ref 0–1.2)
BILIRUB UR QL STRIP: NEGATIVE
BUN SERPL-MCNC: 21 MG/DL (ref 8–23)
BUN/CREAT SERPL: 21.9 (ref 7–25)
CALCIUM SPEC-SCNC: 8.6 MG/DL (ref 8.6–10.5)
CHLORIDE SERPL-SCNC: 112 MMOL/L (ref 98–107)
CLARITY UR: ABNORMAL
CO2 SERPL-SCNC: 27 MMOL/L (ref 22–29)
COLOR UR: YELLOW
CREAT SERPL-MCNC: 0.96 MG/DL (ref 0.57–1)
D DIMER PPP FEU-MCNC: 0.32 MCGFEU/ML (ref 0–0.86)
DEPRECATED RDW RBC AUTO: 55.8 FL (ref 37–54)
EGFRCR SERPLBLD CKD-EPI 2021: 57.7 ML/MIN/1.73
EOSINOPHIL # BLD AUTO: 0.07 10*3/MM3 (ref 0–0.4)
EOSINOPHIL NFR BLD AUTO: 0.8 % (ref 0.3–6.2)
ERYTHROCYTE [DISTWIDTH] IN BLOOD BY AUTOMATED COUNT: 16 % (ref 12.3–15.4)
GEN 5 2HR TROPONIN T REFLEX: 18 NG/L
GLOBULIN UR ELPH-MCNC: 1.8 GM/DL
GLUCOSE SERPL-MCNC: 102 MG/DL (ref 65–99)
GLUCOSE UR STRIP-MCNC: NEGATIVE MG/DL
HCT VFR BLD AUTO: 33.9 % (ref 34–46.6)
HGB BLD-MCNC: 10.2 G/DL (ref 12–15.9)
HGB UR QL STRIP.AUTO: ABNORMAL
IMM GRANULOCYTES # BLD AUTO: 0.05 10*3/MM3 (ref 0–0.05)
IMM GRANULOCYTES NFR BLD AUTO: 0.6 % (ref 0–0.5)
INR PPP: 1.32 (ref 0.9–1.1)
INR PPP: 1.7
KETONES UR QL STRIP: NEGATIVE
LEUKOCYTE ESTERASE UR QL STRIP.AUTO: ABNORMAL
LYMPHOCYTES # BLD AUTO: 1.3 10*3/MM3 (ref 0.7–3.1)
LYMPHOCYTES NFR BLD AUTO: 15.5 % (ref 19.6–45.3)
MCH RBC QN AUTO: 28.4 PG (ref 26.6–33)
MCHC RBC AUTO-ENTMCNC: 30.1 G/DL (ref 31.5–35.7)
MCV RBC AUTO: 94.4 FL (ref 79–97)
MONOCYTES # BLD AUTO: 1.06 10*3/MM3 (ref 0.1–0.9)
MONOCYTES NFR BLD AUTO: 12.6 % (ref 5–12)
NEUTROPHILS NFR BLD AUTO: 5.9 10*3/MM3 (ref 1.7–7)
NEUTROPHILS NFR BLD AUTO: 70.4 % (ref 42.7–76)
NITRITE UR QL STRIP: POSITIVE
PH UR STRIP.AUTO: 7 [PH] (ref 5–8)
PLATELET # BLD AUTO: 145 10*3/MM3 (ref 140–450)
PMV BLD AUTO: 11 FL (ref 6–12)
POTASSIUM SERPL-SCNC: 3.9 MMOL/L (ref 3.5–5.2)
PROCALCITONIN SERPL-MCNC: 0.03 NG/ML (ref 0–0.25)
PROT SERPL-MCNC: 4.9 G/DL (ref 6–8.5)
PROT UR QL STRIP: NEGATIVE
PROTHROMBIN TIME: 16.6 SECONDS (ref 11.7–14.2)
PROTHROMBIN TIME: 18.3 SECONDS (ref 11–15)
QT INTERVAL: 426 MS
QTC INTERVAL: 437 MS
RBC # BLD AUTO: 3.59 10*6/MM3 (ref 3.77–5.28)
SODIUM SERPL-SCNC: 144 MMOL/L (ref 136–145)
SP GR UR STRIP: 1.02 (ref 1–1.03)
TROPONIN T DELTA: 0 NG/L
TROPONIN T SERPL HS-MCNC: 18 NG/L
UROBILINOGEN UR QL STRIP: ABNORMAL
WBC NRBC COR # BLD AUTO: 8.39 10*3/MM3 (ref 3.4–10.8)

## 2024-03-21 PROCEDURE — 99285 EMERGENCY DEPT VISIT HI MDM: CPT | Performed by: EMERGENCY MEDICINE

## 2024-03-21 PROCEDURE — 85610 PROTHROMBIN TIME: CPT | Performed by: EMERGENCY MEDICINE

## 2024-03-21 PROCEDURE — 93971 EXTREMITY STUDY: CPT

## 2024-03-21 PROCEDURE — 85730 THROMBOPLASTIN TIME PARTIAL: CPT | Performed by: EMERGENCY MEDICINE

## 2024-03-21 PROCEDURE — 84145 PROCALCITONIN (PCT): CPT | Performed by: EMERGENCY MEDICINE

## 2024-03-21 PROCEDURE — 73590 X-RAY EXAM OF LOWER LEG: CPT

## 2024-03-21 PROCEDURE — 85379 FIBRIN DEGRADATION QUANT: CPT | Performed by: EMERGENCY MEDICINE

## 2024-03-21 PROCEDURE — G0299 HHS/HOSPICE OF RN EA 15 MIN: HCPCS

## 2024-03-21 PROCEDURE — 87040 BLOOD CULTURE FOR BACTERIA: CPT | Performed by: EMERGENCY MEDICINE

## 2024-03-21 PROCEDURE — 93010 ELECTROCARDIOGRAM REPORT: CPT | Performed by: INTERNAL MEDICINE

## 2024-03-21 PROCEDURE — 25010000002 HEPARIN (PORCINE) 25000-0.45 UT/250ML-% SOLUTION: Performed by: EMERGENCY MEDICINE

## 2024-03-21 PROCEDURE — 80053 COMPREHEN METABOLIC PANEL: CPT | Performed by: EMERGENCY MEDICINE

## 2024-03-21 PROCEDURE — 99285 EMERGENCY DEPT VISIT HI MDM: CPT

## 2024-03-21 PROCEDURE — 36415 COLL VENOUS BLD VENIPUNCTURE: CPT

## 2024-03-21 PROCEDURE — 71046 X-RAY EXAM CHEST 2 VIEWS: CPT

## 2024-03-21 PROCEDURE — 93005 ELECTROCARDIOGRAM TRACING: CPT | Performed by: EMERGENCY MEDICINE

## 2024-03-21 PROCEDURE — 84484 ASSAY OF TROPONIN QUANT: CPT | Performed by: EMERGENCY MEDICINE

## 2024-03-21 PROCEDURE — 85025 COMPLETE CBC W/AUTO DIFF WBC: CPT | Performed by: EMERGENCY MEDICINE

## 2024-03-21 PROCEDURE — 25010000002 CEFTRIAXONE PER 250 MG: Performed by: EMERGENCY MEDICINE

## 2024-03-21 PROCEDURE — 81003 URINALYSIS AUTO W/O SCOPE: CPT | Performed by: EMERGENCY MEDICINE

## 2024-03-21 PROCEDURE — 85610 PROTHROMBIN TIME: CPT

## 2024-03-21 PROCEDURE — 25010000002 HEPARIN (PORCINE) PER 1000 UNITS: Performed by: EMERGENCY MEDICINE

## 2024-03-21 RX ORDER — ACETAMINOPHEN 500 MG
1000 TABLET ORAL EVERY 4 HOURS PRN
COMMUNITY

## 2024-03-21 RX ORDER — HEPARIN SODIUM 5000 [USP'U]/ML
40-80 INJECTION, SOLUTION INTRAVENOUS; SUBCUTANEOUS EVERY 6 HOURS PRN
Status: DISCONTINUED | OUTPATIENT
Start: 2024-03-21 | End: 2024-03-23

## 2024-03-21 RX ORDER — FEXOFENADINE HCL 60 MG/1
TABLET, FILM COATED ORAL DAILY PRN
COMMUNITY

## 2024-03-21 RX ORDER — HEPARIN SODIUM 10000 [USP'U]/100ML
14.4 INJECTION, SOLUTION INTRAVENOUS
Status: DISCONTINUED | OUTPATIENT
Start: 2024-03-21 | End: 2024-03-23

## 2024-03-21 RX ORDER — HEPARIN SODIUM 5000 [USP'U]/ML
80 INJECTION, SOLUTION INTRAVENOUS; SUBCUTANEOUS ONCE
Status: COMPLETED | OUTPATIENT
Start: 2024-03-21 | End: 2024-03-21

## 2024-03-21 RX ADMIN — HEPARIN SODIUM 14.4 UNITS/KG/HR: 10000 INJECTION, SOLUTION INTRAVENOUS at 15:46

## 2024-03-21 RX ADMIN — CEFTRIAXONE SODIUM 1000 MG: 1 INJECTION, POWDER, FOR SOLUTION INTRAMUSCULAR; INTRAVENOUS at 15:58

## 2024-03-21 RX ADMIN — HEPARIN SODIUM 8300 UNITS: 5000 INJECTION INTRAVENOUS; SUBCUTANEOUS at 15:46

## 2024-03-21 NOTE — ED NOTES
Patient called out x2 asking ETA for EMS. Patient notified that other patients are currently ahead of her and we are awaiting ETA time.

## 2024-03-21 NOTE — ED NOTES
"Nursing report ED to floor  Vonnie Coppola  88 y.o.  female    HPI :  HPI (Adult)  Stated Reason for Visit: RLE swelling, redness, tenderness, and hx of DVTs and Pe's, on eliquis  History Obtained From: patient    Chief Complaint  Chief Complaint   Patient presents with    Leg Swelling     RLE swelling, redness, tenderness, and hx of DVTs and Pe's, on eliquis        Admitting doctor:   Maryuri Carranza MD    Admitting diagnosis:   The primary encounter diagnosis was Acute deep vein thrombosis (DVT) of femoral vein of right lower extremity. A diagnosis of UTI (urinary tract infection), bacterial was also pertinent to this visit.    Code status:   Current Code Status       Date Active Code Status Order ID Comments User Context       Prior            Allergies:   Cortisone and Penicillins    Isolation:   No active isolations    Intake and Output    Intake/Output Summary (Last 24 hours) at 3/21/2024 1735  Last data filed at 3/21/2024 1709  Gross per 24 hour   Intake 100 ml   Output --   Net 100 ml       Weight:       03/21/24  1320   Weight: 104 kg (230 lb)       Most recent vitals:   Vitals:    03/21/24 1320 03/21/24 1514 03/21/24 1633 03/21/24 1702   BP: (!) 190/111 134/89 138/93    BP Location: Right arm      Patient Position: Lying      Pulse: 75 64 78 72   Resp: 20      Temp: 98.4 °F (36.9 °C)      TempSrc: Oral      SpO2: 95% 97% 95% 98%   Weight: 104 kg (230 lb)      Height: 166.4 cm (65.5\")          Active LDAs/IV Access:   Lines, Drains & Airways       Active LDAs       Name Placement date Placement time Site Days    Peripheral IV 03/21/24 1431 Anterior;Right Forearm 03/21/24  1431  Forearm  less than 1    Peripheral IV 03/21/24 1602 Right Hand 03/21/24  1602  Hand  less than 1                    Labs (abnormal labs have a star):   Labs Reviewed   COMPREHENSIVE METABOLIC PANEL - Abnormal; Notable for the following components:       Result Value    Glucose 102 (*)     Chloride 112 (*)     Total Protein " 4.9 (*)     Albumin 3.1 (*)     eGFR 57.7 (*)     All other components within normal limits    Narrative:     GFR Normal >60  Chronic Kidney Disease <60  Kidney Failure <15    The GFR formula is only valid for adults with stable renal function between ages 18 and 70.   TROPONIN - Abnormal; Notable for the following components:    HS Troponin T 18 (*)     All other components within normal limits    Narrative:     High Sensitive Troponin T Reference Range:  <14.0 ng/L- Negative Female for AMI  <22.0 ng/L- Negative Male for AMI  >=14 - Abnormal Female indicating possible myocardial injury.  >=22 - Abnormal Male indicating possible myocardial injury.   Clinicians would have to utilize clinical acumen, EKG, Troponin, and serial changes to determine if it is an Acute Myocardial Infarction or myocardial injury due to an underlying chronic condition.        CBC WITH AUTO DIFFERENTIAL - Abnormal; Notable for the following components:    RBC 3.59 (*)     Hemoglobin 10.2 (*)     Hematocrit 33.9 (*)     MCHC 30.1 (*)     RDW 16.0 (*)     RDW-SD 55.8 (*)     Lymphocyte % 15.5 (*)     Monocyte % 12.6 (*)     Immature Grans % 0.6 (*)     Monocytes, Absolute 1.06 (*)     All other components within normal limits   URINALYSIS WITHOUT MICROSCOPIC (NO CULTURE) - Abnormal; Notable for the following components:    Appearance, UA Cloudy (*)     Blood, UA Trace (*)     Leuk Esterase, UA Trace (*)     Nitrite, UA Positive (*)     All other components within normal limits   HIGH SENSITIVITIY TROPONIN T 2HR - Abnormal; Notable for the following components:    HS Troponin T 18 (*)     All other components within normal limits    Narrative:     High Sensitive Troponin T Reference Range:  <14.0 ng/L- Negative Female for AMI  <22.0 ng/L- Negative Male for AMI  >=14 - Abnormal Female indicating possible myocardial injury.  >=22 - Abnormal Male indicating possible myocardial injury.   Clinicians would have to utilize clinical acumen, EKG,  "Troponin, and serial changes to determine if it is an Acute Myocardial Infarction or myocardial injury due to an underlying chronic condition.        LAB PROTIME-INR, FINGERSTICK - Abnormal; Notable for the following components:    Protime 18.3 (*)     All other components within normal limits   D-DIMER, QUANTITATIVE - Normal    Narrative:     According to the assay 's published package insert, a normal (<0.50 MCGFEU/mL) D-dimer result in conjunction with a non-high clinical probability assessment, excludes deep vein thrombosis (DVT) and pulmonary embolism (PE) with high sensitivity.    D-dimer values increase with age and this can make VTE exclusion of an older population difficult. To address this, the [employer], based on best available evidence and recent guidelines, recommends that clinicians use age-adjusted D-dimer thresholds in patients greater than 50 years of age with: a) a low probability of PE who do not meet all Pulmonary Embolism Rule Out Criteria, or b) in those with intermediate probability of PE.   The formula for an age-adjusted D-dimer cut-off is \"age/100\".  For example, a 60 year old patient would have an age-adjusted cut-off of 0.60 MCGFEU/mL and an 80 year old 0.80 MCGFEU/mL.   PROCALCITONIN - Normal    Narrative:     As a Marker for Sepsis (Non-Neonates):    1. <0.5 ng/mL represents a low risk of severe sepsis and/or septic shock.  2. >2 ng/mL represents a high risk of severe sepsis and/or septic shock.    As a Marker for Lower Respiratory Tract Infections that require antibiotic therapy:    PCT on Admission    Antibiotic Therapy       6-12 Hrs later    >0.5                Strongly Recommended  >0.25 - <0.5        Recommended   0.1 - 0.25          Discouraged              Remeasure/reassess PCT  <0.1                Strongly Discouraged     Remeasure/reassess PCT    As 28 day mortality risk marker: \"Change in Procalcitonin Result\" (>80% or <=80%) if Day 0 (or Day " 1) and Day 4 values are available. Refer to http://www.[facility]-pct-calculator.com    Change in PCT <=80%  A decrease of PCT levels below or equal to 80% defines a positive change in PCT test result representing a higher risk for 28-day all-cause mortality of patients diagnosed with severe sepsis for septic shock.    Change in PCT >80%  A decrease of PCT levels of more than 80% defines a negative change in PCT result representing a lower risk for 28-day all-cause mortality of patients diagnosed with severe sepsis or septic shock.      BLOOD CULTURE   BLOOD CULTURE   PROTIME-INR   APTT   POCT PROTIME - INR   CBC AND DIFFERENTIAL    Narrative:     The following orders were created for panel order CBC & Differential.  Procedure                               Abnormality         Status                     ---------                               -----------         ------                     CBC Auto Differential[[id number]]        Abnormal            Final result                 Please view results for these tests on the individual orders.       EKG:   ECG 12 Lead   ED Interpretation   Abnormal   Leo Cao MD     3/21/2024  4:48 PM   ECG 12 Lead         Date/Time: 3/21/2024 3:27 PM      Performed by: Leo Cao MD   Authorized by: Leo Cao MD  Interpreted by ED physician   Rhythm: sinus rhythm   Rate: normal   BPM: 63   QRS axis: left   Conduction: 1st degree   ST Segments: ST segments normal   T Waves: T waves normal   Other findings: LVH and PRWP   Q waves: V3, V4, V5 and V6   Clinical impression: abnormal ECG      ECG 12 Lead Other; htn   Preliminary Result   HEART RATE= 63  bpm   RR Interval= 952  ms   LA Interval= 213  ms   P Horizontal Axis= 44  deg   P Front Axis= 48  deg   QRSD Interval= 80  ms   QT Interval= 426  ms   QTcB= 437  ms   QRS Axis= -24  deg   T Wave Axis= 11  deg   - ABNORMAL ECG -   Sinus rhythm   Borderline prolonged LA interval   Left ventricular hypertrophy   Anterolateral infarct,  old   Electronically Signed By:    Date and Time of Study: 2024-03-21 15:11:43          Meds given in ED:   Medications   heparin 08010 units/250 mL (100 units/mL) in 0.45 % NaCl infusion (14.4 Units/kg/hr × 104 kg Intravenous New Bag 3/21/24 1546)   heparin (porcine) 5000 UNIT/ML injection 4,200-8,300 Units (has no administration in time range)   heparin (porcine) 5000 UNIT/ML injection 8,300 Units (8,300 Units Intravenous Given 3/21/24 1546)   cefTRIAXone (ROCEPHIN) 1,000 mg in sodium chloride 0.9 % 100 mL MBP (0 mg Intravenous Stopped 3/21/24 1709)       Imaging results:  US Venous Doppler Lower Extremity Right (duplex)    Addendum Date: 3/21/2024    ADDENDUM: 03 21 24 15:41 Call Doctor Regarding DVT â€“ acute, progressing, called  Dr. Cao on 03 21 15:41 (-04:00)     Result Date: 3/21/2024  Communications:  Call Doctor DVT â€“ acute, progressing Electronically signed by Dougie Mendoza M.D. on 03-21-24 at 1537     Ambulatory status:   -  up with assistance      Social issues:   Social History     Socioeconomic History    Marital status:    Tobacco Use    Smoking status: Former     Current packs/day: 0.50     Average packs/day: 0.5 packs/day for 2.0 years (1.0 ttl pk-yrs)     Types: Cigarettes    Smokeless tobacco: Never    Tobacco comments:     quit 40 years ago   Vaping Use    Vaping status: Never Used   Substance and Sexual Activity    Alcohol use: No    Drug use: No    Sexual activity: Defer       Peripheral Neurovascular  Peripheral Neurovascular (Adult)  Peripheral Neurovascular WDL: WDL  Additional Documentation: Edema (Group)  Edema  Edema: foot, left, foot, right, leg, left, leg, right  Leg, Left Edema: 3+ (Moderate)  Leg, Right Edema: 3+ (Moderate)  Foot, Left Edema: 3+ (Moderate)  Foot, Right Edema: 3+ (Moderate)    Neuro Cognitive  Neuro Cognitive (Adult)  Cognitive/Neuro/Behavioral WDL: WDL    Learning       Respiratory  Respiratory WDL  Respiratory WDL: WDL    Abdominal Pain       Pain  Assessments  Pain (Adult)  (0-10) Pain Rating: Rest: 5  (0-10) Pain Rating: Activity: 5        Denia Waters RN  03/21/24 17:35 EDT

## 2024-03-21 NOTE — FSED PROVIDER NOTE
Subjective   History of Present Illness  89yo female pmh significant atrial fibrillation/breast ca/cva/VTE/htn presents ED c/o 1wk hx progressive RLE edema/erythema/pain.  Pt seen home health RN earlier today et referred ED for further evaluation.  Pt notably seen ED 03.14.2024 for same with duplex US RLE (neg)/head CT (neg) at that time.  ROS neg fever/chills/chest pain/soa/hemoptysis/abd pain/trauma.    History provided by:  Patient  Leg Swelling      Review of Systems   Constitutional: Negative.    HENT: Negative.     Eyes: Negative.    Respiratory: Negative.     Cardiovascular: Negative.    Gastrointestinal: Negative.    Musculoskeletal:  Positive for arthralgias.   Skin:  Positive for color change.   All other systems reviewed and are negative.      Past Medical History:   Diagnosis Date    Arthritis     Asthma     Atrial fibrillation     per pt's daughter.States hx of a-fib in the past when stressed or tired.    Breast cancer 1999    LEFT BREAST CA, LUMPECTOMY & RADS    Hx of radiation therapy 1999    APPROXIMATELY 40 TREATMENTS FOR LEFT BREAST CA    Hypertension     Pneumonia     Stroke        Allergies   Allergen Reactions    Cortisone Hives    Penicillins Hives       Past Surgical History:   Procedure Laterality Date    APPENDECTOMY      BLADDER SURGERY      BREAST BIOPSY Left 1999    MALIGNANT    BREAST LUMPECTOMY Left 1999    MALIGNANT    COLONOSCOPY N/A 1/19/2024    Procedure: COLONOSCOPY to cecum with cold snare polypectomies;  Surgeon: Harshad Gaston MD;  Location: [facility] ENDOSCOPY;  Service: Gastroenterology;  Laterality: N/A;  pre- gi bleed, anemia  post- diverticulosis, polyps    ENDOSCOPY N/A 1/19/2024    Procedure: ESOPHAGOGASTRODUODENOSCOPY with biospy;  Surgeon: Harshad Gaston MD;  Location: [facility] ENDOSCOPY;  Service: Gastroenterology;  Laterality: N/A;  pre- gi bleed, anemia  post- erosive gastirits    GALLBLADDER SURGERY      HYSTERECTOMY      OOPHORECTOMY         Family History    Problem Relation Age of Onset    Ovarian cancer Sister         70'S    Cancer Sister     Diabetes Sister     Hypertension Sister     Osteoarthritis Sister     Colon cancer Maternal Grandmother     Osteoarthritis Mother     Hypertension Father     Cancer Brother     Hypertension Brother     Osteoarthritis Brother     Cancer Other     Stroke Other     Ovarian cancer Paternal Grandmother         unknown    Breast cancer Neg Hx        Social History     Socioeconomic History    Marital status:    Tobacco Use    Smoking status: Former     Current packs/day: 0.50     Average packs/day: 0.5 packs/day for 2.0 years (1.0 ttl pk-yrs)     Types: Cigarettes    Smokeless tobacco: Never    Tobacco comments:     quit 40 years ago   Vaping Use    Vaping status: Never Used   Substance and Sexual Activity    Alcohol use: No    Drug use: No    Sexual activity: Defer           Objective   Physical Exam  Vitals and nursing note reviewed.   Constitutional:       Appearance: Normal appearance. She is obese.   HENT:      Head: Normocephalic and atraumatic.      Right Ear: External ear normal.      Left Ear: External ear normal.      Nose: Nose normal.      Mouth/Throat:      Mouth: Mucous membranes are moist.      Pharynx: Oropharynx is clear.   Eyes:      Conjunctiva/sclera: Conjunctivae normal.      Pupils: Pupils are equal, round, and reactive to light.   Cardiovascular:      Rate and Rhythm: Normal rate and regular rhythm.      Pulses: Normal pulses.      Heart sounds: Normal heart sounds. No murmur heard.     No friction rub. No gallop.   Pulmonary:      Effort: Pulmonary effort is normal.      Breath sounds: Examination of the right-lower field reveals decreased breath sounds and rales. Examination of the left-lower field reveals decreased breath sounds. No wheezing or rhonchi.   Abdominal:      General: Bowel sounds are normal. There is no distension.      Palpations: Abdomen is soft.      Tenderness: There is no abdominal  tenderness. There is no guarding or rebound.   Musculoskeletal:         General: Swelling present.      Cervical back: Normal range of motion and neck supple. No rigidity.      Right lower leg: Swelling and tenderness present. 3+ Edema present.      Left lower le+ Edema present.        Legs:    Lymphadenopathy:      Cervical: No cervical adenopathy.   Skin:     General: Skin is warm and dry.      Findings: Erythema present.   Neurological:      General: No focal deficit present.      Mental Status: She is alert and oriented to person, place, and time.      GCS: GCS eye subscore is 4. GCS verbal subscore is 5. GCS motor subscore is 6.      Sensory: Sensation is intact.      Motor: Motor function is intact.         ECG 12 Lead      Date/Time: 3/21/2024 3:27 PM    Performed by: Leo Cao MD  Authorized by: Leo Cao MD  Interpreted by ED physician  Rhythm: sinus rhythm  Rate: normal  BPM: 63  QRS axis: left  Conduction: 1st degree  ST Segments: ST segments normal  T Waves: T waves normal  Other findings: LVH and PRWP  Q waves: V3, V4, V5 and V6  Clinical impression: abnormal ECG               ED Course  ED Course as of 24 1648   Thu Mar 21, 2024   1543 [facility] paged for admit [SD]   1613 [facility] paged for admit [SD]   1646 D/w Dr. Carranza, [facility] hospitalist accepting. Pt stable transport. [SD]      ED Course User Index  [SD] Leo Cao MD      Labs Reviewed   COMPREHENSIVE METABOLIC PANEL - Abnormal; Notable for the following components:       Result Value    Glucose 102 (*)     Chloride 112 (*)     Total Protein 4.9 (*)     Albumin 3.1 (*)     eGFR 57.7 (*)     All other components within normal limits    Narrative:     GFR Normal >60  Chronic Kidney Disease <60  Kidney Failure <15    The GFR formula is only valid for adults with stable renal function between ages 18 and 70.   TROPONIN - Abnormal; Notable for the following components:    HS Troponin T 18 (*)     All other components within normal  limits    Narrative:     High Sensitive Troponin T Reference Range:  <14.0 ng/L- Negative Female for AMI  <22.0 ng/L- Negative Male for AMI  >=14 - Abnormal Female indicating possible myocardial injury.  >=22 - Abnormal Male indicating possible myocardial injury.   Clinicians would have to utilize clinical acumen, EKG, Troponin, and serial changes to determine if it is an Acute Myocardial Infarction or myocardial injury due to an underlying chronic condition.        CBC WITH AUTO DIFFERENTIAL - Abnormal; Notable for the following components:    RBC 3.59 (*)     Hemoglobin 10.2 (*)     Hematocrit 33.9 (*)     MCHC 30.1 (*)     RDW 16.0 (*)     RDW-SD 55.8 (*)     Lymphocyte % 15.5 (*)     Monocyte % 12.6 (*)     Immature Grans % 0.6 (*)     Monocytes, Absolute 1.06 (*)     All other components within normal limits   URINALYSIS WITHOUT MICROSCOPIC (NO CULTURE) - Abnormal; Notable for the following components:    Appearance, UA Cloudy (*)     Blood, UA Trace (*)     Leuk Esterase, UA Trace (*)     Nitrite, UA Positive (*)     All other components within normal limits   HIGH SENSITIVITIY TROPONIN T 2HR - Abnormal; Notable for the following components:    HS Troponin T 18 (*)     All other components within normal limits    Narrative:     High Sensitive Troponin T Reference Range:  <14.0 ng/L- Negative Female for AMI  <22.0 ng/L- Negative Male for AMI  >=14 - Abnormal Female indicating possible myocardial injury.  >=22 - Abnormal Male indicating possible myocardial injury.   Clinicians would have to utilize clinical acumen, EKG, Troponin, and serial changes to determine if it is an Acute Myocardial Infarction or myocardial injury due to an underlying chronic condition.        LAB PROTIME-INR, FINGERSTICK - Abnormal; Notable for the following components:    Protime 18.3 (*)     All other components within normal limits   D-DIMER, QUANTITATIVE - Normal    Narrative:     According to the assay 's published  "package insert, a normal (<0.50 MCGFEU/mL) D-dimer result in conjunction with a non-high clinical probability assessment, excludes deep vein thrombosis (DVT) and pulmonary embolism (PE) with high sensitivity.    D-dimer values increase with age and this can make VTE exclusion of an older population difficult. To address this, the [employer], based on best available evidence and recent guidelines, recommends that clinicians use age-adjusted D-dimer thresholds in patients greater than 50 years of age with: a) a low probability of PE who do not meet all Pulmonary Embolism Rule Out Criteria, or b) in those with intermediate probability of PE.   The formula for an age-adjusted D-dimer cut-off is \"age/100\".  For example, a 60 year old patient would have an age-adjusted cut-off of 0.60 MCGFEU/mL and an 80 year old 0.80 MCGFEU/mL.   PROCALCITONIN - Normal    Narrative:     As a Marker for Sepsis (Non-Neonates):    1. <0.5 ng/mL represents a low risk of severe sepsis and/or septic shock.  2. >2 ng/mL represents a high risk of severe sepsis and/or septic shock.    As a Marker for Lower Respiratory Tract Infections that require antibiotic therapy:    PCT on Admission    Antibiotic Therapy       6-12 Hrs later    >0.5                Strongly Recommended  >0.25 - <0.5        Recommended   0.1 - 0.25          Discouraged              Remeasure/reassess PCT  <0.1                Strongly Discouraged     Remeasure/reassess PCT    As 28 day mortality risk marker: \"Change in Procalcitonin Result\" (>80% or <=80%) if Day 0 (or Day 1) and Day 4 values are available. Refer to http://www.[facility]-pct-calculator.com    Change in PCT <=80%  A decrease of PCT levels below or equal to 80% defines a positive change in PCT test result representing a higher risk for 28-day all-cause mortality of patients diagnosed with severe sepsis for septic shock.    Change in PCT >80%  A decrease of PCT levels of more than 80% defines a " negative change in PCT result representing a lower risk for 28-day all-cause mortality of patients diagnosed with severe sepsis or septic shock.      BLOOD CULTURE   BLOOD CULTURE   PROTIME-INR   APTT   POCT PROTIME - INR   CBC AND DIFFERENTIAL    Narrative:     The following orders were created for panel order CBC & Differential.  Procedure                               Abnormality         Status                     ---------                               -----------         ------                     CBC Auto Differential[[id number]]        Abnormal            Final result                 Please view results for these tests on the individual orders.     US Venous Doppler Lower Extremity Right (duplex)    Addendum Date: 3/21/2024 Addendum:   ADDENDUM: 03 21 24 15:41 Call Doctor Regarding DVT â€“ acute, progressing, called  Dr. Cao on 03 21 15:41 (-04:00)     Result Date: 3/21/2024  Narrative: CR Patient: WEN HITCHCOCK  Time Out: 15:37 Exam(s): US VENOUS RIGHT LOWER EXTREMITY EXAM:   US Duplex Right Lower Extremity Veins CLINICAL HISTORY:    Reason for exam: Swelling.  History of DVT on Eliquis. TECHNIQUE:   Real-time duplex ultrasound scan of the right lower extremity veins integrating B-mode two-dimensional vascular structure, Doppler spectral analysis, color flow Doppler imaging and compression. COMPARISON:   No relevant prior studies available. FINDINGS:   Deep veins:  DVT to the mid segment of the right femoral vein is occlusive, potentially acute.  Patient declined scanning of the more distal thigh and the calf due to discomfort.   Superficial veins:  No thrombus in the visualized great saphenous vein. IMPRESSION:       DVT to the mid segment of the right femoral vein is occlusive, potentially acute.  Patient declined scanning of the more distal thigh and the calf due to discomfort.     Impression: Communications:  Call Doctor DVT â€“ acute, progressing Electronically signed by Dougie Mendoza M.D. on  03-21-24 at 1537    XR Chest 2 View    Result Date: 3/21/2024  Narrative: XR CHEST 2 VW-  Clinical: Chest pain and edema  COMPARISON examination dated 2/12/2024  FINDINGS: Cardiac size within normal limits. There is atherosclerotic calcification of the aorta. No pleural effusion, vascular congestion or new airspace disease has developed.  There is a minimal area of infiltrate along the lateral aspect of the left midlung zone, less pronounced compared to the previous examination. There also appears to be a subtle area of infiltrate or atelectasis at the right lung base as before.    This report was finalized on 3/21/2024 3:00 PM by Dr. Jimmy Goff M.D on Workstation: [id number]      XR Tibia Fibula 2 View Right    Result Date: 3/21/2024  Narrative: XR TIBIA FIBULA 2 VW RIGHT-  Clinical: Swelling and erythema right leg with pain  FINDINGS: There is soft tissue swelling of the right lower extremity, no soft tissue gas or radiopaque foreign body seen. There is a substantial amount of right knee joint degeneration. No acute osseous abnormality of the tibia/fibula. No periosteal elevation or cortical bone destruction seen. There is degenerative change noted about the ankle joint, hind and midfoot. The remainder is unremarkable.  This report was finalized on 3/21/2024 2:57 PM by Dr. Jimmy Goff M.D on Workstation: [id number]      Duplex Venous Lower Extremity - RIGHT    Result Date: 3/14/2024  Narrative:   Normal right lower extremity venous duplex scan.     CT Head Without Contrast    Result Date: 3/14/2024  Narrative: CT HEAD WO CONTRAST-  INDICATIONS: Confusion  TECHNIQUE: Radiation dose reduction techniques were utilized, including automated exposure control and exposure modulation based on body size. Noncontrast head CT  COMPARISON: 9/23/2014  FINDINGS:    No acute intracranial hemorrhage, midline shift or mass effect. No acute territorial infarct is identified.  Mild periventricular hypodensities suggest chronic  small vessel ischemic change in a patient this age.  Arterial calcifications are seen at the base of the brain.  Ventricles, cisterns, cerebral sulci are unremarkable for patient age.  Increased prominence of the caliber of the superior ophthalmic veins, bilaterally, could be evidence of increased intracranial pressure, correlate clinically. The visualized paranasal sinuses, orbits, mastoid air cells are otherwise unremarkable.          Impression:  No acute intracranial hemorrhage or hydrocephalus. Increased prominence of the caliber of the superior ophthalmic veins, bilaterally, could be evidence of increased intracranial pressure, correlate clinically.  If further imaging evaluation is indicated, MRI could be considered.    This report was finalized on 3/14/2024 3:52 PM by Dr. Rod Villavicencio M.D on Workstation: [id number]                                          Medical Decision Making  Problems Addressed:  Acute deep vein thrombosis (DVT) of femoral vein of right lower extremity: complicated acute illness or injury  UTI (urinary tract infection), bacterial: complicated acute illness or injury    Amount and/or Complexity of Data Reviewed  Labs: ordered.  Radiology: ordered.  ECG/medicine tests: ordered and independent interpretation performed.    Risk  Prescription drug management.  Decision regarding hospitalization.        Final diagnoses:   Acute deep vein thrombosis (DVT) of femoral vein of right lower extremity   UTI (urinary tract infection), bacterial       ED Disposition  ED Disposition       ED Disposition   Decision to Admit    Condition   --    Comment   Level of Care: Telemetry [5]   Diagnosis: DVT (deep venous thrombosis) [126163]   Admitting Physician: KARINE TITUS [[id number]]   Attending Physician: KARINE TITUS [[id number]]   Certification: I Certify That Inpatient Hospital Services Are Medically Necessary For Greater Than 2 Midnights                 No follow-up provider specified.        Medication List      No changes were made to your prescriptions during this visit.

## 2024-03-22 ENCOUNTER — HOME CARE VISIT (OUTPATIENT)
Dept: HOME HEALTH SERVICES | Facility: HOME HEALTHCARE | Age: 89
End: 2024-03-22
Payer: MEDICARE

## 2024-03-22 ENCOUNTER — APPOINTMENT (OUTPATIENT)
Dept: CARDIOLOGY | Facility: HOSPITAL | Age: 89
DRG: 603 | End: 2024-03-22
Payer: MEDICARE

## 2024-03-22 ENCOUNTER — DOCUMENTATION (OUTPATIENT)
Dept: HOME HEALTH SERVICES | Facility: HOME HEALTHCARE | Age: 89
End: 2024-03-22
Payer: MEDICARE

## 2024-03-22 PROBLEM — E66.812 CLASS 2 SEVERE OBESITY WITH SERIOUS COMORBIDITY IN ADULT: Status: ACTIVE | Noted: 2024-03-22

## 2024-03-22 PROBLEM — D69.6 THROMBOCYTOPENIA: Status: ACTIVE | Noted: 2024-03-22

## 2024-03-22 PROBLEM — E66.01 CLASS 2 SEVERE OBESITY WITH SERIOUS COMORBIDITY IN ADULT: Status: ACTIVE | Noted: 2024-03-22

## 2024-03-22 PROBLEM — Z86.711 HISTORY OF PULMONARY EMBOLISM: Status: ACTIVE | Noted: 2024-03-22

## 2024-03-22 LAB
ANION GAP SERPL CALCULATED.3IONS-SCNC: 8.6 MMOL/L (ref 5–15)
APTT PPP: 86.5 SECONDS (ref 22.7–35.4)
APTT PPP: >200 SECONDS (ref 22.7–35.4)
BASOPHILS # BLD AUTO: 0.01 10*3/MM3 (ref 0–0.2)
BASOPHILS NFR BLD AUTO: 0.1 % (ref 0–1.5)
BH CV LOWER VASCULAR RIGHT COMMON FEMORAL AUGMENT: NORMAL
BH CV LOWER VASCULAR RIGHT COMMON FEMORAL COMPETENT: NORMAL
BH CV LOWER VASCULAR RIGHT COMMON FEMORAL COMPRESS: NORMAL
BH CV LOWER VASCULAR RIGHT COMMON FEMORAL PHASIC: NORMAL
BH CV LOWER VASCULAR RIGHT COMMON FEMORAL SPONT: NORMAL
BH CV LOWER VASCULAR RIGHT DISTAL FEMORAL AUGMENT: NORMAL
BH CV LOWER VASCULAR RIGHT DISTAL FEMORAL COMPETENT: NORMAL
BH CV LOWER VASCULAR RIGHT DISTAL FEMORAL PHASIC: NORMAL
BH CV LOWER VASCULAR RIGHT DISTAL FEMORAL SPONT: NORMAL
BH CV LOWER VASCULAR RIGHT GASTRONEMIUS COMPRESS: NORMAL
BH CV LOWER VASCULAR RIGHT GREATER SAPH AK COMPRESS: NORMAL
BH CV LOWER VASCULAR RIGHT GREATER SAPH BK COMPRESS: NORMAL
BH CV LOWER VASCULAR RIGHT LESSER SAPH COMPRESS: NORMAL
BH CV LOWER VASCULAR RIGHT MID FEMORAL AUGMENT: NORMAL
BH CV LOWER VASCULAR RIGHT MID FEMORAL COMPETENT: NORMAL
BH CV LOWER VASCULAR RIGHT MID FEMORAL COMPRESS: NORMAL
BH CV LOWER VASCULAR RIGHT MID FEMORAL PHASIC: NORMAL
BH CV LOWER VASCULAR RIGHT MID FEMORAL SPONT: NORMAL
BH CV LOWER VASCULAR RIGHT PERONEAL COMPRESS: NORMAL
BH CV LOWER VASCULAR RIGHT POPLITEAL AUGMENT: NORMAL
BH CV LOWER VASCULAR RIGHT POPLITEAL COMPETENT: NORMAL
BH CV LOWER VASCULAR RIGHT POPLITEAL COMPRESS: NORMAL
BH CV LOWER VASCULAR RIGHT POPLITEAL PHASIC: NORMAL
BH CV LOWER VASCULAR RIGHT POPLITEAL SPONT: NORMAL
BH CV LOWER VASCULAR RIGHT POSTERIOR TIBIAL COMPRESS: NORMAL
BH CV LOWER VASCULAR RIGHT PROFUNDA FEMORAL COMPRESS: NORMAL
BH CV LOWER VASCULAR RIGHT PROXIMAL FEMORAL COMPRESS: NORMAL
BH CV LOWER VASCULAR RIGHT SAPHENOFEMORAL JUNCTION COMPRESS: NORMAL
BUN SERPL-MCNC: 19 MG/DL (ref 8–23)
BUN/CREAT SERPL: 21.6 (ref 7–25)
CALCIUM SPEC-SCNC: 8.5 MG/DL (ref 8.6–10.5)
CHLORIDE SERPL-SCNC: 112 MMOL/L (ref 98–107)
CO2 SERPL-SCNC: 24.4 MMOL/L (ref 22–29)
CREAT SERPL-MCNC: 0.88 MG/DL (ref 0.57–1)
DEPRECATED RDW RBC AUTO: 45.4 FL (ref 37–54)
EGFRCR SERPLBLD CKD-EPI 2021: 64.1 ML/MIN/1.73
EOSINOPHIL # BLD AUTO: 0.08 10*3/MM3 (ref 0–0.4)
EOSINOPHIL NFR BLD AUTO: 1 % (ref 0.3–6.2)
ERYTHROCYTE [DISTWIDTH] IN BLOOD BY AUTOMATED COUNT: 14.4 % (ref 12.3–15.4)
GLUCOSE SERPL-MCNC: 104 MG/DL (ref 65–99)
HCT VFR BLD AUTO: 33.8 % (ref 34–46.6)
HGB BLD-MCNC: 10.7 G/DL (ref 12–15.9)
LYMPHOCYTES # BLD AUTO: 1.27 10*3/MM3 (ref 0.7–3.1)
LYMPHOCYTES NFR BLD AUTO: 15.5 % (ref 19.6–45.3)
MCH RBC QN AUTO: 27.8 PG (ref 26.6–33)
MCHC RBC AUTO-ENTMCNC: 31.7 G/DL (ref 31.5–35.7)
MCV RBC AUTO: 87.8 FL (ref 79–97)
MONOCYTES # BLD AUTO: 0.92 10*3/MM3 (ref 0.1–0.9)
MONOCYTES NFR BLD AUTO: 11.2 % (ref 5–12)
NEUTROPHILS NFR BLD AUTO: 5.87 10*3/MM3 (ref 1.7–7)
NEUTROPHILS NFR BLD AUTO: 71.7 % (ref 42.7–76)
PLATELET # BLD AUTO: 134 10*3/MM3 (ref 140–450)
PMV BLD AUTO: 11.3 FL (ref 6–12)
POTASSIUM SERPL-SCNC: 4.1 MMOL/L (ref 3.5–5.2)
RBC # BLD AUTO: 3.85 10*6/MM3 (ref 3.77–5.28)
SODIUM SERPL-SCNC: 145 MMOL/L (ref 136–145)
WBC NRBC COR # BLD AUTO: 8.19 10*3/MM3 (ref 3.4–10.8)

## 2024-03-22 PROCEDURE — 99223 1ST HOSP IP/OBS HIGH 75: CPT | Performed by: INTERNAL MEDICINE

## 2024-03-22 PROCEDURE — 85730 THROMBOPLASTIN TIME PARTIAL: CPT | Performed by: INTERNAL MEDICINE

## 2024-03-22 PROCEDURE — 25010000002 HEPARIN (PORCINE) 25000-0.45 UT/250ML-% SOLUTION: Performed by: EMERGENCY MEDICINE

## 2024-03-22 PROCEDURE — 25010000002 CEFTRIAXONE PER 250 MG: Performed by: INTERNAL MEDICINE

## 2024-03-22 PROCEDURE — 94799 UNLISTED PULMONARY SVC/PX: CPT

## 2024-03-22 PROCEDURE — 94640 AIRWAY INHALATION TREATMENT: CPT

## 2024-03-22 PROCEDURE — 93971 EXTREMITY STUDY: CPT | Performed by: SURGERY

## 2024-03-22 PROCEDURE — 93971 EXTREMITY STUDY: CPT

## 2024-03-22 PROCEDURE — 85730 THROMBOPLASTIN TIME PARTIAL: CPT | Performed by: STUDENT IN AN ORGANIZED HEALTH CARE EDUCATION/TRAINING PROGRAM

## 2024-03-22 PROCEDURE — 80048 BASIC METABOLIC PNL TOTAL CA: CPT | Performed by: NURSE PRACTITIONER

## 2024-03-22 PROCEDURE — 94761 N-INVAS EAR/PLS OXIMETRY MLT: CPT

## 2024-03-22 PROCEDURE — 25810000003 SODIUM CHLORIDE 0.9 % SOLUTION: Performed by: INTERNAL MEDICINE

## 2024-03-22 PROCEDURE — 85025 COMPLETE CBC W/AUTO DIFF WBC: CPT | Performed by: NURSE PRACTITIONER

## 2024-03-22 PROCEDURE — 25010000002 VANCOMYCIN 10 G RECONSTITUTED SOLUTION: Performed by: INTERNAL MEDICINE

## 2024-03-22 RX ORDER — DILTIAZEM HYDROCHLORIDE 120 MG/1
120 CAPSULE, COATED, EXTENDED RELEASE ORAL
Status: DISCONTINUED | OUTPATIENT
Start: 2024-03-22 | End: 2024-03-24 | Stop reason: HOSPADM

## 2024-03-22 RX ORDER — NITROGLYCERIN 0.4 MG/1
0.4 TABLET SUBLINGUAL
Status: DISCONTINUED | OUTPATIENT
Start: 2024-03-22 | End: 2024-03-24 | Stop reason: HOSPADM

## 2024-03-22 RX ORDER — ACETAMINOPHEN 650 MG/1
650 SUPPOSITORY RECTAL EVERY 4 HOURS PRN
Status: DISCONTINUED | OUTPATIENT
Start: 2024-03-22 | End: 2024-03-24 | Stop reason: HOSPADM

## 2024-03-22 RX ORDER — AMOXICILLIN 250 MG
2 CAPSULE ORAL 2 TIMES DAILY PRN
Status: DISCONTINUED | OUTPATIENT
Start: 2024-03-22 | End: 2024-03-24 | Stop reason: HOSPADM

## 2024-03-22 RX ORDER — POLYETHYLENE GLYCOL 3350 17 G/17G
17 POWDER, FOR SOLUTION ORAL DAILY PRN
Status: DISCONTINUED | OUTPATIENT
Start: 2024-03-22 | End: 2024-03-22

## 2024-03-22 RX ORDER — BISACODYL 10 MG
10 SUPPOSITORY, RECTAL RECTAL DAILY PRN
Status: DISCONTINUED | OUTPATIENT
Start: 2024-03-22 | End: 2024-03-24 | Stop reason: HOSPADM

## 2024-03-22 RX ORDER — PANTOPRAZOLE SODIUM 40 MG/1
40 TABLET, DELAYED RELEASE ORAL
Status: DISCONTINUED | OUTPATIENT
Start: 2024-03-22 | End: 2024-03-24 | Stop reason: HOSPADM

## 2024-03-22 RX ORDER — VANCOMYCIN 2 GRAM/500 ML IN 0.9 % SODIUM CHLORIDE INTRAVENOUS
20 ONCE
Status: COMPLETED | OUTPATIENT
Start: 2024-03-22 | End: 2024-03-22

## 2024-03-22 RX ORDER — DOCUSATE SODIUM 100 MG/1
100 CAPSULE, LIQUID FILLED ORAL DAILY
Status: DISCONTINUED | OUTPATIENT
Start: 2024-03-22 | End: 2024-03-24 | Stop reason: HOSPADM

## 2024-03-22 RX ORDER — ARFORMOTEROL TARTRATE 15 UG/2ML
15 SOLUTION RESPIRATORY (INHALATION)
Status: DISCONTINUED | OUTPATIENT
Start: 2024-03-22 | End: 2024-03-24 | Stop reason: HOSPADM

## 2024-03-22 RX ORDER — DIAZEPAM 2 MG/1
2 TABLET ORAL 2 TIMES DAILY
Status: DISCONTINUED | OUTPATIENT
Start: 2024-03-22 | End: 2024-03-24 | Stop reason: HOSPADM

## 2024-03-22 RX ORDER — ACETAMINOPHEN 160 MG/5ML
650 SOLUTION ORAL EVERY 4 HOURS PRN
Status: DISCONTINUED | OUTPATIENT
Start: 2024-03-22 | End: 2024-03-24 | Stop reason: HOSPADM

## 2024-03-22 RX ORDER — CETIRIZINE HYDROCHLORIDE 10 MG/1
5 TABLET ORAL DAILY
Status: DISCONTINUED | OUTPATIENT
Start: 2024-03-22 | End: 2024-03-24 | Stop reason: HOSPADM

## 2024-03-22 RX ORDER — BISACODYL 5 MG/1
5 TABLET, DELAYED RELEASE ORAL DAILY PRN
Status: DISCONTINUED | OUTPATIENT
Start: 2024-03-22 | End: 2024-03-24 | Stop reason: HOSPADM

## 2024-03-22 RX ORDER — BENZONATATE 100 MG/1
100 CAPSULE ORAL 3 TIMES DAILY PRN
Status: DISCONTINUED | OUTPATIENT
Start: 2024-03-22 | End: 2024-03-24 | Stop reason: HOSPADM

## 2024-03-22 RX ORDER — SODIUM CHLORIDE 9 MG/ML
40 INJECTION, SOLUTION INTRAVENOUS AS NEEDED
Status: DISCONTINUED | OUTPATIENT
Start: 2024-03-22 | End: 2024-03-24 | Stop reason: HOSPADM

## 2024-03-22 RX ORDER — ONDANSETRON 2 MG/ML
4 INJECTION INTRAMUSCULAR; INTRAVENOUS EVERY 6 HOURS PRN
Status: DISCONTINUED | OUTPATIENT
Start: 2024-03-22 | End: 2024-03-24 | Stop reason: HOSPADM

## 2024-03-22 RX ORDER — SODIUM CHLORIDE 0.9 % (FLUSH) 0.9 %
10 SYRINGE (ML) INJECTION EVERY 12 HOURS SCHEDULED
Status: DISCONTINUED | OUTPATIENT
Start: 2024-03-22 | End: 2024-03-24 | Stop reason: HOSPADM

## 2024-03-22 RX ORDER — POLYETHYLENE GLYCOL 3350 17 G/17G
17 POWDER, FOR SOLUTION ORAL DAILY PRN
Status: DISCONTINUED | OUTPATIENT
Start: 2024-03-22 | End: 2024-03-24 | Stop reason: HOSPADM

## 2024-03-22 RX ORDER — ACETAMINOPHEN 325 MG/1
650 TABLET ORAL EVERY 4 HOURS PRN
Status: DISCONTINUED | OUTPATIENT
Start: 2024-03-22 | End: 2024-03-24 | Stop reason: HOSPADM

## 2024-03-22 RX ORDER — SODIUM CHLORIDE 0.9 % (FLUSH) 0.9 %
10 SYRINGE (ML) INJECTION AS NEEDED
Status: DISCONTINUED | OUTPATIENT
Start: 2024-03-22 | End: 2024-03-24 | Stop reason: HOSPADM

## 2024-03-22 RX ORDER — BUDESONIDE 0.5 MG/2ML
0.5 INHALANT ORAL 2 TIMES DAILY
Status: DISCONTINUED | OUTPATIENT
Start: 2024-03-22 | End: 2024-03-24 | Stop reason: HOSPADM

## 2024-03-22 RX ADMIN — Medication 10 ML: at 21:53

## 2024-03-22 RX ADMIN — VANCOMYCIN HYDROCHLORIDE 2000 MG: 10 INJECTION, POWDER, LYOPHILIZED, FOR SOLUTION INTRAVENOUS at 18:00

## 2024-03-22 RX ADMIN — DOCUSATE SODIUM 100 MG: 100 CAPSULE, LIQUID FILLED ORAL at 15:04

## 2024-03-22 RX ADMIN — Medication 10 ML: at 08:31

## 2024-03-22 RX ADMIN — CETIRIZINE HYDROCHLORIDE 5 MG: 10 TABLET ORAL at 15:58

## 2024-03-22 RX ADMIN — DIAZEPAM 2 MG: 2 TABLET ORAL at 21:53

## 2024-03-22 RX ADMIN — HEPARIN SODIUM 11.4 UNITS/KG/HR: 10000 INJECTION, SOLUTION INTRAVENOUS at 07:55

## 2024-03-22 RX ADMIN — ACETAMINOPHEN 325MG 650 MG: 325 TABLET ORAL at 15:04

## 2024-03-22 RX ADMIN — BUDESONIDE 0.5 MG: 0.5 SUSPENSION RESPIRATORY (INHALATION) at 20:27

## 2024-03-22 RX ADMIN — CEFTRIAXONE SODIUM 1000 MG: 1 INJECTION, POWDER, FOR SOLUTION INTRAMUSCULAR; INTRAVENOUS at 15:03

## 2024-03-22 RX ADMIN — ARFORMOTEROL TARTRATE 15 MCG: 15 SOLUTION RESPIRATORY (INHALATION) at 20:28

## 2024-03-22 RX ADMIN — PANTOPRAZOLE SODIUM 40 MG: 40 TABLET, DELAYED RELEASE ORAL at 15:58

## 2024-03-22 RX ADMIN — ACETAMINOPHEN 325MG 650 MG: 325 TABLET ORAL at 05:49

## 2024-03-22 RX ADMIN — DILTIAZEM HYDROCHLORIDE 120 MG: 120 CAPSULE, EXTENDED RELEASE ORAL at 15:58

## 2024-03-22 NOTE — SIGNIFICANT NOTE
03/22/24 1640   OTHER   Discipline physical therapist   Rehab Time/Intention   Session Not Performed patient unavailable for evaluation;other (see comments)  (Held PT until after 3:45 per 24 hour Heparin protocol. Pt going [facility] for dopplers this PM on check-back. PT will f/u tomorrow for eval.)   Therapy Assessment/Plan (PT)   Criteria for Skilled Interventions Met (PT) yes   Recommendation   PT - Next Appointment 03/23/24

## 2024-03-22 NOTE — CONSULTS
[facility] GROUP INITIAL INPATIENT CONSULTATION NOTE    REASON FOR CONSULTATION:    DVT while on Eliquis    HISTORY OF PRESENT ILLNESS:  Vonnie Coppola is a 88 y.o. female who we are asked to see today in consultation for DVT while on Eliquis.    The patient has a past medical history of atrial fibrillation, stroke, hypertension, history of breast cancer.    Admission 1/16/2024 through 1/27/2024 with GI bleeding and subsequent venous thromboembolism.  She had an EGD and colonoscopy on 1/19/2024 with no obvious malignancy and no obvious source of bleeding.    Recent pulmonary embolism diagnosed on 1/22/2024 with CT angiogram showing bilateral pulmonary thromboembolic disease with right heart strain as well as a 5 mm and 11 mm right middle lobe pulmonary nodules.    Bilateral lower extremity venous duplex on 1/23/2024 showed acute right lower extremity DVT in the posterior tibial, peroneal, and soleal veins with acute left lower extremity DVT in the posterior tibial, peroneal, gastrocnemius, and soleal veins.    Follow-up right lower extremity venous duplex on 3/14/2024 showed no evidence of DVT.      She presented to the outside emergency department with worsening right lower extremity edema, redness, and pain below the knee that started abruptly a couple of days ago.  Right lower extremity venous duplex done in the outside emergency department 3/21/2024 shows now a DVT to the mid segment of the right femoral vein which is occlusive.    She was transferred to the hospital and admitted.  She has been started on a heparin drip and started on ceftriaxone for possible UTI though she has no urinary symptoms.     Past Medical History:   Diagnosis Date    Arthritis     Asthma     Atrial fibrillation     per pt's daughter.States hx of a-fib in the past when stressed or tired.    Breast cancer 1999    LEFT BREAST CA, LUMPECTOMY & RADS    Hx of radiation therapy 1999    APPROXIMATELY 40 TREATMENTS FOR LEFT BREAST  CA    Hypertension     Pneumonia     Stroke        Past Surgical History:   Procedure Laterality Date    APPENDECTOMY      BLADDER SURGERY      BREAST BIOPSY Left 1999    MALIGNANT    BREAST LUMPECTOMY Left 1999    MALIGNANT    COLONOSCOPY N/A 1/19/2024    Procedure: COLONOSCOPY to cecum with cold snare polypectomies;  Surgeon: Harshad Gaston MD;  Location: [facility] ENDOSCOPY;  Service: Gastroenterology;  Laterality: N/A;  pre- gi bleed, anemia  post- diverticulosis, polyps    ENDOSCOPY N/A 1/19/2024    Procedure: ESOPHAGOGASTRODUODENOSCOPY with biospy;  Surgeon: Harshad Gaston MD;  Location: [facility] ENDOSCOPY;  Service: Gastroenterology;  Laterality: N/A;  pre- gi bleed, anemia  post- erosive gastirits    GALLBLADDER SURGERY      HYSTERECTOMY      OOPHORECTOMY         SOCIAL HISTORY:   reports that she has quit smoking. Her smoking use included cigarettes. She has a 1 pack-year smoking history. She has never used smokeless tobacco. She reports that she does not drink alcohol and does not use drugs.    FAMILY HISTORY:  family history includes Cancer in her brother, sister, and another family member; Colon cancer in her maternal grandmother; Diabetes in her sister; Hypertension in her brother, father, and sister; Osteoarthritis in her brother, mother, and sister; Ovarian cancer in her paternal grandmother and sister; Stroke in an other family member.    ALLERGIES:  Allergies   Allergen Reactions    Cortisone Hives    Penicillins Hives       MEDICATIONS:  As listed in the electronic medical record.    Review of Systems   Skin:  Positive for color change and rash.   All other systems reviewed and are negative.      Vitals:    03/21/24 2046 03/21/24 2322 03/22/24 0725 03/22/24 1328   BP: (!) 131/119 150/61 112/87 148/78   BP Location: Right arm Right arm Right arm Right arm   Patient Position: Lying Lying Lying Lying   Pulse: 75 73 50 72   Resp: 19 19 16 16   Temp: 98.2 °F (36.8 °C) 98.1 °F (36.7 °C) 98.1 °F  (36.7 °C) 98.1 °F (36.7 °C)   TempSrc: Oral Oral Oral Oral   SpO2: 96% 95% 96%    Weight:       Height:           Physical Exam  Vitals reviewed.   Constitutional:       Appearance: She is well-developed.      Comments: obese   HENT:      Head: Normocephalic and atraumatic.      Nose: Nose normal.   Eyes:      Conjunctiva/sclera: Conjunctivae normal.      Pupils: Pupils are equal, round, and reactive to light.   Cardiovascular:      Rate and Rhythm: Normal rate and regular rhythm.      Heart sounds: Normal heart sounds, S1 normal and S2 normal. No murmur heard.     No friction rub. No gallop.   Pulmonary:      Effort: Pulmonary effort is normal. No respiratory distress.      Breath sounds: Normal breath sounds. No stridor. No wheezing, rhonchi or rales.   Chest:      Chest wall: No tenderness.   Abdominal:      General: Bowel sounds are normal. There is no distension.      Palpations: Abdomen is soft. There is no mass.      Tenderness: There is no abdominal tenderness. There is no guarding or rebound.   Musculoskeletal:         General: Normal range of motion.      Cervical back: Neck supple.   Lymphadenopathy:      Cervical: No cervical adenopathy.      Upper Body:      Right upper body: No supraclavicular adenopathy.      Left upper body: No supraclavicular adenopathy.   Skin:     General: Skin is warm and dry.      Findings: No erythema or rash.      Comments: Significant redness at the right tib/fib area with some mild tenderness, 1+ RLE edema below the knee.    Neurological:      Mental Status: She is alert and oriented to person, place, and time.      Cranial Nerves: No cranial nerve deficit.      Sensory: No sensory deficit.   Psychiatric:         Behavior: Behavior normal.         Thought Content: Thought content normal.         Judgment: Judgment normal.         DIAGNOSTIC DATA:  Results from last 7 days   Lab Units 03/22/24  0735   WBC 10*3/mm3 8.19   HEMOGLOBIN g/dL 10.7*   HEMATOCRIT % 33.8*   PLATELETS  10*3/mm3 134*     Lab Results   Component Value Date    NEUTROABS 5.87 03/22/2024     Results from last 7 days   Lab Units 03/22/24  0736   SODIUM mmol/L 145   POTASSIUM mmol/L 4.1   CHLORIDE mmol/L 112*   CO2 mmol/L 24.4   BUN mg/dL 19   CREATININE mg/dL 0.88   GLUCOSE mg/dL 104*   CALCIUM mg/dL 8.5*     Results from last 7 days   Lab Units 03/22/24  0736 03/22/24  0046 03/21/24  1604 03/21/24  1428   INR   --   --  1.70 1.32*   APTT seconds 86.5* >200.0*  --  24.4           IMAGING:      CT Chest With Contrast Diagnostic (01/17/2024 11:58)     CT images reviewed. Small pulmonary nodules, pulmonary emboli    ASSESSMENT:  This is a 88 y.o. female with:    *History of breast cancer  L partial mastectomy, adjuvant radiation, 3 years of adjuvant anti-estrogen therapy, stopped early due to poor tolerance    *Atrial fibrillation    *HTN    *H/o CVA    *History of GI bleeding  Admission 1/16/2024 through 1/27/2024 with GI bleeding and subsequent venous thromboembolism.  She had an EGD and colonoscopy on 1/19/2024 with no obvious malignancy and no obvious source of bleeding.  Not bleeding currently--unclear source though could have been diverticular    *Venous thromboembolism  Recent pulmonary embolism diagnosed on 1/22/2024 with CT angiogram showing bilateral pulmonary thromboembolic disease with right heart strain as well as a 5 mm and 11 mm right middle lobe pulmonary nodules.  Bilateral lower extremity venous duplex on 1/23/2024 showed acute right lower extremity DVT in the posterior tibial, peroneal, and soleal veins with acute left lower extremity DVT in the posterior tibial, peroneal, gastrocnemius, and soleal veins.  Presumably provoked by her hospitalization  Anticoagulated with Eliquis, daughter has been administering 5 mg BID with no missed doses  Follow-up right lower extremity venous duplex on 3/14/2024 showed no evidence of DVT.  She presented to the outside emergency department with worsening right lower  extremity edema, redness, and pain below the knee that started abruptly a couple of days ago.  Right lower extremity venous duplex done in the outside emergency department 3/21/2024 shows now a DVT to the mid segment of the right femoral vein which is occlusive.  She was transferred to the hospital and admitted.  She has been started on a heparin drip and started on ceftriaxone for possible UTI though she has no urinary symptoms.     *RLE cellulitis    RECOMMENDATIONS/PLAN:   Repeat RLE venous duplex here to verify whether or not there is a new clot. I think it seems more likely she has cellulitis rather than a new DVT, irregardless of what the outside duplex shows. I have requested a repeat RLE venous duplex. For now, continue a heparin drop and proceed with antibiotics for cellulitis. If new DVT is confirmed, transition to Lovenox and warfarin due to failure of Eliquis.  Repeat chest CT without contrast to follow up pulmonary nodules. Small chance of malignancy leading to VTE  Discussed with the patient and her daughter at the bedside  Discussed with Dr. Watson.  Will follow    Augustus Wilde MD

## 2024-03-22 NOTE — CASE MANAGEMENT/SOCIAL WORK
Discharge Planning Assessment  [facility]     Patient Name: Vonnie Coppola  MRN: [id number]  Today's Date: 3/22/2024    Admit Date: 3/21/2024    Plan: Home with family and current HH   Discharge Needs Assessment       Row Name 03/22/24 1501       Living Environment    People in Home child(christine), adult    Current Living Arrangements home    Potentially Unsafe Housing Conditions none    Primary Care Provided by self;child(christine)    Family Caregiver if Needed child(christnie), adult    Quality of Family Relationships involved;helpful    Able to Return to Prior Arrangements yes       Resource/Environmental Concerns    Resource/Environmental Concerns none       Transition Planning    Patient/Family Anticipates Transition to home with family;home with help/services    Patient/Family Anticipated Services at Transition home health care    Transportation Anticipated family or friend will provide       Discharge Needs Assessment    Readmission Within the Last 30 Days no previous admission in last 30 days    Equipment Currently Used at Home rollator;grab bar;shower chair;wheelchair    Concerns to be Addressed adjustment to diagnosis/illness    Anticipated Changes Related to Illness none    Equipment Needed After Discharge none                   Discharge Plan       Row Name 03/22/24 1502       Plan    Plan Home with family and current HH    Patient/Family in Agreement with Plan yes    Plan Comments Spoke with pt at bedside, introduced self and explained CCP role, and confirmed pharmacy and face sheet information verified.  Pt lives with her dtr  in 1 level home with 4 VARGHESE. She is normally IADL's, uses w/c, rollator, s/c and gb. She is current with [facility] and thinks she has been to SNF but can't recall where. She plans home with  to transport.  No anticipated needs CCP will follow - Melanie MARTINEZ                  Continued Care and Services - Admitted Since 3/21/2024    No active coordination exists for this  encounter.       Selected Continued Care - Prior Encounters Includes continued care and service providers with selected services from prior encounters from 12/22/2023 to 3/22/2024      Discharged on 1/28/2024 Admission date: 1/16/2024 - Discharge disposition: Home-Health Care Svc      Home Medical Care       Service Provider Selected Services Address Phone Fax Patient Preferred    [facility] [address] [id number] [phone] --                          Expected Discharge Date and Time       Expected Discharge Date Expected Discharge Time    Mar 23, 2024            Demographic Summary       Row Name 03/22/24 1501       General Information    Admission Type inpatient                   Functional Status       Row Name 03/22/24 1501       Functional Status    Usual Activity Tolerance excellent    Current Activity Tolerance good    Functional Status Comments dtr helps monitor shower activity pt mostly IADLs       Assessment of Health Literacy    Health Literacy Good       Functional Status, IADL    Medications independent    Meal Preparation independent    Housekeeping assistive person    Laundry assistive person    Shopping assistive person       Mental Status    General Appearance WDL WDL       Mental Status Summary    Recent Changes in Mental Status/Cognitive Functioning no changes                   Psychosocial    No documentation.                  Abuse/Neglect    No documentation.                  Legal       Row Name 03/22/24 1501       Financial/Legal    Who Manages Finances if Patient Unable dtr                   Substance Abuse    No documentation.                  Patient Forms    No documentation.                     Melanie Villasenor RN

## 2024-03-22 NOTE — DISCHARGE PLACEMENT REQUEST
"Vonnie Hitchcock (88 y.o. Female)       Date of Birth   1935    Social Security Number       Address   [address] [place], [place] [address]    Home Phone       MRN   [id number]       Advent   None    Marital Status                               Admission Date   3/21/24    Admission Type   Urgent    Admitting Provider   Danile Watson MD    Attending Provider   Daniel Watson MD    Department, Room/Bed   [address], [address]       Discharge Date       Discharge Disposition       Discharge Destination                                 Attending Provider: Daniel Watson MD    Allergies: Cortisone, Penicillins    Isolation: None   Infection: None   Code Status: No CPR    Ht: 166.4 cm (65.5\")   Wt: 104 kg (230 lb)    Admission Cmt: None   Principal Problem: DVT (deep venous thrombosis) [I82.409]                   Active Insurance as of 3/21/2024       Primary Coverage       Payor Plan Insurance Group Employer/Plan Group    HUMANA MEDICARE REPLACEMENT HUMANA MED ADV PPO [id number]       Payor Plan Address Payor Plan Phone Number Payor Plan Fax Number Effective Dates    [address] [address] [phone]  3/1/2023 - None Entered    [facility] [id number]         Subscriber Name Subscriber Birth Date Member ID       VONNIE HITCHCOCK 1935 [id number]                     Emergency Contacts        (Rel.) Home Phone Work Phone Mobile Phone    ZARAKELLY (Sister) [phone] -- --    PAULETTEMARY (Daughter) -- -- [phone]    bekah hitchcock (Other) [phone] -- --                "

## 2024-03-22 NOTE — PROGRESS NOTES
"[facility] Clinical Pharmacy Services: Vancomycin Pharmacokinetic Initial Consult Note    Vonnie Coppola is a 88 y.o. female who is on day 1 of pharmacy to dose vancomycin.    Indication: Skin and Soft Tissue  Consulting Provider: Dr. Watson   Planned Duration of Therapy: 7 days  Loading Dose Ordered or Given: 2000 mg on 3/22 at 1700 planned   MRSA PCR performed: ; Result:   Culture/Source: SSTI  Target: -600 mg/L.hr   Pertinent Vanc Dosing History:   Other Antimicrobials: Ceftriaxone    Vitals/Labs  Ht: 166.4 cm (65.5\"); Wt: 104 kg (230 lb)  Temp Readings from Last 1 Encounters:   03/22/24 98.1 °F (36.7 °C) (Oral)    Estimated Creatinine Clearance: 55.4 mL/min (by C-G formula based on SCr of 0.88 mg/dL).        Results from last 7 days   Lab Units 03/22/24  0736 03/22/24  0735 03/21/24  1428   CREATININE mg/dL 0.88  --  0.96   WBC 10*3/mm3  --  8.19 8.39     Assessment/Plan:    Vancomycin Dose:   750 mg IV every  12  hours  Predictive AUC level for the dose ordered is 460 mg/L.hr, which is within the target of 400-600 mg/L.hr  Vanc Trough has been ordered for 3/24 at 0430     Pharmacy will follow patient's kidney function and will adjust doses and obtain levels as necessary. Thank you for involving pharmacy in this patient's care. Please contact pharmacy with any questions or concerns.                           Luna Morrison, PharmD  Clinical Pharmacist    "

## 2024-03-22 NOTE — PROGRESS NOTES
Discussed with oncology concern that the redness and bruising on the right leg may be more of a cellulitis.  We will broaden coverage with vancomycin for now.  I have placed a consult for pharmacy to assist with vancomycin dosing.    Electronically signed by Daniel Watson MD, 03/22/24, 4:04 PM EDT.

## 2024-03-22 NOTE — PLAN OF CARE
Goal Outcome Evaluation:      No sticks to LUE; refer to hx of breast cancer. Pt reports having severe bilateral knee pain at times due to arthritis. PT consulted to assist in mobility so that joint function is maintained and pain mitigated.

## 2024-03-22 NOTE — H&P
[facility] Medicine Services  HISTORY AND PHYSICAL    Patient Name: Vonnie Coppola  : 1935  MRN: [id number]  Primary Care Physician: Christopher Leyva DO  Date of admission: 3/21/2024    Subjective   Subjective   Chief Complaint:  Leg swelling and right leg pain    HPI:  Vonnie Coppola is a 88 y.o. female with past medical history of DVT on Eliquis anticoagulation presents the hospital with 1 week history of worsening right lower extremity swelling with some erythema and bruising and pain.  It is better with pain medicine and worse with palpation.  The pain is moderate at times.  No injury reported.  Emergency room evaluated her and she was found to have acute DVT and UTI.  Patient does note burning urine and discolored urine recently.  Her daughter helps with some of the history at the bedside.  Her daughter states she manages her medicines and that she has not missed any doses of Eliquis.      Review of Systems   No current fevers or chills  No current shortness of breath or cough  No current nausea, vomiting, or diarrhea  No current chest pain or palpitations  All other systems reviewed and are negative.     Personal History     Past Medical History:   Diagnosis Date    Arthritis     Asthma     Atrial fibrillation     per pt's daughter.States hx of a-fib in the past when stressed or tired.    Breast cancer     LEFT BREAST CA, LUMPECTOMY & RADS    Hx of radiation therapy     APPROXIMATELY 40 TREATMENTS FOR LEFT BREAST CA    Hypertension     Pneumonia     Stroke        Past Surgical History:   Procedure Laterality Date    APPENDECTOMY      BLADDER SURGERY      BREAST BIOPSY Left     MALIGNANT    BREAST LUMPECTOMY Left     MALIGNANT    COLONOSCOPY N/A 2024    Procedure: COLONOSCOPY to cecum with cold snare polypectomies;  Surgeon: Harshad Gaston MD;  Location: [facility] ENDOSCOPY;  Service: Gastroenterology;  Laterality: N/A;  pre- gi bleed, anemia  post-  diverticulosis, polyps    ENDOSCOPY N/A 1/19/2024    Procedure: ESOPHAGOGASTRODUODENOSCOPY with biospy;  Surgeon: Harshad Gaston MD;  Location: [facility] ENDOSCOPY;  Service: Gastroenterology;  Laterality: N/A;  pre- gi bleed, anemia  post- erosive gastirits    GALLBLADDER SURGERY      HYSTERECTOMY      OOPHORECTOMY         Family History: family history includes Cancer in her brother, sister, and another family member; Colon cancer in her maternal grandmother; Diabetes in her sister; Hypertension in her brother, father, and sister; Osteoarthritis in her brother, mother, and sister; Ovarian cancer in her paternal grandmother and sister; Stroke in an other family member. Other pertinent FHx was reviewed and unremarkable.     Social History:  reports that she has quit smoking. Her smoking use included cigarettes. She has a 1 pack-year smoking history. She has never used smokeless tobacco. She reports that she does not drink alcohol and does not use drugs.  Social History     Social History Narrative    Not on file       Medications:  Available home medication information reviewed.    Allergies   Allergen Reactions    Cortisone Hives    Penicillins Hives       Objective   Objective   Vital Signs:   Temp:  [98.1 °F (36.7 °C)-98.3 °F (36.8 °C)] 98.1 °F (36.7 °C)  Heart Rate:  [50-89] 72  Resp:  [16-19] 16  BP: (112-175)/() 148/78        Physical Exam   Constitutional: Awake, alert, elderly  Eyes: PERRLA, sclerae anicteric, no conjunctival injection  HENT: NCAT, mucous membranes moist  Neck: Supple, no thyromegaly, no lymphadenopathy, trachea midline  Respiratory: No cough or wheezes, normal inspiration, nonlabored respirations   Cardiovascular: Pulse rate is normal, palpable radial pulses bilaterally  Gastrointestinal:  Soft, nontender, nondistended  Musculoskeletal: Right leg with swelling, erythema, and some bruising.  Moderate lower extremity edema, significantly obese with BMI of 38, debilitated in  appearance  Psychiatric: Appropriate affect, cooperative, conversational  Neurologic: No slurred speech or facial droop, follows commands  Skin: No rashes or jaundice, warm      Results from last 7 days   Lab Units 03/22/24  0735 03/21/24  1604   WBC 10*3/mm3 8.19  --    HEMOGLOBIN g/dL 10.7*  --    HEMATOCRIT % 33.8*  --    PLATELETS 10*3/mm3 134*  --    INR   --  1.70     Results from last 7 days   Lab Units 03/22/24  0736 03/21/24  1558 03/21/24  1428   SODIUM mmol/L 145  --  144   POTASSIUM mmol/L 4.1  --  3.9   CHLORIDE mmol/L 112*  --  112*   CO2 mmol/L 24.4  --  27.0   BUN mg/dL 19  --  21   CREATININE mg/dL 0.88  --  0.96   GLUCOSE mg/dL 104*  --  102*   CALCIUM mg/dL 8.5*  --  8.6   ALK PHOS U/L  --   --  60   ALT (SGPT) U/L  --   --  13   AST (SGOT) U/L  --   --  11   HSTROP T ng/L  --  18* 18*   PROCALCITONIN ng/mL  --   --  0.03     Estimated Creatinine Clearance: 55.4 mL/min (by C-G formula based on SCr of 0.88 mg/dL).  Brief Urine Lab Results  (Last result in the past 365 days)        Color   Clarity   Blood   Leuk Est   Nitrite   Protein   CREAT   Urine HCG        03/21/24 1504 Yellow   Cloudy   Trace   Trace   Positive   Negative                 Imaging Results (Last 24 Hours)       Procedure Component Value Units Date/Time    US Venous Doppler Lower Extremity Right (duplex) [[id number]] Collected: 03/21/24 1537     Updated: 03/21/24 1542    Addenda:        ADDENDUM:  03 21 24 15:41 Call Doctor Regarding DVT â€“ acute, progressing, called    Dr. Cao on 03 21 15:41 (-04:00)      Signed: 03/21/24 1537 by Dougie Mendoza MD    Narrative:      CR  Patient: WEN HITCHCOCK  Time Out: 15:37  Exam(s): US VENOUS RIGHT LOWER EXTREMITY     EXAM:    US Duplex Right Lower Extremity Veins    CLINICAL HISTORY:     Reason for exam: Swelling.  History of DVT on Eliquis.    TECHNIQUE:    Real-time duplex ultrasound scan of the right lower extremity veins   integrating B-mode two-dimensional vascular  structure, Doppler spectral   analysis, color flow Doppler imaging and compression.    COMPARISON:    No relevant prior studies available.    FINDINGS:    Deep veins:  DVT to the mid segment of the right femoral vein is   occlusive, potentially acute.  Patient declined scanning of the more   distal thigh and the calf due to discomfort.    Superficial veins:  No thrombus in the visualized great saphenous vein.    IMPRESSION:         DVT to the mid segment of the right femoral vein is occlusive,   potentially acute.  Patient declined scanning of the more distal thigh   and the calf due to discomfort.      Impression:            Communications:     Call Doctor DVT â€“ acute, progressing    Electronically signed by Dougie Mendoza M.D. on 03-21-24 at 1537    XR Chest 2 View [[id number]] Collected: 03/21/24 1457     Updated: 03/21/24 1504    Narrative:      XR CHEST 2 VW-     Clinical: Chest pain and edema     COMPARISON examination dated 2/12/2024     FINDINGS: Cardiac size within normal limits. There is atherosclerotic  calcification of the aorta. No pleural effusion, vascular congestion or  new airspace disease has developed.     There is a minimal area of infiltrate along the lateral aspect of the  left midlung zone, less pronounced compared to the previous examination.  There also appears to be a subtle area of infiltrate or atelectasis at  the right lung base as before.           This report was finalized on 3/21/2024 3:00 PM by Dr. Jimmy Goff M.D  on Workstation: [id number]       XR Tibia Fibula 2 View Right [[id number]] Collected: 03/21/24 1456     Updated: 03/21/24 1500    Narrative:      XR TIBIA FIBULA 2 VW RIGHT-     Clinical: Swelling and erythema right leg with pain     FINDINGS: There is soft tissue swelling of the right lower extremity, no  soft tissue gas or radiopaque foreign body seen. There is a substantial  amount of right knee joint degeneration. No acute osseous abnormality of  the tibia/fibula. No  periosteal elevation or cortical bone destruction  seen. There is degenerative change noted about the ankle joint, hind and  midfoot. The remainder is unremarkable.     This report was finalized on 3/21/2024 2:57 PM by Dr. Jimmy Goff M.D  on Workstation: [id number]             Results for orders placed during the hospital encounter of 01/16/24    Adult Transthoracic Echo Complete W/ Cont if Necessary Per Protocol    Interpretation Summary    Left ventricular systolic function is hyperdynamic (EF > 70%). Calculated left ventricular EF = 78.7% Left ventricular ejection fraction appears to be 61 - 65%.    Left ventricular diastolic function was normal.    The right ventricular cavity is borderline dilated.    The right atrial cavity is borderline dilated.    Estimated right ventricular systolic pressure from tricuspid regurgitation is markedly elevated (>55 mmHg).      Assessment & Plan   Assessment & Plan     Active Hospital Problems    Diagnosis  POA    **DVT (deep venous thrombosis) [I82.409]  Yes    History of pulmonary embolism [Z86.711]  Yes    Atrial fibrillation [I48.91]  Yes    History of CVA (cerebrovascular accident) [Z86.73]  Not Applicable    HTN (hypertension) [I10]  Yes    History of breast cancer [Z85.3]  Not Applicable    Asthma [J45.909]  Yes    Anemia [D64.9]  Yes     Discussion/plan:    88-year-old female with acute DVT despite compliance with anticoagulation on Eliquis and history of previous DVT.  Plan to consult hematology oncology to weigh in on anticoagulation.  For now we will discontinue Eliquis and has started heparin drip.  Plan to monitor PTT and adjust drip per protocol.  Monitor for any sign of bleeding.  Patient does appear to have bruising on the right leg.  Unclear if there is any trauma that she does not remember.  Plan to trend electrolytes and blood counts while anticoagulated.  Notably she does have some mild thrombocytopenia at this time.    Atrial fibrillation: Restart home  diltiazem.  Anticoagulated.    Essential hypertension: Continue home blood pressure medication and adjust as needed for blood pressure.    Asthma: Continue scheduled nebulizers.  Respiratory status is stable.    Anemia: No active bleeding reported aside from some bruising.  Plan to trend.    History of breast cancer: Noted.    GERD: PPI.  Stable.    Anxiety: As needed Valium.  Stable.    History of constipation: Bowel regimen    DVT prophylaxis: Anticoagulated    CODE STATUS:    Code Status and Medical Interventions:   Ordered at: 03/22/24 0431     Medical Intervention Limits:    NO intubation (DNI)    NO cardioversion     Code Status (Patient has no pulse and is not breathing):    No CPR (Do Not Attempt to Resuscitate)     Medical Interventions (Patient has pulse or is breathing):    Limited Support         Daniel Watson MD  03/22/24

## 2024-03-23 PROBLEM — N30.00 ACUTE CYSTITIS: Status: ACTIVE | Noted: 2024-03-23

## 2024-03-23 PROBLEM — L03.115 CELLULITIS OF RIGHT LEG: Status: ACTIVE | Noted: 2024-03-23

## 2024-03-23 LAB
APTT PPP: 97.8 SECONDS (ref 22.7–35.4)
BASOPHILS # BLD AUTO: 0.01 10*3/MM3 (ref 0–0.2)
BASOPHILS NFR BLD AUTO: 0.2 % (ref 0–1.5)
DEPRECATED RDW RBC AUTO: 44.8 FL (ref 37–54)
EOSINOPHIL # BLD AUTO: 0.1 10*3/MM3 (ref 0–0.4)
EOSINOPHIL NFR BLD AUTO: 1.6 % (ref 0.3–6.2)
ERYTHROCYTE [DISTWIDTH] IN BLOOD BY AUTOMATED COUNT: 14.3 % (ref 12.3–15.4)
HCT VFR BLD AUTO: 34.1 % (ref 34–46.6)
HGB BLD-MCNC: 10.6 G/DL (ref 12–15.9)
LYMPHOCYTES # BLD AUTO: 1.16 10*3/MM3 (ref 0.7–3.1)
LYMPHOCYTES NFR BLD AUTO: 18.4 % (ref 19.6–45.3)
MCH RBC QN AUTO: 26.9 PG (ref 26.6–33)
MCHC RBC AUTO-ENTMCNC: 31.1 G/DL (ref 31.5–35.7)
MCV RBC AUTO: 86.5 FL (ref 79–97)
MONOCYTES # BLD AUTO: 0.83 10*3/MM3 (ref 0.1–0.9)
MONOCYTES NFR BLD AUTO: 13.2 % (ref 5–12)
NEUTROPHILS NFR BLD AUTO: 4.15 10*3/MM3 (ref 1.7–7)
NEUTROPHILS NFR BLD AUTO: 65.8 % (ref 42.7–76)
PLATELET # BLD AUTO: 128 10*3/MM3 (ref 140–450)
PMV BLD AUTO: 10.8 FL (ref 6–12)
RBC # BLD AUTO: 3.94 10*6/MM3 (ref 3.77–5.28)
WBC NRBC COR # BLD AUTO: 6.3 10*3/MM3 (ref 3.4–10.8)

## 2024-03-23 PROCEDURE — 25010000002 HEPARIN (PORCINE) 25000-0.45 UT/250ML-% SOLUTION: Performed by: EMERGENCY MEDICINE

## 2024-03-23 PROCEDURE — 99232 SBSQ HOSP IP/OBS MODERATE 35: CPT | Performed by: INTERNAL MEDICINE

## 2024-03-23 PROCEDURE — 94761 N-INVAS EAR/PLS OXIMETRY MLT: CPT

## 2024-03-23 PROCEDURE — 94664 DEMO&/EVAL PT USE INHALER: CPT

## 2024-03-23 PROCEDURE — 25010000002 VANCOMYCIN 750 MG RECONSTITUTED SOLUTION 1 EACH VIAL: Performed by: INTERNAL MEDICINE

## 2024-03-23 PROCEDURE — 94799 UNLISTED PULMONARY SVC/PX: CPT

## 2024-03-23 PROCEDURE — 97535 SELF CARE MNGMENT TRAINING: CPT

## 2024-03-23 PROCEDURE — 97165 OT EVAL LOW COMPLEX 30 MIN: CPT

## 2024-03-23 PROCEDURE — 85025 COMPLETE CBC W/AUTO DIFF WBC: CPT | Performed by: EMERGENCY MEDICINE

## 2024-03-23 PROCEDURE — 85730 THROMBOPLASTIN TIME PARTIAL: CPT | Performed by: INTERNAL MEDICINE

## 2024-03-23 PROCEDURE — 25010000002 CEFTRIAXONE PER 250 MG: Performed by: INTERNAL MEDICINE

## 2024-03-23 PROCEDURE — 25810000003 SODIUM CHLORIDE 0.9 % SOLUTION 250 ML FLEX CONT: Performed by: INTERNAL MEDICINE

## 2024-03-23 PROCEDURE — 97162 PT EVAL MOD COMPLEX 30 MIN: CPT

## 2024-03-23 RX ADMIN — Medication 10 ML: at 21:11

## 2024-03-23 RX ADMIN — ARFORMOTEROL TARTRATE 15 MCG: 15 SOLUTION RESPIRATORY (INHALATION) at 08:27

## 2024-03-23 RX ADMIN — DIAZEPAM 2 MG: 2 TABLET ORAL at 21:11

## 2024-03-23 RX ADMIN — Medication 10 ML: at 09:12

## 2024-03-23 RX ADMIN — BUDESONIDE 0.5 MG: 0.5 SUSPENSION RESPIRATORY (INHALATION) at 20:58

## 2024-03-23 RX ADMIN — DILTIAZEM HYDROCHLORIDE 120 MG: 120 CAPSULE, EXTENDED RELEASE ORAL at 08:58

## 2024-03-23 RX ADMIN — DIAZEPAM 2 MG: 2 TABLET ORAL at 08:58

## 2024-03-23 RX ADMIN — APIXABAN 5 MG: 5 TABLET, FILM COATED ORAL at 11:15

## 2024-03-23 RX ADMIN — CEFTRIAXONE 2000 MG: 2 INJECTION, POWDER, FOR SOLUTION INTRAMUSCULAR; INTRAVENOUS at 14:25

## 2024-03-23 RX ADMIN — CETIRIZINE HYDROCHLORIDE 5 MG: 10 TABLET ORAL at 08:58

## 2024-03-23 RX ADMIN — ARFORMOTEROL TARTRATE 15 MCG: 15 SOLUTION RESPIRATORY (INHALATION) at 20:58

## 2024-03-23 RX ADMIN — PANTOPRAZOLE SODIUM 40 MG: 40 TABLET, DELAYED RELEASE ORAL at 17:30

## 2024-03-23 RX ADMIN — HEPARIN SODIUM 11.4 UNITS/KG/HR: 10000 INJECTION, SOLUTION INTRAVENOUS at 07:31

## 2024-03-23 RX ADMIN — APIXABAN 5 MG: 5 TABLET, FILM COATED ORAL at 21:11

## 2024-03-23 RX ADMIN — PANTOPRAZOLE SODIUM 40 MG: 40 TABLET, DELAYED RELEASE ORAL at 08:58

## 2024-03-23 RX ADMIN — VANCOMYCIN HYDROCHLORIDE 750 MG: 750 INJECTION, POWDER, LYOPHILIZED, FOR SOLUTION INTRAVENOUS at 05:40

## 2024-03-23 RX ADMIN — BUDESONIDE 0.5 MG: 0.5 SUSPENSION RESPIRATORY (INHALATION) at 08:27

## 2024-03-23 RX ADMIN — DOCUSATE SODIUM 100 MG: 100 CAPSULE, LIQUID FILLED ORAL at 08:58

## 2024-03-23 RX ADMIN — VANCOMYCIN HYDROCHLORIDE 750 MG: 750 INJECTION, POWDER, LYOPHILIZED, FOR SOLUTION INTRAVENOUS at 19:00

## 2024-03-23 NOTE — THERAPY EVALUATION
Patient Name: Vonnie Coppola  : 1935    MRN: [id number]                              Today's Date: 3/23/2024       Admit Date: 3/21/2024    Visit Dx:     ICD-10-CM ICD-9-CM   1. Acute deep vein thrombosis (DVT) of femoral vein of right lower extremity  I82.411 453.41   2. UTI (urinary tract infection), bacterial  N39.0 599.0    A49.9 041.9     Patient Active Problem List   Diagnosis    GI bleed    Anemia    HTN (hypertension)    History of breast cancer    Asthma    History of CVA (cerebrovascular accident)    Dehydration    Renal insufficiency    Pulmonary nodule    Adnexal cyst    Nausea    Atrial fibrillation    History of pulmonary embolism    Class 2 severe obesity with serious comorbidity in adult    Thrombocytopenia    Cellulitis of right leg    Acute cystitis     Past Medical History:   Diagnosis Date    Arthritis     Asthma     Atrial fibrillation     per pt's daughter.States hx of a-fib in the past when stressed or tired.    Breast cancer     LEFT BREAST CA, LUMPECTOMY & RADS    Hx of radiation therapy     APPROXIMATELY 40 TREATMENTS FOR LEFT BREAST CA    Hypertension     Pneumonia     Stroke      Past Surgical History:   Procedure Laterality Date    APPENDECTOMY      BLADDER SURGERY      BREAST BIOPSY Left     MALIGNANT    BREAST LUMPECTOMY Left     MALIGNANT    COLONOSCOPY N/A 2024    Procedure: COLONOSCOPY to cecum with cold snare polypectomies;  Surgeon: Harshad Gaston MD;  Location: [facility] ENDOSCOPY;  Service: Gastroenterology;  Laterality: N/A;  pre- gi bleed, anemia  post- diverticulosis, polyps    ENDOSCOPY N/A 2024    Procedure: ESOPHAGOGASTRODUODENOSCOPY with biospy;  Surgeon: Harshad Gaston MD;  [facility] END[facility];  Service: Gastroenterology;  Laterality: N/A;  pre- gi bleed, anemia  post- erosive gastirits    GALLBLADDER SURGERY      HYSTERECTOMY      OOPHORECTOMY        General Information       Row Name 24 0184          OT Time  and Intention    Document Type evaluation  -KG     Mode of Treatment individual therapy;occupational therapy  -KG       Row Name 03/23/24 1452          General Information    Patient Profile Reviewed yes  -KG     Prior Level of Function --  I<>Mod I w/ BADLs and uses a rollator for functional mobility. Dtr provided intermittent assistance for LBD & spv during showers.  -KG     Existing Precautions/Restrictions fall  -KG     Barriers to Rehab none identified  -KG       Row Name 03/23/24 1453          Living Environment    People in Home child(christine), adult  -KG       Row Name 03/23/24 1453          Home Main Entrance    Number of Stairs, Main Entrance four  -KG       Row Name 03/23/24 1453          Cognition    Orientation Status (Cognition) oriented x 4  -KG       Row Name 03/23/24 1453          Safety Issues, Functional Mobility    Impairments Affecting Function (Mobility) endurance/activity tolerance;pain;shortness of breath;strength  -KG               User Key  (r) = Recorded By, (t) = Taken By, (c) = Cosigned By      Initials Name Provider Type    KG Agus Winters, OT Occupational Therapist                     Mobility/ADL's       Row Name 03/23/24 1450          Bed Mobility    Bed Mobility supine-sit;sit-supine  -KG     Supine-Sit [place] (Bed Mobility) standby assist  -KG     Sit-Supine [place] (Bed Mobility) standby assist  -KG       Row Name 03/23/24 1451          Transfers    Transfers sit-stand transfer;stand-sit transfer;toilet transfer  -KG       Row Name 03/23/24 1458          Sit-Stand Transfer    Sit-Stand [place] (Transfers) contact guard  -KG     Assistive Device (Sit-Stand Transfers) walker, front-wheeled  -KG       Row Name 03/23/24 1454          Stand-Sit Transfer    Stand-Sit [place] (Transfers) contact guard  -KG     Assistive Device (Stand-Sit Transfers) walker, front-wheeled  -KG       Row Name 03/23/24 1457          Toilet Transfer    Type (Toilet Transfer)  sit-stand;stand-sit  -KG     [place] Level (Toilet Transfer) contact guard  -KG     Assistive Device (Toilet Transfer) grab bars/safety frame  -KG       Row Name 03/23/24 1455          Functional Mobility    Functional Mobility- Ind. Level --  Performed functional mobility from EOB to bathroom, and then back to EOB w/ CGA using RW. Pt becoming SOA/fatigued  -KG       Row Name 03/23/24 1455          Activities of Daily Living    BADL Assessment/Intervention grooming;toileting  -KG       Row Name 03/23/24 1455          Grooming Assessment/Training    [place] Level (Grooming) oral care regimen;wash face, hands;contact guard assist  -KG     Position (Grooming) sink side  -KG       Row Name 03/23/24 1455          Toileting Assessment/Training    [place] Level (Toileting) adjust/manage clothing;perform perineal hygiene;contact guard assist  -KG     Position (Toileting) supported standing;supported sitting  -KG               User Key  (r) = Recorded By, (t) = Taken By, (c) = Cosigned By      Initials Name Provider Type    KG Agus Wintesr OT Occupational Therapist                   Obj/Interventions       Row Name 03/23/24 1459          Sensory Assessment (Somatosensory)    Sensory Assessment (Somatosensory) sensation intact  -KG       Row Name 03/23/24 1459          Vision Assessment/Intervention    Visual Impairment/Limitations WNL  -KG       Row Name 03/23/24 1459          Range of Motion Comprehensive    General Range of Motion no range of motion deficits identified  -KG       Row Name 03/23/24 1459          Strength Comprehensive (MMT)    Comment, General Manual Muscle Testing (MMT) Assessment BUE strength 4/5 grossly  -KG       Row Name 03/23/24 1459          Balance    Balance Assessment sitting static balance;sitting dynamic balance;standing static balance;standing dynamic balance  -KG     Static Sitting Balance standby assist  -KG     Dynamic Sitting Balance standby assist  -KG     Position,  Sitting Balance sitting edge of bed  -KG     Static Standing Balance contact guard  -KG     Dynamic Standing Balance contact guard  -KG     Position/Device Used, Standing Balance walker, front-wheeled;supported  -KG     Balance Interventions sitting;standing;sit to stand;static;dynamic;occupation based/functional task  -KG               User Key  (r) = Recorded By, (t) = Taken By, (c) = Cosigned By      Initials Name Provider Type    KG Agus Winters, OT Occupational Therapist                   Goals/Plan       Row Name 03/23/24 1504          Bed Mobility Goal 1 (OT)    Activity/Assistive Device (Bed Mobility Goal 1, OT) sit to supine;supine to sit  -KG     [place] Level/Cues Needed (Bed Mobility Goal 1, OT) modified independence  -KG     Time Frame (Bed Mobility Goal 1, OT) short term goal (STG);2 weeks  -KG     Progress/Outcomes (Bed Mobility Goal 1, OT) new goal  -KG       Row Name 03/23/24 1504          Transfer Goal 1 (OT)    Activity/Assistive Device (Transfer Goal 1, OT) sit-to-stand/stand-to-sit;bed-to-chair/chair-to-bed;toilet  -KG     [place] Level/Cues Needed (Transfer Goal 1, OT) standby assist  -KG     Time Frame (Transfer Goal 1, OT) short term goal (STG);2 weeks  -KG     Progress/Outcome (Transfer Goal 1, OT) new goal  -KG       Row Name 03/23/24 1504          Bathing Goal 1 (OT)    Activity/Device (Bathing Goal 1, OT) upper body bathing;lower body bathing  -KG     [place] Level/Cues Needed (Bathing Goal 1, OT) standby assist  -KG     Time Frame (Bathing Goal 1, OT) short term goal (STG);2 weeks  -KG     Progress/Outcomes (Bathing Goal 1, OT) new goal  -KG       Row Name 03/23/24 1504          Dressing Goal 1 (OT)    Activity/Device (Dressing Goal 1, OT) upper body dressing;lower body dressing  -KG     [place]/Cues Needed (Dressing Goal 1, OT) standby assist  -KG     Time Frame (Dressing Goal 1, OT) short term goal (STG);2 weeks  -KG     Progress/Outcome (Dressing Goal 1, OT) new  goal  -KG       Row Name 03/23/24 1504          Toileting Goal 1 (OT)    Activity/Device (Toileting Goal 1, OT) adjust/manage clothing;perform perineal hygiene  -KG     [place] Level/Cues Needed (Toileting Goal 1, OT) standby assist  -KG     Time Frame (Toileting Goal 1, OT) short term goal (STG);2 weeks  -KG     Progress/Outcome (Toileting Goal 1, OT) new goal  -KG       Row Name 03/23/24 1504          Grooming Goal 1 (OT)    Activity/Device (Grooming Goal 1, OT) oral care;wash face, hands  -KG     [place] (Grooming Goal 1, OT) standby assist  -KG     Time Frame (Grooming Goal 1, OT) short term goal (STG);2 weeks  -KG     Progress/Outcome (Grooming Goal 1, OT) new goal  -KG       Row Name 03/23/24 1504          Therapy Assessment/Plan (OT)    Planned Therapy Interventions (OT) activity tolerance training;adaptive equipment training;BADL retraining;functional balance retraining;occupation/activity based interventions;ROM/therapeutic exercise;patient/caregiver education/training;strengthening exercise;transfer/mobility retraining  -KG               User Key  (r) = Recorded By, (t) = Taken By, (c) = Cosigned By      Initials Name Provider Type    KG Agus Winters, OT Occupational Therapist                   Clinical Impression       Row Name 03/23/24 1500          Pain Assessment    Pretreatment Pain Rating 3/10  -KG     Posttreatment Pain Rating 3/10  -KG     Pain Location - Side/Orientation Right  -KG     Pain Location - foot;calf  -KG     Pain Intervention(s) Repositioned  -KG       Row Name 03/23/24 1500          Plan of Care Review    Plan of Care Reviewed With patient  -KG     Outcome Evaluation Pt [facility] to [facility] for HTN and RLE cellulitis. Lives w/ her dtr in a 1 story home, 4 steps to enter. Dtr provides intermittent assist for LBD and spv for shower, pt is otherwise I<>Mod I w/ BADLs. Uses rollator for functional mobility. Currently has HHOT/PT in place. Performed supine>sit w/ SBA. SBA for EOB  sitting. Performed STS from EOB and functional mobility to bathroom w/ CGA using RW. Required CGA to perform toilet t/f, toileting/LB hygiene, and LB clothing mgmt. Stepped to sink and performed UB grooming/hygiene w/ CGA. Returned to EOB and then to supine at end of session. Fatigue and SOA noted w/ extended OOB activity. Pt presents w/ decreased strength/activity tolerance and increased RLE pain w/ BADLs. OT will f/u to address stated deficits. Rec HHOT and continued assistance from family at d/c.  -KG       Row Name 03/23/24 1500          Therapy Assessment/Plan (OT)    Rehab Potential (OT) good, to achieve stated therapy goals  -KG     Criteria for Skilled Therapeutic Interventions Met (OT) skilled treatment is necessary  -KG     Therapy Frequency (OT) 5 times/wk  -KG       Row Name 03/23/24 1500          Therapy Plan Review/Discharge Plan (OT)    Anticipated Discharge Disposition (OT) home with assist;home with home health  -KG       Row Name 03/23/24 1500          Vital Signs    Pre Patient Position Supine  -KG     Intra Patient Position Standing  -KG     Post Patient Position Supine  -KG       Row Name 03/23/24 1500          Positioning and Restraints    Pre-Treatment Position in bed  -KG     Post Treatment Position bed  -KG     In Bed notified nsg;supine;call light within reach;encouraged to call for assist;exit alarm on  -KG               User Key  (r) = Recorded By, (t) = Taken By, (c) = Cosigned By      Initials Name Provider Type    Agus Wallace, OT Occupational Therapist                   Outcome Measures       Row Name 03/23/24 1506          How much help from another is currently needed...    Putting on and taking off regular lower body clothing? 3  -KG     Bathing (including washing, rinsing, and drying) 3  -KG     Toileting (which includes using toilet bed pan or urinal) 3  -KG     Putting on and taking off regular upper body clothing 4  -KG     Taking care of personal grooming (such as  brushing teeth) 4  -KG     Eating meals 4  -KG     AM-PAC 6 Clicks Score (OT) 21  -KG       Row Name 03/23/24 0858          How much help from another person do you currently need...    Turning from your back to your side while in flat bed without using bedrails? 3  -KS     Moving from lying on back to sitting on the side of a flat bed without bedrails? 3  -KS     Moving to and from a bed to a chair (including a wheelchair)? 3  -KS     Standing up from a chair using your arms (e.g., wheelchair, bedside chair)? 3  -KS     Climbing 3-5 steps with a railing? 2  -KS     To walk in hospital room? 3  -KS     AM-PAC 6 Clicks Score (PT) 17  -KS     Highest Level of Mobility Goal 5 --> Static standing  -KS       Row Name 03/23/24 1506          Modified Grand Scale    Modified Grand Scale 3 - Moderate disability.  Requiring some help, but able to walk without assistance.  -KG       Row Name 03/23/24 1506          Functional Assessment    Outcome Measure Options AM-PAC 6 Clicks Daily Activity (OT);Modified Grand  -KG               User Key  (r) = Recorded By, (t) = Taken By, (c) = Cosigned By      Initials Name Provider Type    Jody Sneed, RN Registered Nurse    Agus Wallace OT Occupational Therapist                    Occupational Therapy Education       Title: PT OT SLP Therapies (Not Started)       Topic: Occupational Therapy (Not Started)       Point: ADL training (Not Started)       Description:   Instruct learner(s) on proper safety adaptation and remediation techniques during self care or transfers.   Instruct in proper use of assistive devices.                  Learner Progress:  Not documented in this visit.              Point: Home exercise program (Not Started)       Description:   Instruct learner(s) on appropriate technique for monitoring, assisting and/or progressing therapeutic exercises/activities.                  Learner Progress:  Not documented in this visit.              Point: Precautions  (Not Started)       Description:   Instruct learner(s) on prescribed precautions during self-care and functional transfers.                  Learner Progress:  Not documented in this visit.              Point: Body mechanics (Not Started)       Description:   Instruct learner(s) on proper positioning and spine alignment during self-care, functional mobility activities and/or exercises.                  Learner Progress:  Not documented in this visit.                                  OT Recommendation and Plan  Planned Therapy Interventions (OT): activity tolerance training, adaptive equipment training, BADL retraining, functional balance retraining, occupation/activity based interventions, ROM/therapeutic exercise, patient/caregiver education/training, strengthening exercise, transfer/mobility retraining  Therapy Frequency (OT): 5 times/wk  Plan of Care Review  Plan of Care Reviewed With: patient  Outcome Evaluation: Pt admitted to [facility] for HTN and RLE cellulitis. Lives w/ her dtr in a 1 story home, 4 steps to enter. Dtr provides intermittent assist for LBD and spv for shower, pt is otherwise I<>Mod I w/ BADLs. Uses rollator for functional mobility. Currently has HHOT/PT in place. Performed supine>sit w/ SBA. SBA for EOB sitting. Performed STS from EOB and functional mobility to bathroom w/ CGA using RW. Required CGA to perform toilet t/f, toileting/LB hygiene, and LB clothing mgmt. Stepped to sink and performed UB grooming/hygiene w/ CGA. Returned to EOB and then to supine at end of session. Fatigue and SOA noted w/ extended OOB activity. Pt presents w/ decreased strength/activity tolerance and increased RLE pain w/ BADLs. OT will f/u to address stated deficits. Rec HHOT and continued assistance from family at d/c.     Time Calculation:   Evaluation Complexity (OT)  Review Occupational Profile/Medical/Therapy History Complexity: brief/low complexity  Assessment, Occupational Performance/Identification of Deficit  Complexity: 1-3 performance deficits  Clinical Decision Making Complexity (OT): problem focused assessment/low complexity  Overall Complexity of Evaluation (OT): low complexity     Time Calculation- OT       Row Name 03/23/24 1507             Time Calculation- OT    OT Start Time 1420  -KG      OT Stop Time 1445  -KG      OT Time Calculation (min) 25 min  -KG      Total Timed Code Minutes- OT 20 minute(s)  -KG      OT Non-Billable Time (min) 5 min  -KG      OT Received On 03/23/24  -KG      OT - Next Appointment 03/25/24  -KG      OT Goal Re-Cert Due Date 04/06/24  -KG         Timed Charges    84801 - OT Self Care/Mgmt Minutes 20  -KG         Untimed Charges    OT Eval/Re-eval Minutes 5  -KG         Total Minutes    Timed Charges Total Minutes 20  -KG      Untimed Charges Total Minutes 5  -KG       Total Minutes 25  -KG                User Key  (r) = Recorded By, (t) = Taken By, (c) = Cosigned By      Initials Name Provider Type    KG Agus Winters OT Occupational Therapist                  Therapy Charges for Today       Code Description Service Date Service Provider Modifiers Qty    91870791396 HC OT SELF CARE/MGMT/TRAIN EA 15 MIN 3/23/2024 Agus Winters OT GO 1    61960293792 HC OT EVAL LOW COMPLEXITY 3 3/23/2024 Agus Winters OT GO 1                 Agus Winters OT  3/23/2024

## 2024-03-23 NOTE — PLAN OF CARE
Goal Outcome Evaluation:  Plan of Care Reviewed With: patient           Outcome Evaluation: Pt admitted to [facility] for HTN and RLE cellulitis. Lives w/ her dtr in a 1 story home, 4 steps to enter. Dtr provides intermittent assist for LBD and spv for shower, pt is otherwise I<>Mod I w/ BADLs. Uses rollator for functional mobility. Currently has HHOT/PT in place. Performed supine>sit w/ SBA. SBA for EOB sitting. Performed STS from EOB and functional mobility to bathroom w/ CGA using RW. Required CGA to perform toilet t/f, toileting/LB hygiene, and LB clothing mgmt. Stepped to sink and performed UB grooming/hygiene w/ CGA. Returned to EOB and then to supine at end of session. Fatigue and SOA noted w/ extended OOB activity. Pt presents w/ decreased strength/activity tolerance and increased RLE pain w/ BADLs. OT will f/u to address stated deficits. Rec HHOT and continued assistance from family at d/c.      Anticipated Discharge Disposition (OT): home with assist, home with home health

## 2024-03-23 NOTE — PROGRESS NOTES
[facility] INPATIENT PROGRESS NOTE    Length of Stay:  2 days    CHIEF COMPLAINT/REASON FOR VISIT:  DVT while on Eliquis     SUBJECTIVE:  Right leg is feeling better today with significantly less pain     ROS:  14 systems reviewed with pertinent positives and negatives in the HPI. Reviewed today.    OBJECTIVE:  Vitals:    03/22/24 2027 03/23/24 0006 03/23/24 0749 03/23/24 0827   BP:  134/82 152/84    BP Location:  Right arm Right arm    Patient Position:  Lying Lying    Pulse: 57 66 62    Resp:  16 18 18   Temp:  98.1 °F (36.7 °C) 98.4 °F (36.9 °C)    TempSrc:  Oral Oral    SpO2: 100% 97% 93%    Weight:       Height:             PHYSICAL EXAMINATION:   General:  No acute distress, awake, alert and oriented  Skin:  RLE erythema a little improved today with less tenderness  HEENT:  Normocephalic/atraumatic.    Chest:  Normal respiratory effort  Extremities:  No visible clubbing, cyanosis, or edema  Neuro/psych:  Grossly nonfocal.  Normal mood and affect.       DIAGNOSTIC DATA:  Results Review:     I reviewed the patient's new clinical results.    Results from last 7 days   Lab Units 03/23/24  0606   WBC 10*3/mm3 6.30   HEMOGLOBIN g/dL 10.6*   HEMATOCRIT % 34.1   PLATELETS 10*3/mm3 128*     Lab Results   Component Value Date    NEUTROABS 4.15 03/23/2024     Results from last 7 days   Lab Units 03/22/24  0736   SODIUM mmol/L 145   POTASSIUM mmol/L 4.1   CHLORIDE mmol/L 112*   CO2 mmol/L 24.4   BUN mg/dL 19   CREATININE mg/dL 0.88   GLUCOSE mg/dL 104*   CALCIUM mg/dL 8.5*     Results from last 7 days   Lab Units 03/23/24  0825 03/22/24  0736 03/22/24  0046 03/21/24  1604 03/21/24  1428   INR   --   --   --  1.70 1.32*   APTT seconds 97.8* 86.5* >200.0*  --  24.4             IMAGING:    Duplex Venous Lower Extremity - Right CAR (03/22/2024 17:21)     ASSESSMENT:  This is a 88 y.o. female with:     *History of breast cancer  L partial mastectomy, adjuvant radiation, 3 years of adjuvant anti-estrogen therapy,  stopped early due to poor tolerance     *Atrial fibrillation     *HTN     *H/o CVA     *History of GI bleeding  Admission 1/16/2024 through 1/27/2024 with GI bleeding and subsequent venous thromboembolism.  She had an EGD and colonoscopy on 1/19/2024 with no obvious malignancy and no obvious source of bleeding.  Not bleeding currently--unclear source though could have been diverticular     *Venous thromboembolism  Recent pulmonary embolism diagnosed on 1/22/2024 with CT angiogram showing bilateral pulmonary thromboembolic disease with right heart strain as well as a 5 mm and 11 mm right middle lobe pulmonary nodules.  Bilateral lower extremity venous duplex on 1/23/2024 showed acute right lower extremity DVT in the posterior tibial, peroneal, and soleal veins with acute left lower extremity DVT in the posterior tibial, peroneal, gastrocnemius, and soleal veins.  Presumably provoked by her hospitalization  Anticoagulated with Eliquis, daughter has been administering 5 mg BID with no missed doses  Follow-up right lower extremity venous duplex on 3/14/2024 showed no evidence of DVT.  She presented to the outside emergency department with worsening right lower extremity edema, redness, and pain below the knee that started abruptly a couple of days ago.  Right lower extremity venous duplex done in the outside emergency department 3/21/2024 shows now a DVT to the mid segment of the right femoral vein which is occlusive.  She was transferred to the hospital and admitted.  She has been started on a heparin drip and started on ceftriaxone for possible UTI though she has no urinary symptoms.   Repeat RLE venous duplex 3/22 without DVT     *RLE cellulitis     RECOMMENDATIONS/PLAN:   Repeat RLE venous duplex here shows no DVT, so I'm not sure the duplex done at the outside ED is accurate. We won't know if the heparin that she had here dissolved a clot that was there, but I doubt this.  Stop heparin and resume Eliquis  Repeat  chest CT without contrast to follow up pulmonary nodules is pending  Discussed with the patient  Discussed with Dr. Watson.    Augustus Wilde MD

## 2024-03-23 NOTE — THERAPY EVALUATION
Patient Name: Vonnie Coppola  : 1935    MRN: [id number]                              Today's Date: 3/23/2024       Admit Date: 3/21/2024    Visit Dx:     ICD-10-CM ICD-9-CM   1. Acute deep vein thrombosis (DVT) of femoral vein of right lower extremity  I82.411 453.41   2. UTI (urinary tract infection), bacterial  N39.0 599.0    A49.9 041.9     Patient Active Problem List   Diagnosis    GI bleed    Anemia    HTN (hypertension)    History of breast cancer    Asthma    History of CVA (cerebrovascular accident)    Dehydration    Renal insufficiency    Pulmonary nodule    Adnexal cyst    Nausea    Atrial fibrillation    History of pulmonary embolism    Class 2 severe obesity with serious comorbidity in adult    Thrombocytopenia    Cellulitis of right leg    Acute cystitis     Past Medical History:   Diagnosis Date    Arthritis     Asthma     Atrial fibrillation     per pt's daughter.States hx of a-fib in the past when stressed or tired.    Breast cancer     LEFT BREAST CA, LUMPECTOMY & RADS    Hx of radiation therapy     APPROXIMATELY 40 TREATMENTS FOR LEFT BREAST CA    Hypertension     Pneumonia     Stroke      Past Surgical History:   Procedure Laterality Date    APPENDECTOMY      BLADDER SURGERY      BREAST BIOPSY Left     MALIGNANT    BREAST LUMPECTOMY Left     MALIGNANT    COLONOSCOPY N/A 2024    Procedure: COLONOSCOPY to cecum with cold snare polypectomies;  Surgeon: Harshad Gaston MD;  Location: [facility];  Service: Gastroenterology;  Laterality: N/A;  pre- gi bleed, anemia  post- diverticulosis, polyps    ENDOSCOPY N/A 2024    Procedure: ESOPHAGOGASTRODUODENOSCOPY with biospy;  Surgeon: Harshad Gaston MD;  Location: [facility];  Service: Gastroenterology;  Laterality: N/A;  pre- gi bleed, anemia  post- erosive gastirits    GALLBLADDER SURGERY      HYSTERECTOMY      OOPHORECTOMY        General Information       Row Name 24 1552          Physical  Therapy Time and Intention    Document Type evaluation  -ST     Mode of Treatment physical therapy  -       Row Name 03/23/24 1552          General Information    Patient Profile Reviewed yes  -ST     Prior Level of Function min assist:;ADL's;independent:;gait  lives with family who mainly assist with ADL and as needed with anything else  -     Existing Precautions/Restrictions fall  -ST     Barriers to Rehab previous functional deficit  -       Row Name 03/23/24 1552          Living Environment    People in Home child(christine), adult  -       Row Name 03/23/24 1552          Home Main Entrance    Number of Stairs, Main Entrance five  -ST     Stair Railings, Main Entrance railings safe and in good condition  -       Row Name 03/23/24 1552          Cognition    Orientation Status (Cognition) oriented x 4  -       Row Name 03/23/24 1552          Safety Issues, Functional Mobility    Impairments Affecting Function (Mobility) balance;endurance/activity tolerance;postural/trunk control;pain  -ST     Comment, Safety Issues/Impairments (Mobility) gait belt, nonskid socks donned  -               User Key  (r) = Recorded By, (t) = Taken By, (c) = Cosigned By      Initials Name Provider Type    ST Bianca Joyce, PT Physical Therapist                   Mobility       Row Name 03/23/24 1553          Bed Mobility    Bed Mobility supine-sit;sit-supine  -ST     Supine-Sit [place] (Bed Mobility) standby assist  -ST     Sit-Supine [place] (Bed Mobility) minimum assist (75% patient effort);contact guard  -     Assistive Device (Bed Mobility) bed rails  -     Comment, (Bed Mobility) minimal assist with R LE back into bed - pt reports this is normal  -       Row Name 03/23/24 1553          Sit-Stand Transfer    Sit-Stand [place] (Transfers) standby assist  -     Assistive Device (Sit-Stand Transfers) walker, front-wheeled  -       Row Name 03/23/24 1553          Gait/Stairs (Locomotion)     [place] Level (Gait) verbal cues;contact guard  -ST     Assistive Device (Gait) walker, front-wheeled  -ST     Distance in Feet (Gait) 50  -ST     Pattern (Gait) step-through  -ST     Deviations/Abnormal Patterns (Gait) gait speed decreased;stride length decreased;prashanth decreased;base of support, wide  -ST     Bilateral Gait Deviations forward flexed posture;heel strike decreased  -ST     Gait Assessment/Intervention increased SOB with ambulation  -ST               User Key  (r) = Recorded By, (t) = Taken By, (c) = Cosigned By      Initials Name Provider Type    ST Bianca Joyce, PT Physical Therapist                   Obj/Interventions       Row Name 03/23/24 1554          Range of Motion Comprehensive    Comment, General Range of Motion bilat LE WFL  -ST       Row Name 03/23/24 1554          Strength Comprehensive (MMT)    Comment, General Manual Muscle Testing (MMT) Assessment bilat LE WFL - R LE more painful and sore  -ST       Row Name 03/23/24 1554          Balance    Comment, Balance no overt unsteadiness noted, VC for posture throughout  -ST               User Key  (r) = Recorded By, (t) = Taken By, (c) = Cosigned By      Initials Name Provider Type    ST Bianca Joyce, PT Physical Therapist                   Goals/Plan       Row Name 03/23/24 1551          Bed Mobility Goal 1 (PT)    Activity/Assistive Device (Bed Mobility Goal 1, PT) bed mobility activities, all  -ST     [place] Level/Cues Needed (Bed Mobility Goal 1, PT) standby assist  -ST     Time Frame (Bed Mobility Goal 1, PT) 1 week  -ST     Progress/Outcomes (Bed Mobility Goal 1, PT) new goal  -ST       Row Name 03/23/24 1556          Transfer Goal 1 (PT)    Activity/Assistive Device (Transfer Goal 1, PT) sit-to-stand/stand-to-sit;bed-to-chair/chair-to-bed  -ST     [place] Level/Cues Needed (Transfer Goal 1, PT) supervision required  -ST     Time Frame (Transfer Goal 1, PT) 1 week  -ST     Progress/Outcome (Transfer Goal  1, PT) new goal  -ST       Row Name 03/23/24 0253          Gait Training Goal 1 (PT)    Activity/Assistive Device (Gait Training Goal 1, PT) gait (walking locomotion);assistive device use  -ST     [place] Level (Gait Training Goal 1, PT) standby assist  -ST     Distance (Gait Training Goal 1, PT) 75'  -ST     Time Frame (Gait Training Goal 1, PT) 1 week  -ST     Progress/Outcome (Gait Training Goal 1, PT) new goal  -ST               User Key  (r) = Recorded By, (t) = Taken By, (c) = Cosigned By      Initials Name Provider Type    ST Bianca Joyce, PT Physical Therapist                   Clinical Impression       Row Name 03/23/24 7099          Pain    Pretreatment Pain Rating 3/10  -ST     Posttreatment Pain Rating 3/10  -ST     Pain Location - Side/Orientation Right  -ST     Pain Location - calf;foot  -ST     Pain Intervention(s) Repositioned;Ambulation/increased activity  -ST       Row Name 03/23/24 2725          Plan of Care Review    Plan of Care Reviewed With patient  -ST     Outcome Evaluation Pt is 89 y/o F admitted to [facility] on 3/21/24 with R LE redness and swelling. Acute DVT initially found and heparin started but second image showed no DVT. At baseline pt is indep with mobility using walker but receives assist with ADL as needed from family she lives with. Pt currently demos SBA for transfers and CGA for bed mobility and ambulation up to 50' with rwx. Increased SOB noted with ambulation. Pt demos mobility below baseline and therefore would benefit from acute skilled PT to address functional mobility deficits. Anticipate d/c home with assist and home PT for further strengthening.  -ST       Row Name 03/23/24 8150          Therapy Assessment/Plan (PT)    Rehab Potential (PT) good, to achieve stated therapy goals  -ST     Criteria for Skilled Interventions Met (PT) yes  -ST     Therapy Frequency (PT) 5 times/wk  -ST     Predicted Duration of Therapy Intervention (PT) 1 week  -ST       Row Name  03/23/24 1555          Positioning and Restraints    Pre-Treatment Position in bed  -ST     Post Treatment Position bed  -ST     In Bed notified nsg;supine;call light within reach;encouraged to call for assist;exit alarm on  -ST               User Key  (r) = Recorded By, (t) = Taken By, (c) = Cosigned By      Initials Name Provider Type    Bianca Rivera, PT Physical Therapist                   Outcome Measures       Row Name 03/23/24 1556 03/23/24 0858       How much help from another person do you currently need...    Turning from your back to your side while in flat bed without using bedrails? 4  -ST 3  -KS    Moving from lying on back to sitting on the side of a flat bed without bedrails? 3  -ST 3  -KS    Moving to and from a bed to a chair (including a wheelchair)? 3  -ST 3  -KS    Standing up from a chair using your arms (e.g., wheelchair, bedside chair)? 4  -ST 3  -KS    Climbing 3-5 steps with a railing? 3  -ST 2  -KS    To walk in hospital room? 3  -ST 3  -KS    AM-PAC 6 Clicks Score (PT) 20  -ST 17  -KS    Highest Level of Mobility Goal 6 --> Walk 10 steps or more  -ST 5 --> Static standing  -KS      Row Name 03/23/24 1506          Modified [place] Scale    Modified [place] Scale 3 - Moderate disability.  Requiring some help, but able to walk without assistance.  -KG       Row Name 03/23/24 1556 03/23/24 1506       Functional Assessment    Outcome Measure Options AM-PAC 6 Clicks Basic Mobility (PT)  -ST AM-PAC 6 Clicks Daily Activity (OT);Modified [place]  -KG              User Key  (r) = Recorded By, (t) = Taken By, (c) = Cosigned By      Initials Name Provider Type    Jody Sneed RN Registered Nurse    Bianca Rivera, PT Physical Therapist    Agus Wallace OT Occupational Therapist                                 Physical Therapy Education       Title: PT OT SLP Therapies (In Progress)       Topic: Physical Therapy (In Progress)       Point: Mobility training (Done)        Learning Progress Summary             Patient Acceptance, E,TB, VU,NR by  at 3/23/2024 1556                         Point: Home exercise program (Not Started)       Learner Progress:  Not documented in this visit.              Point: Body mechanics (Done)       Learning Progress Summary             Patient Acceptance, E,TB, VU,NR by  at 3/23/2024 1556                         Point: Precautions (Done)       Learning Progress Summary             Patient Acceptance, E,TB, VU,NR by  at 3/23/2024 1556                                         User Key       Initials Effective Dates Name Provider Type Discipline    ST 09/22/22 -  Bianca Joyce, PT Physical Therapist PT                  PT Recommendation and Plan     Plan of Care Reviewed With: patient  Outcome Evaluation: Pt is 89 y/o F admitted to [facility] on 3/21/24 with R LE redness and swelling. Acute DVT initially found and heparin started but second image showed no DVT. At baseline pt is indep with mobility using walker but receives assist with ADL as needed from family she lives with. Pt currently demos SBA for transfers and CGA for bed mobility and ambulation up to 50' with rwx. Increased SOB noted with ambulation. Pt demos mobility below baseline and therefore would benefit from acute skilled PT to address functional mobility deficits. Anticipate d/c home with assist and home PT for further strengthening.     Time Calculation:         PT Charges       Row Name 03/23/24 2311             Time Calculation    Start Time 1340  -ST      Stop Time 1354  -ST      Time Calculation (min) 14 min  -ST      PT Received On 03/23/24  -ST      PT - Next Appointment 03/25/24  -ST      PT Goal Re-Cert Due Date 03/29/24  -ST         Time Calculation- PT    Total Timed Code Minutes- PT 0 minute(s)  -ST                User Key  (r) = Recorded By, (t) = Taken By, (c) = Cosigned By      Initials Name Provider Type    ST Bianca Joyce, PT Physical Therapist                   Therapy Charges for Today       Code Description Service Date Service Provider Modifiers Qty    15626703007 HC PT EVAL MOD COMPLEXITY 2 3/23/2024 Bianca Joyce, PT GP 1            PT G-Codes  Outcome Measure Options: AM-PAC 6 Clicks Basic Mobility (PT)  AM-PAC 6 Clicks Score (PT): 20  AM-PAC 6 Clicks Score (OT): 21  Modified [place] Scale: 3 - Moderate disability.  Requiring some help, but able to walk without assistance.  PT Discharge Summary  Anticipated Discharge Disposition (PT): home with assist, home with home health    Bianca Joyce, PT  3/23/2024

## 2024-03-23 NOTE — PROGRESS NOTES
[facility] Medicine Services  PROGRESS NOTE    Patient Name: Vonnie Coppola  : 1935  MRN: [id number]    Date of Admission: 3/21/2024  Primary Care Physician: Christopher Leyva DO    Subjective   Subjective     CC:  Follow-up right leg pain and swelling    Subjective: Redness has decreased on the right leg.  Pain is now mostly resolved.  Patient is pleased to know that she does not have a blood clot.  She still feels generally weak.  Dysuria is improving    Review of Systems  No current fevers or chills  No current shortness of breath or cough  No current nausea, vomiting, or diarrhea  No current chest pain or palpitations       Objective   Objective     Vital Signs:   Temp:  [98.1 °F (36.7 °C)-98.4 °F (36.9 °C)] 98.4 °F (36.9 °C)  Heart Rate:  [57-72] 62  Resp:  [16-18] 18  BP: (134-164)/() 152/84        Physical Exam:  Constitutional:Awake, alert  HENT: NCAT, mucous membranes moist, neck supple  Respiratory: No cough or wheezes, normal respirations, nonlabored breathing   Cardiovascular: Pulse rate is normal, palpable radial pulses  Gastrointestinal:  soft, nontender, nondistended  Musculoskeletal: Right leg with decreased swelling, erythema, and some bruising.  Moderate lower extremity edema, significantly obese with BMI of 38, debilitated in appearance  Psychiatric: Appropriate affect, cooperative, conversational  Neurologic: No slurred speech or facial droop, follows commands  Skin: No rashes or jaundice, warm      Results Reviewed:  Results from last 7 days   Lab Units 24  0606 24  0735 24  1604 24  1428   WBC 10*3/mm3 6.30 8.19  --  8.39   HEMOGLOBIN g/dL 10.6* 10.7*  --  10.2*   HEMATOCRIT % 34.1 33.8*  --  33.9*   PLATELETS 10*3/mm3 128* 134*  --  145   INR   --   --  1.70 1.32*   PROCALCITONIN ng/mL  --   --   --  0.03     Results from last 7 days   Lab Units 24  0736 24  1558 24  1428   SODIUM mmol/L 145  --  144   POTASSIUM mmol/L  4.1  --  3.9   CHLORIDE mmol/L 112*  --  112*   CO2 mmol/L 24.4  --  27.0   BUN mg/dL 19  --  21   CREATININE mg/dL 0.88  --  0.96   GLUCOSE mg/dL 104*  --  102*   CALCIUM mg/dL 8.5*  --  8.6   ALK PHOS U/L  --   --  60   ALT (SGPT) U/L  --   --  13   AST (SGOT) U/L  --   --  11   HSTROP T ng/L  --  18* 18*     Estimated Creatinine Clearance: 55.4 mL/min (by C-G formula based on SCr of 0.88 mg/dL).    Microbiology Results Abnormal       Procedure Component Value - Date/Time    Blood Culture - Blood, Hand, Right [[id number]]  (Normal) Collected: 03/21/24 1557    Lab Status: Preliminary result Specimen: Blood from Hand, Right Updated: 03/22/24 1601     Blood Culture No growth at 24 hours    Narrative:      Less than seven (7) mL's of blood was collected.  Insufficient quantity may yield false negative results.    Blood Culture - Blood, Arm, Right [[id number]]  (Normal) Collected: 03/21/24 1428    Lab Status: Preliminary result Specimen: Blood from Arm, Right Updated: 03/22/24 1446     Blood Culture No growth at 24 hours            Imaging Results (Last 24 Hours)       ** No results found for the last 24 hours. **            Results for orders placed during the hospital encounter of 01/16/24    Adult Transthoracic Echo Complete W/ Cont if Necessary Per Protocol    Interpretation Summary    Left ventricular systolic function is hyperdynamic (EF > 70%). Calculated left ventricular EF = 78.7% Left ventricular ejection fraction appears to be 61 - 65%.    Left ventricular diastolic function was normal.    The right ventricular cavity is borderline dilated.    The right atrial cavity is borderline dilated.    Estimated right ventricular systolic pressure from tricuspid regurgitation is markedly elevated (>55 mmHg).      I have reviewed the medications:  Scheduled Meds:apixaban, 5 mg, Oral, Q12H  arformoterol, 15 mcg, Nebulization, BID - RT  budesonide, 0.5 mg, Nebulization, BID  cefTRIAXone, 2,000 mg, Intravenous,  Q24H  cetirizine, 5 mg, Oral, Daily  diazePAM, 2 mg, Oral, BID  dilTIAZem CD, 120 mg, Oral, Q24H  docusate sodium, 100 mg, Oral, Daily  pantoprazole, 40 mg, Oral, BID AC  sodium chloride, 10 mL, Intravenous, Q12H  vancomycin, 750 mg, Intravenous, Q12H      Continuous Infusions:Pharmacy to dose vancomycin,       PRN Meds:.  acetaminophen **OR** acetaminophen **OR** acetaminophen    benzonatate    senna-docusate sodium **AND** polyethylene glycol **AND** bisacodyl **AND** bisacodyl    nitroglycerin    ondansetron    Pharmacy to dose vancomycin    sodium chloride    sodium chloride    Assessment & Plan   Assessment & Plan     Active Hospital Problems    Diagnosis  POA    Cellulitis of right leg [L03.115]  Yes    Acute cystitis [N30.00]  Yes    History of pulmonary embolism [Z86.711]  Yes    Class 2 severe obesity with serious comorbidity in adult [E66.01]  Yes    Thrombocytopenia [D69.6]  Yes    Atrial fibrillation [I48.91]  Yes    History of CVA (cerebrovascular accident) [Z86.73]  Not Applicable    HTN (hypertension) [I10]  Yes    History of breast cancer [Z85.3]  Not Applicable    Asthma [J45.909]  Yes    Anemia [D64.9]  Yes      Resolved Hospital Problems   No resolved problems to display.        Brief Hospital Course to date:  Vonnie Coppola is a 88 y.o. female presents the hospital with worsening right leg swelling, redness and bruising.  Emergency room ultrasound initially showed femoral DVT concerning for failure of chronic Eliquis.  Patient was placed on a heparin drip and admitted for further evaluation.  Oncology/hematology evaluated and requested repeat ultrasound.  Repeat ultrasound was negative for DVT and it is felt that the initial leg ultrasound was likely erroneous.  Leg redness appeared more like a cellulitis at this time with some bruising and antimicrobial therapy was initiated.  Patient was also having dysuria with pyuria and antibiotics also covered acute cystitis.  Patient was transition  back to Eliquis following the normal repeat leg ultrasound.      Leg swelling: Initial ultrasound in emergency room was positive for DVT but repeat ultrasound was negative.  Initial ultrasound now thought to be erroneous.  Initially there was concern patient failed Eliquis but now hematology oncology have cleared her to restart it based on repeat ultrasound.     Cellulitis: Currently on vancomycin and ceftriaxone.  Pain and redness have improved.    Acute cystitis: Covered with ceftriaxone.  Dysuria improved.    Atrial fibrillation: home diltiazem.  Anticoagulated.     Essential hypertension: Continue home blood pressure medication and adjust as needed for blood pressure.     Asthma: Continue scheduled nebulizers.  Respiratory status is stable.     Anemia: No active bleeding reported aside from some bruising.  Plan to trend.     History of breast cancer: Noted.     GERD: PPI.  Stable.     Anxiety: As needed Valium.  Stable.     History of constipation: Bowel regimen    Debility: PT.     DVT prophylaxis: Anticoagulated    Disposition: I expect the patient to be discharged possibly tomorrow pending PT evaluation.    CODE STATUS:   Code Status and Medical Interventions:   Ordered at: 03/22/24 0431     Medical Intervention Limits:    NO intubation (DNI)    NO cardioversion     Code Status (Patient has no pulse and is not breathing):    No CPR (Do Not Attempt to Resuscitate)     Medical Interventions (Patient has pulse or is breathing):    Limited Support       Daniel Watson MD  03/23/24

## 2024-03-23 NOTE — PLAN OF CARE
Problem: Adult Inpatient Plan of Care  Goal: Plan of Care Review  Outcome: Ongoing, Progressing  Goal: Patient-Specific Goal (Individualized)  Outcome: Ongoing, Progressing  Goal: Absence of Hospital-Acquired Illness or Injury  Outcome: Ongoing, Progressing  Intervention: Prevent and Manage VTE (Venous Thromboembolism) Risk  Recent Flowsheet Documentation  Taken 3/23/2024 0858 by Jody Osullivan, RN  VTE Prevention/Management: (Heparin gtt) other (see comments)  Range of Motion: active ROM (range of motion) encouraged  Goal: Optimal Comfort and Wellbeing  Outcome: Ongoing, Progressing  Goal: Readiness for Transition of Care  Outcome: Ongoing, Progressing     Problem: Skin Injury Risk Increased  Goal: Skin Health and Integrity  Outcome: Ongoing, Progressing  Intervention: Optimize Skin Protection  Recent Flowsheet Documentation  Taken 3/23/2024 0858 by Jody Osullivan, RN  Pressure Reduction Techniques: frequent weight shift encouraged  Pressure Reduction Devices: positioning supports utilized     Problem: Asthma Comorbidity  Goal: Maintenance of Asthma Control  Outcome: Ongoing, Progressing     Problem: Hypertension Comorbidity  Goal: Blood Pressure in Desired Range  Outcome: Ongoing, Progressing     Problem: Pain Chronic (Persistent) (Comorbidity Management)  Goal: Acceptable Pain Control and Functional Ability  Outcome: Ongoing, Progressing     Problem: Fall Injury Risk  Goal: Absence of Fall and Fall-Related Injury  Outcome: Ongoing, Progressing   Goal Outcome Evaluation:

## 2024-03-23 NOTE — PLAN OF CARE
Goal Outcome Evaluation:  Plan of Care Reviewed With: patient      Pt is 89 y/o F admitted to [facility] on 3/21/24 with R LE redness and swelling. Acute DVT initially found and heparin started but second image showed no DVT. At baseline pt is indep with mobility using walker but receives assist with ADL as needed from family she lives with. Pt currently demos SBA for transfers and CGA for bed mobility and ambulation up to 50' with rwx. Increased SOB noted with ambulation. Pt demos mobility below baseline and therefore would benefit from acute skilled PT to address functional mobility deficits. Anticipate d/c home with assist and home PT for further strengthening.              Anticipated Discharge Disposition (PT): home with assist, home with home health

## 2024-03-23 NOTE — PLAN OF CARE
Problem: Adult Inpatient Plan of Care  Goal: Plan of Care Review  Outcome: Ongoing, Progressing  Flowsheets (Taken 3/23/2024 0651)  Outcome Evaluation: Patient received one unit blood during night, A&Ox4, up adlib, awaiting stool sample.  Goal: Patient-Specific Goal (Individualized)  Outcome: Ongoing, Progressing  Goal: Absence of Hospital-Acquired Illness or Injury  Outcome: Ongoing, Progressing  Intervention: Identify and Manage Fall Risk  Recent Flowsheet Documentation  Taken 3/23/2024 0602 by Leticia Salguero, RN  Safety Promotion/Fall Prevention:   safety round/check completed   toileting scheduled   nonskid shoes/slippers when out of bed   fall prevention program maintained   clutter free environment maintained   assistive device/personal items within reach   activity supervised  Taken 3/23/2024 0400 by Leticia Salguero, RN  Safety Promotion/Fall Prevention:   safety round/check completed   room organization consistent   nonskid shoes/slippers when out of bed   mobility aid in reach   clutter free environment maintained   assistive device/personal items within reach   fall prevention program maintained   activity supervised   toileting scheduled  Taken 3/23/2024 0200 by Leticia Salguero, RN  Safety Promotion/Fall Prevention:   safety round/check completed   room organization consistent   toileting scheduled   nonskid shoes/slippers when out of bed   gait belt   mobility aid in reach   activity supervised   assistive device/personal items within reach   clutter free environment maintained  Taken 3/23/2024 0000 by Leticia Salguero, RN  Safety Promotion/Fall Prevention:   safety round/check completed   room organization consistent   toileting scheduled   nonskid shoes/slippers when out of bed   fall prevention program maintained   clutter free environment maintained   assistive device/personal items within reach   activity supervised   gait belt  Taken 3/22/2024 2200 by Leticia Salguero, RN  Safety Promotion/Fall  Prevention:   safety round/check completed   toileting scheduled   room organization consistent   nonskid shoes/slippers when out of bed   mobility aid in reach   gait belt   fall prevention program maintained   clutter free environment maintained   assistive device/personal items within reach   activity supervised  Taken 3/22/2024 2000 by Leticia Salguero RN  Safety Promotion/Fall Prevention:   safety round/check completed   room organization consistent   activity supervised   mobility aid in reach   assistive device/personal items within reach   fall prevention program maintained   clutter free environment maintained   nonskid shoes/slippers when out of bed  Intervention: Prevent Skin Injury  Recent Flowsheet Documentation  Taken 3/23/2024 0602 by Leticia Salguero RN  Body Position: weight shifting  Taken 3/23/2024 0400 by Leticia Salguero RN  Body Position:   weight shifting   position changed independently  Taken 3/23/2024 0200 by Leticia Salguero RN  Body Position: weight shifting  Taken 3/23/2024 0000 by Leticia Salguero RN  Body Position:   weight shifting   position changed independently  Skin Protection: adhesive use limited  Taken 3/22/2024 2200 by Leticia Salguero RN  Body Position:   weight shifting   position changed independently  Taken 3/22/2024 2000 by Leticia Salguero RN  Body Position:   weight shifting   position changed independently  Skin Protection: adhesive use limited  Intervention: Prevent and Manage VTE (Venous Thromboembolism) Risk  Recent Flowsheet Documentation  Taken 3/23/2024 0000 by Leticia Salguero RN  VTE Prevention/Management: (MEDICATION) other (see comments)  Range of Motion: active ROM (range of motion) encouraged  Taken 3/22/2024 2000 by Leticia Salguero RN  Activity Management: activity encouraged  Intervention: Prevent Infection  Recent Flowsheet Documentation  Taken 3/23/2024 0602 by Leticia Salguero RN  Infection Prevention:   single patient room provided   rest/sleep promoted    environmental surveillance performed  Taken 3/23/2024 0400 by Leticia Salguero RN  Infection Prevention:   single patient room provided   rest/sleep promoted   environmental surveillance performed  Taken 3/23/2024 0200 by Leticia Salguero RN  Infection Prevention:   visitors restricted/screened   single patient room provided   rest/sleep promoted   environmental surveillance performed  Taken 3/23/2024 0000 by Leticia Salguero RN  Infection Prevention:   single patient room provided   rest/sleep promoted   environmental surveillance performed  Taken 3/22/2024 2200 by Leticia Salguero RN  Infection Prevention:   single patient room provided   rest/sleep promoted   environmental surveillance performed  Taken 3/22/2024 2000 by Leticia Salguero RN  Infection Prevention:   single patient room provided   rest/sleep promoted   environmental surveillance performed  Goal: Optimal Comfort and Wellbeing  Outcome: Ongoing, Progressing  Intervention: Provide Person-Centered Care  Recent Flowsheet Documentation  Taken 3/23/2024 0000 by Leticia Salguero RN  Trust Relationship/Rapport: care explained  Taken 3/22/2024 2000 by Leticia Salguero RN  Trust Relationship/Rapport: care explained  Goal: Readiness for Transition of Care  Outcome: Ongoing, Progressing   Goal Outcome Evaluation:              Patient IV antibiotics during night ; Hep drip ; redraw on labs this am ordered. Assist x1 bsc / xiang.

## 2024-03-24 ENCOUNTER — TRANSCRIBE ORDERS (OUTPATIENT)
Dept: HOME HEALTH SERVICES | Facility: HOME HEALTHCARE | Age: 89
End: 2024-03-24
Payer: MEDICARE

## 2024-03-24 ENCOUNTER — DOCUMENTATION (OUTPATIENT)
Dept: HOME HEALTH SERVICES | Facility: HOME HEALTHCARE | Age: 89
End: 2024-03-24
Payer: MEDICARE

## 2024-03-24 ENCOUNTER — READMISSION MANAGEMENT (OUTPATIENT)
Dept: CALL CENTER | Facility: HOSPITAL | Age: 89
End: 2024-03-24
Payer: MEDICARE

## 2024-03-24 VITALS
TEMPERATURE: 101.4 F | HEART RATE: 78 BPM | SYSTOLIC BLOOD PRESSURE: 144 MMHG | RESPIRATION RATE: 18 BRPM | OXYGEN SATURATION: 98 % | DIASTOLIC BLOOD PRESSURE: 86 MMHG

## 2024-03-24 VITALS
OXYGEN SATURATION: 100 % | RESPIRATION RATE: 20 BRPM | DIASTOLIC BLOOD PRESSURE: 93 MMHG | HEIGHT: 66 IN | TEMPERATURE: 97.9 F | BODY MASS INDEX: 36.96 KG/M2 | SYSTOLIC BLOOD PRESSURE: 148 MMHG | WEIGHT: 230 LBS | HEART RATE: 73 BPM

## 2024-03-24 DIAGNOSIS — I82.461 ACUTE DEEP VEIN THROMBOSIS (DVT) OF CALF MUSCLE VEIN OF RIGHT LOWER EXTREMITY: Primary | ICD-10-CM

## 2024-03-24 LAB
CREAT SERPL-MCNC: 0.82 MG/DL (ref 0.57–1)
EGFRCR SERPLBLD CKD-EPI 2021: 69.8 ML/MIN/1.73
VANCOMYCIN TROUGH SERPL-MCNC: 12.5 MCG/ML (ref 5–20)

## 2024-03-24 PROCEDURE — 94799 UNLISTED PULMONARY SVC/PX: CPT

## 2024-03-24 PROCEDURE — 94664 DEMO&/EVAL PT USE INHALER: CPT

## 2024-03-24 PROCEDURE — 25810000003 SODIUM CHLORIDE 0.9 % SOLUTION 250 ML FLEX CONT: Performed by: INTERNAL MEDICINE

## 2024-03-24 PROCEDURE — 25010000002 VANCOMYCIN 750 MG RECONSTITUTED SOLUTION 1 EACH VIAL: Performed by: INTERNAL MEDICINE

## 2024-03-24 PROCEDURE — 80202 ASSAY OF VANCOMYCIN: CPT | Performed by: INTERNAL MEDICINE

## 2024-03-24 PROCEDURE — 94761 N-INVAS EAR/PLS OXIMETRY MLT: CPT

## 2024-03-24 PROCEDURE — 82565 ASSAY OF CREATININE: CPT | Performed by: INTERNAL MEDICINE

## 2024-03-24 RX ORDER — CEPHALEXIN 500 MG/1
500 CAPSULE ORAL EVERY 8 HOURS SCHEDULED
Status: DISCONTINUED | OUTPATIENT
Start: 2024-03-24 | End: 2024-03-24 | Stop reason: HOSPADM

## 2024-03-24 RX ORDER — CEPHALEXIN 500 MG/1
500 CAPSULE ORAL EVERY 8 HOURS SCHEDULED
Qty: 12 CAPSULE | Refills: 0 | Status: SHIPPED | OUTPATIENT
Start: 2024-03-24 | End: 2024-03-28

## 2024-03-24 RX ORDER — ACETAMINOPHEN 325 MG/1
650 TABLET ORAL EVERY 6 HOURS PRN
Start: 2024-03-24

## 2024-03-24 RX ORDER — ALBUTEROL SULFATE 2.5 MG/3ML
2.5 SOLUTION RESPIRATORY (INHALATION) ONCE
Status: COMPLETED | OUTPATIENT
Start: 2024-03-24 | End: 2024-03-24

## 2024-03-24 RX ADMIN — PANTOPRAZOLE SODIUM 40 MG: 40 TABLET, DELAYED RELEASE ORAL at 08:46

## 2024-03-24 RX ADMIN — APIXABAN 5 MG: 5 TABLET, FILM COATED ORAL at 08:46

## 2024-03-24 RX ADMIN — DIAZEPAM 2 MG: 2 TABLET ORAL at 08:46

## 2024-03-24 RX ADMIN — DILTIAZEM HYDROCHLORIDE 120 MG: 120 CAPSULE, EXTENDED RELEASE ORAL at 08:47

## 2024-03-24 RX ADMIN — CEPHALEXIN 500 MG: 500 CAPSULE ORAL at 12:55

## 2024-03-24 RX ADMIN — BUDESONIDE 0.5 MG: 0.5 SUSPENSION RESPIRATORY (INHALATION) at 07:59

## 2024-03-24 RX ADMIN — VANCOMYCIN HYDROCHLORIDE 750 MG: 750 INJECTION, POWDER, LYOPHILIZED, FOR SOLUTION INTRAVENOUS at 08:37

## 2024-03-24 RX ADMIN — ALBUTEROL SULFATE 2.5 MG: 2.5 SOLUTION RESPIRATORY (INHALATION) at 11:30

## 2024-03-24 RX ADMIN — Medication 10 ML: at 08:47

## 2024-03-24 RX ADMIN — CETIRIZINE HYDROCHLORIDE 5 MG: 10 TABLET ORAL at 08:47

## 2024-03-24 RX ADMIN — ARFORMOTEROL TARTRATE 15 MCG: 15 SOLUTION RESPIRATORY (INHALATION) at 07:58

## 2024-03-24 NOTE — PROGRESS NOTES
Patient is discharging today and is current with [facility]. Will transcribe home health orders for resumption of care for SN,PT and OT . Thank you !

## 2024-03-24 NOTE — PLAN OF CARE
Goal Outcome Evaluation:            VSS; Aox4; at baseline for mobility; patient very emotional and ready to be DC STAT.

## 2024-03-24 NOTE — PLAN OF CARE
Problem: Adult Inpatient Plan of Care  Goal: Plan of Care Review  Outcome: Ongoing, Progressing  Flowsheets (Taken 3/24/2024 0315)  Outcome Evaluation: Pt A&Ox4, 1xassist to bs, SR w PVC on monitor, Plan to d/c home Sunday with oral antibiotics. Limb Alert to L arm. Call light within reach. Bed alarm on.  Goal: Patient-Specific Goal (Individualized)  Outcome: Ongoing, Progressing  Goal: Absence of Hospital-Acquired Illness or Injury  Outcome: Ongoing, Progressing  Intervention: Identify and Manage Fall Risk  Recent Flowsheet Documentation  Taken 3/24/2024 0200 by Leticia Salguero, RN  Safety Promotion/Fall Prevention:   activity supervised   safety round/check completed   room organization consistent   toileting scheduled   nonskid shoes/slippers when out of bed   mobility aid in reach   fall prevention program maintained   clutter free environment maintained   assistive device/personal items within reach  Taken 3/24/2024 0000 by Leticia Salguero RN  Safety Promotion/Fall Prevention:   activity supervised   safety round/check completed   room organization consistent   mobility aid in reach   fall prevention program maintained   clutter free environment maintained   assistive device/personal items within reach  Taken 3/23/2024 2200 by Leticia Salguero, RN  Safety Promotion/Fall Prevention:   room organization consistent   nonskid shoes/slippers when out of bed   muscle strengthening facilitated   mobility aid in reach   lighting adjusted   gait belt   fall prevention program maintained   clutter free environment maintained   assistive device/personal items within reach   activity supervised  Taken 3/23/2024 2000 by Leticia Salguero, RN  Safety Promotion/Fall Prevention:   safety round/check completed   room organization consistent   toileting scheduled   nonskid shoes/slippers when out of bed   muscle strengthening facilitated   mobility aid in reach   gait belt   fall prevention program maintained   clutter free  environment maintained   assistive device/personal items within reach   activity supervised  Intervention: Prevent Skin Injury  Recent Flowsheet Documentation  Taken 3/24/2024 0200 by Leticia Salguero RN  Body Position:   position changed independently   tilted   weight shifting  Taken 3/24/2024 0000 by Leticia Salguero RN  Body Position: weight shifting  Skin Protection: adhesive use limited  Taken 3/23/2024 2200 by Leticia Salguero RN  Body Position:   weight shifting   position changed independently  Taken 3/23/2024 2000 by Leticia Salguero RN  Body Position:   position changed independently   weight shifting  Skin Protection: adhesive use limited  Intervention: Prevent and Manage VTE (Venous Thromboembolism) Risk  Recent Flowsheet Documentation  Taken 3/24/2024 0200 by Leticia Salguero RN  Activity Management: activity encouraged  Taken 3/24/2024 0000 by Leticia Salguero RN  Range of Motion:   active ROM (range of motion) encouraged   ROM (range of motion) performed  Taken 3/23/2024 2200 by Leticia Salguero RN  Activity Management: activity encouraged  Taken 3/23/2024 2000 by Leticia Salguero RN  Activity Management:   activity encouraged   ambulated in room  VTE Prevention/Management:   sequential compression devices off   patient refused intervention  Range of Motion:   active ROM (range of motion) encouraged   ROM (range of motion) performed  Intervention: Prevent Infection  Recent Flowsheet Documentation  Taken 3/24/2024 0200 by Leticia Salguero RN  Infection Prevention:   single patient room provided   rest/sleep promoted   environmental surveillance performed  Taken 3/24/2024 0000 by Leticia Salguero RN  Infection Prevention:   environmental surveillance performed   hand hygiene promoted   rest/sleep promoted   single patient room provided  Taken 3/23/2024 2200 by Leticia Salguero RN  Infection Prevention:   environmental surveillance performed   rest/sleep promoted   single patient room provided   hand hygiene  promoted  Taken 3/23/2024 2000 by Leticia Salguero, RN  Infection Prevention:   single patient room provided   rest/sleep promoted   environmental surveillance performed  Goal: Optimal Comfort and Wellbeing  Outcome: Ongoing, Progressing  Intervention: Provide Person-Centered Care  Recent Flowsheet Documentation  Taken 3/23/2024 2000 by Leticia Salguero RN  Trust Relationship/Rapport:   care explained   questions encouraged   thoughts/feelings acknowledged  Goal: Readiness for Transition of Care  Outcome: Ongoing, Progressing   Goal Outcome Evaluation:              Outcome Evaluation: Pt A&Ox4, 1xassist to bsc, SR w PVC on monitor, Plan to d/c home Sunday with oral antibiotics. Limb Alert to L arm. Call light within reach. Bed alarm on.

## 2024-03-24 NOTE — PROGRESS NOTES
"[facility] Clinical Pharmacy Services: Vancomycin Monitoring Note    Vonnie Coppola is a 88 y.o. female who is on day 3/7 of pharmacy to dose vancomycin for SSTI.    Previous Vancomycin Dose: 750 mg IV every  12  hours  Updated Cultures and Sensitivities:   -3/21 Blood cx (2/2): NGTD  Results from last 7 days   Lab Units 03/24/24  0837   VANCOMYCIN TR mcg/mL 12.50     Vitals/Labs  Ht: 166.4 cm (65.5\"); Wt: 104 kg (230 lb)   Temp Readings from Last 1 Encounters:   03/24/24 97.9 °F (36.6 °C) (Oral)     Estimated Creatinine Clearance: 59.5 mL/min (by C-G formula based on SCr of 0.82 mg/dL).       Results from last 7 days   Lab Units 03/24/24  0837 03/23/24  0606 03/22/24  0736 03/22/24  0735 03/21/24  1428   CREATININE mg/dL 0.82  --  0.88  --  0.96   WBC 10*3/mm3  --  6.30  --  8.19 8.39     Assessment/Plan  Steady state trough returned at 12.5 mg/L with a corresponding predicted AUC of 434 mg/L8hr. The current regimen is therapeutic and will be continued unchanged    Current Vancomycin Dose: 750 mg IV every  12  hours; provides a predicted  mg/L.hr   Next Level Date and Time: TBD pending clinical course and ultimate duration of therapy.   We will continue to monitor patient changes and renal function     Thank you for involving pharmacy in this patient's care. Please contact pharmacy with any questions or concerns.       Cheyenne Gowers, [facility]  Clinical Pharmacist    "

## 2024-03-24 NOTE — PROGRESS NOTES
Patient discharged today. Will arrange outpatient CT chest and f/u with me in about one month to review. Message sent to my office to arrange.

## 2024-03-24 NOTE — DISCHARGE SUMMARY
[facility] Medicine Services  DISCHARGE SUMMARY    Patient Name: Vonnie Coppola  : 1935  MRN: [id number]    Date of Admission: 3/21/2024  1:16 PM  Date of Discharge:   3/24/2024  Primary Care Physician: Christopher Leyva DO    Consults       Date and Time Order Name Status Description    3/22/2024  2:18 PM Hematology & Oncology Inpatient Consult Completed             Hospital Course       Active Hospital Problems    Diagnosis  POA    Cellulitis of right leg [L03.115]  Yes    Acute cystitis [N30.00]  Yes    History of pulmonary embolism [Z86.711]  Yes    Class 2 severe obesity with serious comorbidity in adult [E66.01]  Yes    Thrombocytopenia [D69.6]  Yes    Atrial fibrillation [I48.91]  Yes    History of CVA (cerebrovascular accident) [Z86.73]  Not Applicable    HTN (hypertension) [I10]  Yes    History of breast cancer [Z85.3]  Not Applicable    Asthma [J45.909]  Yes    Anemia [D64.9]  Yes      Resolved Hospital Problems   No resolved problems to display.          Hospital Course:  Vonnie Coppola is a 88 y.o. female  presents the hospital with worsening right leg swelling, redness and bruising.  Emergency room ultrasound initially showed femoral DVT concerning for failure of chronic Eliquis.  Patient was placed on a heparin drip and admitted for further evaluation.  Oncology/hematology evaluated and requested repeat ultrasound.  Repeat ultrasound was negative for DVT and it is felt that the initial leg ultrasound was likely erroneous.  Leg redness appeared more like a cellulitis at this time with some bruising and antimicrobial therapy was initiated.  Patient was also having dysuria with pyuria and antibiotics also covered acute cystitis.  Patient was transition back to Eliquis following the normal repeat leg ultrasound.       Leg swelling: Initial ultrasound in emergency room was positive for DVT but repeat ultrasound was negative.  Initial ultrasound now thought to be erroneous.   Initially there was concern patient failed Eliquis but now hematology oncology have cleared her to restart it based on repeat ultrasound.  Patient was seen by oncology consult team who agree with discharge on Eliquis and will follow-up in a month for CT scan, please see their notes for full details     Cellulitis: Patient with erythema and swelling and bruising on the leg treated with broad-spectrum antibiotics.  Pain and redness have improved.  Discharged on oral cephalosporin     Acute cystitis: Patient with dysuria and pyuria treated with antibiotics.  Dysuria improved.  Discharged on oral cephalosporin     Atrial fibrillation: home diltiazem.  Anticoagulated.     Essential hypertension: Continue home blood pressure medication and adjust as needed for blood pressure.     Asthma: Continue scheduled nebulizers.  Respiratory status is stable.     Anemia: No active bleeding reported aside from some bruising.  Stable trend     History of breast cancer: Noted.  Plan will be to follow-up with her oncologist     GERD:   Stable.     Anxiety: As needed Valium.  Stable.     History of constipation: Better        Day of Discharge     Subjective is eager and adamant she be discharged home as soon as possible today.  She agrees to follow-up with her primary care provider and with her oncologist.  She denies any other new complaints.  Leg swelling and redness is better.  She reports good family support.    Vital Signs:   Temp:  [97.9 °F (36.6 °C)-98.4 °F (36.9 °C)] 97.9 °F (36.6 °C)  Heart Rate:  [56-96] 64  Resp:  [18-20] 20  BP: (135-150)/(77-93) 148/93     Physical Exam:    Constitutional:Awake, alert  HENT: NCAT, mucous membranes moist, neck supple  Respiratory: No cough or wheezes, normal respirations, nonlabored breathing   Cardiovascular: Pulse rate is normal, palpable radial pulses  Gastrointestinal:  soft, nontender, nondistended  Musculoskeletal: Right leg with decreased swelling, erythema, and some bruising.  Mild  lower extremity edema, significantly obese with BMI of 38, debilitated in appearance  Psychiatric: Appropriate affect, cooperative, conversational  Neurologic: No slurred speech or facial droop, follows commands  Skin: No rashes or jaundice, warm       Pertinent  and/or Most Recent Results     Results from last 7 days   Lab Units 03/24/24  0837 03/23/24  0606 03/22/24  0736 03/22/24  0735 03/21/24  1428   WBC 10*3/mm3  --  6.30  --  8.19 8.39   HEMOGLOBIN g/dL  --  10.6*  --  10.7* 10.2*   HEMATOCRIT %  --  34.1  --  33.8* 33.9*   PLATELETS 10*3/mm3  --  128*  --  134* 145   SODIUM mmol/L  --   --  145  --  144   POTASSIUM mmol/L  --   --  4.1  --  3.9   CHLORIDE mmol/L  --   --  112*  --  112*   CO2 mmol/L  --   --  24.4  --  27.0   BUN mg/dL  --   --  19  --  21   CREATININE mg/dL 0.82  --  0.88  --  0.96   GLUCOSE mg/dL  --   --  104*  --  102*   CALCIUM mg/dL  --   --  8.5*  --  8.6     Results from last 7 days   Lab Units 03/23/24  0825 03/22/24  0736 03/22/24  0046 03/21/24  1604 03/21/24  1428   BILIRUBIN mg/dL  --   --   --   --  0.3   ALK PHOS U/L  --   --   --   --  60   ALT (SGPT) U/L  --   --   --   --  13   AST (SGOT) U/L  --   --   --   --  11   PROTIME Seconds  --   --   --  18.3* 16.6*   INR   --   --   --  1.70 1.32*   APTT seconds 97.8* 86.5* >200.0*  --  24.4         Brief Urine Lab Results  (Last result in the past 365 days)        Color   Clarity   Blood   Leuk Est   Nitrite   Protein   CREAT   Urine HCG        03/21/24 1504 Yellow   Cloudy   Trace   Trace   Positive   Negative                   Microbiology Results Abnormal       Procedure Component Value - Date/Time    Blood Culture - Blood, Hand, Right [[id number]]  (Normal) Collected: 03/21/24 3865    Lab Status: Preliminary result Specimen: Blood from Hand, Right Updated: 03/23/24 1601     Blood Culture No growth at 2 days    Narrative:      Less than seven (7) mL's of blood was collected.  Insufficient quantity may yield false negative  results.    Blood Culture - Blood, Arm, Right [[id number]]  (Normal) Collected: 03/21/24 1428    Lab Status: Preliminary result Specimen: Blood from Arm, Right Updated: 03/23/24 1445     Blood Culture No growth at 2 days            Imaging Results (All)       Procedure Component Value Units Date/Time    US Venous Doppler Lower Extremity Right (duplex) [[id number]] Collected: 03/21/24 1537     Updated: 03/21/24 1542    Addenda:        ADDENDUM:  03 21 24 15:41 Call Doctor Regarding DVT â€“ acute, progressing, called    Dr. Cao on 03 21 15:41 (-04:00)      Signed: 03/21/24 1537 by Dougie Mendoza MD    Narrative:      CR  Patient: WEN HITCHCOCK  Time Out: 15:37  Exam(s): US VENOUS RIGHT LOWER EXTREMITY     EXAM:    US Duplex Right Lower Extremity Veins    CLINICAL HISTORY:     Reason for exam: Swelling.  History of DVT on Eliquis.    TECHNIQUE:    Real-time duplex ultrasound scan of the right lower extremity veins   integrating B-mode two-dimensional vascular structure, Doppler spectral   analysis, color flow Doppler imaging and compression.    COMPARISON:    No relevant prior studies available.    FINDINGS:    Deep veins:  DVT to the mid segment of the right femoral vein is   occlusive, potentially acute.  Patient declined scanning of the more   distal thigh and the calf due to discomfort.    Superficial veins:  No thrombus in the visualized great saphenous vein.    IMPRESSION:         DVT to the mid segment of the right femoral vein is occlusive,   potentially acute.  Patient declined scanning of the more distal thigh   and the calf due to discomfort.      Impression:            Communications:     Call Doctor DVT â€“ acute, progressing    Electronically signed by Dougie Mendoza M.D. on 03-21-24 at 1537    XR Chest 2 View [[id number]] Collected: 03/21/24 1457     Updated: 03/21/24 1504    Narrative:      XR CHEST 2 VW-     Clinical: Chest pain and edema     COMPARISON examination dated 2/12/2024      FINDINGS: Cardiac size within normal limits. There is atherosclerotic  calcification of the aorta. No pleural effusion, vascular congestion or  new airspace disease has developed.     There is a minimal area of infiltrate along the lateral aspect of the  left midlung zone, less pronounced compared to the previous examination.  There also appears to be a subtle area of infiltrate or atelectasis at  the right lung base as before.           This report was finalized on 3/21/2024 3:00 PM by Dr. Jimmy Goff M.D  on Workstation: [id number]       XR Tibia Fibula 2 View Right [[id number]] Collected: 03/21/24 1456     Updated: 03/21/24 1500    Narrative:      XR TIBIA FIBULA 2 VW RIGHT-     Clinical: Swelling and erythema right leg with pain     FINDINGS: There is soft tissue swelling of the right lower extremity, no  soft tissue gas or radiopaque foreign body seen. There is a substantial  amount of right knee joint degeneration. No acute osseous abnormality of  the tibia/fibula. No periosteal elevation or cortical bone destruction  seen. There is degenerative change noted about the ankle joint, hind and  midfoot. The remainder is unremarkable.     This report was finalized on 3/21/2024 2:57 PM by Dr. Jimmy Goff M.D  on Workstation: [id number]               Results for orders placed during the hospital encounter of 03/21/24    Duplex Venous Lower Extremity - Right CAR    Interpretation Summary    Normal right lower extremity venous duplex scan.      Results for orders placed during the hospital encounter of 03/21/24    Duplex Venous Lower Extremity - Right CAR    Interpretation Summary    Normal right lower extremity venous duplex scan.      Results for orders placed during the hospital encounter of 01/16/24    Adult Transthoracic Echo Complete W/ Cont if Necessary Per Protocol    Interpretation Summary    Left ventricular systolic function is hyperdynamic (EF > 70%). Calculated left ventricular EF = 78.7% Left  ventricular ejection fraction appears to be 61 - 65%.    Left ventricular diastolic function was normal.    The right ventricular cavity is borderline dilated.    The right atrial cavity is borderline dilated.    Estimated right ventricular systolic pressure from tricuspid regurgitation is markedly elevated (>55 mmHg).        Discharge Details        Discharge Medications        New Medications        Instructions Start Date   cephalexin 500 MG capsule  Commonly known as: KEFLEX   500 mg, Oral, Every 8 Hours Scheduled             Changes to Medications        Instructions Start Date   acetaminophen 325 MG tablet  Commonly known as: TYLENOL  What changed: You were already taking a medication with the same name, and this prescription was added. Make sure you understand how and when to take each.   650 mg, Oral, Every 6 Hours PRN      acetaminophen 500 MG tablet  Commonly known as: TYLENOL  What changed: Another medication with the same name was added. Make sure you understand how and when to take each.   1,000 mg, Oral, Every 4 Hours PRN             Continue These Medications        Instructions Start Date   Allegra Allergy 60 MG tablet  Generic drug: fexofenadine   Oral, Daily PRN      arformoterol 15 MCG/2ML nebulizer solution  Commonly known as: Brovana   15 mcg, Nebulization, 2 Times Daily - RT      benzonatate 100 MG capsule  Commonly known as: TESSALON   100 mg, Oral, 3 Times Daily PRN      budesonide 0.5 MG/2ML nebulizer solution  Commonly known as: PULMICORT   0.5 mg, Nebulization, 2 Times Daily      coenzyme Q10 50 MG capsule capsule   1 capsule, Oral, Daily      diazePAM 2 MG tablet  Commonly known as: VALIUM   1 tablet, Oral, 2 Times Daily      dilTIAZem  MG 24 hr capsule  Commonly known as: CARDIZEM CD   120 mg, Oral, Every 24 Hours Scheduled      docusate sodium 100 MG capsule  Commonly known as: COLACE   100 mg, Oral, Daily      Eliquis 5 MG tablet tablet  Generic drug: apixaban   5 mg, Oral, Every  12 Hours Scheduled      glucosamine-chondroitin 500-400 MG capsule capsule   1 capsule, Oral, 3 Times Daily With Meals      O2  Commonly known as: OXYGEN   2 L/min, Inhalation, Continuous, At night as needed      ondansetron 4 MG tablet  Commonly known as: ZOFRAN   4 mg, Oral, Daily PRN      pantoprazole 40 MG EC tablet  Commonly known as: PROTONIX   40 mg, Oral, 2 Times Daily Before Meals      polyethylene glycol 17 g packet  Commonly known as: MIRALAX   17 g, Oral, Daily PRN      SELENIMIN PO   1 tablet, Oral, Daily      VITAMIN B 12 PO   1 tablet, Oral, Daily      VITAMIN D-3 PO   1 tablet, Oral, Daily             Stop These Medications      azithromycin 250 MG tablet  Commonly known as: ZITHROMAX     oseltamivir 30 MG capsule  Commonly known as: TAMIFLU            ASK your doctor about these medications        Instructions Start Date   fluticasone 50 MCG/ACT nasal spray  Commonly known as: Flonase   2 sprays, Each Nare, 2 Times Daily               Allergies   Allergen Reactions    Cortisone Hives    Penicillins Hives         Discharge Disposition:  Home or Self Care    Diet:  Hospital:  Diet Order   Procedures    Diet: Cardiac; Healthy Heart (2-3 Na+); Fluid Consistency: Thin (IDDSI 0)       Activity:  Activity Instructions       Activity as Tolerated                   CODE STATUS:    Code Status and Medical Interventions:   Ordered at: 03/22/24 0431     Medical Intervention Limits:    NO intubation (DNI)    NO cardioversion     Code Status (Patient has no pulse and is not breathing):    No CPR (Do Not Attempt to Resuscitate)     Medical Interventions (Patient has pulse or is breathing):    Limited Support       Future Appointments   Date Time Provider Department Center   3/26/2024 To Be Determined Jac Bella, MARTIN [facility] None   3/28/2024 To Be Determined Shivani Noriega, RN [facility] None   3/28/2024 To Be Determined Jac Bella, OT [facility] None       Additional Instructions for the Follow-ups  that You Need to Schedule       Discharge Follow-up with PCP   As directed       Currently Documented PCP:    Christopher Leyva DO    PCP Phone Number:    [phone]     Follow Up Details: 1 week, call Monday               Follow-up Information       Christopher Leyva DO .    Specialty: Internal Medicine  Why: 1 week, call Monday  Contact information:  [address] [facility] [address]  [facility] [address]  [phone]                                 Daniel Watson MD  03/24/24      Time Spent on Discharge:  I spent greater than 35 minutes on this discharge activity which included: face-to-face encounter with the patient, reviewing the data in the system, coordination of the care with the nursing staff as well as consultants, documentation, and entering orders.

## 2024-03-25 ENCOUNTER — HOME CARE VISIT (OUTPATIENT)
Dept: HOME HEALTH SERVICES | Facility: HOME HEALTHCARE | Age: 89
End: 2024-03-25
Payer: MEDICARE

## 2024-03-25 VITALS
HEART RATE: 79 BPM | TEMPERATURE: 98.6 F | DIASTOLIC BLOOD PRESSURE: 68 MMHG | OXYGEN SATURATION: 97 % | SYSTOLIC BLOOD PRESSURE: 116 MMHG | RESPIRATION RATE: 18 BRPM

## 2024-03-25 DIAGNOSIS — Z86.718 HISTORY OF DVT (DEEP VEIN THROMBOSIS): ICD-10-CM

## 2024-03-25 DIAGNOSIS — R91.8 PULMONARY NODULES: Primary | ICD-10-CM

## 2024-03-25 PROCEDURE — G0151 HHCP-SERV OF PT,EA 15 MIN: HCPCS

## 2024-03-25 PROCEDURE — G0299 HHS/HOSPICE OF RN EA 15 MIN: HCPCS

## 2024-03-25 NOTE — OUTREACH NOTE
Prep Survey      Flowsheet Row Responses   [facility]   Is LACE score < 7 ? No   Eligibility Readm Mgmt   Discharge diagnosis Cellulitis of right leg   Does the patient have one of the following disease processes/diagnoses(primary or secondary)? Other   Does the patient have Home health ordered? Yes   What is the Home health agency?  [facility]   Is there a DME ordered? No   Prep survey completed? Yes            Caren Sifuentes Registered Nurse

## 2024-03-25 NOTE — CASE MANAGEMENT/SOCIAL WORK
Case Management Discharge Note      Final Note: home with [facility]         Selected Continued Care - Discharged on 3/24/2024 Admission date: 3/21/2024 - Discharge disposition: Home or Self Care      Destination    No services have been selected for the patient.                Durable Medical Equipment    No services have been selected for the patient.                Dialysis/Infusion    No services have been selected for the patient.                Home Medical Care       Service Provider Selected Services Address Phone Fax Patient Preferred     [facility] [address] [address] [phone] --              Therapy    No services have been selected for the patient.                Community Resources    No services have been selected for the patient.                Community & DME    No services have been selected for the patient.                    Selected Continued Care - Prior Encounters Includes continued care and service providers with selected services from prior encounters from 12/22/2023 to 3/24/2024      Discharged on 1/28/2024 Admission date: 1/16/2024 - Discharge disposition: Home-Health Care [facility]      Home Medical Care       Service Provider Selected Services Address Phone Fax Patient Preferred     [facility] [address] [facility] [place] [id number]- [phone] --                          Transportation Services  Private: Car    Final Discharge Disposition Code: 06 - home with home health care

## 2024-03-25 NOTE — HOME HEALTH
REASON FOR REFERRAL: Decreased functional mobility in and out of the home due to recent hospital and rehab stays for LE edema/cellulitis, GI bleed, hiatal hernia, gastritis, diverticular disease with polyps that were removed, A-fib, bilateral pulmonary emboli, and bilateral DVT, influenza A resulting in functional decline and weakness.     MEDICAL HISTORY: A-fib, history of CVA, Anemia, HTN, History of breast cancer, OA, Asthma     SKILLED THERAPY THERAPY: PT eval only for instruction/education in lower extremity strengthening HEP, bed mobility/transfer training, gait training, balance training, fall prevention, and activity tolerance training to enable patient to safely exit home for medical appointments.    PT eval only as patient demonstrates mod I with transfers, bed mobility, ambulation with rollator walker, and HEP and does not require any additional physical therapy services at this time.  patient is at her PLOF with functional mobility within the home.

## 2024-03-25 NOTE — HOME HEALTH
Routine Visit Note:    Skill/education provided: CP and general assessment, pt has temp of 101.4, all other vitals WDL. Upon assessment, pt right lower leg continues to have 3+ pitting edema with discoloration and warmth. Daughter states pt has had her leg propped up continuously like recommended by ER MD with no changes. Pt does have distal pulses intact. I recommeded pt either be seen by PCP or ER due to continued change in pt status. Contacted PCP office and made appointment for pt to be seen in office today at 2:30pm, daughter states she would rather take pt to ER for continued care.       Plan for next visit: CP and general assessment, vital signs, goal based teaching per careplan.

## 2024-03-25 NOTE — Clinical Note
Please send to MD Christopher Leyva     Resumption of Care Note:     Reason for hospitalization/new problems: Pt to [facility] from 3/21/2024 - 3/24/2024 for cellulitis of the right leg,     CHRISTIAN [place] Findings: Pt continuing to have 2-3+ RLE swelling with brusing and redness. Pt is continuing to have SOA with minimal exertion while on 2L NC, vitals WDL. Daughter states she has not been routinely giving nebulizer.     New/changed medications: cephalexin (KEFLEX) 500 MG capsule    New/changed orders: none at this time    Educated on Emergency Plan, steps to take prior to going to the ER and when to Call [facility]    Plan/Focus of Care and Skilled need: Teaching and monitoring of right lower leg cellulitis     Plan for next visit: CP and general assessment, vital signs, goal based teaching per careplan.

## 2024-03-25 NOTE — HOME HEALTH
Transfer Summary:    - Patient transferred to [facility]    - Reason for transfer: increased right leg swelling     - Summary of Care, treatment or services provided to the patient including disciplines seeing the patient: Pt seeing PT, OT, and SN     - Patient progress toward goals: pt has not met all goals at this time

## 2024-03-26 ENCOUNTER — HOME CARE VISIT (OUTPATIENT)
Dept: HOME HEALTH SERVICES | Facility: HOME HEALTHCARE | Age: 89
End: 2024-03-26
Payer: MEDICARE

## 2024-03-26 VITALS — RESPIRATION RATE: 18 BRPM | TEMPERATURE: 97.8 F | DIASTOLIC BLOOD PRESSURE: 88 MMHG | SYSTOLIC BLOOD PRESSURE: 142 MMHG

## 2024-03-26 LAB
BACTERIA SPEC AEROBE CULT: NORMAL
BACTERIA SPEC AEROBE CULT: NORMAL

## 2024-03-26 PROCEDURE — G0152 HHCP-SERV OF OT,EA 15 MIN: HCPCS

## 2024-03-26 NOTE — Clinical Note
Dr Christopher Leyva,    OT to continue 2x/week for 3 weeks to address adl training, functional transfers, mobility, safety, UE strength, DME/adaptive equipment education, energy conservation, patient/CG education.

## 2024-03-27 NOTE — HOME HEALTH
Resumption of Care Note:     Reason for hospitalization/new problems: Pt to [facility] from 3/21/2024 - 3/24/2024 for cellulitis of the right leg,     CHRISTIAN [place] Findings: Pt continuing to have 2-3+ RLE swelling with brusing and redness. Pt is continuing to have SOA with minimal exertion while on 2L NC, vitals WDL. Daughter states she has not been routinely giving nebulizer.     New/changed medications: cephalexin (KEFLEX) 500 MG capsule    New/changed orders: none at this time    Educated on Emergency Plan, steps to take prior to going to the ER and when to Call [facility]    Plan/Focus of Care and Skilled need: Teaching and monitoring of right lower leg cellulitis     Plan for next visit: CP and general assessment, vital signs, goal based teaching per careplan.

## 2024-03-28 ENCOUNTER — READMISSION MANAGEMENT (OUTPATIENT)
Dept: CALL CENTER | Facility: HOSPITAL | Age: 89
End: 2024-03-28
Payer: MEDICARE

## 2024-03-28 ENCOUNTER — HOME CARE VISIT (OUTPATIENT)
Dept: HOME HEALTH SERVICES | Facility: HOME HEALTHCARE | Age: 89
End: 2024-03-28
Payer: MEDICARE

## 2024-03-28 VITALS
HEART RATE: 82 BPM | TEMPERATURE: 98.7 F | RESPIRATION RATE: 18 BRPM | OXYGEN SATURATION: 98 % | SYSTOLIC BLOOD PRESSURE: 122 MMHG | DIASTOLIC BLOOD PRESSURE: 82 MMHG

## 2024-03-28 PROCEDURE — G0299 HHS/HOSPICE OF RN EA 15 MIN: HCPCS

## 2024-03-28 NOTE — OUTREACH NOTE
Medical Week 1 Survey      Flowsheet Row Responses   [facility] patient discharged from? [facility]   Does the patient have one of the following disease processes/diagnoses(primary or secondary)? Other   Week 1 attempt successful? No   Unsuccessful attempts Attempt 1            Le Sifuentes Registered Nurse

## 2024-03-28 NOTE — HOME HEALTH
Routine Visit Note:    Skill/education provided: CP and general assessment, vital signs stable. Pts right leg swelling has decreased since last visit. Pt has been continuing with compression and elevation. Bruising and redness has also decreased. Pt educated on oxygen safety and continuing with edema management.     Patient/caregiver response: Pt tolerated assessment well and both pt and daughter able to verbalize understanding of teachings.     Plan for next visit: CP and general assessment, vital signs, goal based teaching per careplan.

## 2024-04-01 VITALS
DIASTOLIC BLOOD PRESSURE: 88 MMHG | TEMPERATURE: 97.7 F | OXYGEN SATURATION: 96 % | HEART RATE: 83 BPM | SYSTOLIC BLOOD PRESSURE: 148 MMHG

## 2024-04-01 NOTE — HOME HEALTH
Recent hospitalization 3-21-24 to 3-24-24 for RLE cellulitis, continues on 2L of oxygen, SOA.  Plan to continue OT to address safety with adl tasks, transfers, mobility, safety, energy conservation.

## 2024-04-02 ENCOUNTER — HOME CARE VISIT (OUTPATIENT)
Dept: HOME HEALTH SERVICES | Facility: HOME HEALTHCARE | Age: 89
End: 2024-04-02
Payer: MEDICARE

## 2024-04-02 PROCEDURE — G0299 HHS/HOSPICE OF RN EA 15 MIN: HCPCS

## 2024-04-02 PROCEDURE — G0152 HHCP-SERV OF OT,EA 15 MIN: HCPCS

## 2024-04-02 NOTE — PLAN OF CARE
Goal Outcome Evaluation:  Plan of Care Reviewed With: patient        Progress: improving  Outcome Evaluation: Pt admitted 1/16/2024 for GI bleed. Vital stable . A & O X 4 . No complains or concerns per pt . Goal met. Safety maintained.                                [Feeling Poorly] : not feeling poorly (malaise) [Fever] : no fever [Eye Discharge] : no eye discharge [Pallor] : not pale [Wgt Loss (___ Lbs)] : no recent weight loss [Change in Vision] : no change in vision [Redness] : no redness [Earache] : no earache [Nasal Stuffiness] : no nasal congestion [Sore Throat] : no sore throat [Nosebleeds] : no epistaxis [Cyanosis] : no cyanosis [Loss Of Hearing] : no hearing loss [Diaphoresis] : not diaphoretic [Edema] : no edema [Exercise Intolerance] : no persistence of exercise intolerance [Palpitations] : no palpitations [Orthopnea] : no orthopnea [Fast HR] : no tachycardia [Tachypnea] : not tachypneic [Wheezing] : no wheezing [Cough] : no cough [Being A Poor Eater] : not a poor eater [Shortness Of Breath] : not expressed as feeling short of breath [Vomiting] : no vomiting [Diarrhea] : no diarrhea [Decrease In Appetite] : appetite not decreased [Abdominal Pain] : no abdominal pain [Fainting (Syncope)] : no fainting [Dizziness] : no dizziness [Headache] : no headache [Seizure] : no seizures [Limping] : no limping [Joint Pains] : no arthralgias [Rash] : no rash [Joint Swelling] : no joint swelling [Skin Peeling] : no skin peeling [Wound problems] : no wound problems [Easy Bruising] : no tendency for easy bruising [Swollen Glands] : no lymphadenopathy [Easy Bleeding] : no ~M tendency for easy bleeding [Sleep Disturbances] : ~T no sleep disturbances [Hyperactive] : no hyperactive behavior [Failure To Thrive] : no failure to thrive [Short Stature] : short stature was not noted [Heat/Cold Intolerance] : no temperature intolerance [Jitteriness] : no jitteriness [Dec Urine Output] : no oliguria

## 2024-04-04 ENCOUNTER — HOME CARE VISIT (OUTPATIENT)
Dept: HOME HEALTH SERVICES | Facility: HOME HEALTHCARE | Age: 89
End: 2024-04-04
Payer: MEDICARE

## 2024-04-04 ENCOUNTER — READMISSION MANAGEMENT (OUTPATIENT)
Dept: CALL CENTER | Facility: HOSPITAL | Age: 89
End: 2024-04-04
Payer: MEDICARE

## 2024-04-04 PROCEDURE — G0299 HHS/HOSPICE OF RN EA 15 MIN: HCPCS

## 2024-04-04 PROCEDURE — G0152 HHCP-SERV OF OT,EA 15 MIN: HCPCS

## 2024-04-04 NOTE — OUTREACH NOTE
Medical Week 2 Survey      Flowsheet Row Responses   [facility] patient discharged from? [place]   Does the patient have one of the following disease processes/diagnoses(primary or secondary)? Other   Week 2 attempt successful? No   Unsuccessful attempts Attempt 1            Tonia FREED - Registered Nurse

## 2024-04-04 NOTE — HOME HEALTH
Routine Visit Note:    Skill/education provided: CP and general assessment, vital signs stable. Pt had just got out of the shower with OT upon SN arrival. Pt was fatigued but OT states she was able to assist herself with the shower. Since having her compression stockings off, pt presented with 3+ pitting edema in right leg and 2+ pitting edema in left leg. 2 layer compression wraps with Coban applied. Pt and daughter educated on continued edema management and pressure injury prevention     Patient/caregiver response: Pt tolerated interventions well and able to verbalize understanding of teachings.     Plan for next visit: CP and general assessment, vital signs, goal based teaching per careplan.     Other pertinent info: Pt has appt with PCP on 4/10

## 2024-04-05 VITALS
HEART RATE: 86 BPM | SYSTOLIC BLOOD PRESSURE: 130 MMHG | SYSTOLIC BLOOD PRESSURE: 120 MMHG | DIASTOLIC BLOOD PRESSURE: 84 MMHG | DIASTOLIC BLOOD PRESSURE: 82 MMHG | HEART RATE: 83 BPM | OXYGEN SATURATION: 96 % | TEMPERATURE: 97.9 F | TEMPERATURE: 98.3 F | OXYGEN SATURATION: 96 %

## 2024-04-05 NOTE — HOME HEALTH
Patient lying in bed, reports having a hard day, not feeling well, increased BLE edema and pain.  Plan to address shower task next visit

## 2024-04-05 NOTE — HOME HEALTH
"Routine Visit Note:    Skill/education provided: CP and general assessment, vital signs stable. Pt still presenting with significant swelling in BLE and seems to be non-compliant with wearing compression stockings and states \"I had to take them off, they were hurting.\" Pt educated on the importance of wearing compression to aid in edema, notified I wound contact PCP to seek alternative methods for compression. Pt showing mild SOA while talking and becoming tachypneic when adjusting herslef in bed. SATS remain WDL, lung sounds diminished throughout with mild wheezing. I questioned pt and daughter on use of nebulizer maching and how often pt has been using it and if its being used as prescribed. Daughter states \"Yennifer given it a few times but she usually refuses it.\" Pt states \"She forgets to give it to me, I havent had it since the last time you gave it to me.\" Nebulizer machine was found on the floor by bed. Instructed daughter to go ahead and give pt tx while I was in the home to aid in breathing. Daughter had multiple questions as to how often to give nebulizer and maintence of the machine.     I contacted MD Neville office to notify of pts breathing and leg status. I asked for possible diuretic orders since pt was on them in the past and orders for further leg compression. Spoke with his nurse who suggests pt make visit to assess legs and possible diuretics. She gave verbal order for increased compression with kerlex and Koban or 2 layer tubi  if pt does not tolerate koban wraps.     Pt legs wrapped with kerlexx2 and koban from toes to knees. Pt tolerated well.         Plan for next visit: CP and general assessment, vital signs, goal based teaching per careplan. Compression wrap changes"

## 2024-04-05 NOTE — HOME HEALTH
Patient in agreement to take shower today on tub bench. Nursing arrived at end of visit, discussed patient's status.  Plan to address safety with adl tasks next visit.

## 2024-04-07 VITALS
SYSTOLIC BLOOD PRESSURE: 120 MMHG | RESPIRATION RATE: 18 BRPM | OXYGEN SATURATION: 95 % | DIASTOLIC BLOOD PRESSURE: 84 MMHG | HEART RATE: 98 BPM | TEMPERATURE: 97.5 F

## 2024-04-08 ENCOUNTER — HOME CARE VISIT (OUTPATIENT)
Dept: HOME HEALTH SERVICES | Facility: HOME HEALTHCARE | Age: 89
End: 2024-04-08
Payer: MEDICARE

## 2024-04-08 ENCOUNTER — READMISSION MANAGEMENT (OUTPATIENT)
Dept: CALL CENTER | Facility: HOSPITAL | Age: 89
End: 2024-04-08
Payer: MEDICARE

## 2024-04-08 PROCEDURE — G0299 HHS/HOSPICE OF RN EA 15 MIN: HCPCS

## 2024-04-08 NOTE — OUTREACH NOTE
Medical Week 2 Survey      Flowsheet Row Responses   [facility] [place] patient discharged from? [place]   Does the patient have one of the following disease processes/diagnoses(primary or secondary)? Other   Week 2 attempt successful? No   Unsuccessful attempts Attempt 2            Le FREED - Registered Nurse

## 2024-04-09 ENCOUNTER — HOME CARE VISIT (OUTPATIENT)
Dept: HOME HEALTH SERVICES | Facility: HOME HEALTHCARE | Age: 89
End: 2024-04-09
Payer: MEDICARE

## 2024-04-09 VITALS
TEMPERATURE: 98.5 F | SYSTOLIC BLOOD PRESSURE: 130 MMHG | HEART RATE: 76 BPM | RESPIRATION RATE: 18 BRPM | DIASTOLIC BLOOD PRESSURE: 80 MMHG | OXYGEN SATURATION: 95 %

## 2024-04-09 PROCEDURE — G0152 HHCP-SERV OF OT,EA 15 MIN: HCPCS

## 2024-04-10 NOTE — HOME HEALTH
Routine Visit Note:    Skill/education provided: CP and general assessment, vital signs stable. Pt leg wraps removed, swelling has decreased to 1+ edema in BLE from knees to toes. Pt was up in kitchen upon SN arrival and states she had went out over the weekend to have her hair done and seemed to have tolerated well.     Patient/caregiver response:     Plan for next visit:    Other pertinent info:

## 2024-04-11 ENCOUNTER — HOME CARE VISIT (OUTPATIENT)
Dept: HOME HEALTH SERVICES | Facility: HOME HEALTHCARE | Age: 89
End: 2024-04-11
Payer: MEDICARE

## 2024-04-11 PROCEDURE — G0299 HHS/HOSPICE OF RN EA 15 MIN: HCPCS

## 2024-04-11 PROCEDURE — G0152 HHCP-SERV OF OT,EA 15 MIN: HCPCS

## 2024-04-12 VITALS
DIASTOLIC BLOOD PRESSURE: 82 MMHG | TEMPERATURE: 98.1 F | TEMPERATURE: 98.3 F | DIASTOLIC BLOOD PRESSURE: 82 MMHG | OXYGEN SATURATION: 94 % | OXYGEN SATURATION: 94 % | HEART RATE: 91 BPM | SYSTOLIC BLOOD PRESSURE: 120 MMHG | HEART RATE: 95 BPM | SYSTOLIC BLOOD PRESSURE: 110 MMHG

## 2024-04-12 NOTE — HOME HEALTH
"Routine Visit Note:    Skill/education provided: CP and general assessment, lung sounds diminished throughout. Pt tachyneic with minimal effort and now requiring 4L NC to maintain oxygen above %92. Pt desat to %86 after walking to the bathroom but was able to recover after sitting and using pursed lip breathing. Pt started on lasix yesterday by PCP and still has singificant swelling in legs and torso. Pt daughter to get pt BSC to eliminate fatigue with frequent trips to the bathroom. Pt very anxious and tearful during visit and states that PCP told her yesterday that \"they would try the lasix but there wasn't much else to do and maybe should call in hospice.\" Pt and daughter concerned as to why this statement was made and do not wish to have hospice at this time and to continue with HH. Pt and daughter given continued instruction on edema management and instruction on new lasix prescription and possible side effects. LAURA lower leg wraps removed and double later tubi  applied.     Patient/caregiver response: Pt tolerated intervetnions well. Pt and daughter able to verbalize understanding of teachings but continued monitoring of compliance needed at this time.     Plan for next visit: CP and general assessment performed. Vital signs, compression wrap changes, goal based teaching per careplan.     Other pertinent info: Contacted MD Bell office and spoke with his RN to get update on pt condition. She states that according to MD notes he is concerned for pts multiple comorbities and worsening pulmonary hypertension and he would recommend hospice only due to pts comment in the office about \"no longer wanting to go back to the ER for continued care.\""

## 2024-04-12 NOTE — HOME HEALTH
Patient reports she had a nosebleed last night, having a hard day.  Plan to address shower next visit, coordinate with nurse for LE wraps.

## 2024-04-12 NOTE — HOME HEALTH
Patient reports she is now taking Lasix, headed to bathroom on arrival, exhibiting increased shortness of air, daughter increased to 4L of oxygen last night due to O2 sats in 80's on 3L. Patient had MD appt yesterday with primary. Coordinated visit with nurse but patient requested to not take shower today since not feeling well.  Plan to address safety with adl tasks next visit, fatigue management techniques.

## 2024-04-14 VITALS
SYSTOLIC BLOOD PRESSURE: 110 MMHG | RESPIRATION RATE: 18 BRPM | TEMPERATURE: 98.3 F | HEART RATE: 95 BPM | OXYGEN SATURATION: 94 % | DIASTOLIC BLOOD PRESSURE: 82 MMHG

## 2024-04-16 ENCOUNTER — HOME CARE VISIT (OUTPATIENT)
Dept: HOME HEALTH SERVICES | Facility: HOME HEALTHCARE | Age: 89
End: 2024-04-16
Payer: MEDICARE

## 2024-04-16 PROCEDURE — G0152 HHCP-SERV OF OT,EA 15 MIN: HCPCS

## 2024-04-17 ENCOUNTER — HOME CARE VISIT (OUTPATIENT)
Dept: HOME HEALTH SERVICES | Facility: HOME HEALTHCARE | Age: 89
End: 2024-04-17
Payer: MEDICARE

## 2024-04-17 VITALS
SYSTOLIC BLOOD PRESSURE: 118 MMHG | OXYGEN SATURATION: 94 % | TEMPERATURE: 97.8 F | DIASTOLIC BLOOD PRESSURE: 78 MMHG | HEART RATE: 78 BPM | RESPIRATION RATE: 18 BRPM

## 2024-04-17 VITALS
HEART RATE: 96 BPM | OXYGEN SATURATION: 93 % | DIASTOLIC BLOOD PRESSURE: 78 MMHG | SYSTOLIC BLOOD PRESSURE: 116 MMHG | TEMPERATURE: 97.6 F

## 2024-04-17 PROCEDURE — G0299 HHS/HOSPICE OF RN EA 15 MIN: HCPCS

## 2024-04-17 NOTE — HOME HEALTH
Routine Visit Note:    Skill/education provided: CP and general assessment, vital signs stable while on 4L NC. Pt states she still hasnt been tolerating her normal 2L NC yet. Pt still taking her diuretic daily, swelling decreased, pt has 1+ pitting edema in BLE. No ss of cellulitis noted. Pt does not have scale accessable within the home to weigh. Pt educated on emergency plans and when to see eergent care.     Patient/caregiver response: Pt tolerated assessment well, and able to verbalize understanding of teaching.     Plan for next visit: CP and general assessment, vital signs, goal based teaching per careplan.

## 2024-04-17 NOTE — HOME HEALTH
Patient reports having a hard day.  BLE edema improved, wearing tubi .   Plan for OT discharge next visit, address shower task, patient and daughter in agreement.

## 2024-04-18 ENCOUNTER — HOME CARE VISIT (OUTPATIENT)
Dept: HOME HEALTH SERVICES | Facility: HOME HEALTHCARE | Age: 89
End: 2024-04-18
Payer: MEDICARE

## 2024-04-18 PROCEDURE — G0152 HHCP-SERV OF OT,EA 15 MIN: HCPCS

## 2024-04-22 VITALS
HEART RATE: 92 BPM | TEMPERATURE: 97.2 F | OXYGEN SATURATION: 94 % | DIASTOLIC BLOOD PRESSURE: 78 MMHG | SYSTOLIC BLOOD PRESSURE: 114 MMHG

## 2024-04-26 ENCOUNTER — HOME CARE VISIT (OUTPATIENT)
Dept: HOME HEALTH SERVICES | Facility: HOME HEALTHCARE | Age: 89
End: 2024-04-26
Payer: MEDICARE

## 2024-04-26 PROCEDURE — G0299 HHS/HOSPICE OF RN EA 15 MIN: HCPCS

## 2024-04-29 VITALS
TEMPERATURE: 98.8 F | HEART RATE: 78 BPM | DIASTOLIC BLOOD PRESSURE: 78 MMHG | SYSTOLIC BLOOD PRESSURE: 132 MMHG | OXYGEN SATURATION: 99 % | RESPIRATION RATE: 18 BRPM

## 2024-04-29 NOTE — HOME HEALTH
Routine Visit Note:    Skill/education provided: CP and general assessment, vital signs stable. Pt breathing has improved and pt is decreased to 3L NC and does not appear as winded. Pt edema has decreased, however, pt states she has not been wearing compression stockings the past 3 days. Pt reminded on the importance of needing to wear compression to BLE daily and that both double layer tubi  stockings and coban wraps were ordered by daughter. Pt states she will remind daughter of the importance of wearing compression daily. Pain assessment performed. Pt states she is still having mild pain in BLE, worse on right leg but states pain has been better managed since swelling has gone down and she has been using her topical lidocaine cream. BLE double layer tubi  stockings applied while in the home.     Patient/caregiver response: Pt tolerated well and able to vu of teachings.     Plan for next visit: CP and general assessment, vital signs, goal based teaching per careplan.

## 2024-04-30 ENCOUNTER — HOSPITAL ENCOUNTER (EMERGENCY)
Facility: HOSPITAL | Age: 89
Discharge: HOME | End: 2024-04-30
Attending: EMERGENCY MEDICINE
Payer: MEDICARE

## 2024-04-30 ENCOUNTER — HOME CARE VISIT (OUTPATIENT)
Dept: HOME HEALTH SERVICES | Facility: HOME HEALTHCARE | Age: 89
End: 2024-04-30
Payer: MEDICARE

## 2024-04-30 VITALS
HEART RATE: 70 BPM | HEIGHT: 66 IN | SYSTOLIC BLOOD PRESSURE: 122 MMHG | OXYGEN SATURATION: 96 % | DIASTOLIC BLOOD PRESSURE: 83 MMHG | BODY MASS INDEX: 36.96 KG/M2 | RESPIRATION RATE: 18 BRPM | WEIGHT: 230 LBS | TEMPERATURE: 97.1 F

## 2024-04-30 DIAGNOSIS — L03.115 CELLULITIS OF RIGHT LOWER EXTREMITY: Primary | ICD-10-CM

## 2024-04-30 DIAGNOSIS — Z79.01 ANTICOAGULANT LONG-TERM USE: ICD-10-CM

## 2024-04-30 LAB
ALBUMIN SERPL-MCNC: 3.5 G/DL (ref 3.5–5.2)
ALBUMIN/GLOB SERPL: 1.3 G/DL
ALP SERPL-CCNC: 77 U/L (ref 39–117)
ALT SERPL W P-5'-P-CCNC: 12 U/L (ref 1–33)
ANION GAP SERPL CALCULATED.3IONS-SCNC: 7.1 MMOL/L (ref 5–15)
AST SERPL-CCNC: 14 U/L (ref 1–32)
BASOPHILS # BLD AUTO: 0.04 10*3/MM3 (ref 0–0.2)
BASOPHILS NFR BLD AUTO: 0.4 % (ref 0–1.5)
BILIRUB SERPL-MCNC: 0.4 MG/DL (ref 0–1.2)
BUN SERPL-MCNC: 17 MG/DL (ref 8–23)
BUN/CREAT SERPL: 15.3 (ref 7–25)
CALCIUM SPEC-SCNC: 9.4 MG/DL (ref 8.6–10.5)
CHLORIDE SERPL-SCNC: 105 MMOL/L (ref 98–107)
CO2 SERPL-SCNC: 34.9 MMOL/L (ref 22–29)
CREAT SERPL-MCNC: 1.11 MG/DL (ref 0.57–1)
DEPRECATED RDW RBC AUTO: 43.7 FL (ref 37–54)
EGFRCR SERPLBLD CKD-EPI 2021: 48.5 ML/MIN/1.73
EOSINOPHIL # BLD AUTO: 0.16 10*3/MM3 (ref 0–0.4)
EOSINOPHIL NFR BLD AUTO: 1.7 % (ref 0.3–6.2)
ERYTHROCYTE [DISTWIDTH] IN BLOOD BY AUTOMATED COUNT: 14.2 % (ref 12.3–15.4)
GLOBULIN UR ELPH-MCNC: 2.6 GM/DL
GLUCOSE SERPL-MCNC: 86 MG/DL (ref 65–99)
HCT VFR BLD AUTO: 35.3 % (ref 34–46.6)
HGB BLD-MCNC: 11 G/DL (ref 12–15.9)
HOLD SPECIMEN: NORMAL
IMM GRANULOCYTES # BLD AUTO: 0.05 10*3/MM3 (ref 0–0.05)
IMM GRANULOCYTES NFR BLD AUTO: 0.5 % (ref 0–0.5)
LYMPHOCYTES # BLD AUTO: 2.01 10*3/MM3 (ref 0.7–3.1)
LYMPHOCYTES NFR BLD AUTO: 21.6 % (ref 19.6–45.3)
MCH RBC QN AUTO: 26.3 PG (ref 26.6–33)
MCHC RBC AUTO-ENTMCNC: 31.2 G/DL (ref 31.5–35.7)
MCV RBC AUTO: 84.4 FL (ref 79–97)
MONOCYTES # BLD AUTO: 1.05 10*3/MM3 (ref 0.1–0.9)
MONOCYTES NFR BLD AUTO: 11.3 % (ref 5–12)
NEUTROPHILS NFR BLD AUTO: 5.98 10*3/MM3 (ref 1.7–7)
NEUTROPHILS NFR BLD AUTO: 64.5 % (ref 42.7–76)
NRBC BLD AUTO-RTO: 0 /100 WBC (ref 0–0.2)
PLATELET # BLD AUTO: 241 10*3/MM3 (ref 140–450)
PMV BLD AUTO: 10.3 FL (ref 6–12)
POTASSIUM SERPL-SCNC: 3.8 MMOL/L (ref 3.5–5.2)
PROT SERPL-MCNC: 6.1 G/DL (ref 6–8.5)
RBC # BLD AUTO: 4.18 10*6/MM3 (ref 3.77–5.28)
SODIUM SERPL-SCNC: 147 MMOL/L (ref 136–145)
WBC NRBC COR # BLD AUTO: 9.29 10*3/MM3 (ref 3.4–10.8)

## 2024-04-30 PROCEDURE — 85025 COMPLETE CBC W/AUTO DIFF WBC: CPT | Performed by: PHYSICIAN ASSISTANT

## 2024-04-30 PROCEDURE — G0299 HHS/HOSPICE OF RN EA 15 MIN: HCPCS

## 2024-04-30 PROCEDURE — 99283 EMERGENCY DEPT VISIT LOW MDM: CPT

## 2024-04-30 PROCEDURE — 80053 COMPREHEN METABOLIC PANEL: CPT | Performed by: PHYSICIAN ASSISTANT

## 2024-04-30 RX ORDER — CEPHALEXIN 500 MG/1
500 CAPSULE ORAL ONCE
Status: COMPLETED | OUTPATIENT
Start: 2024-04-30 | End: 2024-04-30

## 2024-04-30 RX ORDER — CEPHALEXIN 500 MG/1
500 CAPSULE ORAL 3 TIMES DAILY
Qty: 30 CAPSULE | Refills: 0 | Status: SHIPPED | OUTPATIENT
Start: 2024-04-30 | End: 2024-05-10

## 2024-04-30 RX ORDER — SODIUM CHLORIDE 0.9 % (FLUSH) 0.9 %
10 SYRINGE (ML) INJECTION AS NEEDED
Status: DISCONTINUED | OUTPATIENT
Start: 2024-04-30 | End: 2024-04-30 | Stop reason: HOSPADM

## 2024-04-30 RX ADMIN — CEPHALEXIN 500 MG: 500 CAPSULE ORAL at 16:58

## 2024-04-30 NOTE — HOME HEALTH
Routine Visit Note:    Skill/education provided: CP and general assessment, vital signs stable. Pt again found with legs not wrapped and ankle socks on which were causing significant indentions in ankles, pt instructed to stop wearing these socks as they are going to cause further complications. Pt also presents with large area of dark discoloration, redness and warmth to right lower leg. Pt denies hitting leg but states she did roll the tubi  down to her ankles yesterday while she was out which caused more pain. Pt instructed to not roll any compression devices down as this again, will cause increased pressure and further complications. Instructed that if she feels that stockings are getting too tight to just take them completely off.     Patient/caregiver response: Pt had pain and was tearful to be sent back out to the ED.     Plan for next visit: CP and general assessment, vital signs, goal based teaching per careplan.     Other pertinent info: Contacted MD Leyva office to notify about pt leg, spoke with Radha. She states PCP did not have any openings until friday to be seen and that pt wound need to be seen in ED. Daughter states her car is broke down and did not think we would be able to get patient into her truck safely. EMS called for pt transport to ED.

## 2024-04-30 NOTE — ED PROVIDER NOTES
EMERGENCY DEPARTMENT ENCOUNTER      PCP: Christopher Leyva DO  Patient Care Team:  Christopher Leyva DO as PCP - General  Christopher Leyva DO as PCP - Family Medicine  Augustus Wilde MD as Consulting Physician (Hematology and Oncology)  Daniel Watson MD as Referring Physician (Hospitalist)   Independent Historians: Patient    HPI:  Chief Complaint: Leg swelling  A complete HPI/ROS/PMH/PSH/SH/FH are unobtainable due to: None    Chronic or social conditions impacting patient care (social determinants of health): None    Context: Vonnie Coppola is a 88 y.o. female with history of atrial fibrillation on Eliquis, hypertension who presents to the ED from home via EMS c/o acute erythema of right lower leg with some swelling.  Patient states her home health care nurse recommended she come in for evaluation due to erythema of her right leg.  Patient has had issues with some increased edema to lower extremities and has been wearing compression to her legs.  She states they had not taken the dressings off for couple days and her right leg began to hurt so she cut the wrap off.  She believes the wrap became slightly malpositioned while on her leg and caused some irritation.  She noticed some increased erythema to the leg and her home health nurse was concerned and recommended she come in.  She denies any fevers or chills.  She reports some mild soreness to the left leg but denies significant pain.  She is chronically on oxygen.    Review of prior external notes and/or external test results outside of this encounter: Reviewed discharge summary from 3/24/2024.  Patient was admitted for right leg swelling and redness.  There was initially concern for DVT while patient was on Eliquis however they had repeat ultrasound performed which was negative for DVT and suspected initial imaging was erroneous.  Resumed Eliquis.  Cellulitic changes improved on broad-spectrum antibiotics.      PAST MEDICAL  HISTORY  Active Ambulatory Problems     Diagnosis Date Noted    GI bleed 01/16/2024    Anemia 01/16/2024    HTN (hypertension) 01/16/2024    History of breast cancer 01/16/2024    Asthma 01/16/2024    History of CVA (cerebrovascular accident) 01/17/2024    Dehydration 01/17/2024    Renal insufficiency 01/17/2024    Pulmonary nodule 01/17/2024    Adnexal cyst 01/17/2024    Nausea 01/16/2024    Atrial fibrillation 01/21/2024    History of pulmonary embolism 03/22/2024    Class 2 severe obesity with serious comorbidity in adult 03/22/2024    Thrombocytopenia 03/22/2024    Cellulitis of right leg 03/23/2024    Acute cystitis 03/23/2024     Resolved Ambulatory Problems     Diagnosis Date Noted    No Resolved Ambulatory Problems     Past Medical History:   Diagnosis Date    Arthritis     Breast cancer 1999    History of cataract     Hx of radiation therapy 1999    Hypertension     Pneumonia     Stroke        The patient has started, but not completed, their COVID-19 vaccination series.    PAST SURGICAL HISTORY  Past Surgical History:   Procedure Laterality Date    APPENDECTOMY      BLADDER SURGERY      BREAST BIOPSY Left 1999    MALIGNANT    BREAST LUMPECTOMY Left 1999    MALIGNANT    CATARACT EXTRACTION      COLONOSCOPY N/A 01/19/2024    Procedure: COLONOSCOPY to cecum with cold snare polypectomies;  Surgeon: Harshad Gaston MD;  Location: [facility] ENDOSCOPY;  Service: Gastroenterology;  Laterality: N/A;  pre- gi bleed, anemia  post- diverticulosis, polyps    ENDOSCOPY N/A 01/19/2024    Procedure: ESOPHAGOGASTRODUODENOSCOPY with biospy;  Surgeon: Harshad Gaston MD;  Location: [facility] ENDOSCOPY;  Service: Gastroenterology;  Laterality: N/A;  pre- gi bleed, anemia  post- erosive gastirits    GALLBLADDER SURGERY      HERNIA REPAIR      HYSTERECTOMY      LASIK      LYMPHADENECTOMY      OOPHORECTOMY           FAMILY HISTORY  Family History   Problem Relation Age of Onset    Other Mother         Cardiac disorder     Osteoarthritis Mother     Cataracts Mother     Macular degeneration Mother     Hypertension Father     Other Father         Cardiac disorder    Ovarian cancer Sister         70'S    Cancer Sister     Diabetes Sister     Hypertension Sister     Osteoarthritis Sister     Cancer Brother     Hypertension Brother     Osteoarthritis Brother     Colon cancer Maternal Grandmother     Ovarian cancer Paternal Grandmother         unknown    Cancer Other     Stroke Other     Breast cancer Neg Hx          SOCIAL HISTORY  Social History     Socioeconomic History    Marital status:    Tobacco Use    Smoking status: Former     Current packs/day: 0.50     Average packs/day: 0.5 packs/day for 2.0 years (1.0 ttl pk-yrs)     Types: Cigarettes    Smokeless tobacco: Never    Tobacco comments:     quit 40 years ago   Vaping Use    Vaping status: Never Used   Substance and Sexual Activity    Alcohol use: No    Drug use: No    Sexual activity: Defer         ALLERGIES  Cortisone and Penicillins        REVIEW OF SYSTEMS  Review of Systems   Constitutional:  Negative for chills and fever.   Respiratory:  Positive for shortness of breath (chronic, unchanged).    Cardiovascular:  Positive for leg swelling. Negative for chest pain.   Skin:  Positive for color change.        All systems reviewed and negative except for those discussed in HPI.       PHYSICAL EXAM    I have reviewed the triage vital signs and nursing notes.    ED Triage Vitals [04/30/24 1407]   Temp Heart Rate Resp BP SpO2   97.1 °F (36.2 °C) 80 18 141/80 97 %      Temp src Heart Rate Source Patient Position BP Location FiO2 (%)   -- -- -- -- --       Physical Exam  GENERAL: alert, no acute distress, chronically ill appearing  SKIN: Warm, dry  HENT: Normocephalic, atraumatic  EYES: no scleral icterus  CV: regular rhythm, regular rate  RESPIRATORY: normal effort, lungs clear, chronic O2 NC  ABDOMEN: soft, nontender, nondistended  MUSCULOSKELETAL: no deformity, 1+ edema to  lower extremities, there is some erythema to distal RLE with increased centralized erythema and warmth, no induration, no significant tenderness  NEURO: alert, moves all extremities, follows commands          LAB RESULTS  Recent Results (from the past 24 hour(s))   Comprehensive Metabolic Panel    Collection Time: 04/30/24  3:37 PM    Specimen: Blood   Result Value Ref Range    Glucose 86 65 - 99 mg/dL    BUN 17 8 - 23 mg/dL    Creatinine 1.11 (H) 0.57 - 1.00 mg/dL    Sodium 147 (H) 136 - 145 mmol/L    Potassium 3.8 3.5 - 5.2 mmol/L    Chloride 105 98 - 107 mmol/L    CO2 34.9 (H) 22.0 - 29.0 mmol/L    Calcium 9.4 8.6 - 10.5 mg/dL    Total Protein 6.1 6.0 - 8.5 g/dL    Albumin 3.5 3.5 - 5.2 g/dL    ALT (SGPT) 12 1 - 33 U/L    AST (SGOT) 14 1 - 32 U/L    Alkaline Phosphatase 77 39 - 117 U/L    Total Bilirubin 0.4 0.0 - 1.2 mg/dL    Globulin 2.6 gm/dL    A/G Ratio 1.3 g/dL    BUN/Creatinine Ratio 15.3 7.0 - 25.0    Anion Gap 7.1 5.0 - 15.0 mmol/L    eGFR 48.5 (L) >60.0 mL/min/1.73   CBC Auto Differential    Collection Time: 04/30/24  3:37 PM    Specimen: Blood   Result Value Ref Range    WBC 9.29 3.40 - 10.80 10*3/mm3    RBC 4.18 3.77 - 5.28 10*6/mm3    Hemoglobin 11.0 (L) 12.0 - 15.9 g/dL    Hematocrit 35.3 34.0 - 46.6 %    MCV 84.4 79.0 - 97.0 fL    MCH 26.3 (L) 26.6 - 33.0 pg    MCHC 31.2 (L) 31.5 - 35.7 g/dL    RDW 14.2 12.3 - 15.4 %    RDW-SD 43.7 37.0 - 54.0 fl    MPV 10.3 6.0 - 12.0 fL    Platelets 241 140 - 450 10*3/mm3    Neutrophil % 64.5 42.7 - 76.0 %    Lymphocyte % 21.6 19.6 - 45.3 %    Monocyte % 11.3 5.0 - 12.0 %    Eosinophil % 1.7 0.3 - 6.2 %    Basophil % 0.4 0.0 - 1.5 %    Immature Grans % 0.5 0.0 - 0.5 %    Neutrophils, Absolute 5.98 1.70 - 7.00 10*3/mm3    Lymphocytes, Absolute 2.01 0.70 - 3.10 10*3/mm3    Monocytes, Absolute 1.05 (H) 0.10 - 0.90 10*3/mm3    Eosinophils, Absolute 0.16 0.00 - 0.40 10*3/mm3    Basophils, Absolute 0.04 0.00 - 0.20 10*3/mm3    Immature Grans, Absolute 0.05 0.00 - 0.05  10*3/mm3    nRBC 0.0 0.0 - 0.2 /100 WBC   Gray Top    Collection Time: 04/30/24  3:37 PM   Result Value Ref Range    Extra Tube Hold for add-ons.        Ordered the above labs and independently reviewed and interpreted the results.        RADIOLOGY  No Radiology Exams Resulted Within Past 24 Hours    I ordered the above noted radiological studies. Independently reviewed and interpreted by me.  See dictation for official radiology interpretation.      PROCEDURES    Procedures      MEDICATIONS GIVEN IN ER    Medications   sodium chloride 0.9 % flush 10 mL (has no administration in time range)   cephalexin (KEFLEX) capsule 500 mg (500 mg Oral Given 4/30/24 1658)         PROGRESS, DATA ANALYSIS, CONSULTS, AND MEDICAL DECISION MAKING    All labs have been independently reviewed and interpreted by me.  All radiology studies have been independently reviewed and interpreted by me and discussed with radiologist dictating the report.   EKG's independently reviewed and interpreted by me.  Discussion below represents my analysis of pertinent findings related to patient's condition, differential diagnosis, treatment plan and final disposition.    Differential diagnosis: cellulitis, abscess, venous stasis dermatitis    ED Course as of 04/30/24 1702 Tue Apr 30, 2024   1629 WBC: 9.29 [DC]   1629 Hemoglobin(!): 11.0  10.6 one month ago [DC]   1638 Sodium(!): 147 [TD]   1639 Creatinine(!): 1.11 [TD]   1701 Labs are overall reassuring.  Afebrile nontoxic-appearing.  May be some mild developing cellulitis so we will start on oral cephalosporin given her history of this.  Recommended continued compression of lower extremities to assist with edema.  Encouraged hydration.  Sodium mildly elevated although she does seem to run in the mid 140s.  Will have her monitor right lower extremity closely for any worsening.  Discussed return precautions. [DC]      ED Course User Index  [DC] Jojo King PA  [TD] Oleg Buckley II, MD              AS OF 17:02 EDT VITALS:    BP - 122/83  HR - 70  TEMP - 97.1 °F (36.2 °C)  O2 SATS - 96%        DIAGNOSIS  Final diagnoses:   Cellulitis of right lower extremity   Anticoagulant long-term use         DISPOSITION  ED Disposition       ED Disposition   Discharge    Condition   Stable    Comment   --                  Note Disclaimer: At [facility], we believe that sharing information builds trust and better relationships. You are receiving this note because you recently visited [facility]. It is possible you will see health information before a provider has talked with you about it. This kind of information can be easy to misunderstand. To help you fully understand what it means for your health, we urge you to discuss this note with your provider.         Jojo King PA  04/30/24 7144

## 2024-05-02 VITALS
SYSTOLIC BLOOD PRESSURE: 126 MMHG | TEMPERATURE: 97.5 F | HEART RATE: 74 BPM | RESPIRATION RATE: 18 BRPM | DIASTOLIC BLOOD PRESSURE: 72 MMHG | OXYGEN SATURATION: 97 %

## 2024-05-05 NOTE — ED PROVIDER NOTES
MD ATTESTATION NOTE    SHARED VISIT: This visit was performed by BOTH a physician and an APC. The substantive portion of the medical decision making was performed by this attesting physician who made or approved the management plan and takes responsibility for patient management. All studies documented in the APC note (if performed) were independently interpreted by me.    The SENIA and I have discussed this patient's history, physical exam, MDM, and treatment plan.  I have reviewed the documentation and personally had a face to face interaction with the patient. The attached note describes my personal findings.      Vonnie Coppola is a 88 y.o. female who presents to the ED c/o acute redness and swelling to the right lower leg.  No fever.    On exam:  GENERAL: not distressed  HENT: nares patent  EYES: no scleral icterus  CV: regular rhythm, regular rate  RESPIRATORY: normal effort  MUSCULOSKELETAL: no deformity  NEURO: alert, moves all extremities, follows commands  SKIN: Blanching and poorly demarcated erythema to the distal right lower extremity with mild associated warmth, no induration or fluctuance    Labs  No results found for this or any previous visit (from the past 24 hour(s)).    Radiology  No Radiology Exams Resulted Within Past 24 Hours    Medications given in the ED:  Medications   cephalexin (KEFLEX) capsule 500 mg (500 mg Oral Given 4/30/24 1658)       Orders placed during this visit:  Orders Placed This Encounter   Procedures    Comprehensive Metabolic Panel    CBC Auto Differential    [place] Draw    CBC & Differential    [place]       Medical Decision Making:  ED Course as of 05/04/24 2042 Tue Apr 30, 2024   1629 WBC: 9.29 [DC]   1629 Hemoglobin(!): 11.0  10.6 one month ago [DC]   1638 Sodium(!): 147 [TD]   1639 Creatinine(!): 1.11 [TD]   1701 Labs are overall reassuring.  Afebrile nontoxic-appearing.  May be some mild developing cellulitis so we will start on oral cephalosporin given her history  of this.  Recommended continued compression of lower extremities to assist with edema.  Encouraged hydration.  Sodium mildly elevated although she does seem to run in the mid 140s.  Will have her monitor right lower extremity closely for any worsening.  Discussed return precautions. [DC]      ED Course User Index  [DC] Jojo King PA  [TD] Oleg Buckley II, MD       Differential diagnosis:  Colitis, necrotizing fasciitis, less likely DVT    Diagnosis  Final diagnoses:   Cellulitis of right lower extremity   Anticoagulant long-term use          Oleg Buckley II, MD  05/04/24 2043

## 2024-05-07 ENCOUNTER — HOME CARE VISIT (OUTPATIENT)
Dept: HOME HEALTH SERVICES | Facility: HOME HEALTHCARE | Age: 89
End: 2024-05-07
Payer: MEDICARE

## 2024-05-07 PROCEDURE — G0299 HHS/HOSPICE OF RN EA 15 MIN: HCPCS

## 2024-05-09 ENCOUNTER — TELEPHONE (OUTPATIENT)
Dept: ONCOLOGY | Facility: CLINIC | Age: 89
End: 2024-05-09
Payer: MEDICARE

## 2024-05-09 ENCOUNTER — HOSPITAL ENCOUNTER (OUTPATIENT)
Dept: CT IMAGING | Facility: HOSPITAL | Age: 89
Discharge: HOME | End: 2024-05-09
Admitting: INTERNAL MEDICINE
Payer: MEDICARE

## 2024-05-09 DIAGNOSIS — R91.8 PULMONARY NODULES: ICD-10-CM

## 2024-05-09 DIAGNOSIS — Z86.718 HISTORY OF DVT (DEEP VEIN THROMBOSIS): ICD-10-CM

## 2024-05-09 PROCEDURE — 71260 CT THORAX DX C+: CPT

## 2024-05-09 PROCEDURE — 25510000001 IOPAMIDOL 61 % SOLUTION: Performed by: INTERNAL MEDICINE

## 2024-05-09 RX ADMIN — IOPAMIDOL 75 ML: 612 INJECTION, SOLUTION INTRAVENOUS at 14:22

## 2024-05-11 VITALS
TEMPERATURE: 97.2 F | RESPIRATION RATE: 18 BRPM | HEART RATE: 84 BPM | OXYGEN SATURATION: 97 % | DIASTOLIC BLOOD PRESSURE: 78 MMHG | SYSTOLIC BLOOD PRESSURE: 132 MMHG

## 2024-05-15 ENCOUNTER — HOME CARE VISIT (OUTPATIENT)
Dept: HOME HEALTH SERVICES | Facility: HOME HEALTHCARE | Age: 89
End: 2024-05-15
Payer: MEDICARE

## 2024-05-15 PROCEDURE — G0299 HHS/HOSPICE OF RN EA 15 MIN: HCPCS

## 2024-05-16 VITALS
RESPIRATION RATE: 18 BRPM | SYSTOLIC BLOOD PRESSURE: 122 MMHG | TEMPERATURE: 97.8 F | HEART RATE: 75 BPM | DIASTOLIC BLOOD PRESSURE: 78 MMHG | OXYGEN SATURATION: 97 %

## 2024-05-16 NOTE — HOME HEALTH
"Routine Visit Note:    Skill/education provided: CP and general assessment, vital signs stable. Pt states she has been feeling \"bad\" x3 days with fatigue and diarrhea. Pt states she did contact MD [facility] office for further instruction and states he is going to call her in something for diarrhea. Pt states is feeling some better today but still weak. Pt states she does not want to go into the office. She denies any bright red blood in stools but states it was been dark, but she has been taking pepto bismol. Pt again did not have legs wrapped during assessment, she states she has been jsut feeling too bad. The koban I laid out last week to be placed after shower are still laying on table in the room. Legs present with BLE worse on right than left. Pt and daughter givne instruction to increase fluid intake to avoid dehydration and to advance diet as tolerated. Also instructed again on the importance of keeping legs wrapped to avoid weeping and possible cellulitis. Daughter states pt has been refusing wraps due to being too tight, pt states daughter has been \"too busy\" to help wrap legs.     Patient/caregiver response: Pt tolerated interveniton well. Pt and daughter express that they will continue to attempt wraps.     Plan for next visit: CP and general assessment, vital signs, goal based teaching per careplan."

## 2024-05-20 ENCOUNTER — TELEPHONE (OUTPATIENT)
Dept: ONCOLOGY | Facility: CLINIC | Age: 89
End: 2024-05-20
Payer: MEDICARE

## 2024-05-20 NOTE — TELEPHONE ENCOUNTER
Caller: Elizabeth Rodríguez    Relationship to patient: Emergency Contact    Best call back number: [phone]     Chief complaint: R/S TOMORROW'S APPT    Type of visit: LAB & FOLLOW UP    Requested date: PLEASE CALL PATIENT TO GET THIS RESCHEDULED.  NO BH VERBAL ON FILE.      If rescheduling, when is the original appointment: TOMORROW 5/21 .

## 2024-05-24 ENCOUNTER — HOME CARE VISIT (OUTPATIENT)
Dept: HOME HEALTH SERVICES | Facility: HOME HEALTHCARE | Age: 89
End: 2024-05-24
Payer: MEDICARE

## 2024-05-24 PROCEDURE — G0299 HHS/HOSPICE OF RN EA 15 MIN: HCPCS

## 2024-05-24 NOTE — Clinical Note
"Please forward to MD Christopher Leyva    Discharge Decision: Discharging pt to home and family care today. During visit, pts legs had worsening edema and only 1 layer of tubi  for compression. Wraps were rolled down to mid-shin, pt states they were \"too tight and hurting\" and had to push them down. Pt states she was out all day yesterday for her grandsons graduation and had chinese food this week for dinner, which is most likely contributing to increased swelling today. Pt instructed to continue with proper compression wraps and the importance of keeping legs with compression at all times and avoiding foods high in sodium. Pt states, \"my daughter has been very busy with her sons graduation stuff this past week she doesn't really have time to help me wrap them.\"    Plan for discharge:Pt is to be discharged home to family    Barriers to discharge: none    Ongoing needs: Pt will need conitnued edema managment by family upon discharge     Any referrals needed: none    Any declines in outcomes: discuss and document reason/Order received to discharge without goals met? edema management goal not met at this time, however, pt and family are knowledgeable on edema management techniques. Continued compliance needed    Teaching needed prior to discharge: Continued educaiton regarding consistant edema management and leg compression given. "

## 2024-05-26 VITALS
SYSTOLIC BLOOD PRESSURE: 134 MMHG | OXYGEN SATURATION: 99 % | HEART RATE: 75 BPM | DIASTOLIC BLOOD PRESSURE: 86 MMHG | RESPIRATION RATE: 18 BRPM | TEMPERATURE: 97.5 F

## 2024-05-26 NOTE — HOME HEALTH
"Discharge Decision: Discharging pt to home and family care today. During visit, pts legs had worsening edema and only 1 layer of tubi  for compression. Wraps were rolled down to mid-shin, pt states they were \"too tight and hurting\" and had to push them down. Pt states she was out all day yesterday for her grandsons graduation and had chinese food this week for dinner, which is most likely contributing to increased swelling today. Pt instructed to continue with proper compression wraps and the importance of keeping legs with compression at all times and avoiding foods high in sodium. Pt states, \"my daughter has been very busy with her sons graduation stuff this past week she doesn't really have time to help me wrap them.\"    Plan for discharge:Pt is to be discharged home to family    Barriers to discharge: none    Ongoing needs: Pt will need conitnued edema managment by family upon discharge     Any referrals needed: none    Any declines in outcomes: discuss and document reason/Order received to discharge without goals met? edema management goal not met at this time, however, pt and family are knowledgeable on edema management techniques. Continued compliance needed    Teaching needed prior to discharge: Continued educaiton regarding consistant edema management and leg compression given."

## 2024-05-28 ENCOUNTER — HOSPITAL ENCOUNTER (EMERGENCY)
Facility: HOSPITAL | Age: 89
Discharge: HOME | DRG: 571 | End: 2024-05-28
Attending: EMERGENCY MEDICINE
Payer: MEDICARE

## 2024-05-28 ENCOUNTER — APPOINTMENT (OUTPATIENT)
Dept: GENERAL RADIOLOGY | Facility: HOSPITAL | Age: 89
DRG: 571 | End: 2024-05-28
Payer: MEDICARE

## 2024-05-28 VITALS
DIASTOLIC BLOOD PRESSURE: 74 MMHG | RESPIRATION RATE: 16 BRPM | HEART RATE: 73 BPM | WEIGHT: 230 LBS | TEMPERATURE: 97 F | BODY MASS INDEX: 36.96 KG/M2 | OXYGEN SATURATION: 96 % | SYSTOLIC BLOOD PRESSURE: 136 MMHG | HEIGHT: 66 IN

## 2024-05-28 DIAGNOSIS — S61.511A TEAR OF SKIN OF RIGHT WRIST, INITIAL ENCOUNTER: ICD-10-CM
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
## 2024-05-28 DIAGNOSIS — S90.01XA CONTUSION OF RIGHT ANKLE, INITIAL ENCOUNTER: ICD-10-CM

## 2024-05-28 DIAGNOSIS — L03.115 CELLULITIS OF RIGHT LOWER EXTREMITY: ICD-10-CM

## 2024-05-28 LAB
ALBUMIN SERPL-MCNC: 3 G/DL (ref 3.5–5.2)
ALBUMIN/GLOB SERPL: 1.1 G/DL
ALP SERPL-CCNC: 70 U/L (ref 39–117)
ALT SERPL W P-5'-P-CCNC: 9 U/L (ref 1–33)
ANION GAP SERPL CALCULATED.3IONS-SCNC: 6 MMOL/L (ref 5–15)
AST SERPL-CCNC: 9 U/L (ref 1–32)
BASOPHILS # BLD AUTO: 0.02 10*3/MM3 (ref 0–0.2)
BASOPHILS NFR BLD AUTO: 0.2 % (ref 0–1.5)
BILIRUB SERPL-MCNC: 0.3 MG/DL (ref 0–1.2)
BUN SERPL-MCNC: 16 MG/DL (ref 8–23)
BUN/CREAT SERPL: 15.4 (ref 7–25)
CALCIUM SPEC-SCNC: 8.9 MG/DL (ref 8.6–10.5)
CHLORIDE SERPL-SCNC: 107 MMOL/L (ref 98–107)
CO2 SERPL-SCNC: 32 MMOL/L (ref 22–29)
CREAT SERPL-MCNC: 1.04 MG/DL (ref 0.57–1)
DEPRECATED RDW RBC AUTO: 45.1 FL (ref 37–54)
EGFRCR SERPLBLD CKD-EPI 2021: 52.5 ML/MIN/1.73
EOSINOPHIL # BLD AUTO: 0.18 10*3/MM3 (ref 0–0.4)
EOSINOPHIL NFR BLD AUTO: 2 % (ref 0.3–6.2)
ERYTHROCYTE [DISTWIDTH] IN BLOOD BY AUTOMATED COUNT: 14.8 % (ref 12.3–15.4)
GLOBULIN UR ELPH-MCNC: 2.7 GM/DL
GLUCOSE SERPL-MCNC: 95 MG/DL (ref 65–99)
HCT VFR BLD AUTO: 30.4 % (ref 34–46.6)
HGB BLD-MCNC: 9.4 G/DL (ref 12–15.9)
IMM GRANULOCYTES # BLD AUTO: 0.06 10*3/MM3 (ref 0–0.05)
IMM GRANULOCYTES NFR BLD AUTO: 0.7 % (ref 0–0.5)
LYMPHOCYTES # BLD AUTO: 2.12 10*3/MM3 (ref 0.7–3.1)
LYMPHOCYTES NFR BLD AUTO: 23.1 % (ref 19.6–45.3)
MCH RBC QN AUTO: 26 PG (ref 26.6–33)
MCHC RBC AUTO-ENTMCNC: 30.9 G/DL (ref 31.5–35.7)
MCV RBC AUTO: 84 FL (ref 79–97)
MONOCYTES # BLD AUTO: 0.9 10*3/MM3 (ref 0.1–0.9)
MONOCYTES NFR BLD AUTO: 9.8 % (ref 5–12)
NEUTROPHILS NFR BLD AUTO: 5.91 10*3/MM3 (ref 1.7–7)
NEUTROPHILS NFR BLD AUTO: 64.2 % (ref 42.7–76)
NRBC BLD AUTO-RTO: 0 /100 WBC (ref 0–0.2)
PLATELET # BLD AUTO: 233 10*3/MM3 (ref 140–450)
PMV BLD AUTO: 10.1 FL (ref 6–12)
POTASSIUM SERPL-SCNC: 3.3 MMOL/L (ref 3.5–5.2)
PROT SERPL-MCNC: 5.7 G/DL (ref 6–8.5)
RBC # BLD AUTO: 3.62 10*6/MM3 (ref 3.77–5.28)
SODIUM SERPL-SCNC: 145 MMOL/L (ref 136–145)
WBC NRBC COR # BLD AUTO: 9.19 10*3/MM3 (ref 3.4–10.8)

## 2024-05-28 PROCEDURE — 99283 EMERGENCY DEPT VISIT LOW MDM: CPT

## 2024-05-28 PROCEDURE — 73610 X-RAY EXAM OF ANKLE: CPT

## 2024-05-28 PROCEDURE — 80053 COMPREHEN METABOLIC PANEL: CPT | Performed by: PHYSICIAN ASSISTANT

## 2024-05-28 PROCEDURE — 90715 TDAP VACCINE 7 YRS/> IM: CPT | Performed by: NURSE PRACTITIONER

## 2024-05-28 PROCEDURE — 25010000002 TETANUS-DIPHTH-ACELL PERTUSSIS 5-2.5-18.5 LF-MCG/0.5 SUSPENSION PREFILLED SYRINGE: Performed by: NURSE PRACTITIONER

## 2024-05-28 PROCEDURE — 85025 COMPLETE CBC W/AUTO DIFF WBC: CPT | Performed by: PHYSICIAN ASSISTANT

## 2024-05-28 PROCEDURE — 90471 IMMUNIZATION ADMIN: CPT | Performed by: NURSE PRACTITIONER

## 2024-05-28 RX ORDER — LIDOCAINE HYDROCHLORIDE AND EPINEPHRINE 10; 10 MG/ML; UG/ML
10 INJECTION, SOLUTION INFILTRATION; PERINEURAL ONCE
Status: COMPLETED | OUTPATIENT
Start: 2024-05-28 | End: 2024-05-28

## 2024-05-28 RX ORDER — HYDROCODONE BITARTRATE AND ACETAMINOPHEN 5; 325 MG/1; MG/1
1 TABLET ORAL ONCE
Status: COMPLETED | OUTPATIENT
Start: 2024-05-28 | End: 2024-05-28

## 2024-05-28 RX ORDER — CEPHALEXIN 500 MG/1
500 CAPSULE ORAL 3 TIMES DAILY
Qty: 21 CAPSULE | Refills: 0 | Status: SHIPPED | OUTPATIENT
Start: 2024-05-28 | End: 2024-06-12 | Stop reason: HOSPADM

## 2024-05-28 RX ORDER — SODIUM CHLORIDE 0.9 % (FLUSH) 0.9 %
10 SYRINGE (ML) INJECTION AS NEEDED
Status: DISCONTINUED | OUTPATIENT
Start: 2024-05-28 | End: 2024-05-28 | Stop reason: HOSPADM

## 2024-05-28 RX ADMIN — HYDROCODONE BITARTRATE AND ACETAMINOPHEN 1 TABLET: 5; 325 TABLET ORAL at 05:35

## 2024-05-28 RX ADMIN — TETANUS TOXOID, REDUCED DIPHTHERIA TOXOID AND ACELLULAR PERTUSSIS VACCINE, ADSORBED 0.5 ML: 5; 2.5; 8; 8; 2.5 SUSPENSION INTRAMUSCULAR at 05:35

## 2024-05-28 RX ADMIN — LIDOCAINE HYDROCHLORIDE AND EPINEPHRINE 10 ML: 10; 10 INJECTION, SOLUTION INFILTRATION; PERINEURAL at 09:00

## 2024-05-28 NOTE — DISCHARGE INSTRUCTIONS
Ice to right lower extremity.    Use walker at all times.    Return to ER for any worsening symptoms.

## 2024-05-28 NOTE — ED NOTES
Pt to ED via EMS from home.     EMS reports pt fell in the hallway at her home while ambulating with walker. Pt c/o right ankle and right shoulder pain. Pt landed on carpeted surface. Pt is on Eliquis. Pt denies hitting her head, denies LOC. EMS reports abrasion to right wrist, bandage intact upon arrival. Pt A/O x 4. Hx of cellulitis on right lower leg.

## 2024-05-28 NOTE — ED PROVIDER NOTES
MD ATTESTATION NOTE    The SENIA and I have discussed this patient's history, physical exam, MDM, and treatment plan.  I have reviewed the documentation and personally had a face to face interaction with the patient. I affirm the documentation and agree with the treatment and plan.  The attached note describes my personal findings.      I provided a substantive portion of the medical decision making.        Brief HPI: Patient presents with complaint of right ankle pain after she fell today walking to the bathroom.  She states that she lost balance on her walker.  She is on Eliquis but she denies head trauma.  She did sustain a small abrasion to her right wrist.  She states that her right leg has been swelling recently.  She has been on antibiotics for cellulitis.  She denies fever or chills.    PHYSICAL EXAM  ED Triage Vitals [05/28/24 0450]   Temp Heart Rate Resp BP SpO2   97 °F (36.1 °C) 80 16 124/70 96 %      Temp src Heart Rate Source Patient Position BP Location FiO2 (%)   Tympanic -- -- -- --         GENERAL: Awake and alert, no acute distress  HENT: nares patent  EYES: no scleral icterus  CV: regular rhythm, normal rate  RESPIRATORY: normal effort  MUSCULOSKELETAL: no deformity.  Right lower extremity there is a area of ecchymosis and fluctuance on the medial aspect of the right leg with mild erythema and warmth.  There is pitting edema on the dorsum of the right foot without erythema or warmth.  There is no soft tissue crepitus.  NEURO: alert, moves all extremities, follows commands, cranial nerves II through XII intact  PSYCH:  calm, cooperative  SKIN: warm, dry    Vital signs and nursing notes reviewed.        Medical Decision Making:  ED Course as of 05/28/24 0842   Tue May 28, 2024   050 First look:  Patient is an 88-year-old female presents the emergency department via EMS after a mechanical fall.  Patient was walking in the hallway with her walker when she tripped causing her to fall.  Patient reports  complaints of right ankle pain and skin tear to right wrist.  Unsure of last tetanus update.  Patient reports she did not hit her head.  X-ray of right ankle ordered, will treat pain with hydrocodone, update tetanus. [AR]   0605 Right ankle x-rays independently interpreted myself show no evidence of acute fracture. [EE]      ED Course User Index  [AR] Liza Issa APRN  [EE] Chava Lindsey, PA     Patient has no evidence of acute ankle fracture.  I am concerned that the area of redness and fluctuance on the medial aspect the right leg may represent infected hematoma or abscess.  This apparently did not occur today as a result of her fall but has been swollen for the last few days.  We will obtain further evaluation with labs and also perform incision and drainage or aspiration.           Garfield Fang MD  05/28/24 8578

## 2024-05-28 NOTE — ED PROVIDER NOTES
EMERGENCY DEPARTMENT ENCOUNTER    Room Number:  40/40  Date of encounter:  5/28/2024  PCP: Christopher Leyva DO  Historian: Patient  Chronic or social conditions impacting care (social determinants of health): DNR from home    HPI:  Chief Complaint: Fall  A complete HPI/ROS/PMH/PSH/SH/FH are unobtainable due to: Nothing    Context: Vonnie Coppola is a 88 y.o. female with a history of hypertension, atrial fibrillation obesity, stroke.  She presents to the ED c/o acute injuries sustained in a mechanical fall prior to arrival.  Patient states she was up walking to the bathroom this morning when she lost her balance on her walker.  Patient hit her right ankle on the walker, as well as  suffered a skin tear to her right wrist.  She is on Eliquis for atrial fibrillation.  She denies any head, neck, trunk trauma.  Patient  was unable to get off the ground initially.  EMS was called.  EMS was able to help the patient to her feet where she ambulated to the bathroom.  Patient reports that she was being treated for cellulitis of her right lower extremity approximately 2 weeks ago.  She has finished with the antibiotic.  She reports that her right lower leg has continued to be mildly erythematous.     Review of prior external notes (non-ED):   Reviewed an admission to the hospital from 3/21/2024.  Patient mated for cellulitis and DVT of the right lower extremity.    Review of prior external test results outside of this encounter:  I reviewed a CMP from 4/30/2024.  Creatinine 1.11, potassium 3.8    PAST MEDICAL HISTORY  Active Ambulatory Problems     Diagnosis Date Noted    GI bleed 01/16/2024    Anemia 01/16/2024    HTN (hypertension) 01/16/2024    History of breast cancer 01/16/2024    Asthma 01/16/2024    History of CVA (cerebrovascular accident) 01/17/2024    Dehydration 01/17/2024    Renal insufficiency 01/17/2024    Pulmonary nodule 01/17/2024    Adnexal cyst 01/17/2024    Nausea 01/16/2024    Atrial fibrillation  01/21/2024    History of pulmonary embolism 03/22/2024    Class 2 severe obesity with serious comorbidity in adult 03/22/2024    Thrombocytopenia 03/22/2024    Cellulitis of right leg 03/23/2024    Acute cystitis 03/23/2024     Resolved Ambulatory Problems     Diagnosis Date Noted    No Resolved Ambulatory Problems     Past Medical History:   Diagnosis Date    Arthritis     Breast cancer 1999    History of cataract     Hx of radiation therapy 1999    Hypertension     Pneumonia     Stroke          PAST SURGICAL HISTORY  Past Surgical History:   Procedure Laterality Date    APPENDECTOMY      BLADDER SURGERY      BREAST BIOPSY Left 1999    MALIGNANT    BREAST LUMPECTOMY Left 1999    MALIGNANT    CATARACT EXTRACTION      COLONOSCOPY N/A 01/19/2024    Procedure: COLONOSCOPY to cecum with cold snare polypectomies;  Surgeon: Harshad Gaston MD;  Location:  [facility];  Service: Gastroenterology;  Laterality: N/A;  pre- gi bleed, anemia  post- diverticulosis, polyps    ENDOSCOPY N/A 01/19/2024    Procedure: ESOPHAGOGASTRODUODENOSCOPY with biospy;  Surgeon: Harshad Gaston MD;  Location:  [facility];  Service: Gastroenterology;  Laterality: N/A;  pre- gi bleed, anemia  post- erosive gastirits    GALLBLADDER SURGERY      HERNIA REPAIR      HYSTERECTOMY      LASIK      LYMPHADENECTOMY      OOPHORECTOMY           FAMILY HISTORY  Family History   Problem Relation Age of Onset    Other Mother         Cardiac disorder    Osteoarthritis Mother     Cataracts Mother     Macular degeneration Mother     Hypertension Father     Other Father         Cardiac disorder    Ovarian cancer Sister         70'S    Cancer Sister     Diabetes Sister     Hypertension Sister     Osteoarthritis Sister     Cancer Brother     Hypertension Brother     Osteoarthritis Brother     Colon cancer Maternal Grandmother     Ovarian cancer Paternal Grandmother         unknown    Cancer Other     Stroke Other     Breast cancer Neg Hx           SOCIAL HISTORY  Social History     Socioeconomic History    Marital status:    Tobacco Use    Smoking status: Former     Current packs/day: 0.50     Average packs/day: 0.5 packs/day for 2.0 years (1.0 ttl pk-yrs)     Types: Cigarettes    Smokeless tobacco: Never    Tobacco comments:     quit 40 years ago   Vaping Use    Vaping status: Never Used   Substance and Sexual Activity    Alcohol use: No    Drug use: No    Sexual activity: Defer         ALLERGIES  Cortisone and Penicillins        REVIEW OF SYSTEMS  All systems reviewed and negative except for those discussed in HPI.       PHYSICAL EXAM    I have reviewed the triage vital signs and nursing notes.    ED Triage Vitals [05/28/24 0450]   Temp Heart Rate Resp BP SpO2   97 °F (36.1 °C) 80 16 124/70 96 %      Temp src Heart Rate Source Patient Position BP Location FiO2 (%)   Tympanic -- -- -- --       Physical Exam  GENERAL: Alert, oriented, chronically ill-appearing, not distressed  HENT: head atraumatic, no nuchal rigidity  EYES: no scleral icterus, EOMI  CV: regular rhythm, regular rate, no murmur  RESPIRATORY: normal effort, CTA  ABDOMEN: soft, nontender  MUSCULOSKELETAL: Area of ecchymosis to the medial right distal lower leg.  Area is mildly indurated and mildly erythematous.  No lymphangitis.  No bony tenderness over the lateral or medial malleolus.  NEURO: alert, moves all extremities, follows commands  SKIN: warm, dry        LAB RESULTS  Recent Results (from the past 24 hour(s))   Comprehensive Metabolic Panel    Collection Time: 05/28/24  8:27 AM    Specimen: Blood   Result Value Ref Range    Glucose 95 65 - 99 mg/dL    BUN 16 8 - 23 mg/dL    Creatinine 1.04 (H) 0.57 - 1.00 mg/dL    Sodium 145 136 - 145 mmol/L    Potassium 3.3 (L) 3.5 - 5.2 mmol/L    Chloride 107 98 - 107 mmol/L    CO2 32.0 (H) 22.0 - 29.0 mmol/L    Calcium 8.9 8.6 - 10.5 mg/dL    Total Protein 5.7 (L) 6.0 - 8.5 g/dL    Albumin 3.0 (L) 3.5 - 5.2 g/dL    ALT (SGPT) 9 1 - 33  U/L    AST (SGOT) 9 1 - 32 U/L    Alkaline Phosphatase 70 39 - 117 U/L    Total Bilirubin 0.3 0.0 - 1.2 mg/dL    Globulin 2.7 gm/dL    A/G Ratio 1.1 g/dL    BUN/Creatinine Ratio 15.4 7.0 - 25.0    Anion Gap 6.0 5.0 - 15.0 mmol/L    eGFR 52.5 (L) >60.0 mL/min/1.73   CBC Auto Differential    Collection Time: 05/28/24  8:27 AM    Specimen: Blood   Result Value Ref Range    WBC 9.19 3.40 - 10.80 10*3/mm3    RBC 3.62 (L) 3.77 - 5.28 10*6/mm3    Hemoglobin 9.4 (L) 12.0 - 15.9 g/dL    Hematocrit 30.4 (L) 34.0 - 46.6 %    MCV 84.0 79.0 - 97.0 fL    MCH 26.0 (L) 26.6 - 33.0 pg    MCHC 30.9 (L) 31.5 - 35.7 g/dL    RDW 14.8 12.3 - 15.4 %    RDW-SD 45.1 37.0 - 54.0 fl    MPV 10.1 6.0 - 12.0 fL    Platelets 233 140 - 450 10*3/mm3    Neutrophil % 64.2 42.7 - 76.0 %    Lymphocyte % 23.1 19.6 - 45.3 %    Monocyte % 9.8 5.0 - 12.0 %    Eosinophil % 2.0 0.3 - 6.2 %    Basophil % 0.2 0.0 - 1.5 %    Immature Grans % 0.7 (H) 0.0 - 0.5 %    Neutrophils, Absolute 5.91 1.70 - 7.00 10*3/mm3    Lymphocytes, Absolute 2.12 0.70 - 3.10 10*3/mm3    Monocytes, Absolute 0.90 0.10 - 0.90 10*3/mm3    Eosinophils, Absolute 0.18 0.00 - 0.40 10*3/mm3    Basophils, Absolute 0.02 0.00 - 0.20 10*3/mm3    Immature Grans, Absolute 0.06 (H) 0.00 - 0.05 10*3/mm3    nRBC 0.0 0.0 - 0.2 /100 WBC       Ordered the above labs and independently reviewed the results.        RADIOLOGY  XR Ankle 3+ View Right    Result Date: 5/28/2024  3 VIEWS RIGHT ANKLE  HISTORY: Fall  COMPARISON: March 21, 2024  FINDINGS: No acute fracture or subluxation of the right ankle is seen. There is soft tissue swelling about the ankle, as well as diffuse right lower extremity edema. Degenerative changes of the right ankle are noted, as well as calcaneal spurring.      No acute fracture or subluxation identified.  This report was finalized on 5/28/2024 5:18 AM by Dr. Robyn Wood M.D on Workstation: [id number]       I ordered the above noted radiological studies. Reviewed by me and  discussed with radiologist.  See dictation for official radiology interpretation.      MEDICATIONS GIVEN IN ER    Medications   Tetanus-Diphth-Acell Pertussis (BOOSTRIX) injection 0.5 mL (0.5 mL Intramuscular Given 5/28/24 0535)   HYDROcodone-acetaminophen (NORCO) 5-325 MG per tablet 1 tablet (1 tablet Oral Given 5/28/24 0535)   lidocaine 1% - EPINEPHrine 1:063559 (XYLOCAINE W/EPI) 1 %-1:243800 injection 10 mL (10 mL Injection Given by Other 5/28/24 0900)         ADDITIONAL ORDERS CONSIDERED BUT NOT ORDERED:  Considered admission however patient's blood work is reassuring.  No purulent discharge on aspiration.  Suspect likely hematoma.  We will cover with antibiotics for possible cellulitis.      PROGRESS, DATA ANALYSIS, CONSULTS, AND MEDICAL DECISION MAKING    All labs have been independently interpreted by myself.  All radiology studies have been independently interpreted by myself and discussed with radiologist dictating the report.   EKG's independently interpreted by myself.  Discussion below represents my analysis of pertinent findings related to patient's condition, differential diagnosis, treatment plan and final disposition.    I have discussed case with Dr. Fang, emergency room physician.  He has performed his own bedside examination and agrees with treatment plan.    ED Course as of 05/28/24 1528   Tue May 28, 2024   0507 First look:  Patient is an 88-year-old female presents the emergency department via EMS after a mechanical fall.  Patient was walking in the hallway with her walker when she tripped causing her to fall.  Patient reports complaints of right ankle pain and skin tear to right wrist.  Unsure of last tetanus update.  Patient reports she did not hit her head.  X-ray of right ankle ordered, will treat pain with hydrocodone, update tetanus. [AR]   0605 Right ankle x-rays independently interpreted myself show no evidence of acute fracture. [EE]   0843 WBC: 9.19 [EE]   0843 Hemoglobin(!): 9.4  [EE]   0918 I performed a needle aspirate of the fluctuant area to the right lower extremity.  There was blood without any purulent discharge.  I suspect this is likely a resolving hematoma.  There is mild surrounding erythema.  Her lab work is reassuring.  We will discharge with further antibiotics.  Patient in agreement with treatment plan [EE]   0941 Patient able to ambulate well with a walker. [EE]      ED Course User Index  [AR] Liza Issa APRN  [EE] Chava Lindsey PA       AS OF 15:28 EDT VITALS:    BP - 136/74  HR - 73  TEMP - 97 °F (36.1 °C) (Tympanic)  O2 SATS - 96%        DIAGNOSIS  Final diagnoses:   Fall, initial encounter   Contusion of right ankle, initial encounter   Tear of skin of right wrist, initial encounter   Cellulitis of right lower extremity         DISPOSITION  Discharged    Admission was considered but after careful review of the patient's presentation, physical examination, diagnostic results, and response to treatment the patient may be safely discharged with outpatient follow-up.         Dictated utilizing [employer] dictation     Chava Lindsey PA  05/28/24 2840

## 2024-05-31 ENCOUNTER — HOSPITAL ENCOUNTER (INPATIENT)
Facility: HOSPITAL | Age: 89
LOS: 12 days | Discharge: NURSING, REHAB OR LONG-TERM CARE | DRG: 571 | End: 2024-06-12
Attending: EMERGENCY MEDICINE | Admitting: STUDENT IN AN ORGANIZED HEALTH CARE EDUCATION/TRAINING PROGRAM
Payer: MEDICARE

## 2024-05-31 ENCOUNTER — ANESTHESIA (OUTPATIENT)
Dept: PERIOP | Facility: HOSPITAL | Age: 89
End: 2024-05-31
Payer: MEDICARE

## 2024-05-31 ENCOUNTER — ANESTHESIA EVENT (OUTPATIENT)
Dept: PERIOP | Facility: HOSPITAL | Age: 89
End: 2024-05-31
Payer: MEDICARE

## 2024-05-31 ENCOUNTER — APPOINTMENT (OUTPATIENT)
Dept: CT IMAGING | Facility: HOSPITAL | Age: 89
DRG: 571 | End: 2024-05-31
Payer: MEDICARE

## 2024-05-31 DIAGNOSIS — D64.9 CHRONIC ANEMIA: ICD-10-CM

## 2024-05-31 DIAGNOSIS — L03.115 CELLULITIS OF RIGHT LEG: ICD-10-CM

## 2024-05-31 DIAGNOSIS — S80.11XD LEG HEMATOMA, RIGHT, SUBSEQUENT ENCOUNTER: Primary | ICD-10-CM

## 2024-05-31 DIAGNOSIS — F41.9 ANXIETY DISORDER, UNSPECIFIED TYPE: ICD-10-CM

## 2024-05-31 DIAGNOSIS — L03.115 CELLULITIS OF LEG, RIGHT: ICD-10-CM

## 2024-05-31 LAB
ALBUMIN SERPL-MCNC: 3.2 G/DL (ref 3.5–5.2)
ALBUMIN/GLOB SERPL: 1.2 G/DL
ALP SERPL-CCNC: 70 U/L (ref 39–117)
ALT SERPL W P-5'-P-CCNC: 10 U/L (ref 1–33)
ANION GAP SERPL CALCULATED.3IONS-SCNC: 8.9 MMOL/L (ref 5–15)
AST SERPL-CCNC: 11 U/L (ref 1–32)
BASOPHILS # BLD AUTO: 0.02 10*3/MM3 (ref 0–0.2)
BASOPHILS NFR BLD AUTO: 0.2 % (ref 0–1.5)
BILIRUB SERPL-MCNC: 0.4 MG/DL (ref 0–1.2)
BUN SERPL-MCNC: 15 MG/DL (ref 8–23)
BUN/CREAT SERPL: 15.3 (ref 7–25)
CALCIUM SPEC-SCNC: 8.7 MG/DL (ref 8.6–10.5)
CHLORIDE SERPL-SCNC: 107 MMOL/L (ref 98–107)
CO2 SERPL-SCNC: 27.1 MMOL/L (ref 22–29)
CREAT SERPL-MCNC: 0.98 MG/DL (ref 0.57–1)
D-LACTATE SERPL-SCNC: 1.5 MMOL/L (ref 0.5–2)
DEPRECATED RDW RBC AUTO: 45.7 FL (ref 37–54)
EGFRCR SERPLBLD CKD-EPI 2021: 56.3 ML/MIN/1.73
EOSINOPHIL # BLD AUTO: 0.16 10*3/MM3 (ref 0–0.4)
EOSINOPHIL NFR BLD AUTO: 1.8 % (ref 0.3–6.2)
ERYTHROCYTE [DISTWIDTH] IN BLOOD BY AUTOMATED COUNT: 15.2 % (ref 12.3–15.4)
GLOBULIN UR ELPH-MCNC: 2.6 GM/DL
GLUCOSE SERPL-MCNC: 110 MG/DL (ref 65–99)
HCT VFR BLD AUTO: 29.8 % (ref 34–46.6)
HGB BLD-MCNC: 9.1 G/DL (ref 12–15.9)
IMM GRANULOCYTES # BLD AUTO: 0.05 10*3/MM3 (ref 0–0.05)
IMM GRANULOCYTES NFR BLD AUTO: 0.6 % (ref 0–0.5)
LYMPHOCYTES # BLD AUTO: 2.23 10*3/MM3 (ref 0.7–3.1)
LYMPHOCYTES NFR BLD AUTO: 25.4 % (ref 19.6–45.3)
MCH RBC QN AUTO: 25.3 PG (ref 26.6–33)
MCHC RBC AUTO-ENTMCNC: 30.5 G/DL (ref 31.5–35.7)
MCV RBC AUTO: 82.8 FL (ref 79–97)
MONOCYTES # BLD AUTO: 0.79 10*3/MM3 (ref 0.1–0.9)
MONOCYTES NFR BLD AUTO: 9 % (ref 5–12)
NEUTROPHILS NFR BLD AUTO: 5.52 10*3/MM3 (ref 1.7–7)
NEUTROPHILS NFR BLD AUTO: 63 % (ref 42.7–76)
NRBC BLD AUTO-RTO: 0 /100 WBC (ref 0–0.2)
PLATELET # BLD AUTO: 247 10*3/MM3 (ref 140–450)
PMV BLD AUTO: 9.8 FL (ref 6–12)
POTASSIUM SERPL-SCNC: 3.7 MMOL/L (ref 3.5–5.2)
PROT SERPL-MCNC: 5.8 G/DL (ref 6–8.5)
QT INTERVAL: 427 MS
QTC INTERVAL: 468 MS
RBC # BLD AUTO: 3.6 10*6/MM3 (ref 3.77–5.28)
SODIUM SERPL-SCNC: 143 MMOL/L (ref 136–145)
WBC NRBC COR # BLD AUTO: 8.77 10*3/MM3 (ref 3.4–10.8)

## 2024-05-31 PROCEDURE — 25010000002 PIPERACILLIN SOD-TAZOBACTAM PER 1 G: Performed by: EMERGENCY MEDICINE

## 2024-05-31 PROCEDURE — 36415 COLL VENOUS BLD VENIPUNCTURE: CPT

## 2024-05-31 PROCEDURE — 11042 DBRDMT SUBQ TIS 1ST 20SQCM/<: CPT | Performed by: SURGERY

## 2024-05-31 PROCEDURE — 25010000002 PROPOFOL 10 MG/ML EMULSION: Performed by: NURSE ANESTHETIST, CERTIFIED REGISTERED

## 2024-05-31 PROCEDURE — 11045 DBRDMT SUBQ TISS EACH ADDL: CPT | Performed by: SURGERY

## 2024-05-31 PROCEDURE — 25010000002 HYDROMORPHONE 1 MG/ML SOLUTION: Performed by: ANESTHESIOLOGY

## 2024-05-31 PROCEDURE — 0JBN0ZZ EXCISION OF RIGHT LOWER LEG SUBCUTANEOUS TISSUE AND FASCIA, OPEN APPROACH: ICD-10-PCS | Performed by: SURGERY

## 2024-05-31 PROCEDURE — 25010000002 VANCOMYCIN 10 G RECONSTITUTED SOLUTION: Performed by: EMERGENCY MEDICINE

## 2024-05-31 PROCEDURE — 25010000002 ONDANSETRON PER 1 MG: Performed by: ANESTHESIOLOGY

## 2024-05-31 PROCEDURE — 25010000002 CLINDAMYCIN 600 MG/50ML SOLUTION: Performed by: EMERGENCY MEDICINE

## 2024-05-31 PROCEDURE — 75635 CT ANGIO ABDOMINAL ARTERIES: CPT

## 2024-05-31 PROCEDURE — 93005 ELECTROCARDIOGRAM TRACING: CPT | Performed by: EMERGENCY MEDICINE

## 2024-05-31 PROCEDURE — 99285 EMERGENCY DEPT VISIT HI MDM: CPT

## 2024-05-31 PROCEDURE — 99223 1ST HOSP IP/OBS HIGH 75: CPT | Performed by: SURGERY

## 2024-05-31 PROCEDURE — 25510000001 IOPAMIDOL PER 1 ML: Performed by: EMERGENCY MEDICINE

## 2024-05-31 PROCEDURE — 25810000003 SODIUM CHLORIDE 0.9 % SOLUTION: Performed by: EMERGENCY MEDICINE

## 2024-05-31 PROCEDURE — 25010000002 ONDANSETRON PER 1 MG: Performed by: EMERGENCY MEDICINE

## 2024-05-31 PROCEDURE — 80053 COMPREHEN METABOLIC PANEL: CPT | Performed by: EMERGENCY MEDICINE

## 2024-05-31 PROCEDURE — 83605 ASSAY OF LACTIC ACID: CPT | Performed by: EMERGENCY MEDICINE

## 2024-05-31 PROCEDURE — 25010000002 FENTANYL CITRATE (PF) 50 MCG/ML SOLUTION: Performed by: EMERGENCY MEDICINE

## 2024-05-31 PROCEDURE — 25010000002 SUGAMMADEX 200 MG/2ML SOLUTION: Performed by: ANESTHESIOLOGY

## 2024-05-31 PROCEDURE — 87040 BLOOD CULTURE FOR BACTERIA: CPT | Performed by: EMERGENCY MEDICINE

## 2024-05-31 PROCEDURE — 25010000002 HYDROMORPHONE PER 4 MG: Performed by: NURSE ANESTHETIST, CERTIFIED REGISTERED

## 2024-05-31 PROCEDURE — 25010000002 MORPHINE PER 10 MG: Performed by: EMERGENCY MEDICINE

## 2024-05-31 PROCEDURE — 93010 ELECTROCARDIOGRAM REPORT: CPT | Performed by: INTERNAL MEDICINE

## 2024-05-31 PROCEDURE — 25810000003 LACTATED RINGERS PER 1000 ML: Performed by: NURSE ANESTHETIST, CERTIFIED REGISTERED

## 2024-05-31 PROCEDURE — 85025 COMPLETE CBC W/AUTO DIFF WBC: CPT | Performed by: EMERGENCY MEDICINE

## 2024-05-31 PROCEDURE — 25810000003 LACTATED RINGERS SOLUTION: Performed by: EMERGENCY MEDICINE

## 2024-05-31 DEVICE — ABSORBABLE HEMOSTAT (OXIDIZED REGENERATED CELLULOSE)
Type: IMPLANTABLE DEVICE | Site: LEG | Status: FUNCTIONAL
Brand: SURGICEL NU-KNIT

## 2024-05-31 RX ORDER — MORPHINE SULFATE 2 MG/ML
2 INJECTION, SOLUTION INTRAMUSCULAR; INTRAVENOUS ONCE
Status: COMPLETED | OUTPATIENT
Start: 2024-05-31 | End: 2024-05-31

## 2024-05-31 RX ORDER — FENTANYL CITRATE 50 UG/ML
50 INJECTION, SOLUTION INTRAMUSCULAR; INTRAVENOUS ONCE AS NEEDED
Status: DISCONTINUED | OUTPATIENT
Start: 2024-05-31 | End: 2024-05-31 | Stop reason: HOSPADM

## 2024-05-31 RX ORDER — SODIUM CHLORIDE 0.9 % (FLUSH) 0.9 %
3-10 SYRINGE (ML) INJECTION AS NEEDED
Status: DISCONTINUED | OUTPATIENT
Start: 2024-05-31 | End: 2024-05-31 | Stop reason: HOSPADM

## 2024-05-31 RX ORDER — PROMETHAZINE HYDROCHLORIDE 25 MG/1
25 TABLET ORAL ONCE AS NEEDED
Status: DISCONTINUED | OUTPATIENT
Start: 2024-05-31 | End: 2024-05-31 | Stop reason: HOSPADM

## 2024-05-31 RX ORDER — EPHEDRINE SULFATE 50 MG/ML
5 INJECTION, SOLUTION INTRAVENOUS ONCE AS NEEDED
Status: DISCONTINUED | OUTPATIENT
Start: 2024-05-31 | End: 2024-05-31 | Stop reason: HOSPADM

## 2024-05-31 RX ORDER — FAMOTIDINE 10 MG/ML
20 INJECTION, SOLUTION INTRAVENOUS ONCE
Status: COMPLETED | OUTPATIENT
Start: 2024-05-31 | End: 2024-05-31

## 2024-05-31 RX ORDER — FENTANYL CITRATE 50 UG/ML
25 INJECTION, SOLUTION INTRAMUSCULAR; INTRAVENOUS ONCE
Status: COMPLETED | OUTPATIENT
Start: 2024-05-31 | End: 2024-05-31

## 2024-05-31 RX ORDER — ONDANSETRON 2 MG/ML
4 INJECTION INTRAMUSCULAR; INTRAVENOUS ONCE AS NEEDED
Status: DISCONTINUED | OUTPATIENT
Start: 2024-05-31 | End: 2024-05-31 | Stop reason: HOSPADM

## 2024-05-31 RX ORDER — LIDOCAINE HYDROCHLORIDE 10 MG/ML
0.5 INJECTION, SOLUTION INFILTRATION; PERINEURAL ONCE AS NEEDED
Status: DISCONTINUED | OUTPATIENT
Start: 2024-05-31 | End: 2024-05-31 | Stop reason: HOSPADM

## 2024-05-31 RX ORDER — ONDANSETRON 2 MG/ML
INJECTION INTRAMUSCULAR; INTRAVENOUS AS NEEDED
Status: DISCONTINUED | OUTPATIENT
Start: 2024-05-31 | End: 2024-05-31 | Stop reason: SURG

## 2024-05-31 RX ORDER — ONDANSETRON 2 MG/ML
4 INJECTION INTRAMUSCULAR; INTRAVENOUS ONCE
Status: COMPLETED | OUTPATIENT
Start: 2024-05-31 | End: 2024-05-31

## 2024-05-31 RX ORDER — FENTANYL CITRATE 50 UG/ML
25 INJECTION, SOLUTION INTRAMUSCULAR; INTRAVENOUS
Status: DISCONTINUED | OUTPATIENT
Start: 2024-05-31 | End: 2024-05-31 | Stop reason: HOSPADM

## 2024-05-31 RX ORDER — VANCOMYCIN 2 GRAM/500 ML IN 0.9 % SODIUM CHLORIDE INTRAVENOUS
20 ONCE
Status: COMPLETED | OUTPATIENT
Start: 2024-05-31 | End: 2024-05-31

## 2024-05-31 RX ORDER — SODIUM CHLORIDE, SODIUM LACTATE, POTASSIUM CHLORIDE, CALCIUM CHLORIDE 600; 310; 30; 20 MG/100ML; MG/100ML; MG/100ML; MG/100ML
9 INJECTION, SOLUTION INTRAVENOUS CONTINUOUS
Status: DISCONTINUED | OUTPATIENT
Start: 2024-05-31 | End: 2024-06-01

## 2024-05-31 RX ORDER — SODIUM CHLORIDE 0.9 % (FLUSH) 0.9 %
3 SYRINGE (ML) INJECTION EVERY 12 HOURS SCHEDULED
Status: DISCONTINUED | OUTPATIENT
Start: 2024-05-31 | End: 2024-05-31 | Stop reason: HOSPADM

## 2024-05-31 RX ORDER — DROPERIDOL 2.5 MG/ML
0.62 INJECTION, SOLUTION INTRAMUSCULAR; INTRAVENOUS
Status: DISCONTINUED | OUTPATIENT
Start: 2024-05-31 | End: 2024-05-31 | Stop reason: HOSPADM

## 2024-05-31 RX ORDER — SODIUM CHLORIDE, SODIUM LACTATE, POTASSIUM CHLORIDE, CALCIUM CHLORIDE 600; 310; 30; 20 MG/100ML; MG/100ML; MG/100ML; MG/100ML
INJECTION, SOLUTION INTRAVENOUS CONTINUOUS PRN
Status: DISCONTINUED | OUTPATIENT
Start: 2024-05-31 | End: 2024-05-31 | Stop reason: SURG

## 2024-05-31 RX ORDER — DIPHENHYDRAMINE HYDROCHLORIDE 50 MG/ML
12.5 INJECTION INTRAMUSCULAR; INTRAVENOUS
Status: DISCONTINUED | OUTPATIENT
Start: 2024-05-31 | End: 2024-05-31 | Stop reason: HOSPADM

## 2024-05-31 RX ORDER — PROPOFOL 10 MG/ML
VIAL (ML) INTRAVENOUS AS NEEDED
Status: DISCONTINUED | OUTPATIENT
Start: 2024-05-31 | End: 2024-05-31 | Stop reason: SURG

## 2024-05-31 RX ORDER — FLUMAZENIL 0.1 MG/ML
0.2 INJECTION INTRAVENOUS AS NEEDED
Status: DISCONTINUED | OUTPATIENT
Start: 2024-05-31 | End: 2024-05-31 | Stop reason: HOSPADM

## 2024-05-31 RX ORDER — LIDOCAINE HYDROCHLORIDE 20 MG/ML
INJECTION, SOLUTION INFILTRATION; PERINEURAL AS NEEDED
Status: DISCONTINUED | OUTPATIENT
Start: 2024-05-31 | End: 2024-05-31 | Stop reason: SURG

## 2024-05-31 RX ORDER — PROMETHAZINE HYDROCHLORIDE 25 MG/1
25 SUPPOSITORY RECTAL ONCE AS NEEDED
Status: DISCONTINUED | OUTPATIENT
Start: 2024-05-31 | End: 2024-05-31 | Stop reason: HOSPADM

## 2024-05-31 RX ORDER — HYDROMORPHONE HYDROCHLORIDE 1 MG/ML
0.5 INJECTION, SOLUTION INTRAMUSCULAR; INTRAVENOUS; SUBCUTANEOUS
Status: DISPENSED | OUTPATIENT
Start: 2024-05-31 | End: 2024-06-05

## 2024-05-31 RX ORDER — CLINDAMYCIN PHOSPHATE 600 MG/50ML
600 INJECTION, SOLUTION INTRAVENOUS ONCE
Status: COMPLETED | OUTPATIENT
Start: 2024-05-31 | End: 2024-05-31

## 2024-05-31 RX ORDER — HYDROCODONE BITARTRATE AND ACETAMINOPHEN 5; 325 MG/1; MG/1
1 TABLET ORAL ONCE AS NEEDED
Status: DISCONTINUED | OUTPATIENT
Start: 2024-05-31 | End: 2024-05-31 | Stop reason: HOSPADM

## 2024-05-31 RX ORDER — ROCURONIUM BROMIDE 10 MG/ML
INJECTION, SOLUTION INTRAVENOUS AS NEEDED
Status: DISCONTINUED | OUTPATIENT
Start: 2024-05-31 | End: 2024-05-31 | Stop reason: SURG

## 2024-05-31 RX ORDER — HYDROCODONE BITARTRATE AND ACETAMINOPHEN 7.5; 325 MG/1; MG/1
1 TABLET ORAL EVERY 4 HOURS PRN
Status: DISCONTINUED | OUTPATIENT
Start: 2024-05-31 | End: 2024-05-31 | Stop reason: HOSPADM

## 2024-05-31 RX ORDER — HYDROMORPHONE HYDROCHLORIDE 1 MG/ML
0.25 INJECTION, SOLUTION INTRAMUSCULAR; INTRAVENOUS; SUBCUTANEOUS
Status: DISCONTINUED | OUTPATIENT
Start: 2024-05-31 | End: 2024-05-31 | Stop reason: HOSPADM

## 2024-05-31 RX ORDER — HYDRALAZINE HYDROCHLORIDE 20 MG/ML
5 INJECTION INTRAMUSCULAR; INTRAVENOUS
Status: DISCONTINUED | OUTPATIENT
Start: 2024-05-31 | End: 2024-05-31 | Stop reason: HOSPADM

## 2024-05-31 RX ORDER — NALOXONE HCL 0.4 MG/ML
0.2 VIAL (ML) INJECTION AS NEEDED
Status: DISCONTINUED | OUTPATIENT
Start: 2024-05-31 | End: 2024-05-31 | Stop reason: HOSPADM

## 2024-05-31 RX ORDER — MIDAZOLAM HYDROCHLORIDE 1 MG/ML
0.5 INJECTION INTRAMUSCULAR; INTRAVENOUS
Status: DISCONTINUED | OUTPATIENT
Start: 2024-05-31 | End: 2024-05-31 | Stop reason: HOSPADM

## 2024-05-31 RX ORDER — LABETALOL HYDROCHLORIDE 5 MG/ML
5 INJECTION, SOLUTION INTRAVENOUS
Status: DISCONTINUED | OUTPATIENT
Start: 2024-05-31 | End: 2024-05-31 | Stop reason: HOSPADM

## 2024-05-31 RX ORDER — MAGNESIUM HYDROXIDE 1200 MG/15ML
LIQUID ORAL AS NEEDED
Status: DISCONTINUED | OUTPATIENT
Start: 2024-05-31 | End: 2024-05-31 | Stop reason: HOSPADM

## 2024-05-31 RX ORDER — ACETAMINOPHEN 500 MG
1000 TABLET ORAL EVERY 6 HOURS PRN
Status: DISCONTINUED | OUTPATIENT
Start: 2024-05-31 | End: 2024-06-03

## 2024-05-31 RX ORDER — IPRATROPIUM BROMIDE AND ALBUTEROL SULFATE 2.5; .5 MG/3ML; MG/3ML
3 SOLUTION RESPIRATORY (INHALATION) ONCE AS NEEDED
Status: DISCONTINUED | OUTPATIENT
Start: 2024-05-31 | End: 2024-05-31 | Stop reason: HOSPADM

## 2024-05-31 RX ADMIN — ROCURONIUM BROMIDE 50 MG: 10 INJECTION, SOLUTION INTRAVENOUS at 19:11

## 2024-05-31 RX ADMIN — HYDROMORPHONE HYDROCHLORIDE 0.5 MG: 1 INJECTION, SOLUTION INTRAMUSCULAR; INTRAVENOUS; SUBCUTANEOUS at 19:52

## 2024-05-31 RX ADMIN — FENTANYL CITRATE 25 MCG: 50 INJECTION, SOLUTION INTRAMUSCULAR; INTRAVENOUS at 15:41

## 2024-05-31 RX ADMIN — ONDANSETRON 4 MG: 2 INJECTION INTRAMUSCULAR; INTRAVENOUS at 15:41

## 2024-05-31 RX ADMIN — SODIUM CHLORIDE, POTASSIUM CHLORIDE, SODIUM LACTATE AND CALCIUM CHLORIDE: 600; 310; 30; 20 INJECTION, SOLUTION INTRAVENOUS at 18:57

## 2024-05-31 RX ADMIN — IOPAMIDOL 100 ML: 755 INJECTION, SOLUTION INTRAVENOUS at 15:31

## 2024-05-31 RX ADMIN — PROPOFOL 100 MG: 10 INJECTION, EMULSION INTRAVENOUS at 19:11

## 2024-05-31 RX ADMIN — SODIUM CHLORIDE, POTASSIUM CHLORIDE, SODIUM LACTATE AND CALCIUM CHLORIDE 1000 ML: 600; 310; 30; 20 INJECTION, SOLUTION INTRAVENOUS at 15:39

## 2024-05-31 RX ADMIN — MORPHINE SULFATE 2 MG: 2 INJECTION, SOLUTION INTRAMUSCULAR; INTRAVENOUS at 14:23

## 2024-05-31 RX ADMIN — ONDANSETRON 4 MG: 2 INJECTION INTRAMUSCULAR; INTRAVENOUS at 19:50

## 2024-05-31 RX ADMIN — FAMOTIDINE 20 MG: 10 INJECTION INTRAVENOUS at 18:51

## 2024-05-31 RX ADMIN — SUGAMMADEX 200 MG: 100 INJECTION, SOLUTION INTRAVENOUS at 19:59

## 2024-05-31 RX ADMIN — LIDOCAINE HYDROCHLORIDE 40 MG: 20 INJECTION, SOLUTION INFILTRATION; PERINEURAL at 19:11

## 2024-05-31 RX ADMIN — HYDROMORPHONE HYDROCHLORIDE 0.25 MG: 1 INJECTION, SOLUTION INTRAMUSCULAR; INTRAVENOUS; SUBCUTANEOUS at 20:35

## 2024-05-31 RX ADMIN — PIPERACILLIN AND TAZOBACTAM 3.38 G: 3; .375 INJECTION, POWDER, FOR SOLUTION INTRAVENOUS at 16:17

## 2024-05-31 RX ADMIN — CLINDAMYCIN PHOSPHATE 600 MG: 600 INJECTION, SOLUTION INTRAVENOUS at 16:06

## 2024-05-31 RX ADMIN — VANCOMYCIN HYDROCHLORIDE 2000 MG: 10 INJECTION, POWDER, LYOPHILIZED, FOR SOLUTION INTRAVENOUS at 16:39

## 2024-05-31 NOTE — ED NOTES
Pt arrives via ems from home.   Pt reports right leg pain, pt had a wound drained and there is new swelling to the wound site.   Pt denies any new injury to her leg   Ems gave 60mg of toradol I'm and took 1000mg of tylenol

## 2024-05-31 NOTE — ANESTHESIA PREPROCEDURE EVALUATION
Anesthesia Evaluation     NPO Solid Status: > 8 hours  NPO Liquid Status: > 8 hours           Airway   Mallampati: I  No difficulty expected  Dental      Pulmonary    Cardiovascular     PT is on anticoagulation therapy    (+) hypertension, dysrhythmias Atrial Fib      Neuro/Psych  (+) CVA  GI/Hepatic/Renal/Endo    (+) obesity, renal disease-    Musculoskeletal     Abdominal    Substance History      OB/GYN          Other   arthritis,   history of cancer                Anesthesia Plan    ASA 3     general     intravenous induction     Anesthetic plan, risks, benefits, and alternatives have been provided, discussed and informed consent has been obtained with: patient.    CODE STATUS:

## 2024-05-31 NOTE — ED NOTES
Nursing report ED to floor  Vonnie Coppola  88 y.o.  female    HPI :  HPI (Adult)  Stated Reason for Visit: leg pain, wound injury  History Obtained From: EMS    Chief Complaint  Chief Complaint   Patient presents with    Leg Pain    Wound Check       Admitting doctor:   Richie Carranza MD    Admitting diagnosis:   There were no encounter diagnoses.    Code status:   Current Code Status       Date Active Code Status Order ID Comments User Context       Prior            Allergies:   Cortisone and Penicillins    Isolation:   No active isolations    Intake and Output    Intake/Output Summary (Last 24 hours) at 5/31/2024 1734  Last data filed at 5/31/2024 1652  Gross per 24 hour   Intake 1250 ml   Output --   Net 1250 ml       Weight:   There were no vitals filed for this visit.    Most recent vitals:   Vitals:    05/31/24 1409 05/31/24 1509 05/31/24 1601 05/31/24 1701   BP: 139/86 97/85 144/89 152/69   Pulse: 88 68 79 72   Resp: 18      Temp: 98.3 °F (36.8 °C)      SpO2: 96% 99% 97% 94%       Active LDAs/IV Access:   Lines, Drains & Airways       Active LDAs       Name Placement date Placement time Site Days    Peripheral IV 05/31/24 1423 Right Antecubital 05/31/24  1423  Antecubital  less than 1    External Urinary Catheter 05/31/24  1505  --  less than 1                    Labs (abnormal labs have a star):   Labs Reviewed   COMPREHENSIVE METABOLIC PANEL - Abnormal; Notable for the following components:       Result Value    Glucose 110 (*)     Total Protein 5.8 (*)     Albumin 3.2 (*)     eGFR 56.3 (*)     All other components within normal limits    Narrative:     GFR Normal >60  Chronic Kidney Disease <60  Kidney Failure <15    The GFR formula is only valid for adults with stable renal function between ages 18 and 70.   CBC WITH AUTO DIFFERENTIAL - Abnormal; Notable for the following components:    RBC 3.60 (*)     Hemoglobin 9.1 (*)     Hematocrit 29.8 (*)     MCH 25.3 (*)     MCHC 30.5 (*)     Immature Grans %  0.6 (*)     All other components within normal limits   LACTIC ACID, PLASMA - Normal   BLOOD CULTURE   BLOOD CULTURE   CBC AND DIFFERENTIAL    Narrative:     The following orders were created for panel order CBC & Differential.  Procedure                               Abnormality         Status                     ---------                               -----------         ------                     CBC Auto Differential[[id number]]        Abnormal            Final result                 Please view results for these tests on the individual orders.       EKG:   ECG 12 Lead Other; Leg swelling   Final Result   HEART RATE= 72  bpm   RR Interval= 833  ms   VA Interval= 234  ms   P Horizontal Axis=   deg   P Front Axis= 22  deg   QRSD Interval= 88  ms   QT Interval= 427  ms   QTcB= 468  ms   QRS Axis= -30  deg   T Wave Axis= 16  deg   - ABNORMAL ECG -   Sinus rhythm   Atrial premature complexes   Prolonged VA interval   Abnormal R-wave progression, early transition   Inferior infarct, old   Consider anterior infarct   No change from previous tracing   Electronically Signed By: Tess Barbosa ([facility]) 31-May-2024 16:30:14   Date and Time of Study: 2024-05-31 15:48:01          Meds given in ED:   Medications   vancomycin IVPB 2000 mg in 0.9% Sodium Chloride 500 mL (2,000 mg Intravenous New Bag 5/31/24 1639)   morphine injection 2 mg (2 mg Intravenous Given 5/31/24 1423)   lactated ringers bolus 1,000 mL (0 mL Intravenous Stopped 5/31/24 1637)   clindamycin (CLEOCIN) 600 mg in dextrose 5% 50 mL IVPB (premix) (0 mg Intravenous Stopped 5/31/24 1637)   fentaNYL citrate (PF) (SUBLIMAZE) injection 25 mcg (25 mcg Intravenous Given 5/31/24 1541)   ondansetron (ZOFRAN) injection 4 mg (4 mg Intravenous Given 5/31/24 1541)   iopamidol (ISOVUE-370) 76 % injection 100 mL (100 mL Intravenous Given 5/31/24 1531)   piperacillin-tazobactam (ZOSYN) 3.375 g IVPB in 100 mL NS MBP (CD) (0 g Intravenous Stopped 5/31/24 1652)       Imaging  results:  CT Angio Abdominal Aorta Bilateral Iliofem Runoff    Result Date: 5/31/2024  Cutaneous and subcutaneous hematoma within the posteromedial distal calf. There is active extravasation of contrast associated with pseudoaneurysm formation along the inner margin of the hematoma and contrast pools within the hematoma. A smaller hematoma is present within the deeper subcutaneous fat anteromedial to the distal tibia. Generalized edema in the subcutaneous fat about the right lower extremity greatest within the distal calf and ankle and this may be related to the hematoma or superimposed infection. No abnormal soft tissue gas. 2. Atherosclerotic disease without evidence for aneurysm or significant stenosis... Discussed with Dr. Martinez in the emergency department 05 /31/2024 at 4:20 p.m.        Ambulatory status:   - bedrest      Social issues:   Social History     Socioeconomic History    Marital status:    Tobacco Use    Smoking status: Former     Current packs/day: 0.50     Average packs/day: 0.5 packs/day for 2.0 years (1.0 ttl pk-yrs)     Types: Cigarettes    Smokeless tobacco: Never    Tobacco comments:     quit 40 years ago   Vaping Use    Vaping status: Never Used   Substance and Sexual Activity    Alcohol use: No    Drug use: No    Sexual activity: Defer       Peripheral Neurovascular  Peripheral Neurovascular (Adult)  Peripheral Neurovascular WDL: .WDL except (Swelling noted in BLE greater in the right than the left)    Neuro Cognitive  Neuro Cognitive (Adult)  Cognitive/Neuro/Behavioral WDL: WDL    Learning  Learning Assessment (Adult)  Learning Readiness and Ability: emotional barrier identified    Respiratory  Respiratory WDL  Respiratory WDL: WDL    Abdominal Pain       Pain Assessments  Pain (Adult)  (0-10) Pain Rating: Rest: 8  (0-10) Pain Rating: Activity: 8  Pain Location: extremity  Pain Side/Orientation: right, lower  Response to Pain Interventions: interventions effective per patient    NIH  Stroke Scale       Godfrey Veronica RN  05/31/24 17:34 EDT

## 2024-05-31 NOTE — LETTER
EMS Transport Request  For use at [facility], [place], [place], [place], and [place] only   Patient Name: Vonnie Coppola : 1935   Weight:110 kg (241 lb 6.5 oz) Pick-up Location: [facility] BLS/ALS: BLS/ALS: BLS   Insurance: [employer] MEDICARE REPLACEMENT Auth End Date:    Pre-Cert #: D/C Summary complete:    Destination: Other [place]   Contact Precautions: None   Equipment (O2, Fluids, etc.): None   Arrive By Date/Time: 24 afternoon Stretcher/WC: Stretcher   CM Requesting: Loree Arrieta RN Ext: [phone]   Notes/Medical Necessity: non ambulatory, max assist x2     ______________________________________________________________________    *Only 2 patient bags OR 1 carry-on size bag are permitted.  Wheelchairs and walkers CANNOT transported with the patient. Acknowledge: Yes

## 2024-05-31 NOTE — ED PROVIDER NOTES
EMERGENCY DEPARTMENT ENCOUNTER  Room Number:  MONICA/MONICA  PCP: Christopher Leyva DO  Independent Historians: Patient, Family, and EMS      HPI:  Chief Complaint: Worsening leg pain and redness and swelling    A complete HPI/ROS/PMH/PSH/SH/FH are unobtainable due to: None    Chronic or social conditions impacting patient care (Social Determinants of Health): None      Context: Vonnie Coppola is a 88 y.o. female with a medical history of atrial fibrillation, hypertension, asthma, arthritis, pulmonary realism, and prior cellulitis of the right leg who presents to the ED c/o acute worsening pain and swelling and redness of the right lower extremity.  Patient was seen here 3 days ago for evaluation of an injury to her leg after an accidental fall.  She had needle aspiration in the ED for drainage of a hematoma.  She was discharged home on oral antibiotics and has been taking Keflex as directed.  However, today she has been experiencing significant worsening of pain and swelling and redness throughout the right lower extremity.  She denies any new injuries today.      Review of prior external notes (non-ED) -and- Review of prior external test results outside of this encounter: I independently reviewed the hospital discharge summary from March 24, 2024.  She had a repeat ultrasound that was negative for DVT at that time.  Was treated for cellulitis and transition back to Eliquis therapy    Prescription drug monitoring program review: COLLIN reviewed by Richie Carranza MD, Lui Hardwick MD, Darron Martinez MD   N/A and COLLIN query complete and reviewed. Patient receives regular prescriptions for controlled substances.    PAST MEDICAL HISTORY  Active Ambulatory Problems     Diagnosis Date Noted    GI bleed 01/16/2024    Anemia 01/16/2024    HTN (hypertension) 01/16/2024    History of breast cancer 01/16/2024    Asthma 01/16/2024    History of CVA (cerebrovascular accident) 01/17/2024    Dehydration 01/17/2024     Renal insufficiency 01/17/2024    Pulmonary nodule 01/17/2024    Adnexal cyst 01/17/2024    Nausea 01/16/2024    Atrial fibrillation 01/21/2024    History of pulmonary embolism 03/22/2024    Class 2 severe obesity with serious comorbidity in adult 03/22/2024    Thrombocytopenia 03/22/2024    Cellulitis of right leg 03/23/2024    Acute cystitis 03/23/2024     Resolved Ambulatory Problems     Diagnosis Date Noted    No Resolved Ambulatory Problems     Past Medical History:   Diagnosis Date    Arthritis     Breast cancer 1999    History of cataract     Hx of radiation therapy 1999    Hypertension     Pneumonia     Stroke          PAST SURGICAL HISTORY  Past Surgical History:   Procedure Laterality Date    APPENDECTOMY      BLADDER SURGERY      BREAST BIOPSY Left 1999    MALIGNANT    BREAST LUMPECTOMY Left 1999    MALIGNANT    CATARACT EXTRACTION      COLONOSCOPY N/A 01/19/2024    Procedure: COLONOSCOPY to cecum with cold snare polypectomies;  Surgeon: Harshad Gaston MD;  Location: [facility] ENDOSCOPY;  Service: Gastroenterology;  Laterality: N/A;  pre- gi bleed, anemia  post- diverticulosis, polyps    ENDOSCOPY N/A 01/19/2024    Procedure: ESOPHAGOGASTRODUODENOSCOPY with biospy;  Surgeon: Harshad Gaston MD;  Location: [facility] ENDOSCOPY;  Service: Gastroenterology;  Laterality: N/A;  pre- gi bleed, anemia  post- erosive gastirits    GALLBLADDER SURGERY      HERNIA REPAIR      HYSTERECTOMY      LASIK      LYMPHADENECTOMY      OOPHORECTOMY           FAMILY HISTORY  Family History   Problem Relation Age of Onset    Other Mother         Cardiac disorder    Osteoarthritis Mother     Cataracts Mother     Macular degeneration Mother     Hypertension Father     Other Father         Cardiac disorder    Ovarian cancer Sister         70'S    Cancer Sister     Diabetes Sister     Hypertension Sister     Osteoarthritis Sister     Cancer Brother     Hypertension Brother     Osteoarthritis Brother     Colon cancer Maternal  Grandmother     Ovarian cancer Paternal Grandmother         unknown    Cancer Other     Stroke Other     Breast cancer Neg Hx          SOCIAL HISTORY  Social History     Socioeconomic History    Marital status:    Tobacco Use    Smoking status: Former     Current packs/day: 0.50     Average packs/day: 0.5 packs/day for 2.0 years (1.0 ttl pk-yrs)     Types: Cigarettes    Smokeless tobacco: Never    Tobacco comments:     quit 40 years ago   Vaping Use    Vaping status: Never Used   Substance and Sexual Activity    Alcohol use: No    Drug use: No    Sexual activity: Defer         ALLERGIES  Cortisone and Penicillins      REVIEW OF SYSTEMS  Review of Systems  Included in HPI  All systems reviewed and negative except for those discussed in HPI.      PHYSICAL EXAM    I have reviewed the triage vital signs and nursing notes.    ED Triage Vitals [05/31/24 1409]   Temp Heart Rate Resp BP SpO2   98.3 °F (36.8 °C) 88 18 139/86 96 %      Temp src Heart Rate Source Patient Position BP Location FiO2 (%)   -- -- -- -- --       Physical Exam  GENERAL: alert, appears ill and in discomfort  SKIN: Warm, dry  HENT: Normocephalic, atraumatic  EYES: no scleral icterus, normal conjunctiva  CV: Regular rhythm, regular rate  RESPIRATORY: normal effort, lungs clear bilaterally  ABDOMEN: soft, nondistended, nontender  MUSCULOSKELETAL: The right lower extremity is markedly tender to palpation and shows some significant hematoma to the distal medial anterior aspect.  The hematoma appears rather tense and there is a broad area of erythema and induration surrounding this hematoma which is spreading more proximally to the posterior medial soft tissues.  The foot is warm and well-perfused.  NEURO: alert, moves all extremities, follows commands      LAB RESULTS  Recent Results (from the past 24 hour(s))   Comprehensive Metabolic Panel    Collection Time: 05/31/24  2:20 PM    Specimen: Blood   Result Value Ref Range    Glucose 110 (H) 65 - 99  mg/dL    BUN 15 8 - 23 mg/dL    Creatinine 0.98 0.57 - 1.00 mg/dL    Sodium 143 136 - 145 mmol/L    Potassium 3.7 3.5 - 5.2 mmol/L    Chloride 107 98 - 107 mmol/L    CO2 27.1 22.0 - 29.0 mmol/L    Calcium 8.7 8.6 - 10.5 mg/dL    Total Protein 5.8 (L) 6.0 - 8.5 g/dL    Albumin 3.2 (L) 3.5 - 5.2 g/dL    ALT (SGPT) 10 1 - 33 U/L    AST (SGOT) 11 1 - 32 U/L    Alkaline Phosphatase 70 39 - 117 U/L    Total Bilirubin 0.4 0.0 - 1.2 mg/dL    Globulin 2.6 gm/dL    A/G Ratio 1.2 g/dL    BUN/Creatinine Ratio 15.3 7.0 - 25.0    Anion Gap 8.9 5.0 - 15.0 mmol/L    eGFR 56.3 (L) >60.0 mL/min/1.73   CBC Auto Differential    Collection Time: 05/31/24  2:20 PM    Specimen: Blood   Result Value Ref Range    WBC 8.77 3.40 - 10.80 10*3/mm3    RBC 3.60 (L) 3.77 - 5.28 10*6/mm3    Hemoglobin 9.1 (L) 12.0 - 15.9 g/dL    Hematocrit 29.8 (L) 34.0 - 46.6 %    MCV 82.8 79.0 - 97.0 fL    MCH 25.3 (L) 26.6 - 33.0 pg    MCHC 30.5 (L) 31.5 - 35.7 g/dL    RDW 15.2 12.3 - 15.4 %    RDW-SD 45.7 37.0 - 54.0 fl    MPV 9.8 6.0 - 12.0 fL    Platelets 247 140 - 450 10*3/mm3    Neutrophil % 63.0 42.7 - 76.0 %    Lymphocyte % 25.4 19.6 - 45.3 %    Monocyte % 9.0 5.0 - 12.0 %    Eosinophil % 1.8 0.3 - 6.2 %    Basophil % 0.2 0.0 - 1.5 %    Immature Grans % 0.6 (H) 0.0 - 0.5 %    Neutrophils, Absolute 5.52 1.70 - 7.00 10*3/mm3    Lymphocytes, Absolute 2.23 0.70 - 3.10 10*3/mm3    Monocytes, Absolute 0.79 0.10 - 0.90 10*3/mm3    Eosinophils, Absolute 0.16 0.00 - 0.40 10*3/mm3    Basophils, Absolute 0.02 0.00 - 0.20 10*3/mm3    Immature Grans, Absolute 0.05 0.00 - 0.05 10*3/mm3    nRBC 0.0 0.0 - 0.2 /100 WBC   Lactic Acid, Plasma    Collection Time: 05/31/24  2:20 PM    Specimen: Blood   Result Value Ref Range    Lactate 1.5 0.5 - 2.0 mmol/L   ECG 12 Lead Other; Leg swelling    Collection Time: 05/31/24  3:48 PM   Result Value Ref Range    QT Interval 427 ms    QTC Interval 468 ms         RADIOLOGY  CT Angio Abdominal Aorta Bilateral Iliofem Runoff    Result  Date: 5/31/2024  CT ANGIOGRAM ABDOMEN AND PELVIS WITH ILIOFEMORAL RUNOFF  HISTORY: Right calf swelling, hematoma, severe pain  COMPARISON: CT chest 05/09/2024, CT abdomen and pelvis 01/16/2024.  TECHNIQUE: Axial images were obtained from the lung bases through the lower extremities following the administration of IV contrast. Coronal and sagittal MIP images were obtained as well as 3D volume rendering. Radiation dose reduction techniques were utilized, including automated exposure control and exposure modulation based on body size.  FINDINGS:  CTA: Distal thoracic aorta is tortuous though exhibits normal caliber. Celiac artery, superior mesenteric artery, both main renal arteries, inferior mesenteric artery are patent. Atherosclerotic calcifications are present involving the abdominal aorta without aneurysm. Both common iliac arteries, internal/external iliac arteries, common femoral arteries are patent.  Right lower extremity: Superficial and deep femoral arteries patent. Mild calcified plaques involving the superficial femoral, popliteal arteries. The popliteal artery, anterior tibial artery, tibioperoneal trunk are patent. Three-vessel runoff to the right lower extremity. There is abnormal swelling of the right lower extremity as compared to the left with edema in the subcutaneous fat involving the right thigh, knee, calf, ankle, foot, more severe distally. There is a cutaneous and subcutaneous complex collection within the posteromedial distal calf just above the ankle that measures approximately 7 x 3.8 x 11 cm. This collection is consistent with a hematoma and exhibits hematocrit level. There is evidence for active extravasation into this collection with contrast extending into the anterior aspect of the collection that subsequently pools into the collection and increases in volume on the venous phase imaging associated with pseudoaneurysm formation.. There is a deeper hematoma there is anterior medial to the  distal tibial shaft just above the ankle within the deep subcutaneous fat and this measures approximately 4 x 2 x 7 cm.  Left lower extremity: Mild atherosclerotic disease associated with calcified plaque though the left superficial and deep femoral arteries, popliteal artery, anterior tibial artery, tibioperoneal trunk are patent. There is three-vessel runoff to the left lower extremity. No hemorrhage or extravasation.  Non-angiogram findings: Mild basilar atelectasis. Previous cholecystectomy. Likely compensatory dilatation of the bile ducts. 2.2 cm cystic lesion caudal aspect of the spleen without change. Adrenal glands, pancreas, kidneys within normal limits. Right renal parapelvic cyst. No hydronephrosis. Moderate rectal distention with stool.       1.  Cutaneous and subcutaneous hematoma within the posteromedial distal right calf. There is active extravasation of arterial phase contrast associated with pseudoaneurysm formation along the anterior margin of the hematoma and contrast pools within the hematoma. A smaller hematoma is present within the deeper subcutaneous fat anteromedial to the distal tibia. Generalized edema in the subcutaneous fat about the right lower extremity greatest within the distal calf and ankle and this may be related to the hematoma or superimposed infection. No abnormal soft tissue gas. 2. Atherosclerotic disease without evidence for aneurysm or significant stenosis.  Discussed with Dr. Martinez in the emergency department 05 /31/2024 at 4:20 p.m.   This report was finalized on 5/31/2024 5:38 PM by Dr. Archie Ortiz M.D on Workstation: [id number]         MEDICATIONS GIVEN IN ER  Medications   vancomycin IVPB 2000 mg in 0.9% Sodium Chloride 500 mL (2,000 mg Intravenous New Bag 5/31/24 1639)   morphine injection 2 mg (2 mg Intravenous Given 5/31/24 1423)   lactated ringers bolus 1,000 mL (0 mL Intravenous Stopped 5/31/24 1637)   clindamycin (CLEOCIN) 600 mg in dextrose 5% 50 mL IVPB  (premix) (0 mg Intravenous Stopped 5/31/24 1637)   fentaNYL citrate (PF) (SUBLIMAZE) injection 25 mcg (25 mcg Intravenous Given 5/31/24 1541)   ondansetron (ZOFRAN) injection 4 mg (4 mg Intravenous Given 5/31/24 1541)   iopamidol (ISOVUE-370) 76 % injection 100 mL (100 mL Intravenous Given 5/31/24 1531)   piperacillin-tazobactam (ZOSYN) 3.375 g IVPB in 100 mL NS MBP (CD) (0 g Intravenous Stopped 5/31/24 1652)         ORDERS PLACED DURING THIS VISIT:  Orders Placed This Encounter   Procedures    Blood Culture - Blood,    Blood Culture - Blood,    CT Angio Abdominal Aorta Bilateral Iliofem Runoff    Comprehensive Metabolic Panel    Lactic Acid, Plasma    CBC Auto Differential    Surgery (on-call MD unless specified)    [facility] (on-call MD unless specified) Details    ECG 12 Lead Other; Leg swelling    Inpatient Admission    CBC & Differential         OUTPATIENT MEDICATION MANAGEMENT:  Current Facility-Administered Medications Ordered in Epic   Medication Dose Route Frequency Provider Last Rate Last Admin    vancomycin IVPB 2000 mg in 0.9% Sodium Chloride 500 mL  20 mg/kg Intravenous Once Darron Martinez  mL/hr at 05/31/24 1639 2,000 mg at 05/31/24 1639     Current Outpatient Medications Ordered in Epic   Medication Sig Dispense Refill    acetaminophen (TYLENOL) 325 MG tablet Take 2 tablets by mouth Every 6 (Six) Hours As Needed for Mild Pain.      acetaminophen (TYLENOL) 500 MG tablet Take 2 tablets by mouth Every 4 (Four) Hours As Needed for Mild Pain or Moderate Pain.      apixaban (ELIQUIS) 5 MG tablet tablet Take 1 tablet by mouth Every 12 (Twelve) Hours. Indications: Atrial Fibrillation 60 tablet 0    apixaban (Eliquis) 5 MG tablet tablet Take 1 tablet by mouth 2 (Two) Times a Day.      arformoterol (BROVANA) 15 MCG/2ML nebulizer solution Take 2 mL by nebulization 2 (Two) Times a Day. 120 mL 11    benzonatate (TESSALON) 100 MG capsule Take 1 capsule by mouth 3 (Three) Times a Day As Needed for Cough. 20  capsule 0    budesonide (PULMICORT) 0.5 MG/2ML nebulizer solution Take 2 mL by nebulization 2 (Two) Times a Day. 360 mL 2    cephalexin (KEFLEX) 500 MG capsule Take 1 capsule by mouth 3 (Three) Times a Day. 21 capsule 0    Cholecalciferol (VITAMIN D-3 PO) Take 1 tablet by mouth Daily. Indications: nutritional support      coenzyme Q10 50 MG capsule capsule Take 1 capsule by mouth Daily. Indications: nutritional support      Cyanocobalamin (VITAMIN B 12 PO) Take 1 tablet by mouth Daily. Indications: nutritional support      diazePAM (VALIUM) 2 MG tablet Take 1 tablet by mouth 2 (Two) Times a Day. Indications: Feeling Anxious      dilTIAZem CD (CARDIZEM CD) 120 MG 24 hr capsule Take 1 capsule by mouth Daily. 30 capsule 0    docusate sodium (COLACE) 100 MG capsule Take 1 capsule by mouth Daily. Indications: Constipation      fexofenadine (Allegra Allergy) 60 MG tablet Take  by mouth Daily As Needed.      fluticasone (FLONASE) 50 MCG/ACT nasal spray 2 sprays by Each Nare route 2 (Two) Times a Day. (Patient not taking: Reported on 1/29/2024) 18.2 mL 11    furosemide (LASIX) 40 MG tablet Take 40 mg by mouth 2 (Two) Times a Day. Indications: Cardiac Failure      furosemide (LASIX) 40 MG tablet Take 40 mg by mouth Daily. Indications: Cardiac Failure      glucosamine-chondroitin 500-400 MG capsule capsule Take 1 capsule by mouth 3 (Three) Times a Day With Meals. Indications: Joint Damage causing Pain and Loss of Function      Magnesium 500 MG tablet Take 500 mg by mouth Daily.      O2 (OXYGEN) Inhale 2 L/min Continuous. At night as needed  Indications: hypoxia      ondansetron (ZOFRAN) 4 MG tablet Take 1 tablet by mouth Daily As Needed for Nausea or Vomiting. Indications: Nausea and Vomiting      pantoprazole (PROTONIX) 40 MG EC tablet Take 1 tablet by mouth 2 (Two) Times a Day Before Meals. 60 tablet 0    polyethylene glycol (MIRALAX) 17 g packet Take 17 g by mouth Daily As Needed (constipation ). Indications: Constipation       potassium chloride (KLOR-CON M10) 10 MEQ CR tablet Take 20 mEq by mouth Daily.      Selenium (SELENIMIN PO) Take 1 tablet by mouth Daily. Indications: nutritional support           PROCEDURES  Procedures            PROGRESS, DATA ANALYSIS, CONSULTS, AND MEDICAL DECISION MAKING  All labs have been independently interpreted by me.  All radiology studies have been reviewed by me. All EKG's have been independently viewed and interpreted by me.  Discussion below represents my analysis of pertinent findings related to patient's condition, differential diagnosis, treatment plan and final disposition.    Differential diagnosis includes but is not limited to sepsis, necrotizing fasciitis, DVT, PAD, abscess, right lower extremity cellulitis.    Clinical Scores:                   ED Course as of 05/31/24 1743   Fri May 31, 2024   1419 First look: I evaluated the patient at the end of my shift for the benefit of the patient in preparation for the oncoming physician.  The patient presents with severe pain to her right lower leg with progressive swelling.  She reports it was drained several days ago and yesterday it seemed to get smaller but today it got more enlarged and more painful.  She has a large hematoma on her medial right lower leg.  Her right lower leg is red and swollen.  It is warm.  I have ordered pain medicine.  She has intact pulses.  I have added labs as well as CTA of the lower extremity.  She is on blood thinners. [TR]   1524 Patient complaining of significant pain in the right lower extremity.  Her daughter tells me that the appearance has changed rather rapidly within the past 1 to 2 hours.  This gives me some clinical concern for the possibility of necrotizing fasciitis.  She is going to CT scan now for further evaluation radiographically.  I will consult surgery specialist as well.  Ordering broad-spectrum antibiotics and some IV fluids as well as analgesia for her. [LINUS]   1524 Hemoglobin(!): 9.1 [LINUS]    1524 Hematocrit(!): 29.8 [LINUS]   1524 Last dose of Eliquis was this morning according to the patient's daughter. [LINUS]   1613 EKG         EKG time/Interp time: 1548/1550  Rhythm/Rate: Sinus rhythm, 72 bpm  P waves and DE: Present, 234 ms  QRS, axis: 88 ms, left axis deviation  ST and T waves: No ST segment elevations are present.  Independently interpreted by me contemporaneously with treatment   [LINUS]   1624 I discussed with Dr. Pedraza from general surgery about this patient.  He is going to come to the ED to assess her and help with disposition planning. [LINUS]   1625 I independently interpreted the CT abdominal aorta and bilateral iliofemoral runoff study and my findings are: Large hematoma to the distal right lower extremity.  There are features of active extravasation and hematoma.  No soft tissue air evident. [LINUS]   1648 Lactate: 1.5 [LINUS]   1652 Dr. Pedraza is here now to assess the patient in the ED. [LINUS]      ED Course User Index  [LINUS] Darron Martinez MD  [TR] Lui Hardwick MD             AS OF 17:43 EDT VITALS:    BP - 152/69  HR - 72  TEMP - 98.3 °F (36.8 °C)  O2 SATS - 94%    COMPLEXITY OF CARE  The patient requires admission.      DIAGNOSIS  Final diagnoses:   Leg hematoma, right, subsequent encounter   Cellulitis of leg, right   Chronic anemia         DISPOSITION  ED Disposition       ED Disposition   Decision to Admit    Condition   --    Comment   Level of Care: Telemetry [5]   Diagnosis: Leg hematoma, right, subsequent encounter [[id number]]   Admitting Physician: BRANDON ARAUJO [[id number]]   Attending Physician: BRANDON ARAUJO [[id number]]   Certification: I Certify That Inpatient Hospital Services Are Medically Necessary For Greater Than 2 Midnights                  Please note that portions of this document were completed with a voice recognition program.    Note Disclaimer: At [facility], we believe that sharing information builds trust and better relationships. You are receiving this note  because you recently visited [facility]. It is possible you will see health information before a provider has talked with you about it. This kind of information can be easy to misunderstand. To help you fully understand what it means for your health, we urge you to discuss this note with your provider.         Darron Martinez MD  05/31/24 0317

## 2024-05-31 NOTE — ANESTHESIA PROCEDURE NOTES
Airway  Urgency: elective    Date/Time: 5/31/2024 7:13 PM  Airway not difficult    General Information and Staff    Patient location during procedure: OR    Indications and Patient Condition  Indications for airway management: airway protection    Preoxygenated: yes  Mask difficulty assessment: 0 - not attempted    Final Airway Details  Final airway type: endotracheal airway      Successful airway: ETT  Cuffed: yes   Successful intubation technique: video laryngoscopy  Facilitating devices/methods: intubating stylet  Endotracheal tube insertion site: oral  Blade: CMAC  Blade size: D  ETT size (mm): 7.0  Cormack-Lehane Classification: grade I - full view of glottis  Placement verified by: chest auscultation and capnometry   Measured from: lips  ETT/EBT  to lips (cm): 22  Number of attempts at approach: 1  Assessment: lips, teeth, and gum same as pre-op and atraumatic intubation

## 2024-05-31 NOTE — CONSULTS
General Surgery H&P/Consultation      Impression/Plan: 88-year-old lady with medial right lower extremity rapidly expanding superficial hematoma.  There is active extravasation on the CT angiogram runoff within the superficial aspect of the hematoma.  The skin overlying this is tense and nonviable in appearance.  I have recommended proceeding to the operating room urgently for debridement, wound exploration, control of hemorrhage.    She is on Eliquis for history of atrial fibrillation and pulmonary embolus.  Currently the hematoma is contained and source of bleeding appears to be superficial.  I will hold on reversing her anticoagulation currently but will have a low threshold to do it depending on operative findings.  I discussed with her and her daughter risks and rationale for the procedure.  Risk include pain, bleeding, infection, damage to neurovascular structures, poor wound healing resulting in need for amputation, and death from postoperative complications.  They understand she is at increased risk due to her age and comorbidities.        CC: Right leg pain    HPI:   Ms. Vonnie Coppola is a 88 y.o. female that presented to the hospital with right leg pain and rapid enlargement of swelling and a right leg wound.  She had a fall on 5/28/2024 when her walker hit on the door frame entering her bathroom.  The walker hit her right ankle and she suffered a skin tear to her right wrist.  EMS transferred her to [facility].  She has history of venous stasis disease and had been on antibiotics for cellulitis of her right lower extremity.  Has also been undergoing local wound care to her bilateral lower extremities with wrapping for the venous stasis disease.  While in the emergency room there was concern for underlying infection and an aspiration was performed.  I do not see results from fluid studies but per the chart it this appears most consistent with hematoma.  She reports this morning that she had  acute worsening of pain and her daughter reports that the size of the hematoma over the medial right leg has rapidly increased.  She was brought to [facility] for further evaluation.    Past Medical History:   Past Medical History:   Diagnosis Date    Arthritis     Asthma     Atrial fibrillation     per pt's daughter.States hx of a-fib in the past when stressed or tired.    Breast cancer 1999    LEFT BREAST CA, LUMPECTOMY & RADS    History of cataract     Hx of radiation therapy 1999    APPROXIMATELY 40 TREATMENTS FOR LEFT BREAST CA    Hypertension     Pneumonia     Stroke        Past Surgical History:   Past Surgical History:   Procedure Laterality Date    APPENDECTOMY      BLADDER SURGERY      BREAST BIOPSY Left 1999    MALIGNANT    BREAST LUMPECTOMY Left 1999    MALIGNANT    CATARACT EXTRACTION      COLONOSCOPY N/A 01/19/2024    Procedure: COLONOSCOPY to cecum with cold snare polypectomies;  Surgeon: Harshad Gaston MD;  Location:  [facility];  Service: Gastroenterology;  Laterality: N/A;  pre- gi bleed, anemia  post- diverticulosis, polyps    ENDOSCOPY N/A 01/19/2024    Procedure: ESOPHAGOGASTRODUODENOSCOPY with biospy;  Surgeon: Harshad Gaston MD;  Location: [facility] ENDOSCOPY;  Service: Gastroenterology;  Laterality: N/A;  pre- gi bleed, anemia  post- erosive gastirits    GALLBLADDER SURGERY      HERNIA REPAIR      HYSTERECTOMY      LASIK      LYMPHADENECTOMY      OOPHORECTOMY         Medications:  (Not in a hospital admission)        Current Facility-Administered Medications:     vancomycin IVPB 2000 mg in 0.9% Sodium Chloride 500 mL, 20 mg/kg, Intravenous, Once, Darron Martinez MD, Last Rate: 250 mL/hr at 05/31/24 1639, 2,000 mg at 05/31/24 1639    Current Outpatient Medications:     acetaminophen (TYLENOL) 325 MG tablet, Take 2 tablets by mouth Every 6 (Six) Hours As Needed for Mild Pain., Disp: , Rfl:     acetaminophen (TYLENOL) 500 MG tablet, Take 2 tablets by mouth Every 4  (Four) Hours As Needed for Mild Pain or Moderate Pain., Disp: , Rfl:     apixaban (ELIQUIS) 5 MG tablet tablet, Take 1 tablet by mouth Every 12 (Twelve) Hours. Indications: Atrial Fibrillation, Disp: 60 tablet, Rfl: 0    apixaban (Eliquis) 5 MG tablet tablet, Take 1 tablet by mouth 2 (Two) Times a Day., Disp: , Rfl:     arformoterol (BROVANA) 15 MCG/2ML nebulizer solution, Take 2 mL by nebulization 2 (Two) Times a Day., Disp: 120 mL, Rfl: 11    benzonatate (TESSALON) 100 MG capsule, Take 1 capsule by mouth 3 (Three) Times a Day As Needed for Cough., Disp: 20 capsule, Rfl: 0    budesonide (PULMICORT) 0.5 MG/2ML nebulizer solution, Take 2 mL by nebulization 2 (Two) Times a Day., Disp: 360 mL, Rfl: 2    cephalexin (KEFLEX) 500 MG capsule, Take 1 capsule by mouth 3 (Three) Times a Day., Disp: 21 capsule, Rfl: 0    Cholecalciferol (VITAMIN D-3 PO), Take 1 tablet by mouth Daily. Indications: nutritional support, Disp: , Rfl:     coenzyme Q10 50 MG capsule capsule, Take 1 capsule by mouth Daily. Indications: nutritional support, Disp: , Rfl:     Cyanocobalamin (VITAMIN B 12 PO), Take 1 tablet by mouth Daily. Indications: nutritional support, Disp: , Rfl:     diazePAM (VALIUM) 2 MG tablet, Take 1 tablet by mouth 2 (Two) Times a Day. Indications: Feeling Anxious, Disp: , Rfl:     dilTIAZem CD (CARDIZEM CD) 120 MG 24 hr capsule, Take 1 capsule by mouth Daily., Disp: 30 capsule, Rfl: 0    docusate sodium (COLACE) 100 MG capsule, Take 1 capsule by mouth Daily. Indications: Constipation, Disp: , Rfl:     fexofenadine (Allegra Allergy) 60 MG tablet, Take  by mouth Daily As Needed., Disp: , Rfl:     fluticasone (FLONASE) 50 MCG/ACT nasal spray, 2 sprays by Each Nare route 2 (Two) Times a Day. (Patient not taking: Reported on 1/29/2024), Disp: 18.2 mL, Rfl: 11    furosemide (LASIX) 40 MG tablet, Take 40 mg by mouth 2 (Two) Times a Day. Indications: Cardiac Failure, Disp: , Rfl:     furosemide (LASIX) 40 MG tablet, Take 40 mg by  mouth Daily. Indications: Cardiac Failure, Disp: , Rfl:     glucosamine-chondroitin 500-400 MG capsule capsule, Take 1 capsule by mouth 3 (Three) Times a Day With Meals. Indications: Joint Damage causing Pain and Loss of Function, Disp: , Rfl:     Magnesium 500 MG tablet, Take 500 mg by mouth Daily., Disp: , Rfl:     O2 (OXYGEN), Inhale 2 L/min Continuous. At night as needed  Indications: hypoxia, Disp: , Rfl:     ondansetron (ZOFRAN) 4 MG tablet, Take 1 tablet by mouth Daily As Needed for Nausea or Vomiting. Indications: Nausea and Vomiting, Disp: , Rfl:     pantoprazole (PROTONIX) 40 MG EC tablet, Take 1 tablet by mouth 2 (Two) Times a Day Before Meals., Disp: 60 tablet, Rfl: 0    polyethylene glycol (MIRALAX) 17 g packet, Take 17 g by mouth Daily As Needed (constipation ). Indications: Constipation, Disp: , Rfl:     potassium chloride (KLOR-CON M10) 10 MEQ CR tablet, Take 20 mEq by mouth Daily., Disp: , Rfl:     Selenium (SELENIMIN PO), Take 1 tablet by mouth Daily. Indications: nutritional support, Disp: , Rfl:      Allergies:   Allergies   Allergen Reactions    Cortisone Hives    Penicillins Hives     Tolerated Zosyn       Social History:   Social History     Socioeconomic History    Marital status:    Tobacco Use    Smoking status: Former     Current packs/day: 0.50     Average packs/day: 0.5 packs/day for 2.0 years (1.0 ttl pk-yrs)     Types: Cigarettes    Smokeless tobacco: Never    Tobacco comments:     quit 40 years ago   Vaping Use    Vaping status: Never Used   Substance and Sexual Activity    Alcohol use: No    Drug use: No    Sexual activity: Defer       Family History:   Family History   Problem Relation Age of Onset    Other Mother         Cardiac disorder    Osteoarthritis Mother     Cataracts Mother     Macular degeneration Mother     Hypertension Father     Other Father         Cardiac disorder    Ovarian cancer Sister         70'S    Cancer Sister     Diabetes Sister     Hypertension  Sister     Osteoarthritis Sister     Cancer Brother     Hypertension Brother     Osteoarthritis Brother     Colon cancer Maternal Grandmother     Ovarian cancer Paternal Grandmother         unknown    Cancer Other     Stroke Other     Breast cancer Neg Hx        Review of Systems:  Clinically relevant review of systems documented in HPI    Physical Exam:   Vitals:    05/31/24 1701   BP: 152/69   Pulse: 72   Resp:    Temp:    SpO2: 94%     BMI: There is no height or weight on file to calculate BMI.   No Weight Documented This Encounter      Intake/Output Summary (Last 24 hours) at 5/31/2024 1719  Last data filed at 5/31/2024 1652  Gross per 24 hour   Intake 1250 ml   Output --   Net 1250 ml       GENERAL: no acute distress, awake and alert  RESPIRATORY: symmetric excursion bilaterally, normal work of breathing  CARDIOVASCULAR: Regular rate, well perfused  Vascular: 2+ DP pulse in right lower extremity, tense and ischemic appearing skin over right medial lower extremity measuring 15 x 10 cm      Pertinent labs:   Results from last 7 days   Lab Units 05/31/24  1420 05/28/24  0827   WBC 10*3/mm3 8.77 9.19   HEMOGLOBIN g/dL 9.1* 9.4*   HEMATOCRIT % 29.8* 30.4*   PLATELETS 10*3/mm3 247 233     Results from last 7 days   Lab Units 05/31/24  1420 05/28/24  0827   SODIUM mmol/L 143 145   POTASSIUM mmol/L 3.7 3.3*   CHLORIDE mmol/L 107 107   CO2 mmol/L 27.1 32.0*   BUN mg/dL 15 16   CREATININE mg/dL 0.98 1.04*   CALCIUM mg/dL 8.7 8.9   BILIRUBIN mg/dL 0.4 0.3   ALK PHOS U/L 70 70   ALT (SGPT) U/L 10 9   AST (SGOT) U/L 11 9   GLUCOSE mg/dL 110* 95       IMAGING:  CT ANGIOGRAM ABDOMEN AND PELVIS WITH ILIOFEMORAL RUNOFF     HISTORY: Right calf swelling, hematoma, severe pain     COMPARISON: CT chest 05/09/2024, CT abdomen and pelvis 01/16/2024.     TECHNIQUE: Axial images were obtained from the lung bases through the  lower extremities following the administration of IV contrast. Coronal  and sagittal MIP images were obtained as  well as 3D volume rendering.   Radiation dose reduction techniques were utilized, including automated  exposure control and exposure modulation based on body size.     FINDINGS:     CTA: Distal thoracic aorta is tortuous though exhibits normal caliber.  Celiac artery, superior mesenteric artery, both main renal arteries,  inferior mesenteric artery are patent. Atherosclerotic calcifications  are present involving the abdominal aorta without aneurysm. Both common  iliac arteries, internal/external iliac arteries, common femoral  arteries are patent.        Right lower extremity: Superficial and deep femoral arteries patent.  Mild calcified plaques involving the superficial femoral, popliteal  arteries. The popliteal artery, anterior tibial artery, tibioperoneal  trunk are patent. Three-vessel runoff to the right lower extremity.  There is abnormal swelling of the right lower extremity as compared to  the left with edema in the subcutaneous fat involving the right thigh,  knee, calf, ankle, foot, more severe distally. There is a cutaneous and  subcutaneous complex collection within the posteromedial distal calf  just above the ankle that measures approximately 7 x 3.8 x 11 cm. This  collection is consistent with a hematoma and exhibits hematocrit level.  There is evidence for active extravasation into this collection with  contrast extending into the anterior aspect of the collection that  subsequently pools into the collection and increases in volume on the  venous phase imaging associated with pseudoaneurysm formation.. There is  a deeper hematoma there is anterior medial to the distal tibial shaft  just above the ankle within the deep subcutaneous fat and this measures  approximately 4 x 2 x 7 cm.     Left lower extremity: Mild atherosclerotic disease associated with  calcified plaque though the left superficial and deep femoral arteries,  popliteal artery, anterior tibial artery, tibioperoneal trunk are  patent.  There is three-vessel runoff to the left lower extremity. No  hemorrhage or extravasation.  Non-angiogram findings: Mild basilar atelectasis. Previous  cholecystectomy. Likely compensatory dilatation of the bile ducts. 2.2  cm cystic lesion caudal aspect of the spleen without change. Adrenal  glands, pancreas, kidneys within normal limits. Right renal parapelvic  cyst. No hydronephrosis. Moderate rectal distention with stool.        IMPRESSION:  Cutaneous and subcutaneous hematoma within the posteromedial  distal calf. There is active extravasation of contrast associated with  pseudoaneurysm formation along the inner margin of the hematoma and  contrast pools within the hematoma. A smaller hematoma is present within  the deeper subcutaneous fat anteromedial to the distal tibia.  Generalized edema in the subcutaneous fat about the right lower  extremity greatest within the distal calf and ankle and this may be  related to the hematoma or superimposed infection. No abnormal soft  tissue gas.  2. Atherosclerotic disease without evidence for aneurysm or significant  stenosis... Discussed with Dr. Martinez in the emergency department 05 /31/2024 at 4:20 p.m.          Gerald Pedraza MD  General and Endoscopic Surgery  [facility], operated by [facility]    [address] [place] [address], [address]  [place] [address]  P: [phone]  F: [phone]

## 2024-06-01 LAB
ANION GAP SERPL CALCULATED.3IONS-SCNC: 11.1 MMOL/L (ref 5–15)
BUN SERPL-MCNC: 17 MG/DL (ref 8–23)
BUN/CREAT SERPL: 14.9 (ref 7–25)
CALCIUM SPEC-SCNC: 8.1 MG/DL (ref 8.6–10.5)
CHLORIDE SERPL-SCNC: 108 MMOL/L (ref 98–107)
CO2 SERPL-SCNC: 24.9 MMOL/L (ref 22–29)
CREAT SERPL-MCNC: 1.14 MG/DL (ref 0.57–1)
DEPRECATED RDW RBC AUTO: 45.4 FL (ref 37–54)
EGFRCR SERPLBLD CKD-EPI 2021: 47 ML/MIN/1.73
ERYTHROCYTE [DISTWIDTH] IN BLOOD BY AUTOMATED COUNT: 15 % (ref 12.3–15.4)
GLUCOSE SERPL-MCNC: 113 MG/DL (ref 65–99)
HCT VFR BLD AUTO: 25.8 % (ref 34–46.6)
HGB BLD-MCNC: 7.9 G/DL (ref 12–15.9)
MCH RBC QN AUTO: 26 PG (ref 26.6–33)
MCHC RBC AUTO-ENTMCNC: 30.6 G/DL (ref 31.5–35.7)
MCV RBC AUTO: 84.9 FL (ref 79–97)
PLATELET # BLD AUTO: 242 10*3/MM3 (ref 140–450)
PMV BLD AUTO: 10.4 FL (ref 6–12)
POTASSIUM SERPL-SCNC: 3.8 MMOL/L (ref 3.5–5.2)
RBC # BLD AUTO: 3.04 10*6/MM3 (ref 3.77–5.28)
SODIUM SERPL-SCNC: 144 MMOL/L (ref 136–145)
WBC NRBC COR # BLD AUTO: 12.47 10*3/MM3 (ref 3.4–10.8)

## 2024-06-01 PROCEDURE — 94761 N-INVAS EAR/PLS OXIMETRY MLT: CPT

## 2024-06-01 PROCEDURE — 94760 N-INVAS EAR/PLS OXIMETRY 1: CPT

## 2024-06-01 PROCEDURE — 94799 UNLISTED PULMONARY SVC/PX: CPT

## 2024-06-01 PROCEDURE — 25010000002 HYDROMORPHONE PER 4 MG: Performed by: SURGERY

## 2024-06-01 PROCEDURE — 85027 COMPLETE CBC AUTOMATED: CPT | Performed by: STUDENT IN AN ORGANIZED HEALTH CARE EDUCATION/TRAINING PROGRAM

## 2024-06-01 PROCEDURE — 97162 PT EVAL MOD COMPLEX 30 MIN: CPT

## 2024-06-01 PROCEDURE — 80048 BASIC METABOLIC PNL TOTAL CA: CPT | Performed by: STUDENT IN AN ORGANIZED HEALTH CARE EDUCATION/TRAINING PROGRAM

## 2024-06-01 PROCEDURE — 94664 DEMO&/EVAL PT USE INHALER: CPT

## 2024-06-01 PROCEDURE — 97530 THERAPEUTIC ACTIVITIES: CPT

## 2024-06-01 PROCEDURE — 99231 SBSQ HOSP IP/OBS SF/LOW 25: CPT | Performed by: SURGERY

## 2024-06-01 RX ORDER — NITROGLYCERIN 0.4 MG/1
0.4 TABLET SUBLINGUAL
Status: DISCONTINUED | OUTPATIENT
Start: 2024-06-01 | End: 2024-06-12 | Stop reason: HOSPADM

## 2024-06-01 RX ORDER — ONDANSETRON 2 MG/ML
4 INJECTION INTRAMUSCULAR; INTRAVENOUS EVERY 6 HOURS PRN
Status: DISCONTINUED | OUTPATIENT
Start: 2024-06-01 | End: 2024-06-12 | Stop reason: HOSPADM

## 2024-06-01 RX ORDER — AMOXICILLIN 250 MG
2 CAPSULE ORAL 2 TIMES DAILY PRN
Status: DISCONTINUED | OUTPATIENT
Start: 2024-06-01 | End: 2024-06-06

## 2024-06-01 RX ORDER — SODIUM CHLORIDE 9 MG/ML
40 INJECTION, SOLUTION INTRAVENOUS AS NEEDED
Status: DISCONTINUED | OUTPATIENT
Start: 2024-06-01 | End: 2024-06-12 | Stop reason: HOSPADM

## 2024-06-01 RX ORDER — BUDESONIDE 0.5 MG/2ML
0.5 INHALANT ORAL
Status: DISCONTINUED | OUTPATIENT
Start: 2024-06-01 | End: 2024-06-12 | Stop reason: HOSPADM

## 2024-06-01 RX ORDER — FUROSEMIDE 40 MG/1
40 TABLET ORAL 2 TIMES DAILY
Status: DISCONTINUED | OUTPATIENT
Start: 2024-06-01 | End: 2024-06-03

## 2024-06-01 RX ORDER — UREA 10 %
5 LOTION (ML) TOPICAL NIGHTLY PRN
Status: DISCONTINUED | OUTPATIENT
Start: 2024-06-01 | End: 2024-06-12 | Stop reason: HOSPADM

## 2024-06-01 RX ORDER — BISACODYL 10 MG
10 SUPPOSITORY, RECTAL RECTAL DAILY PRN
Status: DISCONTINUED | OUTPATIENT
Start: 2024-06-01 | End: 2024-06-06

## 2024-06-01 RX ORDER — POLYETHYLENE GLYCOL 3350 17 G/17G
17 POWDER, FOR SOLUTION ORAL DAILY PRN
Status: DISCONTINUED | OUTPATIENT
Start: 2024-06-01 | End: 2024-06-06

## 2024-06-01 RX ORDER — ARFORMOTEROL TARTRATE 15 UG/2ML
15 SOLUTION RESPIRATORY (INHALATION)
Status: DISCONTINUED | OUTPATIENT
Start: 2024-06-01 | End: 2024-06-12 | Stop reason: HOSPADM

## 2024-06-01 RX ORDER — PANTOPRAZOLE SODIUM 40 MG/1
40 TABLET, DELAYED RELEASE ORAL
Status: DISCONTINUED | OUTPATIENT
Start: 2024-06-01 | End: 2024-06-12 | Stop reason: HOSPADM

## 2024-06-01 RX ORDER — ONDANSETRON 4 MG/1
4 TABLET, ORALLY DISINTEGRATING ORAL EVERY 6 HOURS PRN
Status: DISCONTINUED | OUTPATIENT
Start: 2024-06-01 | End: 2024-06-12 | Stop reason: HOSPADM

## 2024-06-01 RX ORDER — BISACODYL 5 MG/1
5 TABLET, DELAYED RELEASE ORAL DAILY PRN
Status: DISCONTINUED | OUTPATIENT
Start: 2024-06-01 | End: 2024-06-06

## 2024-06-01 RX ORDER — SODIUM CHLORIDE 0.9 % (FLUSH) 0.9 %
10 SYRINGE (ML) INJECTION AS NEEDED
Status: DISCONTINUED | OUTPATIENT
Start: 2024-06-01 | End: 2024-06-12 | Stop reason: HOSPADM

## 2024-06-01 RX ORDER — SODIUM CHLORIDE 0.9 % (FLUSH) 0.9 %
10 SYRINGE (ML) INJECTION EVERY 12 HOURS SCHEDULED
Status: DISCONTINUED | OUTPATIENT
Start: 2024-06-01 | End: 2024-06-12 | Stop reason: HOSPADM

## 2024-06-01 RX ORDER — DILTIAZEM HYDROCHLORIDE 120 MG/1
120 CAPSULE, COATED, EXTENDED RELEASE ORAL
Status: DISCONTINUED | OUTPATIENT
Start: 2024-06-01 | End: 2024-06-12 | Stop reason: HOSPADM

## 2024-06-01 RX ORDER — DIAZEPAM 2 MG/1
2 TABLET ORAL 2 TIMES DAILY
Status: DISCONTINUED | OUTPATIENT
Start: 2024-06-01 | End: 2024-06-12 | Stop reason: HOSPADM

## 2024-06-01 RX ADMIN — DILTIAZEM HYDROCHLORIDE 120 MG: 120 CAPSULE, COATED, EXTENDED RELEASE ORAL at 12:04

## 2024-06-01 RX ADMIN — BISACODYL 5 MG: 5 TABLET, COATED ORAL at 09:26

## 2024-06-01 RX ADMIN — ACETAMINOPHEN 1000 MG: 500 TABLET ORAL at 09:33

## 2024-06-01 RX ADMIN — DIAZEPAM 2 MG: 2 TABLET ORAL at 09:26

## 2024-06-01 RX ADMIN — DIAZEPAM 2 MG: 2 TABLET ORAL at 20:47

## 2024-06-01 RX ADMIN — PANTOPRAZOLE SODIUM 40 MG: 40 TABLET, DELAYED RELEASE ORAL at 17:07

## 2024-06-01 RX ADMIN — ARFORMOTEROL TARTRATE 15 MCG: 15 SOLUTION RESPIRATORY (INHALATION) at 20:34

## 2024-06-01 RX ADMIN — BUDESONIDE 0.5 MG: 0.5 INHALANT ORAL at 08:19

## 2024-06-01 RX ADMIN — PANTOPRAZOLE SODIUM 40 MG: 40 TABLET, DELAYED RELEASE ORAL at 09:26

## 2024-06-01 RX ADMIN — Medication 10 ML: at 20:48

## 2024-06-01 RX ADMIN — Medication 10 ML: at 12:05

## 2024-06-01 RX ADMIN — ACETAMINOPHEN 1000 MG: 500 TABLET ORAL at 17:07

## 2024-06-01 RX ADMIN — BUDESONIDE 0.5 MG: 0.5 INHALANT ORAL at 20:34

## 2024-06-01 RX ADMIN — HYDROMORPHONE HYDROCHLORIDE 0.5 MG: 1 INJECTION, SOLUTION INTRAMUSCULAR; INTRAVENOUS; SUBCUTANEOUS at 06:36

## 2024-06-01 NOTE — PLAN OF CARE
Problem: Fall Injury Risk  Goal: Absence of Fall and Fall-Related Injury  Outcome: Ongoing, Progressing  Intervention: Identify and Manage Contributors  Recent Flowsheet Documentation  Taken 5/31/2024 2318 by Cynthia Mckeon RN  Medication Review/Management: medications reviewed  Intervention: Promote Injury-Free Environment  Recent Flowsheet Documentation  Taken 6/1/2024 0000 by Cynthia Mckeon RN  Safety Promotion/Fall Prevention:   activity supervised   assistive device/personal items within reach   fall prevention program maintained  Taken 5/31/2024 2318 by Cynthia Mckeon RN  Safety Promotion/Fall Prevention:   activity supervised   assistive device/personal items within [facility]   fall prevention program maintained   Goal Outcome Evaluation:  Plan of Care Reviewed With: patient        Progress: no change   Pt is post right lower exploration,debridement and control of bleeding . R lower leg dressing intact. Pt before coming to [address] [place] [address] received IV Dilaudid prn from PACU. Pt is alert and oriented and denies pain. Will continue to monitor.

## 2024-06-01 NOTE — THERAPY EVALUATION
Patient Name: Vonnie Coppola  : 1935    MRN: [id number]                              Today's Date: 2024       Admit Date: 2024    Visit Dx:     ICD-10-CM ICD-9-CM   1. Leg hematoma, right, subsequent encounter  S80.11XD V58.89     924.5   2. Cellulitis of leg, right  L03.115 682.6   3. Chronic anemia  D64.9 285.9     Patient Active Problem List   Diagnosis    GI bleed    Anemia    HTN (hypertension)    History of breast cancer    Asthma    History of CVA (cerebrovascular accident)    Dehydration    Renal insufficiency    Pulmonary nodule    Adnexal cyst    Nausea    Atrial fibrillation    History of pulmonary embolism    Class 2 severe obesity with serious comorbidity in adult    Thrombocytopenia    Cellulitis of right leg    Acute cystitis    Leg hematoma, right, subsequent encounter     Past Medical History:   Diagnosis Date    Arthritis     Asthma     Atrial fibrillation     per pt's daughter.States hx of a-fib in the past when stressed or tired.    Breast cancer     LEFT BREAST CA, LUMPECTOMY & RADS    History of cataract     Hx of radiation therapy     APPROXIMATELY 40 TREATMENTS FOR LEFT BREAST CA    Hypertension     Pneumonia     Stroke      Past Surgical History:   Procedure Laterality Date    APPENDECTOMY      BLADDER SURGERY      BREAST BIOPSY Left     MALIGNANT    BREAST LUMPECTOMY Left     MALIGNANT    CATARACT EXTRACTION      COLONOSCOPY N/A 2024    Procedure: COLONOSCOPY to cecum with cold snare polypectomies;  Surgeon: Harshad Gaston MD;  Location: [facility] ENDOSCOPY;  Service: Gastroenterology;  Laterality: N/A;  pre- gi bleed, anemia  post- diverticulosis, polyps    ENDOSCOPY N/A 2024    Procedure: ESOPHAGOGASTRODUODENOSCOPY with biospy;  Surgeon: Harshad Gaston MD;  Location: [facility] ENDOSCOPY;  Service: Gastroenterology;  Laterality: N/A;  pre- gi bleed, anemia  post- erosive gastirits    GALLBLADDER SURGERY      HERNIA REPAIR      HYSTERECTOMY       BENJAMIN      LYMPHADENECTOMY      OOPHORECTOMY        General Information       Row Name 06/01/24 1210          Physical Therapy Time and Intention    Document Type evaluation  -ER     Mode of Treatment individual therapy;physical therapy  -ER       Row Name 06/01/24 1210          General Information    Patient Profile Reviewed yes  -ER     Prior Level of Function independent:  -ER     Existing Precautions/Restrictions fall  reports x1 fall, fell in the dark tripping over her rollator - R foot wrapped in ace bandage  -ER     Barriers to Rehab medically complex  -ER       Row Name 06/01/24 1210          Living Environment    People in Home child(christine), adult  -ER       Row Name 06/01/24 1210          Home Main Entrance    Number of Stairs, Main Entrance four  -ER       Row Name 06/01/24 1210          Stairs Within Home, Primary    Number of Stairs, Within Home, Primary none  -ER     Stairs Comment, Within Home, Primary Pt. reports 3-4 steps before a landing, and then 1 step to enter. She notes that she holds onto her daughter to enter home, no rails  -ER       Row Name 06/01/24 1210          Cognition    Orientation Status (Cognition) oriented x 3  -ER       Row Name 06/01/24 1210          Safety Issues, Functional Mobility    Impairments Affecting Function (Mobility) pain;endurance/activity tolerance;balance  -ER               User Key  (r) = Recorded By, (t) = Taken By, (c) = Cosigned By      Initials Name Provider Type    ER Wendy Pineda, PT Physical Therapist                   Mobility       Row Name 06/01/24 1212          Bed Mobility    Bed Mobility bed mobility (all) activities  -ER     All Activities, [place] (Bed Mobility) minimum assist (75% patient effort)  -ER     Assistive Device (Bed Mobility) head of bed elevated;bed rails  -ER     Comment, (Bed Mobility) Slow to complete  -ER       Row Name 06/01/24 1212          Sit-Stand Transfer    Sit-Stand [place] (Transfers) unable to assess  -ER      Comment, (Sit-Stand Transfer) severe pain in dependent position sitting EOB, poor endurance to sitting  -ER       Row Name 06/01/24 1212          Gait/Stairs (Locomotion)    [place] Level (Gait) unable to assess  -ER               User Key  (r) = Recorded By, (t) = Taken By, (c) = Cosigned By      Initials Name Provider Type    ER Wendy Pineda, PT Physical Therapist                   Obj/Interventions       Row Name 06/01/24 1216          Range of Motion Comprehensive    Comment, General Range of Motion limitations in R PF/DF due to ace bandage and pain, Generalized limits due to fatigue/pain  -ER       Row Name 06/01/24 1216          Strength Comprehensive (MMT)    Comment, General Manual Muscle Testing (MMT) Assessment Generalized weakness, pain limiting  -ER       Row Name 06/01/24 1216          Balance    Balance Interventions sitting;static;dynamic  -ER       Row Name 06/01/24 1216          Sensory Assessment (Somatosensory)    Sensory Assessment (Somatosensory) sensation intact  -ER               User Key  (r) = Recorded By, (t) = Taken By, (c) = Cosigned By      Initials Name Provider Type    ER Wendy Pineda, PT Physical Therapist                   Goals/Plan       [facility] Name 06/01/24 1220          Bed Mobility Goal 1 (PT)    Activity/Assistive Device (Bed Mobility Goal 1, PT) bed mobility activities, all  -ER     [place] Level/Cues Needed (Bed Mobility Goal 1, PT) contact guard required  -ER     Time Frame (Bed Mobility Goal 1, PT) 2 weeks  -ER       Row Name 06/01/24 1220          Transfer Goal 1 (PT)    Activity/Assistive Device (Transfer Goal 1, PT) transfers, all  -ER     [place] Level/Cues Needed (Transfer Goal 1, PT) contact guard required;minimum assist (75% or more patient effort)  -ER     Time Frame (Transfer Goal 1, PT) 2 weeks  -ER       Row Name 06/01/24 1220          Gait Training Goal 1 (PT)    Activity/Assistive Device (Gait Training Goal 1, PT) gait (walking locomotion)  -ER      [place] Level (Gait Training Goal 1, PT) contact guard required;minimum assist (75% or more patient effort)  -ER     Distance (Gait Training Goal 1, PT) 30  -ER     Time Frame (Gait Training Goal 1, PT) 2 weeks  -ER       Row Name 06/01/24 1220          Stairs Goal 1 (PT)    Activity/Assistive Device (Stairs Goal 1, PT) stairs, all skills  -ER     [place] Level/Cues Needed (Stairs Goal 1, PT) contact guard required;minimum assist (75% or more patient effort)  -ER     Number of Stairs (Stairs Goal 1, PT) 4  -ER     Time Frame (Stairs Goal 1, PT) 2 weeks  -ER       Row Name 06/01/24 1220          Therapy Assessment/Plan (PT)    Planned Therapy Interventions (PT) balance training;bed mobility training;home exercise program;gait training;patient/family education;strengthening;stretching;stair training;ROM (range of motion);transfer training;postural re-education  -ER               User Key  (r) = Recorded By, (t) = Taken By, (c) = Cosigned By      Initials Name Provider Type    ER Wendy Pineda, PT Physical Therapist                   Clinical Impression       Row Name 06/01/24 1217          Pain    Pretreatment Pain Rating 9/10  -ER     Posttreatment Pain Rating 9/10  -ER     Pain Location - Side/Orientation Right  -ER     Pain Location lower  -ER     Pain Location - extremity  -ER     Pain Intervention(s) Rest;Repositioned  -ER       Row Name 06/01/24 1217          Plan of Care Review    Plan of Care Reviewed With patient  -ER     Outcome Evaluation Vonnie Coppola is an 88 year old female s/p debridement of RLE. She lives with her adult daughter, has 3-4 VARGHESE with no rail, and uses a rollator at baseline. She has had x1 fall when she tripped over her rollator in the dark. She reports that she is independent at baseline. BM this date was completed with Min-Mod A. pt. is very slow to complete due to RLE pain. Once seated, she has significant pain and consistently posteriorly leans due to poor sitting  endurance/tolerance. Returned to supine for safety and educated patient on importance of performing AP, GS, QS, heel slides, and hip ABD/ADD throughout the day. She performs ~10 of each this date. Her ACE bandage is slightly weeping, however RN aware and MD to follow up on dressing change. At this time, PT recommending SNF. Will follow for updated recommendations as pt. progresses.  -ER       Row Name 06/01/24 1217          Therapy Assessment/Plan (PT)    Rehab Potential (PT) good, to achieve stated therapy goals  -ER     Criteria for Skilled Interventions Met (PT) yes  -ER     Therapy Frequency (PT) 5 times/wk  -ER       Row Name 06/01/24 1217          Positioning and Restraints    Pre-Treatment Position in bed  -ER     Post Treatment Position bed  -ER     In Bed notified nsg;call light within reach;encouraged to call for assist;exit alarm on  -ER               User Key  (r) = Recorded By, (t) = Taken By, (c) = Cosigned By      Initials Name Provider Type    ER Wendy Pineda, PT Physical Therapist                   Outcome Measures       Row Name 06/01/24 1221          How much help from another person do you currently need...    Turning from your back to your side while in flat bed without using bedrails? 2  -ER     Moving from lying on back to sitting on the side of a flat bed without bedrails? 2  -ER     Moving to and from a bed to a chair (including a wheelchair)? 1  -ER     Standing up from a chair using your arms (e.g., wheelchair, bedside chair)? 1  -ER     Climbing 3-5 steps with a railing? 1  -ER     To walk in hospital room? 1  -ER     AM-PAC 6 Clicks Score (PT) 8  -ER     Highest Level of Mobility Goal 3 --> Sit at edge of bed  -ER       Row Name 06/01/24 1221          Functional Assessment    Outcome Measure Options AM-PAC 6 Clicks Basic Mobility (PT)  -ER               User Key  (r) = Recorded By, (t) = Taken By, (c) = Cosigned By      Initials Name Provider Type    ER Wendy Pineda PT Physical Therapist                                  Physical Therapy Education       Title: PT OT SLP Therapies (Done)       Topic: Physical Therapy (Done)       Point: Mobility training (Done)       Learning Progress Summary             Patient Acceptance, E, VU,NR by ER at 6/1/2024 1222                         Point: Home exercise program (Done)       Learning Progress Summary             Patient Acceptance, E, VU,NR by ER at 6/1/2024 1222                         Point: Body mechanics (Done)       Learning Progress Summary             Patient Acceptance, E, VU,NR by ER at 6/1/2024 1222                         Point: Precautions (Done)       Learning Progress Summary             Patient Acceptance, E, VU,NR by ER at 6/1/2024 1222                                         User Key       Initials Effective Dates Name Provider Type Discipline    ER 10/15/23 -  Wendy Pineda, PT Physical Therapist PT                  PT Recommendation and Plan  Planned Therapy Interventions (PT): balance training, bed mobility training, home exercise program, gait training, patient/family education, strengthening, stretching, stair training, ROM (range of motion), transfer training, postural re-education  Plan of Care Reviewed With: patient  Outcome Evaluation: Vonnie Coppola is an 88 year old female s/p debridement of RLE. She lives with her adult daughter, has 3-4 VARGHESE with no rail, and uses a rollator at baseline. She has had x1 fall when she tripped over her rollator in the dark. She reports that she is independent at baseline. BM this date was completed with Min-Mod A. pt. is very slow to complete due to RLE pain. Once seated, she has significant pain and consistently posteriorly leans due to poor sitting endurance/tolerance. Returned to supine for safety and educated patient on importance of performing AP, GS, QS, heel slides, and hip ABD/ADD throughout the day. She performs ~10 of each this date. Her ACE bandage is slightly weeping, however RN aware and  MD to follow up on dressing change. At this time, PT recommending SNF. Will follow for updated recommendations as pt. progresses.     Time Calculation:         PT Charges       Row Name 06/01/24 1222             Time Calculation    Start Time 1009  -ER      Stop Time 1032  -ER      Time Calculation (min) 23 min  -ER      PT Received On 06/01/24  -ER      PT - Next Appointment 06/03/24  -ER      PT Goal Re-Cert Due Date 06/15/24  -ER         Time Calculation- PT    Total Timed Code Minutes- PT 18 minute(s)  -ER         Timed Charges    98434 - PT Therapeutic Activity Minutes 18  -ER         Untimed Charges    PT Eval/Re-eval Minutes 5  -ER         Total Minutes    Timed Charges Total Minutes 18  -ER      Untimed Charges Total Minutes 5  -ER       Total Minutes 23  -ER                User Key  (r) = Recorded By, (t) = Taken By, (c) = Cosigned By      Initials Name Provider Type    ER Wendy Pineda, PT Physical Therapist                  Therapy Charges for Today       Code Description Service Date Service Provider Modifiers Qty    55898997493 HC PT EVAL MOD COMPLEXITY 3 6/1/2024 Wendy Pineda, PT GP 1    34540386945 HC PT THERAPEUTIC ACT EA 15 MIN 6/1/2024 Wendy Pineda, PT GP 1            PT G-Codes  Outcome Measure Options: AM-PAC 6 Clicks Basic Mobility (PT)  AM-PAC 6 Clicks Score (PT): 8  PT Discharge Summary  Anticipated Discharge Disposition (PT): skilled nursing facility    Wendy Pineda PT  6/1/2024

## 2024-06-01 NOTE — PLAN OF CARE
Problem: Adult Inpatient Plan of Care  Goal: Plan of Care Review  Outcome: Ongoing, Progressing  Goal: Patient-Specific Goal (Individualized)  Outcome: Ongoing, Progressing  Goal: Absence of Hospital-Acquired Illness or Injury  Outcome: Ongoing, Progressing  Intervention: Identify and Manage Fall Risk  Description: Perform standard risk assessment on admission using a validated tool or comprehensive approach appropriate to the patient; reassess fall risk frequently, with change in status or transfer to another level of care.  Communicate fall injury risk to interprofessional healthcare team.  Determine need for increased observation, equipment and environmental modification, such as low bed, signage and supportive, nonskid footwear.  Adjust safety measures to individual developmental age, stage and identified risk factors.  Reinforce the importance of safety and physical activity with patient and family.  Perform regular intentional rounding to assess need for position change, pain assessment and personal needs, including assistance with toileting.  Intervention: Prevent Skin Injury  Description: Perform a screening for skin injury risk, such as pressure or moisture associated skin damage on admission and at regular intervals throughout hospital stay.  Keep all areas of skin (especially folds) clean and dry.  Maintain adequate skin hydration.  Relieve and redistribute pressure and protect bony prominences; implement measures based on patient-specific risk factors.  Match turning and repositioning schedule to clinical condition.  Encourage weight shift frequently; assist with reposition if unable to complete independently.  Float heels off bed; avoid pressure on the Achilles tendon.  Keep skin free from extended contact with medical devices.  Encourage functional activity and mobility, as early as tolerated.  Use aids (e.g., slide boards, mechanical lift) during transfer.  Intervention: Prevent and Manage VTE  (Venous Thromboembolism) Risk  Description: Assess for VTE (venous thromboembolism) risk.  Encourage and assist with early ambulation.  Initiate and maintain compression or other therapy, as indicated, based on identified risk in accordance with organizational protocol and provider order.  Encourage both active and passive leg exercises while in bed, if unable to ambulate.  Goal: Optimal Comfort and Wellbeing  Outcome: Ongoing, Progressing  Intervention: Monitor Pain and Promote Comfort  Description: Assess pain level, treatment efficacy and patient response at regular intervals using a consistent pain scale.  Consider the presence and impact of preexisting chronic pain.  Encourage patient and caregiver involvement in pain assessment, interventions and safety measures.  Intervention: Provide Person-Centered Care  Description: Use a family-focused approach to care.  Develop trust and rapport by proactively providing information, encouraging questions, addressing concerns and offering reassurance.  Acknowledge emotional response to hospitalization.  Recognize and utilize personal coping strategies.  Honor spiritual and cultural preferences.  Goal: Readiness for Transition of Care  Outcome: Ongoing, Progressing     Problem: Fall Injury Risk  Goal: Absence of Fall and Fall-Related Injury  Outcome: Ongoing, Progressing  Intervention: Identify and Manage Contributors  Description: Develop a fall prevention plan with the patient and caregiver/family.  Provide reorientation, appropriate sensory stimulation and routines with changes in mental status to decrease risk of fall.  Promote use of personal vision and auditory aids.  Assess assistance level required for safe and effective self-care; provide support as needed, such as toileting, mobilization. For age 65 and older, implement timed toileting with assistance.  Encourage physical activity, such as performance of mobility and self-care at highest level of patient ability,  multicomponent exercise program and provision of appropriate assistive devices.  If fall occurs, assess the severity of injury; implement fall injury protocol. Determine the cause and revise fall injury prevention plan.  Regularly review medication contribution to fall risk; adjust medication administration times to minimize risk of falling.  Consider risk related to polypharmacy and age.  Balance adequate pain management with potential for oversedation.  Intervention: Promote Injury-Free Environment  Description: Provide a safe, barrier-free environment that encourages independent activity.  Keep care area uncluttered and well-lighted.  Determine need for increased observation or monitoring.  Avoid use of devices that minimize mobility, such as restraints or indwelling urinary catheter.     Problem: Skin Injury Risk Increased  Goal: Skin Health and Integrity  Outcome: Ongoing, Progressing  Intervention: Optimize Skin Protection  Description: Perform a full pressure injury risk assessment, as indicated by screening, upon admission to care unit.  Reassess skin (injury risk, full inspection) frequently (e.g., scheduled interval, with change in condition) to provide optimal early detection and prevention.  Maintain adequate tissue perfusion (e.g., encourage fluid balance; avoid crossing legs, constrictive clothing or devices) to promote tissue oxygenation.  Maintain head of bed at lowest degree of elevation tolerated, considering medical condition and other restrictions.  Avoid positioning onto an area that remains reddened.  Minimize incontinence and moisture (e.g., toileting schedule; moisture-wicking pad, diaper or incontinence collection device; skin moisture barrier).  Cleanse skin promptly and gently when soiled utilizing a pH-balanced cleanser.  Relieve and redistribute pressure (e.g., scheduled position changes, weight shifts, use of support surface, medical device repositioning, protective dressing  application, use of positioning device, microclimate control, use of pressure-injury-monitor  Encourage increased activity, such as sitting in a chair at the bedside or early mobilization, when able to tolerate.   Goal Outcome Evaluation:           Progress: improving

## 2024-06-01 NOTE — OP NOTE
OPERATIVE REPORT     DATE OF OPERATION: 05/31/24    SURGEON: Gerald Pedraza MD    PREOPERATIVE DIAGNOSIS: Rapidly expanding hematoma of right medial lower leg    POSTOPERATIVE DIAGNOSIS: Same    PROCEDURE PERFORMED: Exploration of right lower extremity hematoma with control of bleeding, debridement of skin and subcutaneous tissue measuring 11 x 10 cm    ANESTHESIA: General    SPECIMEN: None    DRAINS: None    BLOOD LOSS: 50 cc    INDICATIONS FOR OPERATION: Ms. Vonnie Coppola is a 88 y.o.   lady with recent fall with hematoma over her medial right ankle.  This underwent aspiration due to concern for infection on 5/28/2024 in the emergency room.  Today she had acute onset of pain and rapidly increasing swelling.  She presented to the emergency room for further evaluation.  CT angiogram of the abdomen and pelvis with runoff of the bilateral lower extremities demonstrated active extravasation of contrast into a superficial hematoma.  On physical examination of the rapidly expanding hematoma there was ischemic skin overlying it.  I recommended proceeding to the operating room for debridement of the ischemic tissue and also control of the right lower extremity hemorrhage. All risks (including bleeding, infection, damage to surrounding structures), benefits, and alternatives were explained to the patient and she agreed and wished to proceed.  Informed consent was obtained.    OPERATIVE REPORT: The patient was taken to the operating room, transferred onto the operating room table, and underwent general endotracheal anesthesia without incident. The patient was prepped and draped in the usual sterile fashion.  A timeout was performed.    Overlying the area of maximal fluctuance and incision was made with the Bovie and approximately 100 cc of hematoma were expressed from the wound.  The thin skin overlying the wound was debrided with the cautery.  In the superior and anterior portion of the wound there was damage to the  subcutaneous fat with bleeding identified at this location.  Location of bleeding was suture-ligated with 2-0 silk suture.  Additional control bleeding was obtained with the cautery.  The wound was irrigated.  Total size of debrided tissue was 11 x 10 cm.  Nu-Knit Surgicel was placed in the wound bed 4 x 4 gauze was placed over top of this.  ABDs were then placed and the leg wrapped loosely with Kerlix gauze.  Ace bandages were then used to give compression from the foot to the knee.  She tolerated the procedure well.  All counts were correct at the end of the procedure.            Gerald Pedraza M.D.  General and Endoscopic Surgery  [facility]    [address], [address]  [place], [place], [address]  P: [phone]  F: [phone]

## 2024-06-01 NOTE — H&P
Patient Name:  Vonnie Coppola  YOB: 1935  MRN:  [id number]  Admit Date:  5/31/2024  Patient Care Team:  Christopher Leyva DO as PCP - General  Christopher Leyva DO as PCP - Family Medicine  Augustus Wilde MD as Consulting Physician (Hematology and Oncology)  Daniel Watson MD as Referring Physician (Hospitalist)      Subjective   History Present Illness     Chief Complaint   Patient presents with   • Leg Pain   • Wound Check       Ms. Coppola is a 88 y.o.  with a history of DVT, atrial fibrillation, hypertension, asthma, breast cancer, anxiety that presents to [facility] complaining of right lower extremity wound.      History of Present Illness  Patient reports pain and redness of her right lower extremity.  She was recently seen in this ER and evaluated for right lower extremity injury after mechanical fall.  In the ER she had a needle aspiration for hematoma and was discharged on antibiotics.  On the day of admission she began to experience significant pain with worsening swelling and presented to the hospital.    Review of Systems   Constitutional:  Negative for fatigue and fever.   Respiratory:  Negative for cough and shortness of breath.    Cardiovascular:  Negative for chest pain, palpitations and leg swelling.   Gastrointestinal:  Negative for abdominal pain, nausea and vomiting.   Skin:  Positive for wound.   Neurological:  Negative for weakness.        Personal History     Past Medical History:   Diagnosis Date   • Arthritis    • Asthma    • Atrial fibrillation     per pt's daughter.States hx of a-fib in the past when stressed or tired.   • Breast cancer 1999    LEFT BREAST CA, LUMPECTOMY & RADS   • History of cataract    • Hx of radiation therapy 1999    APPROXIMATELY 40 TREATMENTS FOR LEFT BREAST CA   • Hypertension    • Pneumonia    • Stroke      Past Surgical History:   Procedure Laterality Date   • APPENDECTOMY     • BLADDER SURGERY     •  BREAST BIOPSY Left 1999    MALIGNANT   • BREAST LUMPECTOMY Left 1999    MALIGNANT   • CATARACT EXTRACTION     • COLONOSCOPY N/A 01/19/2024    Procedure: COLONOSCOPY to cecum with cold snare polypectomies;  Surgeon: Harshad Gaston MD;  Location: [facility] ENDOSCOPY;  Service: Gastroenterology;  Laterality: N/A;  pre- gi bleed, anemia  post- diverticulosis, polyps   • ENDOSCOPY N/A 01/19/2024    Procedure: ESOPHAGOGASTRODUODENOSCOPY with biospy;  Surgeon: Harshad Gaston MD;  Location: [facility] ENDOSCOPY;  Service: Gastroenterology;  Laterality: N/A;  pre- gi bleed, anemia  post- erosive gastirits   • GALLBLADDER SURGERY     • HERNIA REPAIR     • HYSTERECTOMY     • LASIK     • LYMPHADENECTOMY     • OOPHORECTOMY       Family History   Problem Relation Age of Onset   • Other Mother         Cardiac disorder   • Osteoarthritis Mother    • Cataracts Mother    • Macular degeneration Mother    • Hypertension Father    • Other Father         Cardiac disorder   • Ovarian cancer Sister         70'S   • Cancer Sister    • Diabetes Sister    • Hypertension Sister    • Osteoarthritis Sister    • Cancer Brother    • Hypertension Brother    • Osteoarthritis Brother    • Colon cancer Maternal Grandmother    • Ovarian cancer Paternal Grandmother         unknown   • Cancer Other    • Stroke Other    • Breast cancer Neg Hx      Social History     Tobacco Use   • Smoking status: Former     Current packs/day: 0.50     Average packs/day: 0.5 packs/day for 2.0 years (1.0 ttl pk-yrs)     Types: Cigarettes   • Smokeless tobacco: Never   • Tobacco comments:     quit 40 years ago   Vaping Use   • Vaping status: Never Used   Substance Use Topics   • Alcohol use: No   • Drug use: No     No current facility-administered medications on file prior to encounter.     Current Outpatient Medications on File Prior to Encounter   Medication Sig Dispense Refill   • acetaminophen (TYLENOL) 500 MG tablet Take 2 tablets by mouth Every 4 (Four) Hours As  Needed for Mild Pain or Moderate Pain.     • apixaban (ELIQUIS) 5 MG tablet tablet Take 1 tablet by mouth Every 12 (Twelve) Hours. Indications: Atrial Fibrillation 60 tablet 0   • arformoterol (BROVANA) 15 MCG/2ML nebulizer solution Take 2 mL by nebulization 2 (Two) Times a Day. 120 mL 11   • benzonatate (TESSALON) 100 MG capsule Take 1 capsule by mouth 3 (Three) Times a Day As Needed for Cough. 20 capsule 0   • budesonide (PULMICORT) 0.5 MG/2ML nebulizer solution Take 2 mL by nebulization 2 (Two) Times a Day. 360 mL 2   • cephalexin (KEFLEX) 500 MG capsule Take 1 capsule by mouth 3 (Three) Times a Day. 21 capsule 0   • Cholecalciferol (VITAMIN D-3 PO) Take 1 tablet by mouth Daily. Indications: nutritional support     • coenzyme Q10 50 MG capsule capsule Take 1 capsule by mouth Daily. Indications: nutritional support     • Cyanocobalamin (VITAMIN B 12 PO) Take 1 tablet by mouth Daily. Indications: nutritional support     • diazePAM (VALIUM) 2 MG tablet Take 1 tablet by mouth 2 (Two) Times a Day. Indications: Feeling Anxious     • dilTIAZem CD (CARDIZEM CD) 120 MG 24 hr capsule Take 1 capsule by mouth Daily. 30 capsule 0   • fexofenadine (Allegra Allergy) 60 MG tablet Take  by mouth Daily As Needed.     • furosemide (LASIX) 40 MG tablet Take 1 tablet by mouth Daily. Indications: Cardiac Failure     • Magnesium 500 MG tablet Take 1 tablet by mouth Daily.     • ondansetron (ZOFRAN) 4 MG tablet Take 1 tablet by mouth Daily As Needed for Nausea or Vomiting. Indications: Nausea and Vomiting     • pantoprazole (PROTONIX) 40 MG EC tablet Take 1 tablet by mouth 2 (Two) Times a Day Before Meals. 60 tablet 0   • potassium chloride (KLOR-CON M10) 10 MEQ CR tablet Take 2 tablets by mouth Daily.     • acetaminophen (TYLENOL) 325 MG tablet Take 2 tablets by mouth Every 6 (Six) Hours As Needed for Mild Pain.     • apixaban (Eliquis) 5 MG tablet tablet Take 1 tablet by mouth 2 (Two) Times a Day.     • docusate sodium (COLACE) 100 MG  capsule Take 1 capsule by mouth Daily. Indications: Constipation     • fluticasone (FLONASE) 50 MCG/ACT nasal spray 2 sprays by Each Nare route 2 (Two) Times a Day. (Patient not taking: Reported on 1/29/2024) 18.2 mL 11   • furosemide (LASIX) 40 MG tablet Take 40 mg by mouth 2 (Two) Times a Day. Indications: Cardiac Failure     • glucosamine-chondroitin 500-400 MG capsule capsule Take 1 capsule by mouth 3 (Three) Times a Day With Meals. Indications: Joint Damage causing Pain and Loss of Function     • O2 (OXYGEN) Inhale 2 L/min Continuous. At night as needed  Indications: hypoxia     • polyethylene glycol (MIRALAX) 17 g packet Take 17 g by mouth Daily As Needed (constipation ). Indications: Constipation     • Selenium (SELENIMIN PO) Take 1 tablet by mouth Daily. Indications: nutritional support       Allergies   Allergen Reactions   • Cortisone Hives   • Penicillins Hives     Beta lactam allergy details  Antibiotic reaction: hives  Age at reaction: unknown  Dose to reaction time: unknown  Reason for antibiotic: unknown  Epinephrine required for reaction?: no  Tolerated antibiotics: unknown    Tolerated Zosyn       Objective    Objective     Vital Signs  Temp:  [97.3 °F (36.3 °C)-98.2 °F (36.8 °C)] 98.2 °F (36.8 °C)  Heart Rate:  [65-84] 84  Resp:  [14-18] 18  BP: ()/(49-89) 140/65  SpO2:  [94 %-100 %] 97 %  on  Flow (L/min):  [2-3] 2;   Device (Oxygen Therapy): nasal cannula  Body mass index is 40.17 kg/m².    Physical Exam  Constitutional:       General: She is not in acute distress.     Appearance: She is obese. She is ill-appearing.   HENT:      Head: Normocephalic and atraumatic.      Nose: Nose normal.      Mouth/Throat:      Mouth: Mucous membranes are moist.      Pharynx: Oropharynx is clear.   Eyes:      Extraocular Movements: Extraocular movements intact.      Conjunctiva/sclera: Conjunctivae normal.      Pupils: Pupils are equal, round, and reactive to light.   Cardiovascular:      Rate and Rhythm:  Normal rate and regular rhythm.   Pulmonary:      Effort: Pulmonary effort is normal. No respiratory distress.      Breath sounds: Normal breath sounds. No wheezing.   Abdominal:      General: Bowel sounds are normal. There is no distension.      Palpations: Abdomen is soft.      Tenderness: There is no abdominal tenderness.   Musculoskeletal:         General: No swelling or tenderness. Normal range of motion.      Right lower leg: No edema.      Left lower leg: No edema.      Comments: Right lower extremity surgical site/wound- being wrapped by Dr. Pedraza   Skin:     General: Skin is warm and dry.   Neurological:      General: No focal deficit present.      Mental Status: She is alert and oriented to person, place, and time. Mental status is at baseline.   Psychiatric:         Mood and Affect: Mood normal.         Behavior: Behavior normal.         Thought Content: Thought content normal.         Judgment: Judgment normal.         Results Review:  I reviewed the patient's new clinical results.  I reviewed the patient's new imaging results and agree with the interpretation.  I reviewed the patient's other test results and agree with the interpretation  I personally viewed and interpreted the patient's EKG/Telemetry data  Discussed with surgical provider.    Lab Results (last 24 hours)       Procedure Component Value Units Date/Time    CBC & Differential [[id number]]  (Abnormal) Collected: 05/31/24 1420    Specimen: Blood Updated: 05/31/24 1431    Narrative:      The following orders were created for panel order CBC & Differential.  Procedure                               Abnormality         Status                     ---------                               -----------         ------                     CBC Auto Differential[[id number]]        Abnormal            Final result                 Please view results for these tests on the individual orders.    Comprehensive Metabolic Panel [[id number]]  (Abnormal)  Collected: 05/31/24 1420    Specimen: Blood Updated: 05/31/24 1453     Glucose 110 mg/dL      BUN 15 mg/dL      Creatinine 0.98 mg/dL      Sodium 143 mmol/L      Potassium 3.7 mmol/L      Chloride 107 mmol/L      CO2 27.1 mmol/L      Calcium 8.7 mg/dL      Total Protein 5.8 g/dL      Albumin 3.2 g/dL      ALT (SGPT) 10 U/L      AST (SGOT) 11 U/L      Alkaline Phosphatase 70 U/L      Total Bilirubin 0.4 mg/dL      Globulin 2.6 gm/dL      A/G Ratio 1.2 g/dL      BUN/Creatinine Ratio 15.3     Anion Gap 8.9 mmol/L      eGFR 56.3 mL/min/1.73     Narrative:      GFR Normal >60  Chronic Kidney Disease <60  Kidney Failure <15    The GFR formula is only valid for adults with stable renal function between ages 18 and 70.    CBC Auto Differential [[id number]]  (Abnormal) Collected: 05/31/24 1420    Specimen: Blood Updated: 05/31/24 1431     WBC 8.77 10*3/mm3      RBC 3.60 10*6/mm3      Hemoglobin 9.1 g/dL      Hematocrit 29.8 %      MCV 82.8 fL      MCH 25.3 pg      MCHC 30.5 g/dL      RDW 15.2 %      RDW-SD 45.7 fl      MPV 9.8 fL      Platelets 247 10*3/mm3      Neutrophil % 63.0 %      Lymphocyte % 25.4 %      Monocyte % 9.0 %      Eosinophil % 1.8 %      Basophil % 0.2 %      Immature Grans % 0.6 %      Neutrophils, Absolute 5.52 10*3/mm3      Lymphocytes, Absolute 2.23 10*3/mm3      Monocytes, Absolute 0.79 10*3/mm3      Eosinophils, Absolute 0.16 10*3/mm3      Basophils, Absolute 0.02 10*3/mm3      Immature Grans, Absolute 0.05 10*3/mm3      nRBC 0.0 /100 WBC     Lactic Acid, Plasma [[id number]]  (Normal) Collected: 05/31/24 1420    Specimen: Blood Updated: 05/31/24 1448     Lactate 1.5 mmol/L     Blood Culture - Blood, Arm, Right [[id number]] Collected: 05/31/24 1551    Specimen: Blood from Arm, Right Updated: 05/31/24 1557    Blood Culture - Blood, Arm, Right [[id number]] Collected: 05/31/24 1551    Specimen: Blood from Arm, Right Updated: 05/31/24 1557    CBC (No Diff) [[id number]]  (Abnormal) Collected: 06/01/24 1008     Specimen: Blood Updated: 06/01/24 1056     WBC 12.47 10*3/mm3      RBC 3.04 10*6/mm3      Hemoglobin 7.9 g/dL      Hematocrit 25.8 %      MCV 84.9 fL      MCH 26.0 pg      MCHC 30.6 g/dL      RDW 15.0 %      RDW-SD 45.4 fl      MPV 10.4 fL      Platelets 242 10*3/mm3     Basic Metabolic Panel [[id number]]  (Abnormal) Collected: 06/01/24 1008    Specimen: Blood Updated: 06/01/24 1117     Glucose 113 mg/dL      BUN 17 mg/dL      Creatinine 1.14 mg/dL      Sodium 144 mmol/L      Potassium 3.8 mmol/L      Chloride 108 mmol/L      CO2 24.9 mmol/L      Calcium 8.1 mg/dL      BUN/Creatinine Ratio 14.9     Anion Gap 11.1 mmol/L      eGFR 47.0 mL/min/1.73     Narrative:      GFR Normal >60  Chronic Kidney Disease <60  Kidney Failure <15    The GFR formula is only valid for adults with stable renal function between ages 18 and 70.            Imaging Results (Last 24 Hours)       Procedure Component Value Units Date/Time    CT Angio Abdominal Aorta Bilateral Iliofem Runoff [[id number]] Collected: 05/31/24 1626     Updated: 05/31/24 1741    Narrative:      CT ANGIOGRAM ABDOMEN AND PELVIS WITH ILIOFEMORAL RUNOFF     HISTORY: Right calf swelling, hematoma, severe pain     COMPARISON: CT chest 05/09/2024, CT abdomen and pelvis 01/16/2024.     TECHNIQUE: Axial images were obtained from the lung bases through the  lower extremities following the administration of IV contrast. Coronal  and sagittal MIP images were obtained as well as 3D volume rendering.   Radiation dose reduction techniques were utilized, including automated  exposure control and exposure modulation based on body size.     FINDINGS:     CTA: Distal thoracic aorta is tortuous though exhibits normal caliber.  Celiac artery, superior mesenteric artery, both main renal arteries,  inferior mesenteric artery are patent. Atherosclerotic calcifications  are present involving the abdominal aorta without aneurysm. Both common  iliac arteries, internal/external iliac arteries,  common femoral  arteries are patent.     Right lower extremity: Superficial and deep femoral arteries patent.  Mild calcified plaques involving the superficial femoral, popliteal  arteries. The popliteal artery, anterior tibial artery, tibioperoneal  trunk are patent. Three-vessel runoff to the right lower extremity.  There is abnormal swelling of the right lower extremity as compared to  the left with edema in the subcutaneous fat involving the right thigh,  knee, calf, ankle, foot, more severe distally. There is a cutaneous and  subcutaneous complex collection within the posteromedial distal calf  just above the ankle that measures approximately 7 x 3.8 x 11 cm. This  collection is consistent with a hematoma and exhibits hematocrit level.  There is evidence for active extravasation into this collection with  contrast extending into the anterior aspect of the collection that  subsequently pools into the collection and increases in volume on the  venous phase imaging associated with pseudoaneurysm formation.. There is  a deeper hematoma there is anterior medial to the distal tibial shaft  just above the ankle within the deep subcutaneous fat and this measures  approximately 4 x 2 x 7 cm.     Left lower extremity: Mild atherosclerotic disease associated with  calcified plaque though the left superficial and deep femoral arteries,  popliteal artery, anterior tibial artery, tibioperoneal trunk are  patent. There is three-vessel runoff to the left lower extremity. No  hemorrhage or extravasation.     Non-angiogram findings: Mild basilar atelectasis. Previous  cholecystectomy. Likely compensatory dilatation of the bile ducts. 2.2  cm cystic lesion caudal aspect of the spleen without change. Adrenal  glands, pancreas, kidneys within normal limits. Right renal parapelvic  cyst. No hydronephrosis. Moderate rectal distention with stool.          Impression:      1.  Cutaneous and subcutaneous hematoma within the  posteromedial distal  right calf. There is active extravasation of arterial phase contrast  associated with pseudoaneurysm formation along the anterior margin of  the hematoma and contrast pools within the hematoma. A smaller hematoma  is present within the deeper subcutaneous fat anteromedial to the distal  tibia. Generalized edema in the subcutaneous fat about the right lower  extremity greatest within the distal calf and ankle and this may be  related to the hematoma or superimposed infection. No abnormal soft  tissue gas.  2. Atherosclerotic disease without evidence for aneurysm or significant  stenosis.     Discussed with Dr. Martinez in the emergency department 05 /31/2024 at  4:20 p.m.        This report was finalized on 5/31/2024 5:38 PM by Dr. rAchie Ortiz M.D on Workstation: [id number]               Results for orders placed during the hospital encounter of 01/16/24    Adult Transthoracic Echo Complete W/ Cont if Necessary Per Protocol    Interpretation Summary  •  Left ventricular systolic function is hyperdynamic (EF > 70%). Calculated left ventricular EF = 78.7% Left ventricular ejection fraction appears to be 61 - 65%.  •  Left ventricular diastolic function was normal.  •  The right ventricular cavity is borderline dilated.  •  The right atrial cavity is borderline dilated.  •  Estimated right ventricular systolic pressure from tricuspid regurgitation is markedly elevated (>55 mmHg).      ECG 12 Lead Other; Leg swelling   Final Result   HEART RATE= 72  bpm   RR Interval= 833  ms   WY Interval= 234  ms   P Horizontal Axis=   deg   P Front Axis= 22  deg   QRSD Interval= 88  ms   QT Interval= 427  ms   QTcB= 468  ms   QRS Axis= -30  deg   T Wave Axis= 16  deg   - ABNORMAL ECG -   Sinus rhythm   Atrial premature complexes   Prolonged WY interval   Abnormal R-wave progression, early transition   Inferior infarct, old   Consider anterior infarct   No change from previous tracing   Electronically Signed  By: Tess Barbosa ([facility]) 31-May-2024 16:30:14   Date and Time of Study: 2024-05-31 15:48:01      Telemetry Scan   Final Result      Telemetry Scan   Final Result      Telemetry Scan   Final Result           Assessment/Plan     Active Hospital Problems    Diagnosis  POA   • **Leg hematoma, right, subsequent encounter [S80.11XD]  Not Applicable   • History of pulmonary embolism [Z86.711]  Yes   • Class 2 severe obesity with serious comorbidity in adult [E66.01]  Yes   • Atrial fibrillation [I48.91]  Yes   • History of CVA (cerebrovascular accident) [Z86.73]  Not Applicable   • HTN (hypertension) [I10]  Yes   • History of breast cancer [Z85.3]  Not Applicable   • Asthma [J45.909]  Yes      Resolved Hospital Problems   No resolved problems to display.       Ms. Coppola is a 88 y.o.  with a history of DVT, atrial fibrillation, hypertension, asthma, breast cancer, anxiety who presented to the ER with right lower extremity hematoma.    Right lower extremity hematoma  - surgery consulted; to OR 5/31 for exploration of right lower extremity hematoma with control of bleeding, debridement of skin and subcutaneous tissue  - defer to surgery when Eliquis may be resumed-discussed with Dr. Pedraza at bedside, he will reevaluate the wound on 6/2 to make further determination about resuming Eliquis    History of DVT on Eliquis-held given #1    Atrial fibrillation-continue diltiazem for rate control, Eliquis on hold    Essential HTN-diltiazem continued, Lasix on hold for now-BP borderline low    Asthma-continue home nebulizers    Hx of breast cancer    Anxiety/benzodiazepine dependence-continue home Valium    History of constipation- bowel regimen    I discussed the patient's findings and my recommendations with patient, nursing staff, and consulting provider.    VTE Prophylaxis - SCDs. Home Eliquis held.   Code Status - Full code.       Anika Jackson MD  [facility]  06/01/24  14:09 EDT

## 2024-06-01 NOTE — PLAN OF CARE
Goal Outcome Evaluation:  Plan of Care Reviewed With: patient           Outcome Evaluation: Vonnie Coppola is an 88 year old female s/p debridement of RLE. She lives with her adult daughter, has 3-4 VARGHESE with no rail, and uses a rollator at baseline. She has had x1 fall when she tripped over her rollator in the dark. She reports that she is independent at baseline. BM this date was completed with Min-Mod A. pt. is very slow to complete due to RLE pain. Once seated, she has significant pain and consistently posteriorly leans due to poor sitting endurance/tolerance. Returned to supine for safety and educated patient on importance of performing AP, GS, QS, heel slides, and hip ABD/ADD throughout the day. She performs ~10 of each this date. Her ACE bandage is slightly weeping, however RN aware and MD to follow up on dressing change. At this time, PT recommending SNF. Will follow for updated recommendations as pt. progresses.      Anticipated Discharge Disposition (PT): skilled nursing facility

## 2024-06-01 NOTE — ANESTHESIA POSTPROCEDURE EVALUATION
Patient: Vonnie Coppola    Procedure Summary       Date: 05/31/24 Room / Location: [facility] OR [address] [place] [address] MAIN OR    Anesthesia Start: 1857 Anesthesia Stop: 2010    Procedure: RIGHT LOWER EXTREMITY WOUND EXPLORATION, DEBRIDEMENT AND CONTROL OF BLEEDING (Right) Diagnosis:     Surgeons: Gerald Pedraza MD Provider: Chelle Silver MD    Anesthesia Type: general ASA Status: 3            Anesthesia Type: general    Vitals  Vitals Value Taken Time   /59 05/31/24 2130   Temp 36.4 °C (97.5 °F) 05/31/24 2130   Pulse 70 05/31/24 2137   Resp 16 05/31/24 2130   SpO2 97 % 05/31/24 2137   Vitals shown include unfiled device data.        Post Anesthesia Care and Evaluation    Patient location during evaluation: bedside  Patient participation: complete - patient participated  Level of consciousness: awake  Pain management: adequate    Airway patency: patent  Anesthetic complications: No anesthetic complications    Cardiovascular status: acceptable  Respiratory status: acceptable  Hydration status: acceptable

## 2024-06-01 NOTE — PLAN OF CARE
Problem: Skin Injury Risk Increased  Goal: Skin Health and Integrity  6/1/2024 0549 by Cynthia Mckeon, RN  Outcome: Ongoing, Progressing  6/1/2024 0101 by Cynthia Mckeon RN  Outcome: Ongoing, Progressing   Goal Outcome Evaluation:  Plan of Care Reviewed With: patient        Progress: no change   Pt denies any pain or discomfort. Pt able to sleep most of the hs. Dressing to R leg intact. Will continue to monitor.

## 2024-06-01 NOTE — PROGRESS NOTES
IMPRESSION & PLAN:  88-year-old lady status post right lower extremity wound exploration, debridement of skin and subcutaneous fat measuring 11 x 10 cm.  Dressing changed at bedside today and there is no ongoing hemorrhage.  Wet-to-dry dressing applied with Kerlix gauze.  ABD pad placed over wound.  Leg wrapped with Kerlix bandage and Ace wrap.    Anticipate need for prolonged wound care (months) for healing of the wound.  Likely can resume Eliquis tomorrow.    CC:    Chief Complaint   Patient presents with    Leg Pain    Wound Check         HPI: She reports pain is improved from preoperative pain level.  Fairly comfortable.  Had some issues with breakfast being delivered but this has since been resolved.    ROS:   Per HPI      PE:    VS:   Vitals:    06/01/24 0823   BP:    Pulse: 80   Resp:    Temp:    SpO2: 100%          Intake/Output Summary (Last 24 hours) at 6/1/2024 1147  Last data filed at 6/1/2024 0900  Gross per 24 hour   Intake 1368 ml   Output 50 ml   Net 1318 ml        CONST: Awake, alert  Ext: Ecchymosis surrounding wound which extends down to the distal foot and several centimeters proximally.  Wound without bleeding and healthy appearing subcutaneous tissue in place      LABS:  Results from last 7 days   Lab Units 06/01/24  1008 05/31/24  1420 05/28/24  0827   WBC 10*3/mm3 12.47* 8.77 9.19   HEMOGLOBIN g/dL 7.9* 9.1* 9.4*   HEMATOCRIT % 25.8* 29.8* 30.4*   PLATELETS 10*3/mm3 242 247 233     Results from last 7 days   Lab Units 06/01/24  1008 05/31/24  1420 05/28/24  0827   SODIUM mmol/L 144 143 145   POTASSIUM mmol/L 3.8 3.7 3.3*   CHLORIDE mmol/L 108* 107 107   CO2 mmol/L 24.9 27.1 32.0*   BUN mg/dL 17 15 16   CREATININE mg/dL 1.14* 0.98 1.04*   CALCIUM mg/dL 8.1* 8.7 8.9   BILIRUBIN mg/dL  --  0.4 0.3   ALK PHOS U/L  --  70 70   ALT (SGPT) U/L  --  10 9   AST (SGOT) U/L  --  11 9   GLUCOSE mg/dL 113* 110* 95

## 2024-06-02 ENCOUNTER — APPOINTMENT (OUTPATIENT)
Dept: GENERAL RADIOLOGY | Facility: HOSPITAL | Age: 89
DRG: 571 | End: 2024-06-02
Payer: MEDICARE

## 2024-06-02 LAB
ABO GROUP BLD: NORMAL
ANION GAP SERPL CALCULATED.3IONS-SCNC: 8 MMOL/L (ref 5–15)
BASOPHILS # BLD AUTO: 0.03 10*3/MM3 (ref 0–0.2)
BASOPHILS NFR BLD AUTO: 0.3 % (ref 0–1.5)
BLD GP AB SCN SERPL QL: NEGATIVE
BUN SERPL-MCNC: 20 MG/DL (ref 8–23)
BUN/CREAT SERPL: 17.5 (ref 7–25)
CALCIUM SPEC-SCNC: 7.8 MG/DL (ref 8.6–10.5)
CHLORIDE SERPL-SCNC: 106 MMOL/L (ref 98–107)
CO2 SERPL-SCNC: 29 MMOL/L (ref 22–29)
CREAT SERPL-MCNC: 1.14 MG/DL (ref 0.57–1)
DEPRECATED RDW RBC AUTO: 46.9 FL (ref 37–54)
EGFRCR SERPLBLD CKD-EPI 2021: 47 ML/MIN/1.73
EOSINOPHIL # BLD AUTO: 0.36 10*3/MM3 (ref 0–0.4)
EOSINOPHIL NFR BLD AUTO: 3.3 % (ref 0.3–6.2)
ERYTHROCYTE [DISTWIDTH] IN BLOOD BY AUTOMATED COUNT: 15.2 % (ref 12.3–15.4)
GLUCOSE SERPL-MCNC: 81 MG/DL (ref 65–99)
HCT VFR BLD AUTO: 21.6 % (ref 34–46.6)
HGB BLD-MCNC: 6.5 G/DL (ref 12–15.9)
IMM GRANULOCYTES # BLD AUTO: 0.07 10*3/MM3 (ref 0–0.05)
IMM GRANULOCYTES NFR BLD AUTO: 0.6 % (ref 0–0.5)
LYMPHOCYTES # BLD AUTO: 1.95 10*3/MM3 (ref 0.7–3.1)
LYMPHOCYTES NFR BLD AUTO: 18 % (ref 19.6–45.3)
MAGNESIUM SERPL-MCNC: 2.9 MG/DL (ref 1.6–2.4)
MCH RBC QN AUTO: 25.4 PG (ref 26.6–33)
MCHC RBC AUTO-ENTMCNC: 30.1 G/DL (ref 31.5–35.7)
MCV RBC AUTO: 84.4 FL (ref 79–97)
MONOCYTES # BLD AUTO: 1.09 10*3/MM3 (ref 0.1–0.9)
MONOCYTES NFR BLD AUTO: 10.1 % (ref 5–12)
NEUTROPHILS NFR BLD AUTO: 67.7 % (ref 42.7–76)
NEUTROPHILS NFR BLD AUTO: 7.34 10*3/MM3 (ref 1.7–7)
NRBC BLD AUTO-RTO: 0 /100 WBC (ref 0–0.2)
PHOSPHATE SERPL-MCNC: 3 MG/DL (ref 2.5–4.5)
PLATELET # BLD AUTO: 205 10*3/MM3 (ref 140–450)
PMV BLD AUTO: 10.4 FL (ref 6–12)
POTASSIUM SERPL-SCNC: 3.6 MMOL/L (ref 3.5–5.2)
RBC # BLD AUTO: 2.56 10*6/MM3 (ref 3.77–5.28)
RH BLD: NEGATIVE
SODIUM SERPL-SCNC: 143 MMOL/L (ref 136–145)
T&S EXPIRATION DATE: NORMAL
WBC NRBC COR # BLD AUTO: 10.84 10*3/MM3 (ref 3.4–10.8)

## 2024-06-02 PROCEDURE — 94799 UNLISTED PULMONARY SVC/PX: CPT

## 2024-06-02 PROCEDURE — 86900 BLOOD TYPING SEROLOGIC ABO: CPT

## 2024-06-02 PROCEDURE — 25010000002 HYDROMORPHONE PER 4 MG: Performed by: SURGERY

## 2024-06-02 PROCEDURE — 36430 TRANSFUSION BLD/BLD COMPNT: CPT

## 2024-06-02 PROCEDURE — 86923 COMPATIBILITY TEST ELECTRIC: CPT

## 2024-06-02 PROCEDURE — 86850 RBC ANTIBODY SCREEN: CPT | Performed by: STUDENT IN AN ORGANIZED HEALTH CARE EDUCATION/TRAINING PROGRAM

## 2024-06-02 PROCEDURE — 86901 BLOOD TYPING SEROLOGIC RH(D): CPT

## 2024-06-02 PROCEDURE — 84100 ASSAY OF PHOSPHORUS: CPT | Performed by: STUDENT IN AN ORGANIZED HEALTH CARE EDUCATION/TRAINING PROGRAM

## 2024-06-02 PROCEDURE — 94761 N-INVAS EAR/PLS OXIMETRY MLT: CPT

## 2024-06-02 PROCEDURE — 83735 ASSAY OF MAGNESIUM: CPT | Performed by: STUDENT IN AN ORGANIZED HEALTH CARE EDUCATION/TRAINING PROGRAM

## 2024-06-02 PROCEDURE — 94664 DEMO&/EVAL PT USE INHALER: CPT

## 2024-06-02 PROCEDURE — 86901 BLOOD TYPING SEROLOGIC RH(D): CPT | Performed by: STUDENT IN AN ORGANIZED HEALTH CARE EDUCATION/TRAINING PROGRAM

## 2024-06-02 PROCEDURE — 25810000003 SODIUM CHLORIDE 0.9 % SOLUTION: Performed by: STUDENT IN AN ORGANIZED HEALTH CARE EDUCATION/TRAINING PROGRAM

## 2024-06-02 PROCEDURE — 80048 BASIC METABOLIC PNL TOTAL CA: CPT | Performed by: STUDENT IN AN ORGANIZED HEALTH CARE EDUCATION/TRAINING PROGRAM

## 2024-06-02 PROCEDURE — 85025 COMPLETE CBC W/AUTO DIFF WBC: CPT | Performed by: STUDENT IN AN ORGANIZED HEALTH CARE EDUCATION/TRAINING PROGRAM

## 2024-06-02 PROCEDURE — 99231 SBSQ HOSP IP/OBS SF/LOW 25: CPT | Performed by: SURGERY

## 2024-06-02 PROCEDURE — P9016 RBC LEUKOCYTES REDUCED: HCPCS

## 2024-06-02 PROCEDURE — 73030 X-RAY EXAM OF SHOULDER: CPT

## 2024-06-02 PROCEDURE — 86900 BLOOD TYPING SEROLOGIC ABO: CPT | Performed by: STUDENT IN AN ORGANIZED HEALTH CARE EDUCATION/TRAINING PROGRAM

## 2024-06-02 PROCEDURE — 94760 N-INVAS EAR/PLS OXIMETRY 1: CPT

## 2024-06-02 RX ORDER — GUAIFENESIN 200 MG/10ML
200 LIQUID ORAL EVERY 4 HOURS PRN
Status: DISCONTINUED | OUTPATIENT
Start: 2024-06-02 | End: 2024-06-02

## 2024-06-02 RX ORDER — SODIUM CHLORIDE 9 MG/ML
50 INJECTION, SOLUTION INTRAVENOUS CONTINUOUS
Status: DISCONTINUED | OUTPATIENT
Start: 2024-06-02 | End: 2024-06-05

## 2024-06-02 RX ORDER — TRAMADOL HYDROCHLORIDE 50 MG/1
50 TABLET ORAL EVERY 6 HOURS PRN
Status: DISCONTINUED | OUTPATIENT
Start: 2024-06-02 | End: 2024-06-03

## 2024-06-02 RX ADMIN — ACETAMINOPHEN 1000 MG: 500 TABLET ORAL at 03:10

## 2024-06-02 RX ADMIN — Medication 10 ML: at 20:28

## 2024-06-02 RX ADMIN — HYDROMORPHONE HYDROCHLORIDE 0.5 MG: 1 INJECTION, SOLUTION INTRAMUSCULAR; INTRAVENOUS; SUBCUTANEOUS at 14:03

## 2024-06-02 RX ADMIN — DILTIAZEM HYDROCHLORIDE 120 MG: 120 CAPSULE, COATED, EXTENDED RELEASE ORAL at 09:24

## 2024-06-02 RX ADMIN — ACETAMINOPHEN 1000 MG: 500 TABLET ORAL at 20:27

## 2024-06-02 RX ADMIN — SODIUM CHLORIDE 75 ML/HR: 9 INJECTION, SOLUTION INTRAVENOUS at 17:51

## 2024-06-02 RX ADMIN — Medication 10 ML: at 10:21

## 2024-06-02 RX ADMIN — ARFORMOTEROL TARTRATE 15 MCG: 15 SOLUTION RESPIRATORY (INHALATION) at 21:09

## 2024-06-02 RX ADMIN — TRAMADOL HYDROCHLORIDE 50 MG: 50 TABLET ORAL at 16:55

## 2024-06-02 RX ADMIN — ARFORMOTEROL TARTRATE 15 MCG: 15 SOLUTION RESPIRATORY (INHALATION) at 07:01

## 2024-06-02 RX ADMIN — PANTOPRAZOLE SODIUM 40 MG: 40 TABLET, DELAYED RELEASE ORAL at 16:55

## 2024-06-02 RX ADMIN — BUDESONIDE 0.5 MG: 0.5 INHALANT ORAL at 21:10

## 2024-06-02 RX ADMIN — DIAZEPAM 2 MG: 2 TABLET ORAL at 09:24

## 2024-06-02 RX ADMIN — BUDESONIDE 0.5 MG: 0.5 INHALANT ORAL at 06:59

## 2024-06-02 RX ADMIN — DIAZEPAM 2 MG: 2 TABLET ORAL at 20:27

## 2024-06-02 RX ADMIN — PANTOPRAZOLE SODIUM 40 MG: 40 TABLET, DELAYED RELEASE ORAL at 06:30

## 2024-06-02 NOTE — PAYOR COMM NOTE
"Vonnie Hitchcock (88 y.o. Female)     ATTN: NURSE REVIEWER  RE: INITIAL INPT AUTH CLINICALS   REF#: [id number]   PLS REPLY TO GERALDINE WATT [phone] OR FAX# [phone]      Date of Birth   1935    Social Security Number       Address   [address] [place] [id number]    Home Phone   [phone]    MRN   [id number]       Episcopal   None    Marital Status                               Admission Date   5/31/24    Admission Type   Emergency    Admitting Provider   Anika Jackson MD    Attending Provider   Anika Jackson MD    Department, Room/Bed   [address], [address]/1       Discharge Date       Discharge Disposition       Discharge Destination                                 Attending Provider: Anika Jackson MD    Allergies: Cortisone, Penicillins    Isolation: None   Infection: None   Code Status: CPR    Ht: 165.1 cm (65\")   Wt: 110 kg (241 lb 6.5 oz)    Admission Cmt: None   Principal Problem: Leg hematoma, right, subsequent encounter [S80.11XD]                   Active Insurance as of 5/31/2024       Primary Coverage       Payor Plan Insurance Group Employer/Plan Group    HUMANA MEDICARE REPLACEMENT HUMANA MED ADV PPO [id number]       Payor Plan Address Payor Plan Phone Number Payor Plan Fax Number Effective Dates    [address] [address] [phone]  3/1/2023 - None Entered    [facility] [id number]         Subscriber Name Subscriber Birth Date Member ID       VONNIE HITCHCOCK 1935 [id number]                     Emergency Contacts        (Rel.) Home Phone Work Phone Mobile Phone    MargaritaKirsten (Sister) [phone] -- [phone]    Elizabeth Rodríguez (Daughter) [phone] -- [phone]    AbdonHudson (Other) [phone] -- [phone]                 History & Physical        Anika Jackson MD at 06/01/24 0711              Patient Name:  Vonnie Hitchcock  YOB: 1935  MRN:  [id number]  Admit Date:  " 5/31/2024  Patient Care Team:  Christopher Leyva DO as PCP - General  Christopher Leyva DO as PCP - Family Medicine  Augustus Wilde MD as Consulting Physician (Hematology and Oncology)  Daniel Watson MD as Referring Physician (Hospitalist)      Subjective  History Present Illness     Chief Complaint   Patient presents with    Leg Pain    Wound Check       Ms. Coppola is a 88 y.o.  with a history of DVT, atrial fibrillation, hypertension, asthma, breast cancer, anxiety that presents to [facility] complaining of right lower extremity wound.      History of Present Illness  Patient reports pain and redness of her right lower extremity.  She was recently seen in this ER and evaluated for right lower extremity injury after mechanical fall.  In the ER she had a needle aspiration for hematoma and was discharged on antibiotics.  On the day of admission she began to experience significant pain with worsening swelling and presented to the hospital.    Review of Systems   Constitutional:  Negative for fatigue and fever.   Respiratory:  Negative for cough and shortness of breath.    Cardiovascular:  Negative for chest pain, palpitations and leg swelling.   Gastrointestinal:  Negative for abdominal pain, nausea and vomiting.   Skin:  Positive for wound.   Neurological:  Negative for weakness.        Personal History     Past Medical History:   Diagnosis Date    Arthritis     Asthma     Atrial fibrillation     per pt's daughter.States hx of a-fib in the past when stressed or tired.    Breast cancer 1999    LEFT BREAST CA, LUMPECTOMY & RADS    History of cataract     Hx of radiation therapy 1999    APPROXIMATELY 40 TREATMENTS FOR LEFT BREAST CA    Hypertension     Pneumonia     Stroke      Past Surgical History:   Procedure Laterality Date    APPENDECTOMY      BLADDER SURGERY      BREAST BIOPSY Left 1999    MALIGNANT    BREAST LUMPECTOMY Left 1999    MALIGNANT    CATARACT EXTRACTION       COLONOSCOPY N/A 01/19/2024    Procedure: COLONOSCOPY to cecum with cold snare polypectomies;  Surgeon: Harshad Gaston MD;  Location:  [facility];  Service: Gastroenterology;  Laterality: N/A;  pre- gi bleed, anemia  post- diverticulosis, polyps    ENDOSCOPY N/A 01/19/2024    Procedure: ESOPHAGOGASTRODUODENOSCOPY with biospy;  Surgeon: Harshad Gaston MD;  Location:  [facility];  Service: Gastroenterology;  Laterality: N/A;  pre- gi bleed, anemia  post- erosive gastirits    GALLBLADDER SURGERY      HERNIA REPAIR      HYSTERECTOMY      LASIK      LYMPHADENECTOMY      OOPHORECTOMY       Family History   Problem Relation Age of Onset    Other Mother         Cardiac disorder    Osteoarthritis Mother     Cataracts Mother     Macular degeneration Mother     Hypertension Father     Other Father         Cardiac disorder    Ovarian cancer Sister         70'S    Cancer Sister     Diabetes Sister     Hypertension Sister     Osteoarthritis Sister     Cancer Brother     Hypertension Brother     Osteoarthritis Brother     Colon cancer Maternal Grandmother     Ovarian cancer Paternal Grandmother         unknown    Cancer Other     Stroke Other     Breast cancer Neg Hx      Social History     Tobacco Use    Smoking status: Former     Current packs/day: 0.50     Average packs/day: 0.5 packs/day for 2.0 years (1.0 ttl pk-yrs)     Types: Cigarettes    Smokeless tobacco: Never    Tobacco comments:     quit 40 years ago   Vaping Use    Vaping status: Never Used   Substance Use Topics    Alcohol use: No    Drug use: No     No current facility-administered medications on file prior to encounter.     Current Outpatient Medications on File Prior to Encounter   Medication Sig Dispense Refill    acetaminophen (TYLENOL) 500 MG tablet Take 2 tablets by mouth Every 4 (Four) Hours As Needed for Mild Pain or Moderate Pain.      apixaban (ELIQUIS) 5 MG tablet tablet Take 1 tablet by mouth Every 12 (Twelve) Hours. Indications:  Atrial Fibrillation 60 tablet 0    arformoterol (BROVANA) 15 MCG/2ML nebulizer solution Take 2 mL by nebulization 2 (Two) Times a Day. 120 mL 11    benzonatate (TESSALON) 100 MG capsule Take 1 capsule by mouth 3 (Three) Times a Day As Needed for Cough. 20 capsule 0    budesonide (PULMICORT) 0.5 MG/2ML nebulizer solution Take 2 mL by nebulization 2 (Two) Times a Day. 360 mL 2    cephalexin (KEFLEX) 500 MG capsule Take 1 capsule by mouth 3 (Three) Times a Day. 21 capsule 0    Cholecalciferol (VITAMIN D-3 PO) Take 1 tablet by mouth Daily. Indications: nutritional support      coenzyme Q10 50 MG capsule capsule Take 1 capsule by mouth Daily. Indications: nutritional support      Cyanocobalamin (VITAMIN B 12 PO) Take 1 tablet by mouth Daily. Indications: nutritional support      diazePAM (VALIUM) 2 MG tablet Take 1 tablet by mouth 2 (Two) Times a Day. Indications: Feeling Anxious      dilTIAZem CD (CARDIZEM CD) 120 MG 24 hr capsule Take 1 capsule by mouth Daily. 30 capsule 0    fexofenadine (Allegra Allergy) 60 MG tablet Take  by mouth Daily As Needed.      furosemide (LASIX) 40 MG tablet Take 1 tablet by mouth Daily. Indications: Cardiac Failure      Magnesium 500 MG tablet Take 1 tablet by mouth Daily.      ondansetron (ZOFRAN) 4 MG tablet Take 1 tablet by mouth Daily As Needed for Nausea or Vomiting. Indications: Nausea and Vomiting      pantoprazole (PROTONIX) 40 MG EC tablet Take 1 tablet by mouth 2 (Two) Times a Day Before Meals. 60 tablet 0    potassium chloride (KLOR-CON M10) 10 MEQ CR tablet Take 2 tablets by mouth Daily.      acetaminophen (TYLENOL) 325 MG tablet Take 2 tablets by mouth Every 6 (Six) Hours As Needed for Mild Pain.      apixaban (Eliquis) 5 MG tablet tablet Take 1 tablet by mouth 2 (Two) Times a Day.      docusate sodium (COLACE) 100 MG capsule Take 1 capsule by mouth Daily. Indications: Constipation      fluticasone (FLONASE) 50 MCG/ACT nasal spray 2 sprays by Each Nare route 2 (Two) Times a  Day. (Patient not taking: Reported on 1/29/2024) 18.2 mL 11    furosemide (LASIX) 40 MG tablet Take 40 mg by mouth 2 (Two) Times a Day. Indications: Cardiac Failure      glucosamine-chondroitin 500-400 MG capsule capsule Take 1 capsule by mouth 3 (Three) Times a Day With Meals. Indications: Joint Damage causing Pain and Loss of Function      O2 (OXYGEN) Inhale 2 L/min Continuous. At night as needed  Indications: hypoxia      polyethylene glycol (MIRALAX) 17 g packet Take 17 g by mouth Daily As Needed (constipation ). Indications: Constipation      Selenium (SELENIMIN PO) Take 1 tablet by mouth Daily. Indications: nutritional support       Allergies   Allergen Reactions    Cortisone Hives    Penicillins Hives     Beta lactam allergy details  Antibiotic reaction: hives  Age at reaction: unknown  Dose to reaction time: unknown  Reason for antibiotic: unknown  Epinephrine required for reaction?: no  Tolerated antibiotics: unknown    Tolerated Zosyn       Objective   Objective     Vital Signs  Temp:  [97.3 °F (36.3 °C)-98.2 °F (36.8 °C)] 98.2 °F (36.8 °C)  Heart Rate:  [65-84] 84  Resp:  [14-18] 18  BP: ()/(49-89) 140/65  SpO2:  [94 %-100 %] 97 %  on  Flow (L/min):  [2-3] 2;   Device (Oxygen Therapy): nasal cannula  Body mass index is 40.17 kg/m².    Physical Exam  Constitutional:       General: She is not in acute distress.     Appearance: She is obese. She is ill-appearing.   HENT:      Head: Normocephalic and atraumatic.      Nose: Nose normal.      Mouth/Throat:      Mouth: Mucous membranes are moist.      Pharynx: Oropharynx is clear.   Eyes:      Extraocular Movements: Extraocular movements intact.      Conjunctiva/sclera: Conjunctivae normal.      Pupils: Pupils are equal, round, and reactive to light.   Cardiovascular:      Rate and Rhythm: Normal rate and regular rhythm.   Pulmonary:      Effort: Pulmonary effort is normal. No respiratory distress.      Breath sounds: Normal breath sounds. No wheezing.    Abdominal:      General: Bowel sounds are normal. There is no distension.      Palpations: Abdomen is soft.      Tenderness: There is no abdominal tenderness.   Musculoskeletal:         General: No swelling or tenderness. Normal range of motion.      Right lower leg: No edema.      Left lower leg: No edema.      Comments: Right lower extremity surgical site/wound- being wrapped by Dr. Pedraza   Skin:     General: Skin is warm and dry.   Neurological:      General: No focal deficit present.      Mental Status: She is alert and oriented to person, place, and time. Mental status is at baseline.   Psychiatric:         Mood and Affect: Mood normal.         Behavior: Behavior normal.         Thought Content: Thought content normal.         Judgment: Judgment normal.         Results Review:  I reviewed the patient's new clinical results.  I reviewed the patient's new imaging results and agree with the interpretation.  I reviewed the patient's other test results and agree with the interpretation  I personally viewed and interpreted the patient's EKG/Telemetry data  Discussed with surgical provider.    Lab Results (last 24 hours)       Procedure Component Value Units Date/Time    CBC & Differential [[id number]]  (Abnormal) Collected: 05/31/24 1420    Specimen: Blood Updated: 05/31/24 1431    Narrative:      The following orders were created for panel order CBC & Differential.  Procedure                               Abnormality         Status                     ---------                               -----------         ------                     CBC Auto Differential[[id number]]        Abnormal            Final result                 Please view results for these tests on the individual orders.    Comprehensive Metabolic Panel [[id number]]  (Abnormal) Collected: 05/31/24 1420    Specimen: Blood Updated: 05/31/24 1453     Glucose 110 mg/dL      BUN 15 mg/dL      Creatinine 0.98 mg/dL      Sodium 143 mmol/L      Potassium 3.7  mmol/L      Chloride 107 mmol/L      CO2 27.1 mmol/L      Calcium 8.7 mg/dL      Total Protein 5.8 g/dL      Albumin 3.2 g/dL      ALT (SGPT) 10 U/L      AST (SGOT) 11 U/L      Alkaline Phosphatase 70 U/L      Total Bilirubin 0.4 mg/dL      Globulin 2.6 gm/dL      A/G Ratio 1.2 g/dL      BUN/Creatinine Ratio 15.3     Anion Gap 8.9 mmol/L      eGFR 56.3 mL/min/1.73     Narrative:      GFR Normal >60  Chronic Kidney Disease <60  Kidney Failure <15    The GFR formula is only valid for adults with stable renal function between ages 18 and 70.    CBC Auto Differential [[id number]]  (Abnormal) Collected: 05/31/24 1420    Specimen: Blood Updated: 05/31/24 1431     WBC 8.77 10*3/mm3      RBC 3.60 10*6/mm3      Hemoglobin 9.1 g/dL      Hematocrit 29.8 %      MCV 82.8 fL      MCH 25.3 pg      MCHC 30.5 g/dL      RDW 15.2 %      RDW-SD 45.7 fl      MPV 9.8 fL      Platelets 247 10*3/mm3      Neutrophil % 63.0 %      Lymphocyte % 25.4 %      Monocyte % 9.0 %      Eosinophil % 1.8 %      Basophil % 0.2 %      Immature Grans % 0.6 %      Neutrophils, Absolute 5.52 10*3/mm3      Lymphocytes, Absolute 2.23 10*3/mm3      Monocytes, Absolute 0.79 10*3/mm3      Eosinophils, Absolute 0.16 10*3/mm3      Basophils, Absolute 0.02 10*3/mm3      Immature Grans, Absolute 0.05 10*3/mm3      nRBC 0.0 /100 WBC     Lactic Acid, Plasma [[id number]]  (Normal) Collected: 05/31/24 1420    Specimen: Blood Updated: 05/31/24 1448     Lactate 1.5 mmol/L     Blood Culture - Blood, Arm, Right [[id number]] Collected: 05/31/24 1551    Specimen: Blood from Arm, Right Updated: 05/31/24 1557    Blood Culture - Blood, Arm, Right [[id number]] Collected: 05/31/24 1551    Specimen: Blood from Arm, Right Updated: 05/31/24 1557    CBC (No Diff) [[id number]]  (Abnormal) Collected: 06/01/24 1008    Specimen: Blood Updated: 06/01/24 1056     WBC 12.47 10*3/mm3      RBC 3.04 10*6/mm3      Hemoglobin 7.9 g/dL      Hematocrit 25.8 %      MCV 84.9 fL      MCH 26.0 pg      MCHC  30.6 g/dL      RDW 15.0 %      RDW-SD 45.4 fl      MPV 10.4 fL      Platelets 242 10*3/mm3     Basic Metabolic Panel [[id number]]  (Abnormal) Collected: 06/01/24 1008    Specimen: Blood Updated: 06/01/24 1117     Glucose 113 mg/dL      BUN 17 mg/dL      Creatinine 1.14 mg/dL      Sodium 144 mmol/L      Potassium 3.8 mmol/L      Chloride 108 mmol/L      CO2 24.9 mmol/L      Calcium 8.1 mg/dL      BUN/Creatinine Ratio 14.9     Anion Gap 11.1 mmol/L      eGFR 47.0 mL/min/1.73     Narrative:      GFR Normal >60  Chronic Kidney Disease <60  Kidney Failure <15    The GFR formula is only valid for adults with stable renal function between ages 18 and 70.            Imaging Results (Last 24 Hours)       Procedure Component Value Units Date/Time    CT Angio Abdominal Aorta Bilateral Iliofem Runoff [[id number]] Collected: 05/31/24 1626     Updated: 05/31/24 1741    Narrative:      CT ANGIOGRAM ABDOMEN AND PELVIS WITH ILIOFEMORAL RUNOFF     HISTORY: Right calf swelling, hematoma, severe pain     COMPARISON: CT chest 05/09/2024, CT abdomen and pelvis 01/16/2024.     TECHNIQUE: Axial images were obtained from the lung bases through the  lower extremities following the administration of IV contrast. Coronal  and sagittal MIP images were obtained as well as 3D volume rendering.   Radiation dose reduction techniques were utilized, including automated  exposure control and exposure modulation based on body size.     FINDINGS:     CTA: Distal thoracic aorta is tortuous though exhibits normal caliber.  Celiac artery, superior mesenteric artery, both main renal arteries,  inferior mesenteric artery are patent. Atherosclerotic calcifications  are present involving the abdominal aorta without aneurysm. Both common  iliac arteries, internal/external iliac arteries, common femoral  arteries are patent.     Right lower extremity: Superficial and deep femoral arteries patent.  Mild calcified plaques involving the superficial femoral,  popliteal  arteries. The popliteal artery, anterior tibial artery, tibioperoneal  trunk are patent. Three-vessel runoff to the right lower extremity.  There is abnormal swelling of the right lower extremity as compared to  the left with edema in the subcutaneous fat involving the right thigh,  knee, calf, ankle, foot, more severe distally. There is a cutaneous and  subcutaneous complex collection within the posteromedial distal calf  just above the ankle that measures approximately 7 x 3.8 x 11 cm. This  collection is consistent with a hematoma and exhibits hematocrit level.  There is evidence for active extravasation into this collection with  contrast extending into the anterior aspect of the collection that  subsequently pools into the collection and increases in volume on the  venous phase imaging associated with pseudoaneurysm formation.. There is  a deeper hematoma there is anterior medial to the distal tibial shaft  just above the ankle within the deep subcutaneous fat and this measures  approximately 4 x 2 x 7 cm.     Left lower extremity: Mild atherosclerotic disease associated with  calcified plaque though the left superficial and deep femoral arteries,  popliteal artery, anterior tibial artery, tibioperoneal trunk are  patent. There is three-vessel runoff to the left lower extremity. No  hemorrhage or extravasation.     Non-angiogram findings: Mild basilar atelectasis. Previous  cholecystectomy. Likely compensatory dilatation of the bile ducts. 2.2  cm cystic lesion caudal aspect of the spleen without change. Adrenal  glands, pancreas, kidneys within normal limits. Right renal parapelvic  cyst. No hydronephrosis. Moderate rectal distention with stool.          Impression:      1.  Cutaneous and subcutaneous hematoma within the posteromedial distal  right calf. There is active extravasation of arterial phase contrast  associated with pseudoaneurysm formation along the anterior margin of  the hematoma and  contrast pools within the hematoma. A smaller hematoma  is present within the deeper subcutaneous fat anteromedial to the distal  tibia. Generalized edema in the subcutaneous fat about the right lower  extremity greatest within the distal calf and ankle and this may be  related to the hematoma or superimposed infection. No abnormal soft  tissue gas.  2. Atherosclerotic disease without evidence for aneurysm or significant  stenosis.     Discussed with Dr. Martinez in the emergency department 05 /31/2024 at  4:20 p.m.        This report was finalized on 5/31/2024 5:38 PM by Dr. Archie Ortiz M.D on Workstation: [id number]               Results for orders placed during the hospital encounter of 01/16/24    Adult Transthoracic Echo Complete W/ Cont if Necessary Per Protocol    Interpretation Summary    Left ventricular systolic function is hyperdynamic (EF > 70%). Calculated left ventricular EF = 78.7% Left ventricular ejection fraction appears to be 61 - 65%.    Left ventricular diastolic function was normal.    The right ventricular cavity is borderline dilated.    The right atrial cavity is borderline dilated.    Estimated right ventricular systolic pressure from tricuspid regurgitation is markedly elevated (>55 mmHg).      ECG 12 Lead Other; Leg swelling   Final Result   HEART RATE= 72  bpm   RR Interval= 833  ms   IL Interval= 234  ms   P Horizontal Axis=   deg   P Front Axis= 22  deg   QRSD Interval= 88  ms   QT Interval= 427  ms   QTcB= 468  ms   QRS Axis= -30  deg   T Wave Axis= 16  deg   - ABNORMAL ECG -   Sinus rhythm   Atrial premature complexes   Prolonged IL interval   Abnormal R-wave progression, early transition   Inferior infarct, old   Consider anterior infarct   No change from previous tracing   Electronically Signed By: Tess Barbosa ([facility]) 31-May-2024 16:30:14   Date and Time of Study: 2024-05-31 15:48:01      Telemetry Scan   Final Result      Telemetry Scan   Final Result      Telemetry Scan    Final Result           Assessment/Plan     Active Hospital Problems    Diagnosis  POA    **Leg hematoma, right, subsequent encounter [S80.11XD]  Not Applicable    History of pulmonary embolism [Z86.711]  Yes    Class 2 severe obesity with serious comorbidity in adult [E66.01]  Yes    Atrial fibrillation [I48.91]  Yes    History of CVA (cerebrovascular accident) [Z86.73]  Not Applicable    HTN (hypertension) [I10]  Yes    History of breast cancer [Z85.3]  Not Applicable    Asthma [J45.909]  Yes      Resolved Hospital Problems   No resolved problems to display.       Ms. Coppola is a 88 y.o.  with a history of DVT, atrial fibrillation, hypertension, asthma, breast cancer, anxiety who presented to the ER with right lower extremity hematoma.    Right lower extremity hematoma  - surgery consulted; to OR 5/31 for exploration of right lower extremity hematoma with control of bleeding, debridement of skin and subcutaneous tissue  - defer to surgery when Eliquis may be resumed-discussed with Dr. Pedraza at bedside, he will reevaluate the wound on 6/2 to make further determination about resuming Eliquis    History of DVT on Eliquis-held given #1    Atrial fibrillation-continue diltiazem for rate control, Eliquis on hold    Essential HTN-diltiazem continued, Lasix on hold for now-BP borderline low    Asthma-continue home nebulizers    Hx of breast cancer    Anxiety/benzodiazepine dependence-continue home Valium    History of constipation- bowel regimen    I discussed the patient's findings and my recommendations with patient, nursing staff, and consulting provider.    VTE Prophylaxis - SCDs. Home Eliquis held.   Code Status - Full code.       Anika Jackson MD  [facility]  06/01/24  14:09 EDT      Electronically signed by Anika Jackson MD at 06/01/24 1417       Facility-Administered Medications as of 6/1/2024   Medication Dose Route Frequency Provider Last Rate Last Admin    acetaminophen  (TYLENOL) tablet 1,000 mg  1,000 mg Oral Q6H PRN Gerald Pedraza MD   1,000 mg at 06/01/24 1707    [Held by provider] apixaban (ELIQUIS) tablet 5 mg  5 mg Oral Q12H Anika Jackson MD        arformoterol (BROVANA) nebulizer solution 15 mcg  15 mcg Nebulization BID - RT Anika Jackson MD        sennosides-docusate (PERICOLACE) 8.6-50 MG per tablet 2 tablet  2 tablet Oral BID PRN Anika Jackson MD        And    polyethylene glycol (MIRALAX) packet 17 g  17 g Oral Daily PRN Anika Jackson MD        And    bisacodyl (DULCOLAX) EC tablet 5 mg  5 mg Oral Daily PRN Anika Jackson MD   5 mg at 06/01/24 0926    And    bisacodyl (DULCOLAX) suppository 10 mg  10 mg Rectal Daily PRN Anika Jackson MD        budesonide (PULMICORT) nebulizer solution 0.5 mg  0.5 mg Nebulization BID - RT Anika Jackson MD   0.5 mg at 06/01/24 0819    [COMPLETED] clindamycin (CLEOCIN) 600 mg in dextrose 5% 50 mL IVPB (premix)  600 mg Intravenous Once Darron Martinez MD   Stopped at 05/31/24 1637    diazePAM (VALIUM) tablet 2 mg  2 mg Oral BID Anika Jackson MD        dilTIAZem CD (CARDIZEM CD) 24 hr capsule 120 mg  120 mg Oral Q24H Anika Jackson MD   120 mg at 06/01/24 1204    [COMPLETED] famotidine (PEPCID) injection 20 mg  20 mg Intravenous Once Wellington Montaño MD   20 mg at 05/31/24 1851    [COMPLETED] fentaNYL citrate (PF) (SUBLIMAZE) injection 25 mcg  25 mcg Intravenous Once Darron Martinez MD   25 mcg at 05/31/24 1541    [Held by provider] furosemide (LASIX) tablet 40 mg  40 mg Oral BID Anika Jackson MD        HYDROmorphone (DILAUDID) injection 0.5 mg  0.5 mg Intravenous Q2H PRN Gerald Pedraza MD   0.5 mg at 06/01/24 0636    [COMPLETED] iopamidol (ISOVUE-370) 76 % injection 100 mL  100 mL Intravenous Once in imaging Darron Martinez MD   100 mL at 05/31/24 1531    [COMPLETED] lactated ringers bolus 1,000 mL  1,000 mL Intravenous Once Darron Martinez MD   Stopped at 05/31/24 1101     melatonin tablet 5 mg  5 mg Oral Nightly PRN Anika Jackson MD        [COMPLETED] morphine injection 2 mg  2 mg Intravenous Once Lui Hardwick MD   2 mg at 05/31/24 1423    nitroglycerin (NITROSTAT) SL tablet 0.4 mg  0.4 mg Sublingual Q5 Min PRN Anika Jackson MD        [COMPLETED] ondansetron (ZOFRAN) injection 4 mg  4 mg Intravenous Once Darron Martinez MD   4 mg at 05/31/24 1541    ondansetron ODT (ZOFRAN-ODT) disintegrating tablet 4 mg  4 mg Oral Q6H PRN Anika Jackson MD        Or    ondansetron (ZOFRAN) injection 4 mg  4 mg Intravenous Q6H PRN Anika Jackson MD        pantoprazole (PROTONIX) EC tablet 40 mg  40 mg Oral BID AC Anika Jackson MD   40 mg at 06/01/24 1707    [COMPLETED] piperacillin-tazobactam (ZOSYN) 3.375 g IVPB in 100 mL NS MBP (CD)  3.375 g Intravenous Once Darron Martinez MD   Stopped at 05/31/24 1652    sodium chloride 0.9 % flush 10 mL  10 mL Intravenous Q12H Anika Jackson MD   10 mL at 06/01/24 1205    sodium chloride 0.9 % flush 10 mL  10 mL Intravenous PRN Anika Jackson MD        sodium chloride 0.9 % infusion 40 mL  40 mL Intravenous PRN Anika Jackson MD        [COMPLETED] vancomycin IVPB 2000 mg in 0.9% Sodium Chloride 500 mL  20 mg/kg Intravenous Once Darron Martinez  mL/hr at 05/31/24 1639 2,000 mg at 05/31/24 1639     Lab Results (last 24 hours)       Procedure Component Value Units Date/Time    Blood Culture - Blood, Arm, Right [[id number]]  (Normal) Collected: 05/31/24 1551    Specimen: Blood from Arm, Right Updated: 06/01/24 1600     Blood Culture No growth at 24 hours    Narrative:      Less than seven (7) mL's of blood was collected.  Insufficient quantity may yield false negative results.    Blood Culture - Blood, Arm, Right [[id number]]  (Normal) Collected: 05/31/24 1551    Specimen: Blood from Arm, Right Updated: 06/01/24 1600     Blood Culture No growth at 24 hours    Narrative:      Less than seven (7) mL's of blood was  collected.  Insufficient quantity may yield false negative results.    Basic Metabolic Panel [[id number]]  (Abnormal) Collected: 06/01/24 1008    Specimen: Blood Updated: 06/01/24 1117     Glucose 113 mg/dL      BUN 17 mg/dL      Creatinine 1.14 mg/dL      Sodium 144 mmol/L      Potassium 3.8 mmol/L      Chloride 108 mmol/L      CO2 24.9 mmol/L      Calcium 8.1 mg/dL      BUN/Creatinine Ratio 14.9     Anion Gap 11.1 mmol/L      eGFR 47.0 mL/min/1.73     Narrative:      GFR Normal >60  Chronic Kidney Disease <60  Kidney Failure <15    The GFR formula is only valid for adults with stable renal function between ages 18 and 70.    CBC (No Diff) [[id number]]  (Abnormal) Collected: 06/01/24 1008    Specimen: Blood Updated: 06/01/24 1056     WBC 12.47 10*3/mm3      RBC 3.04 10*6/mm3      Hemoglobin 7.9 g/dL      Hematocrit 25.8 %      MCV 84.9 fL      MCH 26.0 pg      MCHC 30.6 g/dL      RDW 15.0 %      RDW-SD 45.4 fl      MPV 10.4 fL      Platelets 242 10*3/mm3           Imaging Results (Last 24 Hours)       ** No results found for the last 24 hours. **          ECG/EMG Results (last 24 hours)       Procedure Component Value Units Date/Time    Telemetry Scan [[id number]] Resulted: 05/31/24     Updated: 05/31/24 2234    Telemetry Scan [[id number]] Resulted: 05/31/24     Updated: 06/01/24 0407    Telemetry Scan [[id number]] Resulted: 05/31/24     Updated: 06/01/24 0707          Orders (last 24 hrs)        Start     Ordered    06/02/24 0600  Basic Metabolic Panel  Daily       06/01/24 0707    06/02/24 0600  CBC Auto Differential  Daily       06/01/24 0707    06/02/24 0600  Magnesium  Daily       06/01/24 0707    06/02/24 0600  Phosphorus  Daily       06/01/24 0707    06/01/24 1105  Diet: Regular/House, Cardiac; Healthy Heart (2-3 Na+); Fluid Consistency: Thin (IDDSI 0)  Diet Effective Now         06/01/24 1105    06/01/24 0930  arformoterol (BROVANA) nebulizer solution 15 mcg  2 Times Daily - RT         06/01/24 0711    06/01/24  0930  budesonide (PULMICORT) nebulizer solution 0.5 mg  2 Times Daily - RT         06/01/24 0711    06/01/24 0900  sodium chloride 0.9 % flush 10 mL  Every 12 Hours Scheduled         06/01/24 0707    06/01/24 0900  [Held by provider]  apixaban (ELIQUIS) tablet 5 mg  Every 12 Hours Scheduled        (On hold since today at 0711 until manually unheld; held by Anika Jackson MDHold Reason: Hold For Procedure)    06/01/24 0711    06/01/24 0900  diazePAM (VALIUM) tablet 2 mg  2 Times Daily         06/01/24 0711    06/01/24 0900  dilTIAZem CD (CARDIZEM CD) 24 hr capsule 120 mg  Every 24 Hours Scheduled         06/01/24 0711    06/01/24 0900  [Held by provider]  furosemide (LASIX) tablet 40 mg  2 Times Daily        (On hold since today at 0711 until manually unheld; held by Anika Jackson MDHold Reason: Hold For Procedure)    06/01/24 0711    06/01/24 0800  Vital Signs  Every 4 Hours       06/01/24 0707    06/01/24 0800  Oral Care  2 Times Daily       06/01/24 0707    06/01/24 0800  pantoprazole (PROTONIX) EC tablet 40 mg  2 Times Daily Before Meals         06/01/24 0711    06/01/24 0709  CBC (No Diff)  Once         06/01/24 0708    06/01/24 0709  Basic Metabolic Panel  Once         06/01/24 0708    06/01/24 0708  Continuous Cardiac Monitoring  Continuous        Comments: Follow Standing Orders As Outlined in Process Instructions (Open Order Report to View Full Instructions)    06/01/24 0707    06/01/24 0708  Maintain IV Access  Continuous         06/01/24 0707    06/01/24 0708  Telemetry - Place Orders & Notify Provider of Results When Patient Experiences Acute Chest Pain, Dysrhythmia or Respiratory Distress  Continuous        Comments: Open Order Report to View Parameters Requiring Provider Notification    06/01/24 0707    06/01/24 0708  May Be Off Telemetry for Tests  Continuous         06/01/24 0707    06/01/24 0707  sennosides-docusate (PERICOLACE) 8.6-50 MG per tablet 2 tablet  2 Times Daily PRN       "  Placed in \"And\" Linked Group    06/01/24 0707    06/01/24 0707  polyethylene glycol (MIRALAX) packet 17 g  Daily PRN        Placed in \"And\" Linked Group    06/01/24 0707    06/01/24 0707  bisacodyl (DULCOLAX) EC tablet 5 mg  Daily PRN        Placed in \"And\" Linked Group    06/01/24 0707    06/01/24 0707  bisacodyl (DULCOLAX) suppository 10 mg  Daily PRN        Placed in \"And\" Linked Group    06/01/24 0707    06/01/24 0707  nitroglycerin (NITROSTAT) SL tablet 0.4 mg  Every 5 Minutes PRN         06/01/24 0707    06/01/24 0707  melatonin tablet 5 mg  Nightly PRN         06/01/24 0707    06/01/24 0707  Intake & Output  Every Shift       06/01/24 0707    06/01/24 0707  Weigh Patient  Once         06/01/24 0707    06/01/24 0707  Insert Peripheral IV  Once         06/01/24 0707    06/01/24 0707  Saline Lock & Maintain IV Access  Continuous,   Status:  Canceled         06/01/24 0707    06/01/24 0707  Code Status and Medical Interventions:  Continuous         06/01/24 0707    06/01/24 0707  VTE Prophylaxis Not Indicated: Contraindicated; Other; Already takes Eliquis  Once        Comments: On Eliquis    06/01/24 0707    06/01/24 0706  sodium chloride 0.9 % flush 10 mL  As Needed         06/01/24 0707    06/01/24 0706  sodium chloride 0.9 % infusion 40 mL  As Needed         06/01/24 0707    06/01/24 0706  ondansetron ODT (ZOFRAN-ODT) disintegrating tablet 4 mg  Every 6 Hours PRN        Placed in \"Or\" Linked Group    06/01/24 0707    06/01/24 0706  ondansetron (ZOFRAN) injection 4 mg  Every 6 Hours PRN        Placed in \"Or\" Linked Group    06/01/24 0707    06/01/24 0037  PT Consult: Eval & Treat Functional Mobility Below Baseline  Once         06/01/24 0036    05/31/24 4941  Diet: Liquid; Clear Liquid; Fluid Consistency: Thin (IDDSI 0)  Diet Effective Now,   Status:  Canceled         05/31/24 2223 05/31/24 2224  Advance Diet As Tolerated -  Until Discontinued         05/31/24 2223 05/31/24 2223  HYDROmorphone (DILAUDID) " injection 0.5 mg  Every 2 Hours PRN         05/31/24 2223 05/31/24 2223  acetaminophen (TYLENOL) tablet 1,000 mg  Every 6 Hours PRN         05/31/24 2223    05/31/24 2100  sodium chloride 0.9 % flush 3 mL  Every 12 Hours Scheduled,   Status:  Discontinued         05/31/24 1806    05/31/24 2028  Continuous Pulse Oximetry  Continuous         05/31/24 2027 05/31/24 2028  POC Glucose Once  Once,   Status:  Canceled        Comments: Post op glucose check on all diabetic patients...Notify Anesthesia if blood sugar is less than 80 mg/dL or greater than 220 mg/dL.      05/31/24 2027 05/31/24 2028  Vital signs every 5 minutes for 15 minutes, every 15 minutes thereafter.  Once,   Status:  Canceled         05/31/24 2027 05/31/24 2028  Call Anesthesiologist for additional IV Fluid bolus for Hypotension/Tachycardia  Until Discontinued,   Status:  Canceled         05/31/24 2027 05/31/24 2028  Notify Anesthesia of Any Acute Changes in Patient Condition  Until Discontinued,   Status:  Canceled         05/31/24 2027 05/31/24 2028  Notify Anesthesia for Unrelieved Pain  Until Discontinued,   Status:  Canceled         05/31/24 2027 05/31/24 2028  Once DC criteria to floor met, follow surgeon's orders.  Until Discontinued,   Status:  Canceled         05/31/24 2027 05/31/24 2028  Discharge patient from PACU when discharge criteria is met.  Until Discontinued,   Status:  Canceled         05/31/24 2027 05/31/24 2027  HYDROcodone-acetaminophen (NORCO) 5-325 MG per tablet 1 tablet  Once As Needed,   Status:  Discontinued         05/31/24 2027 05/31/24 2027  HYDROmorphone (DILAUDID) injection 0.25 mg  Every 5 Minutes PRN,   Status:  Discontinued         05/31/24 2027 05/31/24 2027  HYDROcodone-acetaminophen (NORCO) 7.5-325 MG per tablet 1 tablet  Every 4 Hours PRN,   Status:  Discontinued         05/31/24 2027 05/31/24 2027  fentaNYL citrate (PF) (SUBLIMAZE) injection 25 mcg  Every 5 Minutes PRN,   Status:  " Discontinued         05/31/24 2027 05/31/24 2027  naloxone (NARCAN) injection 0.2 mg  As Needed,   Status:  Discontinued         05/31/24 2027 05/31/24 2027  flumazenil (ROMAZICON) injection 0.2 mg  As Needed,   Status:  Discontinued         05/31/24 2027 05/31/24 2027  ondansetron (ZOFRAN) injection 4 mg  Once As Needed,   Status:  Discontinued         05/31/24 2027 05/31/24 2027  droperidol (INAPSINE) injection 0.625 mg  Every 20 Minutes PRN,   Status:  Discontinued        Placed in \"Or\" Linked Group    05/31/24 2027 05/31/24 2027  droperidol (INAPSINE) injection 0.625 mg  Every 20 Minutes PRN,   Status:  Discontinued        Placed in \"Or\" Linked Group    05/31/24 2027 05/31/24 2027  promethazine (PHENERGAN) suppository 25 mg  Once As Needed,   Status:  Discontinued        Placed in \"Or\" Linked Group    05/31/24 2027 05/31/24 2027  promethazine (PHENERGAN) tablet 25 mg  Once As Needed,   Status:  Discontinued        Placed in \"Or\" Linked Group    05/31/24 2027 05/31/24 2027  labetalol (NORMODYNE,TRANDATE) injection 5 mg  Every 5 Minutes PRN,   Status:  Discontinued         05/31/24 2027 05/31/24 2027  hydrALAZINE (APRESOLINE) injection 5 mg  Every 10 Minutes PRN,   Status:  Discontinued         05/31/24 2027 05/31/24 2027  ePHEDrine injection 5 mg  Once As Needed,   Status:  Discontinued         05/31/24 2027 05/31/24 2027  diphenhydrAMINE (BENADRYL) injection 12.5 mg  Every 15 Minutes PRN,   Status:  Discontinued         05/31/24 2027 05/31/24 2027  ipratropium-albuterol (DUO-NEB) nebulizer solution 3 mL  Once As Needed,   Status:  Discontinued         05/31/24 2027 05/31/24 1921  sodium chloride (NS) irrigation solution  As Needed,   Status:  Discontinued         05/31/24 1921 05/31/24 1808  lactated ringers infusion  Continuous,   Status:  Discontinued         05/31/24 1806 05/31/24 1806  Vital Signs - Per Anesthesia Protocol  As Needed,   Status:  Canceled       " 05/31/24 1806 05/31/24 1806  POC Glucose PRN  As Needed,   Status:  Canceled      Comments: For all diabetic patients. Notify Anesthesiologist for blood sugar > 180.      05/31/24 1806 05/31/24 1806  sodium chloride 0.9 % flush 3-10 mL  As Needed,   Status:  Discontinued         05/31/24 1806 05/31/24 1806  lidocaine (XYLOCAINE) 1 % injection 0.5 mL  Once As Needed,   Status:  Discontinued         05/31/24 1806 05/31/24 1806  fentaNYL citrate (PF) (SUBLIMAZE) injection 50 mcg  Once As Needed,   Status:  Discontinued         05/31/24 1806 05/31/24 1806  midazolam (VERSED) injection 0.5 mg  Every 5 Minutes PRN,   Status:  Discontinued         05/31/24 1806    Unscheduled  Oxygen Therapy- Nasal Cannula; Titrate 1-6 LPM Per SpO2; 90 - 95%  Continuous PRN       05/31/24 1806    Unscheduled  Oxygen Therapy- Nasal Cannula; Titrate 1-6 LPM Per SpO2; 90 - 95%  Continuous PRN       05/31/24 2027    Unscheduled  Wound Care Incision  As Needed      Comments: Keep Ace bandages in place.  Please place a bottle of normal saline, 3 Kerlix gauze rolls, ABD pads, and 2 new 6 inch Ace bandages at the bedside for first dressing change on 6/1/2024 05/31/24 2223                  Operative/Procedure Notes (last 24 hours)  Notes from 05/31/24 2001 through 06/01/24 2001   No notes of this type exist for this encounter.          Physician Progress Notes (last 24 hours)        Gerald Pedraza MD at 06/01/24 1146          IMPRESSION & PLAN:  88-year-old lady status post right lower extremity wound exploration, debridement of skin and subcutaneous fat measuring 11 x 10 cm.  Dressing changed at bedside today and there is no ongoing hemorrhage.  Wet-to-dry dressing applied with Kerlix gauze.  ABD pad placed over wound.  Leg wrapped with Kerlix bandage and Ace wrap.    Anticipate need for prolonged wound care (months) for healing of the wound.  Likely can resume Eliquis tomorrow.    CC:    Chief Complaint   Patient presents  with    Leg Pain    Wound Check         HPI: She reports pain is improved from preoperative pain level.  Fairly comfortable.  Had some issues with breakfast being delivered but this has since been resolved.    ROS:   Per HPI      PE:    VS:   Vitals:    06/01/24 0823   BP:    Pulse: 80   Resp:    Temp:    SpO2: 100%          Intake/Output Summary (Last 24 hours) at 6/1/2024 1147  Last data filed at 6/1/2024 0900  Gross per 24 hour   Intake 1368 ml   Output 50 ml   Net 1318 ml        CONST: Awake, alert  Ext: Ecchymosis surrounding wound which extends down to the distal foot and several centimeters proximally.  Wound without bleeding and healthy appearing subcutaneous tissue in place      LABS:  Results from last 7 days   Lab Units 06/01/24  1008 05/31/24  1420 05/28/24  0827   WBC 10*3/mm3 12.47* 8.77 9.19   HEMOGLOBIN g/dL 7.9* 9.1* 9.4*   HEMATOCRIT % 25.8* 29.8* 30.4*   PLATELETS 10*3/mm3 242 247 233     Results from last 7 days   Lab Units 06/01/24  1008 05/31/24  1420 05/28/24  0827   SODIUM mmol/L 144 143 145   POTASSIUM mmol/L 3.8 3.7 3.3*   CHLORIDE mmol/L 108* 107 107   CO2 mmol/L 24.9 27.1 32.0*   BUN mg/dL 17 15 16   CREATININE mg/dL 1.14* 0.98 1.04*   CALCIUM mg/dL 8.1* 8.7 8.9   BILIRUBIN mg/dL  --  0.4 0.3   ALK PHOS U/L  --  70 70   ALT (SGPT) U/L  --  10 9   AST (SGOT) U/L  --  11 9   GLUCOSE mg/dL 113* 110* 95         Electronically signed by Gerald Pedraza MD at 06/01/24 1159       Consult Notes (last 24 hours)  Notes from 05/31/24 2001 through 06/01/24 2001   No notes of this type exist for this encounter.

## 2024-06-02 NOTE — PLAN OF CARE
Goal Outcome Evaluation:  Plan of Care Reviewed With: patient      Pt pleasant, alert and oriented x 4, forgetful at times. Dressing intake, some drainage noted to outside of bandage. Pt complaining of pain, did require PRN meds per MAR. Pt attempted to have bowel movement, placed on bedpan with no results. Pt is passing gas. Vitals WDL's.

## 2024-06-02 NOTE — PLAN OF CARE
Problem: Adult Inpatient Plan of Care  Goal: Plan of Care Review  6/2/2024 1926 by Lady Christian RN  Outcome: Ongoing, Progressing  6/2/2024 1923 by Lady Christian RN  Outcome: Ongoing, Progressing  Goal: Patient-Specific Goal (Individualized)  6/2/2024 1926 by Lady Christian RN  Outcome: Ongoing, Progressing  6/2/2024 1923 by Lady Christian RN  Outcome: Ongoing, Progressing  Goal: Absence of Hospital-Acquired Illness or Injury  6/2/2024 1926 by Lady Christian RN  Outcome: Ongoing, Progressing  6/2/2024 1923 by Lady Chirstian RN  Outcome: Ongoing, Progressing  Intervention: Identify and Manage Fall Risk  Description: Perform standard risk assessment on admission using a validated tool or comprehensive approach appropriate to the patient; reassess fall risk frequently, with change in status or transfer to another level of care.  Communicate fall injury risk to interprofessional healthcare team.  Determine need for increased observation, equipment and environmental modification, such as low bed, signage and supportive, nonskid footwear.  Adjust safety measures to individual developmental age, stage and identified risk factors.  Reinforce the importance of safety and physical activity with patient and family.  Perform regular intentional rounding to assess need for position change, pain assessment and personal needs, including assistance with toileting.  Intervention: Prevent Skin Injury  Description: Perform a screening for skin injury risk, such as pressure or moisture associated skin damage on admission and at regular intervals throughout hospital stay.  Keep all areas of skin (especially folds) clean and dry.  Maintain adequate skin hydration.  Relieve and redistribute pressure and protect bony prominences; implement measures based on patient-specific risk factors.  Match turning and repositioning schedule to clinical condition.  Encourage weight shift frequently; assist  with reposition if unable to complete independently.  Float heels off bed; avoid pressure on the Achilles tendon.  Keep skin free from extended contact with medical devices.  Encourage functional activity and mobility, as early as tolerated.  Use aids (e.g., slide boards, mechanical lift) during transfer.  Intervention: Prevent and Manage VTE (Venous Thromboembolism) Risk  Description: Assess for VTE (venous thromboembolism) risk.  Encourage and assist with early ambulation.  Initiate and maintain compression or other therapy, as indicated, based on identified risk in accordance with organizational protocol and provider order.  Encourage both active and passive leg exercises while in bed, if unable to ambulate.  Goal: Optimal Comfort and Wellbeing  6/2/2024 1926 by Lady Christian RN  Outcome: Ongoing, Progressing  6/2/2024 1923 by Lady Christian RN  Outcome: Ongoing, Progressing  Intervention: Monitor Pain and Promote Comfort  Description: Assess pain level, treatment efficacy and patient response at regular intervals using a consistent pain scale.  Consider the presence and impact of preexisting chronic pain.  Encourage patient and caregiver involvement in pain assessment, interventions and safety measures.  Intervention: Provide Person-Centered Care  Description: Use a family-focused approach to care.  Develop trust and rapport by proactively providing information, encouraging questions, addressing concerns and offering reassurance.  Acknowledge emotional response to hospitalization.  Recognize and utilize personal coping strategies.  Honor spiritual and cultural preferences.  Goal: Readiness for Transition of Care  6/2/2024 1926 by Lady Christian RN  Outcome: Ongoing, Progressing  6/2/2024 1923 by Lady Christian RN  Outcome: Ongoing, Progressing     Problem: Fall Injury Risk  Goal: Absence of Fall and Fall-Related Injury  6/2/2024 1926 by Lady Christian RN  Outcome: Ongoing,  Progressing  6/2/2024 1923 by Lady Christian RN  Outcome: Ongoing, Progressing  Intervention: Identify and Manage Contributors  Description: Develop a fall prevention plan with the patient and caregiver/family.  Provide reorientation, appropriate sensory stimulation and routines with changes in mental status to decrease risk of fall.  Promote use of personal vision and auditory aids.  Assess assistance level required for safe and effective self-care; provide support as needed, such as toileting, mobilization. For age 65 and older, implement timed toileting with assistance.  Encourage physical activity, such as performance of mobility and self-care at highest level of patient ability, multicomponent exercise program and provision of appropriate assistive devices.  If fall occurs, assess the severity of injury; implement fall injury protocol. Determine the cause and revise fall injury prevention plan.  Regularly review medication contribution to fall risk; adjust medication administration times to minimize risk of falling.  Consider risk related to polypharmacy and age.  Balance adequate pain management with potential for oversedation.  Intervention: Promote Injury-Free Environment  Description: Provide a safe, barrier-free environment that encourages independent activity.  Keep care area uncluttered and well-lighted.  Determine need for increased observation or monitoring.  Avoid use of devices that minimize mobility, such as restraints or indwelling urinary catheter.     Problem: Skin Injury Risk Increased  Goal: Skin Health and Integrity  6/2/2024 1926 by Lady Christian, REGINA  Outcome: Ongoing, Progressing  6/2/2024 1923 by Lady Christian, REGINA  Outcome: Ongoing, Progressing  Intervention: Optimize Skin Protection  Description: Perform a full pressure injury risk assessment, as indicated by screening, upon admission to care unit.  Reassess skin (injury risk, full inspection) frequently (e.g.,  scheduled interval, with change in condition) to provide optimal early detection and prevention.  Maintain adequate tissue perfusion (e.g., encourage fluid balance; avoid crossing legs, constrictive clothing or devices) to promote tissue oxygenation.  Maintain head of bed at lowest degree of elevation tolerated, considering medical condition and other restrictions.  Avoid positioning onto an area that remains reddened.  Minimize incontinence and moisture (e.g., toileting schedule; moisture-wicking pad, diaper or incontinence collection device; skin moisture barrier).  Cleanse skin promptly and gently when soiled utilizing a pH-balanced cleanser.  Relieve and redistribute pressure (e.g., scheduled position changes, weight shifts, use of support surface, medical device repositioning, protective dressing application, use of positioning device, microclimate control, use of pressure-injury-monitor  Encourage increased activity, such as sitting in a chair at the bedside or early mobilization, when able to tolerate.   Goal Outcome Evaluation:  Plan of Care Reviewed With: patient        Progress: improving

## 2024-06-02 NOTE — PROGRESS NOTES
IMPRESSION & PLAN:  88-year-old lady status post right lower extremity wound exploration, debridement of skin and subcutaneous fat measuring 11 x 10 cm.  Dressing changed at bedside today and there is no ongoing hemorrhage.  Wet-to-dry dressing applied with Kerlix gauze.  ABD pad placed over wound.  Leg wrapped with Kerlix bandage and Ace wrap.    Anticipate need for prolonged wound care (months) for healing of the wound.  Small amount of oozing from lower portion of wound today which was controlled with pressure for a few minutes.  Will hold on Eliquis resumption today and inflamed no longer oozing tomorrow will plan to resume.  Tramadol ordered for p.o. pain control in addition to Tylenol.    Agree with blood transfusion.    Will follow.  Discussed with Dr. Jackson.    CC:    Chief Complaint   Patient presents with    Leg Pain    Wound Check         HPI: She is frustrated today because her leg is hurting.     ROS:   Per HPI      PE:    VS:   Vitals:    06/02/24 1235   BP: 129/60   Pulse: 81   Resp: 18   Temp: 98.1 °F (36.7 °C)   SpO2: 100%          Intake/Output Summary (Last 24 hours) at 6/2/2024 1415  Last data filed at 6/2/2024 0850  Gross per 24 hour   Intake 318 ml   Output 400 ml   Net -82 ml        CONST: Awake, alert  Ext: Swelling around wound much improved, ecchymosis surrounding wound which extends down to the distal foot and several centimeters proximally.  Slow oozing from inferior portion of wound controlled with pressure      LABS:  Results from last 7 days   Lab Units 06/02/24  0538 06/01/24  1008 05/31/24  1420 05/28/24  0827   WBC 10*3/mm3 10.84* 12.47* 8.77 9.19   HEMOGLOBIN g/dL 6.5* 7.9* 9.1* 9.4*   HEMATOCRIT % 21.6* 25.8* 29.8* 30.4*   PLATELETS 10*3/mm3 205 242 247 233     Results from last 7 days   Lab Units 06/02/24  0538 06/01/24  1008 05/31/24  1420 05/28/24  0827   SODIUM mmol/L 143 144 143 145   POTASSIUM mmol/L 3.6 3.8 3.7 3.3*   CHLORIDE mmol/L 106 108* 107 107   CO2 mmol/L 29.0  24.9 27.1 32.0*   BUN mg/dL 20 17 15 16   CREATININE mg/dL 1.14* 1.14* 0.98 1.04*   CALCIUM mg/dL 7.8* 8.1* 8.7 8.9   BILIRUBIN mg/dL  --   --  0.4 0.3   ALK PHOS U/L  --   --  70 70   ALT (SGPT) U/L  --   --  10 9   AST (SGOT) U/L  --   --  11 9   GLUCOSE mg/dL 81 113* 110* 95

## 2024-06-02 NOTE — PLAN OF CARE
Problem: Adult Inpatient Plan of Care  Goal: Plan of Care Review  Outcome: Ongoing, Progressing  Goal: Patient-Specific Goal (Individualized)  Outcome: Ongoing, Progressing  Goal: Absence of Hospital-Acquired Illness or Injury  Outcome: Ongoing, Progressing  Intervention: Identify and Manage Fall Risk  Description: Perform standard risk assessment on admission using a validated tool or comprehensive approach appropriate to the patient; reassess fall risk frequently, with change in status or transfer to another level of care.  Communicate fall injury risk to interprofessional healthcare team.  Determine need for increased observation, equipment and environmental modification, such as low bed, signage and supportive, nonskid footwear.  Adjust safety measures to individual developmental age, stage and identified risk factors.  Reinforce the importance of safety and physical activity with patient and family.  Perform regular intentional rounding to assess need for position change, pain assessment and personal needs, including assistance with toileting.  Intervention: Prevent Skin Injury  Description: Perform a screening for skin injury risk, such as pressure or moisture associated skin damage on admission and at regular intervals throughout hospital stay.  Keep all areas of skin (especially folds) clean and dry.  Maintain adequate skin hydration.  Relieve and redistribute pressure and protect bony prominences; implement measures based on patient-specific risk factors.  Match turning and repositioning schedule to clinical condition.  Encourage weight shift frequently; assist with reposition if unable to complete independently.  Float heels off bed; avoid pressure on the Achilles tendon.  Keep skin free from extended contact with medical devices.  Encourage functional activity and mobility, as early as tolerated.  Use aids (e.g., slide boards, mechanical lift) during transfer.  Intervention: Prevent and Manage VTE  (Venous Thromboembolism) Risk  Description: Assess for VTE (venous thromboembolism) risk.  Encourage and assist with early ambulation.  Initiate and maintain compression or other therapy, as indicated, based on identified risk in accordance with organizational protocol and provider order.  Encourage both active and passive leg exercises while in bed, if unable to ambulate.  Goal: Optimal Comfort and Wellbeing  Outcome: Ongoing, Progressing  Intervention: Monitor Pain and Promote Comfort  Description: Assess pain level, treatment efficacy and patient response at regular intervals using a consistent pain scale.  Consider the presence and impact of preexisting chronic pain.  Encourage patient and caregiver involvement in pain assessment, interventions and safety measures.  Intervention: Provide Person-Centered Care  Description: Use a family-focused approach to care.  Develop trust and rapport by proactively providing information, encouraging questions, addressing concerns and offering reassurance.  Acknowledge emotional response to hospitalization.  Recognize and utilize personal coping strategies.  Honor spiritual and cultural preferences.  Goal: Readiness for Transition of Care  Outcome: Ongoing, Progressing     Problem: Fall Injury Risk  Goal: Absence of Fall and Fall-Related Injury  Outcome: Ongoing, Progressing  Intervention: Identify and Manage Contributors  Description: Develop a fall prevention plan with the patient and caregiver/family.  Provide reorientation, appropriate sensory stimulation and routines with changes in mental status to decrease risk of fall.  Promote use of personal vision and auditory aids.  Assess assistance level required for safe and effective self-care; provide support as needed, such as toileting, mobilization. For age 65 and older, implement timed toileting with assistance.  Encourage physical activity, such as performance of mobility and self-care at highest level of patient ability,  multicomponent exercise program and provision of appropriate assistive devices.  If fall occurs, assess the severity of injury; implement fall injury protocol. Determine the cause and revise fall injury prevention plan.  Regularly review medication contribution to fall risk; adjust medication administration times to minimize risk of falling.  Consider risk related to polypharmacy and age.  Balance adequate pain management with potential for oversedation.  Intervention: Promote Injury-Free Environment  Description: Provide a safe, barrier-free environment that encourages independent activity.  Keep care area uncluttered and well-lighted.  Determine need for increased observation or monitoring.  Avoid use of devices that minimize mobility, such as restraints or indwelling urinary catheter.     Problem: Skin Injury Risk Increased  Goal: Skin Health and Integrity  Outcome: Ongoing, Progressing  Intervention: Optimize Skin Protection  Description: Perform a full pressure injury risk assessment, as indicated by screening, upon admission to care unit.  Reassess skin (injury risk, full inspection) frequently (e.g., scheduled interval, with change in condition) to provide optimal early detection and prevention.  Maintain adequate tissue perfusion (e.g., encourage fluid balance; avoid crossing legs, constrictive clothing or devices) to promote tissue oxygenation.  Maintain head of bed at lowest degree of elevation tolerated, considering medical condition and other restrictions.  Avoid positioning onto an area that remains reddened.  Minimize incontinence and moisture (e.g., toileting schedule; moisture-wicking pad, diaper or incontinence collection device; skin moisture barrier).  Cleanse skin promptly and gently when soiled utilizing a pH-balanced cleanser.  Relieve and redistribute pressure (e.g., scheduled position changes, weight shifts, use of support surface, medical device repositioning, protective dressing  application, use of positioning device, microclimate control, use of pressure-injury-monitor  Encourage increased activity, such as sitting in a chair at the bedside or early mobilization, when able to tolerate.   Goal Outcome Evaluation:  Plan of Care Reviewed With: patient        Progress: improving

## 2024-06-02 NOTE — PROGRESS NOTES
Name: Vonnie Coppola ADMIT: 2024   : 1935  PCP: Christopher Leyva DO    MRN: [id number] LOS: 2 days   AGE/SEX: 88 y.o. female  ROOM: [facility]     Subjective   Subjective   Irritable this morning. RN reports surgeon hasn't been by yet to check on wound. Hemoglobin down slightly this morning.        Objective   Objective   Vital Signs  Temp:  [98.1 °F (36.7 °C)-98.8 °F (37.1 °C)] 98.1 °F (36.7 °C)  Heart Rate:  [79-87] 81  Resp:  [16-18] 18  BP: (120-138)/(54-62) 129/60  SpO2:  [96 %-100 %] 100 %  on  Flow (L/min):  [2] 2;   Device (Oxygen Therapy): nasal cannula  Body mass index is 40.17 kg/m².  Physical Exam  Constitutional:       General: She is not in acute distress.     Appearance: She is obese. She is ill-appearing.   Cardiovascular:      Rate and Rhythm: Normal rate and regular rhythm.   Pulmonary:      Effort: Pulmonary effort is normal. No respiratory distress.      Breath sounds: Normal breath sounds. No wheezing.   Abdominal:      General: Bowel sounds are normal. There is no distension.      Palpations: Abdomen is soft.      Tenderness: There is no abdominal tenderness.   Musculoskeletal:         General: No swelling or tenderness. Normal range of motion.      Right lower leg: No edema.      Left lower leg: No edema.      Comments: Right lower extremity surgical site/wound- wrapped  Neurological:      General: No focal deficit present.      Mental Status: She is alert and oriented to person, place, and time. Mental status is at baseline.     Results Review     I reviewed the patient's new clinical results.  Results from last 7 days   Lab Units 24  0538 24  1008 24  1420 24  0827   WBC 10*3/mm3 10.84* 12.47* 8.77 9.19   HEMOGLOBIN g/dL 6.5* 7.9* 9.1* 9.4*   PLATELETS 10*3/mm3 205 242 247 233     Results from last 7 days   Lab Units 24  0538 24  1008 24  1420 24  0827   SODIUM mmol/L 143 144 143 145   POTASSIUM mmol/L 3.6 3.8 3.7 3.3*  "  CHLORIDE mmol/L 106 108* 107 107   CO2 mmol/L 29.0 24.9 27.1 32.0*   BUN mg/dL 20 17 15 16   CREATININE mg/dL 1.14* 1.14* 0.98 1.04*   GLUCOSE mg/dL 81 113* 110* 95   Estimated Creatinine Clearance: 43.7 mL/min (A) (by C-G formula based on SCr of 1.14 mg/dL (H)).  Results from last 7 days   Lab Units 05/31/24  1420 05/28/24  0827   ALBUMIN g/dL 3.2* 3.0*   BILIRUBIN mg/dL 0.4 0.3   ALK PHOS U/L 70 70   AST (SGOT) U/L 11 9   ALT (SGPT) U/L 10 9     Results from last 7 days   Lab Units 06/02/24  0538 06/01/24  1008 05/31/24  1420 05/28/24  0827   CALCIUM mg/dL 7.8* 8.1* 8.7 8.9   ALBUMIN g/dL  --   --  3.2* 3.0*   MAGNESIUM mg/dL 2.9*  --   --   --    PHOSPHORUS mg/dL 3.0  --   --   --      Results from last 7 days   Lab Units 05/31/24  1420   LACTATE mmol/L 1.5     COVID19   Date Value Ref Range Status   02/12/2024 Not Detected Not Detected - Ref. Range Final     No results found for: \"HGBA1C\", \"POCGLU\"    CT Angio Abdominal Aorta Bilateral Iliofem Runoff  Narrative: CT ANGIOGRAM ABDOMEN AND PELVIS WITH ILIOFEMORAL RUNOFF     HISTORY: Right calf swelling, hematoma, severe pain     COMPARISON: CT chest 05/09/2024, CT abdomen and pelvis 01/16/2024.     TECHNIQUE: Axial images were obtained from the lung bases through the  lower extremities following the administration of IV contrast. Coronal  and sagittal MIP images were obtained as well as 3D volume rendering.   Radiation dose reduction techniques were utilized, including automated  exposure control and exposure modulation based on body size.     FINDINGS:     CTA: Distal thoracic aorta is tortuous though exhibits normal caliber.  Celiac artery, superior mesenteric artery, both main renal arteries,  inferior mesenteric artery are patent. Atherosclerotic calcifications  are present involving the abdominal aorta without aneurysm. Both common  iliac arteries, internal/external iliac arteries, common femoral  arteries are patent.     Right lower extremity: Superficial " and deep femoral arteries patent.  Mild calcified plaques involving the superficial femoral, popliteal  arteries. The popliteal artery, anterior tibial artery, tibioperoneal  trunk are patent. Three-vessel runoff to the right lower extremity.  There is abnormal swelling of the right lower extremity as compared to  the left with edema in the subcutaneous fat involving the right thigh,  knee, calf, ankle, foot, more severe distally. There is a cutaneous and  subcutaneous complex collection within the posteromedial distal calf  just above the ankle that measures approximately 7 x 3.8 x 11 cm. This  collection is consistent with a hematoma and exhibits hematocrit level.  There is evidence for active extravasation into this collection with  contrast extending into the anterior aspect of the collection that  subsequently pools into the collection and increases in volume on the  venous phase imaging associated with pseudoaneurysm formation.. There is  a deeper hematoma there is anterior medial to the distal tibial shaft  just above the ankle within the deep subcutaneous fat and this measures  approximately 4 x 2 x 7 cm.     Left lower extremity: Mild atherosclerotic disease associated with  calcified plaque though the left superficial and deep femoral arteries,  popliteal artery, anterior tibial artery, tibioperoneal trunk are  patent. There is three-vessel runoff to the left lower extremity. No  hemorrhage or extravasation.     Non-angiogram findings: Mild basilar atelectasis. Previous  cholecystectomy. Likely compensatory dilatation of the bile ducts. 2.2  cm cystic lesion caudal aspect of the spleen without change. Adrenal  glands, pancreas, kidneys within normal limits. Right renal parapelvic  cyst. No hydronephrosis. Moderate rectal distention with stool.        Impression: 1.  Cutaneous and subcutaneous hematoma within the posteromedial distal  right calf. There is active extravasation of arterial phase  contrast  associated with pseudoaneurysm formation along the anterior margin of  the hematoma and contrast pools within the hematoma. A smaller hematoma  is present within the deeper subcutaneous fat anteromedial to the distal  tibia. Generalized edema in the subcutaneous fat about the right lower  extremity greatest within the distal calf and ankle and this may be  related to the hematoma or superimposed infection. No abnormal soft  tissue gas.  2. Atherosclerotic disease without evidence for aneurysm or significant  stenosis.     Discussed with Dr. Martinez in the emergency department 05 /31/2024 at  4:20 p.m.        This report was finalized on 5/31/2024 5:38 PM by Dr. Archie Ortiz M.D on Workstation: [id number]       Scheduled Medications  [Held by provider] apixaban, 5 mg, Oral, Q12H  arformoterol, 15 mcg, Nebulization, BID - RT  budesonide, 0.5 mg, Nebulization, BID - RT  diazePAM, 2 mg, Oral, BID  dilTIAZem CD, 120 mg, Oral, Q24H  [Held by provider] furosemide, 40 mg, Oral, BID  pantoprazole, 40 mg, Oral, BID AC  sodium chloride, 10 mL, Intravenous, Q12H    Infusions   Diet  Diet: Regular/House, Cardiac; Healthy Heart (2-3 Na+); Fluid Consistency: Thin (IDDSI 0)           Assessment/Plan     Active Hospital Problems    Diagnosis  POA    **Leg hematoma, right, subsequent encounter [S80.11XD]  Not Applicable    History of pulmonary embolism [Z86.711]  Yes    Class 2 severe obesity with serious comorbidity in adult [E66.01]  Yes    Atrial fibrillation [I48.91]  Yes    History of CVA (cerebrovascular accident) [Z86.73]  Not Applicable    HTN (hypertension) [I10]  Yes    History of breast cancer [Z85.3]  Not Applicable    Asthma [J45.909]  Yes      Resolved Hospital Problems   No resolved problems to display.     Ms. Coppola is a 88 y.o.  with a history of DVT, atrial fibrillation, hypertension, asthma, breast cancer, anxiety who presented to the ER with right lower extremity hematoma.     Right lower extremity  hematoma  Acute blood loss anemia  - surgery consulted; to OR 5/31 for exploration of right lower extremity hematoma with control of bleeding, debridement of skin and subcutaneous tissue  - defer to surgery when Eliquis may be resumed-discussed with Dr. Pedraza at bedside, he will reevaluate the wound on 6/2 to make further determination about resuming Eliquis  - hgb down to 6.5, await surgery recs but given hgb is down trended will continue to hold for now  - transfuse 1 U PRBC for hgb 6.5     History of DVT on Eliquis-held given #1     Atrial fibrillation-continue diltiazem for rate control, Eliquis on hold     Essential HTN-diltiazem continued, Lasix on hold for now-BP borderline low     Asthma-continue home nebulizers     Hx of breast cancer     Anxiety/benzodiazepine dependence-continue home Valium     History of constipation- bowel regimen    SCDs for DVT prophylaxis.  Full code.  Discussed with patient and nursing staff.  Anticipated discharge TBD, TBD        Anika Jackson MD  [facility]  06/02/24  13:40 EDT    I wore protective equipment throughout this patient encounter including gloves.  Hand hygiene was performed before donning protective equipment and after removal when leaving the room.         Copied text in this note has been reviewed and is accurate as of 06/02/24.

## 2024-06-03 ENCOUNTER — APPOINTMENT (OUTPATIENT)
Dept: GENERAL RADIOLOGY | Facility: HOSPITAL | Age: 89
DRG: 571 | End: 2024-06-03
Payer: MEDICARE

## 2024-06-03 LAB
ANION GAP SERPL CALCULATED.3IONS-SCNC: 6 MMOL/L (ref 5–15)
BASOPHILS # BLD AUTO: 0.03 10*3/MM3 (ref 0–0.2)
BASOPHILS NFR BLD AUTO: 0.3 % (ref 0–1.5)
BH BB BLOOD EXPIRATION DATE: NORMAL
BH BB BLOOD TYPE BARCODE: 9500
BH BB DISPENSE STATUS: NORMAL
BH BB PRODUCT CODE: NORMAL
BH BB UNIT NUMBER: NORMAL
BUN SERPL-MCNC: 18 MG/DL (ref 8–23)
BUN/CREAT SERPL: 17.8 (ref 7–25)
CALCIUM SPEC-SCNC: 7.6 MG/DL (ref 8.6–10.5)
CHLORIDE SERPL-SCNC: 106 MMOL/L (ref 98–107)
CO2 SERPL-SCNC: 30 MMOL/L (ref 22–29)
CREAT SERPL-MCNC: 1.01 MG/DL (ref 0.57–1)
CROSSMATCH INTERPRETATION: NORMAL
DEPRECATED RDW RBC AUTO: 44.4 FL (ref 37–54)
EGFRCR SERPLBLD CKD-EPI 2021: 54.3 ML/MIN/1.73
EOSINOPHIL # BLD AUTO: 0.27 10*3/MM3 (ref 0–0.4)
EOSINOPHIL NFR BLD AUTO: 2.8 % (ref 0.3–6.2)
ERYTHROCYTE [DISTWIDTH] IN BLOOD BY AUTOMATED COUNT: 14.8 % (ref 12.3–15.4)
GLUCOSE SERPL-MCNC: 87 MG/DL (ref 65–99)
HCT VFR BLD AUTO: 22.4 % (ref 34–46.6)
HCT VFR BLD AUTO: 23.4 % (ref 34–46.6)
HGB BLD-MCNC: 7.1 G/DL (ref 12–15.9)
HGB BLD-MCNC: 7.2 G/DL (ref 12–15.9)
IMM GRANULOCYTES # BLD AUTO: 0.13 10*3/MM3 (ref 0–0.05)
IMM GRANULOCYTES NFR BLD AUTO: 1.3 % (ref 0–0.5)
LYMPHOCYTES # BLD AUTO: 2.03 10*3/MM3 (ref 0.7–3.1)
LYMPHOCYTES NFR BLD AUTO: 20.9 % (ref 19.6–45.3)
MAGNESIUM SERPL-MCNC: 2.3 MG/DL (ref 1.6–2.4)
MCH RBC QN AUTO: 26.7 PG (ref 26.6–33)
MCHC RBC AUTO-ENTMCNC: 32.1 G/DL (ref 31.5–35.7)
MCV RBC AUTO: 83 FL (ref 79–97)
MONOCYTES # BLD AUTO: 1.05 10*3/MM3 (ref 0.1–0.9)
MONOCYTES NFR BLD AUTO: 10.8 % (ref 5–12)
NEUTROPHILS NFR BLD AUTO: 6.2 10*3/MM3 (ref 1.7–7)
NEUTROPHILS NFR BLD AUTO: 63.9 % (ref 42.7–76)
NRBC BLD AUTO-RTO: 0.2 /100 WBC (ref 0–0.2)
PHOSPHATE SERPL-MCNC: 3 MG/DL (ref 2.5–4.5)
PLATELET # BLD AUTO: 218 10*3/MM3 (ref 140–450)
PMV BLD AUTO: 10.4 FL (ref 6–12)
POTASSIUM SERPL-SCNC: 3.7 MMOL/L (ref 3.5–5.2)
RBC # BLD AUTO: 2.7 10*6/MM3 (ref 3.77–5.28)
SODIUM SERPL-SCNC: 142 MMOL/L (ref 136–145)
UNIT  ABO: NORMAL
UNIT  RH: NORMAL
WBC NRBC COR # BLD AUTO: 9.71 10*3/MM3 (ref 3.4–10.8)

## 2024-06-03 PROCEDURE — 83735 ASSAY OF MAGNESIUM: CPT | Performed by: STUDENT IN AN ORGANIZED HEALTH CARE EDUCATION/TRAINING PROGRAM

## 2024-06-03 PROCEDURE — 86900 BLOOD TYPING SEROLOGIC ABO: CPT

## 2024-06-03 PROCEDURE — 80048 BASIC METABOLIC PNL TOTAL CA: CPT | Performed by: STUDENT IN AN ORGANIZED HEALTH CARE EDUCATION/TRAINING PROGRAM

## 2024-06-03 PROCEDURE — 36430 TRANSFUSION BLD/BLD COMPNT: CPT

## 2024-06-03 PROCEDURE — 85018 HEMOGLOBIN: CPT | Performed by: STUDENT IN AN ORGANIZED HEALTH CARE EDUCATION/TRAINING PROGRAM

## 2024-06-03 PROCEDURE — 85025 COMPLETE CBC W/AUTO DIFF WBC: CPT | Performed by: STUDENT IN AN ORGANIZED HEALTH CARE EDUCATION/TRAINING PROGRAM

## 2024-06-03 PROCEDURE — 94799 UNLISTED PULMONARY SVC/PX: CPT

## 2024-06-03 PROCEDURE — 85014 HEMATOCRIT: CPT | Performed by: STUDENT IN AN ORGANIZED HEALTH CARE EDUCATION/TRAINING PROGRAM

## 2024-06-03 PROCEDURE — 84100 ASSAY OF PHOSPHORUS: CPT | Performed by: STUDENT IN AN ORGANIZED HEALTH CARE EDUCATION/TRAINING PROGRAM

## 2024-06-03 PROCEDURE — 71045 X-RAY EXAM CHEST 1 VIEW: CPT

## 2024-06-03 PROCEDURE — P9016 RBC LEUKOCYTES REDUCED: HCPCS

## 2024-06-03 PROCEDURE — 99231 SBSQ HOSP IP/OBS SF/LOW 25: CPT | Performed by: SURGERY

## 2024-06-03 PROCEDURE — 25010000002 HYDROMORPHONE PER 4 MG: Performed by: SURGERY

## 2024-06-03 PROCEDURE — 94664 DEMO&/EVAL PT USE INHALER: CPT

## 2024-06-03 PROCEDURE — 97530 THERAPEUTIC ACTIVITIES: CPT

## 2024-06-03 RX ORDER — ACETAMINOPHEN 500 MG
1000 TABLET ORAL EVERY 8 HOURS
Status: DISCONTINUED | OUTPATIENT
Start: 2024-06-03 | End: 2024-06-12 | Stop reason: HOSPADM

## 2024-06-03 RX ORDER — OXYCODONE HYDROCHLORIDE 5 MG/1
5 TABLET ORAL EVERY 4 HOURS PRN
Status: DISPENSED | OUTPATIENT
Start: 2024-06-03 | End: 2024-06-08

## 2024-06-03 RX ORDER — ALBUTEROL SULFATE 0.63 MG/3ML
0.63 SOLUTION RESPIRATORY (INHALATION) EVERY 6 HOURS PRN
Status: DISCONTINUED | OUTPATIENT
Start: 2024-06-03 | End: 2024-06-12 | Stop reason: HOSPADM

## 2024-06-03 RX ORDER — CALCIUM CARBONATE 500 MG/1
2 TABLET, CHEWABLE ORAL 3 TIMES DAILY PRN
Status: DISCONTINUED | OUTPATIENT
Start: 2024-06-03 | End: 2024-06-07

## 2024-06-03 RX ORDER — DEXTROMETHORPHAN POLISTIREX 30 MG/5ML
60 SUSPENSION ORAL EVERY 12 HOURS PRN
Status: DISCONTINUED | OUTPATIENT
Start: 2024-06-03 | End: 2024-06-12 | Stop reason: HOSPADM

## 2024-06-03 RX ORDER — FUROSEMIDE 40 MG/1
40 TABLET ORAL DAILY
Status: DISCONTINUED | OUTPATIENT
Start: 2024-06-04 | End: 2024-06-12 | Stop reason: HOSPADM

## 2024-06-03 RX ADMIN — DIAZEPAM 2 MG: 2 TABLET ORAL at 08:33

## 2024-06-03 RX ADMIN — ACETAMINOPHEN 1000 MG: 500 TABLET ORAL at 16:42

## 2024-06-03 RX ADMIN — APIXABAN 5 MG: 5 TABLET, FILM COATED ORAL at 20:43

## 2024-06-03 RX ADMIN — Medication 10 ML: at 08:36

## 2024-06-03 RX ADMIN — HYDROMORPHONE HYDROCHLORIDE 0.5 MG: 1 INJECTION, SOLUTION INTRAMUSCULAR; INTRAVENOUS; SUBCUTANEOUS at 13:09

## 2024-06-03 RX ADMIN — TRAMADOL HYDROCHLORIDE 50 MG: 50 TABLET ORAL at 08:33

## 2024-06-03 RX ADMIN — FUROSEMIDE 40 MG: 40 TABLET ORAL at 11:31

## 2024-06-03 RX ADMIN — PANTOPRAZOLE SODIUM 40 MG: 40 TABLET, DELAYED RELEASE ORAL at 16:42

## 2024-06-03 RX ADMIN — ACETAMINOPHEN 1000 MG: 500 TABLET ORAL at 09:45

## 2024-06-03 RX ADMIN — DIAZEPAM 2 MG: 2 TABLET ORAL at 20:43

## 2024-06-03 RX ADMIN — Medication 10 ML: at 20:43

## 2024-06-03 RX ADMIN — ARFORMOTEROL TARTRATE 15 MCG: 15 SOLUTION RESPIRATORY (INHALATION) at 19:11

## 2024-06-03 RX ADMIN — DILTIAZEM HYDROCHLORIDE 120 MG: 120 CAPSULE, COATED, EXTENDED RELEASE ORAL at 08:33

## 2024-06-03 RX ADMIN — PANTOPRAZOLE SODIUM 40 MG: 40 TABLET, DELAYED RELEASE ORAL at 06:11

## 2024-06-03 RX ADMIN — BUDESONIDE 0.5 MG: 0.5 INHALANT ORAL at 19:11

## 2024-06-03 NOTE — PROGRESS NOTES
Name: Vonnie Coppola ADMIT: 2024   : 1935  PCP: Christopher Leyva DO    MRN: [id number] LOS: 3 days   AGE/SEX: 88 y.o. female  ROOM: [facility]     Subjective   Subjective   Resting in bed, daughter/family at bedside. They think the tramadol is causing some confusion and would like to change pain meds if possible. Agreeable to scheduled tylenol and prn mendez.       Objective   Objective   Vital Signs  Temp:  [97.7 °F (36.5 °C)-98.6 °F (37 °C)] 97.8 °F (36.6 °C)  Heart Rate:  [75-82] 78  Resp:  [18] 18  BP: (102-129)/(46-72) 128/60  SpO2:  [98 %-100 %] 98 %  on  Flow (L/min):  [2] 2;   Device (Oxygen Therapy): nasal cannula  Body mass index is 40.17 kg/m².  Physical Exam  Constitutional:       General: She is not in acute distress.     Appearance: She is obese. She is ill-appearing.   Cardiovascular:      Rate and Rhythm: Normal rate and regular rhythm.   Pulmonary:      Effort: Pulmonary effort is normal. No respiratory distress.      Breath sounds: Normal breath sounds. No wheezing.   Abdominal:      General: Bowel sounds are normal. There is no distension.      Palpations: Abdomen is soft.      Tenderness: There is no abdominal tenderness.   Musculoskeletal:         General: No swelling or tenderness. Normal range of motion.      Right lower leg: Wrapped     Left lower leg: Edema, significant ecchymoses     Comments: Right lower extremity surgical site/wound- wrapped  Neurological:      General: No focal deficit present.      Mental Status: She is alert and oriented to person, place, and time. Mental status is at baseline.     Results Review     I reviewed the patient's new clinical results.  Results from last 7 days   Lab Units 24  0404 24  0538 24  1008 24  1420   WBC 10*3/mm3 9.71 10.84* 12.47* 8.77   HEMOGLOBIN g/dL 7.2* 6.5* 7.9* 9.1*   PLATELETS 10*3/mm3 218 205 242 247     Results from last 7 days   Lab Units 24  0404 24  0538 24  1008  "05/31/24  1420   SODIUM mmol/L 142 143 144 143   POTASSIUM mmol/L 3.7 3.6 3.8 3.7   CHLORIDE mmol/L 106 106 108* 107   CO2 mmol/L 30.0* 29.0 24.9 27.1   BUN mg/dL 18 20 17 15   CREATININE mg/dL 1.01* 1.14* 1.14* 0.98   GLUCOSE mg/dL 87 81 113* 110*   Estimated Creatinine Clearance: 49.4 mL/min (A) (by C-G formula based on SCr of 1.01 mg/dL (H)).  Results from last 7 days   Lab Units 05/31/24  1420 05/28/24  0827   ALBUMIN g/dL 3.2* 3.0*   BILIRUBIN mg/dL 0.4 0.3   ALK PHOS U/L 70 70   AST (SGOT) U/L 11 9   ALT (SGPT) U/L 10 9     Results from last 7 days   Lab Units 06/03/24  0404 06/02/24  0538 06/01/24  1008 05/31/24  1420 05/28/24  0827   CALCIUM mg/dL 7.6* 7.8* 8.1* 8.7 8.9   ALBUMIN g/dL  --   --   --  3.2* 3.0*   MAGNESIUM mg/dL 2.3 2.9*  --   --   --    PHOSPHORUS mg/dL 3.0 3.0  --   --   --      Results from last 7 days   Lab Units 05/31/24  1420   LACTATE mmol/L 1.5     COVID19   Date Value Ref Range Status   02/12/2024 Not Detected Not Detected - Ref. Range Final     No results found for: \"HGBA1C\", \"POCGLU\"    XR Chest 1 View  Narrative: CHEST SINGLE VIEW     HISTORY: Shortness of breath.     COMPARISON: Two-view chest 03/21/2024     FINDINGS: Thoracic aorta is tortuous and aortic valve calcifications are  present. Heart size appears within normal limits. Lungs are clear of  focal airspace disease and there is no evidence for pulmonary edema or  pleural effusion. Left axillary clips are present.     Impression: No evidence for active disease in the chest.     This report was finalized on 6/3/2024 8:15 AM by Dr. Archie Ortiz M.D on Workstation: [id number]       Scheduled Medications  acetaminophen, 1,000 mg, Oral, Q8H  apixaban, 5 mg, Oral, Q12H  arformoterol, 15 mcg, Nebulization, BID - RT  budesonide, 0.5 mg, Nebulization, BID - RT  diazePAM, 2 mg, Oral, BID  dilTIAZem CD, 120 mg, Oral, Q24H  [START ON 6/4/2024] furosemide, 40 mg, Oral, Daily  pantoprazole, 40 mg, Oral, BID AC  sodium chloride, 10 mL, " Intravenous, Q12H    Infusions  sodium chloride, 50 mL/hr, Last Rate: 50 mL/hr (06/02/24 7963)    Diet  Diet: Regular/House, Cardiac; Healthy Heart (2-3 Na+); Fluid Consistency: Thin (IDDSI 0)           Assessment/Plan     Active Hospital Problems    Diagnosis  POA   • **Leg hematoma, right, subsequent encounter [S80.11XD]  Not Applicable   • History of pulmonary embolism [Z86.711]  Yes   • Class 2 severe obesity with serious comorbidity in adult [E66.01]  Yes   • Atrial fibrillation [I48.91]  Yes   • History of CVA (cerebrovascular accident) [Z86.73]  Not Applicable   • HTN (hypertension) [I10]  Yes   • History of breast cancer [Z85.3]  Not Applicable   • Asthma [J45.909]  Yes      Resolved Hospital Problems   No resolved problems to display.     Ms. Coppola is a 88 y.o.  with a history of DVT, atrial fibrillation, hypertension, asthma, breast cancer, anxiety who presented to the ER with right lower extremity hematoma.     Right lower extremity hematoma  Acute blood loss anemia  - surgery consulted; to OR 5/31 for exploration of right lower extremity hematoma with control of bleeding, debridement of skin and subcutaneous tissue  - ok to restart Eliquis 6/3  - transfuse 1 U PRBC for hgb 6.5; improved to 7.2 this morning- repeat H/H at 16:00     History of DVT on Eliquis- restarted     Atrial fibrillation-continue diltiazem for rate control, Eliquis      Essential HTN-diltiazem continued, Lasix restarted     Asthma-continue home nebulizers     Hx of breast cancer     Anxiety/benzodiazepine dependence-continue home Valium     History of constipation- bowel regimen    Eliquis (home med) for DVT prophylaxis.  Full code.  Discussed with patient and nursing staff and family.   Anticipated discharge TBD, TBD        Anika Jackson MD  [facility]  06/03/24  14:41 EDT    I wore protective equipment throughout this patient encounter including gloves.  Hand hygiene was performed before donning  protective equipment and after removal when leaving the room.         Copied text in this note has been reviewed and is accurate as of 06/03/24.

## 2024-06-03 NOTE — PLAN OF CARE
Goal Outcome Evaluation:      89 yo female admitted 5/31 with RLE hematoma. POD 3 debridement. Dressing changed today by surgery, orders placed for daily dressing changes. Scheduled tylenol and PRN oxycodone for pain control. Eliquis and lasix restarted today. VS stable, 2L O2, A&OX4.

## 2024-06-03 NOTE — DISCHARGE PLACEMENT REQUEST
"Vonnie Hitchcock (88 y.o. Female)       Date of Birth   1935    Social Security Number       Address   [address] [place] [id number]    Home Phone   [phone]    MRN   [id number]       Rastafari   None    Marital Status                               Admission Date   5/31/24    Admission Type   Emergency    Admitting Provider   Anika Jackson MD    Attending Provider   Anika Jackson MD    Department, Room/Bed   [address], [address]       Discharge Date       Discharge Disposition       Discharge Destination                                 Attending Provider: Anika Jackson MD    Allergies: Cortisone, Penicillins    Isolation: None   Infection: None   Code Status: CPR    Ht: 165.1 cm (65\")   Wt: 110 kg (241 lb 6.5 oz)    Admission Cmt: None   Principal Problem: Leg hematoma, right, subsequent encounter [S80.11XD]                   Active Insurance as of 5/31/2024       Primary Coverage       Payor Plan Insurance Group Employer/Plan Group    [employer] MEDICARE REPLACEMENT HU[employer] MED [id number] PPO [id number]       Payor Plan Address Payor Plan Phone Number Payor Plan Fax Number Effective Dates    [address] [address] [phone]  3/1/2023 - None Entered    [facility] [id number]         Subscriber Name Subscriber Birth Date Member ID       VONNIE HITCHCOCK 1935 [id number]                     Emergency Contacts        (Rel.) Home Phone Work Phone Mobile Phone    XiangKirsten painting (Sister) [phone] -- [phone]    Elizabeth Rodríguez (Daughter) [phone] -- [phone]    AbdonHudson (Other) [phone] -- [phone]                "

## 2024-06-03 NOTE — PROGRESS NOTES
IMPRESSION & PLAN:  88-year-old lady status post right lower extremity wound exploration, debridement of skin and subcutaneous fat measuring 11 x 10 cm.  Dressing changed at bedside.  Wound is not bleeding and there is eschar along the base of the wound from cautery used in the operating room as well as topical hemostatic placed over the wound for the first 24 hours.    Anticipate need for prolonged wound care (months) for healing of the wound.  Okay to resume Eliquis.    Tramadol and Tylenol for pain control.    Recommend daily dressing changes.  Pack wound with Kerlix fluff gauze moistened with normal saline.  Place ABD pad over this.  Sleeve wrap leg from toes to knee with Kerlix fluff gauze x 2 rolls.  Wrap from toes to knee with Ace wrap applying gentle compression.    Small amount of oozing from lower portion of wound today which was controlled with pressure for a few minutes.  Will hold on Eliquis resumption today and inflamed no longer oozing tomorrow will plan to resume.  Tramadol ordered for p.o. pain control in addition to Tylenol.    Agree with blood transfusion.    Will follow.  Discussed with Dr. Jackson.    CC:    Chief Complaint   Patient presents with    Leg Pain    Wound Check         HPI: She was able to stand on the leg today but had significant pain associated with it.    ROS:   Per HPI      PE:    VS:   Vitals:    06/03/24 1210   BP: 128/60   Pulse: 78   Resp: 18   Temp: 97.8 °F (36.6 °C)   SpO2:           Intake/Output Summary (Last 24 hours) at 6/3/2024 1422  Last data filed at 6/2/2024 1753  Gross per 24 hour   Intake 1280 ml   Output --   Net 1280 ml        CONST: Awake, alert        LABS:  Results from last 7 days   Lab Units 06/03/24  0404 06/02/24  0538 06/01/24  1008 05/31/24  1420 05/28/24  0827   WBC 10*3/mm3 9.71 10.84* 12.47* 8.77 9.19   HEMOGLOBIN g/dL 7.2* 6.5* 7.9* 9.1* 9.4*   HEMATOCRIT % 22.4* 21.6* 25.8* 29.8* 30.4*   PLATELETS 10*3/mm3 218 205 242 247 233     Results from  last 7 days   Lab Units 06/03/24  0404 06/02/24  0538 06/01/24  1008 05/31/24  1420 05/28/24  0827   SODIUM mmol/L 142 143 144 143 145   POTASSIUM mmol/L 3.7 3.6 3.8 3.7 3.3*   CHLORIDE mmol/L 106 106 108* 107 107   CO2 mmol/L 30.0* 29.0 24.9 27.1 32.0*   BUN mg/dL 18 20 17 15 16   CREATININE mg/dL 1.01* 1.14* 1.14* 0.98 1.04*   CALCIUM mg/dL 7.6* 7.8* 8.1* 8.7 8.9   BILIRUBIN mg/dL  --   --   --  0.4 0.3   ALK PHOS U/L  --   --   --  70 70   ALT (SGPT) U/L  --   --   --  10 9   AST (SGOT) U/L  --   --   --  11 9   GLUCOSE mg/dL 87 81 113* 110* 95

## 2024-06-03 NOTE — THERAPY TREATMENT NOTE
Patient Name: Vonnie Coppola  : 1935    MRN: [id number]                              Today's Date: 6/3/2024       Admit Date: 2024    Visit Dx:     ICD-10-CM ICD-9-CM   1. Leg hematoma, right, subsequent encounter  S80.11XD V58.89     924.5   2. Cellulitis of leg, right  L03.115 682.6   3. Chronic anemia  D64.9 285.9     Patient Active Problem List   Diagnosis    GI bleed    Anemia    HTN (hypertension)    History of breast cancer    Asthma    History of CVA (cerebrovascular accident)    Dehydration    Renal insufficiency    Pulmonary nodule    Adnexal cyst    Nausea    Atrial fibrillation    History of pulmonary embolism    Class 2 severe obesity with serious comorbidity in adult    Thrombocytopenia    Cellulitis of right leg    Acute cystitis    Leg hematoma, right, subsequent encounter     Past Medical History:   Diagnosis Date    Arthritis     Asthma     Atrial fibrillation     per pt's daughter.States hx of a-fib in the past when stressed or tired.    Breast cancer     LEFT BREAST CA, LUMPECTOMY & RADS    History of cataract     Hx of radiation therapy     APPROXIMATELY 40 TREATMENTS FOR LEFT BREAST CA    Hypertension     Pneumonia     Stroke      Past Surgical History:   Procedure Laterality Date    APPENDECTOMY      BLADDER SURGERY      BREAST BIOPSY Left     MALIGNANT    BREAST LUMPECTOMY Left     MALIGNANT    CATARACT EXTRACTION      COLONOSCOPY N/A 2024    Procedure: COLONOSCOPY to cecum with cold snare polypectomies;  Surgeon: Harshad Gaston MD;  Location: [facility] ENDOSCOPY;  Service: Gastroenterology;  Laterality: N/A;  pre- gi bleed, anemia  post- diverticulosis, polyps    ENDOSCOPY N/A 2024    Procedure: ESOPHAGOGASTRODUODENOSCOPY with biospy;  Surgeon: Harshad Gaston MD;  Location: [facility] ENDOSCOPY;  Service: Gastroenterology;  Laterality: N/A;  pre- gi bleed, anemia  post- erosive gastirits    GALLBLADDER SURGERY      HERNIA REPAIR      HYSTERECTOMY       BENJAMIN      LYMPHADENECTOMY      OOPHORECTOMY        General Information       Row Name 06/03/24 1012          Physical Therapy Time and Intention    Document Type therapy note (daily note)  -PH     Mode of Treatment physical therapy  -       Row Name 06/03/24 1012          General Information    Existing Precautions/Restrictions fall  -[facility] Name 06/03/24 1012          Cognition    Orientation Status (Cognition) oriented x 3  -PH       Row Name 06/03/24 1012          Safety Issues, Functional Mobility    Impairments Affecting Function (Mobility) pain;endurance/activity tolerance;strength;balance  -PH     Comment, Safety Issues/Impairments (Mobility) gt belt and non skid socks donned  -               User Key  (r) = Recorded By, (t) = Taken By, (c) = Cosigned By      Initials Name Provider Type    PH Lorraine Corcoran PTA Physical Therapist Assistant                   Mobility       Row Name 06/03/24 1012          Bed Mobility    Bed Mobility supine-sit;sit-supine  -PH     Supine-Sit [place] (Bed Mobility) minimum assist (75% patient effort);verbal cues;nonverbal cues (demo/gesture)  -     Sit-Supine [place] (Bed Mobility) supervision;verbal cues  -PH     Assistive Device (Bed Mobility) bed rails;head of bed elevated  -PH     Comment, (Bed Mobility) significant time to sit up to EOB 2/2 pain  -PH       [facility] Name 06/03/24 1012          Sit-Stand Transfer    Sit-Stand [place] (Transfers) maximum assist (25% patient effort);2 person assist;verbal cues;nonverbal cues (demo/gesture)  -PH     Assistive Device (Sit-Stand Transfers) walker, front-wheeled  -PH     Comment, (Sit-Stand Transfer) sit to stand 2x from R side of bed; cues for B foot and hand placement;  2 posterior LOBs as pt stood w/ cues for upright posture  -PH       Row Name 06/03/24 1012          Gait/Stairs (Locomotion)    [place] Level (Gait) moderate assist (50% patient effort);2 person assist;verbal  cues;nonverbal cues (demo/gesture)  -PH     Assistive Device (Gait) walker, front-wheeled  -PH     Distance in Feet (Gait) 3  1' laterally to foot of bed w/ seated rest then 2' to HOB laterally  -PH     [place] Level (Stairs) unable to assess  -PH     Comment, (Gait/Stairs) pain and activity intolerance limiting; very slow although wt shifting  -PH               User Key  (r) = Recorded By, (t) = Taken By, (c) = Cosigned By      Initials Name Provider Type     Lorraine Corcoran PTA Physical Therapist Assistant                   Obj/Interventions       Row Name 06/03/24 1015          Motor Skills    Therapeutic Exercise other (see comments)  BAP, LAQ; x 10 reps  -PH       Row Name 06/03/24 1015          Balance    Balance Assessment sitting static balance;standing static balance  -PH     Static Sitting Balance supervision  -PH     Static Standing Balance minimal assist;moderate assist;2-person assist;verbal cues;non-verbal cues (demo/gesture)  -PH     Position/Device Used, Standing Balance walker, front-wheeled  -PH     Comment, Balance poor endurance w/ pain limiting  -PH               User Key  (r) = Recorded By, (t) = Taken By, (c) = Cosigned By      Initials Name Provider Type     Lorraine Corcoran PTA Physical Therapist Assistant                   Goals/Plan    No documentation.                  Clinical Impression       Row Name 06/03/24 1016          Pain    Posttreatment Pain Rating 9/10  -PH     Pain Location - Side/Orientation Right  -PH     Pain Location lower  -PH     Pain Location - extremity  -PH     Pain Intervention(s) Ambulation/increased activity;Repositioned  -PH     Additional Documentation Pain Scale: Numbers Pre/Post-Treatment (Group)  -PH       Row Name 06/03/24 1016          Plan of Care Review    Plan of Care Reviewed With patient;daughter  -PH     Progress improving  -PH     Outcome Evaluation Pt was seen by PT this AM for tx. Pt was in bed and req significant extra time  to sit up to EOB w/ min A. Pt stood 2x from EOB w/ posterior LOB each time req max A x 2 for sit to stand w/ use of fww. Pt amb 1' laterally to foot of bed, took a seated rest then amb 2' laterally to HOB  both req mod A x 2 w/ use of fww. Pt performed ther ex for B LE . Pt returned to bed w/ SV. Pain and activity tolerance limiting. PT will prog as pt omar.  -PH       Row Name 06/03/24 1016          Vital Signs    Pre SpO2 (%) 94  -PH     O2 Delivery Pre Treatment nasal cannula  -PH     O2 Delivery Intra Treatment nasal cannula  -PH     Post SpO2 (%) 94  -PH     O2 Delivery Post Treatment nasal cannula  -PH       Row Name 06/03/24 1016          Positioning and Restraints    Pre-Treatment Position in bed  -PH     Post Treatment Position bed  -PH     In Bed notified nsg;call light within reach;encouraged to call for assist;exit alarm on;with family/caregiver;fowlers  -PH               User Key  (r) = Recorded By, (t) = Taken By, (c) = Cosigned By      Initials Name Provider Type    PH Lorraine Corcoran PTA Physical Therapist Assistant                   Outcome Measures       Row Name 06/03/24 1019          How much help from another person do you currently need...    Turning from your back to your side while in flat bed without using bedrails? 3  -PH     Moving from lying on back to sitting on the side of a flat bed without bedrails? 3  -PH     Moving to and from a bed to a chair (including a wheelchair)? 2  -PH     Standing up from a chair using your arms (e.g., wheelchair, bedside chair)? 2  -PH     Climbing 3-5 steps with a railing? 1  -PH     To walk in hospital room? 1  -PH     AM-PAC 6 Clicks Score (PT) 12  -PH     Highest Level of Mobility Goal 4 --> Transfer to chair/commode  -PH       Row Name 06/03/24 1019          Functional Assessment    Outcome Measure Options AM-PAC 6 Clicks Basic Mobility (PT)  -PH               User Key  (r) = Recorded By, (t) = Taken By, (c) = Cosigned By      Initials Name  Provider Type     Lorraine Corcoran PTA Physical Therapist Assistant                                 Physical Therapy Education       Title: PT OT SLP Therapies (Done)       Topic: Physical Therapy (Done)       Point: Mobility training (Done)       Learning Progress Summary             Patient Acceptance, E,TB,D, VU,NR by  at 6/3/2024 1019    Acceptance, E, VU,NR by ER at 6/1/2024 1222                         Point: Home exercise program (Done)       Learning Progress Summary             Patient Acceptance, E,TB,D, VU,NR by  at 6/3/2024 1019    Acceptance, E, VU,NR by ER at 6/1/2024 1222                         Point: Body mechanics (Done)       Learning Progress Summary             Patient Acceptance, E,TB,D, VU,NR by  at 6/3/2024 1019    Acceptance, E, VU,NR by ER at 6/1/2024 1222                         Point: Precautions (Done)       Learning Progress Summary             Patient Acceptance, E,TB,D, VU,NR by  at 6/3/2024 1019    Acceptance, E, VU,NR by ER at 6/1/2024 1222                                         User Key       Initials Effective Dates Name Provider Type Discipline     06/16/21 -  Lorraine Corcoran PTA Physical Therapist Assistant PT    ER 10/15/23 -  Wendy Pineda PT Physical Therapist PT                  PT Recommendation and Plan     Plan of Care Reviewed With: patient, daughter  Progress: improving  Outcome Evaluation: Pt was seen by PT this AM for tx. Pt was in bed and req significant extra time to sit up to EOB w/ min A. Pt stood 2x from EOB w/ posterior LOB each time req max A x 2 for sit to stand w/ use of fww. Pt amb 1' laterally to foot of bed, took a seated rest then amb 2' laterally to HOB  both req mod A x 2 w/ use of fww. Pt performed ther ex for B LE . Pt returned to bed w/ SV. Pain and activity tolerance limiting. PT will prog as pt omar.     Time Calculation:         PT Charges       Row Name 06/03/24 1020             Time Calculation    Start Time 0918  -       Stop Time 0937  -PH      Time Calculation (min) 19 min  -PH      PT Received On 06/03/24  -PH      PT - Next Appointment 06/04/24  -PH         Timed Charges    58053 - PT Therapeutic Exercise Minutes 3  -PH      43924 - PT Therapeutic Activity Minutes 16  -PH         Total Minutes    Timed Charges Total Minutes 19  -PH       Total Minutes 19  -PH                User Key  (r) = Recorded By, (t) = Taken By, (c) = Cosigned By      Initials Name Provider Type     Lorraine Corcoran PTA Physical Therapist Assistant                  Therapy Charges for Today       Code Description Service Date Service Provider Modifiers Qty    73840221037  PT THERAPEUTIC ACT EA 15 MIN 6/3/2024 Lorraine Corcoran PTA GP 1    04055659715 HC PT THER SUPP EA 15 MIN 6/3/2024 Lorraine Corcoran PTA GP 1            PT G-Codes  Outcome Measure Options: AM-PAC 6 Clicks Basic Mobility (PT)  AM-PAC 6 Clicks Score (PT): 12  PT Discharge Summary  Anticipated Discharge Disposition (PT): skilled nursing facility    Lorraine Corcoran PTA  6/3/2024

## 2024-06-03 NOTE — PLAN OF CARE
Goal Outcome Evaluation:  Plan of Care Reviewed With: patient, daughter        Progress: improving  Outcome Evaluation: Pt was seen by PT this AM for tx. Pt was in bed and req significant extra time to sit up to EOB w/ min A. Pt stood 2x from EOB w/ posterior LOB each time req max A x 2 for sit to stand w/ use of fww. Pt amb 1' laterally to foot of bed, took a seated rest then amb 2' laterally to HOB  both req mod A x 2 w/ use of fww. Pt performed ther ex for B LE . Pt returned to bed w/ SV. Pain and activity tolerance limiting. PT will prog as pt omar.      Anticipated Discharge Disposition (PT): skilled nursing facility

## 2024-06-03 NOTE — PLAN OF CARE
Goal Outcome Evaluation:  Plan of Care Reviewed With: patient      Family at bedside at beginning of shift. Pt said she had a difficult day. Family member staying at bedside for the duration of the night. No successful BM to report. Pt received on dose of PRN pain medication per MAR for pain. Asked pt several times throughout the night if she was in pain. She declined pain medication. Vitals WDL's. Around 0500, pt called out with SOA. Called on call [facility]. Orders for STAT chest xray and PRN cough medication given.

## 2024-06-04 LAB
ANION GAP SERPL CALCULATED.3IONS-SCNC: 5 MMOL/L (ref 5–15)
BASOPHILS # BLD AUTO: 0.03 10*3/MM3 (ref 0–0.2)
BASOPHILS NFR BLD AUTO: 0.3 % (ref 0–1.5)
BH BB BLOOD EXPIRATION DATE: NORMAL
BH BB BLOOD TYPE BARCODE: 9500
BH BB DISPENSE STATUS: NORMAL
BH BB PRODUCT CODE: NORMAL
BH BB UNIT NUMBER: NORMAL
BUN SERPL-MCNC: 17 MG/DL (ref 8–23)
BUN/CREAT SERPL: 16.7 (ref 7–25)
CALCIUM SPEC-SCNC: 7.9 MG/DL (ref 8.6–10.5)
CHLORIDE SERPL-SCNC: 107 MMOL/L (ref 98–107)
CO2 SERPL-SCNC: 30 MMOL/L (ref 22–29)
CREAT SERPL-MCNC: 1.02 MG/DL (ref 0.57–1)
CROSSMATCH INTERPRETATION: NORMAL
DEPRECATED RDW RBC AUTO: 47.5 FL (ref 37–54)
EGFRCR SERPLBLD CKD-EPI 2021: 53.7 ML/MIN/1.73
EOSINOPHIL # BLD AUTO: 0.3 10*3/MM3 (ref 0–0.4)
EOSINOPHIL NFR BLD AUTO: 3.1 % (ref 0.3–6.2)
ERYTHROCYTE [DISTWIDTH] IN BLOOD BY AUTOMATED COUNT: 15.2 % (ref 12.3–15.4)
GLUCOSE SERPL-MCNC: 92 MG/DL (ref 65–99)
HCT VFR BLD AUTO: 25.7 % (ref 34–46.6)
HGB BLD-MCNC: 8 G/DL (ref 12–15.9)
IMM GRANULOCYTES # BLD AUTO: 0.17 10*3/MM3 (ref 0–0.05)
IMM GRANULOCYTES NFR BLD AUTO: 1.7 % (ref 0–0.5)
LYMPHOCYTES # BLD AUTO: 2.15 10*3/MM3 (ref 0.7–3.1)
LYMPHOCYTES NFR BLD AUTO: 22.1 % (ref 19.6–45.3)
MAGNESIUM SERPL-MCNC: 2.3 MG/DL (ref 1.6–2.4)
MCH RBC QN AUTO: 26.7 PG (ref 26.6–33)
MCHC RBC AUTO-ENTMCNC: 31.1 G/DL (ref 31.5–35.7)
MCV RBC AUTO: 85.7 FL (ref 79–97)
MONOCYTES # BLD AUTO: 0.92 10*3/MM3 (ref 0.1–0.9)
MONOCYTES NFR BLD AUTO: 9.5 % (ref 5–12)
NEUTROPHILS NFR BLD AUTO: 6.16 10*3/MM3 (ref 1.7–7)
NEUTROPHILS NFR BLD AUTO: 63.3 % (ref 42.7–76)
NRBC BLD AUTO-RTO: 0.3 /100 WBC (ref 0–0.2)
PHOSPHATE SERPL-MCNC: 2.7 MG/DL (ref 2.5–4.5)
PLATELET # BLD AUTO: 217 10*3/MM3 (ref 140–450)
PMV BLD AUTO: 10.8 FL (ref 6–12)
POTASSIUM SERPL-SCNC: 4.1 MMOL/L (ref 3.5–5.2)
RBC # BLD AUTO: 3 10*6/MM3 (ref 3.77–5.28)
SODIUM SERPL-SCNC: 142 MMOL/L (ref 136–145)
UNIT  ABO: NORMAL
UNIT  RH: NORMAL
WBC NRBC COR # BLD AUTO: 9.73 10*3/MM3 (ref 3.4–10.8)

## 2024-06-04 PROCEDURE — 94760 N-INVAS EAR/PLS OXIMETRY 1: CPT

## 2024-06-04 PROCEDURE — 83735 ASSAY OF MAGNESIUM: CPT | Performed by: STUDENT IN AN ORGANIZED HEALTH CARE EDUCATION/TRAINING PROGRAM

## 2024-06-04 PROCEDURE — 94664 DEMO&/EVAL PT USE INHALER: CPT

## 2024-06-04 PROCEDURE — 80048 BASIC METABOLIC PNL TOTAL CA: CPT | Performed by: STUDENT IN AN ORGANIZED HEALTH CARE EDUCATION/TRAINING PROGRAM

## 2024-06-04 PROCEDURE — 94761 N-INVAS EAR/PLS OXIMETRY MLT: CPT

## 2024-06-04 PROCEDURE — 94799 UNLISTED PULMONARY SVC/PX: CPT

## 2024-06-04 PROCEDURE — 84100 ASSAY OF PHOSPHORUS: CPT | Performed by: STUDENT IN AN ORGANIZED HEALTH CARE EDUCATION/TRAINING PROGRAM

## 2024-06-04 PROCEDURE — 85025 COMPLETE CBC W/AUTO DIFF WBC: CPT | Performed by: STUDENT IN AN ORGANIZED HEALTH CARE EDUCATION/TRAINING PROGRAM

## 2024-06-04 PROCEDURE — 99231 SBSQ HOSP IP/OBS SF/LOW 25: CPT | Performed by: SURGERY

## 2024-06-04 PROCEDURE — 97530 THERAPEUTIC ACTIVITIES: CPT

## 2024-06-04 RX ORDER — DIAZEPAM 2 MG/1
2 TABLET ORAL EVERY 12 HOURS PRN
Status: DISPENSED | OUTPATIENT
Start: 2024-06-04 | End: 2024-06-09

## 2024-06-04 RX ADMIN — APIXABAN 5 MG: 5 TABLET, FILM COATED ORAL at 09:12

## 2024-06-04 RX ADMIN — ACETAMINOPHEN 1000 MG: 500 TABLET ORAL at 17:25

## 2024-06-04 RX ADMIN — PANTOPRAZOLE SODIUM 40 MG: 40 TABLET, DELAYED RELEASE ORAL at 17:25

## 2024-06-04 RX ADMIN — BUDESONIDE 0.5 MG: 0.5 INHALANT ORAL at 07:52

## 2024-06-04 RX ADMIN — DIAZEPAM 2 MG: 2 TABLET ORAL at 09:13

## 2024-06-04 RX ADMIN — Medication 10 ML: at 20:18

## 2024-06-04 RX ADMIN — OXYCODONE HYDROCHLORIDE 5 MG: 5 TABLET ORAL at 15:29

## 2024-06-04 RX ADMIN — FUROSEMIDE 40 MG: 40 TABLET ORAL at 09:12

## 2024-06-04 RX ADMIN — POLYETHYLENE GLYCOL 3350 17 G: 17 POWDER, FOR SOLUTION ORAL at 12:33

## 2024-06-04 RX ADMIN — ACETAMINOPHEN 1000 MG: 500 TABLET ORAL at 00:27

## 2024-06-04 RX ADMIN — DIAZEPAM 2 MG: 2 TABLET ORAL at 20:18

## 2024-06-04 RX ADMIN — ARFORMOTEROL TARTRATE 15 MCG: 15 SOLUTION RESPIRATORY (INHALATION) at 20:28

## 2024-06-04 RX ADMIN — ACETAMINOPHEN 1000 MG: 500 TABLET ORAL at 23:37

## 2024-06-04 RX ADMIN — ARFORMOTEROL TARTRATE 15 MCG: 15 SOLUTION RESPIRATORY (INHALATION) at 07:49

## 2024-06-04 RX ADMIN — PANTOPRAZOLE SODIUM 40 MG: 40 TABLET, DELAYED RELEASE ORAL at 09:13

## 2024-06-04 RX ADMIN — APIXABAN 5 MG: 5 TABLET, FILM COATED ORAL at 20:18

## 2024-06-04 RX ADMIN — DIAZEPAM 2 MG: 2 TABLET ORAL at 15:24

## 2024-06-04 RX ADMIN — DILTIAZEM HYDROCHLORIDE 120 MG: 120 CAPSULE, COATED, EXTENDED RELEASE ORAL at 09:12

## 2024-06-04 RX ADMIN — BUDESONIDE 0.5 MG: 0.5 INHALANT ORAL at 20:28

## 2024-06-04 RX ADMIN — ACETAMINOPHEN 1000 MG: 500 TABLET ORAL at 09:12

## 2024-06-04 NOTE — PROGRESS NOTES
Name: Vonnie Coppola ADMIT: 2024   : 1935  PCP: Christopher Leyva DO    MRN: [id number] LOS: 4 days   AGE/SEX: 88 y.o. female  ROOM: [facility]     Subjective   Subjective   Some right leg pain but she just finished working with therapy. No chest pain or shortness of breath.     Objective   Objective   Vital Signs  Temp:  [97.8 °F (36.6 °C)-98.4 °F (36.9 °C)] 98.1 °F (36.7 °C)  Heart Rate:  [66-82] 82  Resp:  [16-18] 18  BP: ()/(58-64) 125/58  SpO2:  [94 %-98 %] 94 %  on  Flow (L/min):  [2] 2;   Device (Oxygen Therapy): nasal cannula;humidified  Body mass index is 40.17 kg/m².    Physical Exam  Constitutional:       General: She is not in acute distress.     Appearance: She is not toxic-appearing.   HENT:      Head: Normocephalic and atraumatic.   Cardiovascular:      Rate and Rhythm: Normal rate and regular rhythm.   Pulmonary:      Effort: Pulmonary effort is normal. No respiratory distress.      Breath sounds: Wheezing (mild end expiratory) present. No rhonchi.   Abdominal:      General: Bowel sounds are normal.      Palpations: Abdomen is soft.      Tenderness: There is no abdominal tenderness. There is no guarding or rebound.   Musculoskeletal:         General: No swelling.      Comments: Right lower extremity dressing   Skin:     General: Skin is warm and dry.   Neurological:      General: No focal deficit present.      Mental Status: She is alert and oriented to person, place, and time.   Psychiatric:         Mood and Affect: Mood normal.         Behavior: Behavior normal.     Results Review  I reviewed the patient's new clinical results.  Results from last 7 days   Lab Units 24  0626 24  1600 24  0404 24  0538 24  1008   WBC 10*3/mm3 9.73  --  9.71 10.84* 12.47*   HEMOGLOBIN g/dL 8.0* 7.1* 7.2* 6.5* 7.9*   PLATELETS 10*3/mm3 217  --  218 205 242     Results from last 7 days   Lab Units 24  0626 24  0404 24  0538 24  1008   SODIUM  "mmol/L 142 142 143 144   POTASSIUM mmol/L 4.1 3.7 3.6 3.8   CHLORIDE mmol/L 107 106 106 108*   CO2 mmol/L 30.0* 30.0* 29.0 24.9   BUN mg/dL 17 18 20 17   CREATININE mg/dL 1.02* 1.01* 1.14* 1.14*   GLUCOSE mg/dL 92 87 81 113*     Lab Results   Component Value Date    ANIONGAP 5.0 06/04/2024     Estimated Creatinine Clearance: 48.9 mL/min (A) (by C-G formula based on SCr of 1.02 mg/dL (H)).   Lab Results   Component Value Date    EGFR 53.7 (L) 06/04/2024     Results from last 7 days   Lab Units 05/31/24  1420   ALBUMIN g/dL 3.2*   BILIRUBIN mg/dL 0.4   ALK PHOS U/L 70   AST (SGOT) U/L 11   ALT (SGPT) U/L 10     Results from last 7 days   Lab Units 06/04/24  0626 06/03/24  0404 06/02/24  0538 06/01/24  1008 05/31/24  1420   CALCIUM mg/dL 7.9* 7.6* 7.8* 8.1* 8.7   ALBUMIN g/dL  --   --   --   --  3.2*   MAGNESIUM mg/dL 2.3 2.3 2.9*  --   --    PHOSPHORUS mg/dL 2.7 3.0 3.0  --   --      Results from last 7 days   Lab Units 05/31/24  1420   LACTATE mmol/L 1.5     No results found for: \"HGBA1C\", \"POCGLU\"    XR Chest 1 View    Result Date: 6/3/2024  No evidence for active disease in the chest.  This report was finalized on 6/3/2024 8:15 AM by Dr. Archie Ortiz M.D on Workstation: [id number]       Scheduled Meds  acetaminophen, 1,000 mg, Oral, Q8H  apixaban, 5 mg, Oral, Q12H  arformoterol, 15 mcg, Nebulization, BID - RT  budesonide, 0.5 mg, Nebulization, BID - RT  diazePAM, 2 mg, Oral, BID  dilTIAZem CD, 120 mg, Oral, Q24H  furosemide, 40 mg, Oral, Daily  pantoprazole, 40 mg, Oral, BID AC  sodium chloride, 10 mL, Intravenous, Q12H    Continuous Infusions  sodium chloride, 50 mL/hr, Last Rate: 50 mL/hr (06/02/24 6221)    PRN Meds  •  albuterol  •  senna-docusate sodium **AND** polyethylene glycol **AND** bisacodyl **AND** bisacodyl  •  calcium carbonate  •  dextromethorphan polistirex ER  •  HYDROmorphone  •  Ibuprofen/Lidocaine/Baclofen 3/4/2% cream  •  melatonin  •  nitroglycerin  •  ondansetron ODT **OR** ondansetron  •  " oxyCODONE  •  sodium chloride  •  sodium chloride    sodium chloride, 50 mL/hr, Last Rate: 50 mL/hr (06/02/24 1363)    Diet  Diet: Regular/House, Cardiac; Healthy Heart (2-3 Na+); Fluid Consistency: Thin (IDDSI 0)       Assessment/Plan     Active Hospital Problems    Diagnosis  POA   • **Leg hematoma, right, subsequent encounter [S80.11XD]  Not Applicable   • History of pulmonary embolism [Z86.711]  Yes   • Class 2 severe obesity with serious comorbidity in adult [E66.01]  Yes   • Atrial fibrillation [I48.91]  Yes   • History of CVA (cerebrovascular accident) [Z86.73]  Not Applicable   • HTN (hypertension) [I10]  Yes   • History of breast cancer [Z85.3]  Not Applicable   • Asthma [J45.909]  Yes      Resolved Hospital Problems   No resolved problems to display.     88 y.o. female with a history of DVT, atrial fibrillation, hypertension, asthma, breast cancer, anxiety who presented to the ER with right lower extremity hematoma.     Right lower extremity hematoma  Acute blood loss anemia  s/p 5/31/24 exploration of right lower extremity hematoma with control of bleeding, debridement of skin and subcutaneous tissue. Daily dressing changes  ok to restart Eliquis 6/3  Hemoglobin improved after transfusion (1 unit each on 6/2/2024 and on 6/3/2024) continue to monitor q12 four a couple and transfuse further if needed     History of DVT on Eliquis restarted  Atrial fibrillation continue diltiazem for rate control, Eliquis   Essential HTN diltiazem continued, Lasix restarted. BP controlled  Asthma continue home nebulizers  Hx of breast cancer   Anxiety/benzodiazepine dependence-continue home Valium  History of constipation- bowel regimen. Having BM    Discharge  TBD. PT following  Expected Discharge Date: 6/5/2024; Expected Discharge Time:     Discussed with patient and nursing staff    Trevor Cervantes MD  [facility]  06/04/24

## 2024-06-04 NOTE — CASE MANAGEMENT/SOCIAL WORK
Post-Acute Authorization Submission      Post Acute Pre-Cert Documentation  Request Submitted by Facility - Type:: Hospital  Post-Acute Authorization Type Submitted:: SNF  Date Post Acute Pre-Cert Inititated per Facility: 06/04/24  Date Post Acute Pre-Cert Completed: 06/04/24  Accepting Facility: [facility] Discharge Date Requested: 06/05/24  All Clinicals Submitted?: Yes  Had Accepting Facility at Time of Submission: Yes  Response Received from Insurance?: Approval  Response Communicated to:: , Accepting Facility Liaison, Accepting Facility Auth Department  Authorization Number:: [id number]  Post Acute Pre-Cert Initiated Comment: VALID TO ADMIT UPTO 11:59PM ON 6/9/24.              Ha Abdullahi, PCT

## 2024-06-04 NOTE — PLAN OF CARE
Problem: Adult Inpatient Plan of Care  Goal: Plan of Care Review  Outcome: Ongoing, Progressing  Flowsheets (Taken 6/4/2024 0639)  Progress: no change  Plan of Care Reviewed With: patient  Outcome Evaluation: Pt A&OX4, VSS. No acute distress noted on assessment. Meds admin as ordered. RLE wound dressing clean and intact. Safety precautions in place, plan of care ongoing.  Goal: Patient-Specific Goal (Individualized)  Outcome: Ongoing, Progressing  Goal: Absence of Hospital-Acquired Illness or Injury  Outcome: Ongoing, Progressing  Intervention: Identify and Manage Fall Risk  Recent Flowsheet Documentation  Taken 6/4/2024 0030 by Rakel Rosenberg RN  Safety Promotion/Fall Prevention:   activity supervised   assistive device/personal items within reach   clutter free environment maintained   fall prevention program maintained   safety round/check completed  Taken 6/3/2024 2043 by Rakel Rosenberg RN  Safety Promotion/Fall Prevention:   activity supervised   assistive device/personal items within reach   clutter free environment maintained   fall prevention program maintained   room organization consistent   safety round/check completed  Intervention: Prevent Skin Injury  Recent Flowsheet Documentation  Taken 6/4/2024 0030 by Rakel Rosenberg RN  Body Position: supine, legs elevated  Skin Protection:   adhesive use limited   incontinence pads utilized  Taken 6/3/2024 2043 by Rakel Rosenberg RN  Body Position: supine, legs elevated  Skin Protection:   adhesive use limited   mittens applied to hands  Intervention: Prevent and Manage VTE (Venous Thromboembolism) Risk  Recent Flowsheet Documentation  Taken 6/3/2024 2043 by Rakel Rosenberg RN  VTE Prevention/Management:   bilateral   sequential compression devices off  Intervention: Prevent Infection  Recent Flowsheet Documentation  Taken 6/4/2024 0030 by Rakel Rosenberg RN  Infection Prevention: environmental surveillance performed  Goal: Optimal Comfort and Wellbeing  Outcome:  Ongoing, Progressing  Intervention: Provide Person-Centered Care  Recent Flowsheet Documentation  Taken 6/4/2024 0030 by Rakel oRsenberg RN  Trust Relationship/Rapport:   care explained   choices provided  Taken 6/3/2024 2043 by Rakel Rosenberg RN  Trust Relationship/Rapport:   care explained   choices provided   questions answered   reassurance provided  Goal: Readiness for Transition of Care  Outcome: Ongoing, Progressing     Problem: Fall Injury Risk  Goal: Absence of Fall and Fall-Related Injury  Outcome: Ongoing, Progressing  Intervention: Identify and Manage Contributors  Recent Flowsheet Documentation  Taken 6/4/2024 0030 by Raekl Rosenberg RN  Medication Review/Management: medications reviewed  Taken 6/3/2024 2043 by Rakel Rosenberg RN  Medication Review/Management: medications reviewed  Intervention: Promote Injury-Free Environment  Recent Flowsheet Documentation  Taken 6/4/2024 0030 by Rakel Rosenberg RN  Safety Promotion/Fall Prevention:   activity supervised   assistive device/personal items within reach   clutter free environment maintained   fall prevention program maintained   safety round/check completed  Taken 6/3/2024 2043 by Rakel Rosenberg RN  Safety Promotion/Fall Prevention:   activity supervised   assistive device/personal items within reach   clutter free environment maintained   fall prevention program maintained   room organization consistent   safety round/check completed     Problem: Skin Injury Risk Increased  Goal: Skin Health and Integrity  Outcome: Ongoing, Progressing  Intervention: Optimize Skin Protection  Recent Flowsheet Documentation  Taken 6/4/2024 0030 by Rakel Rosenberg RN  Pressure Reduction Techniques: frequent weight shift encouraged  Head of Bed (HOB) Positioning: HOB at 20-30 degrees  Pressure Reduction Devices: pressure-redistributing mattress utilized  Skin Protection:   adhesive use limited   incontinence pads utilized  Taken 6/3/2024 2043 by Rakel Rosenberg RN  Pressure Reduction  Techniques: frequent weight shift encouraged  Head of Bed (HOB) Positioning: HOB at 20-30 degrees  Pressure Reduction Devices: pressure-redistributing mattress utilized  Skin Protection:   adhesive use limited   mittens applied to hands   Goal Outcome Evaluation:  Plan of Care Reviewed With: patient        Progress: no change  Outcome Evaluation: Pt A&OX4, VSS. No acute distress noted on assessment. Meds admin as ordered. RLE wound dressing clean and intact. Safety precautions in place, plan of care ongoing.

## 2024-06-04 NOTE — PLAN OF CARE
Goal Outcome Evaluation:  Plan of Care Reviewed With: patient        Progress: improving  Outcome Evaluation: Pt was seen by PT this AM for tx. This PTA assisted aide for brief change w/ pt rolling L and R w/ mod A. Pt sat up to EOB w/ min A. Pt then stood and transferred in a few steps to chair req mod/max A x 2 w/ HHA x 2. Pt was UIC w/ MD in room at end of session. PT will prog as pt omar.      Anticipated Discharge Disposition (PT): skilled nursing facility

## 2024-06-04 NOTE — THERAPY TREATMENT NOTE
Patient Name: Vonnie Coppola  : 1935    MRN: [id number]                              Today's Date: 2024       Admit Date: 2024    Visit Dx:     ICD-10-CM ICD-9-CM   1. Leg hematoma, right, subsequent encounter  S80.11XD V58.89     924.5   2. Cellulitis of leg, right  L03.115 682.6   3. Chronic anemia  D64.9 285.9     Patient Active Problem List   Diagnosis    GI bleed    Anemia    HTN (hypertension)    History of breast cancer    Asthma    History of CVA (cerebrovascular accident)    Dehydration    Renal insufficiency    Pulmonary nodule    Adnexal cyst    Nausea    Atrial fibrillation    History of pulmonary embolism    Class 2 severe obesity with serious comorbidity in adult    Thrombocytopenia    Cellulitis of right leg    Acute cystitis    Leg hematoma, right, subsequent encounter     Past Medical History:   Diagnosis Date    Arthritis     Asthma     Atrial fibrillation     per pt's daughter.States hx of a-fib in the past when stressed or tired.    Breast cancer     LEFT BREAST CA, LUMPECTOMY & RADS    History of cataract     Hx of radiation therapy     APPROXIMATELY 40 TREATMENTS FOR LEFT BREAST CA    Hypertension     Pneumonia     Stroke      Past Surgical History:   Procedure Laterality Date    APPENDECTOMY      BLADDER SURGERY      BREAST BIOPSY Left     MALIGNANT    BREAST LUMPECTOMY Left     MALIGNANT    CATARACT EXTRACTION      COLONOSCOPY N/A 2024    Procedure: COLONOSCOPY to cecum with cold snare polypectomies;  Surgeon: Harshad Gaston MD;  Location: [facility] ENDOSCOPY;  Service: Gastroenterology;  Laterality: N/A;  pre- gi bleed, anemia  post- diverticulosis, polyps    ENDOSCOPY N/A 2024    Procedure: ESOPHAGOGASTRODUODENOSCOPY with biospy;  Surgeon: Harshad Gaston MD;  Location: [facility] ENDOSCOPY;  Service: Gastroenterology;  Laterality: N/A;  pre- gi bleed, anemia  post- erosive gastirits    GALLBLADDER SURGERY      HERNIA REPAIR      HYSTERECTOMY       INCISION AND DRAINAGE LEG Right 5/31/2024    Procedure: RIGHT LOWER EXTREMITY WOUND EXPLORATION, DEBRIDEMENT AND CONTROL OF BLEEDING;  Surgeon: Gerald Pedraza MD;  Location: [facility] MAIN OR;  Service: General;  Laterality: Right;    LASIK      LYMPHADENECTOMY      OOPHORECTOMY        General Information       Row Name 06/04/24 1300          Physical Therapy Time and Intention    Document Type therapy note (daily note)  -PH     Mode of Treatment physical therapy  -PH       Row Name 06/04/24 1300          General Information    Existing Precautions/Restrictions fall  -PH     Barriers to Rehab medically complex  -PH       Row Name 06/04/24 1300          Cognition    Orientation Status (Cognition) oriented x 3  -PH       Row Name 06/04/24 1300          Safety Issues, Functional Mobility    Impairments Affecting Function (Mobility) pain;endurance/activity tolerance;strength;balance  -PH     Comment, Safety Issues/Impairments (Mobility) gt belt and non skid socks donned  -PH               User Key  (r) = Recorded By, (t) = Taken By, (c) = Cosigned By      Initials Name Provider Type    PH Lorraine Corcoran PTA Physical Therapist Assistant                   Mobility       Row Name 06/04/24 1301          Bed Mobility    Bed Mobility supine-sit;rolling right;sit-supine  -PH     All Activities, [place] (Bed Mobility) moderate assist (50% patient effort);verbal cues;nonverbal cues (demo/gesture)  -PH     Rolling Right [place] (Bed Mobility) verbal cues;moderate assist (50% patient effort);nonverbal cues (demo/gesture)  -PH     Supine-Sit [place] (Bed Mobility) minimum assist (75% patient effort);verbal cues;nonverbal cues (demo/gesture)  -PH     Assistive Device (Bed Mobility) bed rails;head of bed elevated  -PH     Comment, (Bed Mobility) cues for sequencing to L side of bed w/ use of bed rail. Incr time although improved over 6/3 session; aide assisted in brief change prior to transfer w/ pt  rolling L and R  -PH       Row Name 06/04/24 1301          Bed-Chair Transfer    Bed-Chair [place] (Transfers) maximum assist (25% patient effort);2 person assist;verbal cues;nonverbal cues (demo/gesture);moderate assist (50% patient effort)  -PH     Comment, (Bed-Chair Transfer) pt took a few steps to chair pulled close  -PH       Row Name 06/04/24 1301          Sit-Stand Transfer    Sit-Stand [place] (Transfers) maximum assist (25% patient effort);2 person assist;verbal cues;nonverbal cues (demo/gesture)  -PH     Assistive Device (Sit-Stand Transfers) other (see comments)  HHA x 2  -PH       Row Name 06/04/24 1301          Gait/Stairs (Locomotion)    Distance in Feet (Gait) --  few steps to chair  -PH               User Key  (r) = Recorded By, (t) = Taken By, (c) = Cosigned By      Initials Name Provider Type     Lorraine Corcoran PTA Physical Therapist Assistant                   Obj/Interventions       Row Name 06/04/24 1302          Balance    Balance Assessment sitting static balance  -PH     Static Sitting Balance supervision  -PH               User Key  (r) = Recorded By, (t) = Taken By, (c) = Cosigned By      Initials Name Provider Type     Lorraine Corcoran PTA Physical Therapist Assistant                   Goals/Plan    No documentation.                  Clinical Impression       Row Name 06/04/24 1303          Pain    Pre/Posttreatment Pain Comment pt reported taking pain meds so R LE not as painful although did not rate; very painful when touched  -PH     Pain Intervention(s) Ambulation/increased activity;Repositioned;Elevated;Medication (See MAR)  -PH       Row Name 06/04/24 1303          Plan of Care Review    Plan of Care Reviewed With patient  -PH     Progress improving  -PH     Outcome Evaluation Pt was seen by PT this AM for tx. This PTA assisted aide for brief change w/ pt rolling L and R w/ mod A. Pt sat up to EOB w/ min A. Pt then stood and transferred in a few steps  to chair req mod/max A x 2 w/ HHA x 2. Pt was NANDA w/ MD in room at end of session. PT will prog as pt omar.  -PH       Row Name 06/04/24 1303          Vital Signs    O2 Delivery Pre Treatment nasal cannula  -PH     O2 Delivery Intra Treatment nasal cannula  -PH     O2 Delivery Post Treatment nasal cannula  -PH       Row Name 06/04/24 1303          Positioning and Restraints    Pre-Treatment Position in bed  -PH     Post Treatment Position chair  -PH     In Chair reclined;call light within reach;encouraged to call for assist;exit alarm on;notified nsg  -PH               User Key  (r) = Recorded By, (t) = Taken By, (c) = Cosigned By      Initials Name Provider Type    PH Lorraine Corcoran PTA Physical Therapist Assistant                   Outcome Measures       Row Name 06/04/24 1306          How much help from another person do you currently need...    Turning from your back to your side while in flat bed without using bedrails? 3  -PH     Moving from lying on back to sitting on the side of a flat bed without bedrails? 3  -PH     Moving to and from a bed to a chair (including a wheelchair)? 2  -PH     Standing up from a chair using your arms (e.g., wheelchair, bedside chair)? 2  -PH     Climbing 3-5 steps with a railing? 1  -PH     To walk in hospital room? 1  -PH     AM-PAC 6 Clicks Score (PT) 12  -PH     Highest Level of Mobility Goal 4 --> Transfer to chair/commode  -PH       Row Name 06/04/24 1306          Functional Assessment    Outcome Measure Options AM-PAC 6 Clicks Basic Mobility (PT)  -PH               User Key  (r) = Recorded By, (t) = Taken By, (c) = Cosigned By      Initials Name Provider Type    PH Lorraine Corcoran PTA Physical Therapist Assistant                                 Physical Therapy Education       Title: PT OT SLP Therapies (Done)       Topic: Physical Therapy (Done)       Point: Mobility training (Done)       Learning Progress Summary             Patient Acceptance, E,TB,  VU,NR by PH at 6/4/2024 1306    Acceptance, E,TB,D, VU,NR by PH at 6/3/2024 1019    Acceptance, E, VU,NR by ER at 6/1/2024 1222                         Point: Home exercise program (Done)       Learning Progress Summary             Patient Acceptance, E,TB,D, VU,NR by PH at 6/3/2024 1019    Acceptance, E, VU,NR by ER at 6/1/2024 1222                         Point: Body mechanics (Done)       Learning Progress Summary             Patient Acceptance, E,TB, VU,NR by PH at 6/4/2024 1306    Acceptance, E,TB,D, VU,NR by PH at 6/3/2024 1019    Acceptance, E, VU,NR by ER at 6/1/2024 1222                         Point: Precautions (Done)       Learning Progress Summary             Patient Acceptance, E,TB, VU,NR by PH at 6/4/2024 1306    Acceptance, E,TB,D, VU,NR by PH at 6/3/2024 1019    Acceptance, E, VU,NR by ER at 6/1/2024 1222                                         User Key       Initials Effective Dates Name Provider Type Discipline    PH 06/16/21 -  Lorraine Corcoran PTA Physical Therapist Assistant PT    ER 10/15/23 -  Wendy Pineda PT Physical Therapist PT                  PT Recommendation and Plan     Plan of Care Reviewed With: patient  Progress: improving  Outcome Evaluation: Pt was seen by PT this AM for tx. This PTA assisted aide for brief change w/ pt rolling L and R w/ mod A. Pt sat up to EOB w/ min A. Pt then stood and transferred in a few steps to chair req mod/max A x 2 w/ HHA x 2. Pt was NANDA w/ MD in room at end of session. PT will prog as pt omar.     Time Calculation:         PT Charges       Row Name 06/04/24 1307             Time Calculation    Start Time 1030  -PH      Stop Time 1044  -PH      Time Calculation (min) 14 min  -PH      PT Received On 06/04/24  -PH      PT - Next Appointment 06/05/24  -PH         Timed Charges    00132 - PT Therapeutic Activity Minutes 14  -PH         Total Minutes    Timed Charges Total Minutes 14  -PH       Total Minutes 14  -PH                User Key  (r) =  Recorded By, (t) = Taken By, (c) = Cosigned By      Initials Name Provider Type    Lorraine Gupta PTA Physical Therapist Assistant                  Therapy Charges for Today       Code Description Service Date Service Provider Modifiers Qty    50864927200 HC PT THERAPEUTIC ACT EA 15 MIN 6/3/2024 Lorraine Corcoran, PTA GP 1    61570446214 HC PT THER SUPP EA 15 MIN 6/3/2024 Lorraine Corcoran, PTA GP 1    79872922501 HC PT THERAPEUTIC ACT EA 15 MIN 6/4/2024 Lorraine Corcoran, PTA GP 1    47504739650 HC PT THER SUPP EA 15 MIN 6/4/2024 Lorraine Corcoran, ANNE GP 1            PT G-Codes  Outcome Measure Options: AM-PAC 6 Clicks Basic Mobility (PT)  AM-PAC 6 Clicks Score (PT): 12  PT Discharge Summary  Anticipated Discharge Disposition (PT): skilled nursing facility    Lorraine Corcoran PTA  6/4/2024

## 2024-06-04 NOTE — CASE MANAGEMENT/SOCIAL WORK
Continued Stay Note  [facility]     Patient Name: Vonnie Coppola  MRN: [id number]  Today's Date: 6/4/2024    Admit Date: 5/31/2024    Plan: [facility]- accepted and pre-cert initiated.   Discharge Plan       Row Name 06/04/24 1129       Plan    Plan [facility] accepted and pre-cert initiated.    Patient/Family in Agreement with Plan yes    Plan Comments Spoke with [facility] and they can accept pending pre-cert. Patient and daughter updated and they are agreeable. Spoke with MD who anticipates patient may be ready for dc in next 1-2 days. E-mail sent asking that pre-cert be initiated. Transfer packet in CCP office and pharmacy updated. Anticipate patient will need stretcher transport @ dc. CCP will follow.                   Discharge Codes    No documentation.                 Expected Discharge Date and Time       Expected Discharge Date Expected Discharge Time    Jun 5, 2024               Denia Chavis RN

## 2024-06-05 LAB
BACTERIA SPEC AEROBE CULT: NORMAL
BACTERIA SPEC AEROBE CULT: NORMAL
DEPRECATED RDW RBC AUTO: 49 FL (ref 37–54)
ERYTHROCYTE [DISTWIDTH] IN BLOOD BY AUTOMATED COUNT: 15.6 % (ref 12.3–15.4)
HCT VFR BLD AUTO: 26.9 % (ref 34–46.6)
HGB BLD-MCNC: 8.3 G/DL (ref 12–15.9)
MCH RBC QN AUTO: 26.7 PG (ref 26.6–33)
MCHC RBC AUTO-ENTMCNC: 30.9 G/DL (ref 31.5–35.7)
MCV RBC AUTO: 86.5 FL (ref 79–97)
PLATELET # BLD AUTO: 230 10*3/MM3 (ref 140–450)
PMV BLD AUTO: 10 FL (ref 6–12)
RBC # BLD AUTO: 3.11 10*6/MM3 (ref 3.77–5.28)
WBC NRBC COR # BLD AUTO: 9.78 10*3/MM3 (ref 3.4–10.8)

## 2024-06-05 PROCEDURE — 85027 COMPLETE CBC AUTOMATED: CPT | Performed by: HOSPITALIST

## 2024-06-05 PROCEDURE — 94799 UNLISTED PULMONARY SVC/PX: CPT

## 2024-06-05 PROCEDURE — 25010000002 ONDANSETRON PER 1 MG: Performed by: STUDENT IN AN ORGANIZED HEALTH CARE EDUCATION/TRAINING PROGRAM

## 2024-06-05 PROCEDURE — 94760 N-INVAS EAR/PLS OXIMETRY 1: CPT

## 2024-06-05 PROCEDURE — 99231 SBSQ HOSP IP/OBS SF/LOW 25: CPT | Performed by: SURGERY

## 2024-06-05 PROCEDURE — 25010000002 HYDROMORPHONE PER 4 MG: Performed by: SURGERY

## 2024-06-05 PROCEDURE — 94761 N-INVAS EAR/PLS OXIMETRY MLT: CPT

## 2024-06-05 PROCEDURE — 94664 DEMO&/EVAL PT USE INHALER: CPT

## 2024-06-05 PROCEDURE — 63710000001 ONDANSETRON ODT 4 MG TABLET DISPERSIBLE: Performed by: STUDENT IN AN ORGANIZED HEALTH CARE EDUCATION/TRAINING PROGRAM

## 2024-06-05 PROCEDURE — 99223 1ST HOSP IP/OBS HIGH 75: CPT | Performed by: INTERNAL MEDICINE

## 2024-06-05 RX ORDER — PREDNISOLONE ACETATE 10 MG/ML
1 SUSPENSION/ DROPS OPHTHALMIC EVERY 8 HOURS SCHEDULED
Qty: 21 ML | Refills: 0 | Status: DISCONTINUED | OUTPATIENT
Start: 2024-06-05 | End: 2024-06-12 | Stop reason: HOSPADM

## 2024-06-05 RX ADMIN — OXYCODONE HYDROCHLORIDE 5 MG: 5 TABLET ORAL at 11:57

## 2024-06-05 RX ADMIN — ONDANSETRON 4 MG: 4 TABLET, ORALLY DISINTEGRATING ORAL at 11:53

## 2024-06-05 RX ADMIN — ACETAMINOPHEN 1000 MG: 500 TABLET ORAL at 08:54

## 2024-06-05 RX ADMIN — FUROSEMIDE 40 MG: 40 TABLET ORAL at 08:54

## 2024-06-05 RX ADMIN — Medication 10 ML: at 08:56

## 2024-06-05 RX ADMIN — APIXABAN 5 MG: 5 TABLET, FILM COATED ORAL at 08:54

## 2024-06-05 RX ADMIN — ARFORMOTEROL TARTRATE 15 MCG: 15 SOLUTION RESPIRATORY (INHALATION) at 21:22

## 2024-06-05 RX ADMIN — ACETAMINOPHEN 1000 MG: 500 TABLET ORAL at 16:30

## 2024-06-05 RX ADMIN — PANTOPRAZOLE SODIUM 40 MG: 40 TABLET, DELAYED RELEASE ORAL at 16:31

## 2024-06-05 RX ADMIN — BUDESONIDE 0.5 MG: 0.5 INHALANT ORAL at 08:02

## 2024-06-05 RX ADMIN — PANTOPRAZOLE SODIUM 40 MG: 40 TABLET, DELAYED RELEASE ORAL at 08:54

## 2024-06-05 RX ADMIN — ONDANSETRON 4 MG: 2 INJECTION INTRAMUSCULAR; INTRAVENOUS at 18:58

## 2024-06-05 RX ADMIN — HYDROMORPHONE HYDROCHLORIDE 0.5 MG: 1 INJECTION, SOLUTION INTRAMUSCULAR; INTRAVENOUS; SUBCUTANEOUS at 16:31

## 2024-06-05 RX ADMIN — DIAZEPAM 2 MG: 2 TABLET ORAL at 20:30

## 2024-06-05 RX ADMIN — DIAZEPAM 2 MG: 2 TABLET ORAL at 08:54

## 2024-06-05 RX ADMIN — BUDESONIDE 0.5 MG: 0.5 INHALANT ORAL at 21:22

## 2024-06-05 RX ADMIN — APIXABAN 2.5 MG: 2.5 TABLET, FILM COATED ORAL at 20:30

## 2024-06-05 RX ADMIN — Medication 10 ML: at 20:30

## 2024-06-05 RX ADMIN — DILTIAZEM HYDROCHLORIDE 120 MG: 120 CAPSULE, COATED, EXTENDED RELEASE ORAL at 16:30

## 2024-06-05 RX ADMIN — ARFORMOTEROL TARTRATE 15 MCG: 15 SOLUTION RESPIRATORY (INHALATION) at 07:58

## 2024-06-05 NOTE — SIGNIFICANT NOTE
06/05/24 1526   OTHER   Discipline physical therapy assistant   Rehab Time/Intention   Session Not Performed patient unavailable for treatment  (Pt was w/ IV RN earlier PM and now w/ oncology MD. PT will follow up tomorrow.)   Recommendation   PT - Next Appointment 06/06/24

## 2024-06-05 NOTE — CONSULTS
[facility] INITIAL INPATIENT CONSULTATION NOTE    REASON FOR CONSULTATION:    History of venous thromboembolism  Recurrent bleeding on Eliquis      HISTORY OF PRESENT ILLNESS:  Vonnie Coppola is a 88 y.o. female who we are asked to see today in consultation for recurrent bleeding while on Eliquis with a history of venous thromboembolism and atrial fibrillation.    The patient has a past medical history of atrial fibrillation, stroke, hypertension, history of breast cancer, bilateral pulmonary embolism.  She has had recurrent issues with bleeding, particularly epistaxis from left nare.  This has required holding Eliquis at times.    The patient presented with a traumatic right leg injury with hematoma that has required intraoperative debridement.  Eliquis was temporarily held but subsequently resumed at 5 mg twice daily.    She notes significant bruising and epistaxis from the left nare but no active bleeding at this time.  The right leg is wrapped.  She anticipates discharge to rehabilitation.       She complains of visual disturbance.  Ophthalmology has been consulted to see her in the hospital.  She had an outpatient appointment but will not be able to make this appointment as she is being discharged to rehabilitation.    Past Medical History:   Diagnosis Date    Arthritis     Asthma     Atrial fibrillation     per pt's daughter.States hx of a-fib in the past when stressed or tired.    Breast cancer 1999    LEFT BREAST CA, LUMPECTOMY & RADS    History of cataract     Hx of radiation therapy 1999    APPROXIMATELY 40 TREATMENTS FOR LEFT BREAST CA    Hypertension     Pneumonia     Stroke        Past Surgical History:   Procedure Laterality Date    APPENDECTOMY      BLADDER SURGERY      BREAST BIOPSY Left 1999    MALIGNANT    BREAST LUMPECTOMY Left 1999    MALIGNANT    CATARACT EXTRACTION      COLONOSCOPY N/A 01/19/2024    Procedure: COLONOSCOPY to cecum with cold snare polypectomies;  Surgeon: Marilin  Harshad DAVISON MD;  Location: [facility] ENDOSCOPY;  Service: Gastroenterology;  Laterality: N/A;  pre- gi bleed, anemia  post- diverticulosis, polyps    ENDOSCOPY N/A 01/19/2024    Procedure: ESOPHAGOGASTRODUODENOSCOPY with biospy;  Surgeon: Harshad Gaston MD;  Location: [facility] ENDOSCOPY;  Service: Gastroenterology;  Laterality: N/A;  pre- gi bleed, anemia  post- erosive gastirits    GALLBLADDER SURGERY      HERNIA REPAIR      HYSTERECTOMY      INCISION AND DRAINAGE LEG Right 5/31/2024    Procedure: RIGHT LOWER EXTREMITY WOUND EXPLORATION, DEBRIDEMENT AND CONTROL OF BLEEDING;  Surgeon: Gerald Pedraza MD;  Location: [facility] MAIN OR;  Service: General;  Laterality: Right;    LASIK      LYMPHADENECTOMY      OOPHORECTOMY         SOCIAL HISTORY:   reports that she has quit smoking. Her smoking use included cigarettes. She has a 1 pack-year smoking history. She has never used smokeless tobacco. She reports that she does not drink alcohol and does not use drugs.    FAMILY HISTORY:  family history includes Cancer in her brother, sister, and another family member; Cataracts in her mother; Colon cancer in her maternal grandmother; Diabetes in her sister; Hypertension in her brother, father, and sister; Macular degeneration in her mother; Osteoarthritis in her brother, mother, and sister; Other in her father and mother; Ovarian cancer in her paternal grandmother and sister; Stroke in an other family member.    ALLERGIES:  Allergies   Allergen Reactions    Cortisone Hives    Penicillins Hives     Beta lactam allergy details  Antibiotic reaction: hives  Age at reaction: unknown  Dose to reaction time: unknown  Reason for antibiotic: unknown  Epinephrine required for reaction?: no  Tolerated antibiotics: unknown    Tolerated Zosyn       MEDICATIONS:  As listed in the electronic medical record.    Review of Systems   Constitutional:  Positive for fatigue.   Eyes:  Positive for visual disturbance.   Skin:  Positive for wound.    All other systems reviewed and are negative.      Vitals:    06/05/24 0716 06/05/24 0800 06/05/24 0803 06/05/24 1316   BP: 138/64   136/58   BP Location: Right arm   Right arm   Patient Position: Lying   Lying   Pulse: 82 82 79 83   Resp: 18 18  17   Temp: 98.1 °F (36.7 °C)   98.1 °F (36.7 °C)   TempSrc: Oral   Oral   SpO2: 99% 97% 100% 97%   Weight:       Height:           Physical Exam  Vitals reviewed.   Constitutional:       Appearance: She is well-developed.   HENT:      Head: Normocephalic and atraumatic.      Nose: Nose normal.   Eyes:      Conjunctiva/sclera: Conjunctivae normal.      Pupils: Pupils are equal, round, and reactive to light.   Cardiovascular:      Rate and Rhythm: Normal rate and regular rhythm.      Heart sounds: Normal heart sounds, S1 normal and S2 normal. No murmur heard.     No friction rub. No gallop.   Pulmonary:      Effort: Pulmonary effort is normal. No respiratory distress.      Breath sounds: Normal breath sounds. No stridor. No wheezing, rhonchi or rales.   Chest:      Chest wall: No tenderness.   Abdominal:      General: Bowel sounds are normal. There is no distension.      Palpations: Abdomen is soft. There is no mass.      Tenderness: There is no abdominal tenderness. There is no guarding or rebound.   Musculoskeletal:         General: Normal range of motion.      Cervical back: Neck supple.   Lymphadenopathy:      Cervical: No cervical adenopathy.      Upper Body:      Right upper body: No supraclavicular adenopathy.      Left upper body: No supraclavicular adenopathy.   Skin:     General: Skin is warm and dry.      Findings: No erythema or rash.      Comments: Extensive purpura on her arms right greater than left.  The right lower extremity is wrapped.   Neurological:      Mental Status: She is alert and oriented to person, place, and time.      Cranial Nerves: No cranial nerve deficit.      Sensory: No sensory deficit.   Psychiatric:         Behavior: Behavior normal.          Thought Content: Thought content normal.         Judgment: Judgment normal.         DIAGNOSTIC DATA:  Results from last 7 days   Lab Units 06/05/24  1120   WBC 10*3/mm3 9.78   HEMOGLOBIN g/dL 8.3*   HEMATOCRIT % 26.9*   PLATELETS 10*3/mm3 230     Lab Results   Component Value Date    NEUTROABS 6.16 06/04/2024     Results from last 7 days   Lab Units 06/04/24  0626   SODIUM mmol/L 142   POTASSIUM mmol/L 4.1   CHLORIDE mmol/L 107   CO2 mmol/L 30.0*   BUN mg/dL 17   CREATININE mg/dL 1.02*   GLUCOSE mg/dL 92   CALCIUM mg/dL 7.9*         Results from last 7 days   Lab Units 06/04/24  0626   MAGNESIUM mg/dL 2.3       IMAGING:    CT Angio Abdominal Aorta Bilateral Iliofem Runoff (05/31/2024 15:32)     CT images reviewed.  Hematoma in the right calf.  Atherosclerosis.    ASSESSMENT:  This is a 88 y.o. female with:    *History of breast cancer  L partial mastectomy, adjuvant radiation, 3 years of adjuvant anti-estrogen therapy, stopped early due to poor tolerance     *Atrial fibrillation     *HTN     *H/o CVA     *History of GI bleeding  Admission 1/16/2024 through 1/27/2024 with GI bleeding and subsequent venous thromboembolism.  She had an EGD and colonoscopy on 1/19/2024 with no obvious malignancy and no obvious source of bleeding.  Not bleeding currently--unclear source though could have been diverticular     *Venous thromboembolism  Recent pulmonary embolism diagnosed on 1/22/2024 with CT angiogram showing bilateral pulmonary thromboembolic disease with right heart strain as well as a 5 mm and 11 mm right middle lobe pulmonary nodules.  Bilateral lower extremity venous duplex on 1/23/2024 showed acute right lower extremity DVT in the posterior tibial, peroneal, and soleal veins with acute left lower extremity DVT in the posterior tibial, peroneal, gastrocnemius, and soleal veins.  Presumably provoked by her hospitalization  Anticoagulated with Eliquis, daughter has been administering 5 mg BID with no missed  doses  Follow-up right lower extremity venous duplex on 3/14/2024 showed no evidence of DVT.  She presented to the outside emergency department with worsening right lower extremity edema, redness, and pain below the knee that started abruptly a couple of days ago.  Right lower extremity venous duplex done in the outside emergency department 3/21/2024 shows now a DVT to the mid segment of the right femoral vein which is occlusive.  She was transferred to the hospital and admitted.   Subsequent duplex showed no DVT.  Eliquis was resumed.     *RLE hematoma  Traumatic  Status post debridement    *Recurrent epistaxis from the left nare  Suggest outpatient ENT evaluation when appropriate    *Lung nodules  CT chest 5/9/2024 stable.  5 to 6 mm nodule in the right lower lobe, unchanged.  5 x 10 mm nodule, unchanged from January.  CT imaging to be repeated in 7 months suggested.    *Anemia  Hgb improved after transfusion, 8.3, from 8.0, from 7.1  Blood loss    RECOMMENDATIONS/PLAN:   Difficult situation.  She certainly has risk of recurrent clotting.  However, she is having significant issues with bleeding.  I think therefore it is reasonable to decrease the Eliquis dose to 2.5 mg twice daily.  The patient and her daughters are in agreement with this and this change will be made today.  If epistaxis persists, outpatient ENT referral  Ophthalmology to evaluate  Wound care to the right lower extremity continues  Follow-up CT chest in early 2025  She is currently scheduled to see me on 6/19 but we will move this visit out a little bit as anticipate she will still be in rehabilitation at that time.  We will sign off. Please call us back if needed.     Augustus Wilde MD

## 2024-06-05 NOTE — PLAN OF CARE
Goal Outcome Evaluation:              Outcome Evaluation: Pt AOX4. VSS. No acute changes this shift. Leg dressing changed. Meds given per MAR. WCTM the remainder of the nursing shift.

## 2024-06-05 NOTE — PROGRESS NOTES
Continued Stay Note  [facility]     Patient Name: Vonnie Coppola  MRN: [id number]  Today's Date: 6/5/2024    Admit Date: 5/31/2024    [facility]: [facility] and [facility] available.   Discharge Plan       Row Name 06/05/24 1556       Plan    Plan [facility] approved and bed available.    Patient/Family in Agreement with Plan yes    Plan Comments [facility] approved to d/c to [facility] skilled. [facility] arranged for Thursday, 6/6 at 1 PM. [facility] and nursing notified. Packet will be on chart. Pharmacy updated.                   Discharge Codes    No documentation.                 Expected Discharge Date and Time       Expected Discharge Date Expected Discharge Time    Jun 6, 2024               Loree Arrieta RN

## 2024-06-05 NOTE — PLAN OF CARE
Goal Outcome Evaluation:  Plan of Care Reviewed With: patient        Progress: no change  Outcome Evaluation: Pt A&OX4, VSS. No acute distress noted on assessment. Meds admin as ordered. RLE wound dressing clean and intact. Safety precautions in place, plan of care ongoing.    Problem: Adult Inpatient Plan of Care  Goal: Plan of Care Review  Outcome: Ongoing, Progressing  Flowsheets (Taken 6/5/2024 0511)  Progress: no change  Plan of Care Reviewed With: patient  Outcome Evaluation: Pt A&OX4, VSS. No acute distress noted on assessment. Meds admin as ordered. RLE wound dressing clean and intact. Safety precautions in place, plan of care ongoing.  Goal: Patient-Specific Goal (Individualized)  Outcome: Ongoing, Progressing  Goal: Absence of Hospital-Acquired Illness or Injury  Outcome: Ongoing, Progressing  Intervention: Identify and Manage Fall Risk  Recent Flowsheet Documentation  Taken 6/5/2024 0439 by Rakel Rosenberg RN  Safety Promotion/Fall Prevention: safety round/check completed  Taken 6/5/2024 0202 by Rakel Rosenberg RN  Safety Promotion/Fall Prevention: safety round/check completed  Taken 6/5/2024 0019 by Rakel Rosenberg RN  Safety Promotion/Fall Prevention:   activity supervised   assistive device/personal items within reach   clutter free environment maintained   fall prevention program maintained   nonskid shoes/slippers when out of bed   room organization consistent   safety round/check completed  Taken 6/4/2024 2219 by Rakel Rosenberg RN  Safety Promotion/Fall Prevention: safety round/check completed  Taken 6/4/2024 2028 by Rakel Rosenberg RN  Safety Promotion/Fall Prevention:   activity supervised   assistive device/personal items within reach   clutter free environment maintained   fall prevention program maintained   nonskid shoes/slippers when out of bed   room organization consistent   safety round/check completed  Intervention: Prevent Skin Injury  Recent Flowsheet Documentation  Taken 6/5/2024 0439 by Shakira  REGINA Ellington  Body Position:   weight shifting   legs elevated  Taken 6/5/2024 0202 by Rakel Rosenberg RN  Body Position:   left   side-lying   legs elevated  Taken 6/5/2024 0019 by Rakel Rosenberg RN  Body Position:   turned   left   legs elevated  Skin Protection:   adhesive use limited   incontinence pads utilized  Taken 6/4/2024 2219 by Rakel Rosenberg RN  Body Position: supine, legs elevated  Taken 6/4/2024 2028 by Rakel Rosenberg RN  Body Position:   weight shifting   legs elevated  Intervention: Prevent Infection  Recent Flowsheet Documentation  Taken 6/5/2024 0019 by Rakel Rosenberg RN  Infection Prevention:   single patient room provided   rest/sleep promoted  Taken 6/4/2024 2028 by Rakel Rosenberg RN  Infection Prevention:   single patient room provided   rest/sleep promoted  Goal: Optimal Comfort and Wellbeing  Outcome: Ongoing, Progressing  Intervention: Provide Person-Centered Care  Recent Flowsheet Documentation  Taken 6/5/2024 0019 by Rakel Rosenberg RN  Trust Relationship/Rapport:   care explained   choices provided  Taken 6/4/2024 2028 by Rakel Rosenberg RN  Trust Relationship/Rapport:   care explained   choices provided   reassurance provided   thoughts/feelings acknowledged  Goal: Readiness for Transition of Care  Outcome: Ongoing, Progressing     Problem: Fall Injury Risk  Goal: Absence of Fall and Fall-Related Injury  Outcome: Ongoing, Progressing  Intervention: Identify and Manage Contributors  Recent Flowsheet Documentation  Taken 6/5/2024 0439 by Rakel Rosenberg RN  Medication Review/Management: medications reviewed  Taken 6/5/2024 0202 by Rakel Rosenberg RN  Medication Review/Management: medications reviewed  Taken 6/5/2024 0019 by Rakel Rosenberg RN  Medication Review/Management: medications reviewed  Taken 6/4/2024 2219 by Rakel Rosenberg RN  Medication Review/Management: medications reviewed  Taken 6/4/2024 2028 by Rakel Rosenberg RN  Medication Review/Management: medications reviewed  Intervention: Promote  Injury-Free Environment  Recent Flowsheet Documentation  Taken 6/5/2024 0439 by Rkael Rosenberg RN  Safety Promotion/Fall Prevention: safety round/check completed  Taken 6/5/2024 0202 by Rakel Rosenberg RN  Safety Promotion/Fall Prevention: safety round/check completed  Taken 6/5/2024 0019 by Rakel Rosenberg RN  Safety Promotion/Fall Prevention:   activity supervised   assistive device/personal items within reach   clutter free environment maintained   fall prevention program maintained   nonskid shoes/slippers when out of bed   room organization consistent   safety round/check completed  Taken 6/4/2024 2219 by Rakel Rosenberg RN  Safety Promotion/Fall Prevention: safety round/check completed  Taken 6/4/2024 2028 by Rakel Rosenberg RN  Safety Promotion/Fall Prevention:   activity supervised   assistive device/personal items within reach   clutter free environment maintained   fall prevention program maintained   nonskid shoes/slippers when out of bed   room organization consistent   safety round/check completed     Problem: Skin Injury Risk Increased  Goal: Skin Health and Integrity  Outcome: Ongoing, Progressing  Intervention: Optimize Skin Protection  Recent Flowsheet Documentation  Taken 6/5/2024 0439 by Rakel Rosenberg RN  Head of Bed (HOB) Positioning: HOB at 20-30 degrees  Taken 6/5/2024 0202 by Rakel Rosenberg RN  Head of Bed ([facility]) Positioning: HOB at 20-30 degrees  Taken 6/5/2024 0019 by Rakel Rosenberg RN  Pressure Reduction Techniques: frequent weight shift encouraged  Head of Bed (HOB) Positioning: HOB at 20-30 degrees  Pressure Reduction Devices: pressure-redistributing mattress utilized  Skin Protection:   adhesive use limited   incontinence pads utilized  Taken 6/4/2024 2219 by Rakel Rosenberg RN  Head of Bed (HOB) Positioning: HOB at 20-30 degrees  Taken 6/4/2024 2028 by Rakel Rosenberg RN  Head of Bed ([facility]) Positioning: HOB at 20-30 degrees

## 2024-06-05 NOTE — PROGRESS NOTES
IMPRESSION & PLAN:  88-year-old lady status post right lower extremity wound exploration, debridement of skin and subcutaneous fat measuring 11 x 10 cm.    Tramadol and Tylenol ordered for pain control.    Recommend daily dressing changes.  Pack wound with Kerlix fluff gauze moistened with normal saline.  Place ABD pad over this.  Then wrap leg from toes to knee with Kerlix fluff gauze x2 rolls.  Wrap from toes to knee with Ace wrap applying gentle compression.    Ok for discharge from surgical standpoint.  Follow-up with me in 1 to 2 weeks.    CC:    Chief Complaint   Patient presents with    Leg Pain    Wound Check         HPI: She is doing well.  Having a better day today.    ROS:   Per HPI      PE:    VS:   Vitals:    06/05/24 1316   BP: 136/58   Pulse: 83   Resp: 17   Temp: 98.1 °F (36.7 °C)   SpO2: 97%          Intake/Output Summary (Last 24 hours) at 6/5/2024 1612  Last data filed at 6/5/2024 0500  Gross per 24 hour   Intake --   Output 1100 ml   Net -1100 ml        CONST: Awake, alert  EXT: Ace bandage in place on right lower extremity, no evidence of bleeding      LABS:  Results from last 7 days   Lab Units 06/05/24  1120 06/04/24  0626 06/03/24  1600 06/03/24  0404 06/02/24  0538 06/01/24  1008 05/31/24  1420   WBC 10*3/mm3 9.78 9.73  --  9.71 10.84* 12.47* 8.77   HEMOGLOBIN g/dL 8.3* 8.0*   < > 7.2* 6.5* 7.9* 9.1*   HEMATOCRIT % 26.9* 25.7*   < > 22.4* 21.6* 25.8* 29.8*   PLATELETS 10*3/mm3 230 217  --  218 205 242 247    < > = values in this interval not displayed.     Results from last 7 days   Lab Units 06/04/24  0626 06/03/24  0404 06/02/24  0538 06/01/24  1008 05/31/24  1420   SODIUM mmol/L 142 142 143   < > 143   POTASSIUM mmol/L 4.1 3.7 3.6   < > 3.7   CHLORIDE mmol/L 107 106 106   < > 107   CO2 mmol/L 30.0* 30.0* 29.0   < > 27.1   BUN mg/dL 17 18 20   < > 15   CREATININE mg/dL 1.02* 1.01* 1.14*   < > 0.98   CALCIUM mg/dL 7.9* 7.6* 7.8*   < > 8.7   BILIRUBIN mg/dL  --   --   --   --  0.4   ALK PHOS  U/L  --   --   --   --  70   ALT (SGPT) U/L  --   --   --   --  10   AST (SGOT) U/L  --   --   --   --  11   GLUCOSE mg/dL 92 87 81   < > 110*    < > = values in this interval not displayed.

## 2024-06-05 NOTE — PROGRESS NOTES
Name: Vonnie Coppola ADMIT: 2024   : 1935  PCP: Christopher Leyva DO    MRN: [id number] LOS: 5 days   AGE/SEX: 88 y.o. female  ROOM: [facility]     Subjective   Subjective   No new complaints. Leg pain is acceptable. No chest pain or shortness of breath. Family states she has had blurry vision left eye and actually had an appointment with ophthalmologist but had to cancel because of admission. They would also like her hematologist to see.     Objective   Objective   Vital Signs  Temp:  [97.9 °F (36.6 °C)-98.8 °F (37.1 °C)] 98.1 °F (36.7 °C)  Heart Rate:  [75-84] 79  Resp:  [17-18] 18  BP: (103-142)/(59-68) 138/64  SpO2:  [97 %-100 %] 100 %  on  Flow (L/min):  [2] 2;   Device (Oxygen Therapy): nasal cannula  Body mass index is 40.17 kg/m².    Physical Exam  Constitutional:       General: She is not in acute distress.     Appearance: She is not toxic-appearing.   HENT:      Head: Normocephalic and atraumatic.   Cardiovascular:      Rate and Rhythm: Normal rate and regular rhythm.   Pulmonary:      Effort: Pulmonary effort is normal. No respiratory distress.      Breath sounds: No wheezing or rhonchi.   Abdominal:      General: Bowel sounds are normal.      Palpations: Abdomen is soft.      Tenderness: There is no abdominal tenderness. There is no guarding or rebound.   Musculoskeletal:         General: No swelling.      Comments: Right lower extremity dressing   Skin:     General: Skin is warm and dry.   Neurological:      General: No focal deficit present.      Mental Status: She is alert and oriented to person, place, and time.   Psychiatric:         Mood and Affect: Mood normal.         Behavior: Behavior normal.     Results Review  I reviewed the patient's new clinical results.  Results from last 7 days   Lab Units 24  1120 24  0626 24  1600 24  0404 24  0538   WBC 10*3/mm3 9.78 9.73  --  9.71 10.84*   HEMOGLOBIN g/dL 8.3* 8.0* 7.1* 7.2* 6.5*   PLATELETS 10*3/mm3 230  "217  --  218 205     Results from last 7 days   Lab Units 06/04/24  0626 06/03/24  0404 06/02/24  0538 06/01/24  1008   SODIUM mmol/L 142 142 143 144   POTASSIUM mmol/L 4.1 3.7 3.6 3.8   CHLORIDE mmol/L 107 106 106 108*   CO2 mmol/L 30.0* 30.0* 29.0 24.9   BUN mg/dL 17 18 20 17   CREATININE mg/dL 1.02* 1.01* 1.14* 1.14*   GLUCOSE mg/dL 92 87 81 113*     Lab Results   Component Value Date    ANIONGAP 5.0 06/04/2024     Estimated Creatinine Clearance: 48.9 mL/min (A) (by C-G formula based on SCr of 1.02 mg/dL (H)).   Lab Results   Component Value Date    EGFR 53.7 (L) 06/04/2024     Results from last 7 days   Lab Units 05/31/24  1420   ALBUMIN g/dL 3.2*   BILIRUBIN mg/dL 0.4   ALK PHOS U/L 70   AST (SGOT) U/L 11   ALT (SGPT) U/L 10     Results from last 7 days   Lab Units 06/04/24  0626 06/03/24  0404 06/02/24  0538 06/01/24  1008 05/31/24  1420   CALCIUM mg/dL 7.9* 7.6* 7.8* 8.1* 8.7   ALBUMIN g/dL  --   --   --   --  3.2*   MAGNESIUM mg/dL 2.3 2.3 2.9*  --   --    PHOSPHORUS mg/dL 2.7 3.0 3.0  --   --      Results from last 7 days   Lab Units 05/31/24  1420   LACTATE mmol/L 1.5     No results found for: \"HGBA1C\", \"POCGLU\"    No radiology results for the last day    Scheduled Meds  acetaminophen, 1,000 mg, Oral, Q8H  apixaban, 5 mg, Oral, Q12H  arformoterol, 15 mcg, Nebulization, BID - RT  budesonide, 0.5 mg, Nebulization, BID - RT  diazePAM, 2 mg, Oral, BID  dilTIAZem CD, 120 mg, Oral, Q24H  furosemide, 40 mg, Oral, Daily  pantoprazole, 40 mg, Oral, BID AC  sodium chloride, 10 mL, Intravenous, Q12H    Continuous Infusions     PRN Meds    albuterol    senna-docusate sodium **AND** polyethylene glycol **AND** bisacodyl **AND** bisacodyl    calcium carbonate    dextromethorphan polistirex ER    diazePAM    HYDROmorphone    Ibuprofen/Lidocaine/Baclofen 3/4/2% cream    melatonin    nitroglycerin    ondansetron ODT **OR** ondansetron    oxyCODONE    sodium chloride    sodium chloride       Diet  Diet: Regular/House, " Cardiac; Healthy Heart (2-3 Na+); Fluid Consistency: Thin (IDDSI 0)       Assessment/Plan     Active Hospital Problems    Diagnosis  POA    **Leg hematoma, right, subsequent encounter [S80.11XD]  Not Applicable    History of pulmonary embolism [Z86.711]  Yes    Class 2 severe obesity with serious comorbidity in adult [E66.01]  Yes    Atrial fibrillation [I48.91]  Yes    History of CVA (cerebrovascular accident) [Z86.73]  Not Applicable    HTN (hypertension) [I10]  Yes    History of breast cancer [Z85.3]  Not Applicable    Asthma [J45.909]  Yes      Resolved Hospital Problems   No resolved problems to display.     88 y.o. female with a history of DVT, atrial fibrillation, hypertension, asthma, breast cancer, anxiety who presented to the ER with right lower extremity hematoma.     Blurry vision  Outpatient ophthalmology appointment was canceled. Family would like to see while here    Right lower extremity hematoma  Acute blood loss anemia  s/p 5/31/24 exploration of right lower extremity hematoma with control of bleeding, debridement of skin and subcutaneous tissue. Daily dressing changes  ok to restart Eliquis 6/3  Hemoglobin improved after transfusions (1 unit each on 6/2/2024 and on 6/3/2024)   Hemoglobin stable.    History of DVT on Eliquis restarted. Family would like her hematologist to see well.  Atrial fibrillation continue diltiazem for rate control, Eliquis   Essential HTN diltiazem continued, Lasix restarted. BP controlled. Stop IVF  Asthma continue home nebulizers. No wheezing today  Hx of breast cancer   Anxiety/benzodiazepine dependence-continue home Valium  History of constipation- bowel regimen.     Discharge  SNF awaiting pre-CERT. May be tomorrow  Expected Discharge Date: 6/6/2024; Expected Discharge Time:     Discussed with patient, family, and nursing staff    Trevor Cervantes MD  [facility]  06/05/24

## 2024-06-06 LAB
ANION GAP SERPL CALCULATED.3IONS-SCNC: 7.6 MMOL/L (ref 5–15)
BASOPHILS # BLD AUTO: 0.02 10*3/MM3 (ref 0–0.2)
BASOPHILS NFR BLD AUTO: 0.2 % (ref 0–1.5)
BUN SERPL-MCNC: 16 MG/DL (ref 8–23)
BUN/CREAT SERPL: 14.8 (ref 7–25)
CALCIUM SPEC-SCNC: 8.1 MG/DL (ref 8.6–10.5)
CHLORIDE SERPL-SCNC: 105 MMOL/L (ref 98–107)
CO2 SERPL-SCNC: 29.4 MMOL/L (ref 22–29)
CREAT SERPL-MCNC: 1.08 MG/DL (ref 0.57–1)
DEPRECATED RDW RBC AUTO: 46.3 FL (ref 37–54)
EGFRCR SERPLBLD CKD-EPI 2021: 50.1 ML/MIN/1.73
EOSINOPHIL # BLD AUTO: 0.27 10*3/MM3 (ref 0–0.4)
EOSINOPHIL NFR BLD AUTO: 2.8 % (ref 0.3–6.2)
ERYTHROCYTE [DISTWIDTH] IN BLOOD BY AUTOMATED COUNT: 15.4 % (ref 12.3–15.4)
GLUCOSE SERPL-MCNC: 102 MG/DL (ref 65–99)
HCT VFR BLD AUTO: 26.4 % (ref 34–46.6)
HGB BLD-MCNC: 8.3 G/DL (ref 12–15.9)
IMM GRANULOCYTES # BLD AUTO: 0.1 10*3/MM3 (ref 0–0.05)
IMM GRANULOCYTES NFR BLD AUTO: 1 % (ref 0–0.5)
LYMPHOCYTES # BLD AUTO: 1.51 10*3/MM3 (ref 0.7–3.1)
LYMPHOCYTES NFR BLD AUTO: 15.6 % (ref 19.6–45.3)
MAGNESIUM SERPL-MCNC: 2 MG/DL (ref 1.6–2.4)
MCH RBC QN AUTO: 26.6 PG (ref 26.6–33)
MCHC RBC AUTO-ENTMCNC: 31.4 G/DL (ref 31.5–35.7)
MCV RBC AUTO: 84.6 FL (ref 79–97)
MONOCYTES # BLD AUTO: 0.92 10*3/MM3 (ref 0.1–0.9)
MONOCYTES NFR BLD AUTO: 9.5 % (ref 5–12)
NEUTROPHILS NFR BLD AUTO: 6.88 10*3/MM3 (ref 1.7–7)
NEUTROPHILS NFR BLD AUTO: 70.9 % (ref 42.7–76)
NRBC BLD AUTO-RTO: 0 /100 WBC (ref 0–0.2)
PHOSPHATE SERPL-MCNC: 2.7 MG/DL (ref 2.5–4.5)
PLATELET # BLD AUTO: 248 10*3/MM3 (ref 140–450)
PMV BLD AUTO: 10 FL (ref 6–12)
POTASSIUM SERPL-SCNC: 3.6 MMOL/L (ref 3.5–5.2)
RBC # BLD AUTO: 3.12 10*6/MM3 (ref 3.77–5.28)
SODIUM SERPL-SCNC: 142 MMOL/L (ref 136–145)
WBC NRBC COR # BLD AUTO: 9.7 10*3/MM3 (ref 3.4–10.8)

## 2024-06-06 PROCEDURE — 85025 COMPLETE CBC W/AUTO DIFF WBC: CPT | Performed by: STUDENT IN AN ORGANIZED HEALTH CARE EDUCATION/TRAINING PROGRAM

## 2024-06-06 PROCEDURE — 94799 UNLISTED PULMONARY SVC/PX: CPT

## 2024-06-06 PROCEDURE — 94664 DEMO&/EVAL PT USE INHALER: CPT

## 2024-06-06 PROCEDURE — 83735 ASSAY OF MAGNESIUM: CPT | Performed by: STUDENT IN AN ORGANIZED HEALTH CARE EDUCATION/TRAINING PROGRAM

## 2024-06-06 PROCEDURE — 80048 BASIC METABOLIC PNL TOTAL CA: CPT | Performed by: STUDENT IN AN ORGANIZED HEALTH CARE EDUCATION/TRAINING PROGRAM

## 2024-06-06 PROCEDURE — 94761 N-INVAS EAR/PLS OXIMETRY MLT: CPT

## 2024-06-06 PROCEDURE — 94760 N-INVAS EAR/PLS OXIMETRY 1: CPT

## 2024-06-06 PROCEDURE — 84100 ASSAY OF PHOSPHORUS: CPT | Performed by: STUDENT IN AN ORGANIZED HEALTH CARE EDUCATION/TRAINING PROGRAM

## 2024-06-06 PROCEDURE — 25010000002 ONDANSETRON PER 1 MG: Performed by: STUDENT IN AN ORGANIZED HEALTH CARE EDUCATION/TRAINING PROGRAM

## 2024-06-06 RX ORDER — BISACODYL 10 MG
10 SUPPOSITORY, RECTAL RECTAL DAILY PRN
Status: DISCONTINUED | OUTPATIENT
Start: 2024-06-06 | End: 2024-06-12 | Stop reason: HOSPADM

## 2024-06-06 RX ORDER — POLYETHYLENE GLYCOL 3350 17 G/17G
17 POWDER, FOR SOLUTION ORAL DAILY PRN
Status: DISCONTINUED | OUTPATIENT
Start: 2024-06-06 | End: 2024-06-12 | Stop reason: HOSPADM

## 2024-06-06 RX ORDER — BISACODYL 5 MG/1
5 TABLET, DELAYED RELEASE ORAL DAILY PRN
Status: DISCONTINUED | OUTPATIENT
Start: 2024-06-06 | End: 2024-06-12 | Stop reason: HOSPADM

## 2024-06-06 RX ORDER — AMOXICILLIN 250 MG
2 CAPSULE ORAL 2 TIMES DAILY
Status: DISCONTINUED | OUTPATIENT
Start: 2024-06-06 | End: 2024-06-12 | Stop reason: HOSPADM

## 2024-06-06 RX ADMIN — PREDNISOLONE ACETATE 1 DROP: 10 SUSPENSION/ DROPS OPHTHALMIC at 06:42

## 2024-06-06 RX ADMIN — BUDESONIDE 0.5 MG: 0.5 INHALANT ORAL at 07:35

## 2024-06-06 RX ADMIN — DIAZEPAM 2 MG: 2 TABLET ORAL at 21:35

## 2024-06-06 RX ADMIN — PANTOPRAZOLE SODIUM 40 MG: 40 TABLET, DELAYED RELEASE ORAL at 20:01

## 2024-06-06 RX ADMIN — OXYCODONE HYDROCHLORIDE 5 MG: 5 TABLET ORAL at 03:22

## 2024-06-06 RX ADMIN — ARFORMOTEROL TARTRATE 15 MCG: 15 SOLUTION RESPIRATORY (INHALATION) at 20:52

## 2024-06-06 RX ADMIN — APIXABAN 2.5 MG: 2.5 TABLET, FILM COATED ORAL at 08:07

## 2024-06-06 RX ADMIN — ACETAMINOPHEN 1000 MG: 500 TABLET ORAL at 14:31

## 2024-06-06 RX ADMIN — ARFORMOTEROL TARTRATE 15 MCG: 15 SOLUTION RESPIRATORY (INHALATION) at 07:31

## 2024-06-06 RX ADMIN — DILTIAZEM HYDROCHLORIDE 120 MG: 120 CAPSULE, COATED, EXTENDED RELEASE ORAL at 08:07

## 2024-06-06 RX ADMIN — ONDANSETRON 4 MG: 2 INJECTION INTRAMUSCULAR; INTRAVENOUS at 10:35

## 2024-06-06 RX ADMIN — ACETAMINOPHEN 1000 MG: 500 TABLET ORAL at 00:02

## 2024-06-06 RX ADMIN — Medication 10 ML: at 21:36

## 2024-06-06 RX ADMIN — PANTOPRAZOLE SODIUM 40 MG: 40 TABLET, DELAYED RELEASE ORAL at 08:07

## 2024-06-06 RX ADMIN — PREDNISOLONE ACETATE 1 DROP: 10 SUSPENSION/ DROPS OPHTHALMIC at 21:36

## 2024-06-06 RX ADMIN — PREDNISOLONE ACETATE 1 DROP: 10 SUSPENSION/ DROPS OPHTHALMIC at 14:29

## 2024-06-06 RX ADMIN — SENNOSIDES AND DOCUSATE SODIUM 2 TABLET: 50; 8.6 TABLET ORAL at 21:35

## 2024-06-06 RX ADMIN — APIXABAN 2.5 MG: 2.5 TABLET, FILM COATED ORAL at 21:35

## 2024-06-06 RX ADMIN — FUROSEMIDE 40 MG: 40 TABLET ORAL at 08:07

## 2024-06-06 RX ADMIN — ACETAMINOPHEN 1000 MG: 500 TABLET ORAL at 21:35

## 2024-06-06 RX ADMIN — Medication 10 ML: at 09:00

## 2024-06-06 RX ADMIN — PREDNISOLONE ACETATE 1 DROP: 10 SUSPENSION/ DROPS OPHTHALMIC at 00:02

## 2024-06-06 RX ADMIN — BUDESONIDE 0.5 MG: 0.5 INHALANT ORAL at 20:52

## 2024-06-06 RX ADMIN — ACETAMINOPHEN 1000 MG: 500 TABLET ORAL at 08:07

## 2024-06-06 RX ADMIN — DIAZEPAM 2 MG: 2 TABLET ORAL at 08:08

## 2024-06-06 RX ADMIN — SENNOSIDES AND DOCUSATE SODIUM 2 TABLET: 50; 8.6 TABLET ORAL at 14:29

## 2024-06-06 NOTE — PLAN OF CARE
Goal Outcome Evaluation:  Plan of Care Reviewed With: patient        Progress: no change  Outcome Evaluation: Pt A&OX4, VSS. No acute distress noted on assessment. Meds admin as ordered, PRN med admin for pain. Pt transferred to [facility] w/ x2 assist. RLE wound dressing clean and intact. Safety precautions in place, plan of care ongoing.      Problem: Adult Inpatient Plan of Care  Goal: Plan of Care Review  Outcome: Ongoing, Progressing  Flowsheets (Taken 6/6/2024 0602)  Progress: no change  Plan of Care Reviewed With: patient  Outcome Evaluation: Pt A&OX4, VSS. No acute distress noted on assessment. Meds admin as ordered, PRN med admin for pain. Pt transferred to [facility] w/ x2 assist. RLE wound dressing clean and intact. Safety precautions in place, plan of care ongoing.  Goal: Patient-Specific Goal (Individualized)  Outcome: Ongoing, Progressing  Goal: Absence of Hospital-Acquired Illness or Injury  Outcome: Ongoing, Progressing  Intervention: Identify and Manage Fall Risk  Recent Flowsheet Documentation  Taken 6/5/2024 2030 by Rakel Rosenberg RN  Safety Promotion/Fall Prevention:   activity supervised   assistive device/personal items within reach   clutter free environment maintained   fall prevention program maintained   lighting adjusted   nonskid shoes/slippers when out of bed   room organization consistent   safety round/check completed  Intervention: Prevent Skin Injury  Recent Flowsheet Documentation  Taken 6/5/2024 2030 by Rakel Rosenberg RN  Body Position: position changed independently  Skin Protection:   adhesive use limited   incontinence pads utilized  Intervention: Prevent Infection  Recent Flowsheet Documentation  Taken 6/5/2024 2030 by Rakel Rosenberg RN  Infection Prevention:   single patient room provided   rest/sleep promoted  Goal: Optimal Comfort and Wellbeing  Outcome: Ongoing, Progressing  Intervention: Provide Person-Centered Care  Recent Flowsheet Documentation  Taken 6/5/2024 2030 by Rakel Rosenberg  RN  Trust Relationship/Rapport:   care explained   choices provided   empathic listening provided   reassurance provided  Goal: Readiness for Transition of Care  Outcome: Ongoing, Progressing     Problem: Fall Injury Risk  Goal: Absence of Fall and Fall-Related Injury  Outcome: Ongoing, Progressing  Intervention: Identify and Manage Contributors  Recent Flowsheet Documentation  Taken 6/5/2024 2030 by Rakel Rosenberg RN  Medication Review/Management: medications reviewed  Intervention: Promote Injury-Free Environment  Recent Flowsheet Documentation  Taken 6/5/2024 2030 by Rakel Rosenberg RN  Safety Promotion/Fall Prevention:   activity supervised   assistive device/personal items within reach   clutter free environment maintained   fall prevention program maintained   lighting adjusted   nonskid shoes/slippers when out of bed   room organization consistent   safety round/check completed     Problem: Skin Injury Risk Increased  Goal: Skin Health and Integrity  Outcome: Ongoing, Progressing  Intervention: Optimize Skin Protection  Recent Flowsheet Documentation  Taken 6/5/2024 2030 by Rakel Rosenberg RN  Pressure Reduction Techniques: frequent weight shift encouraged  Head of Bed (HOB) Positioning: HOB elevated  Pressure Reduction Devices: pressure-redistributing mattress utilized  Skin Protection:   adhesive use limited   incontinence pads utilized

## 2024-06-06 NOTE — SIGNIFICANT NOTE
Pt refused PT this PM stating 1) she was nauseaous, 2) her L LE, and 3) she was waiting for dtr to return from lunch and nsg to transfer her. Educ pt that the purpose of PT was to work w/ pt's on mobility and transfers. Pt continued to refuse. RN notified. PT will follow up tomorrow.

## 2024-06-06 NOTE — CONSULTS
"OPHTHALMOLOGY CONSULT NOTE    Patient Identification:  Name: Vonnie Coppola  Age: 88 y.o.  Sex: female  :  1935  MRN: [id number]                                               Requesting Physician: per order  Reason for consult: Blurry Vision Left Eye    History of Present Illness:  88 y.o. female currently with history of CVA, Afib, Pulmonary Emboli inpatient for fall and subsequent wound management of lower extremity. Patient reports blurry vision in the left eye for approximately 1 month. Denies pain in the affected eye. Persistent in nature. Feels like \"looking through cobwebs.\"     She has not seen an eye doctor in approximately 10 years. She is an avid reader with good functioning vision in the right eye. No pain in the affected eye. Denies ocular trauma or history of ocular problems such as Glaucoma and Macular Degeneration.       Problem List:  Principal Problem:    Leg hematoma, right, subsequent encounter  Active Problems:    HTN (hypertension)    History of breast cancer    Asthma    History of CVA (cerebrovascular accident)    Atrial fibrillation    History of pulmonary embolism    Class 2 severe obesity with serious comorbidity in adult      Past Medical History:  Past Medical History:   Diagnosis Date    Arthritis     Asthma     Atrial fibrillation     per pt's daughter.States hx of a-fib in the past when stressed or tired.    Breast cancer     LEFT BREAST CA, LUMPECTOMY & RADS    History of cataract     Hx of radiation therapy     APPROXIMATELY 40 TREATMENTS FOR LEFT BREAST CA    Hypertension     Pneumonia     Stroke        Past Surgical History:  Past Surgical History:   Procedure Laterality Date    APPENDECTOMY      BLADDER SURGERY      BREAST BIOPSY Left     MALIGNANT    BREAST LUMPECTOMY Left     MALIGNANT    CATARACT EXTRACTION      COLONOSCOPY N/A 2024    Procedure: COLONOSCOPY to cecum with cold snare polypectomies;  Surgeon: Harshad Gaston MD;  [facility] [facility];  Service: Gastroenterology;  Laterality: N/A;  pre- gi bleed, anemia  post- diverticulosis, polyps    ENDOSCOPY N/A 01/19/2024    Procedure: ESOPHAGOGASTRODUODENOSCOPY with biospy;  Surgeon: Harshad Gaston MD;  Location:  [facility];  Service: Gastroenterology;  Laterality: N/A;  pre- gi bleed, anemia  post- erosive gastirits    GALLBLADDER SURGERY      HERNIA REPAIR      HYSTERECTOMY      INCISION AND DRAINAGE LEG Right 5/31/2024    Procedure: RIGHT LOWER EXTREMITY WOUND EXPLORATION, DEBRIDEMENT AND CONTROL OF BLEEDING;  Surgeon: Gerald Pedraza MD;  Location: [facility] MAIN OR;  Service: General;  Laterality: Right;    LASIK      LYMPHADENECTOMY      OOPHORECTOMY          Past Ocular History:    ROS:  Per HPI    Eyes: Per Hpi  Ocular Medication: N/A    Home Meds:  Medications Prior to Admission   Medication Sig Dispense Refill Last Dose    acetaminophen (TYLENOL) 500 MG tablet Take 2 tablets by mouth Every 4 (Four) Hours As Needed for Mild Pain or Moderate Pain.   5/31/2024 at 0930    apixaban (ELIQUIS) 5 MG tablet tablet Take 1 tablet by mouth Every 12 (Twelve) Hours. Indications: Atrial Fibrillation 60 tablet 0 5/31/2024 at 0930    arformoterol (BROVANA) 15 MCG/2ML nebulizer solution Take 2 mL by nebulization 2 (Two) Times a Day. 120 mL 11 5/30/2024    benzonatate (TESSALON) 100 MG capsule Take 1 capsule by mouth 3 (Three) Times a Day As Needed for Cough. 20 capsule 0 Past Month    budesonide (PULMICORT) 0.5 MG/2ML nebulizer solution Take 2 mL by nebulization 2 (Two) Times a Day. 360 mL 2 5/30/2024    cephalexin (KEFLEX) 500 MG capsule Take 1 capsule by mouth 3 (Three) Times a Day. 21 capsule 0 5/31/2024 at 1200    Cholecalciferol (VITAMIN D-3 PO) Take 1 tablet by mouth Daily. Indications: nutritional support   Patient Taking Differently    coenzyme Q10 50 MG capsule capsule Take 1 capsule by mouth Daily. Indications: nutritional support   Past Month    Cyanocobalamin (VITAMIN B 12 PO)  Take 1 tablet by mouth Daily. Indications: nutritional support   Patient Taking Differently    diazePAM (VALIUM) 2 MG tablet Take 1 tablet by mouth 2 (Two) Times a Day. Indications: Feeling Anxious   5/31/2024 at 0930    dilTIAZem CD (CARDIZEM CD) 120 MG 24 hr capsule Take 1 capsule by mouth Daily. 30 capsule 0 5/31/2024 at 0930    fexofenadine (Allegra Allergy) 60 MG tablet Take  by mouth Daily As Needed.   Past Week    furosemide (LASIX) 40 MG tablet Take 1 tablet by mouth Daily. Indications: Cardiac Failure   5/31/2024 at 0930    Magnesium 500 MG tablet Take 1 tablet by mouth Daily.   5/31/2024 at 0930    ondansetron (ZOFRAN) 4 MG tablet Take 1 tablet by mouth Daily As Needed for Nausea or Vomiting. Indications: Nausea and Vomiting   Past Week    pantoprazole (PROTONIX) 40 MG EC tablet Take 1 tablet by mouth 2 (Two) Times a Day Before Meals. 60 tablet 0 5/31/2024    potassium chloride (KLOR-CON M10) 10 MEQ CR tablet Take 2 tablets by mouth Daily.   5/31/2024 at 0930    acetaminophen (TYLENOL) 325 MG tablet Take 2 tablets by mouth Every 6 (Six) Hours As Needed for Mild Pain.   5/31/2024    apixaban (Eliquis) 5 MG tablet tablet Take 1 tablet by mouth 2 (Two) Times a Day.       docusate sodium (COLACE) 100 MG capsule Take 1 capsule by mouth Daily. Indications: Constipation       fluticasone (FLONASE) 50 MCG/ACT nasal spray 2 sprays by Each Nare route 2 (Two) Times a Day. (Patient not taking: Reported on 1/29/2024) 18.2 mL 11     furosemide (LASIX) 40 MG tablet Take 40 mg by mouth 2 (Two) Times a Day. Indications: Cardiac Failure       glucosamine-chondroitin 500-400 MG capsule capsule Take 1 capsule by mouth 3 (Three) Times a Day With Meals. Indications: Joint Damage causing Pain and Loss of Function       O2 (OXYGEN) Inhale 2 L/min Continuous. At night as needed  Indications: hypoxia       polyethylene glycol (MIRALAX) 17 g packet Take 17 g by mouth Daily As Needed (constipation ). Indications: Constipation        Selenium (SELENIMIN PO) Take 1 tablet by mouth Daily. Indications: nutritional support          Current Meds:     Current Facility-Administered Medications:     acetaminophen (TYLENOL) tablet 1,000 mg, 1,000 mg, Oral, Q8H, Anika Jackson MD, 1,000 mg at 06/05/24 1630    albuterol (ACCUNEB) nebulizer solution 0.63 mg, 0.63 mg, Nebulization, Q6H PRN, Anika Jackson MD    apixaban (ELIQUIS) tablet 2.5 mg, 2.5 mg, Oral, Q12H, Augustus Wilde MD    arformoterol (BROVANA) nebulizer solution 15 mcg, 15 mcg, Nebulization, BID - RT, Anika Jackson MD, 15 mcg at 06/05/24 0758    sennosides-docusate (PERICOLACE) 8.6-50 MG per tablet 2 tablet, 2 tablet, Oral, BID PRN **AND** polyethylene glycol (MIRALAX) packet 17 g, 17 g, Oral, Daily PRN, 17 g at 06/04/24 1233 **AND** bisacodyl (DULCOLAX) EC tablet 5 mg, 5 mg, Oral, Daily PRN, 5 mg at 06/01/24 0926 **AND** bisacodyl (DULCOLAX) suppository 10 mg, 10 mg, Rectal, Daily PRN, Anika Jackson MD    budesonide (PULMICORT) nebulizer solution 0.5 mg, 0.5 mg, Nebulization, BID - RT, Anika Jackson MD, 0.5 mg at 06/05/24 0802    calcium carbonate (TUMS) chewable tablet 500 mg (200 mg elemental), 2 tablet, Oral, TID PRN, Anika Jackson MD    dextromethorphan polistirex ER (DELSYM) 30 MG/5ML oral suspension 60 mg, 60 mg, Oral, Q12H PRN, Karma Lu APRN    diazePAM (VALIUM) tablet 2 mg, 2 mg, Oral, BID, Anika Jackson MD, 2 mg at 06/05/24 0854    diazePAM (VALIUM) tablet 2 mg, 2 mg, Oral, Q12H PRN, Trevor Cervantes MD, 2 mg at 06/04/24 1524    dilTIAZem CD (CARDIZEM CD) 24 hr capsule 120 mg, 120 mg, Oral, Q24H, Anika Jackson MD, 120 mg at 06/05/24 1630    furosemide (LASIX) tablet 40 mg, 40 mg, Oral, Daily, Anika Jackson MD, 40 mg at 06/05/24 0854    HYDROmorphone (DILAUDID) injection 0.5 mg, 0.5 mg, Intravenous, Q2H PRN, Gerald Pedraza MD, 0.5 mg at 06/05/24 1631    Ibuprofen/Lidocaine/Baclofen 3/4/2% cream, , Topical, 4x Daily PRN,  Anika Jackson MD, Given at 06/02/24 2028    melatonin tablet 5 mg, 5 mg, Oral, Nightly PRN, Anika Jackson MD    nitroglycerin (NITROSTAT) SL tablet 0.4 mg, 0.4 mg, Sublingual, Q5 Min PRN, Anika Jackson MD    ondansetron ODT (ZOFRAN-ODT) disintegrating tablet 4 mg, 4 mg, Oral, Q6H PRN, 4 mg at 06/05/24 1153 **OR** ondansetron (ZOFRAN) injection 4 mg, 4 mg, Intravenous, Q6H PRN, Anika Jackson MD, 4 mg at 06/05/24 1858    oxyCODONE (ROXICODONE) immediate release tablet 5 mg, 5 mg, Oral, Q4H PRN, Anika Jackson MD, 5 mg at 06/05/24 1157    pantoprazole (PROTONIX) EC tablet 40 mg, 40 mg, Oral, BID AC, Anika Jackson MD, 40 mg at 06/05/24 1631    sodium chloride 0.9 % flush 10 mL, 10 mL, Intravenous, Q12H, Anika Jackson MD, 10 mL at 06/05/24 0856    sodium chloride 0.9 % flush 10 mL, 10 mL, Intravenous, PRN, Anika Jackson MD    sodium chloride 0.9 % infusion 40 mL, 40 mL, Intravenous, PRN, Anika Jcakson MD    Allergies:  Allergies   Allergen Reactions    Cortisone Hives    Penicillins Hives     Beta lactam allergy details  Antibiotic reaction: hives  Age at reaction: unknown  Dose to reaction time: unknown  Reason for antibiotic: unknown  Epinephrine required for reaction?: no  Tolerated antibiotics: unknown    Tolerated Zosyn       Social History:   Social History     Tobacco Use    Smoking status: Former     Current packs/day: 0.50     Average packs/day: 0.5 packs/day for 2.0 years (1.0 ttl pk-yrs)     Types: Cigarettes    Smokeless tobacco: Never    Tobacco comments:     quit 40 years ago   Substance Use Topics    Alcohol use: No        Family History:  Denies h/o glaucoma, retinal detachment, strabismus, amblyopia    Objective:  General Appearance: NAD    Exam:    VA sc near P T EOM    20/ PH 20/ 40  5->3mm no apd  Soft to palpation Full    20/ PH 20/ CF at 3 feet 5->3mm no apd Soft to palpation Full      SLE/PLE       OD OS   External/Lid wnl wnl   Conj/sclera  White/quiet White/quiet   Cornea clear 1+ kp on endothelium   Anterior Chamber formed 1+ cells   Iris Round/reactive Round/reactive   Lens PCIOL PCIOL   Vitreous clear Dense 3+ Vitreous Hemorrhage     Dilated Fundus Exam: 5:30 PM EST  OD - 0.2 optic nerve, macula, vessels, periphery wnl  OS -  hazy view due to VH, optic nerve visualized, macula appears flat, possible small horseshoe tear in the inferior periphery    Imaging:  XR Chest 1 View   Final Result   No evidence for active disease in the chest.       This report was finalized on 6/3/2024 8:15 AM by Dr. Archie Ortiz M.D on Workstation: [id number]          XR Shoulder 2+ View Right   Final Result      CT Angio Abdominal Aorta Bilateral Iliofem Runoff   Final Result   1.  Cutaneous and subcutaneous hematoma within the posteromedial distal   right calf. There is active extravasation of arterial phase contrast   associated with pseudoaneurysm formation along the anterior margin of   the hematoma and contrast pools within the hematoma. A smaller hematoma   is present within the deeper subcutaneous fat anteromedial to the distal   tibia. Generalized edema in the subcutaneous fat about the right lower   extremity greatest within the distal calf and ankle and this may be   related to the hematoma or superimposed infection. No abnormal soft   tissue gas.   2. Atherosclerotic disease without evidence for aneurysm or significant   stenosis.       Discussed with Dr. Martinez in the emergency department 05 /31/2024 at   4:20 p.m.           This report was finalized on 5/31/2024 5:38 PM by Dr. Archie Ortiz M.D on Workstation: [id number]              Data Review:      Assessment/Recommendations:  Vonnie Coppola is a 88 y.o. female currently inpatient for fall and subsequent leg wound    1) Vitreous Hemorrhage OS  -Patient has a 3+ VH in the left eye. Based on clinical history most likely this has been present for 3-4 weeks. No signs of detachment on exam but view is  limited due to VH  -Discussed need for follow up with Retina Specialist to evaluate for surgical repair as observation period for conservative approach has likely passed  -Ok to stay on anticoagulation until seen by Retina but may need to stop for a few months depending on their assessment. History of CVA and Pulmonary Emboli noted making it a more challenging choice  -Due to the kp on exam recommend course of prednisolone 1% drops TID for 7 days  -Patient to go to rehab for wound care in the near future. Rec being seen by Retina within the next 5 days as this appears slightly chronic in nature  -Case management will need to call [facility] [phone] or [facility] [phone] for appointment  -Ok to reach out to me if any issues securing appointment      Thank you for the consult.    Mike Hough MD  6/5/2024 20:30 EDT

## 2024-06-06 NOTE — DISCHARGE INSTR - APPOINTMENTS
[facility]   [address] [place] [address]    Monday, June 17th @ 1000  [phone]

## 2024-06-06 NOTE — CASE MANAGEMENT/SOCIAL WORK
Continued Stay Note  [facility]     Patient Name: Vonnie Coppola  MRN: [id number]  Today's Date: 6/6/2024    Admit Date: 5/31/2024    Plan: [facility] pre-cert approved and bed available   Discharge Plan       Row Name 06/06/24 1224       Plan    Plan [facility] pre-cert approved and bed available    Patient/Family in Agreement with Plan yes    Plan Comments Spoke with MD and patient will not dc today. [facility] canceled. Mark/Signature updated. Patient and daughter updated at bedside. Will need EMS transport @ dc. CCP will follow.                   Discharge Codes    No documentation.                 Expected Discharge Date and Time       Expected Discharge Date Expected Discharge Time    Jun 6, 2024               Denia Chavis RN

## 2024-06-06 NOTE — PROGRESS NOTES
Name: Vonnie Coppola ADMIT: 2024   : 1935  PCP: Christopher Leyva DO    MRN: [id number] LOS: 6 days   AGE/SEX: 88 y.o. female  ROOM: [facility]     Subjective   Subjective   She is now complaining of left leg pain and tenderness. It started this morning.     Objective   Objective   Vital Signs  Temp:  [98.1 °F (36.7 °C)-98.8 °F (37.1 °C)] 98.1 °F (36.7 °C)  Heart Rate:  [69-88] 73  Resp:  [16-18] 16  BP: (111-136)/(56-64) 117/62  SpO2:  [93 %-100 %] 100 %  on  Flow (L/min):  [2] 2;   Device (Oxygen Therapy): nasal cannula  Body mass index is 40.17 kg/m².    Physical Exam  Constitutional:       General: She is not in acute distress.     Appearance: She is not toxic-appearing.   HENT:      Head: Normocephalic and atraumatic.   Cardiovascular:      Rate and Rhythm: Normal rate and regular rhythm.   Pulmonary:      Effort: Pulmonary effort is normal. No respiratory distress.      Breath sounds: No wheezing or rhonchi.   Abdominal:      General: Bowel sounds are normal.      Palpations: Abdomen is soft.      Tenderness: There is no abdominal tenderness. There is no guarding or rebound.   Musculoskeletal:         General: Tenderness (LLE) present.      Comments: Right lower extremity dressing. Left leg bruised and somewhat lumpy and tender to touch   Skin:     General: Skin is warm and dry.   Neurological:      General: No focal deficit present.      Mental Status: She is alert and oriented to person, place, and time.   Psychiatric:         Mood and Affect: Mood normal.         Behavior: Behavior normal.     Results Review  I reviewed the patient's new clinical results.  Results from last 7 days   Lab Units 24  0544 24  1120 24  0626 24  1600 24  0404   WBC 10*3/mm3 9.70 9.78 9.73  --  9.71   HEMOGLOBIN g/dL 8.3* 8.3* 8.0* 7.1* 7.2*   PLATELETS 10*3/mm3 248 230 217  --  218     Results from last 7 days   Lab Units 24  0544 24  0626 24  0404 24  0538  "  SODIUM mmol/L 142 142 142 143   POTASSIUM mmol/L 3.6 4.1 3.7 3.6   CHLORIDE mmol/L 105 107 106 106   CO2 mmol/L 29.4* 30.0* 30.0* 29.0   BUN mg/dL 16 17 18 20   CREATININE mg/dL 1.08* 1.02* 1.01* 1.14*   GLUCOSE mg/dL 102* 92 87 81     Lab Results   Component Value Date    ANIONGAP 7.6 06/06/2024     Estimated Creatinine Clearance: 46.2 mL/min (A) (by C-G formula based on SCr of 1.08 mg/dL (H)).   Lab Results   Component Value Date    EGFR 50.1 (L) 06/06/2024     Results from last 7 days   Lab Units 05/31/24  1420   ALBUMIN g/dL 3.2*   BILIRUBIN mg/dL 0.4   ALK PHOS U/L 70   AST (SGOT) U/L 11   ALT (SGPT) U/L 10     Results from last 7 days   Lab Units 06/06/24  0544 06/04/24  0626 06/03/24  0404 06/02/24  0538 06/01/24  1008 05/31/24  1420   CALCIUM mg/dL 8.1* 7.9* 7.6* 7.8*   < > 8.7   ALBUMIN g/dL  --   --   --   --   --  3.2*   MAGNESIUM mg/dL 2.0 2.3 2.3 2.9*  --   --    PHOSPHORUS mg/dL 2.7 2.7 3.0 3.0  --   --     < > = values in this interval not displayed.     Results from last 7 days   Lab Units 05/31/24  1420   LACTATE mmol/L 1.5     No results found for: \"HGBA1C\", \"POCGLU\"    No radiology results for the last day    Scheduled Meds  acetaminophen, 1,000 mg, Oral, Q8H  apixaban, 2.5 mg, Oral, Q12H  arformoterol, 15 mcg, Nebulization, BID - RT  budesonide, 0.5 mg, Nebulization, BID - RT  diazePAM, 2 mg, Oral, BID  dilTIAZem CD, 120 mg, Oral, Q24H  furosemide, 40 mg, Oral, Daily  pantoprazole, 40 mg, Oral, BID AC  prednisoLONE acetate, 1 drop, Left Eye, Q8H  senna-docusate sodium, 2 tablet, Oral, BID  sodium chloride, 10 mL, Intravenous, Q12H    Continuous Infusions     PRN Meds    albuterol    senna-docusate sodium **AND** polyethylene glycol **AND** bisacodyl **AND** bisacodyl    calcium carbonate    dextromethorphan polistirex ER    diazePAM    Ibuprofen/Lidocaine/Baclofen 3/4/2% cream    melatonin    nitroglycerin    ondansetron ODT **OR** ondansetron    oxyCODONE    sodium chloride    sodium " chloride       Diet  Diet: Regular/House, Cardiac; Healthy Heart (2-3 Na+); Fluid Consistency: Thin (IDDSI 0)       Assessment/Plan     Active Hospital Problems    Diagnosis  POA    **Leg hematoma, right, subsequent encounter [S80.11XD]  Not Applicable    History of pulmonary embolism [Z86.711]  Yes    Class 2 severe obesity with serious comorbidity in adult [E66.01]  Yes    Atrial fibrillation [I48.91]  Yes    History of CVA (cerebrovascular accident) [Z86.73]  Not Applicable    HTN (hypertension) [I10]  Yes    History of breast cancer [Z85.3]  Not Applicable    Asthma [J45.909]  Yes      Resolved Hospital Problems   No resolved problems to display.     88 y.o. female with a history of DVT, atrial fibrillation, hypertension, asthma, breast cancer, anxiety who presented to the ER with right lower extremity hematoma.     Blurry vision  Vitreous hemorrhage  Appreciate ophthalmology  Okay to stay on anticoagulation (dose reduced)  Started on steroid drops for KP  Needs to follow-up with retina specialist within the next 5 days    Right lower extremity hematoma  Acute blood loss anemia  s/p 5/31/24 exploration of right lower extremity hematoma with control of bleeding, debridement of skin and subcutaneous tissue. Daily dressing changes  ok to restart Eliquis 6/3  Hemoglobin improved after transfusions (1 unit each on 6/2/2024 and on 6/3/2024)   Hemoglobin stable.    Epistaxis  Outpatient ENT evaluation    History of DVT on Eliquis restarted-hematology and surgery both agreed to decrease apixaban dose  Atrial fibrillation continue diltiazem for rate control, Eliquis   Essential HTN diltiazem continued, Lasix restarted. BP controlled. Off IVF  Asthma continue home nebulizers. No wheezing today  Hx of breast cancer   Anxiety/benzodiazepine dependence-continue home Valium  History of constipation- bowel regimen.     Discharge  SNF was scheduled for 1pm today but will hold with LLE pain and check CT  Expected Discharge Date:  6/6/2024; Expected Discharge Time:     Discussed with patient, family, and nursing staff    Trevor Cervantes MD  [facility]  06/06/24

## 2024-06-06 NOTE — CASE MANAGEMENT/SOCIAL WORK
Continued Stay Note  [facility]     Patient Name: Vonnie Coppola  MRN: [id number]  Today's Date: 6/6/2024    Admit Date: 5/31/2024    Plan: [facility]- pre-cert obtained and bed available   Discharge Plan       Row Name 06/06/24 1243       Plan    Plan [facility] pre-cert obtained and bed available    Plan Comments Opthamology recs noted. [phone] and appointment made for patient to see a retina specialist ( Dr Roche) on Monday 6/10 @ 1030. Address is [address] [place] [address]. [facility] updated so that they can work on securing ride for patient to this appointment.      Row Name 06/06/24 1224       Plan    Plan [facility]- pre-cert approved and bed available    Patient/Family in Agreement with Plan yes    Plan Comments Spoke with MD and patient will not dc today. [facility] canceled. [facility] updated. Patient and daughter updated at bedside. Will need EMS transport @ dc. CCP will follow.                   Discharge Codes    No documentation.                 Expected Discharge Date and Time       Expected Discharge Date Expected Discharge Time    Jun 6, 2024               Denia Chavis RN

## 2024-06-07 ENCOUNTER — APPOINTMENT (OUTPATIENT)
Dept: CT IMAGING | Facility: HOSPITAL | Age: 89
DRG: 571 | End: 2024-06-07
Payer: MEDICARE

## 2024-06-07 LAB
ANION GAP SERPL CALCULATED.3IONS-SCNC: 7 MMOL/L (ref 5–15)
BASOPHILS # BLD AUTO: 0.02 10*3/MM3 (ref 0–0.2)
BASOPHILS NFR BLD AUTO: 0.2 % (ref 0–1.5)
BUN SERPL-MCNC: 14 MG/DL (ref 8–23)
BUN/CREAT SERPL: 13.9 (ref 7–25)
CALCIUM SPEC-SCNC: 8.3 MG/DL (ref 8.6–10.5)
CHLORIDE SERPL-SCNC: 105 MMOL/L (ref 98–107)
CO2 SERPL-SCNC: 31 MMOL/L (ref 22–29)
CREAT SERPL-MCNC: 1.01 MG/DL (ref 0.57–1)
DEPRECATED RDW RBC AUTO: 47.4 FL (ref 37–54)
EGFRCR SERPLBLD CKD-EPI 2021: 54.3 ML/MIN/1.73
EOSINOPHIL # BLD AUTO: 0.24 10*3/MM3 (ref 0–0.4)
EOSINOPHIL NFR BLD AUTO: 2.8 % (ref 0.3–6.2)
ERYTHROCYTE [DISTWIDTH] IN BLOOD BY AUTOMATED COUNT: 15.3 % (ref 12.3–15.4)
GLUCOSE SERPL-MCNC: 88 MG/DL (ref 65–99)
HCT VFR BLD AUTO: 26.3 % (ref 34–46.6)
HGB BLD-MCNC: 8.2 G/DL (ref 12–15.9)
IMM GRANULOCYTES # BLD AUTO: 0.1 10*3/MM3 (ref 0–0.05)
IMM GRANULOCYTES NFR BLD AUTO: 1.2 % (ref 0–0.5)
LYMPHOCYTES # BLD AUTO: 1.69 10*3/MM3 (ref 0.7–3.1)
LYMPHOCYTES NFR BLD AUTO: 20 % (ref 19.6–45.3)
MAGNESIUM SERPL-MCNC: 2.1 MG/DL (ref 1.6–2.4)
MCH RBC QN AUTO: 26.5 PG (ref 26.6–33)
MCHC RBC AUTO-ENTMCNC: 31.2 G/DL (ref 31.5–35.7)
MCV RBC AUTO: 84.8 FL (ref 79–97)
MONOCYTES # BLD AUTO: 0.84 10*3/MM3 (ref 0.1–0.9)
MONOCYTES NFR BLD AUTO: 9.9 % (ref 5–12)
NEUTROPHILS NFR BLD AUTO: 5.56 10*3/MM3 (ref 1.7–7)
NEUTROPHILS NFR BLD AUTO: 65.9 % (ref 42.7–76)
NRBC BLD AUTO-RTO: 0 /100 WBC (ref 0–0.2)
PHOSPHATE SERPL-MCNC: 3.3 MG/DL (ref 2.5–4.5)
PLATELET # BLD AUTO: 257 10*3/MM3 (ref 140–450)
PMV BLD AUTO: 9.6 FL (ref 6–12)
POTASSIUM SERPL-SCNC: 3.7 MMOL/L (ref 3.5–5.2)
RBC # BLD AUTO: 3.1 10*6/MM3 (ref 3.77–5.28)
SODIUM SERPL-SCNC: 143 MMOL/L (ref 136–145)
WBC NRBC COR # BLD AUTO: 8.45 10*3/MM3 (ref 3.4–10.8)

## 2024-06-07 PROCEDURE — 94664 DEMO&/EVAL PT USE INHALER: CPT

## 2024-06-07 PROCEDURE — 94760 N-INVAS EAR/PLS OXIMETRY 1: CPT

## 2024-06-07 PROCEDURE — 94799 UNLISTED PULMONARY SVC/PX: CPT

## 2024-06-07 PROCEDURE — 80048 BASIC METABOLIC PNL TOTAL CA: CPT | Performed by: STUDENT IN AN ORGANIZED HEALTH CARE EDUCATION/TRAINING PROGRAM

## 2024-06-07 PROCEDURE — 85025 COMPLETE CBC W/AUTO DIFF WBC: CPT | Performed by: STUDENT IN AN ORGANIZED HEALTH CARE EDUCATION/TRAINING PROGRAM

## 2024-06-07 PROCEDURE — 84100 ASSAY OF PHOSPHORUS: CPT | Performed by: STUDENT IN AN ORGANIZED HEALTH CARE EDUCATION/TRAINING PROGRAM

## 2024-06-07 PROCEDURE — 94761 N-INVAS EAR/PLS OXIMETRY MLT: CPT

## 2024-06-07 PROCEDURE — 25010000002 ONDANSETRON PER 1 MG: Performed by: STUDENT IN AN ORGANIZED HEALTH CARE EDUCATION/TRAINING PROGRAM

## 2024-06-07 PROCEDURE — 83735 ASSAY OF MAGNESIUM: CPT | Performed by: STUDENT IN AN ORGANIZED HEALTH CARE EDUCATION/TRAINING PROGRAM

## 2024-06-07 PROCEDURE — 73701 CT LOWER EXTREMITY W/DYE: CPT

## 2024-06-07 PROCEDURE — 25510000001 IOPAMIDOL 61 % SOLUTION: Performed by: HOSPITALIST

## 2024-06-07 RX ORDER — DOXYCYCLINE 100 MG/1
100 CAPSULE ORAL EVERY 12 HOURS SCHEDULED
Status: DISCONTINUED | OUTPATIENT
Start: 2024-06-07 | End: 2024-06-12 | Stop reason: HOSPADM

## 2024-06-07 RX ADMIN — ARFORMOTEROL TARTRATE 15 MCG: 15 SOLUTION RESPIRATORY (INHALATION) at 07:29

## 2024-06-07 RX ADMIN — ONDANSETRON 4 MG: 2 INJECTION INTRAMUSCULAR; INTRAVENOUS at 15:20

## 2024-06-07 RX ADMIN — ARFORMOTEROL TARTRATE 15 MCG: 15 SOLUTION RESPIRATORY (INHALATION) at 19:31

## 2024-06-07 RX ADMIN — PANTOPRAZOLE SODIUM 40 MG: 40 TABLET, DELAYED RELEASE ORAL at 17:50

## 2024-06-07 RX ADMIN — ONDANSETRON 4 MG: 2 INJECTION INTRAMUSCULAR; INTRAVENOUS at 09:19

## 2024-06-07 RX ADMIN — PREDNISOLONE ACETATE 1 DROP: 10 SUSPENSION/ DROPS OPHTHALMIC at 05:52

## 2024-06-07 RX ADMIN — IOPAMIDOL 95 ML: 612 INJECTION, SOLUTION INTRAVENOUS at 12:23

## 2024-06-07 RX ADMIN — ACETAMINOPHEN 1000 MG: 500 TABLET ORAL at 15:20

## 2024-06-07 RX ADMIN — DIAZEPAM 2 MG: 2 TABLET ORAL at 20:49

## 2024-06-07 RX ADMIN — SENNOSIDES AND DOCUSATE SODIUM 2 TABLET: 50; 8.6 TABLET ORAL at 20:49

## 2024-06-07 RX ADMIN — DOXYCYCLINE 100 MG: 100 CAPSULE ORAL at 20:49

## 2024-06-07 RX ADMIN — BUDESONIDE 0.5 MG: 0.5 INHALANT ORAL at 19:31

## 2024-06-07 RX ADMIN — BUDESONIDE 0.5 MG: 0.5 INHALANT ORAL at 07:29

## 2024-06-07 RX ADMIN — APIXABAN 2.5 MG: 2.5 TABLET, FILM COATED ORAL at 20:51

## 2024-06-07 RX ADMIN — DOXYCYCLINE 100 MG: 100 CAPSULE ORAL at 15:20

## 2024-06-07 RX ADMIN — PREDNISOLONE ACETATE 1 DROP: 10 SUSPENSION/ DROPS OPHTHALMIC at 15:21

## 2024-06-07 RX ADMIN — Medication 10 ML: at 20:50

## 2024-06-07 RX ADMIN — Medication 10 ML: at 09:21

## 2024-06-07 RX ADMIN — ACETAMINOPHEN 1000 MG: 500 TABLET ORAL at 05:52

## 2024-06-07 RX ADMIN — PREDNISOLONE ACETATE 1 DROP: 10 SUSPENSION/ DROPS OPHTHALMIC at 22:40

## 2024-06-07 RX ADMIN — OXYCODONE HYDROCHLORIDE 5 MG: 5 TABLET ORAL at 13:58

## 2024-06-07 NOTE — PROGRESS NOTES
Name: Vonnie Coppola ADMIT: 2024   : 1935  PCP: Christopher Leyva DO    MRN: [id number] LOS: 7 days   AGE/SEX: 88 y.o. female  ROOM: [facility]     Subjective   Subjective   Right leg pain is okay. Still with left leg pain still waiting on CT. No family at bedside today.     Objective   Objective   Vital Signs  Temp:  [97.5 °F (36.4 °C)-97.8 °F (36.6 °C)] 97.7 °F (36.5 °C)  Heart Rate:  [66-74] 68  Resp:  [18] 18  BP: (115-143)/(52-72) 143/62  SpO2:  [93 %-99 %] 96 %  on  Flow (L/min):  [2-3] 3;   Device (Oxygen Therapy): humidified;nasal cannula  Body mass index is 40.17 kg/m².    Physical Exam  Constitutional:       General: She is not in acute distress.     Appearance: She is not toxic-appearing.   HENT:      Head: Normocephalic and atraumatic.   Cardiovascular:      Rate and Rhythm: Normal rate and regular rhythm.   Pulmonary:      Effort: Pulmonary effort is normal. No respiratory distress.      Breath sounds: No wheezing or rhonchi.   Abdominal:      General: Bowel sounds are normal.      Palpations: Abdomen is soft.      Tenderness: There is no abdominal tenderness. There is no guarding or rebound.   Musculoskeletal:         General: Tenderness (LLE) present.      Comments: Right lower extremity dressing. Left leg with bruising and tenderness to touch but seems more swollen and has some erythema today   Skin:     General: Skin is warm and dry.   Neurological:      General: No focal deficit present.      Mental Status: She is alert and oriented to person, place, and time.   Psychiatric:         Mood and Affect: Mood normal.         Behavior: Behavior normal.     Results Review  I reviewed the patient's new clinical results.  Results from last 7 days   Lab Units 24  0414 24  0544 24  1120 24  0626   WBC 10*3/mm3 8.45 9.70 9.78 9.73   HEMOGLOBIN g/dL 8.2* 8.3* 8.3* 8.0*   PLATELETS 10*3/mm3 257 248 230 217     Results from last 7 days   Lab Units 24  0414  "06/06/24  0544 06/04/24  0626 06/03/24  0404   SODIUM mmol/L 143 142 142 142   POTASSIUM mmol/L 3.7 3.6 4.1 3.7   CHLORIDE mmol/L 105 105 107 106   CO2 mmol/L 31.0* 29.4* 30.0* 30.0*   BUN mg/dL 14 16 17 18   CREATININE mg/dL 1.01* 1.08* 1.02* 1.01*   GLUCOSE mg/dL 88 102* 92 87     Lab Results   Component Value Date    ANIONGAP 7.0 06/07/2024     Estimated Creatinine Clearance: 49.4 mL/min (A) (by C-G formula based on SCr of 1.01 mg/dL (H)).   Lab Results   Component Value Date    EGFR 54.3 (L) 06/07/2024     Results from last 7 days   Lab Units 05/31/24  1420   ALBUMIN g/dL 3.2*   BILIRUBIN mg/dL 0.4   ALK PHOS U/L 70   AST (SGOT) U/L 11   ALT (SGPT) U/L 10     Results from last 7 days   Lab Units 06/07/24  0414 06/06/24  0544 06/04/24  0626 06/03/24  0404 06/01/24  1008 05/31/24  1420   CALCIUM mg/dL 8.3* 8.1* 7.9* 7.6*   < > 8.7   ALBUMIN g/dL  --   --   --   --   --  3.2*   MAGNESIUM mg/dL 2.1 2.0 2.3 2.3   < >  --    PHOSPHORUS mg/dL 3.3 2.7 2.7 3.0   < >  --     < > = values in this interval not displayed.     Results from last 7 days   Lab Units 05/31/24  1420   LACTATE mmol/L 1.5     No results found for: \"HGBA1C\", \"POCGLU\"    No radiology results for the last day    Scheduled Meds  acetaminophen, 1,000 mg, Oral, Q8H  apixaban, 2.5 mg, Oral, Q12H  arformoterol, 15 mcg, Nebulization, BID - RT  budesonide, 0.5 mg, Nebulization, BID - RT  diazePAM, 2 mg, Oral, BID  dilTIAZem CD, 120 mg, Oral, Q24H  furosemide, 40 mg, Oral, Daily  pantoprazole, 40 mg, Oral, BID AC  prednisoLONE acetate, 1 drop, Left Eye, Q8H  senna-docusate sodium, 2 tablet, Oral, BID  sodium chloride, 10 mL, Intravenous, Q12H    Continuous Infusions     PRN Meds    albuterol    senna-docusate sodium **AND** polyethylene glycol **AND** bisacodyl **AND** bisacodyl    calcium carbonate    dextromethorphan polistirex ER    diazePAM    Ibuprofen/Lidocaine/Baclofen 3/4/2% cream    melatonin    nitroglycerin    ondansetron ODT **OR** ondansetron    " oxyCODONE    sodium chloride    sodium chloride       Diet  Diet: Regular/House, Cardiac; Healthy Heart (2-3 Na+); Fluid Consistency: Thin (IDDSI 0)       Assessment/Plan     Active Hospital Problems    Diagnosis  POA    **Leg hematoma, right, subsequent encounter [S80.11XD]  Not Applicable    History of pulmonary embolism [Z86.711]  Yes    Class 2 severe obesity with serious comorbidity in adult [E66.01]  Yes    Atrial fibrillation [I48.91]  Yes    History of CVA (cerebrovascular accident) [Z86.73]  Not Applicable    HTN (hypertension) [I10]  Yes    History of breast cancer [Z85.3]  Not Applicable    Asthma [J45.909]  Yes      Resolved Hospital Problems   No resolved problems to display.     88 y.o. female with a history of DVT, atrial fibrillation, hypertension, asthma, breast cancer, anxiety who presented to the ER with right lower extremity hematoma.     Blurry vision  Vitreous hemorrhage  Appreciate ophthalmology  Okay to stay on anticoagulation (dose reduced)  Started on steroid drops for KP  Needs to follow-up with retina specialist within the next 5 days-discussed with CCP appointment has been made    Right lower extremity hematoma  Acute blood loss anemia  s/p 5/31/24 exploration of right lower extremity hematoma with control of bleeding, debridement of skin and subcutaneous tissue. Daily dressing changes  ok to restart Eliquis 6/3  Hemoglobin improved after transfusions (1 unit each on 6/2/2024 and on 6/3/2024)   Hemoglobin remains stable.    Now _left_ leg pain, swelling, erythema, warmth  Today the leg has some erythema will start antibiotics. No fever and WBC is okay however.  Awaiting CT scan  Do not suspect DVT since she has been on anticoagulation    Epistaxis  Outpatient ENT evaluation    History of DVT on Eliquis restarted-hematology and surgery both agreed to decrease apixaban dose  Atrial fibrillation continue diltiazem for rate control, Eliquis   Essential HTN diltiazem continued, Lasix  restarted. BP controlled. Off IVF  Asthma continue home nebulizers. No wheezing today  Hx of breast cancer   Anxiety/benzodiazepine dependence-continue home Valium  History of constipation- bowel regimen.     Discharge  SNF probably a couple of days depending on left leg  Expected Discharge Date: 6/7/2024; Expected Discharge Time:     Discussed with patient and nursing staff    Trevor Cervantes MD  [facility]  06/07/24

## 2024-06-07 NOTE — CASE MANAGEMENT/SOCIAL WORK
Continued Stay Note  [facility]     Patient Name: Vonnie Coppola  MRN: [id number]  Today's Date: 6/7/2024    Admit Date: 5/31/2024    Plan: [facility]- pre-cert obtained and bed available   Discharge Plan       Row Name 06/07/24 1449       Plan    Plan Comments Spoke with MD and patient not ready for dc. Spoke with [facility] and [facility] can accept through the weekend. Insurance pre-cert for SNF expires 6/9/24 @ 1064, so if not dc'd this weekend, will need new pre-cert. Packet in cubbie and pharmacy updated. WIll need stretcher transport. If patient not dc'd over the weekend, will need to call [facility] [phone] to reschedule her appointment with Dr Roche on 6/10 @ 4690.                   Discharge Codes    No documentation.                 Expected Discharge Date and Time       Expected Discharge Date Expected Discharge Time    Jun 8, 2024               Denia Chavis RN

## 2024-06-07 NOTE — PLAN OF CARE
Goal Outcome Evaluation:      Pt very tearful at times today.  Offered to call for a  to see, pt declined, pt was speaking a lot about family problems and issues, very frustrated.  Pt had CT of left lower extremity today, pt declined any pain until RLE dressing change, at which time pt received PRN pain medications.  Pt had several boughs of nausea today, zofran given twice for it.

## 2024-06-07 NOTE — PLAN OF CARE
Goal Outcome Evaluation:              Outcome Evaluation: Pt tolerated treatment and dressing change today. VSS. AOX4. Meds given per MAR. Left leg showing signs of hematoma. MD aware. CT ordered.

## 2024-06-07 NOTE — PLAN OF CARE
Problem: Fall Injury Risk  Goal: Absence of Fall and Fall-Related Injury  Outcome: Ongoing, Progressing  Intervention: Identify and Manage Contributors  Recent Flowsheet Documentation  Taken 6/6/2024 2136 by Cynthia Mckeon RN  Medication Review/Management: medications reviewed  Intervention: Promote Injury-Free Environment  Recent Flowsheet Documentation  Taken 6/7/2024 0400 by Cynthia Mckeon RN  Safety Promotion/Fall Prevention:   activity supervised   assistive device/personal items within reach   fall prevention program maintained  Taken 6/7/2024 0200 by Cynthia Mckeon RN  Safety Promotion/Fall Prevention:   activity supervised   assistive device/personal items within reach   fall prevention program maintained  Taken 6/6/2024 2136 by Cynthia Mckeon RN  Safety Promotion/Fall Prevention:   activity supervised   assistive device/personal items within reach   fall prevention program maintained     Problem: Skin Injury Risk Increased  Goal: Skin Health and Integrity  Outcome: Ongoing, Progressing  Intervention: Optimize Skin Protection  Recent Flowsheet Documentation  Taken 6/6/2024 2136 by Cynthia Mckeon RN  Pressure Reduction Techniques: frequent weight shift encouraged  Pressure Reduction Devices: pressure-redistributing mattress utilized   Goal Outcome Evaluation:  Plan of Care Reviewed With: patient        Progress: no change

## 2024-06-08 ENCOUNTER — APPOINTMENT (OUTPATIENT)
Dept: CARDIOLOGY | Facility: HOSPITAL | Age: 89
DRG: 571 | End: 2024-06-08
Payer: MEDICARE

## 2024-06-08 LAB
ANION GAP SERPL CALCULATED.3IONS-SCNC: 7 MMOL/L (ref 5–15)
BASOPHILS # BLD AUTO: 0.05 10*3/MM3 (ref 0–0.2)
BASOPHILS NFR BLD AUTO: 0.6 % (ref 0–1.5)
BH CV LOWER VASCULAR LEFT COMMON FEMORAL AUGMENT: NORMAL
BH CV LOWER VASCULAR LEFT COMMON FEMORAL COMPETENT: NORMAL
BH CV LOWER VASCULAR LEFT COMMON FEMORAL COMPRESS: NORMAL
BH CV LOWER VASCULAR LEFT COMMON FEMORAL PHASIC: NORMAL
BH CV LOWER VASCULAR LEFT COMMON FEMORAL SPONT: NORMAL
BH CV LOWER VASCULAR LEFT DISTAL FEMORAL COMPRESS: NORMAL
BH CV LOWER VASCULAR LEFT GASTRONEMIUS COMPRESS: NORMAL
BH CV LOWER VASCULAR LEFT GREATER SAPH AK COMPRESS: NORMAL
BH CV LOWER VASCULAR LEFT GREATER SAPH BK COMPRESS: NORMAL
BH CV LOWER VASCULAR LEFT LESSER SAPH COMPRESS: NORMAL
BH CV LOWER VASCULAR LEFT MID FEMORAL AUGMENT: NORMAL
BH CV LOWER VASCULAR LEFT MID FEMORAL COMPETENT: NORMAL
BH CV LOWER VASCULAR LEFT MID FEMORAL COMPRESS: NORMAL
BH CV LOWER VASCULAR LEFT MID FEMORAL PHASIC: NORMAL
BH CV LOWER VASCULAR LEFT MID FEMORAL SPONT: NORMAL
BH CV LOWER VASCULAR LEFT PERONEAL COMPRESS: NORMAL
BH CV LOWER VASCULAR LEFT POPLITEAL AUGMENT: NORMAL
BH CV LOWER VASCULAR LEFT POPLITEAL COMPETENT: NORMAL
BH CV LOWER VASCULAR LEFT POPLITEAL COMPRESS: NORMAL
BH CV LOWER VASCULAR LEFT POPLITEAL PHASIC: NORMAL
BH CV LOWER VASCULAR LEFT POPLITEAL SPONT: NORMAL
BH CV LOWER VASCULAR LEFT POSTERIOR TIBIAL COMPRESS: NORMAL
BH CV LOWER VASCULAR LEFT PROFUNDA FEMORAL COMPRESS: NORMAL
BH CV LOWER VASCULAR LEFT PROXIMAL FEMORAL COMPRESS: NORMAL
BH CV LOWER VASCULAR LEFT SAPHENOFEMORAL JUNCTION COMPRESS: NORMAL
BH CV LOWER VASCULAR RIGHT COMMON FEMORAL AUGMENT: NORMAL
BH CV LOWER VASCULAR RIGHT COMMON FEMORAL COMPETENT: NORMAL
BH CV LOWER VASCULAR RIGHT COMMON FEMORAL COMPRESS: NORMAL
BH CV LOWER VASCULAR RIGHT COMMON FEMORAL PHASIC: NORMAL
BH CV LOWER VASCULAR RIGHT COMMON FEMORAL SPONT: NORMAL
BUN SERPL-MCNC: 15 MG/DL (ref 8–23)
BUN/CREAT SERPL: 15.6 (ref 7–25)
CALCIUM SPEC-SCNC: 8.3 MG/DL (ref 8.6–10.5)
CHLORIDE SERPL-SCNC: 106 MMOL/L (ref 98–107)
CO2 SERPL-SCNC: 31 MMOL/L (ref 22–29)
CREAT SERPL-MCNC: 0.96 MG/DL (ref 0.57–1)
DEPRECATED RDW RBC AUTO: 49.9 FL (ref 37–54)
EGFRCR SERPLBLD CKD-EPI 2021: 57.7 ML/MIN/1.73
EOSINOPHIL # BLD AUTO: 0.29 10*3/MM3 (ref 0–0.4)
EOSINOPHIL NFR BLD AUTO: 3.5 % (ref 0.3–6.2)
ERYTHROCYTE [DISTWIDTH] IN BLOOD BY AUTOMATED COUNT: 15.6 % (ref 12.3–15.4)
GLUCOSE SERPL-MCNC: 77 MG/DL (ref 65–99)
HCT VFR BLD AUTO: 27.7 % (ref 34–46.6)
HGB BLD-MCNC: 8.5 G/DL (ref 12–15.9)
IMM GRANULOCYTES # BLD AUTO: 0.06 10*3/MM3 (ref 0–0.05)
IMM GRANULOCYTES NFR BLD AUTO: 0.7 % (ref 0–0.5)
LYMPHOCYTES # BLD AUTO: 2.08 10*3/MM3 (ref 0.7–3.1)
LYMPHOCYTES NFR BLD AUTO: 24.9 % (ref 19.6–45.3)
MAGNESIUM SERPL-MCNC: 2.9 MG/DL (ref 1.6–2.4)
MCH RBC QN AUTO: 26.7 PG (ref 26.6–33)
MCHC RBC AUTO-ENTMCNC: 30.7 G/DL (ref 31.5–35.7)
MCV RBC AUTO: 87.1 FL (ref 79–97)
MONOCYTES # BLD AUTO: 0.92 10*3/MM3 (ref 0.1–0.9)
MONOCYTES NFR BLD AUTO: 11 % (ref 5–12)
NEUTROPHILS NFR BLD AUTO: 4.96 10*3/MM3 (ref 1.7–7)
NEUTROPHILS NFR BLD AUTO: 59.3 % (ref 42.7–76)
NRBC BLD AUTO-RTO: 0 /100 WBC (ref 0–0.2)
PHOSPHATE SERPL-MCNC: 3 MG/DL (ref 2.5–4.5)
PLATELET # BLD AUTO: 269 10*3/MM3 (ref 140–450)
PMV BLD AUTO: 10.3 FL (ref 6–12)
POTASSIUM SERPL-SCNC: 3.6 MMOL/L (ref 3.5–5.2)
RBC # BLD AUTO: 3.18 10*6/MM3 (ref 3.77–5.28)
SODIUM SERPL-SCNC: 144 MMOL/L (ref 136–145)
WBC NRBC COR # BLD AUTO: 8.36 10*3/MM3 (ref 3.4–10.8)

## 2024-06-08 PROCEDURE — 97116 GAIT TRAINING THERAPY: CPT

## 2024-06-08 PROCEDURE — 94799 UNLISTED PULMONARY SVC/PX: CPT

## 2024-06-08 PROCEDURE — 97530 THERAPEUTIC ACTIVITIES: CPT

## 2024-06-08 PROCEDURE — 93971 EXTREMITY STUDY: CPT

## 2024-06-08 PROCEDURE — 83735 ASSAY OF MAGNESIUM: CPT | Performed by: STUDENT IN AN ORGANIZED HEALTH CARE EDUCATION/TRAINING PROGRAM

## 2024-06-08 PROCEDURE — 94664 DEMO&/EVAL PT USE INHALER: CPT

## 2024-06-08 PROCEDURE — 84100 ASSAY OF PHOSPHORUS: CPT | Performed by: STUDENT IN AN ORGANIZED HEALTH CARE EDUCATION/TRAINING PROGRAM

## 2024-06-08 PROCEDURE — 94761 N-INVAS EAR/PLS OXIMETRY MLT: CPT

## 2024-06-08 PROCEDURE — 85025 COMPLETE CBC W/AUTO DIFF WBC: CPT | Performed by: STUDENT IN AN ORGANIZED HEALTH CARE EDUCATION/TRAINING PROGRAM

## 2024-06-08 PROCEDURE — 80048 BASIC METABOLIC PNL TOTAL CA: CPT | Performed by: STUDENT IN AN ORGANIZED HEALTH CARE EDUCATION/TRAINING PROGRAM

## 2024-06-08 PROCEDURE — 93971 EXTREMITY STUDY: CPT | Performed by: STUDENT IN AN ORGANIZED HEALTH CARE EDUCATION/TRAINING PROGRAM

## 2024-06-08 RX ADMIN — SENNOSIDES AND DOCUSATE SODIUM 2 TABLET: 50; 8.6 TABLET ORAL at 21:11

## 2024-06-08 RX ADMIN — APIXABAN 2.5 MG: 2.5 TABLET, FILM COATED ORAL at 21:12

## 2024-06-08 RX ADMIN — PREDNISOLONE ACETATE 1 DROP: 10 SUSPENSION/ DROPS OPHTHALMIC at 21:13

## 2024-06-08 RX ADMIN — DOXYCYCLINE 100 MG: 100 CAPSULE ORAL at 21:11

## 2024-06-08 RX ADMIN — SENNOSIDES AND DOCUSATE SODIUM 2 TABLET: 50; 8.6 TABLET ORAL at 08:50

## 2024-06-08 RX ADMIN — ARFORMOTEROL TARTRATE 15 MCG: 15 SOLUTION RESPIRATORY (INHALATION) at 19:32

## 2024-06-08 RX ADMIN — PANTOPRAZOLE SODIUM 40 MG: 40 TABLET, DELAYED RELEASE ORAL at 18:10

## 2024-06-08 RX ADMIN — APIXABAN 2.5 MG: 2.5 TABLET, FILM COATED ORAL at 08:50

## 2024-06-08 RX ADMIN — Medication 10 ML: at 08:53

## 2024-06-08 RX ADMIN — ARFORMOTEROL TARTRATE 15 MCG: 15 SOLUTION RESPIRATORY (INHALATION) at 06:37

## 2024-06-08 RX ADMIN — DIAZEPAM 2 MG: 2 TABLET ORAL at 21:11

## 2024-06-08 RX ADMIN — DILTIAZEM HYDROCHLORIDE 120 MG: 120 CAPSULE, COATED, EXTENDED RELEASE ORAL at 08:50

## 2024-06-08 RX ADMIN — PREDNISOLONE ACETATE 1 DROP: 10 SUSPENSION/ DROPS OPHTHALMIC at 05:44

## 2024-06-08 RX ADMIN — BUDESONIDE 0.5 MG: 0.5 INHALANT ORAL at 06:38

## 2024-06-08 RX ADMIN — ACETAMINOPHEN 1000 MG: 500 TABLET ORAL at 15:52

## 2024-06-08 RX ADMIN — PANTOPRAZOLE SODIUM 40 MG: 40 TABLET, DELAYED RELEASE ORAL at 05:44

## 2024-06-08 RX ADMIN — ACETAMINOPHEN 1000 MG: 500 TABLET ORAL at 05:44

## 2024-06-08 RX ADMIN — PREDNISOLONE ACETATE 1 DROP: 10 SUSPENSION/ DROPS OPHTHALMIC at 15:52

## 2024-06-08 RX ADMIN — DOXYCYCLINE 100 MG: 100 CAPSULE ORAL at 08:50

## 2024-06-08 RX ADMIN — FUROSEMIDE 40 MG: 40 TABLET ORAL at 08:49

## 2024-06-08 RX ADMIN — BUDESONIDE 0.5 MG: 0.5 INHALANT ORAL at 19:32

## 2024-06-08 RX ADMIN — DIAZEPAM 2 MG: 2 TABLET ORAL at 08:50

## 2024-06-08 RX ADMIN — Medication 10 ML: at 21:12

## 2024-06-08 RX ADMIN — ACETAMINOPHEN 1000 MG: 500 TABLET ORAL at 21:11

## 2024-06-08 NOTE — PLAN OF CARE
Goal Outcome Evaluation:      Pt progressing today.  Provider and this RN had conversation with pt about the need to work with PT and why its important.  Pt verbalized understanding and agreement.  When PT showed up, this RN went with PT and again discussed why pt needed to work with PT.  Pt verbalized understanding and per PT report, pt worked with them.  Pt having some internal family conflicted shared with this RN that she did not want family to get info, if they wanted it they could call her.

## 2024-06-08 NOTE — PLAN OF CARE
Goal Outcome Evaluation:  Plan of Care Reviewed With: patient        Progress: no change  Pt able to sleep most of the hs. Pt denies any pain or discomfort.

## 2024-06-08 NOTE — PLAN OF CARE
Goal Outcome Evaluation:  Plan of Care Reviewed With: patient        Progress: improving  Outcome Evaluation: Pt seen for PT tx today. Pt agreeable to participate and get up to the chair. Pt able to complete bed mobility with Rosario and stood with Rosario from EOB. Pt ambulated about 3ft to the chair with CGA. Pt had no unsteadiness or LOB noted but does fatigue quickly. Pt had min/moderate c/o left leg pain during activity. Will continue to follow pt and progress as able.

## 2024-06-08 NOTE — THERAPY TREATMENT NOTE
Patient Name: Vonnie Coppola  : 1935    MRN: [id number]                              Today's Date: 2024       Admit Date: 2024    Visit Dx:     ICD-10-CM ICD-9-CM   1. Leg hematoma, right, subsequent encounter  S80.11XD V58.89     924.5   2. Cellulitis of leg, right  L03.115 682.6   3. Chronic anemia  D64.9 285.9     Patient Active Problem List   Diagnosis    GI bleed    Anemia    HTN (hypertension)    History of breast cancer    Asthma    History of CVA (cerebrovascular accident)    Dehydration    Renal insufficiency    Pulmonary nodule    Adnexal cyst    Nausea    Atrial fibrillation    History of pulmonary embolism    Class 2 severe obesity with serious comorbidity in adult    Thrombocytopenia    Cellulitis of right leg    Acute cystitis    Leg hematoma, right, subsequent encounter     Past Medical History:   Diagnosis Date    Arthritis     Asthma     Atrial fibrillation     per pt's daughter.States hx of a-fib in the past when stressed or tired.    Breast cancer     LEFT BREAST CA, LUMPECTOMY & RADS    History of cataract     Hx of radiation therapy     APPROXIMATELY 40 TREATMENTS FOR LEFT BREAST CA    Hypertension     Pneumonia     Stroke      Past Surgical History:   Procedure Laterality Date    APPENDECTOMY      BLADDER SURGERY      BREAST BIOPSY Left     MALIGNANT    BREAST LUMPECTOMY Left     MALIGNANT    CATARACT EXTRACTION      COLONOSCOPY N/A 2024    Procedure: COLONOSCOPY to cecum with cold snare polypectomies;  Surgeon: Harshad Gaston MD;  Location: [facility] ENDOSCOPY;  Service: Gastroenterology;  Laterality: N/A;  pre- gi bleed, anemia  post- diverticulosis, polyps    ENDOSCOPY N/A 2024    Procedure: ESOPHAGOGASTRODUODENOSCOPY with biospy;  Surgeon: Harshad Gaston MD;  Location: [facility] ENDOSCOPY;  Service: Gastroenterology;  Laterality: N/A;  pre- gi bleed, anemia  post- erosive gastirits    GALLBLADDER SURGERY      HERNIA REPAIR      HYSTERECTOMY       INCISION AND DRAINAGE LEG Right 5/31/2024    Procedure: RIGHT LOWER EXTREMITY WOUND EXPLORATION, DEBRIDEMENT AND CONTROL OF BLEEDING;  Surgeon: Gerald Pedraza MD;  Location: [facility];  Service: General;  Laterality: Right;    LASIK      LYMPHADENECTOMY      OOPHORECTOMY        General Information       Row Name 06/08/24 1550          Physical Therapy Time and Intention    Document Type therapy note (daily note)  -     Mode of Treatment physical therapy;individual therapy  -       Row Name 06/08/24 1550          General Information    Patient Profile Reviewed yes  -     Existing Precautions/Restrictions fall  -       Row Name 06/08/24 1550          Cognition    Orientation Status (Cognition) oriented x 3  -       Row Name 06/08/24 1550          Safety Issues, Functional Mobility    Impairments Affecting Function (Mobility) pain;endurance/activity tolerance;strength;balance  -               User Key  (r) = Recorded By, (t) = Taken By, (c) = Cosigned By      Initials Name Provider Type     Yoly Holland PTA Physical Therapist Assistant                   Mobility       Row Name 06/08/24 1550          Bed Mobility    Supine-Sit [place] (Bed Mobility) minimum assist (75% patient effort);verbal cues;nonverbal cues (demo/gesture)  -     Sit-Supine [place] (Bed Mobility) not tested  -     Assistive Device (Bed Mobility) bed rails;head of bed elevated;draw sheet  -     Comment, (Bed Mobility) increased time sequencing cues needed. Pt needed assist with scooting to EOB  -       Row Name 06/08/24 1550          Sit-Stand Transfer    Sit-Stand [place] (Transfers) minimum assist (75% patient effort);verbal cues;nonverbal cues (demo/gesture)  -     Assistive Device (Sit-Stand Transfers) walker, front-wheeled  -       Row Name 06/08/24 1550          Gait/Stairs (Locomotion)    [place] Level (Gait) contact guard  -     Assistive Device (Gait) walker, front-wheeled  -      Distance in Feet (Gait) 3  -EB     Deviations/Abnormal Patterns (Gait) gait speed decreased;stride length decreased  -EB     Bilateral Gait Deviations forward flexed posture  -EB     Comment, (Gait/Stairs) pt took several steps to the chair with some c/o left LE pain.  -EB               User Key  (r) = Recorded By, (t) = Taken By, (c) = Cosigned By      Initials Name Provider Type    Yoly Jorge PTA Physical Therapist Assistant                   Obj/Interventions    No documentation.                  Goals/Plan    No documentation.                  Clinical Impression       Row Name 06/08/24 1552          Pain    Pain Location - Side/Orientation Left  -     Pain Location lower  -EB     Pain Location - extremity  -EB       Row Name 06/08/24 1552          Plan of Care Review    Plan of Care Reviewed With patient  -EB     Progress improving  -EB     Outcome Evaluation Pt seen for PT tx today. Pt agreeable to participate and get up to the chair. Pt able to complete bed mobility with Rosario and stood with Rosario from EOB. Pt ambulated about 3ft to the chair with CGA. Pt had no unsteadiness or LOB noted but does fatigue quickly. Pt had min/moderate c/o left leg pain during activity. Will continue to follow pt and progress as able.  -EB       Row Name 06/08/24 1552          Therapy Assessment/Plan (PT)    Therapy Frequency (PT) 5 times/wk  -EB       Row Name 06/08/24 1552          Positioning and Restraints    Pre-Treatment Position in bed  -EB     Post Treatment Position chair  -EB     In Chair reclined;call light within reach;encouraged to call for assist;exit alarm on  -EB               User Key  (r) = Recorded By, (t) = Taken By, (c) = Cosigned By      Initials Name Provider Type    Yoly Jorge PTA Physical Therapist Assistant                   Outcome Measures       Row Name 06/08/24 1555 06/08/24 0800       How much help from another person do you currently need...    Turning from your back to your side  while in flat bed without using bedrails? 3  -EB 3  -HT    Moving from lying on back to sitting on the side of a flat bed without bedrails? 3  -EB 2  -HT    Moving to and from a bed to a chair (including a wheelchair)? 3  -EB 2  -HT    Standing up from a chair using your arms (e.g., wheelchair, bedside chair)? 3  -EB 2  -HT    Climbing 3-5 steps with a railing? 1  -EB 1  -HT    To walk in hospital room? 2  -EB 1  -HT    AM-PAC 6 Clicks Score (PT) 15  -EB 11  -HT    Highest Level of Mobility Goal 4 --> Transfer to chair/commode  -EB 4 --> Transfer to chair/commode  -HT      Row Name 06/08/24 0500          How much help from another person do you currently need...    Turning from your back to your side while in flat bed without using bedrails? 3  -SK     Moving from lying on back to sitting on the side of a flat bed without bedrails? 2  -SK     Moving to and from a bed to a chair (including a wheelchair)? 2  -SK     Standing up from a chair using your arms (e.g., wheelchair, bedside chair)? 2  -SK     Climbing 3-5 steps with a railing? 1  -SK     To walk in hospital room? 2  -SK     AM-PAC 6 Clicks Score (PT) 12  -SK     Highest Level of Mobility Goal 4 --> Transfer to chair/commode  -SK               User Key  (r) = Recorded By, (t) = Taken By, (c) = Cosigned By      Initials Name Provider Type    SK Cynthia Mckeon RN Registered Nurse    Yoly Jorge PTA Physical Therapist Assistant    Arielle Chance RN Registered Nurse                                 Physical Therapy Education       Title: PT OT SLP Therapies (Done)       Topic: Physical Therapy (Done)       Point: Mobility training (Done)       Learning Progress Summary             Patient Acceptance, E,D, VU by EB at 6/8/2024 1555    Acceptance, E,TB, VU,NR by PH at 6/4/2024 1306    Acceptance, E,TB,D, VU,NR by PH at 6/3/2024 1019    Acceptance, E, VU,NR by ER at 6/1/2024 1222                         Point: Home exercise program (Done)       Learning  Progress Summary             Patient Acceptance, E,TB,D, VU,NR by PH at 6/3/2024 1019    Acceptance, E, VU,NR by ER at 6/1/2024 1222                         Point: Body mechanics (Done)       Learning Progress Summary             Patient Acceptance, E,TB, VU,NR by PH at 6/4/2024 1306    Acceptance, E,TB,D, VU,NR by PH at 6/3/2024 1019    Acceptance, E, VU,NR by ER at 6/1/2024 1222                         Point: Precautions (Done)       Learning Progress Summary             Patient Acceptance, E,TB, VU,NR by PH at 6/4/2024 1306    Acceptance, E,TB,D, VU,NR by PH at 6/3/2024 1019    Acceptance, E, VU,NR by ER at 6/1/2024 1222                                         User Key       Initials Effective Dates Name Provider Type Discipline     02/14/23 -  Yoly Holland PTA Physical Therapist Assistant PT    PH 06/16/21 -  Lorraine Corcoran PTA Physical Therapist Assistant PT    ER 10/15/23 -  Wendy Pineda PT Physical Therapist PT                  PT Recommendation and Plan     Plan of Care Reviewed With: patient  Progress: improving  Outcome Evaluation: Pt seen for PT tx today. Pt agreeable to participate and get up to the chair. Pt able to complete bed mobility with Rosario and stood with Rosario from EOB. Pt ambulated about 3ft to the chair with CGA. Pt had no unsteadiness or LOB noted but does fatigue quickly. Pt had min/moderate c/o left leg pain during activity. Will continue to follow pt and progress as able.     Time Calculation:         PT Charges       Row Name 06/08/24 1556             Time Calculation    Start Time 1524  -EB      Stop Time 1547  -EB      Time Calculation (min) 23 min  -EB      PT Received On 06/08/24  -EB      PT - Next Appointment 06/10/24  -EB         Time Calculation- PT    Total Timed Code Minutes- PT 23 minute(s)  -EB                User Key  (r) = Recorded By, (t) = Taken By, (c) = Cosigned By      Initials Name Provider Type    EB Yoly Holland PTA Physical Therapist Assistant                   Therapy Charges for Today       Code Description Service Date Service Provider Modifiers Qty    56588897813 HC PT THERAPEUTIC ACT EA 15 MIN 6/8/2024 Yoly Holland, ANNE GP 1    66014263079 HC GAIT TRAINING EA 15 MIN 6/8/2024 Yoly Holland PTA GP 1            PT G-Codes  Outcome Measure Options: AM-PAC 6 Clicks Basic Mobility (PT)  AM-PAC 6 Clicks Score (PT): 15       Yoly Holland PTA  6/8/2024

## 2024-06-08 NOTE — PROGRESS NOTES
Name: Vonnie Coppola ADMIT: 2024   : 1935  PCP: Christopher Leyva DO    MRN: [id number] LOS: 8 days   AGE/SEX: 88 y.o. female  ROOM: [facility]     Subjective   Subjective   Right leg pain is okay. Still with left leg pain still waiting on CT. No family at bedside today.     Objective   Objective   Vital Signs  Temp:  [97.2 °F (36.2 °C)-98.2 °F (36.8 °C)] 98.2 °F (36.8 °C)  Heart Rate:  [80-93] 89  Resp:  [18-20] 18  BP: (111-125)/(53-64) 116/53  SpO2:  [96 %-100 %] 96 %  on  Flow (L/min):  [3] 3;   Device (Oxygen Therapy): nasal cannula  Body mass index is 40.13 kg/m².    Physical Exam  Constitutional:       General: She is not in acute distress.     Appearance: She is not toxic-appearing.   HENT:      Head: Normocephalic and atraumatic.   Cardiovascular:      Rate and Rhythm: Normal rate and regular rhythm.   Pulmonary:      Effort: Pulmonary effort is normal. No respiratory distress.      Breath sounds: No wheezing or rhonchi.   Abdominal:      General: Bowel sounds are normal.      Palpations: Abdomen is soft.      Tenderness: There is no abdominal tenderness. There is no guarding or rebound.   Musculoskeletal:         General: Tenderness (LLE) present.      Comments: Right lower extremity dressing. Left leg with bruising and tenderness to touch but seems more swollen; minimal erythema noted   Skin:     General: Skin is warm and dry.   Neurological:      General: No focal deficit present.      Mental Status: She is alert and oriented to person, place, and time.   Psychiatric:         Mood and Affect: Mood normal.         Behavior: Behavior normal.     Results Review  I reviewed the patient's new clinical results.  Results from last 7 days   Lab Units 24  0501 24  0414 24  0544 24  1120   WBC 10*3/mm3 8.36 8.45 9.70 9.78   HEMOGLOBIN g/dL 8.5* 8.2* 8.3* 8.3*   PLATELETS 10*3/mm3 269 257 248 230     Results from last 7 days   Lab Units 24  0501 24  0414  "06/06/24  0544 06/04/24  0626   SODIUM mmol/L 144 143 142 142   POTASSIUM mmol/L 3.6 3.7 3.6 4.1   CHLORIDE mmol/L 106 105 105 107   CO2 mmol/L 31.0* 31.0* 29.4* 30.0*   BUN mg/dL 15 14 16 17   CREATININE mg/dL 0.96 1.01* 1.08* 1.02*   GLUCOSE mg/dL 77 88 102* 92     Lab Results   Component Value Date    ANIONGAP 7.0 06/08/2024     Estimated Creatinine Clearance: 51.7 mL/min (by C-G formula based on SCr of 0.96 mg/dL).   Lab Results   Component Value Date    EGFR 57.7 (L) 06/08/2024           Results from last 7 days   Lab Units 06/08/24  0501 06/07/24  0414 06/06/24  0544 06/04/24  0626   CALCIUM mg/dL 8.3* 8.3* 8.1* 7.9*   MAGNESIUM mg/dL 2.9* 2.1 2.0 2.3   PHOSPHORUS mg/dL 3.0 3.3 2.7 2.7           No results found for: \"HGBA1C\", \"POCGLU\"    CT Lower Extremity Left With Contrast    Result Date: 6/7/2024  1. Generalized edema within the subcutaneous fat about the left calf, ankle, foot greatest along the lateral ankle and dorsal midfoot and forefoot. Small, 13 mm nodular focus within the subtendinous fat anteromedial to the proximal and mid tibia likely small hematoma. No evidence for abscess or drainable collection. Findings are nonspecific and to be associate with cellulitis and this exhibits progression compared to the CT 05/31/2024. No evidence for fracture. 2. Advanced osteoarthritis left knee with chronic osteochondral body posterior to the intercondylar fossa.  Radiation dose reduction techniques were utilized, including automated exposure control and exposure modulation based on body size.   This report was finalized on 6/7/2024 3:36 PM by Dr. Archie Ortiz M.D on Workstation: [id number]       Scheduled Meds  acetaminophen, 1,000 mg, Oral, Q8H  apixaban, 2.5 mg, Oral, Q12H  arformoterol, 15 mcg, Nebulization, BID - RT  budesonide, 0.5 mg, Nebulization, BID - RT  diazePAM, 2 mg, Oral, BID  dilTIAZem CD, 120 mg, Oral, Q24H  doxycycline, 100 mg, Oral, Q12H  furosemide, 40 mg, Oral, Daily  pantoprazole, 40 " mg, Oral, BID AC  prednisoLONE acetate, 1 drop, Left Eye, Q8H  senna-docusate sodium, 2 tablet, Oral, BID  sodium chloride, 10 mL, Intravenous, Q12H    Continuous Infusions     PRN Meds    albuterol    senna-docusate sodium **AND** polyethylene glycol **AND** bisacodyl **AND** bisacodyl    dextromethorphan polistirex ER    diazePAM    Ibuprofen/Lidocaine/Baclofen 3/4/2% cream    melatonin    nitroglycerin    ondansetron ODT **OR** ondansetron    oxyCODONE    sodium chloride    sodium chloride       Diet  Diet: Regular/House, Cardiac; Healthy Heart (2-3 Na+); Fluid Consistency: Thin (IDDSI 0)       Assessment/Plan     Active Hospital Problems    Diagnosis  POA    **Leg hematoma, right, subsequent encounter [S80.11XD]  Not Applicable    History of pulmonary embolism [Z86.711]  Yes    Class 2 severe obesity with serious comorbidity in adult [E66.01]  Yes    Atrial fibrillation [I48.91]  Yes    History of CVA (cerebrovascular accident) [Z86.73]  Not Applicable    HTN (hypertension) [I10]  Yes    History of breast cancer [Z85.3]  Not Applicable    Asthma [J45.909]  Yes      Resolved Hospital Problems   No resolved problems to display.     88 y.o. female with a history of DVT, atrial fibrillation, hypertension, asthma, breast cancer, anxiety who presented to the ER with right lower extremity hematoma.     Blurry vision  Vitreous hemorrhage  Appreciate ophthalmology  Okay to stay on anticoagulation (dose reduced)  Started on steroid drops for KP  Needs to follow-up with retina specialist within the next 5 days-discussed with CCP appointment has been made for 6/10    Right lower extremity hematoma  Acute blood loss anemia  s/p 5/31/24 exploration of right lower extremity hematoma with control of bleeding, debridement of skin and subcutaneous tissue. Daily dressing changes  ok to restart Eliquis 6/3  Hemoglobin improved after transfusions (1 unit each on 6/2/2024 and on 6/3/2024)   Hemoglobin remains stable.    Left leg  pain, swelling, erythema, warmth  6/7 the leg has some erythema- started on doxycycline 6/7. No fever and WBC is okay however.  CT with edema- no abscess/drainable fluid collection  Check doppler to r/o DVT- no significant erythema- suspect related to holding lasix, chronic edema, low albumin/poor nutrition    Epistaxis  Outpatient ENT evaluation    History of DVT on Eliquis restarted-hematology and surgery both agreed to decrease apixaban dose  Atrial fibrillation continue diltiazem for rate control, Eliquis   Essential HTN diltiazem continued, Lasix restarted. BP controlled. Off IVF  Asthma continue home nebulizers. No wheezing today  Hx of breast cancer   Anxiety/benzodiazepine dependence-continue home Valium  History of constipation- bowel regimen.     Discharge  SNF probably a couple of days depending on left leg  Expected Discharge Date: 6/8/2024; Expected Discharge Time:     Discussed with patient and nursing staff    Anika Jackson MD  [facility]  06/08/24

## 2024-06-09 LAB
ANION GAP SERPL CALCULATED.3IONS-SCNC: 5 MMOL/L (ref 5–15)
BASOPHILS # BLD AUTO: 0.05 10*3/MM3 (ref 0–0.2)
BASOPHILS NFR BLD AUTO: 0.5 % (ref 0–1.5)
BUN SERPL-MCNC: 18 MG/DL (ref 8–23)
BUN/CREAT SERPL: 17.8 (ref 7–25)
CALCIUM SPEC-SCNC: 8.3 MG/DL (ref 8.6–10.5)
CHLORIDE SERPL-SCNC: 104 MMOL/L (ref 98–107)
CO2 SERPL-SCNC: 32 MMOL/L (ref 22–29)
CREAT SERPL-MCNC: 1.01 MG/DL (ref 0.57–1)
DEPRECATED RDW RBC AUTO: 51.1 FL (ref 37–54)
EGFRCR SERPLBLD CKD-EPI 2021: 54.3 ML/MIN/1.73
EOSINOPHIL # BLD AUTO: 0.28 10*3/MM3 (ref 0–0.4)
EOSINOPHIL NFR BLD AUTO: 3.1 % (ref 0.3–6.2)
ERYTHROCYTE [DISTWIDTH] IN BLOOD BY AUTOMATED COUNT: 15.9 % (ref 12.3–15.4)
GEN 5 2HR TROPONIN T REFLEX: 22 NG/L
GLUCOSE SERPL-MCNC: 96 MG/DL (ref 65–99)
HCT VFR BLD AUTO: 26.3 % (ref 34–46.6)
HGB BLD-MCNC: 7.9 G/DL (ref 12–15.9)
IMM GRANULOCYTES # BLD AUTO: 0.07 10*3/MM3 (ref 0–0.05)
IMM GRANULOCYTES NFR BLD AUTO: 0.8 % (ref 0–0.5)
LYMPHOCYTES # BLD AUTO: 2.1 10*3/MM3 (ref 0.7–3.1)
LYMPHOCYTES NFR BLD AUTO: 23.1 % (ref 19.6–45.3)
MAGNESIUM SERPL-MCNC: 2.4 MG/DL (ref 1.6–2.4)
MCH RBC QN AUTO: 26.4 PG (ref 26.6–33)
MCHC RBC AUTO-ENTMCNC: 30 G/DL (ref 31.5–35.7)
MCV RBC AUTO: 88 FL (ref 79–97)
MONOCYTES # BLD AUTO: 0.99 10*3/MM3 (ref 0.1–0.9)
MONOCYTES NFR BLD AUTO: 10.9 % (ref 5–12)
NEUTROPHILS NFR BLD AUTO: 5.61 10*3/MM3 (ref 1.7–7)
NEUTROPHILS NFR BLD AUTO: 61.6 % (ref 42.7–76)
NRBC BLD AUTO-RTO: 0 /100 WBC (ref 0–0.2)
PHOSPHATE SERPL-MCNC: 3.3 MG/DL (ref 2.5–4.5)
PLATELET # BLD AUTO: 288 10*3/MM3 (ref 140–450)
PMV BLD AUTO: 9.8 FL (ref 6–12)
POTASSIUM SERPL-SCNC: 3.5 MMOL/L (ref 3.5–5.2)
RBC # BLD AUTO: 2.99 10*6/MM3 (ref 3.77–5.28)
SODIUM SERPL-SCNC: 141 MMOL/L (ref 136–145)
TROPONIN T DELTA: 7 NG/L
TROPONIN T SERPL HS-MCNC: 15 NG/L
WBC NRBC COR # BLD AUTO: 9.1 10*3/MM3 (ref 3.4–10.8)

## 2024-06-09 PROCEDURE — 84100 ASSAY OF PHOSPHORUS: CPT | Performed by: STUDENT IN AN ORGANIZED HEALTH CARE EDUCATION/TRAINING PROGRAM

## 2024-06-09 PROCEDURE — 99231 SBSQ HOSP IP/OBS SF/LOW 25: CPT | Performed by: STUDENT IN AN ORGANIZED HEALTH CARE EDUCATION/TRAINING PROGRAM

## 2024-06-09 PROCEDURE — 93010 ELECTROCARDIOGRAM REPORT: CPT | Performed by: INTERNAL MEDICINE

## 2024-06-09 PROCEDURE — 84484 ASSAY OF TROPONIN QUANT: CPT | Performed by: STUDENT IN AN ORGANIZED HEALTH CARE EDUCATION/TRAINING PROGRAM

## 2024-06-09 PROCEDURE — 85025 COMPLETE CBC W/AUTO DIFF WBC: CPT | Performed by: STUDENT IN AN ORGANIZED HEALTH CARE EDUCATION/TRAINING PROGRAM

## 2024-06-09 PROCEDURE — 83735 ASSAY OF MAGNESIUM: CPT | Performed by: STUDENT IN AN ORGANIZED HEALTH CARE EDUCATION/TRAINING PROGRAM

## 2024-06-09 PROCEDURE — 93005 ELECTROCARDIOGRAM TRACING: CPT | Performed by: STUDENT IN AN ORGANIZED HEALTH CARE EDUCATION/TRAINING PROGRAM

## 2024-06-09 PROCEDURE — 80048 BASIC METABOLIC PNL TOTAL CA: CPT | Performed by: STUDENT IN AN ORGANIZED HEALTH CARE EDUCATION/TRAINING PROGRAM

## 2024-06-09 RX ORDER — DIAZEPAM 2 MG/1
2 TABLET ORAL EVERY 12 HOURS PRN
Status: DISCONTINUED | OUTPATIENT
Start: 2024-06-09 | End: 2024-06-12 | Stop reason: HOSPADM

## 2024-06-09 RX ORDER — OXYCODONE HYDROCHLORIDE 5 MG/1
5 TABLET ORAL EVERY 4 HOURS PRN
Status: DISCONTINUED | OUTPATIENT
Start: 2024-06-09 | End: 2024-06-12 | Stop reason: HOSPADM

## 2024-06-09 RX ADMIN — APIXABAN 2.5 MG: 2.5 TABLET, FILM COATED ORAL at 10:00

## 2024-06-09 RX ADMIN — PREDNISOLONE ACETATE 1 DROP: 10 SUSPENSION/ DROPS OPHTHALMIC at 06:24

## 2024-06-09 RX ADMIN — ACETAMINOPHEN 1000 MG: 500 TABLET ORAL at 06:24

## 2024-06-09 RX ADMIN — OXYCODONE HYDROCHLORIDE 5 MG: 5 TABLET ORAL at 15:24

## 2024-06-09 RX ADMIN — PREDNISOLONE ACETATE 1 DROP: 10 SUSPENSION/ DROPS OPHTHALMIC at 15:24

## 2024-06-09 RX ADMIN — Medication 10 ML: at 20:29

## 2024-06-09 RX ADMIN — PREDNISOLONE ACETATE 1 DROP: 10 SUSPENSION/ DROPS OPHTHALMIC at 20:29

## 2024-06-09 RX ADMIN — ACETAMINOPHEN 1000 MG: 500 TABLET ORAL at 14:46

## 2024-06-09 RX ADMIN — PANTOPRAZOLE SODIUM 40 MG: 40 TABLET, DELAYED RELEASE ORAL at 17:30

## 2024-06-09 RX ADMIN — PANTOPRAZOLE SODIUM 40 MG: 40 TABLET, DELAYED RELEASE ORAL at 06:24

## 2024-06-09 RX ADMIN — FUROSEMIDE 40 MG: 40 TABLET ORAL at 10:00

## 2024-06-09 RX ADMIN — DILTIAZEM HYDROCHLORIDE 120 MG: 120 CAPSULE, COATED, EXTENDED RELEASE ORAL at 10:00

## 2024-06-09 RX ADMIN — DIAZEPAM 2 MG: 2 TABLET ORAL at 10:00

## 2024-06-09 RX ADMIN — APIXABAN 2.5 MG: 2.5 TABLET, FILM COATED ORAL at 20:28

## 2024-06-09 RX ADMIN — SENNOSIDES AND DOCUSATE SODIUM 2 TABLET: 50; 8.6 TABLET ORAL at 10:00

## 2024-06-09 RX ADMIN — DOXYCYCLINE 100 MG: 100 CAPSULE ORAL at 20:28

## 2024-06-09 RX ADMIN — DIAZEPAM 2 MG: 2 TABLET ORAL at 20:28

## 2024-06-09 RX ADMIN — SENNOSIDES AND DOCUSATE SODIUM 2 TABLET: 50; 8.6 TABLET ORAL at 20:28

## 2024-06-09 RX ADMIN — DOXYCYCLINE 100 MG: 100 CAPSULE ORAL at 10:00

## 2024-06-09 RX ADMIN — ACETAMINOPHEN 1000 MG: 500 TABLET ORAL at 20:30

## 2024-06-09 RX ADMIN — Medication 10 ML: at 10:02

## 2024-06-09 RX ADMIN — DIAZEPAM 2 MG: 2 TABLET ORAL at 14:46

## 2024-06-09 NOTE — PLAN OF CARE
Goal Outcome Evaluation:      Pt is slowly making progress.  Needs lots encouragement and reassurance.  Lots of tears and anxiety, fighting with family again.  C/O CP just to the left of the sternum, non-radiating, sharp, with SOB.  Stat EKG and Troponin ordered, EKG showed prolonged NH interval, troponin was 16.  Surgery came up to evaluate pts wound, per surgery its ok, the excess bleeding and drainage is due to the eliquis, surgery did the dressing change today.

## 2024-06-09 NOTE — PLAN OF CARE
Problem: Skin Injury Risk Increased  Goal: Skin Health and Integrity  Outcome: Ongoing, Progressing  Intervention: Optimize Skin Protection  Recent Flowsheet Documentation  Taken 6/8/2024 2114 by Cynthia Mckeon, RN  Pressure Reduction Techniques: frequent weight shift encouraged  Pressure Reduction Devices: specialty bed utilized   Goal Outcome Evaluation:  Plan of Care Reviewed With: patient        Progress: no change

## 2024-06-09 NOTE — PROGRESS NOTES
Called by RN asking for evaluation of patient's right lower extremity wound.  RN states the wound appears to be bleeding with a lot of drainage through the gauze bandages.    Briefly, patient is an 88-year-old lady on Eliquis for A-fib and prior pulmonary embolus as well as bilateral lower extremity edema who underwent right lower extremity wound exploration with debridement of skin and subcutaneous fat on 5/31/2024.  Postoperatively she has been undergoing wet-to-dry dressing changes for her wound.    WBC within normal limits. Hgb 7.9, has been in the low 8s the last few days.  On exam, the patient is AVSS.  There is serous fluid noted on her pillow where her leg is supported.  I removed her dressings and note serosanguinous fluid on them, with right medial calf wound that has healthy appearing beefy red granulation tissue without any active bleeding.  There is a small amount of eschar consistent with cautery use.  There is minimal hyperpigmentation of the surrounding skin which appears consistent with her history of decompressed hematoma.  She has palpable right DP pulse which is strong.  On exam there is no concern for infection or bleeding.    Recommend continue daily dressing changes as previously ordered.  Follow-up with Dr. Pedraza in 1 to 2 weeks.    Emilia Sesay M.D.  General, Robotic, and Endoscopic Surgery  [facility]    [address], [address]  [place], [place], [address]  P: [phone]  F: [phone]

## 2024-06-09 NOTE — PROGRESS NOTES
Name: Vonnie Coppola ADMIT: 2024   : 1935  PCP: Christopher Leyva DO    MRN: [id number] LOS: 9 days   AGE/SEX: 88 y.o. female  ROOM: [facility]     Subjective   Subjective   Mood seems down today. She does say her left leg feels a little better today. Still with edema but better. Per RN- right lower extremity wound with bleeding/seeping through bandages.      Objective   Objective   Vital Signs  Temp:  [98.2 °F (36.8 °C)] 98.2 °F (36.8 °C)  Heart Rate:  [70-89] 70  Resp:  [18] 18  BP: (116-130)/(53-67) 128/58  SpO2:  [95 %-100 %] 100 %  on  Flow (L/min):  [3] 3;   Device (Oxygen Therapy): nasal cannula  Body mass index is 39.73 kg/m².    Physical Exam  Constitutional:       General: She is not in acute distress.     Appearance: She is not toxic-appearing.   HENT:      Head: Normocephalic and atraumatic.   Cardiovascular:      Rate and Rhythm: Normal rate and regular rhythm.   Pulmonary:      Effort: Pulmonary effort is normal. No respiratory distress.      Breath sounds: No wheezing or rhonchi.   Abdominal:      General: Bowel sounds are normal.      Palpations: Abdomen is soft.      Tenderness: There is no abdominal tenderness. There is no guarding or rebound.   Musculoskeletal:         General: Tenderness (LLE) present.      Comments: Right lower extremity dressing. Left leg with bruising and tenderness to touch but seems more swollen; minimal erythema noted- swelling improved from yesterday   Skin:     General: Skin is warm and dry.   Neurological:      General: No focal deficit present.      Mental Status: She is alert and oriented to person, place, and time.   Psychiatric:         Mood and Affect: Mood normal.         Behavior: Behavior normal.     Results Review  I reviewed the patient's new clinical results.  Results from last 7 days   Lab Units 24  0338 24  0501 24  0414 24  0544   WBC 10*3/mm3 9.10 8.36 8.45 9.70   HEMOGLOBIN g/dL 7.9* 8.5* 8.2* 8.3*   PLATELETS  "10*3/mm3 288 269 257 248     Results from last 7 days   Lab Units 06/09/24  0338 06/08/24  0501 06/07/24  0414 06/06/24  0544   SODIUM mmol/L 141 144 143 142   POTASSIUM mmol/L 3.5 3.6 3.7 3.6   CHLORIDE mmol/L 104 106 105 105   CO2 mmol/L 32.0* 31.0* 31.0* 29.4*   BUN mg/dL 18 15 14 16   CREATININE mg/dL 1.01* 0.96 1.01* 1.08*   GLUCOSE mg/dL 96 77 88 102*     Lab Results   Component Value Date    ANIONGAP 5.0 06/09/2024     Estimated Creatinine Clearance: 48.9 mL/min (A) (by C-G formula based on SCr of 1.01 mg/dL (H)).   Lab Results   Component Value Date    EGFR 54.3 (L) 06/09/2024           Results from last 7 days   Lab Units 06/09/24 0338 06/08/24 0501 06/07/24 0414 06/06/24  0544   CALCIUM mg/dL 8.3* 8.3* 8.3* 8.1*   MAGNESIUM mg/dL 2.4 2.9* 2.1 2.0   PHOSPHORUS mg/dL 3.3 3.0 3.3 2.7           No results found for: \"HGBA1C\", \"POCGLU\"    No radiology results for the last day    Scheduled Meds  acetaminophen, 1,000 mg, Oral, Q8H  apixaban, 2.5 mg, Oral, Q12H  arformoterol, 15 mcg, Nebulization, BID - RT  budesonide, 0.5 mg, Nebulization, BID - RT  diazePAM, 2 mg, Oral, BID  dilTIAZem CD, 120 mg, Oral, Q24H  doxycycline, 100 mg, Oral, Q12H  furosemide, 40 mg, Oral, Daily  pantoprazole, 40 mg, Oral, BID AC  prednisoLONE acetate, 1 drop, Left Eye, Q8H  senna-docusate sodium, 2 tablet, Oral, BID  sodium chloride, 10 mL, Intravenous, Q12H    Continuous Infusions     PRN Meds  •  albuterol  •  senna-docusate sodium **AND** polyethylene glycol **AND** bisacodyl **AND** bisacodyl  •  dextromethorphan polistirex ER  •  diazePAM  •  Ibuprofen/Lidocaine/Baclofen 3/4/2% cream  •  melatonin  •  nitroglycerin  •  ondansetron ODT **OR** ondansetron  •  sodium chloride  •  sodium chloride       Diet  Diet: Regular/House, Cardiac; Healthy Heart (2-3 Na+); Fluid Consistency: Thin (IDDSI 0)       Assessment/Plan     Active Hospital Problems    Diagnosis  POA   • **Leg hematoma, right, subsequent encounter [S80.11XD]  Not " Applicable   • History of pulmonary embolism [Z86.711]  Yes   • Class 2 severe obesity with serious comorbidity in adult [E66.01]  Yes   • Atrial fibrillation [I48.91]  Yes   • History of CVA (cerebrovascular accident) [Z86.73]  Not Applicable   • HTN (hypertension) [I10]  Yes   • History of breast cancer [Z85.3]  Not Applicable   • Asthma [J45.909]  Yes      Resolved Hospital Problems   No resolved problems to display.     88 y.o. female with a history of DVT, atrial fibrillation, hypertension, asthma, breast cancer, anxiety who presented to the ER with right lower extremity hematoma.     Blurry vision  Vitreous hemorrhage  Appreciate ophthalmology  Okay to stay on anticoagulation (dose reduced)  Started on steroid drops for KP  Needs to follow-up with retina specialist within the next 5 days-discussed with CCP appointment has been made for 6/10- will need to d/w CCP- given she is not discharging.     Right lower extremity hematoma  Acute blood loss anemia  s/p 5/31/24 exploration of right lower extremity hematoma with control of bleeding, debridement of skin and subcutaneous tissue. Daily dressing changes  ok to restart Eliquis 6/3  Hemoglobin improved after transfusions (1 unit each on 6/2/2024 and on 6/3/2024)   Hemoglobin remains stable  - have asked RN to page surgery to eval surgical site given persistent bleeding     Left leg pain, swelling, erythema, warmth  6/7 the leg has some erythema- started on doxycycline 6/7. No fever and WBC is okay however.  CT with edema- no abscess/drainable fluid collection  Check doppler to r/o DVT- no significant erythema- suspect related to holding lasix, chronic edema, low albumin/poor nutrition  Doppler negative    Epistaxis  Outpatient ENT evaluation    History of DVT on Eliquis restarted-hematology and surgery both agreed to decrease apixaban dose  Atrial fibrillation continue diltiazem for rate control, Eliquis   Essential HTN diltiazem continued, Lasix restarted. BP  controlled. Off IVF  Asthma continue home nebulizers. No wheezing today  Hx of breast cancer   Anxiety/benzodiazepine dependence-continue home Valium  History of constipation- bowel regimen.     Discharge  SNF probably a couple of days depending on left leg and surgery eval of RLE wound.     Expected Discharge Date: 6/8/2024; Expected Discharge Time:     Discussed with patient and nursing staff    Anika Jackson MD  [facility]  06/09/24

## 2024-06-10 LAB
ANION GAP SERPL CALCULATED.3IONS-SCNC: 6 MMOL/L (ref 5–15)
BASOPHILS # BLD AUTO: 0.04 10*3/MM3 (ref 0–0.2)
BASOPHILS NFR BLD AUTO: 0.5 % (ref 0–1.5)
BUN SERPL-MCNC: 18 MG/DL (ref 8–23)
BUN/CREAT SERPL: 20.9 (ref 7–25)
CALCIUM SPEC-SCNC: 8.2 MG/DL (ref 8.6–10.5)
CHLORIDE SERPL-SCNC: 105 MMOL/L (ref 98–107)
CO2 SERPL-SCNC: 32 MMOL/L (ref 22–29)
CREAT SERPL-MCNC: 0.86 MG/DL (ref 0.57–1)
DEPRECATED RDW RBC AUTO: 49.1 FL (ref 37–54)
EGFRCR SERPLBLD CKD-EPI 2021: 65.9 ML/MIN/1.73
EOSINOPHIL # BLD AUTO: 0.27 10*3/MM3 (ref 0–0.4)
EOSINOPHIL NFR BLD AUTO: 3.5 % (ref 0.3–6.2)
ERYTHROCYTE [DISTWIDTH] IN BLOOD BY AUTOMATED COUNT: 15.5 % (ref 12.3–15.4)
GLUCOSE SERPL-MCNC: 88 MG/DL (ref 65–99)
HCT VFR BLD AUTO: 25.7 % (ref 34–46.6)
HGB BLD-MCNC: 7.9 G/DL (ref 12–15.9)
IMM GRANULOCYTES # BLD AUTO: 0.06 10*3/MM3 (ref 0–0.05)
IMM GRANULOCYTES NFR BLD AUTO: 0.8 % (ref 0–0.5)
LYMPHOCYTES # BLD AUTO: 1.97 10*3/MM3 (ref 0.7–3.1)
LYMPHOCYTES NFR BLD AUTO: 25.4 % (ref 19.6–45.3)
MAGNESIUM SERPL-MCNC: 2.3 MG/DL (ref 1.6–2.4)
MCH RBC QN AUTO: 26.6 PG (ref 26.6–33)
MCHC RBC AUTO-ENTMCNC: 30.7 G/DL (ref 31.5–35.7)
MCV RBC AUTO: 86.5 FL (ref 79–97)
MONOCYTES # BLD AUTO: 0.83 10*3/MM3 (ref 0.1–0.9)
MONOCYTES NFR BLD AUTO: 10.7 % (ref 5–12)
NEUTROPHILS NFR BLD AUTO: 4.6 10*3/MM3 (ref 1.7–7)
NEUTROPHILS NFR BLD AUTO: 59.1 % (ref 42.7–76)
NRBC BLD AUTO-RTO: 0 /100 WBC (ref 0–0.2)
PHOSPHATE SERPL-MCNC: 3.5 MG/DL (ref 2.5–4.5)
PLATELET # BLD AUTO: 263 10*3/MM3 (ref 140–450)
PMV BLD AUTO: 9.6 FL (ref 6–12)
POTASSIUM SERPL-SCNC: 3.5 MMOL/L (ref 3.5–5.2)
QT INTERVAL: 398 MS
QTC INTERVAL: 454 MS
RBC # BLD AUTO: 2.97 10*6/MM3 (ref 3.77–5.28)
SODIUM SERPL-SCNC: 143 MMOL/L (ref 136–145)
WBC NRBC COR # BLD AUTO: 7.77 10*3/MM3 (ref 3.4–10.8)

## 2024-06-10 PROCEDURE — 94760 N-INVAS EAR/PLS OXIMETRY 1: CPT

## 2024-06-10 PROCEDURE — 94799 UNLISTED PULMONARY SVC/PX: CPT

## 2024-06-10 PROCEDURE — 97110 THERAPEUTIC EXERCISES: CPT

## 2024-06-10 PROCEDURE — 80048 BASIC METABOLIC PNL TOTAL CA: CPT | Performed by: STUDENT IN AN ORGANIZED HEALTH CARE EDUCATION/TRAINING PROGRAM

## 2024-06-10 PROCEDURE — 84100 ASSAY OF PHOSPHORUS: CPT | Performed by: STUDENT IN AN ORGANIZED HEALTH CARE EDUCATION/TRAINING PROGRAM

## 2024-06-10 PROCEDURE — 97530 THERAPEUTIC ACTIVITIES: CPT

## 2024-06-10 PROCEDURE — 83735 ASSAY OF MAGNESIUM: CPT | Performed by: STUDENT IN AN ORGANIZED HEALTH CARE EDUCATION/TRAINING PROGRAM

## 2024-06-10 PROCEDURE — 85025 COMPLETE CBC W/AUTO DIFF WBC: CPT | Performed by: STUDENT IN AN ORGANIZED HEALTH CARE EDUCATION/TRAINING PROGRAM

## 2024-06-10 PROCEDURE — 94761 N-INVAS EAR/PLS OXIMETRY MLT: CPT

## 2024-06-10 PROCEDURE — 94664 DEMO&/EVAL PT USE INHALER: CPT

## 2024-06-10 PROCEDURE — 97166 OT EVAL MOD COMPLEX 45 MIN: CPT

## 2024-06-10 RX ADMIN — PANTOPRAZOLE SODIUM 40 MG: 40 TABLET, DELAYED RELEASE ORAL at 17:52

## 2024-06-10 RX ADMIN — BUDESONIDE 0.5 MG: 0.5 INHALANT ORAL at 06:52

## 2024-06-10 RX ADMIN — ACETAMINOPHEN 1000 MG: 500 TABLET ORAL at 23:13

## 2024-06-10 RX ADMIN — DOXYCYCLINE 100 MG: 100 CAPSULE ORAL at 08:28

## 2024-06-10 RX ADMIN — SENNOSIDES AND DOCUSATE SODIUM 2 TABLET: 50; 8.6 TABLET ORAL at 20:35

## 2024-06-10 RX ADMIN — DILTIAZEM HYDROCHLORIDE 120 MG: 120 CAPSULE, COATED, EXTENDED RELEASE ORAL at 08:28

## 2024-06-10 RX ADMIN — DOXYCYCLINE 100 MG: 100 CAPSULE ORAL at 20:35

## 2024-06-10 RX ADMIN — ARFORMOTEROL TARTRATE 15 MCG: 15 SOLUTION RESPIRATORY (INHALATION) at 06:52

## 2024-06-10 RX ADMIN — PANTOPRAZOLE SODIUM 40 MG: 40 TABLET, DELAYED RELEASE ORAL at 05:46

## 2024-06-10 RX ADMIN — Medication 10 ML: at 20:52

## 2024-06-10 RX ADMIN — BUDESONIDE 0.5 MG: 0.5 INHALANT ORAL at 19:26

## 2024-06-10 RX ADMIN — SENNOSIDES AND DOCUSATE SODIUM 2 TABLET: 50; 8.6 TABLET ORAL at 08:28

## 2024-06-10 RX ADMIN — APIXABAN 2.5 MG: 2.5 TABLET, FILM COATED ORAL at 20:35

## 2024-06-10 RX ADMIN — FUROSEMIDE 40 MG: 40 TABLET ORAL at 08:28

## 2024-06-10 RX ADMIN — DIAZEPAM 2 MG: 2 TABLET ORAL at 08:28

## 2024-06-10 RX ADMIN — OXYCODONE HYDROCHLORIDE 5 MG: 5 TABLET ORAL at 20:35

## 2024-06-10 RX ADMIN — ARFORMOTEROL TARTRATE 15 MCG: 15 SOLUTION RESPIRATORY (INHALATION) at 19:26

## 2024-06-10 RX ADMIN — ACETAMINOPHEN 1000 MG: 500 TABLET ORAL at 17:52

## 2024-06-10 RX ADMIN — APIXABAN 2.5 MG: 2.5 TABLET, FILM COATED ORAL at 08:28

## 2024-06-10 RX ADMIN — ACETAMINOPHEN 1000 MG: 500 TABLET ORAL at 05:46

## 2024-06-10 RX ADMIN — OXYCODONE HYDROCHLORIDE 5 MG: 5 TABLET ORAL at 14:37

## 2024-06-10 RX ADMIN — Medication 10 ML: at 08:29

## 2024-06-10 RX ADMIN — DIAZEPAM 2 MG: 2 TABLET ORAL at 20:34

## 2024-06-10 RX ADMIN — PREDNISOLONE ACETATE 1 DROP: 10 SUSPENSION/ DROPS OPHTHALMIC at 21:04

## 2024-06-10 RX ADMIN — PREDNISOLONE ACETATE 1 DROP: 10 SUSPENSION/ DROPS OPHTHALMIC at 05:45

## 2024-06-10 RX ADMIN — PREDNISOLONE ACETATE 1 DROP: 10 SUSPENSION/ DROPS OPHTHALMIC at 14:57

## 2024-06-10 NOTE — PLAN OF CARE
Goal Outcome Evaluation:  Plan of Care Reviewed With: patient           Outcome Evaluation: Pt seen for OT eval this afternoon. Admitted with RLE hematoma s/p debridement and wound exploration. PMHx includes breast cancer, asthma. Today she was sitting [facility] upon arrival for session with family present. She reports living with her daughter and being independent with ADLs (supervision for showers). Today she reports pain in her RUE and RLE. RUE very limited shoulder flexion. Performed table slides with RUE x5. HOA BOLTON. anticipate mod A for toileting at this time due to decreased use of RUE. Max A for LBD. pt presents with decreased ROM, strength, endurance, functional mobility and ADL performance. rec SNF at [facility]. Seated in chair at the end of session with family present.      Anticipated Discharge Disposition (OT): skilled nursing facility

## 2024-06-10 NOTE — THERAPY TREATMENT NOTE
Patient Name: Vonnie Coppola  : 1935    MRN: [id number]                              Today's Date: 6/10/2024       Admit Date: 2024    Visit Dx:     ICD-10-CM ICD-9-CM   1. Leg hematoma, right, subsequent encounter  S80.11XD V58.89     924.5   2. Cellulitis of leg, right  L03.115 682.6   3. Chronic anemia  D64.9 285.9     Patient Active Problem List   Diagnosis    GI bleed    Anemia    HTN (hypertension)    History of breast cancer    Asthma    History of CVA (cerebrovascular accident)    Dehydration    Renal insufficiency    Pulmonary nodule    Adnexal cyst    Nausea    Atrial fibrillation    History of pulmonary embolism    Class 2 severe obesity with serious comorbidity in adult    Thrombocytopenia    Cellulitis of right leg    Acute cystitis    Leg hematoma, right, subsequent encounter     Past Medical History:   Diagnosis Date    Arthritis     Asthma     Atrial fibrillation     per pt's daughter.States hx of a-fib in the past when stressed or tired.    Breast cancer     LEFT BREAST CA, LUMPECTOMY & RADS    History of cataract     Hx of radiation therapy     APPROXIMATELY 40 TREATMENTS FOR LEFT BREAST CA    Hypertension     Pneumonia     Stroke      Past Surgical History:   Procedure Laterality Date    APPENDECTOMY      BLADDER SURGERY      BREAST BIOPSY Left     MALIGNANT    BREAST LUMPECTOMY Left     MALIGNANT    CATARACT EXTRACTION      COLONOSCOPY N/A 2024    Procedure: COLONOSCOPY to cecum with cold snare polypectomies;  Surgeon: Harshad Gaston MD;  Location: [facility] ENDOSCOPY;  Service: Gastroenterology;  Laterality: N/A;  pre- gi bleed, anemia  post- diverticulosis, polyps    ENDOSCOPY N/A 2024    Procedure: ESOPHAGOGASTRODUODENOSCOPY with biospy;  Surgeon: Harshad Gaston MD;  Location: [facility] ENDOSCOPY;  Service: Gastroenterology;  Laterality: N/A;  pre- gi bleed, anemia  post- erosive gastirits    GALLBLADDER SURGERY      HERNIA REPAIR       HYSTERECTOMY      INCISION AND DRAINAGE LEG Right 5/31/2024    Procedure: RIGHT LOWER EXTREMITY WOUND EXPLORATION, DEBRIDEMENT AND CONTROL OF BLEEDING;  Surgeon: Gerald Pedraza MD;  Location: [facility];  Service: General;  Laterality: Right;    LASIK      LYMPHADENECTOMY      OOPHORECTOMY        General Information       Row Name 06/10/24 1147          Physical Therapy Time and Intention    Document Type therapy note (daily note)  -MG     Mode of Treatment physical therapy;individual therapy  -MG       Row Name 06/10/24 1147          General Information    Patient Profile Reviewed yes  -MG     Existing Precautions/Restrictions fall  -MG     Barriers to Rehab medically complex  -MG       Row Name 06/10/24 1147          Cognition    Orientation Status (Cognition) oriented x 3  -MG       Row Name 06/10/24 1147          Safety Issues, Functional Mobility    Impairments Affecting Function (Mobility) pain;endurance/activity tolerance;strength;balance  -MG     Comment, Safety Issues/Impairments (Mobility) Gait belt and non-skid socks donned.  -MG               User Key  (r) = Recorded By, (t) = Taken By, (c) = Cosigned By      Initials Name Provider Type    MG Mariaelena Daley, PT Physical Therapist                   Mobility       Row Name 06/10/24 1147          Bed Mobility    Supine-Sit [place] (Bed Mobility) standby assist;verbal cues  -MG     Assistive Device (Bed Mobility) bed rails;head of bed elevated  -MG     Comment, (Bed Mobility) Increased time to complete. Once sitting up pt pulling on this PTs hand to complete scooting fwd/out.  -MG       Row Name 06/10/24 1148          Sit-Stand Transfer    Sit-Stand [place] (Transfers) minimum assist (75% patient effort);verbal cues;nonverbal cues (demo/gesture)  -MG     Assistive Device (Sit-Stand Transfers) walker, front-wheeled  -MG     Comment, (Sit-Stand Transfer) Cues for hand placement. Increased time to complete.  -MG       Row Name 06/10/24 1147           Gait/Stairs (Locomotion)    [place] Level (Gait) contact guard  -MG     Assistive Device (Gait) walker, front-wheeled  -MG     Distance in Feet (Gait) 4  -MG     Deviations/Abnormal Patterns (Gait) gait speed decreased;stride length decreased;weight shifting decreased  -MG     Bilateral Gait Deviations forward flexed posture  -MG     Comment, (Gait/Stairs) Pt amb few feet to recliner chair. Difficulty maintaining weight shift on either LE d/t pain. No knee buckling or LOB.  -MG               User Key  (r) = Recorded By, (t) = Taken By, (c) = Cosigned By      Initials Name Provider Type    Mariaelena Tyson, PT Physical Therapist                   Obj/Interventions       Row Name 06/10/24 1150          Motor Skills    Therapeutic Exercise other (see comments)  AP, LAQ, HF x10  -MG       Row Name 06/10/24 1150          Balance    Balance Assessment standing dynamic balance  -MG     Static Sitting Balance supervision  -MG     Position, Sitting Balance unsupported;sitting edge of bed;supported;sitting in chair  -MG     Static Standing Balance contact guard  -MG     Dynamic Standing Balance contact guard  -MG     Position/Device Used, Standing Balance supported;walker, front-wheeled  -MG     Comment, Balance No LOB or knee buckling.  -MG               User Key  (r) = Recorded By, (t) = Taken By, (c) = Cosigned By      Initials Name Provider Type    Mariaelena Tyson, PT Physical Therapist                   Goals/Plan    No documentation.                  Clinical Impression       Row Name 06/10/24 1151          Pain    Pretreatment Pain Rating 5/10  -MG     Posttreatment Pain Rating 6/10  -MG     Pain Location - Side/Orientation Bilateral  -MG     Pain Location lower  -MG     Pain Location - extremity  -MG     Pre/Posttreatment Pain Comment Reports receiving bath and bandage change shortly before PT arrival and states pain level has been better than what it is currently.  -MG     Pain Intervention(s)  Medication (See MAR);Ambulation/increased activity;Elevated;Repositioned;Rest  -MG       Row Name 06/10/24 1151          Plan of Care Review    Plan of Care Reviewed With patient  -MG     Progress no change  -MG     Outcome Evaluation Pt seen for PT tx this AM and tolerated the session well. Pt politely declined ambulating around the bed to the chair d/t pain, quickness to fatigue and stating it is easier to get up on the L side. Today, pt was SBA for bed mobility w/ increased time, Rosario to come to standing to RW and CGA for ambulation of 4' to the recliner chair w/ a RW. Once seated in the chair pt performed LE ther-ex to end the session. Pt was reclined with her LEs elevated and heels elevated on a pillow w/ socks removed d/t swelling. PT will cont to follow.  -MG       Row Name 06/10/24 1151          Therapy Assessment/Plan (PT)    Rehab Potential (PT) good, to achieve stated therapy goals  -MG     Criteria for Skilled Interventions Met (PT) yes  -MG     Therapy Frequency (PT) 5 times/wk  -MG       Row Name 06/10/24 1151          Vital Signs    O2 Delivery Pre Treatment supplemental O2  -MG     O2 Delivery Intra Treatment supplemental O2  -MG     O2 Delivery Post Treatment supplemental O2  -MG     Pre Patient Position Supine  -MG     Intra Patient Position Standing  -MG     Post Patient Position Sitting  -MG       Row Name 06/10/24 1151          Positioning and Restraints    Pre-Treatment Position in bed  -MG     Post Treatment Position chair  -MG     In Chair notified nsg;reclined;call light within reach;encouraged to call for assist;exit alarm on;heels elevated;legs elevated  -MG               User Key  (r) = Recorded By, (t) = Taken By, (c) = Cosigned By      Initials Name Provider Type    MG Mariaelena Daley, PT Physical Therapist                   Outcome Measures       Row Name 06/10/24 1155 06/10/24 0500       How much help from another person do you currently need...    Turning from your back to your side  while in flat bed without using bedrails? 3  -MG 3  -SK    Moving from lying on back to sitting on the side of a flat bed without bedrails? 3  -MG 3  -SK    Moving to and from a bed to a chair (including a wheelchair)? 3  -MG 2  -SK    Standing up from a chair using your arms (e.g., wheelchair, bedside chair)? 3  -MG 2  -SK    Climbing 3-5 steps with a railing? 2  -MG 2  -SK    To walk in hospital room? 3  -MG 3  -SK    AM-PAC 6 Clicks Score (PT) 17  -MG 15  -SK    Highest Level of Mobility Goal 5 --> Static standing  -MG 4 --> Transfer to chair/commode  -SK              User Key  (r) = Recorded By, (t) = Taken By, (c) = Cosigned By      Initials Name Provider Type    Cynthia Franco, RN Registered Nurse    Mariaelena Tyson, PT Physical Therapist                                 Physical Therapy Education       Title: PT OT SLP Therapies (Done)       Topic: Physical Therapy (Done)       Point: Mobility training (Done)       Learning Progress Summary             Patient Acceptance, E, VU,NR by MG at 6/10/2024 1155    Acceptance, E,D, VU by EB at 6/8/2024 1555    Acceptance, E,TB, VU,NR by PH at 6/4/2024 1306    Acceptance, E,TB,D, VU,NR by PH at 6/3/2024 1019    Acceptance, E, VU,NR by ER at 6/1/2024 1222                         Point: Home exercise program (Done)       Learning Progress Summary             Patient Acceptance, E,TB,D, VU,NR by PH at 6/3/2024 1019    Acceptance, E, VU,NR by ER at 6/1/2024 1222                         Point: Body mechanics (Done)       Learning Progress Summary             Patient Acceptance, E, VU,NR by MG at 6/10/2024 1155    Acceptance, E,TB, VU,NR by PH at 6/4/2024 1306    Acceptance, E,TB,D, VU,NR by PH at 6/3/2024 1019    Acceptance, E, VU,NR by ER at 6/1/2024 1222                         Point: Precautions (Done)       Learning Progress Summary             Patient Acceptance, E, VU,NR by MG at 6/10/2024 1155    Acceptance, E,TB, VU,NR by PH at 6/4/2024 1306    Acceptance,  E,TB,D, VU,NR by PH at 6/3/2024 1019    Acceptance, E, VU,NR by ER at 6/1/2024 1222                                         User Key       Initials Effective Dates Name Provider Type Discipline    MG 05/24/22 -  Mariaelena Daley, PT Physical Therapist PT    EB 02/14/23 -  Yoly Holland PTA Physical Therapist Assistant PT    PH 06/16/21 -  Lorraine Corcoran PTA Physical Therapist Assistant PT    ER 10/15/23 -  Wendy Pineda, PT Physical Therapist PT                  PT Recommendation and Plan     Plan of Care Reviewed With: patient  Progress: no change  Outcome Evaluation: Pt seen for PT tx this AM and tolerated the session well. Pt politely declined ambulating around the bed to the chair d/t pain, quickness to fatigue and stating it is easier to get up on the L side. Today, pt was SBA for bed mobility w/ increased time, Rosario to come to standing to RW and CGA for ambulation of 4' to the recliner chair w/ a RW. Once seated in the chair pt performed LE ther-ex to end the session. Pt was reclined with her LEs elevated and heels elevated on a pillow w/ socks removed d/t swelling. PT will cont to follow.     Time Calculation:         PT Charges       Row Name 06/10/24 1155             Time Calculation    Start Time 1110  -MG      Stop Time 1125  -MG      Time Calculation (min) 15 min  -MG      PT Received On 06/10/24  -MG      PT - Next Appointment 06/11/24  -MG                User Key  (r) = Recorded By, (t) = Taken By, (c) = Cosigned By      Initials Name Provider Type    MG Mariaelena Daley, PT Physical Therapist                  Therapy Charges for Today       Code Description Service Date Service Provider Modifiers Qty    [id number] HC PT THERAPEUTIC ACT EA 15 MIN 6/10/2024 Mariaelena Daley, PT GP 1            PT G-Codes  Outcome Measure Options: AM-PAC 6 Clicks Basic Mobility (PT)  AM-PAC 6 Clicks Score (PT): 17  PT Discharge Summary  Anticipated Discharge Disposition (PT): skilled nursing facility    Mariaelena Daley  PT  6/10/2024

## 2024-06-10 NOTE — PLAN OF CARE
Goal Outcome Evaluation:  Plan of Care Reviewed With: patient        Progress: no change  Outcome Evaluation: Pt seen for PT tx this AM and tolerated the session well. Pt politely declined ambulating around the bed to the chair d/t pain, quickness to fatigue and stating it is easier to get up on the L side. Today, pt was SBA for bed mobility w/ increased time, Rosario to come to standing to RW and CGA for ambulation of 4' to the recliner chair w/ a RW. Once seated in the chair pt performed LE ther-ex to end the session. Pt was reclined with her LEs elevated and heels elevated on a pillow w/ socks removed d/t swelling. PT will cont to follow.      Anticipated Discharge Disposition (PT): skilled nursing facility

## 2024-06-10 NOTE — CASE MANAGEMENT/SOCIAL WORK
Continued Stay Note  [facility]     Patient Name: Vonnie Coppola  MRN: [id number]  Today's Date: 6/10/2024    Admit Date: 2024    [facility]: [facility]-cert initaited   Discharge Plan       Row Name 06/10/24 1542       Plan    Plan [facility] new pre-cert initaited    Patient/Family in Agreement with Plan yes    Plan Comments Spoke with Mark/Branden who confirms that pre-cert has . Per MD likely dc in 1-2 days. Mark/Branden confirms that the [facility] has a private room for the patient. E-mail sent asking that pre-cert be initiated. Patient's appointment with Dr Roche @ [facility] rescheduled for  @ 1000. Patient and family updated. Transfer packet in [place]. Will cecille fletchermaria teresa transport @ [facility].                   Discharge Codes    No documentation.                 Expected Discharge Date and Time       Expected Discharge Date Expected Discharge Time    2024               Denia Chavis RN

## 2024-06-10 NOTE — PLAN OF CARE
Goal Outcome Evaluation:              Outcome Evaluation: No acute changes this shift. VSS. AOX4. Meds given per MAR. WCTM.                                Routing to  to send letter. Also routing to PCP to inform.

## 2024-06-10 NOTE — PROGRESS NOTES
Name: Vonnie Coppola ADMIT: 2024   : 1935  PCP: Christopher Leyva DO    MRN: [id number] LOS: 10 days   AGE/SEX: 88 y.o. female  ROOM: [facility]     Subjective   Subjective   Resting in her recliner; leg feels better- still with swelling but better if she leaves it elevated. Agreeable to trying to see if compression wraps help.      Objective   Objective   Vital Signs  Temp:  [97.5 °F (36.4 °C)-98.6 °F (37 °C)] 97.5 °F (36.4 °C)  Heart Rate:  [71-83] 83  Resp:  [16-18] 16  BP: (114-129)/(59-65) 129/65  SpO2:  [97 %-100 %] 98 %  on  Flow (L/min):  [3-5] 3;   Device (Oxygen Therapy): nasal cannula  Body mass index is 39.73 kg/m².    Physical Exam  Constitutional:       General: She is not in acute distress.     Appearance: She is not toxic-appearing.   HENT:      Head: Normocephalic and atraumatic.   Cardiovascular:      Rate and Rhythm: Normal rate and regular rhythm.   Pulmonary:      Effort: Pulmonary effort is normal. No respiratory distress.      Breath sounds: No wheezing or rhonchi.   Abdominal:      General: Bowel sounds are normal.      Palpations: Abdomen is soft.      Tenderness: There is no abdominal tenderness. There is no guarding or rebound.   Musculoskeletal:         General: Tenderness (LLE) present.      Comments: Right lower extremity dressing. Left leg with bruising and tenderness to touch but seems more swollen; minimal erythema noted- swelling improved from yesterday   Skin:     General: Skin is warm and dry.   Neurological:      General: No focal deficit present.      Mental Status: She is alert and oriented to person, place, and time.   Psychiatric:         Mood and Affect: Mood normal.         Behavior: Behavior normal.     Results Review  I reviewed the patient's new clinical results.  Results from last 7 days   Lab Units 06/10/24  0405 24  0338 24  0501 24  0414   WBC 10*3/mm3 7.77 9.10 8.36 8.45   HEMOGLOBIN g/dL 7.9* 7.9* 8.5* 8.2*   PLATELETS 10*3/mm3 263  "288 269 257     Results from last 7 days   Lab Units 06/10/24  0405 06/09/24  0338 06/08/24  0501 06/07/24  0414   SODIUM mmol/L 143 141 144 143   POTASSIUM mmol/L 3.5 3.5 3.6 3.7   CHLORIDE mmol/L 105 104 106 105   CO2 mmol/L 32.0* 32.0* 31.0* 31.0*   BUN mg/dL 18 18 15 14   CREATININE mg/dL 0.86 1.01* 0.96 1.01*   GLUCOSE mg/dL 88 96 77 88     Lab Results   Component Value Date    ANIONGAP 6.0 06/10/2024     Estimated Creatinine Clearance: 57.4 mL/min (by C-G formula based on SCr of 0.86 mg/dL).   Lab Results   Component Value Date    EGFR 65.9 06/10/2024           Results from last 7 days   Lab Units 06/10/24  0405 06/09/24  0338 06/08/24  0501 06/07/24  0414   CALCIUM mg/dL 8.2* 8.3* 8.3* 8.3*   MAGNESIUM mg/dL 2.3 2.4 2.9* 2.1   PHOSPHORUS mg/dL 3.5 3.3 3.0 3.3           No results found for: \"HGBA1C\", \"POCGLU\"    No radiology results for the last day    Scheduled Meds  acetaminophen, 1,000 mg, Oral, Q8H  apixaban, 2.5 mg, Oral, Q12H  arformoterol, 15 mcg, Nebulization, BID - RT  budesonide, 0.5 mg, Nebulization, BID - RT  diazePAM, 2 mg, Oral, BID  dilTIAZem CD, 120 mg, Oral, Q24H  doxycycline, 100 mg, Oral, Q12H  furosemide, 40 mg, Oral, Daily  pantoprazole, 40 mg, Oral, BID AC  prednisoLONE acetate, 1 drop, Left Eye, Q8H  senna-docusate sodium, 2 tablet, Oral, BID  sodium chloride, 10 mL, Intravenous, Q12H    Continuous Infusions     PRN Meds  •  albuterol  •  senna-docusate sodium **AND** polyethylene glycol **AND** bisacodyl **AND** bisacodyl  •  dextromethorphan polistirex ER  •  diazePAM  •  Ibuprofen/Lidocaine/Baclofen 3/4/2% cream  •  melatonin  •  nitroglycerin  •  ondansetron ODT **OR** ondansetron  •  oxyCODONE  •  sodium chloride  •  sodium chloride       Diet  Diet: Regular/House, Cardiac; Healthy Heart (2-3 Na+); Fluid Consistency: Thin (IDDSI 0)       Assessment/Plan     Active Hospital Problems    Diagnosis  POA   • **Leg hematoma, right, subsequent encounter [S80.11XD]  Not Applicable   • " History of pulmonary embolism [Z86.711]  Yes   • Class 2 severe obesity with serious comorbidity in adult [E66.01]  Yes   • Atrial fibrillation [I48.91]  Yes   • History of CVA (cerebrovascular accident) [Z86.73]  Not Applicable   • HTN (hypertension) [I10]  Yes   • History of breast cancer [Z85.3]  Not Applicable   • Asthma [J45.909]  Yes      Resolved Hospital Problems   No resolved problems to display.     88 y.o. female with a history of DVT, atrial fibrillation, hypertension, asthma, breast cancer, anxiety who presented to the ER with right lower extremity hematoma.     Blurry vision  Vitreous hemorrhage  Appreciate ophthalmology  Okay to stay on anticoagulation (dose reduced)  Started on steroid drops for KP  Needs to follow-up with retina specialist within the next 5 days-discussed with CCP appointment has been made for 6/10 but patient remained hospitalized- rescheduled for next Monday 6/17    Right lower extremity hematoma  Acute blood loss anemia  s/p 5/31/24 exploration of right lower extremity hematoma with control of bleeding, debridement of skin and subcutaneous tissue. Daily dressing changes  ok to restart Eliquis 6/3  Hemoglobin improved after transfusions (1 unit each on 6/2/2024 and on 6/3/2024)   Hemoglobin remains stable  - surgery evaluated 6/9- pt going to have some bleeding when on Eliquis- continue dressing changes as prescribed    Left leg pain, swelling, erythema, warmth  6/7 the leg has some erythema- started on doxycycline 6/7. No fever and WBC is okay however.  CT with edema- no abscess/drainable fluid collection  Check doppler to r/o DVT- no significant erythema- suspect related to holding lasix, chronic edema, low albumin/poor nutrition  Doppler negative    Epistaxis- resolved  Outpatient ENT evaluation    History of DVT on Eliquis restarted-hematology and surgery both agreed to decrease apixaban dose  Atrial fibrillation continue diltiazem for rate control, Eliquis   Essential HTN  diltiazem continued, Lasix restarted. BP controlled. Off IVF  Asthma continue home nebulizers. No wheezing today  Hx of breast cancer   Anxiety/benzodiazepine dependence-continue home Valium  History of constipation- bowel regimen.     Discharge  SNF- pending precert    Expected Discharge Date: 6/11/2024; Expected Discharge Time:     Discussed with patient and nursing staff and CCP    Anika Jackson MD  [facility]  06/10/24

## 2024-06-10 NOTE — THERAPY EVALUATION
Patient Name: Vonnie Coppola  : 1935    MRN: [id number]                              Today's Date: 6/10/2024       Admit Date: 2024    Visit Dx:     ICD-10-CM ICD-9-CM   1. Leg hematoma, right, subsequent encounter  S80.11XD V58.89     924.5   2. Cellulitis of leg, right  L03.115 682.6   3. Chronic anemia  D64.9 285.9     Patient Active Problem List   Diagnosis    GI bleed    Anemia    HTN (hypertension)    History of breast cancer    Asthma    History of CVA (cerebrovascular accident)    Dehydration    Renal insufficiency    Pulmonary nodule    Adnexal cyst    Nausea    Atrial fibrillation    History of pulmonary embolism    Class 2 severe obesity with serious comorbidity in adult    Thrombocytopenia    Cellulitis of right leg    Acute cystitis    Leg hematoma, right, subsequent encounter     Past Medical History:   Diagnosis Date    Arthritis     Asthma     Atrial fibrillation     per pt's daughter.States hx of a-fib in the past when stressed or tired.    Breast cancer     LEFT BREAST CA, LUMPECTOMY & RADS    History of cataract     Hx of radiation therapy     APPROXIMATELY 40 TREATMENTS FOR LEFT BREAST CA    Hypertension     Pneumonia     Stroke      Past Surgical History:   Procedure Laterality Date    APPENDECTOMY      BLADDER SURGERY      BREAST BIOPSY Left     MALIGNANT    BREAST LUMPECTOMY Left     MALIGNANT    CATARACT EXTRACTION      COLONOSCOPY N/A 2024    Procedure: COLONOSCOPY to cecum with cold snare polypectomies;  Surgeon: Harshad Gaston MD;  Location: [facility] ENDOSCOPY;  Service: Gastroenterology;  Laterality: N/A;  pre- gi bleed, anemia  post- diverticulosis, polyps    ENDOSCOPY N/A 2024    Procedure: ESOPHAGOGASTRODUODENOSCOPY with biospy;  Surgeon: Harshad Gaston MD;  Location: [facility] ENDOSCOPY;  Service: Gastroenterology;  Laterality: N/A;  pre- gi bleed, anemia  post- erosive gastirits    GALLBLADDER SURGERY      HERNIA REPAIR       HYSTERECTOMY      INCISION AND DRAINAGE LEG Right 5/31/2024    Procedure: RIGHT LOWER EXTREMITY WOUND EXPLORATION, DEBRIDEMENT AND CONTROL OF BLEEDING;  Surgeon: Gerald Pedraza MD;  Location: [facility];  Service: General;  Laterality: Right;    LASIK      LYMPHADENECTOMY      OOPHORECTOMY        General Information       Row Name 06/10/24 1553          OT Time and Intention    Document Type therapy note (daily note)  -     Mode of Treatment individual therapy;occupational therapy  -       Row Name 06/10/24 1553          General Information    Patient Profile Reviewed yes  -KA     Prior Level of Function independent:  Indep for ADLs and supervision for showers; uses rollator or rwx  -     Existing Precautions/Restrictions fall  -     Barriers to Rehab medically complex  -       Row Name 06/10/24 1553          Occupational Profile    Environmental Supports and Barriers (Occupational Profile) has tub bench for her tub shower combo and has grab bars near toilet  -       Row Name 06/10/24 1553          Living Environment    People in Home child(christine), adult  -       Row Name 06/10/24 1553          Home Main Entrance    Number of Stairs, Main Entrance four  -       Row Name 06/10/24 1553          Cognition    Orientation Status (Cognition) oriented x 3  -       Row Name 06/10/24 1553          Safety Issues, Functional Mobility    Impairments Affecting Function (Mobility) pain;endurance/activity tolerance;strength;balance  -               User Key  (r) = Recorded By, (t) = Taken By, (c) = Cosigned By      Initials Name Provider Type    Dania Boucher, OT Occupational Therapist                     Mobility/ADL's       Row Name 06/10/24 1551          Bed Mobility    Comment, (Bed Mobility) UIC at the start and end of session  -       Row Name 06/10/24 7504          Sit-Stand Transfer    Comment, (Sit-Stand Transfer) declined sts stating she had just worked with PT  -       Row Name  06/10/24 1554          Activities of Daily Living    BADL Assessment/Intervention grooming;toileting  -       Row Name 06/10/24 1554          Grooming Assessment/Training    [place] Level (Grooming) grooming skills;wash face, hands;standby assist;oral care regimen  -     Position (Grooming) supported sitting  -       Row Name 06/10/24 1554          Toileting Assessment/Training    [place] Level (Toileting) toileting skills;moderate assist (50% patient effort)  -     Comment, (Toileting) decreased use of RUE due to recent shoulder injury per pt  -               User Key  (r) = Recorded By, (t) = Taken By, (c) = Cosigned By      Initials Name Provider Type    Dania Boucher OT Occupational Therapist                   Obj/Interventions       Row Name 06/10/24 1558          Strength Comprehensive (MMT)    General Manual Muscle Testing (MMT) Assessment upper extremity strength deficits identified  -     Comment, General Manual Muscle Testing (MMT) Assessment RUE very limited shoulder flexion due to recent shoulder injury per pt. Unable to raise- uses LUE to assist. Elbow, wrist, hand WFL; LUE WFL  -       Row Name 06/10/24 1558          Shoulder (Therapeutic Exercise)    Shoulder (Therapeutic Exercise) AROM (active range of motion);AAROM (active assistive range of motion)  -     Shoulder AROM (Therapeutic Exercise) left;flexion;extension;10 repetitions  -     Shoulder AAROM (Therapeutic Exercise) right;left assist right;table slides, flexion;sitting  -       Row Name 06/10/24 1558          Wrist (Therapeutic Exercise)    Wrist (Therapeutic Exercise) AROM (active range of motion)  -     Wrist AROM (Therapeutic Exercise) right;flexion;extension  -       Row Name 06/10/24 1558          Hand (Therapeutic Exercise)    Hand (Therapeutic Exercise) AROM (active range of motion)  -     Hand AROM/AAROM (Therapeutic Exercise) right;finger flexion;finger extension  -[facility] Name  06/10/24 1558          Motor Skills    Therapeutic Exercise shoulder;wrist;hand  -       Row Name 06/10/24 1558          Balance    Static Sitting Balance supervision  -               User Key  (r) = Recorded By, (t) = Taken By, (c) = Cosigned By      Initials Name Provider Type    Dania Boucher OT Occupational Therapist                   Goals/Plan       Row Name 06/10/24 1601          Transfer Goal 1 (OT)    Activity/Assistive Device (Transfer Goal 1, OT) transfers, all  -KA     [place] Level/Cues Needed (Transfer Goal 1, OT) minimum assist (75% or more patient effort)  -KA     Time Frame (Transfer Goal 1, OT) short term goal (STG);2 weeks  -KA     Progress/Outcome (Transfer Goal 1, OT) goal ongoing  -[facility] Name 06/10/24 1601          Dressing Goal 1 (OT)    Activity/Device (Dressing Goal 1, OT) dressing skills, all  -KA     [place]/Cues Needed (Dressing Goal 1, OT) minimum assist (75% or more patient effort)  -KA     Time Frame (Dressing Goal 1, OT) short term goal (STG);2 weeks  -KA     Progress/Outcome (Dressing Goal 1, OT) goal ongoing  -[facility] Name 06/10/24 1601          Toileting Goal 1 (OT)    Activity/Device (Toileting Goal 1, OT) toileting skills, all  -KA     [place] Level/Cues Needed (Toileting Goal 1, OT) minimum assist (75% or more patient effort)  -KA     Time Frame (Toileting Goal 1, OT) short term goal (STG);2 weeks  -KA     Progress/Outcome (Toileting Goal 1, OT) goal ongoing  -[facility] Name 06/10/24 1601          Therapy Assessment/Plan (OT)    Planned Therapy Interventions (OT) activity tolerance training;BADL retraining;patient/caregiver education/training;ROM/therapeutic exercise;strengthening exercise;functional balance retraining;transfer/mobility retraining;occupation/activity based interventions  -               User Key  (r) = Recorded By, (t) = Taken By, (c) = Cosigned By      Initials Name Provider Type    Dania Boucher, MARTIN Occupational  Therapist                   Clinical Impression       Row Name 06/10/24 1559          Pain Assessment    Pretreatment Pain Rating 5/10  -     Posttreatment Pain Rating 7/10  -     Pre/Posttreatment Pain Comment MIGUEL. RN notified  -       Row Name 06/10/24 9199          Plan of Care Review    Plan of Care Reviewed With patient  -     Outcome Evaluation Pt seen for OT eval this afternoon. Admitted with RLE hematoma s/p debridement and wound exploration. PMHx includes breast cancer, asthma. Today she was sitting UIC upon arrival for session with family present. She reports living with her daughter and being independent with ADLs (supervision for showers). Today she reports pain in her RUE and RLE. RUE very limited shoulder flexion. Performed table slides with RUE x5. HOA WFL. anticipate mod A for toileting at this time due to decreased use of RUE. Max A for LBD. pt presents with decreased ROM, strength, endurance, functional mobility and ADL performance. rec SNF at [place]. Seated in chair at the end of session with family present.  -       Row Name 06/10/24 3859          Therapy Assessment/Plan (OT)    Rehab Potential (OT) good, to achieve stated therapy goals  -     Criteria for Skilled Therapeutic Interventions Met (OT) yes  -     Therapy Frequency (OT) 5 times/wk  -       Row Name 06/10/24 4239          Therapy Plan Review/Discharge Plan (OT)    Anticipated Discharge Disposition (OT) skilled nursing facility  -       Row Name 06/10/24 1559          Vital Signs    Pre Patient Position Sitting  -KA     Intra Patient Position Sitting  -KA     Post Patient Position Sitting  -       Row Name 06/10/24 3749          Positioning and Restraints    Pre-Treatment Position sitting in chair/recliner  -     Post Treatment Position chair  -KA     In Chair notified nsg;reclined;call light within reach;encouraged to call for assist;exit alarm on  -               User Key  (r) = Recorded By, (t) = Taken By, (c) =  Cosigned By      Initials Name Provider Type    LASHAE Dania Ramirez, OT Occupational Therapist                   Outcome Measures       Row Name 06/10/24 1602          How much help from another is currently needed...    Putting on and taking off regular lower body clothing? 1  -KA     Bathing (including washing, rinsing, and drying) 2  -KA     Toileting (which includes using toilet bed pan or urinal) 2  -KA     Putting on and taking off regular upper body clothing 3  -KA     Taking care of personal grooming (such as brushing teeth) 3  -KA     Eating meals 3  -KA     AM-PAC 6 Clicks Score (OT) 14  -KA       Row Name 06/10/24 1155 06/10/24 0810       How much help from another person do you currently need...    Turning from your back to your side while in flat bed without using bedrails? 3  -MG 3  -RF    Moving from lying on back to sitting on the side of a flat bed without bedrails? 3  -MG 3  -RF    Moving to and from a bed to a chair (including a wheelchair)? 3  -MG 3  -RF    Standing up from a chair using your arms (e.g., wheelchair, bedside chair)? 3  -MG 3  -RF    Climbing 3-5 steps with a railing? 2  -MG 2  -RF    To walk in hospital room? 3  -MG 3  -RF    AM-PAC 6 Clicks Score (PT) 17  -MG 17  -RF    Highest Level of Mobility Goal 5 --> Static standing  -MG 5 --> Static standing  -RF      Row Name 06/10/24 0500          How much help from another person do you currently need...    Turning from your back to your side while in flat bed without using bedrails? 3  -SK     Moving from lying on back to sitting on the side of a flat bed without bedrails? 3  -SK     Moving to and from a bed to a chair (including a wheelchair)? 2  -SK     Standing up from a chair using your arms (e.g., wheelchair, bedside chair)? 2  -SK     Climbing 3-5 steps with a railing? 2  -SK     To walk in hospital room? 3  -SK     AM-PAC 6 Clicks Score (PT) 15  -SK     Highest Level of Mobility Goal 4 --> Transfer to chair/commode  -SK        Row Name 06/10/24 1602          Functional Assessment    Outcome Measure Options AM-PAC 6 Clicks Daily Activity (OT)  -               User Key  (r) = Recorded By, (t) = Taken By, (c) = Cosigned By      Initials Name Provider Type    Cynthia Franco RN Registered Nurse    Mariaelena Tyson, PT Physical Therapist    Dania Boucher, OT Occupational Therapist    Tess Pacheco RN Registered Nurse                      OT Recommendation and Plan  Planned Therapy Interventions (OT): activity tolerance training, BADL retraining, patient/caregiver education/training, ROM/therapeutic exercise, strengthening exercise, functional balance retraining, transfer/mobility retraining, occupation/activity based interventions  Therapy Frequency (OT): 5 times/wk  Plan of Care Review  Plan of Care Reviewed With: patient  Outcome Evaluation: Pt seen for OT eval this afternoon. Admitted with RLE hematoma s/p debridement and wound exploration. PMHx includes breast cancer, asthma. Today she was sitting UI upon arrival for session with family present. She reports living with her daughter and being independent with ADLs (supervision for showers). Today she reports pain in her RUE and RLE. RUE very limited shoulder flexion. Performed table slides with RUE x5. LUE WFL. anticipate mod A for toileting at this time due to decreased use of RUE. Max A for LBD. pt presents with decreased ROM, strength, endurance, functional mobility and ADL performance. rec SNF at [place]. Seated in chair at the end of session with family present.     Time Calculation:         Time Calculation- OT       Row Name 06/10/24 1602             Time Calculation- OT    OT Start Time 1328  -KA      OT Stop Time 1354  -KA      OT Time Calculation (min) 26 min  -KA      OT Received On 06/10/24  -KA      OT - Next Appointment 06/11/24  -KA      OT Goal Re-Cert Due Date 06/24/24  -KA         Timed Charges    50461 - OT Therapeutic Exercise Minutes 16  -KA          Untimed Charges    OT Eval/Re-eval Minutes 10  -KA         Total Minutes    Timed Charges Total Minutes 16  -KA      Untimed Charges Total Minutes 10  -KA       Total Minutes 26  -KA                User Key  (r) = Recorded By, (t) = Taken By, (c) = Cosigned By      Initials Name Provider Type    Dania Boucher OT Occupational Therapist                  Therapy Charges for Today       Code Description Service Date Service Provider Modifiers Qty    [id number]  OT THER PROC EA 15 MIN 6/10/2024 Dania Ramirez OT GO 1    [id number]  OT EVAL MOD COMPLEXITY 3 6/10/2024 Dania Ramirez OT GO 1                 Dania Ramirez OT  6/10/2024

## 2024-06-11 LAB
ANION GAP SERPL CALCULATED.3IONS-SCNC: 3 MMOL/L (ref 5–15)
BASOPHILS # BLD AUTO: 0.04 10*3/MM3 (ref 0–0.2)
BASOPHILS NFR BLD AUTO: 0.5 % (ref 0–1.5)
BUN SERPL-MCNC: 19 MG/DL (ref 8–23)
BUN/CREAT SERPL: 20.2 (ref 7–25)
CALCIUM SPEC-SCNC: 8.4 MG/DL (ref 8.6–10.5)
CHLORIDE SERPL-SCNC: 105 MMOL/L (ref 98–107)
CO2 SERPL-SCNC: 34 MMOL/L (ref 22–29)
CREAT SERPL-MCNC: 0.94 MG/DL (ref 0.57–1)
DEPRECATED RDW RBC AUTO: 47.1 FL (ref 37–54)
EGFRCR SERPLBLD CKD-EPI 2021: 59.2 ML/MIN/1.73
EOSINOPHIL # BLD AUTO: 0.25 10*3/MM3 (ref 0–0.4)
EOSINOPHIL NFR BLD AUTO: 3 % (ref 0.3–6.2)
ERYTHROCYTE [DISTWIDTH] IN BLOOD BY AUTOMATED COUNT: 15.5 % (ref 12.3–15.4)
GLUCOSE SERPL-MCNC: 82 MG/DL (ref 65–99)
HCT VFR BLD AUTO: 25.6 % (ref 34–46.6)
HGB BLD-MCNC: 8.2 G/DL (ref 12–15.9)
IMM GRANULOCYTES # BLD AUTO: 0.05 10*3/MM3 (ref 0–0.05)
IMM GRANULOCYTES NFR BLD AUTO: 0.6 % (ref 0–0.5)
LYMPHOCYTES # BLD AUTO: 2.02 10*3/MM3 (ref 0.7–3.1)
LYMPHOCYTES NFR BLD AUTO: 24.1 % (ref 19.6–45.3)
MAGNESIUM SERPL-MCNC: 2 MG/DL (ref 1.6–2.4)
MCH RBC QN AUTO: 27.3 PG (ref 26.6–33)
MCHC RBC AUTO-ENTMCNC: 32 G/DL (ref 31.5–35.7)
MCV RBC AUTO: 85.3 FL (ref 79–97)
MONOCYTES # BLD AUTO: 0.96 10*3/MM3 (ref 0.1–0.9)
MONOCYTES NFR BLD AUTO: 11.4 % (ref 5–12)
NEUTROPHILS NFR BLD AUTO: 5.07 10*3/MM3 (ref 1.7–7)
NEUTROPHILS NFR BLD AUTO: 60.4 % (ref 42.7–76)
NRBC BLD AUTO-RTO: 0 /100 WBC (ref 0–0.2)
PHOSPHATE SERPL-MCNC: 3.5 MG/DL (ref 2.5–4.5)
PLATELET # BLD AUTO: 261 10*3/MM3 (ref 140–450)
PMV BLD AUTO: 9.5 FL (ref 6–12)
POTASSIUM SERPL-SCNC: 3.3 MMOL/L (ref 3.5–5.2)
RBC # BLD AUTO: 3 10*6/MM3 (ref 3.77–5.28)
SODIUM SERPL-SCNC: 142 MMOL/L (ref 136–145)
WBC NRBC COR # BLD AUTO: 8.39 10*3/MM3 (ref 3.4–10.8)

## 2024-06-11 PROCEDURE — 94799 UNLISTED PULMONARY SVC/PX: CPT

## 2024-06-11 PROCEDURE — 80048 BASIC METABOLIC PNL TOTAL CA: CPT | Performed by: STUDENT IN AN ORGANIZED HEALTH CARE EDUCATION/TRAINING PROGRAM

## 2024-06-11 PROCEDURE — 84100 ASSAY OF PHOSPHORUS: CPT | Performed by: STUDENT IN AN ORGANIZED HEALTH CARE EDUCATION/TRAINING PROGRAM

## 2024-06-11 PROCEDURE — 85025 COMPLETE CBC W/AUTO DIFF WBC: CPT | Performed by: STUDENT IN AN ORGANIZED HEALTH CARE EDUCATION/TRAINING PROGRAM

## 2024-06-11 PROCEDURE — 29581 APPL MULTLAYER CMPRN SYS LEG: CPT

## 2024-06-11 PROCEDURE — 83735 ASSAY OF MAGNESIUM: CPT | Performed by: STUDENT IN AN ORGANIZED HEALTH CARE EDUCATION/TRAINING PROGRAM

## 2024-06-11 PROCEDURE — 97535 SELF CARE MNGMENT TRAINING: CPT

## 2024-06-11 PROCEDURE — 94760 N-INVAS EAR/PLS OXIMETRY 1: CPT

## 2024-06-11 RX ADMIN — BUDESONIDE 0.5 MG: 0.5 INHALANT ORAL at 09:53

## 2024-06-11 RX ADMIN — POLYETHYLENE GLYCOL 3350 17 G: 17 POWDER, FOR SOLUTION ORAL at 13:18

## 2024-06-11 RX ADMIN — DOXYCYCLINE 100 MG: 100 CAPSULE ORAL at 10:03

## 2024-06-11 RX ADMIN — DILTIAZEM HYDROCHLORIDE 120 MG: 120 CAPSULE, COATED, EXTENDED RELEASE ORAL at 10:04

## 2024-06-11 RX ADMIN — BUDESONIDE 0.5 MG: 0.5 INHALANT ORAL at 20:18

## 2024-06-11 RX ADMIN — PREDNISOLONE ACETATE 1 DROP: 10 SUSPENSION/ DROPS OPHTHALMIC at 06:30

## 2024-06-11 RX ADMIN — PANTOPRAZOLE SODIUM 40 MG: 40 TABLET, DELAYED RELEASE ORAL at 06:30

## 2024-06-11 RX ADMIN — DIAZEPAM 2 MG: 2 TABLET ORAL at 13:21

## 2024-06-11 RX ADMIN — OXYCODONE HYDROCHLORIDE 5 MG: 5 TABLET ORAL at 14:20

## 2024-06-11 RX ADMIN — ARFORMOTEROL TARTRATE 15 MCG: 15 SOLUTION RESPIRATORY (INHALATION) at 20:18

## 2024-06-11 RX ADMIN — PANTOPRAZOLE SODIUM 40 MG: 40 TABLET, DELAYED RELEASE ORAL at 19:10

## 2024-06-11 RX ADMIN — PREDNISOLONE ACETATE 1 DROP: 10 SUSPENSION/ DROPS OPHTHALMIC at 21:25

## 2024-06-11 RX ADMIN — APIXABAN 2.5 MG: 2.5 TABLET, FILM COATED ORAL at 21:23

## 2024-06-11 RX ADMIN — ACETAMINOPHEN 1000 MG: 500 TABLET ORAL at 06:30

## 2024-06-11 RX ADMIN — DIAZEPAM 2 MG: 2 TABLET ORAL at 21:23

## 2024-06-11 RX ADMIN — ARFORMOTEROL TARTRATE 15 MCG: 15 SOLUTION RESPIRATORY (INHALATION) at 09:53

## 2024-06-11 RX ADMIN — ACETAMINOPHEN 1000 MG: 500 TABLET ORAL at 18:04

## 2024-06-11 RX ADMIN — DIAZEPAM 2 MG: 2 TABLET ORAL at 10:03

## 2024-06-11 RX ADMIN — PREDNISOLONE ACETATE 1 DROP: 10 SUSPENSION/ DROPS OPHTHALMIC at 14:21

## 2024-06-11 RX ADMIN — SENNOSIDES AND DOCUSATE SODIUM 2 TABLET: 50; 8.6 TABLET ORAL at 21:23

## 2024-06-11 RX ADMIN — FUROSEMIDE 40 MG: 40 TABLET ORAL at 10:03

## 2024-06-11 RX ADMIN — APIXABAN 2.5 MG: 2.5 TABLET, FILM COATED ORAL at 10:04

## 2024-06-11 NOTE — PROGRESS NOTES
Name: Vonnie Coppola ADMIT: 2024   : 1935  PCP: Christopher Leyva DO    MRN: [id number] LOS: 11 days   AGE/SEX: 88 y.o. female  ROOM: [facility]     Subjective   Subjective   Resting in her recliner; having pain in both legs today. Worse after her recliner foot rest was dropped too quickly yesterday. Thinks it may have bumped her legs against it. Daughter at bedside.      Objective   Objective   Vital Signs  Temp:  [97.5 °F (36.4 °C)-98.2 °F (36.8 °C)] 97.5 °F (36.4 °C)  Heart Rate:  [69-79] 69  Resp:  [16-18] 18  BP: (117-135)/(51-79) 135/79  SpO2:  [98 %-100 %] 100 %  on  Flow (L/min):  [2-3] 2;   Device (Oxygen Therapy): nasal cannula  Body mass index is 39.84 kg/m².    Physical Exam  Constitutional:       General: She is not in acute distress.     Appearance: She is not toxic-appearing.   HENT:      Head: Normocephalic and atraumatic.   Cardiovascular:      Rate and Rhythm: Normal rate and regular rhythm.   Pulmonary:      Effort: Pulmonary effort is normal. No respiratory distress.      Breath sounds: No wheezing or rhonchi.   Abdominal:      General: Bowel sounds are normal.      Palpations: Abdomen is soft.      Tenderness: There is no abdominal tenderness. There is no guarding or rebound.   Musculoskeletal:         General: Tenderness (LLE) present.      Comments: Right lower extremity dressing. Left leg with bruising and tenderness to touch but seems more swollen; minimal erythema noted- swelling improved from yesterday   Skin:     General: Skin is warm and dry.   Neurological:      General: No focal deficit present.      Mental Status: She is alert and oriented to person, place, and time.   Psychiatric:         Mood and Affect: Mood normal.         Behavior: Behavior normal.     Results Review  I reviewed the patient's new clinical results.  Results from last 7 days   Lab Units 24  0443 06/10/24  0405 24  0338 24  0501   WBC 10*3/mm3 8.39 7.77 9.10 8.36   HEMOGLOBIN g/dL  "8.2* 7.9* 7.9* 8.5*   PLATELETS 10*3/mm3 261 263 288 269     Results from last 7 days   Lab Units 06/11/24  0443 06/10/24  0405 06/09/24  0338 06/08/24  0501   SODIUM mmol/L 142 143 141 144   POTASSIUM mmol/L 3.3* 3.5 3.5 3.6   CHLORIDE mmol/L 105 105 104 106   CO2 mmol/L 34.0* 32.0* 32.0* 31.0*   BUN mg/dL 19 18 18 15   CREATININE mg/dL 0.94 0.86 1.01* 0.96   GLUCOSE mg/dL 82 88 96 77     Lab Results   Component Value Date    ANIONGAP 3.0 (L) 06/11/2024     Estimated Creatinine Clearance: 52.8 mL/min (by C-G formula based on SCr of 0.94 mg/dL).   Lab Results   Component Value Date    EGFR 59.2 (L) 06/11/2024           Results from last 7 days   Lab Units 06/11/24  0443 06/10/24  0405 06/09/24  0338 06/08/24  0501   CALCIUM mg/dL 8.4* 8.2* 8.3* 8.3*   MAGNESIUM mg/dL 2.0 2.3 2.4 2.9*   PHOSPHORUS mg/dL 3.5 3.5 3.3 3.0           No results found for: \"HGBA1C\", \"POCGLU\"    No radiology results for the last day    Scheduled Meds  acetaminophen, 1,000 mg, Oral, Q8H  apixaban, 2.5 mg, Oral, Q12H  arformoterol, 15 mcg, Nebulization, BID - RT  budesonide, 0.5 mg, Nebulization, BID - RT  diazePAM, 2 mg, Oral, BID  dilTIAZem CD, 120 mg, Oral, Q24H  doxycycline, 100 mg, Oral, Q12H  furosemide, 40 mg, Oral, Daily  pantoprazole, 40 mg, Oral, BID AC  prednisoLONE acetate, 1 drop, Left Eye, Q8H  senna-docusate sodium, 2 tablet, Oral, BID  sodium chloride, 10 mL, Intravenous, Q12H    Continuous Infusions     PRN Meds    albuterol    senna-docusate sodium **AND** polyethylene glycol **AND** bisacodyl **AND** bisacodyl    dextromethorphan polistirex ER    diazePAM    Ibuprofen/Lidocaine/Baclofen 3/4/2% cream    melatonin    nitroglycerin    ondansetron ODT **OR** ondansetron    oxyCODONE    sodium chloride    sodium chloride       Diet  Diet: Regular/House, Cardiac; Healthy Heart (2-3 Na+); Fluid Consistency: Thin (IDDSI 0)       Assessment/Plan     Active Hospital Problems    Diagnosis  POA    **Leg hematoma, right, subsequent " encounter [S80.11XD]  Not Applicable    History of pulmonary embolism [Z86.711]  Yes    Class 2 severe obesity with serious comorbidity in adult [E66.01]  Yes    Atrial fibrillation [I48.91]  Yes    History of CVA (cerebrovascular accident) [Z86.73]  Not Applicable    HTN (hypertension) [I10]  Yes    History of breast cancer [Z85.3]  Not Applicable    Asthma [J45.909]  Yes      Resolved Hospital Problems   No resolved problems to display.     88 y.o. female with a history of DVT, atrial fibrillation, hypertension, asthma, breast cancer, anxiety who presented to the ER with right lower extremity hematoma.     Blurry vision  Vitreous hemorrhage  Appreciate ophthalmology  Okay to stay on anticoagulation (dose reduced)  Started on steroid drops for KP  Needs to follow-up with retina specialist within the next 5 days-discussed with CCP appointment has been made for 6/10 but patient remained hospitalized- rescheduled for next Monday 6/17    Right lower extremity hematoma  Acute blood loss anemia  s/p 5/31/24 exploration of right lower extremity hematoma with control of bleeding, debridement of skin and subcutaneous tissue. Daily dressing changes  ok to restart Eliquis 6/3  Hemoglobin improved after transfusions (1 unit each on 6/2/2024 and on 6/3/2024)   Hemoglobin remains stable  - surgery evaluated 6/9- pt going to have some bleeding when on Eliquis- continue dressing changes as prescribed    Left leg pain, swelling, erythema, warmth  6/7 the leg has some erythema- started on doxycycline 6/7. No fever and WBC is okay however.  CT with edema- no abscess/drainable fluid collection  Check doppler to r/o DVT- no significant erythema- suspect related to holding lasix, chronic edema, low albumin/poor nutrition  Doppler negative  - will ask lymphedema team to do leg wraps to help with swelling    Epistaxis- resolved  Outpatient ENT evaluation    History of DVT on Eliquis restarted-hematology and surgery both agreed to decrease  apixaban dose  Atrial fibrillation continue diltiazem for rate control, Eliquis   Essential HTN diltiazem continued, Lasix restarted. BP controlled. Off IVF  Asthma continue home nebulizers. No wheezing today  Hx of breast cancer   Anxiety/benzodiazepine dependence-continue home Valium  History of constipation- bowel regimen.     Discharge  SNF- pending precert    Expected Discharge Date: 6/11/2024; Expected Discharge Time:     Discussed with patient and nursing staff and CCP and family    Anika Jackson MD  [facility]  06/11/24

## 2024-06-11 NOTE — PLAN OF CARE
Goal Outcome Evaluation:         Patient given nursing care per MD orders and hospital policies and showed no signs or symptoms of distress this shift

## 2024-06-11 NOTE — PLAN OF CARE
Goal Outcome Evaluation:   Pt seen this date for OT as well as lymphedema. Noted new order for LLE to be wrapped, RLE contraindicated due to large wound present and wound/RN address and bandaging present to RLE. Pt UIC on arrival- family present as well. Education provided on wrapping. Pt plans SNF at d/c.   Materials used:  Rosidal 8cm and 10cm  Artiflex 10cm and 15 cm  Stockinette tg  Pt requires min A for STS this date and short distance to return to bed, she remains quick to fatigue with upright activity and overall decreased activity tolerance required for ADLs. Continue to progress as able, plan to address lymph order daily, f/u tomorrow. Pt with no report of discomfort to LLE, skin intact, RN aware.

## 2024-06-11 NOTE — CASE MANAGEMENT/SOCIAL WORK
"Physicians Statement of Medical Necessity for  Ambulance Transportation    GENERAL INFORMATION     Name: Vonnie Coppola  YOB: 1935  Medicare #: [id number]  Transport Date: 6/12/2024 (Valid for round trips this date, or for scheduled repetitive trips for 60 days from the date signed below.)  Origin: [facility]  Destination: [facility]   Is the Patient's stay covered under Medicare Part A (PPS/DRG?)Yes  Closest appropriate facility? Yes  If this a hosp-hosp transfer? No  Is this a hospice patient? No    MEDICAL NECESSITY QUESTIONAIRE    Ambulance Transportation is medically necessary only if other means of transportation are contraindicated or would be potentially harmful to the patient.  To meet this requirement, the patient must be either \"bed confined\" or suffer from a condition such that transport by means other than an ambulance is contraindicated by the patient's condition.  The following questions must be answered by the healthcare professional signing below for this form to be valid:     1) Describe the MEDICAL CONDITION (physical and/or mental) of this patient AT THE TIME OF AMBULANCE TRANSPORT that requires the patient to be transported in an ambulance, and why transport by other means is contraindicated by the patient's condition:     Past Medical History:   Diagnosis Date    Arthritis     Asthma     Atrial fibrillation     per pt's daughter.States hx of a-fib in the past when stressed or tired.    Breast cancer 1999    LEFT BREAST CA, LUMPECTOMY & RADS    History of cataract     Hx of radiation therapy 1999    APPROXIMATELY 40 TREATMENTS FOR LEFT BREAST CA    Hypertension     Pneumonia     Stroke       Past Surgical History:   Procedure Laterality Date    APPENDECTOMY      BLADDER SURGERY      BREAST BIOPSY Left 1999    MALIGNANT    BREAST LUMPECTOMY Left 1999    MALIGNANT    CATARACT EXTRACTION      COLONOSCOPY N/A 01/19/2024    Procedure: COLONOSCOPY to cecum with " "cold snare polypectomies;  Surgeon: Harshad Gaston MD;  Location: [facility] ENDOSCOPY;  Service: Gastroenterology;  Laterality: N/A;  pre- gi bleed, anemia  post- diverticulosis, polyps    ENDOSCOPY N/A 01/19/2024    Procedure: ESOPHAGOGASTRODUODENOSCOPY with biospy;  Surgeon: Harshad Gaston MD;  Location: [facility] ENDOSCOPY;  Service: Gastroenterology;  Laterality: N/A;  pre- gi bleed, anemia  post- erosive gastirits    GALLBLADDER SURGERY      HERNIA REPAIR      HYSTERECTOMY      INCISION AND DRAINAGE LEG Right 5/31/2024    Procedure: RIGHT LOWER EXTREMITY WOUND EXPLORATION, DEBRIDEMENT AND CONTROL OF BLEEDING;  Surgeon: Gerald Pedraza MD;  Location: [facility] MAIN OR;  Service: General;  Laterality: Right;    LASIK      LYMPHADENECTOMY      OOPHORECTOMY        2) Is this patient \"bed confined\" as defined below?Yes   To be \"bed confined\" the patient must satisfy all three of the following criteria:  (1) unable to get up from bed without assistance; AND (2) unable to ambulate;  AND (3) unable to sit in a chair or wheelchair.  3) Can this patient safely be transported by car or wheelchair van (I.e., may safely sit during transport, without an attendant or monitoring?)No   4. In addition to completing questions 1-3 above, please check any of the following conditions that apply*:          *Note: supporting documentation for any boxes checked must be maintained in the patient's medical records Moderate/severe pain on movement and Unable to tolerate seated position for time needed to transport      SIGNATURE OF PHYSICIAN OR OTHER AUTHORIZED HEALTHCARE PROFESSIONAL    I certify that the above information is true and correct based on my evaluation of this patient, and represent that the patient requires transport by ambulance and that other forms of transport are contraindicated.  I understand that this information will be used by the Centers for Medicare and Medicaid Services (CMS) to support the determiniation of " medical necessity for ambulance services, and I represent that I have personal knowledge of the patient's condition at the time of transport.       If this box is checked, I also certify that the patient is physically or mentally incapable of signing the ambulance service's claim form and that the institution with which I am affiliated has furnished care, services or assistance to the patient.  My signature below is made on behalf of the patient pursuant to 42 .36(b)(4). In accordance with 42 .37, the specific reason(s) that the patient is physically or mentally incapable of signing the claim for is as follows: x      (For Scheduled repetitive transport, this form is not valid for transports performed more than 60 days after this date).                                                                                                                                            --------------------------------------------------------------------------------------------  Printed Name and Credentials of Physician or Authorized Healthcare Professional     *Form must be signed by patient's attending physician for scheduled, repetitive transports,.  For non-repetitive ambulance transports, if unable to obtain the signature of the attending physician, any of the following may sign (please select below):     Physician  Clinical Nurse Specialist  Registered Nurse     Physician Assistant  Discharge Planner  Licensed Practical Nurse     Nurse Practitioner

## 2024-06-11 NOTE — PLAN OF CARE
Goal Outcome Evaluation:  Plan of Care Reviewed With: patient        Progress: improving       A&Ox4, VSS, and no acute changes throughout shift. Will continue to monitor for the remainder of the shift.

## 2024-06-11 NOTE — CASE MANAGEMENT/SOCIAL WORK
Post-Acute Authorization Submission      Post Acute Pre-Cert Documentation  Request Submitted by Facility - Type:: Hospital  Post-Acute Authorization Type Submitted:: SNF  Date Post Acute Pre-Cert Inititated per Facility: 06/04/24  Date Post Acute Pre-Cert Completed: 06/04/24  Accepting Facility: [facility] Discharge Date Requested: 06/05/24  All Clinicals Submitted?: Yes  Had Accepting Facility at Time of Submission: Yes  Response Received from Insurance?: Approval  Response Communicated to:: , Accepting Facility Liaison, Accepting Facility Auth Department  Authorization Number:: [id number]  Post Acute Pre-Cert Initiated Comment: VALID TO ADMIT UPTO 11:59PM ON 6/9/24.  New Pre-Cert Needed?: Yes  Post Acute Pre-Cert #2 Documentation  Request Submitted by Facility - Type:: Hospital  Post-Acute Authorization Type Submitted:: SNF  Date Post Acute Pre-Cert Inititated per Facility: 06/11/24  Date Post Acute Pre-Cert Completed: 06/11/24  Accepting Facility: [facility] Discharge Date Requested: 06/11/24  All Clinicals Submitted?: Yes  Had Accepting Facility at Time of Submission: Yes  Response Received from Insurance?: Approval  Response Communicated to:: , Accepting Facility Liaison, Accepting Facility Auth Department  Authorization Number:: [id number]  Post Acute Pre-Cert Initiated Comment: VALID TO ADMIT UPTO 11:59PM ON 6/16/24.           Ha Abdullahi, PCT

## 2024-06-11 NOTE — THERAPY TREATMENT NOTE
Acute Care - Occupational Therapy Lymphedema Treatment Note  [facility]     Patient Name: Vonnie Coppola  : 1935  MRN: [id number]  Today's Date: 2024             Admit Date: 2024       ICD-10-CM ICD-9-CM   1. Leg hematoma, right, subsequent encounter  S80.11XD V58.89     924.5   2. Cellulitis of leg, right  L03.115 682.6   3. Chronic anemia  D64.9 285.9     Patient Active Problem List   Diagnosis    GI bleed    Anemia    HTN (hypertension)    History of breast cancer    Asthma    History of CVA (cerebrovascular accident)    Dehydration    Renal insufficiency    Pulmonary nodule    Adnexal cyst    Nausea    Atrial fibrillation    History of pulmonary embolism    Class 2 severe obesity with serious comorbidity in adult    Thrombocytopenia    Cellulitis of right leg    Acute cystitis    Leg hematoma, right, subsequent encounter     Past Medical History:   Diagnosis Date    Arthritis     Asthma     Atrial fibrillation     per pt's daughter.States hx of a-fib in the past when stressed or tired.    Breast cancer     LEFT BREAST CA, LUMPECTOMY & RADS    History of cataract     Hx of radiation therapy     APPROXIMATELY 40 TREATMENTS FOR LEFT BREAST CA    Hypertension     Pneumonia     Stroke      Past Surgical History:   Procedure Laterality Date    APPENDECTOMY      BLADDER SURGERY      BREAST BIOPSY Left     MALIGNANT    BREAST LUMPECTOMY Left     MALIGNANT    CATARACT EXTRACTION      COLONOSCOPY N/A 2024    Procedure: COLONOSCOPY to cecum with cold snare polypectomies;  Surgeon: Harshad Gaston MD;  Location: [facility] ENDOSCOPY;  Service: Gastroenterology;  Laterality: N/A;  pre- gi bleed, anemia  post- diverticulosis, polyps    ENDOSCOPY N/A 2024    Procedure: ESOPHAGOGASTRODUODENOSCOPY with biospy;  Surgeon: Harshad Gaston MD;  Location: [facility] ENDOSCOPY;  Service: Gastroenterology;  Laterality: N/A;  pre- gi bleed, anemia  post- erosive gastirits    GALLBLADDER  SURGERY      HERNIA REPAIR      HYSTERECTOMY      INCISION AND DRAINAGE LEG Right 5/31/2024    Procedure: RIGHT LOWER EXTREMITY WOUND EXPLORATION, DEBRIDEMENT AND CONTROL OF BLEEDING;  Surgeon: Gerald Pedraza MD;  Location: [facility];  Service: General;  Laterality: Right;    LASIK      LYMPHADENECTOMY      OOPHORECTOMY          Lymphedema       Row Name 06/11/24 1400             Subjective Pain    Able to rate subjective pain? yes  -MM      Subjective Pain Comment pt reports pain in RLE this date due to large wound present-RN and wound addressing RLE - order for lymphedema wrap on LLE only  -MM      Recorded by [MM] Sharifa Vega OT              Subjective    Subjective Comments pt reports feeling very sick- following LLE wrapping, pt reports no discomfort  -MM      Recorded by [MM] Sharifa Vega OT              Lymphedema Assessment    Lymphedema Classification LLE:;stage 1 (Spontaneously Reversible)  -MM      Infections or Cellulitis? no  -MM      Lymphedema Precautions DVT  hx DVT, doppler (-) on LLE noted prior to wrapping, hematoma present on LLE, RN aware and noted, OK to wrap per RN/MD  -MM      Lymphedema Assessment Comments pt's family present for session- report that she was previously warpped with ace bandage material at home from  nurses- no history of lymphedema wrapping  -MM      Recorded by [MM] Sharifa Vega OT              LLIS - Physical Concerns    The amount of pain associated with my lymphedema is: 1  -MM      The amount of limb heaviness associated with my lymphedema is: 2  -MM      The amount of skin tightness associated with my lymphedema is: 1  -MM      The size of my swollen limb(s) seems: 1  -MM      Lymphedema affects the movement of my swollen limb(s): 1  -MM      The strength in my swollen limb(s) is: 2  -MM      Recorded by [MM] Sharifa Vega OT              Skin Changes/Observations    Location/Assessment Lower Extremity  -MM      Lower Extremity Conditions  left:;fragile  L hematoma noted  -MM      Recorded by [MM] Sharifa Vega OT              Compression/Skin Care    Compression/Skin Care skin care;wrapping location;bandaging;compression garment  -MM      Skin Care washed/dried;lotion applied;moisturizing lotion applied  -MM      Wrapping Location lower extremity  LLE  -MM      Wrapping Location LE left:;foot to knee  -MM      Bandage Layers cotton liner;cotton elastic stocking- single layer (comment size)  -MM      Bandaging Comments size 10 x1 artiflex and size x1 15 artiflex  -MM      Bandaging Technique light compression  -MM      Compression Garment Comments jobst 8cm and 10cm  -MM      Compression/Skin Care Comments pt reports no concerns, no discomfort, skin moisturized and intact  -MM      Recorded by [MM] Sharifa Vega OT              Lymphedema Life Impact Scale Totals    A.  Total Q1 - Q17 (Do not include Q18) 8  -MM      B.  Total number of questions answered (Q1-Q17) 6  -MM      C. Divide A by B 1.33  -MM      D. Multiple C by 25 33.25  -MM      Recorded by [MM] Sharifa Vega OT                User Key  (r) = Recorded By, (t) = Taken By, (c) = Cosigned By      Initials Name Effective Dates    MM Sharifa Vega OT 05/31/24 -                     OT ASSESSMENT FLOWSHEET (Last 12 Hours)       OT Evaluation and Treatment       Row Name 06/11/24 1500                   Pain Assessment    Pretreatment Pain Rating --  RLE pain  -MM           Cognition    Orientation Status (Cognition) oriented x 3  -MM           Functional Mobility    Functional Mobility- Ind. Level minimum assist (75% patient effort)  HHA  -MM           Sit-Stand Transfer    Sit-Stand [place] (Transfers) minimum assist (75% patient effort);verbal cues;nonverbal cues (demo/gesture)  -MM        Assistive Device (Sit-Stand Transfers) --  HHA  -MM           Balance    Dynamic Standing Balance minimal assist  -MM           Wound 05/31/24 1940 Right lower leg Incision    Wound - Properties  Group Placement Date: 05/31/24  -RD Placement Time: 1940 -RD Side: Right  -RD Orientation: lower  -RD Location: leg  -RD Primary Wound Type: Incision  -RD    Retired Wound - Properties Group Placement Date: 05/31/24  -RD Placement Time: 1940  -RD Side: Right  -RD Orientation: lower  -RD Location: leg  -RD Primary Wound Type: Incision  -RD    Retired Wound - Properties Group Date first assessed: 05/31/24  -RD Time first assessed: 1940  -RD Side: Right  -RD Location: leg  -RD Primary Wound Type: Incision  -RD       Wound 06/10/24 1914 Right anterior greater trochanter    Wound - Properties Group Placement Date: 06/10/24  -RF Placement Time: 1914  -RF Side: Right  -RF Orientation: anterior  -RF Location: greater trochanter  -RF    Retired Wound - Properties Group Placement Date: 06/10/24  -RF Placement Time: 1914  -RF Side: Right  -RF Orientation: anterior  -RF Location: greater trochanter  -RF    Retired Wound - Properties Group Date first assessed: 06/10/24  -RF Time first assessed: 1914  -RF Side: Right  -RF Location: greater trochanter  -RF       Wound 06/10/24 1915 Right anterior thigh    Wound - Properties Group Placement Date: 06/10/24  -RF Placement Time: 1915  -RF Side: Right  -RF Orientation: anterior  -RF Location: thigh  -RF    Retired Wound - Properties Group Placement Date: 06/10/24  -RF Placement Time: 1915  -RF Side: Right  -RF Orientation: anterior  -RF Location: thigh  -RF    Retired Wound - Properties Group Date first assessed: 06/10/24  -RF Time first assessed: 1915  -RF Side: Right  -RF Location: thigh  -RF              User Key  (r) = Recorded By, (t) = Taken By, (c) = Cosigned By      Initials Name Effective Dates    Yue Barrios RN 06/16/21 -     MM Sharifa Vega OT 05/31/24 -     RF Tess Gaston RN 07/05/23 -                      Occupational Therapy Education       Title: PT OT SLP Therapies (Done)       Topic: Occupational Therapy (Done)       Point: ADL training (Done)        Description:   Instruct learner(s) on proper safety adaptation and remediation techniques during self care or transfers.   Instruct in proper use of assistive devices.                  Learning Progress Summary             Patient Acceptance, E,TB,D, VU by MM at 6/11/2024 1507    Comment: benefits of lymphedema, skin assessment, compression education   Family Acceptance, E,TB,D, VU by MM at 6/11/2024 1507    Comment: benefits of lymphedema, skin assessment, compression education                         Point: Precautions (Done)       Description:   Instruct learner(s) on prescribed precautions during self-care and functional transfers.                  Learning Progress Summary             Patient Acceptance, E,TB,D, VU by MM at 6/11/2024 1507    Comment: benefits of lymphedema, skin assessment, compression education   Family Acceptance, E,TB,D, VU by MM at 6/11/2024 1507    Comment: benefits of lymphedema, skin assessment, compression education                         Point: Body mechanics (Done)       Description:   Instruct learner(s) on proper positioning and spine alignment during self-care, functional mobility activities and/or exercises.                  Learning Progress Summary             Patient Acceptance, E,TB,D, VU by MM at 6/11/2024 1507    Comment: benefits of lymphedema, skin assessment, compression education   Family Acceptance, E,TB,D, VU by MM at 6/11/2024 1507    Comment: benefits of lymphedema, skin assessment, compression education                                         User Key       Initials Effective Dates Name Provider Type Discipline     05/31/24 -  Sharifa Vega OT Occupational Therapist OT                      OT Recommendation and Plan             Outcome Measures       Row Name 06/11/24 1500             How much help from another is currently needed...    Putting on and taking off regular lower body clothing? 1  -MM      Bathing (including washing, rinsing, and drying) 2  -MM       Toileting (which includes using toilet bed pan or urinal) 3  -MM      Putting on and taking off regular upper body clothing 2  -MM      Taking care of personal grooming (such as brushing teeth) 3  -MM      Eating meals 3  -MM      AM-PAC 6 Clicks Score (OT) 14  -MM         Functional Assessment    Outcome Measure Options AM-PAC 6 Clicks Daily Activity (OT)  -MM                User Key  (r) = Recorded By, (t) = Taken By, (c) = Cosigned By      Initials Name Provider Type    Sharifa Mcdonald OT Occupational Therapist                      Time Calculation:     Time Calculation- OT       Row Name 06/11/24 1508             Time Calculation- OT    OT Start Time 1320  -MM      OT Stop Time 1350  -MM      OT Time Calculation (min) 30 min  -MM         Timed Charges    25373 - OT Self Care/Mgmt Minutes 10  -MM         Total Minutes    Timed Charges Total Minutes 10  -MM       Total Minutes 10  -MM                User Key  (r) = Recorded By, (t) = Taken By, (c) = Cosigned By      Initials Name Provider Type    Sharifa Mcdonald OT Occupational Therapist                    Therapy Charges for Today       Code Description Service Date Service Provider Modifiers Qty    [id number]  OT SELF CARE/MGMT/TRAIN EA 15 MIN 6/11/2024 Sharifa Vega OT GO 1    [id number]  OT MULTI LAYER COMP SYS BELOW KNEE BILATERAL 6/11/2024 Sharifa Vega OT  1                        Sharifa Vega OT  6/11/2024

## 2024-06-11 NOTE — PROGRESS NOTES
"Nutrition Services    Patient Name:  Vonnie Coppola  YOB: 1935  MRN: [id number]  Admit Date:  5/31/2024  Assessment Date:  06/11/24    NUTRITION SCREENING      Reason for Encounter LOS   Diagnosis/Problem    88 y.o. female with a history of DVT, atrial fibrillation, hypertension, asthma, breast cancer, anxiety who presented to the ER with right lower extremity hematoma.       PO Diet Diet: Regular/House, Cardiac; Healthy Heart (2-3 Na+); Fluid Consistency: Thin (IDDSI 0)   Supplements    PO Intake % 50-75%       Medications MAR reviewed by RD   Labs  Listed below, reviewed   Physical Findings Alert, oriented   GI Function BM 6/10   Skin Status Right lower leg incision. Right anterior greater trochanter, right anterior thigh       Height  Weight  BMI  Weight Trend     Height: 165.1 cm (65\")  Weight: 109 kg (239 lb 6.7 oz) (06/11/24 0515)  Body mass index is 39.84 kg/m².  Stable       Nutrition Problem (PES) Nutrition appropriate for condition at this time as evidenced by tolerating diet with good po intake.       Intervention/Plan Visited pt and discussed food preferences. She prefers the regular yogurts over there low fit brand. Dietary notified  RD to follow up per protocol.     Results from last 7 days   Lab Units 06/11/24  0443 06/10/24  0405 06/09/24  0338   SODIUM mmol/L 142 143 141   POTASSIUM mmol/L 3.3* 3.5 3.5   CHLORIDE mmol/L 105 105 104   CO2 mmol/L 34.0* 32.0* 32.0*   BUN mg/dL 19 18 18   CREATININE mg/dL 0.94 0.86 1.01*   CALCIUM mg/dL 8.4* 8.2* 8.3*   GLUCOSE mg/dL 82 88 96     Results from last 7 days   Lab Units 06/11/24  0443 06/10/24  0405 06/09/24  0338   MAGNESIUM mg/dL 2.0 2.3 2.4   PHOSPHORUS mg/dL 3.5 3.5 3.3   HEMOGLOBIN g/dL 8.2* 7.9* 7.9*   HEMATOCRIT % 25.6* 25.7* 26.3*     No results found for: \"HGBA1C\"      Electronically signed by:  Loree Patel RD  06/11/24 10:47 EDT  "

## 2024-06-11 NOTE — CASE MANAGEMENT/SOCIAL WORK
Post-Acute Authorization Submission      Post Acute Pre-Cert Documentation  Request Submitted by Facility - Type:: Hospital  Post-Acute Authorization Type Submitted:: SNF  Date Post Acute Pre-Cert Inititated per Facility: 06/04/24  Date Post Acute Pre-Cert Completed: 06/04/24  Accepting Facility: [facility] Discharge Date Requested: 06/05/24  All Clinicals Submitted?: Yes  Had Accepting Facility at Time of Submission: Yes  Response Received from Insurance?: Approval  Response Communicated to:: , Accepting Facility Liaison, Accepting [facility] Auth Department  Authorization Number:: [id number]  Post Acute Pre-Cert Initiated Comment: VALID TO ADMIT UPTO 11:59PM ON 6/9/24.  New Pre-Cert Needed?: Yes  Post Acute Pre-Cert #2 Documentation  Request Submitted by Facility - Type:: Hospital  Post-Acute Authorization Type Submitted:: SNF  Date Post Acute Pre-Cert Inititated per Facility: 06/11/24  Accepting Facility: [facility] Discharge Date Requested: 06/11/24  All Clinicals Submitted?: Yes  Had Accepting Facility at Time of Submission: Yes  Response Communicated to:: , Accepting Facility Liaison  Authorization Number:: PENDING [id number]           YASH Lyon

## 2024-06-11 NOTE — CASE MANAGEMENT/SOCIAL WORK
Continued Stay Note  [facility]     Patient Name: Vonnie Coppola  MRN: [id number]  Today's Date: 6/11/2024    Admit Date: 5/31/2024    Plan: [facility] precert obtained   Discharge Plan       Row Name 06/11/24 1701       [facility]    [facility]    [facility] Comments Met with patient and family at bedside. Plan is to go to [facility]. Precert obtained. EMS scheduled for 3PM 6/12/2024. Will continue to monitor for new or changing discharge needs. Sydnee GARRISON RN CCP    Final Discharge Disposition Code 86 - home health care w/planned readmission                   Discharge Codes    No documentation.                 Expected Discharge Date and Time       Expected Discharge Date Expected Discharge Time    Jun 12, 2024               Sydnee Rojas RN

## 2024-06-12 VITALS
RESPIRATION RATE: 18 BRPM | HEART RATE: 79 BPM | TEMPERATURE: 97.7 F | OXYGEN SATURATION: 99 % | HEIGHT: 65 IN | BODY MASS INDEX: 39.38 KG/M2 | DIASTOLIC BLOOD PRESSURE: 55 MMHG | SYSTOLIC BLOOD PRESSURE: 118 MMHG | WEIGHT: 236.33 LBS

## 2024-06-12 LAB
ANION GAP SERPL CALCULATED.3IONS-SCNC: 6 MMOL/L (ref 5–15)
BASOPHILS # BLD AUTO: 0.05 10*3/MM3 (ref 0–0.2)
BASOPHILS NFR BLD AUTO: 0.6 % (ref 0–1.5)
BUN SERPL-MCNC: 15 MG/DL (ref 8–23)
BUN/CREAT SERPL: 17 (ref 7–25)
CALCIUM SPEC-SCNC: 8.2 MG/DL (ref 8.6–10.5)
CHLORIDE SERPL-SCNC: 103 MMOL/L (ref 98–107)
CO2 SERPL-SCNC: 33 MMOL/L (ref 22–29)
CREAT SERPL-MCNC: 0.88 MG/DL (ref 0.57–1)
DEPRECATED RDW RBC AUTO: 47.1 FL (ref 37–54)
EGFRCR SERPLBLD CKD-EPI 2021: 64.1 ML/MIN/1.73
EOSINOPHIL # BLD AUTO: 0.23 10*3/MM3 (ref 0–0.4)
EOSINOPHIL NFR BLD AUTO: 2.7 % (ref 0.3–6.2)
ERYTHROCYTE [DISTWIDTH] IN BLOOD BY AUTOMATED COUNT: 15.5 % (ref 12.3–15.4)
GLUCOSE SERPL-MCNC: 80 MG/DL (ref 65–99)
HCT VFR BLD AUTO: 24 % (ref 34–46.6)
HGB BLD-MCNC: 7.5 G/DL (ref 12–15.9)
IMM GRANULOCYTES # BLD AUTO: 0.05 10*3/MM3 (ref 0–0.05)
IMM GRANULOCYTES NFR BLD AUTO: 0.6 % (ref 0–0.5)
LYMPHOCYTES # BLD AUTO: 1.97 10*3/MM3 (ref 0.7–3.1)
LYMPHOCYTES NFR BLD AUTO: 23.2 % (ref 19.6–45.3)
MAGNESIUM SERPL-MCNC: 2.1 MG/DL (ref 1.6–2.4)
MCH RBC QN AUTO: 26.5 PG (ref 26.6–33)
MCHC RBC AUTO-ENTMCNC: 31.3 G/DL (ref 31.5–35.7)
MCV RBC AUTO: 84.8 FL (ref 79–97)
MONOCYTES # BLD AUTO: 0.97 10*3/MM3 (ref 0.1–0.9)
MONOCYTES NFR BLD AUTO: 11.4 % (ref 5–12)
NEUTROPHILS NFR BLD AUTO: 5.23 10*3/MM3 (ref 1.7–7)
NEUTROPHILS NFR BLD AUTO: 61.5 % (ref 42.7–76)
NRBC BLD AUTO-RTO: 0 /100 WBC (ref 0–0.2)
PHOSPHATE SERPL-MCNC: 2.9 MG/DL (ref 2.5–4.5)
PLATELET # BLD AUTO: 267 10*3/MM3 (ref 140–450)
PMV BLD AUTO: 9.9 FL (ref 6–12)
POTASSIUM SERPL-SCNC: 3.2 MMOL/L (ref 3.5–5.2)
RBC # BLD AUTO: 2.83 10*6/MM3 (ref 3.77–5.28)
SODIUM SERPL-SCNC: 142 MMOL/L (ref 136–145)
WBC NRBC COR # BLD AUTO: 8.5 10*3/MM3 (ref 3.4–10.8)

## 2024-06-12 PROCEDURE — 84100 ASSAY OF PHOSPHORUS: CPT | Performed by: STUDENT IN AN ORGANIZED HEALTH CARE EDUCATION/TRAINING PROGRAM

## 2024-06-12 PROCEDURE — 94761 N-INVAS EAR/PLS OXIMETRY MLT: CPT

## 2024-06-12 PROCEDURE — 63710000001 ONDANSETRON ODT 4 MG TABLET DISPERSIBLE: Performed by: STUDENT IN AN ORGANIZED HEALTH CARE EDUCATION/TRAINING PROGRAM

## 2024-06-12 PROCEDURE — 80048 BASIC METABOLIC PNL TOTAL CA: CPT | Performed by: STUDENT IN AN ORGANIZED HEALTH CARE EDUCATION/TRAINING PROGRAM

## 2024-06-12 PROCEDURE — 94799 UNLISTED PULMONARY SVC/PX: CPT

## 2024-06-12 PROCEDURE — 94664 DEMO&/EVAL PT USE INHALER: CPT

## 2024-06-12 PROCEDURE — 83735 ASSAY OF MAGNESIUM: CPT | Performed by: STUDENT IN AN ORGANIZED HEALTH CARE EDUCATION/TRAINING PROGRAM

## 2024-06-12 PROCEDURE — 29581 APPL MULTLAYER CMPRN SYS LEG: CPT

## 2024-06-12 PROCEDURE — 85025 COMPLETE CBC W/AUTO DIFF WBC: CPT | Performed by: STUDENT IN AN ORGANIZED HEALTH CARE EDUCATION/TRAINING PROGRAM

## 2024-06-12 RX ORDER — DIAZEPAM 2 MG/1
2 TABLET ORAL 2 TIMES DAILY
Qty: 6 TABLET | Refills: 0 | Status: SHIPPED | OUTPATIENT
Start: 2024-06-12 | End: 2024-06-15

## 2024-06-12 RX ORDER — PREDNISOLONE ACETATE 10 MG/ML
1 SUSPENSION/ DROPS OPHTHALMIC EVERY 8 HOURS SCHEDULED
Start: 2024-06-12 | End: 2024-06-13

## 2024-06-12 RX ORDER — ALBUTEROL SULFATE 0.63 MG/3ML
0.63 SOLUTION RESPIRATORY (INHALATION) EVERY 6 HOURS PRN
Start: 2024-06-12

## 2024-06-12 RX ORDER — OXYCODONE HYDROCHLORIDE 5 MG/1
5 TABLET ORAL EVERY 8 HOURS PRN
Qty: 9 TABLET | Refills: 0 | Status: SHIPPED | OUTPATIENT
Start: 2024-06-12 | End: 2024-06-15

## 2024-06-12 RX ORDER — DOXYCYCLINE 100 MG/1
100 CAPSULE ORAL EVERY 12 HOURS SCHEDULED
Qty: 4 CAPSULE | Refills: 0 | Status: SHIPPED | OUTPATIENT
Start: 2024-06-12 | End: 2024-06-14

## 2024-06-12 RX ADMIN — DOXYCYCLINE 100 MG: 100 CAPSULE ORAL at 10:29

## 2024-06-12 RX ADMIN — ONDANSETRON 4 MG: 4 TABLET, ORALLY DISINTEGRATING ORAL at 14:11

## 2024-06-12 RX ADMIN — SENNOSIDES AND DOCUSATE SODIUM 2 TABLET: 50; 8.6 TABLET ORAL at 08:46

## 2024-06-12 RX ADMIN — PANTOPRAZOLE SODIUM 40 MG: 40 TABLET, DELAYED RELEASE ORAL at 08:46

## 2024-06-12 RX ADMIN — FUROSEMIDE 40 MG: 40 TABLET ORAL at 08:46

## 2024-06-12 RX ADMIN — DIAZEPAM 2 MG: 2 TABLET ORAL at 08:46

## 2024-06-12 RX ADMIN — BUDESONIDE 0.5 MG: 0.5 INHALANT ORAL at 07:10

## 2024-06-12 RX ADMIN — OXYCODONE HYDROCHLORIDE 5 MG: 5 TABLET ORAL at 14:11

## 2024-06-12 RX ADMIN — ARFORMOTEROL TARTRATE 15 MCG: 15 SOLUTION RESPIRATORY (INHALATION) at 07:09

## 2024-06-12 RX ADMIN — PREDNISOLONE ACETATE 1 DROP: 10 SUSPENSION/ DROPS OPHTHALMIC at 05:50

## 2024-06-12 RX ADMIN — APIXABAN 2.5 MG: 2.5 TABLET, FILM COATED ORAL at 08:46

## 2024-06-12 RX ADMIN — ACETAMINOPHEN 1000 MG: 500 TABLET ORAL at 01:22

## 2024-06-12 RX ADMIN — ACETAMINOPHEN 1000 MG: 500 TABLET ORAL at 10:29

## 2024-06-12 NOTE — SIGNIFICANT NOTE
06/12/24 1414   OTHER   Discipline physical therapist   Rehab Time/Intention   Session Not Performed patient/family declined, not feeling well  (Pt politely declined PT tx this PM stating having stomach pain, nausea and increased RLE pain. RN already aware. Per chart pt to DC later this date to SNF. PT to cont to follow in case pt remains admitted.)   Therapy Assessment/Plan (PT)   Criteria for Skilled Interventions Met (PT) yes   Recommendation   PT - Next Appointment 06/13/24

## 2024-06-12 NOTE — THERAPY TREATMENT NOTE
Acute Care - Occupational Therapy Lymphedema Treatment Note  [facility]     Patient Name: Vonnie Coppola  : 1935  MRN: [id number]  Today's Date: 2024             Admit Date: 2024       ICD-10-CM ICD-9-CM   1. Leg hematoma, right, subsequent encounter  S80.11XD V58.89     924.5   2. Cellulitis of leg, right  L03.115 682.6   3. Chronic anemia  D64.9 285.9     Patient Active Problem List   Diagnosis    GI bleed    Anemia    HTN (hypertension)    History of breast cancer    Asthma    History of CVA (cerebrovascular accident)    Dehydration    Renal insufficiency    Pulmonary nodule    Adnexal cyst    Nausea    Atrial fibrillation    History of pulmonary embolism    Class 2 severe obesity with serious comorbidity in adult    Thrombocytopenia    Cellulitis of right leg    Acute cystitis    Leg hematoma, right, subsequent encounter     Past Medical History:   Diagnosis Date    Arthritis     Asthma     Atrial fibrillation     per pt's daughter.States hx of a-fib in the past when stressed or tired.    Breast cancer     LEFT BREAST CA, LUMPECTOMY & RADS    History of cataract     Hx of radiation therapy     APPROXIMATELY 40 TREATMENTS FOR LEFT BREAST CA    Hypertension     Pneumonia     Stroke      Past Surgical History:   Procedure Laterality Date    APPENDECTOMY      BLADDER SURGERY      BREAST BIOPSY Left     MALIGNANT    BREAST LUMPECTOMY Left     MALIGNANT    CATARACT EXTRACTION      COLONOSCOPY N/A 2024    Procedure: COLONOSCOPY to cecum with cold snare polypectomies;  Surgeon: Harshad Gaston MD;  Location: [facility] ENDOSCOPY;  Service: Gastroenterology;  Laterality: N/A;  pre- gi bleed, anemia  post- diverticulosis, polyps    ENDOSCOPY N/A 2024    Procedure: ESOPHAGOGASTRODUODENOSCOPY with biospy;  Surgeon: Harshad Gaston MD;  Location: [facility] ENDOSCOPY;  Service: Gastroenterology;  Laterality: N/A;  pre- gi bleed, anemia  post- erosive gastirits    GALLBLADDER  SURGERY      HERNIA REPAIR      HYSTERECTOMY      INCISION AND DRAINAGE LEG Right 5/31/2024    Procedure: RIGHT LOWER EXTREMITY WOUND EXPLORATION, DEBRIDEMENT AND CONTROL OF BLEEDING;  Surgeon: Gerald Pedraza MD;  Location: [facility];  Service: General;  Laterality: Right;    LASIK      LYMPHADENECTOMY      OOPHORECTOMY          Lymphedema       Row Name 06/12/24 1100 06/11/24 1400          Subjective Pain    Able to rate subjective pain? yes  -MM yes  -MM     Subjective Pain Comment reports pain in RLE however no pain related to wrapping in LLE this date  -MM pt reports pain in RLE this date due to large wound present-RN and wound addressing RLE - order for lymphedema wrap on LLE only  -MM     Recorded by [MM] Sharifa Vega OT [MM] Sharifa Vega OT            Subjective    Subjective Comments pt reports LLE feeling better, reports no c/o discomfort with wrapping overnight  -MM pt reports feeling very sick- following LLE wrapping, pt reports no discomfort  -MM     Recorded by [MM] Sharifa Vega OT [MM] Sharifa Vega OT            Lymphedema Assessment    Lymphedema Classification -- LLE:;stage 1 (Spontaneously Reversible)  -MM     Infections or Cellulitis? no  -MM no  -MM     Lymphedema Precautions -- DVT  hx DVT, doppler (-) on LLE noted prior to wrapping, hematoma present on LLE, RN aware and noted, OK to wrap per RN/MD  -MM     Lymphedema Assessment Comments -- pt's family present for session- report that she was previously warpped with ace bandage material at home from  nurses- no history of lymphedema wrapping  -MM     Recorded by [MM] Sharifa Vega OT [MM] Sharifa Vega OT            LLIS - Physical Concerns    The amount of pain associated with my lymphedema is: -- 1  -MM     The amount of limb heaviness associated with my lymphedema is: -- 2  -MM     The amount of skin tightness associated with my lymphedema is: -- 1  -MM     The size of my swollen limb(s) seems: -- 1  -MM     Lymphedema  affects the movement of my swollen limb(s): -- 1  -MM     The strength in my swollen limb(s) is: -- 2  -MM     Recorded by  [MM] Sharifa Vega OT            Skin Changes/Observations    Location/Assessment Lower Extremity  -MM Lower Extremity  -MM     Lower Extremity Conditions left:;fragile  -MM left:;fragile  L hematoma noted  -MM     Lower Extremity Color/Pigment left:;purple  L hematoma present, this date did wrapping ending right below hematoma  -MM --     Recorded by [MM] Sharifa Vega OT [MM] Sharifa Vega OT            Compression/Skin Care    Compression/Skin Care skin care;wrapping location;bandaging;compression garment  -MM skin care;wrapping location;bandaging;compression garment  -MM     Skin Care washed/dried;lotion applied;moisturizing lotion applied  -MM washed/dried;lotion applied;moisturizing lotion applied  -MM     Wrapping Location lower extremity  -MM lower extremity  LLE  -MM     Wrapping Location LE left:;foot to knee  -MM left:;foot to knee  -MM     Bandage Layers cotton liner;cotton elastic stocking- single layer (comment size)  -MM cotton liner;cotton elastic stocking- single layer (comment size)  -MM     Bandaging Comments size 10x1 artiflex and size x1 15 cm artiflex  -MM size 10 x1 artiflex and size x1 15 artiflex  -MM     Bandaging Technique light compression  -MM light compression  -MM     Compression Garment Comments jobst 8cm and 10cm, x1 each  -MM jobst 8cm and 10cm  -MM     Compression/Skin Care Comments skin intact, no deficits noted since yesterday  -MM pt reports no concerns, no discomfort, skin moisturized and intact  -MM     Recorded by [MM] Sharifa Vega OT [MM] Sharifa Vega OT            Lymphedema Life Impact Scale Totals    A.  Total Q1 - Q17 (Do not include Q18) -- 8  -MM     B.  Total number of questions answered (Q1-Q17) -- 6  -MM     C. Divide A by B -- 1.33  -MM     D. Multiple C by 25 -- 33.25  -MM     Recorded by  [MM] Sharifa Vega OT               User Torres   (r) = Recorded By, (t) = Taken By, (c) = Cosigned By      Initials Name Effective Dates    MM Gary Sharifa, OT 05/31/24 -                     OT ASSESSMENT FLOWSHEET (Last 12 Hours)       OT Evaluation and Treatment       Row Name                     Wound 05/31/24 1940 Right lower leg Incision    Wound - Properties Group Placement Date: 05/31/24  -RD Placement Time: 1940 -RD Side: Right  -RD Orientation: lower  -RD Location: leg  -RD Primary Wound Type: Incision  -RD    Retired Wound - Properties Group Placement Date: 05/31/24  -RD Placement Time: 1940  -RD Side: Right  -RD Orientation: lower  -RD Location: leg  -RD Primary Wound Type: Incision  -RD    Retired Wound - Properties Group Date first assessed: 05/31/24  -RD Time first assessed: 1940 -RD Side: Right  -RD Location: leg  -RD Primary Wound Type: Incision  -RD       Wound 06/10/24 1914 Right anterior greater trochanter    Wound - Properties Group Placement Date: 06/10/24  -RF Placement Time: 1914  -RF Side: Right  -RF Orientation: anterior  -RF Location: greater trochanter  -RF    Retired Wound - Properties Group Placement Date: 06/10/24  -RF Placement Time: 1914  -RF Side: Right  -RF Orientation: anterior  -RF Location: greater trochanter  -RF    Retired Wound - Properties Group Date first assessed: 06/10/24  -RF Time first assessed: 1914  -RF Side: Right  -RF Location: greater trochanter  -RF       Wound 06/10/24 1915 Right anterior thigh    Wound - Properties Group Placement Date: 06/10/24  -RF Placement Time: 1915  -RF Side: Right  -RF Orientation: anterior  -RF Location: thigh  -RF    Retired Wound - Properties Group Placement Date: 06/10/24  -RF Placement Time: 1915  -RF Side: Right  -RF Orientation: anterior  -RF Location: thigh  -RF    Retired Wound - Properties Group Date first assessed: 06/10/24  -RF Time first assessed: 1915  -RF Side: Right  -RF Location: thigh  -RF              User Key  (r) = Recorded By, (t) = Taken By, (c) = Cosigned By       Initials Name Effective Dates    Yue Barrios RN 06/16/21 -     RF Tess Gaston RN 07/05/23 -                      Occupational Therapy Education       Title: PT OT SLP Therapies (Done)       Topic: Occupational Therapy (Done)       Point: ADL training (Done)       Description:   Instruct learner(s) on proper safety adaptation and remediation techniques during self care or transfers.   Instruct in proper use of assistive devices.                  Learning Progress Summary             Patient Acceptance, E,TB,D, VU by MM at 6/11/2024 1507    Comment: benefits of lymphedema, skin assessment, compression education   Family Acceptance, E,TB,D, VU by MM at 6/11/2024 1507    Comment: benefits of lymphedema, skin assessment, compression education                         Point: Precautions (Done)       Description:   Instruct learner(s) on prescribed precautions during self-care and functional transfers.                  Learning Progress Summary             Patient Acceptance, E,TB,D, VU by MM at 6/11/2024 1507    Comment: benefits of lymphedema, skin assessment, compression education   Family Acceptance, E,TB,D, VU by MM at 6/11/2024 1507    Comment: benefits of lymphedema, skin assessment, compression education                         Point: Body mechanics (Done)       Description:   Instruct learner(s) on proper positioning and spine alignment during self-care, functional mobility activities and/or exercises.                  Learning Progress Summary             Patient Acceptance, E,TB,D, VU by MM at 6/11/2024 1507    Comment: benefits of lymphedema, skin assessment, compression education   Family Acceptance, E,TB,D, VU by MM at 6/11/2024 1507    Comment: benefits of lymphedema, skin assessment, compression education                                         User Key       Initials Effective Dates Name Provider Type Discipline     05/31/24 -  Sharifa Vega OT Occupational Therapist OT                       OT Recommendation and Plan             Outcome Measures       Row Name 06/12/24 1100 06/11/24 1500          How much help from another is currently needed...    Putting on and taking off regular lower body clothing? 1  -MM 1  -MM     Bathing (including washing, rinsing, and drying) 2  -MM 2  -MM     Toileting (which includes using toilet bed pan or urinal) 2  -MM 3  -MM     Putting on and taking off regular upper body clothing 2  -MM 2  -MM     Taking care of personal grooming (such as brushing teeth) 3  -MM 3  -MM     Eating meals 3  -MM 3  -MM     AM-PAC 6 Clicks Score (OT) 13  -MM 14  -MM        Functional Assessment    Outcome Measure Options AM-PAC 6 Clicks Daily Activity (OT)  -MM AM-PAC 6 Clicks Daily Activity (OT)  -MM               User Key  (r) = Recorded By, (t) = Taken By, (c) = Cosigned By      Initials Name Provider Type    Sharifa Mcdonald OT Occupational Therapist                      Time Calculation:     Time Calculation- OT       Row Name 06/12/24 1133             Time Calculation- OT    OT Start Time 1101  -MM      OT Stop Time 1120  -MM      OT Time Calculation (min) 19 min  -MM      OT Received On 06/12/24  -MM      OT - Next Appointment 06/13/24  -MM                User Key  (r) = Recorded By, (t) = Taken By, (c) = Cosigned By      Initials Name Provider Type    Sharifa Mcdonald OT Occupational Therapist                    Therapy Charges for Today       Code Description Service Date Service Provider Modifiers Qty    36236573078 HC OT SELF CARE/MGMT/TRAIN EA 15 MIN 6/11/2024 Sharifa Vega OT GO 1    72185346163 HC OT MULTI LAYER COMP SYS BELOW KNEE BILATERAL 6/11/2024 Sharifa Vega OT  1    [id number] HC OT MULTI LAYER COMP SYS BELOW KNEE BILATERAL 6/12/2024 Sharifa Vega OT 1 Madison Murr, OT  6/12/2024

## 2024-06-12 NOTE — DISCHARGE SUMMARY
Patient Name: Vonnie Coppola  : 1935  MRN: [id number]    Date of Admission: 2024  Date of Discharge:  2024  Primary Care Physician: Christopher Leyva DO      Chief Complaint:   Leg Pain and Wound Check      Discharge Diagnoses     Active Hospital Problems    Diagnosis  POA    **Leg hematoma, right, subsequent encounter [S80.11XD]  Not Applicable    History of pulmonary embolism [Z86.711]  Yes    Class 2 severe obesity with serious comorbidity in adult [E66.01]  Yes    Atrial fibrillation [I48.91]  Yes    History of CVA (cerebrovascular accident) [Z86.73]  Not Applicable    HTN (hypertension) [I10]  Yes    History of breast cancer [Z85.3]  Not Applicable    Asthma [J45.909]  Yes      Resolved Hospital Problems   No resolved problems to display.        Hospital Course     Ms. Coppola is a 88 y.o. female with a history of DVT, atrial fibrillation, hypertension, asthma, breast cancer, anxiety who presented to [facility] initially complaining of right lower extremity pain/wound.  Please see the admitting history and physical for further details.  She was admitted to the hospital for further evaluation and treatment.  The patient was noted to have a rapidly expanding hematoma of the right lower extremity for which a surgical consult was requested.  She was taken to the OR on her first day of admission for exploration, control of bleeding, debridement of skin and soft tissue.  Per surgery, wound will likely take months to heal and she will need wound care throughout this time.  She has been resumed on her Eliquis although given her persistently bleeding wound, her Eliquis has been reduced to 2.5 mg.  Wound continues to ooze small amounts of blood, surgery reevaluated the wound and said that this is normal especially if she is going to continue on Eliquis.  Prior to discharge she did develop some redness on her left lower extremity and was initiated on a course of doxycycline for  skin and soft tissue infection, redness has been improving.  Also recommend continuing compression wraps on her left lower extremity as tolerated to help with substantial lower extremity edema.  While admitted the patient also complained of visual disturbances, ophthalmology was consulted and she was found to have vitreous hemorrhage in the left eye, she will need to follow-up with a retinal specialist, this appointment has been made for her on 6/17.  She will need to continue on prednisone eyedrops for 7 days.  The patient will need to follow-up with surgery in the next 1 to 2 weeks for wound evaluation.  She will need to follow-up with ophthalmology on 6/17 as scheduled and her PCP in a week for posthospital follow-up visit.    Day of Discharge     Subjective:  Sitting up in bed, complaining of nausea. Would like to try some ginger ale and saltines.     Physical Exam:  Temp:  [97.7 °F (36.5 °C)-98.2 °F (36.8 °C)] 97.7 °F (36.5 °C)  Heart Rate:  [78-86] 79  Resp:  [18] 18  BP: (118-128)/(55-59) 118/55  Body mass index is 39.33 kg/m².  Physical Exam  Constitutional:       General: She is not in acute distress.     Appearance: She is not toxic-appearing.   HENT:      Head: Normocephalic and atraumatic.   Cardiovascular:      Rate and Rhythm: Normal rate and regular rhythm.   Pulmonary:      Effort: Pulmonary effort is normal. No respiratory distress.      Breath sounds: No wheezing or rhonchi.   Abdominal:      General: Bowel sounds are normal.      Palpations: Abdomen is soft.      Tenderness: There is no abdominal tenderness. There is no guarding or rebound.   Musculoskeletal:         General: Lower extremities wrapped   Skin:     General: Skin is warm and dry.   Neurological:      General: No focal deficit present.      Mental Status: She is alert and oriented to person, place, and time.   Psychiatric:         Mood and Affect: Mood normal.         Behavior: Behavior normal.   Consultants     Consult Orders (all)  (From admission, onward)       Start     Ordered    06/05/24 1147  Inpatient Ophthalmology Consult  Once        Specialty:  Ophthalmology  Provider:  Mike Hough MD    06/05/24 1146    06/05/24 1145  Hematology & Oncology Inpatient Consult  Once        Specialty:  Hematology and Oncology  Provider:  Augustus Wilde MD    06/05/24 1145    05/31/24 1710  [facility] (on-call MD unless specified) Details  Once        Specialty:  Hospitalist  Provider:  (Not yet assigned)    05/31/24 1709    05/31/24 1554  Surgery (on-call MD unless specified)  Once        Specialty:  General Surgery  Provider:  (Not yet assigned)    05/31/24 1554                  Procedures     Imaging Results (All)       Procedure Component Value Units Date/Time    CT Lower Extremity Left With Contrast [[id number]] Collected: 06/07/24 1425     Updated: 06/07/24 1539    Narrative:      CT LEFT LOWER EXTREMITY WITH IV CONTRAST     HISTORY: Pain and swelling and bruising in the left lower extremity.     CT includes axial imaging through the left lower leg from the distal  femoral shaft through the toes with IV contrast.     COMPARISON: CT angiogram 05/31/2024.     FINDINGS: There is generalized edema subcutaneous fat circumferentially  about the left calf, ankle, foot. Degree of swelling is significantly  increased compared to the CT 05/31/2024. Greatest extensive subcutaneous  fat edema and swelling is present along the lateral ankle and  dorsolateral midfoot and forefoot. There is no evidence for abscess or  drainable collection. No evidence for extension to the underlying  musculature. There appears to be  somewhat nodular area in the  subcutaneous tissues anteromedial to the proximal to mid tibial shaft  suggesting a small hematoma this measures 13 mm.     There is advanced osteoarthritis of the left knee with joint space  narrowing and marginal spur formation. Chronic osteochondral body  posterior to the intercondylar fossa measures 2.2 cm.        Impression:      1. Generalized edema within the subcutaneous fat about the left calf,  ankle, foot greatest along the lateral ankle and dorsal midfoot and  forefoot. Small, 13 mm nodular focus within the subtendinous fat  anteromedial to the proximal and mid tibia likely small hematoma. No  evidence for abscess or drainable collection. Findings are nonspecific  and to be associate with cellulitis and this exhibits progression  compared to the CT 05/31/2024. No evidence for fracture.  2. Advanced osteoarthritis left knee with chronic osteochondral body  posterior to the intercondylar fossa.     Radiation dose reduction techniques were utilized, including automated  exposure control and exposure modulation based on body size.        This report was finalized on 6/7/2024 3:36 PM by Dr. Archie Ortiz M.D on Workstation: [id number]       XR Chest 1 View [[id number]] Collected: 06/03/24 0711     Updated: 06/03/24 0818    Narrative:      CHEST SINGLE VIEW     HISTORY: Shortness of breath.     COMPARISON: Two-view chest 03/21/2024     FINDINGS: Thoracic aorta is tortuous and aortic valve calcifications are  present. Heart size appears within normal limits. Lungs are clear of  focal airspace disease and there is no evidence for pulmonary edema or  pleural effusion. Left axillary clips are present.       Impression:      No evidence for active disease in the chest.     This report was finalized on 6/3/2024 8:15 AM by Dr. Archie Ortiz M.D on Workstation: [id number]       XR Shoulder 2+ View Right [[id number]] Collected: 06/02/24 1636     Updated: 06/02/24 1641    Narrative:      RIGHT SHOULDER: INTERNAL AND EXTERNAL ROTATION     HISTORY: Right shoulder pain after transferring from bed to stretcher.     COMPARISON: None     FINDINGS: Humeral head is high riding consistent with a chronic right  rotator cuff tear. There is remodeling along the undersurface of the  acromion and there is AC joint arthritis with acromial spur  formation.  There is no evidence for fracture or dislocation. The bones appear  osteopenic.           This report was finalized on 6/2/2024 4:38 PM by Dr. Archie Ortiz M.D on Workstation: [id number]       CT Angio Abdominal Aorta Bilateral Iliofem Runoff [[id number]] Collected: 05/31/24 1626     Updated: 05/31/24 1741    Narrative:      CT ANGIOGRAM ABDOMEN AND PELVIS WITH ILIOFEMORAL RUNOFF     HISTORY: Right calf swelling, hematoma, severe pain     COMPARISON: CT chest 05/09/2024, CT abdomen and pelvis 01/16/2024.     TECHNIQUE: Axial images were obtained from the lung bases through the  lower extremities following the administration of IV contrast. Coronal  and sagittal MIP images were obtained as well as 3D volume rendering.   Radiation dose reduction techniques were utilized, including automated  exposure control and exposure modulation based on body size.     FINDINGS:     CTA: Distal thoracic aorta is tortuous though exhibits normal caliber.  Celiac artery, superior mesenteric artery, both main renal arteries,  inferior mesenteric artery are patent. Atherosclerotic calcifications  are present involving the abdominal aorta without aneurysm. Both common  iliac arteries, internal/external iliac arteries, common femoral  arteries are patent.     Right lower extremity: Superficial and deep femoral arteries patent.  Mild calcified plaques involving the superficial femoral, popliteal  arteries. The popliteal artery, anterior tibial artery, tibioperoneal  trunk are patent. Three-vessel runoff to the right lower extremity.  There is abnormal swelling of the right lower extremity as compared to  the left with edema in the subcutaneous fat involving the right thigh,  knee, calf, ankle, foot, more severe distally. There is a cutaneous and  subcutaneous complex collection within the posteromedial distal calf  just above the ankle that measures approximately 7 x 3.8 x 11 cm. This  collection is consistent with a  hematoma and exhibits hematocrit level.  There is evidence for active extravasation into this collection with  contrast extending into the anterior aspect of the collection that  subsequently pools into the collection and increases in volume on the  venous phase imaging associated with pseudoaneurysm formation.. There is  a deeper hematoma there is anterior medial to the distal tibial shaft  just above the ankle within the deep subcutaneous fat and this measures  approximately 4 x 2 x 7 cm.     Left lower extremity: Mild atherosclerotic disease associated with  calcified plaque though the left superficial and deep femoral arteries,  popliteal artery, anterior tibial artery, tibioperoneal trunk are  patent. There is three-vessel runoff to the left lower extremity. No  hemorrhage or extravasation.     Non-angiogram findings: Mild basilar atelectasis. Previous  cholecystectomy. Likely compensatory dilatation of the bile ducts. 2.2  cm cystic lesion caudal aspect of the spleen without change. Adrenal  glands, pancreas, kidneys within normal limits. Right renal parapelvic  cyst. No hydronephrosis. Moderate rectal distention with stool.          Impression:      1.  Cutaneous and subcutaneous hematoma within the posteromedial distal  right calf. There is active extravasation of arterial phase contrast  associated with pseudoaneurysm formation along the anterior margin of  the hematoma and contrast pools within the hematoma. A smaller hematoma  is present within the deeper subcutaneous fat anteromedial to the distal  tibia. Generalized edema in the subcutaneous fat about the right lower  extremity greatest within the distal calf and ankle and this may be  related to the hematoma or superimposed infection. No abnormal soft  tissue gas.  2. Atherosclerotic disease without evidence for aneurysm or significant  stenosis.     Discussed with Dr. Martinez in the emergency department 05 /31/2024 at  4:20 p.m.        This report was  "finalized on 5/31/2024 5:38 PM by Dr. Archie Ortiz M.D on Workstation: [id number]               Pertinent Labs     Results from last 7 days   Lab Units 06/12/24  0522 06/11/24  0443 06/10/24  0405 06/09/24  0338   WBC 10*3/mm3 8.50 8.39 7.77 9.10   HEMOGLOBIN g/dL 7.5* 8.2* 7.9* 7.9*   PLATELETS 10*3/mm3 267 261 263 288     Results from last 7 days   Lab Units 06/12/24  0522 06/11/24  0443 06/10/24  0405 06/09/24  0338   SODIUM mmol/L 142 142 143 141   POTASSIUM mmol/L 3.2* 3.3* 3.5 3.5   CHLORIDE mmol/L 103 105 105 104   CO2 mmol/L 33.0* 34.0* 32.0* 32.0*   BUN mg/dL 15 19 18 18   CREATININE mg/dL 0.88 0.94 0.86 1.01*   GLUCOSE mg/dL 80 82 88 96   Estimated Creatinine Clearance: 55.8 mL/min (by C-G formula based on SCr of 0.88 mg/dL).    Results from last 7 days   Lab Units 06/12/24  0522 06/11/24  0443 06/10/24  0405 06/09/24  0338   CALCIUM mg/dL 8.2* 8.4* 8.2* 8.3*   MAGNESIUM mg/dL 2.1 2.0 2.3 2.4   PHOSPHORUS mg/dL 2.9 3.5 3.5 3.3       Results from last 7 days   Lab Units 06/09/24 1939 06/09/24  1733   HSTROP T ng/L 22* 15*           Invalid input(s): \"LDLCALC\"        Test Results Pending at Discharge       Discharge Details        Discharge Medications        New Medications        Instructions Start Date   albuterol 0.63 MG/3ML nebulizer solution  Commonly known as: ACCUNEB   0.63 mg, Nebulization, Every 6 Hours PRN      doxycycline 100 MG capsule  Commonly known as: MONODOX   100 mg, Oral, Every 12 Hours Scheduled      lidocaine 1 g, ibuprofen 300 mg, baclofen 200 mg   1 Application, Topical, 4 Times Daily PRN      oxyCODONE 5 MG immediate release tablet  Commonly known as: ROXICODONE   5 mg, Oral, Every 8 Hours PRN      prednisoLONE acetate 1 % ophthalmic suspension  Commonly known as: PRED FORTE   1 drop, Left Eye, Every 8 Hours Scheduled             Changes to Medications        Instructions Start Date   acetaminophen 325 MG tablet  Commonly known as: TYLENOL  What changed: Another medication with " the same name was removed. Continue taking this medication, and follow the directions you see here.   650 mg, Oral, Every 6 Hours PRN      apixaban 2.5 MG tablet tablet  Commonly known as: ELIQUIS  What changed:   medication strength  how much to take  Another medication with the same name was removed. Continue taking this medication, and follow the directions you see here.   2.5 mg, Oral, Every 12 Hours Scheduled      furosemide 40 MG tablet  Commonly known as: LASIX  What changed: Another medication with the same name was removed. Continue taking this medication, and follow the directions you see here.   40 mg, Oral, Daily             Continue These Medications        Instructions Start Date   Allegra Allergy 60 MG tablet  Generic drug: fexofenadine   Oral, Daily PRN      arformoterol 15 MCG/2ML nebulizer solution  Commonly known as: Brovana   15 mcg, Nebulization, 2 Times Daily - RT      benzonatate 100 MG capsule  Commonly known as: TESSALON   100 mg, Oral, 3 Times Daily PRN      budesonide 0.5 MG/2ML nebulizer solution  Commonly known as: PULMICORT   0.5 mg, Nebulization, 2 Times Daily      coenzyme Q10 50 MG capsule capsule   1 capsule, Oral, Daily      diazePAM 2 MG tablet  Commonly known as: VALIUM   2 mg, Oral, 2 Times Daily      dilTIAZem  MG 24 hr capsule  Commonly known as: CARDIZEM CD   120 mg, Oral, Every 24 Hours Scheduled      docusate sodium 100 MG capsule  Commonly known as: COLACE   100 mg, Oral, Daily      glucosamine-chondroitin 500-400 MG capsule capsule   1 capsule, Oral, 3 Times Daily With Meals      Magnesium 500 MG tablet   500 mg, Oral, Daily      O2  Commonly known as: OXYGEN   2 L/min, Inhalation, Continuous, At night as needed      ondansetron 4 MG tablet  Commonly known as: ZOFRAN   4 mg, Oral, Daily PRN      pantoprazole 40 MG EC tablet  Commonly known as: PROTONIX   40 mg, Oral, 2 Times Daily Before Meals      polyethylene glycol 17 g packet  Commonly known as: MIRALAX   17 g,  Oral, Daily PRN      VITAMIN B 12 PO   1 tablet, Oral, Daily      VITAMIN D-3 PO   1 tablet, Oral, Daily             Stop These Medications      cephalexin 500 MG capsule  Commonly known as: KEFLEX     fluticasone 50 MCG/ACT nasal spray  Commonly known as: Flonase     potassium chloride 10 MEQ CR tablet  Commonly known as: KLOR-CON M10     SELENIMIN PO              Allergies   Allergen Reactions    Cortisone Hives    Penicillins Hives     Beta lactam allergy details  Antibiotic reaction: hives  Age at reaction: unknown  Dose to reaction time: unknown  Reason for antibiotic: unknown  Epinephrine required for reaction?: no  Tolerated antibiotics: unknown    Tolerated Zosyn         Discharge Disposition:  Skilled Nursing Facility (DC - External)    Discharge Diet:  Diet Order   Procedures    Diet: Regular/House, Cardiac; Healthy Heart (2-3 Na+); Fluid Consistency: Thin (IDDSI 0)       Discharge Activity:   Activity Instructions       Activity as Tolerated              CODE STATUS:    Code Status and Medical Interventions:   Ordered at: 06/01/24 0707     Code Status (Patient has no pulse and is not breathing):    CPR (Attempt to Resuscitate)     Medical Interventions (Patient has pulse or is breathing):    Full Support       Future Appointments   Date Time Provider Department Center   6/12/2024  3:00 PM EMS MED 1  [facility]   7/30/2024  8:00 AM LAB CHAIR 5 NU BULL  [facility]   7/30/2024  8:20 AM Augustus Wilde MD [facility]     Additional Instructions for the Follow-ups that You Need to Schedule       Discharge Follow-up with PCP   As directed       Currently Documented PCP:    Christopher Leyva DO    PCP Phone Number:    [phone]     Follow Up Details: post-hospital follow up        Discharge Follow-up with Specified Provider: Ophthalmology on 6/17/24   As directed      To: Ophthalmology on 6/17/24        Discharge Follow-up with Specified Provider: Surgery/Dr. Pedraza 1-2 weeks   As  directed      To: Surgery/Dr. Pedraza 1-2 weeks               Contact information for follow-up providers       Gerald Pedraza MD. Schedule an appointment as soon as possible for a visit in 2 week(s).    Specialty: General Surgery  Contact information:  [address]  [facility] [id number] [phone]               Christopher Leyva DO .    Specialty: Internal Medicine  Why: post-hospital follow up  Contact information:  [address]  [facility] [facility] [address] [phone]                       Contact information for after-discharge care       Destination       [facility] .    Service: Skilled Nursing  Contact information:  [address] [id number] [phone]                                   Additional Instructions for the Follow-ups that You Need to Schedule       Discharge Follow-up with PCP   As directed       Currently Documented PCP:    Christopher Leyva DO    PCP Phone Number:    [phone]     Follow Up Details: post-hospital follow up        Discharge Follow-up with Specified Provider: Ophthalmology on 6/17/24   As directed      To: Ophthalmology on 6/17/24        Discharge Follow-up with Specified Provider: Surgery/Dr. Pedraza 1-2 weeks   As directed      To: Surgery/Dr. Pedraza 1-2 weeks            Time Spent on Discharge:  I spent greater than 30 minutes on this discharge activity which included: face-to-face encounter with the patient, reviewing the data in the system, coordination of the care with the nursing staff as well as consultants, documentation, and entering orders.       Anika Jackson MD  [facility]  06/12/24  14:05 EDT

## 2024-06-12 NOTE — CASE MANAGEMENT/SOCIAL WORK
Continued Stay Note  [facility]     Patient Name: Vonnie Coppola  MRN: [id number]  Today's Date: 6/12/2024    Admit Date: 5/31/2024    Plan: [facility] today at 3pm   Discharge Plan       Row Name 06/12/24 1252       Plan    Plan [facility] today at 3pm    Plan Comments Plan for [facility] today via [facility] at 3pm. CCP notified RN, MD, and facility of transport time. Per RN, family aware of transport time. Packet in [employer]. RAAD, CSW                   Discharge Codes    No documentation.                 Expected Discharge Date and Time       Expected Discharge Date Expected Discharge Time    Jun 12, 2024               ABY Nunez

## 2024-06-12 NOTE — PLAN OF CARE
Goal Outcome Evaluation:                 Patient discharged per order. Report called to Radha. Wound dressing changed before discharge with no complications. Appearance same as yesterday. Patient showed no signs of distress at discharge

## 2024-06-12 NOTE — PLAN OF CARE
Goal Outcome Evaluation:   Pt plans SNF at d/c. Pt reported no discomfort overnight and into this AM with wrapping to LLE. Noted observations that LLE ankle with decreased swelling compared to yesterday prior to wrapping.   Materials used:  Rosidal 8cm and 10cm  Artiflex 10cm and 15 cm  Stockinette tg  Noted plans for d/c this date at 1500. plan to address lymph order daily, f/u tomorrow if pt does not d/c. Pt with no report of discomfort to LLE, skin intact, RN aware.

## 2024-06-14 NOTE — CASE MANAGEMENT/SOCIAL WORK
Case Management Discharge Note      Final Note: [facility] transport.         Selected Continued Care - Discharged on 6/12/2024 Admission date: 5/31/2024 - Discharge disposition: Skilled Nursing Facility (DC - External)      Destination Coordination complete.      Service Provider Selected Services Address Phone Fax Patient Preferred    [facility] [address] [address] [phone] --              Durable Medical Equipment    No services have been selected for the patient.                Dialysis/Infusion    No services have been selected for the patient.                Home Medical Care    No services have been selected for the patient.                Therapy    No services have been selected for the patient.                Community Resources    No services have been selected for the patient.                Community & DME    No services have been selected for the patient.                    Selected Continued Care - Prior Encounters Includes continued care and service providers with selected services from prior encounters from 3/2/2024 to 6/12/2024      Discharged on 3/24/2024 Admission date: 3/21/2024 - Discharge disposition: Home-Health Care Svc      Home Medical Care       Service Provider Selected Services Address Phone Fax Patient Preferred     [facility] [address] [facility] [address] [address] [phone] --                          Transportation Services  Ambulance: [facility]    Final Discharge Disposition Code: 03 - skilled nursing facility (SNF)

## 2024-06-19 ENCOUNTER — OFFICE VISIT (OUTPATIENT)
Dept: SURGERY | Facility: CLINIC | Age: 89
End: 2024-06-19
Payer: MEDICARE

## 2024-06-19 VITALS — HEIGHT: 65 IN | SYSTOLIC BLOOD PRESSURE: 112 MMHG | DIASTOLIC BLOOD PRESSURE: 86 MMHG | BODY MASS INDEX: 39.33 KG/M2

## 2024-06-19 DIAGNOSIS — S80.11XD LEG HEMATOMA, RIGHT, SUBSEQUENT ENCOUNTER: Primary | ICD-10-CM

## 2024-06-19 PROBLEM — E66.9 OBESITY (BMI 35.0-39.9 WITHOUT COMORBIDITY): Status: ACTIVE | Noted: 2024-06-19

## 2024-06-19 PROBLEM — E66.01 MORBID (SEVERE) OBESITY DUE TO EXCESS CALORIES: Status: ACTIVE | Noted: 2024-06-19

## 2024-06-19 RX ORDER — MOXIFLOXACIN HCL 0.5 %
DROPS OPHTHALMIC (EYE)
Status: ON HOLD | COMMUNITY
Start: 2024-05-10 | End: 2024-06-23

## 2024-06-19 RX ORDER — POTASSIUM CHLORIDE 20 MEQ/1
20 TABLET, EXTENDED RELEASE ORAL
Status: ON HOLD | COMMUNITY
Start: 2024-04-25 | End: 2024-06-23

## 2024-06-19 RX ORDER — CEPHALEXIN 500 MG/1
1 CAPSULE ORAL 3 TIMES DAILY
Status: ON HOLD | COMMUNITY
End: 2024-06-23

## 2024-06-19 RX ORDER — DILTIAZEM HYDROCHLORIDE 120 MG/1
120 CAPSULE, COATED, EXTENDED RELEASE ORAL DAILY
COMMUNITY
Start: 2024-04-04

## 2024-06-19 RX ORDER — DIAZEPAM 2 MG/1
2 TABLET ORAL 2 TIMES DAILY
COMMUNITY
Start: 2023-12-25 | End: 2024-12-11

## 2024-06-19 NOTE — PROGRESS NOTES
ASSESSMENT/PLAN:    88-year-old lady presenting in follow-up after undergoing right lower extremity wound exploration, debridement, control of hemorrhage on 5/31/2024.  Wound is granulating well although there is some fibrinous material and slough at the anteromedial superior portion of the wound.  Recommend continuing wet-to-dry dressing changes with Kerlix gauze send with saline.  Place gauze over the wound and then cover with an ABD pad.  Wrap the leg loosely with Kerlix gauze up to just below the knee.  Then wrap the leg with mild compression from the toes to just below the knee with an Ace bandage.  She also has left lower extremity edema and when she was in the hospital it was recommended that the left leg be wrapped as well.  Left leg should be wrapped with mild compression and these wrappings can remain in place for 2 to 3 days.  Wrapping of the left lower extremity should spanned from the toes to just below the knee.    I will see her back in 4 weeks.  Consideration to be given for wound VAC placement at that time.    CC:     Chief Complaint   Patient presents with    Post-op     Exploration of right lower extremity hematoma with control of bleeding, debridement of skin and subcutaneous tissue 05/31/2024        HPI:    She reports they are changing the wound daily.  She reports frustration at the mattress being uncomfortable and reports that her left leg was not wrapped yesterday.    SOCIAL HISTORY:   Social Determinants of Health     Tobacco Use: Medium Risk (6/19/2024)    Patient History     Smoking Tobacco Use: Former     Smokeless Tobacco Use: Never     Passive Exposure: Not on file   Alcohol Use: Not At Risk (5/31/2024)    AUDIT-C     Frequency of Alcohol Consumption: Never     Average Number of Drinks: Patient does not drink     Frequency of Binge Drinking: Never   Financial Resource Strain: Not on file   Food Insecurity: Not on file   Transportation Needs: No Transportation Needs (5/24/2024)    OASIS  : Transportation     Lack of Transportation (Medical): No     Lack of Transportation (Non-Medical): No     Patient Unable or Declines to Respond: No   Physical Activity: Not on file   Stress: Not on file   Social Connections: Feeling Socially Integrated (5/24/2024)    OASIS : Social Isolation     Frequency of experiencing loneliness or isolation: Rarely   Recent Concern: Social Connections - Feeling Somewhat Isolated (3/25/2024)    OASIS : Social Isolation     Frequency of experiencing loneliness or isolation: Sometimes   Interpersonal Safety: Not At Risk (5/31/2024)    Abuse Screen     Unsafe at Home or Work/School: no     Feels Threatened by Someone?: no     Does Anyone Keep You from Contacting Others or Doint Things Outside the Home?: no     Physical Sign of Abuse Present: no   Depression: Not on file   Housing Stability: Not At Risk (6/3/2024)    Housing Stability     Current Living Arrangements: home     Potentially Unsafe Housing Conditions: none   Utilities: Not on file   Health Literacy: Unknown (6/3/2024)    Education     Help with school or training?: Not on file     Preferred Language: English   Recent Concern: Health Literacy - Inadequate Health Literacy (5/24/2024)    OASIS : Health Literacy     Frequency of needing help to read materials from doctor or pharmacy: Sometimes   Employment: Unknown (10/9/2023)    Employment     Do you want help finding or keeping work or a job?: Not on file   Disabilities: At Risk (5/31/2024)    Disabilities     Concentrating, Remembering, or Making Decisions Difficulty: yes     Doing Errands Independently Difficulty: yes        FAMILY HISTORY:    Family History   Problem Relation Age of Onset    Other Mother         Cardiac disorder    Osteoarthritis Mother     Cataracts Mother     Macular degeneration Mother     Hypertension Father     Other Father         Cardiac disorder    Ovarian cancer Sister         70'S    Cancer Sister     Diabetes Sister      Hypertension Sister     Osteoarthritis Sister     Cancer Brother     Hypertension Brother     Osteoarthritis Brother     Colon cancer Maternal Grandmother     Ovarian cancer Paternal Grandmother         unknown    Cancer Other     Stroke Other     Breast cancer Neg Hx         OTHER SURGERY:  Past Surgical History:   Procedure Laterality Date    APPENDECTOMY      BLADDER SURGERY      BREAST BIOPSY Left 1999    MALIGNANT    BREAST LUMPECTOMY Left 1999    MALIGNANT    CATARACT EXTRACTION      COLONOSCOPY N/A 01/19/2024    Procedure: COLONOSCOPY to cecum with cold snare polypectomies;  Surgeon: Harshad Gaston MD;  Location: [facility] ENDOSCOPY;  Service: Gastroenterology;  Laterality: N/A;  pre- gi bleed, anemia  post- diverticulosis, polyps    ENDOSCOPY N/A 01/19/2024    Procedure: ESOPHAGOGASTRODUODENOSCOPY with biospy;  Surgeon: Harshad Gaston MD;  Location: [facility] ENDOSCOPY;  Service: Gastroenterology;  Laterality: N/A;  pre- gi bleed, anemia  post- erosive gastirits    GALLBLADDER SURGERY      HERNIA REPAIR      HYSTERECTOMY      INCISION AND DRAINAGE LEG Right 5/31/2024    Procedure: RIGHT LOWER EXTREMITY WOUND EXPLORATION, DEBRIDEMENT AND CONTROL OF BLEEDING;  Surgeon: Gerald Pedraza MD;  Location: [facility] MAIN OR;  Service: General;  Laterality: Right;    LASIK      LYMPHADENECTOMY      OOPHORECTOMY          PAST MEDICAL HISTORY:    Past Medical History:   Diagnosis Date    Arthritis     Asthma     Atrial fibrillation     per pt's daughter.States hx of a-fib in the past when stressed or tired.    Breast cancer 1999    LEFT BREAST CA, LUMPECTOMY & RADS    History of cataract     Hx of radiation therapy 1999    APPROXIMATELY 40 TREATMENTS FOR LEFT BREAST CA    Hypertension     Pneumonia     Stroke         MEDICATIONS:   Current Outpatient Medications on File Prior to Visit   Medication Sig Dispense Refill    acetaminophen (TYLENOL) 325 MG tablet Take 2 tablets by mouth Every 6 (Six) Hours As Needed for  Mild Pain.      albuterol (ACCUNEB) 0.63 MG/3ML nebulizer solution Take 3 mL by nebulization Every 6 (Six) Hours As Needed for Shortness of Air.      apixaban (ELIQUIS) 2.5 MG tablet tablet Take 1 tablet by mouth Every 12 (Twelve) Hours. Indications: Atrial Fibrillation      arformoterol (BROVANA) 15 MCG/2ML nebulizer solution Take 2 mL by nebulization 2 (Two) Times a Day. 120 mL 11    benzonatate (TESSALON) 100 MG capsule Take 1 capsule by mouth 3 (Three) Times a Day As Needed for Cough. 20 capsule 0    budesonide (PULMICORT) 0.5 MG/2ML nebulizer solution Take 2 mL by nebulization 2 (Two) Times a Day. 360 mL 2    Cholecalciferol (VITAMIN D-3 PO) Take 1 tablet by mouth Daily. Indications: nutritional support      coenzyme Q10 50 MG capsule capsule Take 1 capsule by mouth Daily. Indications: nutritional support      Cyanocobalamin (VITAMIN B 12 PO) Take 1 tablet by mouth Daily. Indications: nutritional support      diazePAM (VALIUM) 2 MG tablet Take 1 tablet by mouth.      dilTIAZem CD (CARDIZEM CD) 120 MG 24 hr capsule Take 1 capsule by mouth Daily.      docusate sodium (COLACE) 100 MG capsule Take 1 capsule by mouth Daily. Indications: Constipation      fexofenadine (Allegra Allergy) 60 MG tablet Take  by mouth Daily As Needed.      furosemide (LASIX) 40 MG tablet Take 1 tablet by mouth Daily. Indications: Cardiac Failure      glucosamine-chondroitin 500-400 MG capsule capsule Take 1 capsule by mouth 3 (Three) Times a Day With Meals. Indications: Joint Damage causing Pain and Loss of Function      lidocaine 1 g, ibuprofen 300 mg, baclofen 200 mg Apply 1 Application topically to the appropriate area as directed 4 (Four) Times a Day As Needed (pain).      Magnesium 500 MG tablet Take 1 tablet by mouth Daily.      O2 (OXYGEN) Inhale 2 L/min Continuous. At night as needed  Indications: hypoxia      ondansetron (ZOFRAN) 4 MG tablet Take 1 tablet by mouth Daily As Needed for Nausea or Vomiting. Indications: Nausea and  "Vomiting      pantoprazole (PROTONIX) 40 MG EC tablet Take 1 tablet by mouth 2 (Two) Times a Day Before Meals. 60 tablet 0    polyethylene glycol (MIRALAX) 17 g packet Take 17 g by mouth Daily As Needed (constipation ). Indications: Constipation      potassium chloride (KLOR-CON M20) 20 MEQ CR tablet 1 tablet.      Vigamox 0.5 % ophthalmic solution INSTILL 1 DROP INTO AFFECTED EYE(S) BY OPHTHALMIC ROUTE 3 TIMES PER DAY      cephalexin (KEFLEX) 500 MG capsule Take 1 capsule by mouth 3 (Three) Times a Day.      [DISCONTINUED] dilTIAZem CD (CARDIZEM CD) 120 MG 24 hr capsule Take 1 capsule by mouth Daily. 30 capsule 0     No current facility-administered medications on file prior to visit.        ALLERGIES:   Allergies   Allergen Reactions    Cortisone Hives    Penicillins Hives     Beta lactam allergy details  Antibiotic reaction: hives  Age at reaction: unknown  Dose to reaction time: unknown  Reason for antibiotic: unknown  Epinephrine required for reaction?: no  Tolerated antibiotics: unknown    Tolerated Zosyn    Iodinated Contrast Media Unknown - Low Severity     Patient  passed out, she has had studies with contact  recently         Body mass index is 39.33 kg/m².  165.1 cm (65\")  No Weight Documented This Encounter    PHYSICAL EXAM:   Constitutional: Well-developed well-nourished, no acute distress  Skin: Right lower extremity with improving ecchymosis surrounding the wound, wound bed with granulation tissue in the base with some fibrinous slough in the anteromedial superior portion of the wound bed, total dimensions of the wound measure 11 x 10 x 1 cm.    Class 2 Severe Obesity (BMI >=35 and <=39.9). Obesity-related health conditions include the following: hypertension. Obesity is unchanged. BMI is is above average; BMI management plan is completed. We discussed Information on healthy weight added to patient's after visit summary.      Gerald Pedraza M.D.  General and Endoscopic Surgery  [facility]    [address], [address]  [place], [place], [address]  P: [phone]  F: [phone]

## 2024-06-21 ENCOUNTER — HOSPITAL ENCOUNTER (OUTPATIENT)
Facility: HOSPITAL | Age: 89
Setting detail: OBSERVATION
LOS: 1 days | Discharge: NURSING, REHAB OR LONG-TERM CARE | End: 2024-06-24
Attending: EMERGENCY MEDICINE | Admitting: HOSPITALIST
Payer: MEDICARE

## 2024-06-21 DIAGNOSIS — D64.9 CHRONIC ANEMIA: ICD-10-CM

## 2024-06-21 DIAGNOSIS — R10.30 LOWER ABDOMINAL PAIN: Primary | ICD-10-CM

## 2024-06-21 DIAGNOSIS — D68.9 COAGULOPATHY: ICD-10-CM

## 2024-06-21 DIAGNOSIS — K56.41 FECAL IMPACTION: ICD-10-CM

## 2024-06-21 PROCEDURE — 99285 EMERGENCY DEPT VISIT HI MDM: CPT

## 2024-06-21 PROCEDURE — 83605 ASSAY OF LACTIC ACID: CPT | Performed by: EMERGENCY MEDICINE

## 2024-06-21 PROCEDURE — 83735 ASSAY OF MAGNESIUM: CPT | Performed by: EMERGENCY MEDICINE

## 2024-06-21 PROCEDURE — 83690 ASSAY OF LIPASE: CPT | Performed by: EMERGENCY MEDICINE

## 2024-06-21 PROCEDURE — 80053 COMPREHEN METABOLIC PANEL: CPT | Performed by: EMERGENCY MEDICINE

## 2024-06-21 PROCEDURE — 85610 PROTHROMBIN TIME: CPT | Performed by: EMERGENCY MEDICINE

## 2024-06-21 PROCEDURE — 36415 COLL VENOUS BLD VENIPUNCTURE: CPT

## 2024-06-21 PROCEDURE — 85025 COMPLETE CBC W/AUTO DIFF WBC: CPT | Performed by: EMERGENCY MEDICINE

## 2024-06-21 RX ORDER — SODIUM CHLORIDE 0.9 % (FLUSH) 0.9 %
10 SYRINGE (ML) INJECTION AS NEEDED
Status: DISCONTINUED | OUTPATIENT
Start: 2024-06-21 | End: 2024-06-24 | Stop reason: HOSPADM

## 2024-06-22 ENCOUNTER — APPOINTMENT (OUTPATIENT)
Dept: CT IMAGING | Facility: HOSPITAL | Age: 89
End: 2024-06-22
Payer: MEDICARE

## 2024-06-22 PROBLEM — K56.41 FECAL IMPACTION: Status: ACTIVE | Noted: 2024-06-22

## 2024-06-22 LAB
ALBUMIN SERPL-MCNC: 2.5 G/DL (ref 3.5–5.2)
ALBUMIN SERPL-MCNC: 3.2 G/DL (ref 3.5–5.2)
ALBUMIN/GLOB SERPL: 0.9 G/DL
ALBUMIN/GLOB SERPL: 1.3 G/DL
ALP SERPL-CCNC: 57 U/L (ref 39–117)
ALP SERPL-CCNC: 68 U/L (ref 39–117)
ALT SERPL W P-5'-P-CCNC: 10 U/L (ref 1–33)
ALT SERPL W P-5'-P-CCNC: 8 U/L (ref 1–33)
ANION GAP SERPL CALCULATED.3IONS-SCNC: 10.4 MMOL/L (ref 5–15)
ANION GAP SERPL CALCULATED.3IONS-SCNC: 9.2 MMOL/L (ref 5–15)
AST SERPL-CCNC: 15 U/L (ref 1–32)
AST SERPL-CCNC: 18 U/L (ref 1–32)
BASOPHILS # BLD AUTO: 0.05 10*3/MM3 (ref 0–0.2)
BASOPHILS # BLD AUTO: 0.05 10*3/MM3 (ref 0–0.2)
BASOPHILS NFR BLD AUTO: 0.6 % (ref 0–1.5)
BASOPHILS NFR BLD AUTO: 0.6 % (ref 0–1.5)
BILIRUB SERPL-MCNC: 0.3 MG/DL (ref 0–1.2)
BILIRUB SERPL-MCNC: 0.4 MG/DL (ref 0–1.2)
BILIRUB UR QL STRIP: NEGATIVE
BUN SERPL-MCNC: 12 MG/DL (ref 8–23)
BUN SERPL-MCNC: 13 MG/DL (ref 8–23)
BUN/CREAT SERPL: 12.5 (ref 7–25)
BUN/CREAT SERPL: 12.6 (ref 7–25)
CALCIUM SPEC-SCNC: 7.9 MG/DL (ref 8.6–10.5)
CALCIUM SPEC-SCNC: 8.2 MG/DL (ref 8.6–10.5)
CHLORIDE SERPL-SCNC: 106 MMOL/L (ref 98–107)
CHLORIDE SERPL-SCNC: 108 MMOL/L (ref 98–107)
CLARITY UR: CLEAR
CO2 SERPL-SCNC: 22.8 MMOL/L (ref 22–29)
CO2 SERPL-SCNC: 25.6 MMOL/L (ref 22–29)
COLOR UR: YELLOW
CREAT SERPL-MCNC: 0.95 MG/DL (ref 0.57–1)
CREAT SERPL-MCNC: 1.04 MG/DL (ref 0.57–1)
D-LACTATE SERPL-SCNC: 1.5 MMOL/L (ref 0.5–2)
DEPRECATED RDW RBC AUTO: 48.1 FL (ref 37–54)
DEPRECATED RDW RBC AUTO: 50.1 FL (ref 37–54)
EGFRCR SERPLBLD CKD-EPI 2021: 52.5 ML/MIN/1.73
EGFRCR SERPLBLD CKD-EPI 2021: 58.5 ML/MIN/1.73
EOSINOPHIL # BLD AUTO: 0.32 10*3/MM3 (ref 0–0.4)
EOSINOPHIL # BLD AUTO: 0.34 10*3/MM3 (ref 0–0.4)
EOSINOPHIL NFR BLD AUTO: 3.7 % (ref 0.3–6.2)
EOSINOPHIL NFR BLD AUTO: 3.8 % (ref 0.3–6.2)
ERYTHROCYTE [DISTWIDTH] IN BLOOD BY AUTOMATED COUNT: 15.4 % (ref 12.3–15.4)
ERYTHROCYTE [DISTWIDTH] IN BLOOD BY AUTOMATED COUNT: 15.8 % (ref 12.3–15.4)
EXPIRATION DATE: NORMAL
FECAL OCCULT BLOOD SCREEN, POC: NEGATIVE
GLOBULIN UR ELPH-MCNC: 2.4 GM/DL
GLOBULIN UR ELPH-MCNC: 2.7 GM/DL
GLUCOSE SERPL-MCNC: 88 MG/DL (ref 65–99)
GLUCOSE SERPL-MCNC: 98 MG/DL (ref 65–99)
GLUCOSE UR STRIP-MCNC: NEGATIVE MG/DL
HCT VFR BLD AUTO: 26.7 % (ref 34–46.6)
HCT VFR BLD AUTO: 30.1 % (ref 34–46.6)
HGB BLD-MCNC: 8.1 G/DL (ref 12–15.9)
HGB BLD-MCNC: 9 G/DL (ref 12–15.9)
HGB UR QL STRIP.AUTO: NEGATIVE
HOLD SPECIMEN: NORMAL
HOLD SPECIMEN: NORMAL
IMM GRANULOCYTES # BLD AUTO: 0.08 10*3/MM3 (ref 0–0.05)
IMM GRANULOCYTES # BLD AUTO: 0.09 10*3/MM3 (ref 0–0.05)
IMM GRANULOCYTES NFR BLD AUTO: 0.9 % (ref 0–0.5)
IMM GRANULOCYTES NFR BLD AUTO: 1 % (ref 0–0.5)
INR PPP: 1.19 (ref 0.9–1.1)
KETONES UR QL STRIP: NEGATIVE
LEUKOCYTE ESTERASE UR QL STRIP.AUTO: NEGATIVE
LIPASE SERPL-CCNC: 18 U/L (ref 13–60)
LYMPHOCYTES # BLD AUTO: 2.19 10*3/MM3 (ref 0.7–3.1)
LYMPHOCYTES # BLD AUTO: 3.4 10*3/MM3 (ref 0.7–3.1)
LYMPHOCYTES NFR BLD AUTO: 25.2 % (ref 19.6–45.3)
LYMPHOCYTES NFR BLD AUTO: 38.3 % (ref 19.6–45.3)
Lab: 230
MAGNESIUM SERPL-MCNC: 2.2 MG/DL (ref 1.6–2.4)
MCH RBC QN AUTO: 26 PG (ref 26.6–33)
MCH RBC QN AUTO: 26.3 PG (ref 26.6–33)
MCHC RBC AUTO-ENTMCNC: 29.9 G/DL (ref 31.5–35.7)
MCHC RBC AUTO-ENTMCNC: 30.3 G/DL (ref 31.5–35.7)
MCV RBC AUTO: 86.7 FL (ref 79–97)
MCV RBC AUTO: 87 FL (ref 79–97)
MONOCYTES # BLD AUTO: 1.01 10*3/MM3 (ref 0.1–0.9)
MONOCYTES # BLD AUTO: 1.06 10*3/MM3 (ref 0.1–0.9)
MONOCYTES NFR BLD AUTO: 11.4 % (ref 5–12)
MONOCYTES NFR BLD AUTO: 12.2 % (ref 5–12)
NEGATIVE CONTROL: NEGATIVE
NEUTROPHILS NFR BLD AUTO: 3.98 10*3/MM3 (ref 1.7–7)
NEUTROPHILS NFR BLD AUTO: 44.9 % (ref 42.7–76)
NEUTROPHILS NFR BLD AUTO: 5 10*3/MM3 (ref 1.7–7)
NEUTROPHILS NFR BLD AUTO: 57.4 % (ref 42.7–76)
NITRITE UR QL STRIP: NEGATIVE
NRBC BLD AUTO-RTO: 0 /100 WBC (ref 0–0.2)
NRBC BLD AUTO-RTO: 0 /100 WBC (ref 0–0.2)
PH UR STRIP.AUTO: 7 [PH] (ref 5–8)
PLATELET # BLD AUTO: 259 10*3/MM3 (ref 140–450)
PLATELET # BLD AUTO: 321 10*3/MM3 (ref 140–450)
PMV BLD AUTO: 9.6 FL (ref 6–12)
PMV BLD AUTO: 9.8 FL (ref 6–12)
POSITIVE CONTROL: POSITIVE
POTASSIUM SERPL-SCNC: 3.4 MMOL/L (ref 3.5–5.2)
POTASSIUM SERPL-SCNC: 4 MMOL/L (ref 3.5–5.2)
PROT SERPL-MCNC: 5.2 G/DL (ref 6–8.5)
PROT SERPL-MCNC: 5.6 G/DL (ref 6–8.5)
PROT UR QL STRIP: NEGATIVE
PROTHROMBIN TIME: 15.4 SECONDS (ref 11.7–14.2)
RBC # BLD AUTO: 3.08 10*6/MM3 (ref 3.77–5.28)
RBC # BLD AUTO: 3.46 10*6/MM3 (ref 3.77–5.28)
SODIUM SERPL-SCNC: 140 MMOL/L (ref 136–145)
SODIUM SERPL-SCNC: 142 MMOL/L (ref 136–145)
SP GR UR STRIP: 1.02 (ref 1–1.03)
UROBILINOGEN UR QL STRIP: NORMAL
WBC NRBC COR # BLD AUTO: 8.7 10*3/MM3 (ref 3.4–10.8)
WBC NRBC COR # BLD AUTO: 8.87 10*3/MM3 (ref 3.4–10.8)
WHOLE BLOOD HOLD COAG: NORMAL
WHOLE BLOOD HOLD SPECIMEN: NORMAL

## 2024-06-22 PROCEDURE — 94761 N-INVAS EAR/PLS OXIMETRY MLT: CPT

## 2024-06-22 PROCEDURE — 81003 URINALYSIS AUTO W/O SCOPE: CPT | Performed by: EMERGENCY MEDICINE

## 2024-06-22 PROCEDURE — 82270 OCCULT BLOOD FECES: CPT | Performed by: EMERGENCY MEDICINE

## 2024-06-22 PROCEDURE — 36415 COLL VENOUS BLD VENIPUNCTURE: CPT | Performed by: NURSE PRACTITIONER

## 2024-06-22 PROCEDURE — P9612 CATHETERIZE FOR URINE SPEC: HCPCS

## 2024-06-22 PROCEDURE — 80053 COMPREHEN METABOLIC PANEL: CPT | Performed by: NURSE PRACTITIONER

## 2024-06-22 PROCEDURE — 94799 UNLISTED PULMONARY SVC/PX: CPT

## 2024-06-22 PROCEDURE — 25810000003 SODIUM CHLORIDE 0.9 % SOLUTION: Performed by: NURSE PRACTITIONER

## 2024-06-22 PROCEDURE — G0378 HOSPITAL OBSERVATION PER HR: HCPCS

## 2024-06-22 PROCEDURE — 85025 COMPLETE CBC W/AUTO DIFF WBC: CPT | Performed by: NURSE PRACTITIONER

## 2024-06-22 PROCEDURE — 94640 AIRWAY INHALATION TREATMENT: CPT

## 2024-06-22 PROCEDURE — 74176 CT ABD & PELVIS W/O CONTRAST: CPT

## 2024-06-22 RX ORDER — DIAZEPAM 2 MG/1
2 TABLET ORAL 2 TIMES DAILY PRN
Status: DISCONTINUED | OUTPATIENT
Start: 2024-06-22 | End: 2024-06-24 | Stop reason: HOSPADM

## 2024-06-22 RX ORDER — SODIUM CHLORIDE 0.9 % (FLUSH) 0.9 %
10 SYRINGE (ML) INJECTION AS NEEDED
Status: DISCONTINUED | OUTPATIENT
Start: 2024-06-22 | End: 2024-06-24 | Stop reason: HOSPADM

## 2024-06-22 RX ORDER — POLYETHYLENE GLYCOL 3350 17 G/17G
17 POWDER, FOR SOLUTION ORAL 2 TIMES DAILY
Status: DISCONTINUED | OUTPATIENT
Start: 2024-06-22 | End: 2024-06-24 | Stop reason: HOSPADM

## 2024-06-22 RX ORDER — ONDANSETRON 2 MG/ML
4 INJECTION INTRAMUSCULAR; INTRAVENOUS EVERY 6 HOURS PRN
Status: DISCONTINUED | OUTPATIENT
Start: 2024-06-22 | End: 2024-06-24 | Stop reason: HOSPADM

## 2024-06-22 RX ORDER — BISACODYL 5 MG/1
5 TABLET, DELAYED RELEASE ORAL DAILY PRN
Status: DISCONTINUED | OUTPATIENT
Start: 2024-06-22 | End: 2024-06-22

## 2024-06-22 RX ORDER — DOXYCYCLINE 100 MG/1
100 CAPSULE ORAL EVERY 12 HOURS SCHEDULED
Status: COMPLETED | OUTPATIENT
Start: 2024-06-22 | End: 2024-06-22

## 2024-06-22 RX ORDER — AMOXICILLIN 250 MG
2 CAPSULE ORAL 2 TIMES DAILY
Status: DISCONTINUED | OUTPATIENT
Start: 2024-06-22 | End: 2024-06-24 | Stop reason: HOSPADM

## 2024-06-22 RX ORDER — DOXYCYCLINE HYCLATE 100 MG/1
100 CAPSULE ORAL 2 TIMES DAILY
COMMUNITY
End: 2024-06-24 | Stop reason: HOSPADM

## 2024-06-22 RX ORDER — BISACODYL 10 MG
10 SUPPOSITORY, RECTAL RECTAL DAILY PRN
COMMUNITY

## 2024-06-22 RX ORDER — BISACODYL 10 MG
10 SUPPOSITORY, RECTAL RECTAL DAILY PRN
Status: DISCONTINUED | OUTPATIENT
Start: 2024-06-22 | End: 2024-06-24 | Stop reason: HOSPADM

## 2024-06-22 RX ORDER — ONDANSETRON 4 MG/1
4 TABLET, ORALLY DISINTEGRATING ORAL DAILY PRN
COMMUNITY

## 2024-06-22 RX ORDER — SODIUM CHLORIDE 9 MG/ML
100 INJECTION, SOLUTION INTRAVENOUS CONTINUOUS
Status: DISCONTINUED | OUTPATIENT
Start: 2024-06-22 | End: 2024-06-22

## 2024-06-22 RX ORDER — SODIUM CHLORIDE 9 MG/ML
40 INJECTION, SOLUTION INTRAVENOUS AS NEEDED
Status: DISCONTINUED | OUTPATIENT
Start: 2024-06-22 | End: 2024-06-24 | Stop reason: HOSPADM

## 2024-06-22 RX ORDER — BUDESONIDE 0.5 MG/2ML
0.5 INHALANT ORAL 2 TIMES DAILY
Status: DISCONTINUED | OUTPATIENT
Start: 2024-06-22 | End: 2024-06-24 | Stop reason: HOSPADM

## 2024-06-22 RX ORDER — AMOXICILLIN 250 MG
2 CAPSULE ORAL 2 TIMES DAILY
Status: DISCONTINUED | OUTPATIENT
Start: 2024-06-22 | End: 2024-06-22

## 2024-06-22 RX ORDER — BENZONATATE 100 MG/1
100 CAPSULE ORAL 3 TIMES DAILY PRN
Status: DISCONTINUED | OUTPATIENT
Start: 2024-06-22 | End: 2024-06-24 | Stop reason: HOSPADM

## 2024-06-22 RX ORDER — POLYETHYLENE GLYCOL 3350 17 G/17G
17 POWDER, FOR SOLUTION ORAL DAILY PRN
Status: DISCONTINUED | OUTPATIENT
Start: 2024-06-22 | End: 2024-06-22

## 2024-06-22 RX ORDER — ACETAMINOPHEN 500 MG
1000 TABLET ORAL 3 TIMES DAILY
Status: COMPLETED | OUTPATIENT
Start: 2024-06-22 | End: 2024-06-23

## 2024-06-22 RX ORDER — PANTOPRAZOLE SODIUM 40 MG/1
40 TABLET, DELAYED RELEASE ORAL
Status: DISCONTINUED | OUTPATIENT
Start: 2024-06-22 | End: 2024-06-24 | Stop reason: HOSPADM

## 2024-06-22 RX ORDER — OXYCODONE HYDROCHLORIDE 5 MG/1
5 TABLET ORAL EVERY 8 HOURS PRN
COMMUNITY

## 2024-06-22 RX ORDER — AMOXICILLIN 250 MG
1 CAPSULE ORAL 2 TIMES DAILY
COMMUNITY

## 2024-06-22 RX ORDER — ARFORMOTEROL TARTRATE 15 UG/2ML
15 SOLUTION RESPIRATORY (INHALATION)
Status: DISCONTINUED | OUTPATIENT
Start: 2024-06-22 | End: 2024-06-24 | Stop reason: HOSPADM

## 2024-06-22 RX ORDER — SODIUM CHLORIDE 0.9 % (FLUSH) 0.9 %
10 SYRINGE (ML) INJECTION EVERY 12 HOURS SCHEDULED
Status: DISCONTINUED | OUTPATIENT
Start: 2024-06-22 | End: 2024-06-24 | Stop reason: HOSPADM

## 2024-06-22 RX ORDER — BISACODYL 10 MG
10 SUPPOSITORY, RECTAL RECTAL DAILY PRN
Status: DISCONTINUED | OUTPATIENT
Start: 2024-06-22 | End: 2024-06-22

## 2024-06-22 RX ORDER — DILTIAZEM HYDROCHLORIDE 120 MG/1
120 CAPSULE, COATED, EXTENDED RELEASE ORAL DAILY
Status: DISCONTINUED | OUTPATIENT
Start: 2024-06-22 | End: 2024-06-24 | Stop reason: HOSPADM

## 2024-06-22 RX ORDER — ALBUTEROL SULFATE 0.63 MG/3ML
0.63 SOLUTION RESPIRATORY (INHALATION) EVERY 6 HOURS PRN
Status: DISCONTINUED | OUTPATIENT
Start: 2024-06-22 | End: 2024-06-24 | Stop reason: HOSPADM

## 2024-06-22 RX ORDER — BISACODYL 5 MG/1
5 TABLET, DELAYED RELEASE ORAL DAILY PRN
Status: DISCONTINUED | OUTPATIENT
Start: 2024-06-22 | End: 2024-06-24 | Stop reason: HOSPADM

## 2024-06-22 RX ADMIN — DILTIAZEM HYDROCHLORIDE 120 MG: 120 CAPSULE, EXTENDED RELEASE ORAL at 13:09

## 2024-06-22 RX ADMIN — POLYETHYLENE GLYCOL 3350 17 G: 17 POWDER, FOR SOLUTION ORAL at 21:29

## 2024-06-22 RX ADMIN — ACETAMINOPHEN 1000 MG: 500 TABLET ORAL at 16:00

## 2024-06-22 RX ADMIN — BUDESONIDE 0.5 MG: 0.5 INHALANT ORAL at 12:18

## 2024-06-22 RX ADMIN — ACETAMINOPHEN 1000 MG: 500 TABLET ORAL at 21:29

## 2024-06-22 RX ADMIN — SODIUM CHLORIDE 100 ML/HR: 9 INJECTION, SOLUTION INTRAVENOUS at 05:34

## 2024-06-22 RX ADMIN — BUDESONIDE 0.5 MG: 0.5 INHALANT ORAL at 19:14

## 2024-06-22 RX ADMIN — DOXYCYCLINE 100 MG: 100 CAPSULE ORAL at 13:09

## 2024-06-22 RX ADMIN — PANTOPRAZOLE SODIUM 40 MG: 40 TABLET, DELAYED RELEASE ORAL at 16:31

## 2024-06-22 RX ADMIN — Medication 10 ML: at 21:29

## 2024-06-22 RX ADMIN — ARFORMOTEROL TARTRATE 15 MCG: 15 SOLUTION RESPIRATORY (INHALATION) at 19:14

## 2024-06-22 RX ADMIN — SENNOSIDES AND DOCUSATE SODIUM 2 TABLET: 50; 8.6 TABLET ORAL at 08:53

## 2024-06-22 RX ADMIN — APIXABAN 2.5 MG: 2.5 TABLET, FILM COATED ORAL at 13:09

## 2024-06-22 RX ADMIN — DIAZEPAM 2 MG: 2 TABLET ORAL at 16:31

## 2024-06-22 RX ADMIN — ACETAMINOPHEN 1000 MG: 500 TABLET ORAL at 05:35

## 2024-06-22 RX ADMIN — Medication 10 ML: at 10:03

## 2024-06-22 RX ADMIN — APIXABAN 2.5 MG: 2.5 TABLET, FILM COATED ORAL at 21:29

## 2024-06-22 RX ADMIN — SENNOSIDES AND DOCUSATE SODIUM 2 TABLET: 50; 8.6 TABLET ORAL at 21:29

## 2024-06-22 NOTE — PLAN OF CARE
Goal Outcome Evaluation:           Progress: no change  Outcome Evaluation: VSS. Pt had BM today. Wound care wrapped legs. Valium PO PRN for tremors as ordered. A&O X 4. 2L NC O2. On Eliquis. Tolerating clears. Could discharge tomorrow.

## 2024-06-22 NOTE — PLAN OF CARE
Problem: Adult Inpatient Plan of Care  Goal: Plan of Care Review  Outcome: Ongoing, Not Progressing  Flowsheets (Taken 6/22/2024 5875)  Outcome Evaluation: admitted from ER alert and oriented, with c/o abdominal pain, Ct scan showed large stool burden, received enema in ER with minimal results, plan of care discussed with Pt, IVF started, on Clear liquid diet, falls precaution initiated, incontinence care done, picture taken of wound to LLE, dressing changed, NS soaked gauze, ABD pads, kerlix and ace wrap, RLE intact with mild bruising of the toes, and mild edema, rewarpped, Wound consult placed, medicated with Tylenol for c/o ariel shoulder pains, for further care   Goal Outcome Evaluation:              Outcome Evaluation: admitted from ER alert and oriented, with c/o abdominal pain, Ct scan showed large stool burden, received enema in ER with minimal results, plan of care discussed with Pt, IVF started, on Clear liquid diet, falls precaution initiated, incontinence care done, picture taken of wound to LLE, dressing changed, NS soaked gauze, ABD pads, kerlix and ace wrap, RLE intact with mild bruising of the toes, and mild edema, rewarpped, Wound consult placed, medicated with Tylenol for c/o ariel shoulder pains, for further care

## 2024-06-22 NOTE — ED NOTES
"Pt arrives from [facility] via EMS.    EMS states \"Constipation for several days, stool softners given, but has severe cramping for 4 hours. The nurse at the facility did an examintaion and reports that she has internal hemorrhoids. Pt has been having several small formed stools through out the day. Pt has had diazepam, and oxycodone PTA.\"     "

## 2024-06-22 NOTE — ED NOTES
.Nursing report ED to floor  Vonnie Coppola  88 y.o.  female    HPI :  HPI (Adult)  Stated Reason for Visit: Constipation and abdominal pain  History Obtained From: EMS    Chief Complaint  Chief Complaint   Patient presents with    Abdominal Pain       Admitting doctor:   Riley Mathew DO    Admitting diagnosis:   The primary encounter diagnosis was Lower abdominal pain. Diagnoses of Fecal impaction, Coagulopathy: Eliquis induced, and Chronic anemia were also pertinent to this visit.    Code status:   Current Code Status       Date Active Code Status Order ID Comments User Context       6/22/2024 0342 CPR (Attempt to Resuscitate) [id number]  Karma Lu, APRN ED        Question Answer    Code Status (Patient has no pulse and is not breathing) CPR (Attempt to Resuscitate)    Medical Interventions (Patient has pulse or is breathing) Full Support                    Allergies:   Cortisone, Penicillins, and Iodinated contrast media    Isolation:   No active isolations    Intake and Output    Intake/Output Summary (Last 24 hours) at 6/22/2024 0342  Last data filed at 6/22/2024 0042  Gross per 24 hour   Intake --   Output 700 ml   Net -700 ml       Weight:       06/21/24  2256   Weight: 107 kg (235 lb 14.3 oz)       Most recent vitals:   Vitals:    06/22/24 0031 06/22/24 0101 06/22/24 0201 06/22/24 0234   BP: 125/85 114/63 120/63 148/69   Pulse:    73   Resp:       Temp:       TempSrc:       SpO2:    99%   Weight:       Height:           Active LDAs/IV Access:   Lines, Drains & Airways       Active LDAs       Name Placement date Placement time Site Days    External Urinary Catheter 05/31/24  1505  --  21                    Labs (abnormal labs have a star):   Labs Reviewed   COMPREHENSIVE METABOLIC PANEL - Abnormal; Notable for the following components:       Result Value    Potassium 3.4 (*)     Calcium 8.2 (*)     Total Protein 5.6 (*)     Albumin 3.2 (*)     eGFR 58.5 (*)     All other components within normal  limits    Narrative:     GFR Normal >60  Chronic Kidney Disease <60  Kidney Failure <15    The GFR formula is only valid for adults with stable renal function between ages 18 and 70.   CBC WITH AUTO DIFFERENTIAL - Abnormal; Notable for the following components:    RBC 3.46 (*)     Hemoglobin 9.0 (*)     Hematocrit 30.1 (*)     MCH 26.0 (*)     MCHC 29.9 (*)     RDW 15.8 (*)     Immature Grans % 1.0 (*)     Lymphocytes, Absolute 3.40 (*)     Monocytes, Absolute 1.01 (*)     Immature Grans, Absolute 0.09 (*)     All other components within normal limits   PROTIME-INR - Abnormal; Notable for the following components:    Protime 15.4 (*)     INR 1.19 (*)     All other components within normal limits   LIPASE - Normal   URINALYSIS W/ MICROSCOPIC IF INDICATED (NO CULTURE) - Normal    Narrative:     Urine microscopic not indicated.   LACTIC ACID, PLASMA - Normal   MAGNESIUM - Normal   RAINBOW DRAW    Narrative:     The following orders were created for panel order [place] Draw.  Procedure                               Abnormality         Status                     ---------                               -----------         ------                     Green Top (Gel)[[id number]]                                  Final result               Lavender Top[[id number]]                                     Final result               Gold Top - SST[[id number]]                                   Final result               Light Blue Top[[id number]]                                   Final result                 Please view results for these tests on the individual orders.   CBC WITH AUTO DIFFERENTIAL   COMPREHENSIVE METABOLIC PANEL   POCT OCCULT BLOOD STOOL (ED ONLY)   CBC AND DIFFERENTIAL    Narrative:     The following orders were created for panel order CBC & Differential.  Procedure                               Abnormality         Status                     ---------                               -----------         ------                      CBC Auto Differential[[id number]]        Abnormal            Final result                 Please view results for these tests on the individual orders.   GREEN TOP   LAVENDER TOP   GOLD TOP - SST   LIGHT BLUE TOP       EKG:   No orders to display       Meds given in ED:   Medications   sodium chloride 0.9 % flush 10 mL (has no administration in time range)   sodium chloride 0.9 % flush 10 mL (has no administration in time range)   sodium chloride 0.9 % flush 10 mL (has no administration in time range)   sodium chloride 0.9 % flush 10 mL (has no administration in time range)   sodium chloride 0.9 % infusion 40 mL (has no administration in time range)   sodium chloride 0.9 % infusion (has no administration in time range)   acetaminophen (TYLENOL) tablet 1,000 mg (has no administration in time range)   sennosides-docusate (PERICOLACE) 8.6-50 MG per tablet 2 tablet (has no administration in time range)     And   polyethylene glycol (MIRALAX) packet 17 g (has no administration in time range)     And   bisacodyl (DULCOLAX) EC tablet 5 mg (has no administration in time range)     And   bisacodyl (DULCOLAX) suppository 10 mg (has no administration in time range)   ondansetron (ZOFRAN) injection 4 mg (has no administration in time range)       Imaging results:  CT Abdomen Pelvis Without Contrast    Result Date: 6/22/2024   1. Large volume of stool within the rectal vault, suggesting fecal impaction. There is also some rectal wall thickening, as well as perirectal stranding and presacral edema, suggesting proctitis.  Radiation dose reduction techniques were utilized, including automated exposure control and exposure modulation based on body size.   This report was finalized on 6/22/2024 2:52 AM by Dr. Robyn Wood M.D on Workstation: [id number]       Ambulatory status:   - up as tolerated    Social issues:   Social History     Socioeconomic History    Marital status:    Tobacco Use    Smoking status: Former      Current packs/day: 0.50     Average packs/day: 0.5 packs/day for 2.0 years (1.0 ttl pk-yrs)     Types: Cigarettes    Smokeless tobacco: Never    Tobacco comments:     quit 40 years ago   Vaping Use    Vaping status: Never Used   Substance and Sexual Activity    Alcohol use: No    Drug use: No    Sexual activity: Defer       Peripheral Neurovascular  Peripheral Neurovascular (Adult)  Peripheral Neurovascular WDL: WDL    Neuro Cognitive  Neuro Cognitive (Adult)  Cognitive/Neuro/Behavioral WDL: WDL    Learning  Learning Assessment (Adult)  Learning Readiness and Ability: no barriers identified  Education Provided  Person Taught: patient  Teaching Method: verbal instruction  Teaching Focus: symptom/problem overview, diagnostic test  Education Outcome Evaluation: verbalizes understanding    Respiratory  Respiratory WDL  Respiratory WDL: WDL  Breath Sounds  Breath Sounds: All Fields  All Lung Fields Breath Sounds: Anterior:, Posterior:, Lateral:, clear, equal bilaterally    Abdominal Pain       Pain Assessments  Pain (Adult)  (0-10) Pain Rating: Rest: 0  (0-10) Pain Rating: Activity: 0  Pain Location: abdomen  Pain Side/Orientation: generalized    NIH Stroke Scale       Emilia Hogan RN  06/22/24 03:42 EDT

## 2024-06-22 NOTE — H&P
Patient Name:  Vonnie Coppola  YOB: 1935  MRN:  [id number]  Admit Date:  6/21/2024  Patient Care Team:  Christopher Leyva DO as PCP - General  Christopher Leyva DO as PCP - Family Medicine  Augustus Wilde MD as Consulting Physician (Hematology and Oncology)  Daniel Watson MD as Referring Physician (Hospitalist)      Subjective   History Present Illness     Chief Complaint   Patient presents with   • Abdominal Pain     History of Present Illness  Ms. Coppola is an 88-year-old female with history of DVT, PE, anemia, hypertension, CVA, A-fib thrombocytopenia, obesity who presents to the emergency room with abdominal pain and constipation.  Patient states she has had constipation for the past 3 to 4 days, she determinable movement today and was unsuccessful, but developed some severe abdominal pain radiating from her rectum into her abdomen.  She also complains of some distended abdomen.  She has some nausea but denies any vomiting.  She states she has had constipation in the past and has been taking stool softeners.  She is ordered to start and forgetful, but is able to answer questions appropriately.  She was recently hospitalized for any further extremity hematoma that was evacuated.  After that she was started back on her Eliquis due to her history of DVT and PE.  Patient has been followed by wound care.  Her legs are currently wrapped, she has good cap refill and extremities are warm to touch.  In the emergency room CT scan her abdomen shows large volume of stool within the rectal vault suggesting fecal impaction, there is also some rectal wall thickening as well as perirectal stranding and presacral edema suggesting proctitis.  Patient was given an enema in the emergency room and with rectal exam some formed stool was removed digitally by ER provider.  Patient states she has had relief of her rectal pain at this time.  Symptoms 142, potassium 3.4, creatinine 0.95, BUN  12, INR 1.1, white blood cell count 8.8, hemoglobin 9.0 which is up from his baseline which is around 8, hematocrit 30.1, platelets 321.  Urinalysis is unremarkable.  Stool was negative for occult blood.    Review of Systems   Constitutional:  Negative for appetite change and fever.   HENT:  Negative for nosebleeds and trouble swallowing.    Eyes:  Negative for photophobia, redness and visual disturbance.   Respiratory:  Negative for cough, chest tightness, shortness of breath and wheezing.    Cardiovascular:  Negative for chest pain, palpitations and leg swelling.   Gastrointestinal:  Positive for constipation. Negative for abdominal distention, abdominal pain, nausea and vomiting.   Endocrine: Negative.    Genitourinary: Negative.    Musculoskeletal:  Negative for gait problem and joint swelling.   Skin: Negative.    Neurological:  Negative for dizziness, seizures, speech difficulty, light-headedness and headaches.   Hematological: Negative.    Psychiatric/Behavioral:  Negative for behavioral problems and confusion.         Personal History     Past Medical History:   Diagnosis Date   • Arthritis    • Asthma    • Atrial fibrillation     per pt's daughter.States hx of a-fib in the past when stressed or tired.   • Breast cancer 1999    LEFT BREAST CA, LUMPECTOMY & RADS   • History of cataract    • Hx of radiation therapy 1999    APPROXIMATELY 40 TREATMENTS FOR LEFT BREAST CA   • Hypertension    • Pneumonia    • Stroke      Past Surgical History:   Procedure Laterality Date   • APPENDECTOMY     • BLADDER SURGERY     • BREAST BIOPSY Left 1999    MALIGNANT   • BREAST LUMPECTOMY Left 1999    MALIGNANT   • CATARACT EXTRACTION     • COLONOSCOPY N/A 01/19/2024    Procedure: COLONOSCOPY to cecum with cold snare polypectomies;  Surgeon: Harshad Gaston MD;  Location: [facility] ENDOSCOPY;  Service: Gastroenterology;  Laterality: N/A;  pre- gi bleed, anemia  post- diverticulosis, polyps   • ENDOSCOPY N/A 01/19/2024     Procedure: ESOPHAGOGASTRODUODENOSCOPY with biospy;  Surgeon: Harshad Gaston MD;  Location: [facility] ENDOSCOPY;  Service: Gastroenterology;  Laterality: N/A;  pre- gi bleed, anemia  post- erosive gastirits   • GALLBLADDER SURGERY     • HERNIA REPAIR     • HYSTERECTOMY     • INCISION AND DRAINAGE LEG Right 5/31/2024    Procedure: RIGHT LOWER EXTREMITY WOUND EXPLORATION, DEBRIDEMENT AND CONTROL OF BLEEDING;  Surgeon: Gerald Pedraza MD;  Location: [facility] MAIN OR;  Service: General;  Laterality: Right;   • LASIK     • LYMPHADENECTOMY     • OOPHORECTOMY       Family History   Problem Relation Age of Onset   • Other Mother         Cardiac disorder   • Osteoarthritis Mother    • Cataracts Mother    • Macular degeneration Mother    • Hypertension Father    • Other Father         Cardiac disorder   • Ovarian cancer Sister         70'S   • Cancer Sister    • Diabetes Sister    • Hypertension Sister    • Osteoarthritis Sister    • Cancer Brother    • Hypertension Brother    • Osteoarthritis Brother    • Colon cancer Maternal Grandmother    • Ovarian cancer Paternal Grandmother         unknown   • Cancer Other    • Stroke Other    • Breast cancer Neg Hx      Social History     Tobacco Use   • Smoking status: Former     Current packs/day: 0.50     Average packs/day: 0.5 packs/day for 2.0 years (1.0 ttl pk-yrs)     Types: Cigarettes   • Smokeless tobacco: Never   • Tobacco comments:     quit 40 years ago   Vaping Use   • Vaping status: Never Used   Substance Use Topics   • Alcohol use: No   • Drug use: No     No current facility-administered medications on file prior to encounter.     Current Outpatient Medications on File Prior to Encounter   Medication Sig Dispense Refill   • acetaminophen (TYLENOL) 325 MG tablet Take 2 tablets by mouth Every 6 (Six) Hours As Needed for Mild Pain.     • albuterol (ACCUNEB) 0.63 MG/3ML nebulizer solution Take 3 mL by nebulization Every 6 (Six) Hours As Needed for Shortness of Air.     •  apixaban (ELIQUIS) 2.5 MG tablet tablet Take 1 tablet by mouth Every 12 (Twelve) Hours. Indications: Atrial Fibrillation     • arformoterol (BROVANA) 15 MCG/2ML nebulizer solution Take 2 mL by nebulization 2 (Two) Times a Day. 120 mL 11   • benzonatate (TESSALON) 100 MG capsule Take 1 capsule by mouth 3 (Three) Times a Day As Needed for Cough. 20 capsule 0   • budesonide (PULMICORT) 0.5 MG/2ML nebulizer solution Take 2 mL by nebulization 2 (Two) Times a Day. 360 mL 2   • cephalexin (KEFLEX) 500 MG capsule Take 1 capsule by mouth 3 (Three) Times a Day.     • Cholecalciferol (VITAMIN D-3 PO) Take 1 tablet by mouth Daily. Indications: nutritional support     • coenzyme Q10 50 MG capsule capsule Take 1 capsule by mouth Daily. Indications: nutritional support     • Cyanocobalamin (VITAMIN B 12 PO) Take 1 tablet by mouth Daily. Indications: nutritional support     • diazePAM (VALIUM) 2 MG tablet Take 1 tablet by mouth.     • dilTIAZem CD (CARDIZEM CD) 120 MG 24 hr capsule Take 1 capsule by mouth Daily.     • docusate sodium (COLACE) 100 MG capsule Take 1 capsule by mouth Daily. Indications: Constipation     • fexofenadine (Allegra Allergy) 60 MG tablet Take  by mouth Daily As Needed.     • furosemide (LASIX) 40 MG tablet Take 1 tablet by mouth Daily. Indications: Cardiac Failure     • glucosamine-chondroitin 500-400 MG capsule capsule Take 1 capsule by mouth 3 (Three) Times a Day With Meals. Indications: Joint Damage causing Pain and Loss of Function     • lidocaine 1 g, ibuprofen 300 mg, baclofen 200 mg Apply 1 Application topically to the appropriate area as directed 4 (Four) Times a Day As Needed (pain).     • Magnesium 500 MG tablet Take 1 tablet by mouth Daily.     • O2 (OXYGEN) Inhale 2 L/min Continuous. At night as needed  Indications: hypoxia     • ondansetron (ZOFRAN) 4 MG tablet Take 1 tablet by mouth Daily As Needed for Nausea or Vomiting. Indications: Nausea and Vomiting     • pantoprazole (PROTONIX) 40 MG EC  tablet Take 1 tablet by mouth 2 (Two) Times a Day Before Meals. 60 tablet 0   • polyethylene glycol (MIRALAX) 17 g packet Take 17 g by mouth Daily As Needed (constipation ). Indications: Constipation     • potassium chloride (KLOR-CON M20) 20 MEQ CR tablet 1 tablet.     • Vigamox 0.5 % ophthalmic solution INSTILL 1 DROP INTO AFFECTED EYE(S) BY OPHTHALMIC ROUTE 3 TIMES PER DAY       Allergies   Allergen Reactions   • Cortisone Hives   • Penicillins Hives     Beta lactam allergy details  Antibiotic reaction: hives  Age at reaction: unknown  Dose to reaction time: unknown  Reason for antibiotic: unknown  Epinephrine required for reaction?: no  Tolerated antibiotics: unknown    Tolerated Zosyn   • Iodinated Contrast Media Unknown - Low Severity     Patient  passed out, she has had studies with contact  recently        Objective    Objective     Vital Signs  Temp:  [97 °F (36.1 °C)-98.1 °F (36.7 °C)] 97 °F (36.1 °C)  Heart Rate:  [73-81] 80  Resp:  [18-20] 18  BP: (114-148)/(63-85) 121/74  SpO2:  [91 %-99 %] 98 %  on  Flow (L/min):  [2] 2;   Device (Oxygen Therapy): nasal cannula  Body mass index is 38.48 kg/m².    Physical Exam  Vitals and nursing note reviewed.   Constitutional:       General: She is not in acute distress.     Appearance: She is well-developed. She is morbidly obese.   HENT:      Head: Normocephalic.   Neck:      Vascular: No JVD.   Cardiovascular:      Rate and Rhythm: Normal rate and regular rhythm.      Heart sounds: Normal heart sounds.      Comments: BLE wrapped with ace wraps  Pulmonary:      Effort: Pulmonary effort is normal.      Breath sounds: Normal breath sounds.      Comments: Lung sounds clear  Abdominal:      General: Bowel sounds are normal. There is no distension.      Palpations: Abdomen is soft.      Tenderness: There is no abdominal tenderness.   Musculoskeletal:         General: Normal range of motion.      Cervical back: Normal range of motion.   Skin:     General: Skin is warm  and dry.      Capillary Refill: Capillary refill takes less than 2 seconds.   Neurological:      Mental Status: She is alert and oriented to person, place, and time.      Comments: Patient forgetful, oriented x 3, able to follow commands, no focal neurodeficits noted   Psychiatric:         Mood and Affect: Mood normal.         Behavior: Behavior normal.         Results Review:  I reviewed the patient's new clinical results.  I reviewed the patient's new imaging results and agree with the interpretation.  I reviewed the patient's other test results and agree with the interpretation  I personally viewed and interpreted the patient's EKG/Telemetry data  Discussed with ED provider.    Lab Results (last 24 hours)       Procedure Component Value Units Date/Time    CBC & Differential [[id number]]  (Abnormal) Collected: 06/21/24 2354    Specimen: Blood from Arm, Right Updated: 06/22/24 0029    Narrative:      The following orders were created for panel order CBC & Differential.  Procedure                               Abnormality         Status                     ---------                               -----------         ------                     CBC Auto Differential[[id number]]        Abnormal            Final result                 Please view results for these tests on the individual orders.    Comprehensive Metabolic Panel [[id number]]  (Abnormal) Collected: 06/21/24 2354    Specimen: Blood from Arm, Right Updated: 06/22/24 0022     Glucose 98 mg/dL      BUN 12 mg/dL      Creatinine 0.95 mg/dL      Sodium 142 mmol/L      Potassium 3.4 mmol/L      Chloride 106 mmol/L      CO2 25.6 mmol/L      Calcium 8.2 mg/dL      Total Protein 5.6 g/dL      Albumin 3.2 g/dL      ALT (SGPT) 10 U/L      AST (SGOT) 15 U/L      Alkaline Phosphatase 68 U/L      Total Bilirubin 0.4 mg/dL      Globulin 2.4 gm/dL      A/G Ratio 1.3 g/dL      BUN/Creatinine Ratio 12.6     Anion Gap 10.4 mmol/L      eGFR 58.5 mL/min/1.73     Narrative:      GFR  Normal >60  Chronic Kidney Disease <60  Kidney Failure <15    The GFR formula is only valid for adults with stable renal function between ages 18 and 70.    Lipase [[id number]]  (Normal) Collected: 06/21/24 2354    Specimen: Blood from Arm, Right Updated: 06/22/24 0022     Lipase 18 U/L     Lactic Acid, Plasma [[id number]]  (Normal) Collected: 06/21/24 2354    Specimen: Blood from Arm, Right Updated: 06/22/24 0018     Lactate 1.5 mmol/L     CBC Auto Differential [[id number]]  (Abnormal) Collected: 06/21/24 2354    Specimen: Blood from Arm, Right Updated: 06/22/24 0029     WBC 8.87 10*3/mm3      RBC 3.46 10*6/mm3      Hemoglobin 9.0 g/dL      Hematocrit 30.1 %      MCV 87.0 fL      MCH 26.0 pg      MCHC 29.9 g/dL      RDW 15.8 %      RDW-SD 50.1 fl      MPV 9.8 fL      Platelets 321 10*3/mm3      Neutrophil % 44.9 %      Lymphocyte % 38.3 %      Monocyte % 11.4 %      Eosinophil % 3.8 %      Basophil % 0.6 %      Immature Grans % 1.0 %      Neutrophils, Absolute 3.98 10*3/mm3      Lymphocytes, Absolute 3.40 10*3/mm3      Monocytes, Absolute 1.01 10*3/mm3      Eosinophils, Absolute 0.34 10*3/mm3      Basophils, Absolute 0.05 10*3/mm3      Immature Grans, Absolute 0.09 10*3/mm3      nRBC 0.0 /100 WBC     Protime-INR [[id number]]  (Abnormal) Collected: 06/21/24 2354    Specimen: Blood from Arm, Right Updated: 06/22/24 0037     Protime 15.4 Seconds      INR 1.19    Magnesium [[id number]]  (Normal) Collected: 06/21/24 2354    Specimen: Blood from Arm, Right Updated: 06/22/24 0022     Magnesium 2.2 mg/dL     Urinalysis With Microscopic If Indicated (No Culture) - Straight Cath [[id number]]  (Normal) Collected: 06/22/24 0028    Specimen: Urine from Straight Cath Updated: 06/22/24 0036     Color, UA Yellow     Appearance, UA Clear     pH, UA 7.0     Specific Gravity, UA 1.016     Glucose, UA Negative     Ketones, UA Negative     Bilirubin, UA Negative     Blood, UA Negative     Protein, UA Negative     Leuk Esterase, UA Negative      Nitrite, UA Negative     Urobilinogen, UA 1.0 E.U./dL    Narrative:      Urine microscopic not indicated.    POC Occult Blood Stool [[id number]] Collected: 06/22/24 0234    Specimen: Stool Updated: 06/22/24 0235     Fecal Occult Blood Negative     Lot Number [id number]     Expiration Date August 31, 2024     Positive Control Positive     Negative Control Negative     Comment: Negative               Imaging Results (Last 24 Hours)       Procedure Component Value Units Date/Time    CT Abdomen Pelvis Without Contrast [[id number]] Collected: 06/22/24 0244     Updated: 06/22/24 0255    Narrative:      CT OF THE ABDOMEN PELVIS WITHOUT CONTRAST     HISTORY: Lower abdominal pain     COMPARISON: May 31, 2024     TECHNIQUE: Axial CT imaging was obtained through the abdomen and pelvis.  No IV contrast was administered.     FINDINGS:  Images through the lung bases demonstrate some chronic scarring.  Noncalcified nodules within the right middle lobe appears stable when  compared to January 16, 2024 the larger measures 1.1 cm, and the smaller  measures 5 mm. Consider CT follow-up in January 2025. No suspicious  hepatic lesions are seen. Gallbladder is absent. There is a small hiatal  hernia. The duodenum and the adrenal glands, and pancreas are all  normal. Low-attenuation lesion within the spleen may represent a cyst.  There are also granulomata noted within the spleen. There are multiple  parapelvic cysts noted on the right. No hydronephrosis is seen on either  side. There are nonobstructing stones noted within the left kidney, with  the larger measuring up to 3 mm. There is a simple appearing right renal  cortical cyst. No additional follow-up is necessary. There are  aortoiliac calcifications. The patient's rectal vault is distended with  stool. It is concerning for fecal impaction. There is rectal wall  thickening, as well as perirectal stranding and presacral edema,  concerning for proctitis. No pneumatosis or free air is seen.  There is  colonic diverticulosis. There is no small bowel obstruction. Stool  burden throughout the colon overall is increased. There is no evidence  of appendicitis. Uterus is absent. There is some body wall edema. No  acute osseous abnormalities are seen. There are small bilateral  fat-containing inguinal hernias.       Impression:         1. Large volume of stool within the rectal vault, suggesting fecal  impaction. There is also some rectal wall thickening, as well as  perirectal stranding and presacral edema, suggesting proctitis.     Radiation dose reduction techniques were utilized, including automated  exposure control and exposure modulation based on body size.        This report was finalized on 6/22/2024 2:52 AM by Dr. Robyn Wood M.D on Workstation: [id number]               Results for orders placed during the hospital encounter of 01/16/24    Adult Transthoracic Echo Complete W/ Cont if Necessary Per Protocol    Interpretation Summary  •  Left ventricular systolic function is hyperdynamic (EF > 70%). Calculated left ventricular EF = 78.7% Left ventricular ejection fraction appears to be 61 - 65%.  •  Left ventricular diastolic function was normal.  •  The right ventricular cavity is borderline dilated.  •  The right atrial cavity is borderline dilated.  •  Estimated right ventricular systolic pressure from tricuspid regurgitation is markedly elevated (>55 mmHg).      No orders to display        Assessment/Plan     Active Hospital Problems    Diagnosis  POA   • **Fecal impaction [K56.41]  Yes   • Obesity (BMI 35.0-39.9 without comorbidity) [E66.9]  Yes   • History of pulmonary embolism [Z86.711]  Yes   • Thrombocytopenia [D69.6]  Yes   • Atrial fibrillation [I48.91]  Yes   • History of CVA (cerebrovascular accident) [Z86.73]  Not Applicable   • Anemia [D64.9]  Yes   • HTN (hypertension) [I10]  Yes     Ms. Coppola is an 88-year-old female with history of DVT, PE, anemia, hypertension, CVA, A-fib  thrombocytopenia, obesity who presents to the emergency room with abdominal pain and constipation.     Fecal impaction  -Fleets enema tonight   -start bowel regimen tomorrow including MiraLAX and Colace scheduled  -Acute assist  -Check CBC and BMP in a.m.    A-fib/hypertension/history of DVT  -Continue Eliquis 2.5 mg  -Chronic conditions are stable, continue home meds when med rec is completed    Recent hematoma evacuation  -Consult wound care for right lower extremity wound and bilateral lower extremity edema      I discussed the patient's findings and my recommendations with patient.    VTE Prophylaxis -on Eliquis.  Code Status - Full code.       CHAPO Rodriguez  [facility]  06/22/24  05:48 EDT

## 2024-06-22 NOTE — ED PROVIDER NOTES
EMERGENCY DEPARTMENT ENCOUNTER    Room Number:  24/24  Date of encounter:  6/22/2024  PCP: Christopher Leyva DO  Historian: Patient  Relevant information and history provided by sources other than the patient will be included below and in the ED Course.  Review of pertinent past medical records may also be included in record below and ED Course.    HPI:  Chief Complaint: Abdominal pain and constipation  A complete HPI/ROS/PMH/PSH/SH/FH are unobtainable due to: Not applicable  Context: Vonnie Coppola is a 88 y.o. female who presents to the ED c/o patient states that she has been suffering from constipation for the past 3 days.  She has had a little bit of bowel movements.  Today she states that she needed to have a bowel movement all day but was unable to.  Prior to arrival here she went to the bathroom could not go have a bowel movement started getting some pain that was basically in her rectum that then shot up to her abdomen.  Complaining of severe abdominal pain at this time in the lower portion of abdomen.  She also feels that her belly is distended.  She denies any vomiting.  Has had some episodes of mild nausea.  She has had problems with constipation in the past and has been taking stool softeners.  She denies any chest pain or shortness of breath.        Previous Episodes: History of constipation in the past.  Current Symptoms: See above    MEDICAL HISTORY REVIEWED  This is a patient that has severe obesity hypertension history of atrial fibrillation and CVA in the past.  She is currently on Eliquis 2.5 mg did review her other medicine list.      I reviewed this patient's discharge summary from 6/12/2024.  She was admitted for hematoma in the left leg.  She has history of DVT and did have a history of atrial fibrillation she had an expanding hematoma to the right lower extremity she had surgery consulted she went to the OR for debridement they did return and resume Eliquis because of her history of  DVTs and PEs.  He is post to follow-up with wound care.  Patient did have surgery by Dr. Pedraza on May 31, 2024.  She had an expanding hematoma and had debridement it was the lower portion of her right leg and the medial aspect of her right ankle.  PAST MEDICAL HISTORY  Active Ambulatory Problems     Diagnosis Date Noted    GI bleed 01/16/2024    Anemia 01/16/2024    HTN (hypertension) 01/16/2024    History of breast cancer 01/16/2024    Asthma 01/16/2024    History of CVA (cerebrovascular accident) 01/17/2024    Dehydration 01/17/2024    Renal insufficiency 01/17/2024    Pulmonary nodule 01/17/2024    Adnexal cyst 01/17/2024    Nausea 01/16/2024    Atrial fibrillation 01/21/2024    History of pulmonary embolism 03/22/2024    Class 2 severe obesity with serious comorbidity in adult 03/22/2024    Thrombocytopenia 03/22/2024    Cellulitis of right leg 03/23/2024    Acute cystitis 03/23/2024    Leg hematoma, right, subsequent encounter 05/31/2024    Obesity (BMI 35.0-39.9 without comorbidity) 06/19/2024    Morbid (severe) obesity due to excess calories (*specify comorbidity) 06/19/2024     Resolved Ambulatory Problems     Diagnosis Date Noted    No Resolved Ambulatory Problems     Past Medical History:   Diagnosis Date    Arthritis     Breast cancer 1999    History of cataract     Hx of radiation therapy 1999    Hypertension     Pneumonia     Stroke          PAST SURGICAL HISTORY  Past Surgical History:   Procedure Laterality Date    APPENDECTOMY      BLADDER SURGERY      BREAST BIOPSY Left 1999    MALIGNANT    BREAST LUMPECTOMY Left 1999    MALIGNANT    CATARACT EXTRACTION      COLONOSCOPY N/A 01/19/2024    Procedure: COLONOSCOPY to cecum with cold snare polypectomies;  Surgeon: Harshad Gaston MD;  Location: [facility] ENDOSCOPY;  Service: Gastroenterology;  Laterality: N/A;  pre- gi bleed, anemia  post- diverticulosis, polyps    ENDOSCOPY N/A 01/19/2024    Procedure: ESOPHAGOGASTRODUODENOSCOPY with biospy;   Surgeon: Harshad Gaston MD;  Location: [facility] ENDOSCOPY;  Service: Gastroenterology;  Laterality: N/A;  pre- gi bleed, anemia  post- erosive gastirits    GALLBLADDER SURGERY      HERNIA REPAIR      HYSTERECTOMY      INCISION AND DRAINAGE LEG Right 5/31/2024    Procedure: RIGHT LOWER EXTREMITY WOUND EXPLORATION, DEBRIDEMENT AND CONTROL OF BLEEDING;  Surgeon: Gerald Pedraza MD;  Location: [facility] MAIN OR;  Service: General;  Laterality: Right;    LASIK      LYMPHADENECTOMY      OOPHORECTOMY           FAMILY HISTORY  Family History   Problem Relation Age of Onset    Other Mother         Cardiac disorder    Osteoarthritis Mother     Cataracts Mother     Macular degeneration Mother     Hypertension Father     Other Father         Cardiac disorder    Ovarian cancer Sister         70'S    Cancer Sister     Diabetes Sister     Hypertension Sister     Osteoarthritis Sister     Cancer Brother     Hypertension Brother     Osteoarthritis Brother     Colon cancer Maternal Grandmother     Ovarian cancer Paternal Grandmother         unknown    Cancer Other     Stroke Other     Breast cancer Neg Hx          SOCIAL HISTORY  Social History     Socioeconomic History    Marital status:    Tobacco Use    Smoking status: Former     Current packs/day: 0.50     Average packs/day: 0.5 packs/day for 2.0 years (1.0 ttl pk-yrs)     Types: Cigarettes    Smokeless tobacco: Never    Tobacco comments:     quit 40 years ago   Vaping Use    Vaping status: Never Used   Substance and Sexual Activity    Alcohol use: No    Drug use: No    Sexual activity: Defer         ALLERGIES  Cortisone, Penicillins, and Iodinated contrast media        REVIEW OF SYSTEMS  Review of Systems     All systems reviewed and negative except for those discussed in HPI.       PHYSICAL EXAM    I have reviewed the triage vital signs and nursing notes.    ED Triage Vitals [06/21/24 2256]   Temp Heart Rate Resp BP SpO2   98.1 °F (36.7 °C) 75 20 142/84 97 %      Temp  src Heart Rate Source Patient Position BP Location FiO2 (%)   Oral Monitor Lying Right arm --       GENERAL: This is an elderly female.  Patient is super morbidly obese.  She looks uncomfortable complains of some pain in her abdomen.  Vital signs on my initial evaluation have been reviewed and unremarkable.  Blood pressure is normal heart rate is normal she is afebrile  HENT: nares patent  Head/neck/ face are symmetric without gross deformity, signs of trauma, or swelling  EYES: no scleral icterus, no conjunctival pallor.  NECK: Supple, no meningismus  CV: regular rhythm, regular rate with intact distal pulses.  RESPIRATORY: normal effort and no respiratory distress.  Clear to auscultation bilaterally  ABDOMEN: Patient is super morbidly obese belly appears to be a little distended and firm more prominent in the lower abdomen with palpation.  She does have positive bowel sounds.  You can see scars to her abdomen from previous surgeries.  I hear distant bowel sounds.  Very tender on exam.  Rectal exam: First to her on her pelvis there is no protrusion or mass noted from her vagina.  She has some light brown stool that is loose and liquid in her undergarment.  She has no rectal prolapse.  She has a firm stool that is at the very distal portion of my finger on digital rectal exam.  She has had a large release of gas on my exam.  Also some liquid stool release as well.  There was no melena or gross blood.  MUSCULOSKELETAL: She has Ace wrap's to her bilateral lower extremities.  There is no coolness or cyanosis.  NEURO: alert and appropriate, moves all extremities, follows commands.  No focal motor or sensory changes  SKIN: warm, dry    Vital signs and nursing notes reviewed.        LAB RESULTS  Recent Results (from the past 24 hour(s))   Comprehensive Metabolic Panel    Collection Time: 06/21/24 11:54 PM    Specimen: Arm, Right; Blood   Result Value Ref Range    Glucose 98 65 - 99 mg/dL    BUN 12 8 - 23 mg/dL     Creatinine 0.95 0.57 - 1.00 mg/dL    Sodium 142 136 - 145 mmol/L    Potassium 3.4 (L) 3.5 - 5.2 mmol/L    Chloride 106 98 - 107 mmol/L    CO2 25.6 22.0 - 29.0 mmol/L    Calcium 8.2 (L) 8.6 - 10.5 mg/dL    Total Protein 5.6 (L) 6.0 - 8.5 g/dL    Albumin 3.2 (L) 3.5 - 5.2 g/dL    ALT (SGPT) 10 1 - 33 U/L    AST (SGOT) 15 1 - 32 U/L    Alkaline Phosphatase 68 39 - 117 U/L    Total Bilirubin 0.4 0.0 - 1.2 mg/dL    Globulin 2.4 gm/dL    A/G Ratio 1.3 g/dL    BUN/Creatinine Ratio 12.6 7.0 - 25.0    Anion Gap 10.4 5.0 - 15.0 mmol/L    eGFR 58.5 (L) >60.0 mL/min/1.73   Lipase    Collection Time: 06/21/24 11:54 PM    Specimen: Arm, Right; Blood   Result Value Ref Range    Lipase 18 13 - 60 U/L   Lactic Acid, Plasma    Collection Time: 06/21/24 11:54 PM    Specimen: Arm, Right; Blood   Result Value Ref Range    Lactate 1.5 0.5 - 2.0 mmol/L   Green Top (Gel)    Collection Time: 06/21/24 11:54 PM   Result Value Ref Range    Extra Tube Hold for add-ons.    Lavender Top    Collection Time: 06/21/24 11:54 PM   Result Value Ref Range    Extra Tube hold for add-on    Gold Top - SST    Collection Time: 06/21/24 11:54 PM   Result Value Ref Range    Extra Tube Hold for add-ons.    Light Blue Top    Collection Time: 06/21/24 11:54 PM   Result Value Ref Range    Extra Tube Hold for add-ons.    CBC Auto Differential    Collection Time: 06/21/24 11:54 PM    Specimen: Arm, Right; Blood   Result Value Ref Range    WBC 8.87 3.40 - 10.80 10*3/mm3    RBC 3.46 (L) 3.77 - 5.28 10*6/mm3    Hemoglobin 9.0 (L) 12.0 - 15.9 g/dL    Hematocrit 30.1 (L) 34.0 - 46.6 %    MCV 87.0 79.0 - 97.0 fL    MCH 26.0 (L) 26.6 - 33.0 pg    MCHC 29.9 (L) 31.5 - 35.7 g/dL    RDW 15.8 (H) 12.3 - 15.4 %    RDW-SD 50.1 37.0 - 54.0 fl    MPV 9.8 6.0 - 12.0 fL    Platelets 321 140 - 450 10*3/mm3    Neutrophil % 44.9 42.7 - 76.0 %    Lymphocyte % 38.3 19.6 - 45.3 %    Monocyte % 11.4 5.0 - 12.0 %    Eosinophil % 3.8 0.3 - 6.2 %    Basophil % 0.6 0.0 - 1.5 %    Immature  Grans % 1.0 (H) 0.0 - 0.5 %    Neutrophils, Absolute 3.98 1.70 - 7.00 10*3/mm3    Lymphocytes, Absolute 3.40 (H) 0.70 - 3.10 10*3/mm3    Monocytes, Absolute 1.01 (H) 0.10 - 0.90 10*3/mm3    Eosinophils, Absolute 0.34 0.00 - 0.40 10*3/mm3    Basophils, Absolute 0.05 0.00 - 0.20 10*3/mm3    Immature Grans, Absolute 0.09 (H) 0.00 - 0.05 10*3/mm3    nRBC 0.0 0.0 - 0.2 /100 WBC   Protime-INR    Collection Time: 06/21/24 11:54 PM    Specimen: Arm, Right; Blood   Result Value Ref Range    Protime 15.4 (H) 11.7 - 14.2 Seconds    INR 1.19 (H) 0.90 - 1.10   Magnesium    Collection Time: 06/21/24 11:54 PM    Specimen: Arm, Right; Blood   Result Value Ref Range    Magnesium 2.2 1.6 - 2.4 mg/dL   Urinalysis With Microscopic If Indicated (No Culture) - Straight Cath    Collection Time: 06/22/24 12:28 AM    Specimen: Straight Cath; Urine   Result Value Ref Range    Color, UA Yellow Yellow, Straw    Appearance, UA Clear Clear    pH, UA 7.0 5.0 - 8.0    Specific Gravity, UA 1.016 1.005 - 1.030    Glucose, UA Negative Negative    Ketones, UA Negative Negative    Bilirubin, UA Negative Negative    Blood, UA Negative Negative    Protein, UA Negative Negative    Leuk Esterase, UA Negative Negative    Nitrite, UA Negative Negative    Urobilinogen, UA 1.0 E.U./dL 0.2 - 1.0 E.U./dL   POC Occult Blood Stool    Collection Time: 06/22/24  2:34 AM    Specimen: Stool   Result Value Ref Range    Fecal Occult Blood Negative Negative    Lot Number [id number]     Expiration Date August 31, 2024     Positive Control Positive Positive    Negative Control Negative Negative       Ordered the above labs and independently reviewed the results.        RADIOLOGY  CT Abdomen Pelvis Without Contrast    Result Date: 6/22/2024  CT OF THE ABDOMEN PELVIS WITHOUT CONTRAST  HISTORY: Lower abdominal pain  COMPARISON: May 31, 2024  TECHNIQUE: Axial CT imaging was obtained through the abdomen and pelvis. No IV contrast was administered.  FINDINGS: Images through the lung  bases demonstrate some chronic scarring. Noncalcified nodules within the right middle lobe appears stable when compared to January 16, 2024 the larger measures 1.1 cm, and the smaller measures 5 mm. Consider CT follow-up in January 2025. No suspicious hepatic lesions are seen. Gallbladder is absent. There is a small hiatal hernia. The duodenum and the adrenal glands, and pancreas are all normal. Low-attenuation lesion within the spleen may represent a cyst. There are also granulomata noted within the spleen. There are multiple parapelvic cysts noted on the right. No hydronephrosis is seen on either side. There are nonobstructing stones noted within the left kidney, with the larger measuring up to 3 mm. There is a simple appearing right renal cortical cyst. No additional follow-up is necessary. There are aortoiliac calcifications. The patient's rectal vault is distended with stool. It is concerning for fecal impaction. There is rectal wall thickening, as well as perirectal stranding and presacral edema, concerning for proctitis. No pneumatosis or free air is seen. There is colonic diverticulosis. There is no small bowel obstruction. Stool burden throughout the colon overall is increased. There is no evidence of appendicitis. Uterus is absent. There is some body wall edema. No acute osseous abnormalities are seen. There are small bilateral fat-containing inguinal hernias.       1. Large volume of stool within the rectal vault, suggesting fecal impaction. There is also some rectal wall thickening, as well as perirectal stranding and presacral edema, suggesting proctitis.  Radiation dose reduction techniques were utilized, including automated exposure control and exposure modulation based on body size.   This report was finalized on 6/22/2024 2:52 AM by Dr. Robyn Wood M.D on Workstation: [id number]       I ordered the above noted radiological studies. Reviewed by me and discussed with radiologist.  See  dictation for official radiology interpretation.      PROCEDURES    Procedures      MEDICATIONS GIVEN IN ER    Medications   sodium chloride 0.9 % flush 10 mL (has no administration in time range)   sodium chloride 0.9 % flush 10 mL (has no administration in time range)         All labs have been independently reviewed by me.  All radiology studies have been reviewed by me and I discussed with radiologist dictating the report when indicated below.  All EKG's independently viewed and interpreted by me.  Discussion below represents my analysis of pertinent findings related to patient's condition, differential diagnosis, treatment plan and final disposition.        PROGRESS, DATA ANALYSIS, CONSULTS, AND MEDICAL DECISION MAKING    Differential diagnosis includes   - hepatobiliary pathology such as cholecystitis, cholangitis, and symptomatic cholelithiasis  -PUD  -Mesenteric ischemia  - Pancreatitis  - Dyspepsia  - Small bowel or large bowel obstruction  - Appendicitis  - Diverticulitis  - UTI including pyelonephritis  - Ureteral stone  - Zoster  - Colitis, including infectious and ischemic  - Atypical ACS        ED Course as of 06/22/24 0316   Fri Jun 21, 2024   2358 History of contrast allergy but not specified severity or symptoms with allergy.  Reports she passed out. [MM]   Sat Jun 22, 2024   0016 Within just a couple minutes after my rectal exam she had significant improvement of her pain.  Did not relieve 100% but it is now currently mild she does not need anything for pain.  Again she had a large release of gas and some loose liquid stool after digital rectal exam. [MM]   0050 Patient did have bladder distention as well had approximately 700 cc.  This was drained.  Patient also felt much better after this. [MM]   0051 Hemoglobin(!): 9.0  Patient has chronic anemia.  Hemoglobin 10 days ago was 7.511 days ago was 8.2 and 12 days ago 7.9. [MM]   0221 I did look at the wound to her medial aspect in her right lower  extremity.  It is a large open area appears to be healing secondarily.  I do not see any obvious purulent drainage or any obvious signs of acute infection.  Patient states that she saw either wound care or the general surgeon yesterday and they stated that it was looking good and healing slowly.  She is supposed to be continuing to see wound care. [MM]   0222 Concerning her abdominal pain that pain has 100% resolved.  She fell asleep.  She is in no distress at this time. [MM]   0300 I reviewed the CT scan report from radiologist.  There is fecal impaction as well as some constipation.  There are some rectal wall thickening suggesting proctitis.  Please see complete dictated report from radiologist. [MM]   0301 I reviewed the CT scan report on my rectal exam there was some hard stool but it was the very tip of my finger.  She cannot really be disimpacted this time.  When I initially examined her I did remove some stool as mentioned in previous note.  She is currently much more comfortable.  The plan is to admit her give her some enemas to hopefully see some improvement in her bowel motility. [MM]   0304 I have reevaluated the patient informed of the results of the test.  She is feeling much better has mild fullness in her lower abdomen.  Does not want anything for pain.  We never gave her any medicine for pain here.  All questions answered at this time. [MM]   0313 I did discuss the case with Karma who is the midlevel provider on for [facility].  Dr. Mathew is the attending.  Informed of the patient presenting symptoms and results of test.  All questions answered [MM]      ED Course User Index  [MM] Mike Lim MD       AS OF 03:16 EDT VITALS:    BP - 148/69  HR - 73  TEMP - 98.1 °F (36.7 °C) (Oral)  02 SATS - 99%    SOCIAL DETERMINANTS OF HEALTH THAT IMPACT OR LIMIT CARE (For example..Homelessness,safe discharge, inability to obtain care, follow up, or prescriptions):      DIAGNOSIS  Final diagnoses:   Lower  abdominal pain   Fecal impaction   Coagulopathy: Eliquis induced   Chronic anemia         DISPOSITION  I have reviewed the test results with my patient and explained the current treatment plan.  I answered all the patient's questions and concerns.  The patient is hemodynamically stable and is in no distress and is appropriate to be admitted to a medical/surgical floor bed at this time.            DICTATED UTILIZING DRAGON DICTATION    Note Disclaimer: At [facility], we believe that sharing information builds trust and better relationships. You are receiving this note because you recently visited [facility]. It is possible you will see health information before a provider has talked with you about it. This kind of information can be easy to misunderstand. To help you fully understand what it means for your health, we urge you to discuss this note with your provider.       Mike Lim MD  06/22/24 7495

## 2024-06-23 PROBLEM — K56.41 FECAL IMPACTION: Status: RESOLVED | Noted: 2024-06-22 | Resolved: 2024-06-23

## 2024-06-23 LAB
ANION GAP SERPL CALCULATED.3IONS-SCNC: 7 MMOL/L (ref 5–15)
BUN SERPL-MCNC: 13 MG/DL (ref 8–23)
BUN/CREAT SERPL: 13.5 (ref 7–25)
CALCIUM SPEC-SCNC: 8.2 MG/DL (ref 8.6–10.5)
CHLORIDE SERPL-SCNC: 108 MMOL/L (ref 98–107)
CO2 SERPL-SCNC: 27 MMOL/L (ref 22–29)
CREAT SERPL-MCNC: 0.96 MG/DL (ref 0.57–1)
DEPRECATED RDW RBC AUTO: 46.2 FL (ref 37–54)
EGFRCR SERPLBLD CKD-EPI 2021: 57.7 ML/MIN/1.73
ERYTHROCYTE [DISTWIDTH] IN BLOOD BY AUTOMATED COUNT: 15.1 % (ref 12.3–15.4)
GLUCOSE SERPL-MCNC: 68 MG/DL (ref 65–99)
HCT VFR BLD AUTO: 28.9 % (ref 34–46.6)
HGB BLD-MCNC: 8.7 G/DL (ref 12–15.9)
MAGNESIUM SERPL-MCNC: 2.8 MG/DL (ref 1.6–2.4)
MCH RBC QN AUTO: 25.7 PG (ref 26.6–33)
MCHC RBC AUTO-ENTMCNC: 30.1 G/DL (ref 31.5–35.7)
MCV RBC AUTO: 85.5 FL (ref 79–97)
PHOSPHATE SERPL-MCNC: 3.3 MG/DL (ref 2.5–4.5)
PLATELET # BLD AUTO: 297 10*3/MM3 (ref 140–450)
PMV BLD AUTO: 9.8 FL (ref 6–12)
POTASSIUM SERPL-SCNC: 4.2 MMOL/L (ref 3.5–5.2)
RBC # BLD AUTO: 3.38 10*6/MM3 (ref 3.77–5.28)
SODIUM SERPL-SCNC: 142 MMOL/L (ref 136–145)
WBC NRBC COR # BLD AUTO: 7.04 10*3/MM3 (ref 3.4–10.8)

## 2024-06-23 PROCEDURE — 97162 PT EVAL MOD COMPLEX 30 MIN: CPT

## 2024-06-23 PROCEDURE — 94799 UNLISTED PULMONARY SVC/PX: CPT

## 2024-06-23 PROCEDURE — 96374 THER/PROPH/DIAG INJ IV PUSH: CPT

## 2024-06-23 PROCEDURE — 83735 ASSAY OF MAGNESIUM: CPT | Performed by: HOSPITALIST

## 2024-06-23 PROCEDURE — 97530 THERAPEUTIC ACTIVITIES: CPT

## 2024-06-23 PROCEDURE — 25010000002 ONDANSETRON PER 1 MG: Performed by: NURSE PRACTITIONER

## 2024-06-23 PROCEDURE — 84100 ASSAY OF PHOSPHORUS: CPT | Performed by: HOSPITALIST

## 2024-06-23 PROCEDURE — 80048 BASIC METABOLIC PNL TOTAL CA: CPT | Performed by: HOSPITALIST

## 2024-06-23 PROCEDURE — 94664 DEMO&/EVAL PT USE INHALER: CPT

## 2024-06-23 PROCEDURE — G0378 HOSPITAL OBSERVATION PER HR: HCPCS

## 2024-06-23 PROCEDURE — 85027 COMPLETE CBC AUTOMATED: CPT | Performed by: HOSPITALIST

## 2024-06-23 RX ADMIN — ACETAMINOPHEN 1000 MG: 500 TABLET ORAL at 08:32

## 2024-06-23 RX ADMIN — DILTIAZEM HYDROCHLORIDE 120 MG: 120 CAPSULE, EXTENDED RELEASE ORAL at 08:32

## 2024-06-23 RX ADMIN — Medication 10 ML: at 08:33

## 2024-06-23 RX ADMIN — BUDESONIDE 0.5 MG: 0.5 INHALANT ORAL at 22:37

## 2024-06-23 RX ADMIN — ARFORMOTEROL TARTRATE 15 MCG: 15 SOLUTION RESPIRATORY (INHALATION) at 22:37

## 2024-06-23 RX ADMIN — PANTOPRAZOLE SODIUM 40 MG: 40 TABLET, DELAYED RELEASE ORAL at 07:58

## 2024-06-23 RX ADMIN — DIAZEPAM 2 MG: 2 TABLET ORAL at 08:37

## 2024-06-23 RX ADMIN — BUDESONIDE 0.5 MG: 0.5 INHALANT ORAL at 09:00

## 2024-06-23 RX ADMIN — PANTOPRAZOLE SODIUM 40 MG: 40 TABLET, DELAYED RELEASE ORAL at 17:37

## 2024-06-23 RX ADMIN — Medication 10 ML: at 20:23

## 2024-06-23 RX ADMIN — ACETAMINOPHEN 1000 MG: 500 TABLET ORAL at 20:23

## 2024-06-23 RX ADMIN — APIXABAN 2.5 MG: 2.5 TABLET, FILM COATED ORAL at 20:23

## 2024-06-23 RX ADMIN — ONDANSETRON 4 MG: 2 INJECTION INTRAMUSCULAR; INTRAVENOUS at 14:42

## 2024-06-23 RX ADMIN — ACETAMINOPHEN 1000 MG: 500 TABLET ORAL at 14:42

## 2024-06-23 RX ADMIN — DIAZEPAM 2 MG: 2 TABLET ORAL at 20:23

## 2024-06-23 RX ADMIN — ARFORMOTEROL TARTRATE 15 MCG: 15 SOLUTION RESPIRATORY (INHALATION) at 08:58

## 2024-06-23 RX ADMIN — APIXABAN 2.5 MG: 2.5 TABLET, FILM COATED ORAL at 08:33

## 2024-06-23 NOTE — PLAN OF CARE
Goal Outcome Evaluation:  Plan of Care Reviewed With: patient           Outcome Evaluation: Pt is a 87 y/o F [facility] with [facility]/o abdominal pain and constipation. Pt was recently hospitalized for R LE hematoma that was evacuated and was discharged to SNF where she was ambulating short distances with therapy. Pt presents to PT with generalized weakness, decreased endurance, and impaired functional mobility. Pt performed supine to sit with use of bed rails and SBA. Pt stood requiring min A and ambulated to bathroom c RW requiring SBA/CGA. Pt demo's a slow pace but overall steady gait. Pt request to have time for possible BM. RN notified and able to assist. PT recommends pt return to SNF at D/C to continue to address functional deficits.      Anticipated Discharge Disposition (PT): skilled nursing facility

## 2024-06-23 NOTE — PLAN OF CARE
Goal Outcome Evaluation:  Plan of Care Reviewed With: patient        Progress: improving  Outcome Evaluation: VSS, precert started today, falls precautions maintained, worked with PT, up to bathroom with walker and assist x1, up in chair, had one episode of nausea- meds given with relief, several Bm's this shift, dressing to leg CDI

## 2024-06-23 NOTE — DISCHARGE PLACEMENT REQUEST
"Vonnie Hitchcock (88 y.o. Female)       Date of Birth   1935    Social Security Number       Address   [address] [place] [id number]    Home Phone   [phone]    MRN   [id number]       Zoroastrian   None    Marital Status                               Admission Date   6/21/24    Admission Type   Emergency    Admitting Provider   Trevor Cervantes MD    Attending Provider   Pascual Reyna MD    Department, Room/Bed   [address], 82/[address]       Discharge Date       Discharge Disposition   Skilled Nursing Facility (DC - External)    Discharge Destination                                 Attending Provider: Pascual Reyna MD    Allergies: Cortisone, Penicillins, Iodinated Contrast Media    Isolation: None   Infection: None   Code Status: CPR    Ht: 166.4 cm (65.5\")   Wt: 107 kg (234 lb 12.6 oz)    Admission Cmt: None   Principal Problem: Fecal impaction [K56.41]                   Active Insurance as of 6/21/2024       Primary Coverage       Payor Plan Insurance Group Employer/Plan Group    HUMANA MEDICARE REPLACEMENT HUMANA MED ADV PPO [id number]       Payor Plan Address Payor Plan Phone Number Payor Plan Fax Number Effective Dates    [address] [address] [phone]  3/1/2023 - None Entered    [facility] [id number]         Subscriber Name Subscriber Birth Date Member ID       VONNIE HITCHCOCK 1935 [id number]                     Emergency Contacts        (Rel.) Home Phone Work Phone Mobile Phone    Kirsten Avila (Sister) [phone] -- [phone]    Elizabeth Rodríguez (Daughter) [phone] -- [phone]    AbdonHudson (Other) [phone] -- [phone]                "

## 2024-06-23 NOTE — SIGNIFICANT NOTE
06/23/24 1208   Post Acute Pre-Cert Documentation   Request Submitted by Facility - Type: Hospital   Post-Acute Authorization Type Submitted: SNF   Date Post Acute Pre-Cert Inititated per Facility 06/23/24   Accepting Facility [facility] Discharge Date Requested 06/23/24   All Clinicals Submitted? Yes   Had Accepting Facility at Time of Submission Yes   Authorization Number: PENDING #[id number]

## 2024-06-23 NOTE — CASE MANAGEMENT/SOCIAL WORK
Continued Stay Note  [facility]     Patient Name: Vonnie Coppola  MRN: [id number]  Today's Date: 6/23/2024    Admit Date: 6/21/2024    Plan: Return to [facility], will need new pre-cert   Discharge Plan       Row Name 06/23/24 0950       Plan    Plan Return to [facility], will need new pre-cert    Patient/Family in Agreement with Plan yes    Plan Comments Inbound call from RN notifying CCP that pt has discharge order and plans to return to [facility] for skilled rehab. Call to pt's room with no answer. Call to pt's daughter  and confirmed that plan is for pt to return there. Call to [facility] and spoke with Kapil, confirmed pt can return and bed available but will need new pre-cert. Awaiting PT/OT notes and will start pre-cert. CCP to follow. RN updated. Pt will likely need  van transport.  [facility]                   Discharge Codes    No documentation.                 Expected Discharge Date and Time       Expected Discharge Date Expected Discharge Time    Jun 23, 2024               Jac Del Castillo RN

## 2024-06-23 NOTE — THERAPY EVALUATION
Patient Name: Vonnie Coppola  : 1935    MRN: [id number]                              Today's Date: 2024       Admit Date: 2024    Visit Dx:     ICD-10-CM ICD-9-CM   1. Lower abdominal pain  R10.30 789.09   2. Fecal impaction  K56.41 560.32   3. Coagulopathy: Eliquis induced  D68.9 286.9   4. Chronic anemia  D64.9 285.9     Patient Active Problem List   Diagnosis    GI bleed    Anemia    HTN (hypertension)    History of breast cancer    Asthma    History of CVA (cerebrovascular accident)    Dehydration    Renal insufficiency    Pulmonary nodule    Adnexal cyst    Nausea    Atrial fibrillation    History of pulmonary embolism    Class 2 severe obesity with serious comorbidity in adult    Thrombocytopenia    Cellulitis of right leg    Acute cystitis    Leg hematoma, right, subsequent encounter    Obesity (BMI 35.0-39.9 without comorbidity)    Morbid (severe) obesity due to excess calories (*specify comorbidity)     Past Medical History:   Diagnosis Date    Arthritis     Asthma     Atrial fibrillation     per pt's daughter.States hx of a-fib in the past when stressed or tired.    Breast cancer     LEFT BREAST CA, LUMPECTOMY & RADS    History of cataract     Hx of radiation therapy     APPROXIMATELY 40 TREATMENTS FOR LEFT BREAST CA    Hypertension     Pneumonia     Stroke      Past Surgical History:   Procedure Laterality Date    APPENDECTOMY      BLADDER SURGERY      BREAST BIOPSY Left     MALIGNANT    BREAST LUMPECTOMY Left     MALIGNANT    CATARACT EXTRACTION      COLONOSCOPY N/A 2024    Procedure: COLONOSCOPY to cecum with cold snare polypectomies;  Surgeon: Harshad Gaston MD;  Location: [facility] ENDOSCOPY;  Service: Gastroenterology;  Laterality: N/A;  pre- gi bleed, anemia  post- diverticulosis, polyps    ENDOSCOPY N/A 2024    Procedure: ESOPHAGOGASTRODUODENOSCOPY with biospy;  Surgeon: Harshad Gaston MD;  Location: [facility] ENDOSCOPY;  Service:  Gastroenterology;  Laterality: N/A;  pre- gi bleed, anemia  post- erosive gastirits    GALLBLADDER SURGERY      HERNIA REPAIR      HYSTERECTOMY      INCISION AND DRAINAGE LEG Right 5/31/2024    Procedure: RIGHT LOWER EXTREMITY WOUND EXPLORATION, DEBRIDEMENT AND CONTROL OF BLEEDING;  Surgeon: Gerald Pedraza MD;  Location: [facility] OR;  Service: General;  Laterality: Right;    LASIK      LYMPHADENECTOMY      OOPHORECTOMY        General Information       Row Name 06/23/24 1147          Physical Therapy Time and Intention    Document Type evaluation  -CS     Mode of Treatment individual therapy;physical therapy  -CS       Row Name 06/23/24 1147          General Information    Patient Profile Reviewed yes  -CS     Prior Level of Function gait;transfer;bed mobility  ambulating short distances with PT at rehab  -CS     Existing Precautions/Restrictions fall  -CS     Barriers to Rehab none identified  -CS       Row Name 06/23/24 1147          Living Environment    People in Home facility resident  admitted from SNF  -CS       Row Name 06/23/24 1147          Cognition    Orientation Status (Cognition) oriented x 3  -CS       Row Name 06/23/24 1147          Safety Issues, Functional Mobility    Impairments Affecting Function (Mobility) endurance/activity tolerance;pain;strength  -CS               User Key  (r) = Recorded By, (t) = Taken By, (c) = Cosigned By      Initials Name Provider Type    CS Jeanne Cortez, PT Physical Therapist                   Mobility       Row Name 06/23/24 1148          Bed Mobility    Bed Mobility supine-sit  -CS     Supine-Sit [place] (Bed Mobility) standby assist  -CS     Assistive Device (Bed Mobility) bed rails;head of bed elevated  -CS     Comment, (Bed Mobility) increased time to complete; in bathroom at end of session - RN aware  -CS       Row Name 06/23/24 114          Sit-Stand Transfer    Sit-Stand [place] (Transfers) minimum assist (75% patient effort);verbal cues  -CS      Assistive Device (Sit-Stand Transfers) walker, front-wheeled  -CS       Row Name 06/23/24 1148          Gait/Stairs (Locomotion)    [place] Level (Gait) standby assist;contact guard;verbal cues  -     Assistive Device (Gait) walker, front-wheeled  -     Distance in Feet (Gait) 15  -CS     Deviations/Abnormal Patterns (Gait) prashanth decreased;gait speed decreased;stride length decreased  -     Bilateral Gait Deviations forward flexed posture;heel strike decreased  -CS     [place] Level (Stairs) not tested  -CS     Comment, (Gait/Stairs) slow pace, no overt LOB  -CS               User Key  (r) = Recorded By, (t) = Taken By, (c) = Cosigned By      Initials Name Provider Type    CS Jeanne Cortez, PT Physical Therapist                   Obj/Interventions       Row Name 06/23/24 1148          Range of Motion Comprehensive    General Range of Motion bilateral lower extremity ROM WFL  -       Row Name 06/23/24 1148          Strength Comprehensive (MMT)    General Manual Muscle Testing (MMT) Assessment other (see comments)  -CS     Comment, General Manual Muscle Testing (MMT) Assessment generalized weakness; B LE grossly 3/5  -       Row Name 06/23/24 1148          Balance    Balance Assessment sitting static balance;sitting dynamic balance;standing static balance;standing dynamic balance  -     Static Sitting Balance supervision  -     Dynamic Sitting Balance standby assist  -CS     Position, Sitting Balance unsupported;sitting edge of bed  -     Static Standing Balance standby assist  -     Dynamic Standing Balance standby assist;contact guard  -     Position/Device Used, Standing Balance supported;walker, front-wheeled  -               User Key  (r) = Recorded By, (t) = Taken By, (c) = Cosigned By      Initials Name Provider Type    CS Jeanne Cortez, PT Physical Therapist                   Goals/Plan       Row Name 06/23/24 1153          Bed Mobility Goal 1 (PT)     Activity/Assistive Device (Bed Mobility Goal 1, PT) bed mobility activities, all  -CS     [place] Level/Cues Needed (Bed Mobility Goal 1, PT) supervision required  -CS     Time Frame (Bed Mobility Goal 1, PT) 2 weeks  -CS       Row Name 06/23/24 1153          Transfer Goal 1 (PT)    Activity/Assistive Device (Transfer Goal 1, PT) sit-to-stand/stand-to-sit;bed-to-chair/chair-to-bed  -CS     [place] Level/Cues Needed (Transfer Goal 1, PT) standby assist  -CS     Time Frame (Transfer Goal 1, PT) 2 weeks  -CS       Row Name 06/23/24 1153          Gait Training Goal 1 (PT)    Activity/Assistive Device (Gait Training Goal 1, PT) assistive device use;gait (walking locomotion)  -CS     Ell[place] Level (Gait Training Goal 1, PT) contact guard required  -CS     Distance (Gait Training Goal 1, PT) 40'  -CS     Time Frame (Gait Training Goal 1, PT) 2 weeks  -CS               User Key  (r) = Recorded By, (t) = Taken By, (c) = Cosigned By      Initials Name Provider Type    CS Jeanne Cortez, PT Physical Therapist                   Clinical Impression       Row Name 06/23/24 1149          Pain    Pretreatment Pain Rating 0/10 - no pain  -CS       Row Name 06/23/24 1148          Plan of Care Review    Plan of Care Reviewed With patient  -CS     Outcome Evaluation Pt is a 87 y/o F [facility]/o abdominal pain and constipation. Pt was recently hospitalized for R LE hematoma that was evacuated and was discharged to SNF where she was ambulating short distances with therapy. Pt presents to PT with generalized weakness, decreased endurance, and impaired functional mobility. Pt performed supine to sit with use of bed rails and SBA. Pt stood requiring min A and ambulated to bathroom c RW requiring SBA/CGA. Pt demo's a slow pace but overall steady gait. Pt request to have time for possible BM. RN notified and able to assist. PT recommends pt return to SNF at D/C to continue to address functional deficits.  -CS        Row Name 06/23/24 1149          Therapy Assessment/Plan (PT)    Rehab Potential (PT) good, to achieve stated therapy goals  -CS     Criteria for Skilled Interventions Met (PT) yes;meets criteria  -CS     Therapy Frequency (PT) 5 times/wk  -CS       Row Name 06/23/24 1149          Positioning and Restraints    Pre-Treatment Position in bed  -CS     Post Treatment Position bathroom  -CS     Bathroom notified nsg;sitting;call light within reach;encouraged to call for assist  -CS               User Key  (r) = Recorded By, (t) = Taken By, (c) = Cosigned By      Initials Name Provider Type    Jeanne Gutierrez, PT Physical Therapist                   Outcome Measures       Row Name 06/23/24 1154 06/23/24 0831       How much help from another person do you currently need...    Turning from your back to your side while in flat bed without using bedrails? 3  -CS 3  -KS    Moving from lying on back to sitting on the side of a flat bed without bedrails? 3  -CS 3  -KS    Moving to and from a bed to a chair (including a wheelchair)? 3  -CS 2  -KS    Standing up from a chair using your arms (e.g., wheelchair, bedside chair)? 3  -CS 2  -KS    Climbing 3-5 steps with a railing? 2  -CS 2  -KS    To walk in hospital room? 3  -CS 2  -KS    AM-PAC 6 Clicks Score (PT) 17  -CS 14  -KS    Highest Level of Mobility Goal 5 --> Static standing  -CS 4 --> Transfer to chair/commode  -KS      Row Name 06/23/24 1154          Functional Assessment    Outcome Measure Options AM-PAC 6 Clicks Basic Mobility (PT)  -CS               User Key  (r) = Recorded By, (t) = Taken By, (c) = Cosigned By      Initials Name Provider Type    Millicent Sneed RN Registered Nurse    Jeanne Gutierrez PT Physical Therapist                                 Physical Therapy Education       Title: PT OT SLP Therapies (In Progress)       Topic: Physical Therapy (In Progress)       Point: Mobility training (Done)       Learning Progress Summary              Patient Acceptance, E,TB, VU,DU by  at 6/23/2024 1154                         Point: Home exercise program (Not Started)       Learner Progress:  Not documented in this visit.              Point: Body mechanics (Done)       Learning Progress Summary             Patient Acceptance, E,TB, VU,DU by  at 6/23/2024 1154                         Point: Precautions (Done)       Learning Progress Summary             Patient Acceptance, E,TB, VU,DU by  at 6/23/2024 1154                                         User Key       Initials Effective Dates Name Provider Type Discipline     09/22/22 -  Jeanne Cortez, PT Physical Therapist PT                  PT Recommendation and Plan     Plan of Care Reviewed With: patient  Outcome Evaluation: Pt is a 87 y/o F admitted to [facility] with c/o abdominal pain and constipation. Pt was recently hospitalized for R LE hematoma that was evacuated and was discharged to SNF where she was ambulating short distances with therapy. Pt presents to PT with generalized weakness, decreased endurance, and impaired functional mobility. Pt performed supine to sit with use of bed rails and SBA. Pt stood requiring min A and ambulated to bathroom c RW requiring SBA/CGA. Pt demo's a slow pace but overall steady gait. Pt request to have time for possible BM. RN notified and able to assist. PT recommends pt return to SNF at D/C to continue to address functional deficits.     Time Calculation:         PT Charges       Row Name 06/23/24 1154             Time Calculation    Start Time 1017  -CS      Stop Time 1027  -CS      Time Calculation (min) 10 min  -CS      PT Received On 06/23/24  -      PT - Next Appointment 06/24/24  -      PT Goal Re-Cert Due Date 07/07/24  -CS         Time Calculation- PT    Total Timed Code Minutes- PT 8 minute(s)  -CS         Timed Charges    43059 - PT Therapeutic Activity Minutes 8  -CS         Total Minutes    Timed Charges Total Minutes 8  -CS       Total Minutes 8   -ELIGIO                User Key  (r) = Recorded By, (t) = Taken By, (c) = Cosigned By      Initials Name Provider Type    CS Jeanne Cortez, PT Physical Therapist                  Therapy Charges for Today       Code Description Service Date Service Provider Modifiers Qty    65811695124  PT THERAPEUTIC ACT EA 15 MIN 6/23/2024 Jeanne Cortez, PT GP 1    [id number] HC PT EVAL MOD COMPLEXITY 2 6/23/2024 Jeanne Cortez, PT GP 1            PT G-Codes  Outcome Measure Options: AM-PAC 6 Clicks Basic Mobility (PT)  AM-PAC 6 Clicks Score (PT): 17  PT Discharge Summary  Anticipated Discharge Disposition (PT): skilled nursing facility    Jeanne Cortez PT  6/23/2024

## 2024-06-23 NOTE — CASE MANAGEMENT/SOCIAL WORK
"Physicians Statement of Medical Necessity for  Ambulance Transportation    GENERAL INFORMATION     Name: Vonnie Coppola  YOB: 1935  [employer] Medicare Replacement:     [id number]     Transport Date: ___________________ (Valid for round trips this date, or for scheduled repetitive trips for 60 days from the date signed below.)  Origin: [facility]  Destination: [facility], [address] [place] [address], [place] [id number]   Is the Patient's stay covered under Medicare Part A (PPS/DRG?)No  Closest appropriate facility? Yes  If this a hosp-hosp transfer? No  Is this a hospice patient? No    MEDICAL NECESSITY QUESTIONAIRE    Ambulance Transportation is medically necessary only if other means of transportation are contraindicated or would be potentially harmful to the patient.  To meet this requirement, the patient must be either \"bed confined\" or suffer from a condition such that transport by means other than an ambulance is contraindicated by the patient's condition.  The following questions must be answered by the healthcare professional signing below for this form to be valid:     1) Describe the MEDICAL CONDITION (physical and/or mental) of this patient AT THE TIME OF AMBULANCE TRANSPORT that requires the patient to be transported in an ambulance, and why transport by other means is contraindicated by the patient's condition: The primary encounter diagnosis was Lower abdominal pain. Diagnoses of Fecal impaction, Coagulopathy: Eliquis induced, and Chronic anemia were also pertinent to this visit.   Problems Addressed this Visit       * (Principal) RESOLVED: Fecal impaction     Other Visit Diagnoses       Lower abdominal pain    -  Primary    Coagulopathy: Eliquis induced        Relevant Medications    apixaban (ELIQUIS) tablet 2.5 mg    Chronic anemia              Diagnoses         Codes Comments    Lower abdominal pain    -  Primary ICD-10-CM: R10.30  ICD-9-CM: 789.09     Fecal " "impaction     ICD-10-CM: K56.41  ICD-9-CM: 560.32     Coagulopathy: Eliquis induced     ICD-10-CM: D68.9  ICD-9-CM: 286.9     Chronic anemia     ICD-10-CM: D64.9  ICD-9-CM: 285.9            Past Medical History:   Diagnosis Date    Arthritis     Asthma     Atrial fibrillation     per pt's daughter.States hx of a-fib in the past when stressed or tired.    Breast cancer 1999    LEFT BREAST CA, LUMPECTOMY & RADS    History of cataract     Hx of radiation therapy 1999    APPROXIMATELY 40 TREATMENTS FOR LEFT BREAST CA    Hypertension     Pneumonia     Stroke       Past Surgical History:   Procedure Laterality Date    APPENDECTOMY      BLADDER SURGERY      BREAST BIOPSY Left 1999    MALIGNANT    BREAST LUMPECTOMY Left 1999    MALIGNANT    CATARACT EXTRACTION      COLONOSCOPY N/A 01/19/2024    Procedure: COLONOSCOPY to cecum with cold snare polypectomies;  Surgeon: Harshad Gaston MD;  Location: [facility] ENDOSCOPY;  Service: Gastroenterology;  Laterality: N/A;  pre- gi bleed, anemia  post- diverticulosis, polyps    ENDOSCOPY N/A 01/19/2024    Procedure: ESOPHAGOGASTRODUODENOSCOPY with biospy;  Surgeon: Harshad Gaston MD;  Location: [facility] ENDOSCOPY;  Service: Gastroenterology;  Laterality: N/A;  pre- gi bleed, anemia  post- erosive gastirits    GALLBLADDER SURGERY      HERNIA REPAIR      HYSTERECTOMY      INCISION AND DRAINAGE LEG Right 5/31/2024    Procedure: RIGHT LOWER EXTREMITY WOUND EXPLORATION, DEBRIDEMENT AND CONTROL OF BLEEDING;  Surgeon: Gerald Pedraza MD;  Location: [facility] MAIN OR;  Service: General;  Laterality: Right;    LASIK      LYMPHADENECTOMY      OOPHORECTOMY        2) Is this patient \"bed confined\" as defined below?Yes   To be \"bed confined\" the patient must satisfy all three of the following criteria:  (1) unable to get up from bed without assistance; AND (2) unable to ambulate;  AND (3) unable to sit in a chair or wheelchair.  3) Can this patient safely be transported by car or wheelchair " van (I.e., may safely sit during transport, without an attendant or monitoring?)No   4. In addition to completing questions 1-3 above, please check any of the following conditions that apply*:          *Note: supporting documentation for any boxes checked must be maintained in the patient's medical records Moderate/severe pain on movement, Medical attendant required, Unable to tolerate seated position for time needed to transport, and Other high falls risk      SIGNATURE OF PHYSICIAN OR OTHER AUTHORIZED HEALTHCARE PROFESSIONAL    I certify that the above information is true and correct based on my evaluation of this patient, and represent that the patient requires transport by ambulance and that other forms of transport are contraindicated.  I understand that this information will be used by the Centers for Medicare and Medicaid Services (CMS) to support the determiniation of medical necessity for ambulance services, and I represent that I have personal knowledge of the patient's condition at the time of transport.       If this box is checked, I also certify that the patient is physically or mentally incapable of signing the ambulance service's claim form and that the institution with which I am affiliated has furnished care, services or assistance to the patient.  My signature below is made on behalf of the patient pursuant to 42 .36(b)(4). In accordance with 42 .37, the specific reason(s) that the patient is physically or mentally incapable of signing the claim for is as follows:     Signature of Physician or Healthcare Professional  Date/Time:        (For Scheduled repetitive transport, this form is not valid for transports performed more than 60 days after this date).                                                                                                                                             --------------------------------------------------------------------------------------------  Printed Name and Credentials of Physician or Authorized Healthcare Professional     *Form must be signed by patient's attending physician for scheduled, repetitive transports,.  For non-repetitive ambulance transports, if unable to obtain the signature of the attending physician, any of the following may sign (please select below):     Physician  Clinical Nurse Specialist  Registered Nurse     Physician Assistant  Discharge Planner  Licensed Practical Nurse     Nurse Practitioner

## 2024-06-23 NOTE — PROGRESS NOTES
Name: Vonnie Coppola ADMIT: 2024   : 1935  PCP: Christopher Leyva DO    MRN: [id number] LOS: 1 days   AGE/SEX: 88 y.o. female  ROOM: [facility]     Subjective   Subjective   Feeling better today after bowel movement.  No new problems.    Objective   Objective   Vital Signs  Temp:  [97 °F (36.1 °C)-97.3 °F (36.3 °C)] 97.2 °F (36.2 °C)  Heart Rate:  [67-74] 70  Resp:  [16-18] 16  BP: (105-135)/(63-75) 117/66  SpO2:  [92 %-97 %] 92 %  on  Flow (L/min):  [1] 1;   Device (Oxygen Therapy): room air  Body mass index is 38.48 kg/m².  Physical Exam  Constitutional:       General: She is not in acute distress.  Pulmonary:      Effort: Pulmonary effort is normal. No respiratory distress.      Breath sounds: No stridor.   Skin:     Coloration: Skin is not jaundiced.      Findings: No bruising.   Neurological:      General: No focal deficit present.      Mental Status: She is alert.   Psychiatric:         Mood and Affect: Mood normal.         Behavior: Behavior normal.         Results Review     I reviewed the patient's new clinical results.  Results from last 7 days   Lab Units 24  0814 24  2354   WBC 10*3/mm3 7.04 8.70 8.87   HEMOGLOBIN g/dL 8.7* 8.1* 9.0*   PLATELETS 10*3/mm3 297 259 321     Results from last 7 days   Lab Units 24  0814 24  2354   SODIUM mmol/L 142 140 142   POTASSIUM mmol/L 4.2 4.0 3.4*   CHLORIDE mmol/L 108* 108* 106   CO2 mmol/L 27.0 22.8 25.6   BUN mg/dL 13 13 12   CREATININE mg/dL 0.96 1.04* 0.95   GLUCOSE mg/dL 68 88 98   EGFR mL/min/1.73 57.7* 52.5* 58.5*     Results from last 7 days   Lab Units 24  0814 24  2354   ALBUMIN g/dL 2.5* 3.2*   BILIRUBIN mg/dL 0.3 0.4   ALK PHOS U/L 57 68   AST (SGOT) U/L 18 15   ALT (SGPT) U/L 8 10     Results from last 7 days   Lab Units 24  0449 24  0814 24  2354   CALCIUM mg/dL 8.2* 7.9* 8.2*   ALBUMIN g/dL  --  2.5* 3.2*   MAGNESIUM mg/dL 2.8*  --  2.2   PHOSPHORUS  "mg/dL 3.3  --   --      Results from last 7 days   Lab Units 06/21/24  2354   LACTATE mmol/L 1.5     No results found for: \"HGBA1C\", \"POCGLU\"    CT Abdomen Pelvis Without Contrast    Result Date: 6/22/2024   1. Large volume of stool within the rectal vault, suggesting fecal impaction. There is also some rectal wall thickening, as well as perirectal stranding and presacral edema, suggesting proctitis.  Radiation dose reduction techniques were utilized, including automated exposure control and exposure modulation based on body size.   This report was finalized on 6/22/2024 2:52 AM by Dr. Robyn Wood M.D on Workstation: [id number]     Scheduled Medications  acetaminophen, 1,000 mg, Oral, TID  apixaban, 2.5 mg, Oral, Q12H  arformoterol, 15 mcg, Nebulization, BID - RT  budesonide, 0.5 mg, Nebulization, BID  dilTIAZem CD, 120 mg, Oral, Daily  pantoprazole, 40 mg, Oral, BID AC  senna-docusate sodium, 2 tablet, Oral, BID   And  polyethylene glycol, 17 g, Oral, BID  sodium chloride, 10 mL, Intravenous, Q12H    Infusions   Diet  Diet: Regular/House; Fluid Consistency: Thin (IDDSI 0)       Assessment/Plan     Active Hospital Problems    Diagnosis  POA    Obesity (BMI 35.0-39.9 without comorbidity) [E66.9]  Yes    History of pulmonary embolism [Z86.711]  Yes    Thrombocytopenia [D69.6]  Yes    Atrial fibrillation [I48.91]  Yes    History of CVA (cerebrovascular accident) [Z86.73]  Not Applicable    Anemia [D64.9]  Yes    HTN (hypertension) [I10]  Yes      Resolved Hospital Problems    Diagnosis Date Resolved POA    **Fecal impaction [K56.41] 06/23/2024 Yes       88 y.o. female admitted with Fecal impaction.      06/23/24  Continue bowel regimen.  Ready for return to SNF, needs new pre-CERT.    Fecal impaction  -improved after fleet enema  -bowel regimen including MiraLAX and Colace scheduled     A-fib/hypertension/history of DVT  -Continue Eliquis 2.5 mg     Recent hematoma evacuation  -Consult wound care for right " lower extremity wound and bilateral lower extremity edema    Flow (L/min):  [1] 1    DVT prophylaxis: Eliquis (home med)  Discussed with patient.  Anticipated discharge SNF, once precert obtained            Pascual Reyna MD  [facility]  06/23/24  14:42 EDT

## 2024-06-23 NOTE — PLAN OF CARE
Goal Outcome Evaluation:  Plan of Care Reviewed With: patient           Outcome Evaluation: VSS, had BM this evening, leg remains wrapped, 2LO2, tolerating clears, voiding freely, no c/o pain or nausea this shift, will continue to monitor.

## 2024-06-24 VITALS
SYSTOLIC BLOOD PRESSURE: 113 MMHG | WEIGHT: 234.79 LBS | RESPIRATION RATE: 18 BRPM | DIASTOLIC BLOOD PRESSURE: 78 MMHG | OXYGEN SATURATION: 95 % | BODY MASS INDEX: 37.73 KG/M2 | HEIGHT: 66 IN | TEMPERATURE: 97.2 F | HEART RATE: 88 BPM

## 2024-06-24 LAB
ANION GAP SERPL CALCULATED.3IONS-SCNC: 7 MMOL/L (ref 5–15)
BUN SERPL-MCNC: 13 MG/DL (ref 8–23)
BUN/CREAT SERPL: 13.1 (ref 7–25)
CALCIUM SPEC-SCNC: 8.2 MG/DL (ref 8.6–10.5)
CHLORIDE SERPL-SCNC: 109 MMOL/L (ref 98–107)
CO2 SERPL-SCNC: 24 MMOL/L (ref 22–29)
CREAT SERPL-MCNC: 0.99 MG/DL (ref 0.57–1)
DEPRECATED RDW RBC AUTO: 46.9 FL (ref 37–54)
EGFRCR SERPLBLD CKD-EPI 2021: 55.6 ML/MIN/1.73
ERYTHROCYTE [DISTWIDTH] IN BLOOD BY AUTOMATED COUNT: 15.5 % (ref 12.3–15.4)
GLUCOSE SERPL-MCNC: 70 MG/DL (ref 65–99)
HCT VFR BLD AUTO: 26.3 % (ref 34–46.6)
HGB BLD-MCNC: 8.1 G/DL (ref 12–15.9)
MAGNESIUM SERPL-MCNC: 2.5 MG/DL (ref 1.6–2.4)
MCH RBC QN AUTO: 26.2 PG (ref 26.6–33)
MCHC RBC AUTO-ENTMCNC: 30.8 G/DL (ref 31.5–35.7)
MCV RBC AUTO: 85.1 FL (ref 79–97)
PHOSPHATE SERPL-MCNC: 3.1 MG/DL (ref 2.5–4.5)
PLATELET # BLD AUTO: 277 10*3/MM3 (ref 140–450)
PMV BLD AUTO: 9.9 FL (ref 6–12)
POTASSIUM SERPL-SCNC: 3.9 MMOL/L (ref 3.5–5.2)
RBC # BLD AUTO: 3.09 10*6/MM3 (ref 3.77–5.28)
SODIUM SERPL-SCNC: 140 MMOL/L (ref 136–145)
WBC NRBC COR # BLD AUTO: 7.09 10*3/MM3 (ref 3.4–10.8)

## 2024-06-24 PROCEDURE — 25010000002 ONDANSETRON PER 1 MG: Performed by: NURSE PRACTITIONER

## 2024-06-24 PROCEDURE — 97165 OT EVAL LOW COMPLEX 30 MIN: CPT

## 2024-06-24 PROCEDURE — 85027 COMPLETE CBC AUTOMATED: CPT | Performed by: HOSPITALIST

## 2024-06-24 PROCEDURE — G0378 HOSPITAL OBSERVATION PER HR: HCPCS

## 2024-06-24 PROCEDURE — 94799 UNLISTED PULMONARY SVC/PX: CPT

## 2024-06-24 PROCEDURE — 97535 SELF CARE MNGMENT TRAINING: CPT

## 2024-06-24 PROCEDURE — 94664 DEMO&/EVAL PT USE INHALER: CPT

## 2024-06-24 PROCEDURE — 83735 ASSAY OF MAGNESIUM: CPT | Performed by: HOSPITALIST

## 2024-06-24 PROCEDURE — 96376 TX/PRO/DX INJ SAME DRUG ADON: CPT

## 2024-06-24 PROCEDURE — 84100 ASSAY OF PHOSPHORUS: CPT | Performed by: HOSPITALIST

## 2024-06-24 PROCEDURE — 80048 BASIC METABOLIC PNL TOTAL CA: CPT | Performed by: HOSPITALIST

## 2024-06-24 RX ADMIN — DIAZEPAM 2 MG: 2 TABLET ORAL at 08:13

## 2024-06-24 RX ADMIN — Medication 10 ML: at 08:08

## 2024-06-24 RX ADMIN — ARFORMOTEROL TARTRATE 15 MCG: 15 SOLUTION RESPIRATORY (INHALATION) at 09:38

## 2024-06-24 RX ADMIN — ONDANSETRON 4 MG: 2 INJECTION INTRAMUSCULAR; INTRAVENOUS at 09:24

## 2024-06-24 RX ADMIN — BUDESONIDE 0.5 MG: 0.5 INHALANT ORAL at 09:42

## 2024-06-24 RX ADMIN — PANTOPRAZOLE SODIUM 40 MG: 40 TABLET, DELAYED RELEASE ORAL at 06:04

## 2024-06-24 RX ADMIN — POLYETHYLENE GLYCOL 3350 17 G: 17 POWDER, FOR SOLUTION ORAL at 08:13

## 2024-06-24 RX ADMIN — APIXABAN 2.5 MG: 2.5 TABLET, FILM COATED ORAL at 08:07

## 2024-06-24 RX ADMIN — DILTIAZEM HYDROCHLORIDE 120 MG: 120 CAPSULE, EXTENDED RELEASE ORAL at 08:07

## 2024-06-24 NOTE — DISCHARGE SUMMARY
"    Patient Name: Vonnie Coppola  : 1935  MRN: [id number]    Date of Admission: 2024  Date of Discharge:  2024  Primary Care Physician: Christopher Leyva DO      Chief Complaint:   Abdominal Pain      Discharge Diagnoses     Active Hospital Problems    Diagnosis  POA    Obesity (BMI 35.0-39.9 without comorbidity) [E66.9]  Yes    History of pulmonary embolism [Z86.711]  Yes    Thrombocytopenia [D69.6]  Yes    Atrial fibrillation [I48.91]  Yes    History of CVA (cerebrovascular accident) [Z86.73]  Not Applicable    Anemia [D64.9]  Yes    HTN (hypertension) [I10]  Yes      Resolved Hospital Problems    Diagnosis Date Resolved POA    **Fecal impaction [K56.41] 2024 Yes        Admitting HPI     \"Ms. Copploa is an 88-year-old female with history of DVT, PE, anemia, hypertension, CVA, A-fib thrombocytopenia, obesity who presents to the emergency room with abdominal pain and constipation.  Patient states she has had constipation for the past 3 to 4 days, she determinable movement today and was unsuccessful, but developed some severe abdominal pain radiating from her rectum into her abdomen.  She also complains of some distended abdomen.  She has some nausea but denies any vomiting.  She states she has had constipation in the past and has been taking stool softeners.  She is ordered to start and forgetful, but is able to answer questions appropriately.  She was recently hospitalized for any further extremity hematoma that was evacuated.  After that she was started back on her Eliquis due to her history of DVT and PE.  Patient has been followed by wound care.  Her legs are currently wrapped, she has good cap refill and extremities are warm to touch.  In the emergency room CT scan her abdomen shows large volume of stool within the rectal vault suggesting fecal impaction, there is also some rectal wall thickening as well as perirectal stranding and presacral edema suggesting proctitis.  Patient " "was given an enema in the emergency room and with rectal exam some formed stool was removed digitally by ER provider.  Patient states she has had relief of her rectal pain at this time.  Symptoms 142, potassium 3.4, creatinine 0.95, BUN 12, INR 1.1, white blood cell count 8.8, hemoglobin 9.0 which is up from his baseline which is around 8, hematocrit 30.1, platelets 321.  Urinalysis is unremarkable.  Stool was negative for occult blood.\"    Hospital Course     Pt admitted for abdominal pain, noted to have significant constipation on admission.  She received enema and digital disimpaction with large BM.  Abdominal pain quickly resolved.  Will plan to continue on aggressive bowel regimen postdischarge with as needed suppositories, which should be used if patient does not have a bowel movement for 1 to 2 days.  Stable for return to facility.    Discharge Plan     Fecal impaction  -improved after fleet enema  -bowel regimen including MiraLAX and Colace scheduled; as needed suppository     A-fib/hypertension/history of DVT  -Continue Eliquis 2.5 mg       Day of Discharge     Physical Exam:  Temp:  [97.2 °F (36.2 °C)-97.5 °F (36.4 °C)] 97.2 °F (36.2 °C)  Heart Rate:  [72-88] 88  Resp:  [16-18] 18  BP: (106-131)/(67-83) 113/78  Body mass index is 38.48 kg/m².  Physical Exam  Constitutional:       General: She is not in acute distress.  Pulmonary:      Effort: Pulmonary effort is normal. No respiratory distress.      Breath sounds: No stridor.   Skin:     Coloration: Skin is not jaundiced.      Findings: No bruising.   Neurological:      General: No focal deficit present.      Mental Status: She is alert.   Psychiatric:         Mood and Affect: Mood normal.         Behavior: Behavior normal.   Consultants     Consult Orders (all) (From admission, onward)       Start     Ordered    06/22/24 0830  Inpatient Case Management  Consult  Once        Provider:  (Not yet assigned)    06/22/24 0830    06/22/24 0301  " [facility] (on-call MD unless specified) Details  Once        Specialty:  Hospitalist  Provider:  (Not yet assigned)    06/22/24 0300                  Procedures     * Surgery not found *      Imaging Results (All)       Procedure Component Value Units Date/Time    CT Abdomen Pelvis Without Contrast [[id number]] Collected: 06/22/24 0244     Updated: 06/22/24 0255    Narrative:      CT OF THE ABDOMEN PELVIS WITHOUT CONTRAST     HISTORY: Lower abdominal pain     COMPARISON: May 31, 2024     TECHNIQUE: Axial CT imaging was obtained through the abdomen and pelvis.  No IV contrast was administered.     FINDINGS:  Images through the lung bases demonstrate some chronic scarring.  Noncalcified nodules within the right middle lobe appears stable when  compared to January 16, 2024 the larger measures 1.1 cm, and the smaller  measures 5 mm. Consider CT follow-up in January 2025. No suspicious  hepatic lesions are seen. Gallbladder is absent. There is a small hiatal  hernia. The duodenum and the adrenal glands, and pancreas are all  normal. Low-attenuation lesion within the spleen may represent a cyst.  There are also granulomata noted within the spleen. There are multiple  parapelvic cysts noted on the right. No hydronephrosis is seen on either  side. There are nonobstructing stones noted within the left kidney, with  the larger measuring up to 3 mm. There is a simple appearing right renal  cortical cyst. No additional follow-up is necessary. There are  aortoiliac calcifications. The patient's rectal vault is distended with  stool. It is concerning for fecal impaction. There is rectal wall  thickening, as well as perirectal stranding and presacral edema,  concerning for proctitis. No pneumatosis or free air is seen. There is  colonic diverticulosis. There is no small bowel obstruction. Stool  burden throughout the colon overall is increased. There is no evidence  of appendicitis. Uterus is absent. There is some body wall edema.  No  acute osseous abnormalities are seen. There are small bilateral  fat-containing inguinal hernias.       Impression:         1. Large volume of stool within the rectal vault, suggesting fecal  impaction. There is also some rectal wall thickening, as well as  perirectal stranding and presacral edema, suggesting proctitis.     Radiation dose reduction techniques were utilized, including automated  exposure control and exposure modulation based on body size.        This report was finalized on 6/22/2024 2:52 AM by Dr. Robyn Wood M.D on Workstation: [id number]             Results for orders placed during the hospital encounter of 05/31/24    Duplex Venous Lower Extremity - Left CAR    Interpretation Summary    Normal left lower extremity venous duplex scan.    Results for orders placed during the hospital encounter of 01/16/24    Adult Transthoracic Echo Complete W/ Cont if Necessary Per Protocol    Interpretation Summary    Left ventricular systolic function is hyperdynamic (EF > 70%). Calculated left ventricular EF = 78.7% Left ventricular ejection fraction appears to be 61 - 65%.    Left ventricular diastolic function was normal.    The right ventricular cavity is borderline dilated.    The right atrial cavity is borderline dilated.    Estimated right ventricular systolic pressure from tricuspid regurgitation is markedly elevated (>55 mmHg).    Pertinent Labs     Results from last 7 days   Lab Units 06/24/24  0431 06/23/24  0449 06/22/24  0814 06/21/24  2354   WBC 10*3/mm3 7.09 7.04 8.70 8.87   HEMOGLOBIN g/dL 8.1* 8.7* 8.1* 9.0*   PLATELETS 10*3/mm3 277 297 259 321     Results from last 7 days   Lab Units 06/24/24  0431 06/23/24  0449 06/22/24  0814 06/21/24  2354   SODIUM mmol/L 140 142 140 142   POTASSIUM mmol/L 3.9 4.2 4.0 3.4*   CHLORIDE mmol/L 109* 108* 108* 106   CO2 mmol/L 24.0 27.0 22.8 25.6   BUN mg/dL 13 13 13 12   CREATININE mg/dL 0.99 0.96 1.04* 0.95   GLUCOSE mg/dL 70 68 88 98   EGFR  "mL/min/1.73 55.6* 57.7* 52.5* 58.5*     Results from last 7 days   Lab Units 06/22/24  0814 06/21/24  2354   ALBUMIN g/dL 2.5* 3.2*   BILIRUBIN mg/dL 0.3 0.4   ALK PHOS U/L 57 68   AST (SGOT) U/L 18 15   ALT (SGPT) U/L 8 10     Results from last 7 days   Lab Units 06/24/24  0431 06/23/24  0449 06/22/24  0814 06/21/24  2354   CALCIUM mg/dL 8.2* 8.2* 7.9* 8.2*   ALBUMIN g/dL  --   --  2.5* 3.2*   MAGNESIUM mg/dL 2.5* 2.8*  --  2.2   PHOSPHORUS mg/dL 3.1 3.3  --   --      Results from last 7 days   Lab Units 06/21/24  2354   LIPASE U/L 18             Invalid input(s): \"LDLCALC\"          Test Results Pending at Discharge       Discharge Details        Discharge Medications        Continue These Medications        Instructions Start Date   acetaminophen 325 MG tablet  Commonly known as: TYLENOL   650 mg, Oral, Every 6 Hours PRN      albuterol 0.63 MG/3ML nebulizer solution  Commonly known as: ACCUNEB   0.63 mg, Nebulization, Every 6 Hours PRN      apixaban 2.5 MG tablet tablet  Commonly known as: ELIQUIS   2.5 mg, Oral, Every 12 Hours Scheduled      arformoterol 15 MCG/2ML nebulizer solution  Commonly known as: Brovana   15 mcg, Nebulization, 2 Times Daily - RT      benzonatate 100 MG capsule  Commonly known as: TESSALON   100 mg, Oral, 3 Times Daily PRN      budesonide 0.5 MG/2ML nebulizer solution  Commonly known as: PULMICORT   0.5 mg, Nebulization, 2 Times Daily      coenzyme Q10 50 MG capsule capsule   1 capsule, Oral, Daily      diazePAM 2 MG tablet  Commonly known as: VALIUM   2 mg, Oral, 2 Times Daily      dilTIAZem  MG 24 hr capsule  Commonly known as: CARDIZEM CD   120 mg, Oral, Daily      Dulcolax 10 MG suppository  Generic drug: bisacodyl   10 mg, Rectal, Daily PRN      glucosamine-chondroitin 500-400 MG capsule capsule   1 capsule, Oral, 3 Times Daily With Meals      Magnesium 500 MG tablet   500 mg, Oral, Daily      magnesium oxide 400 tablet tablet  Commonly known as: MAG-OX   500 mg, Oral, Daily    "   ondansetron ODT 4 MG disintegrating tablet  Commonly known as: ZOFRAN-ODT   4 mg, Translingual, Daily PRN      oxyCODONE 5 MG immediate release tablet  Commonly known as: ROXICODONE   5 mg, Oral, Every 8 Hours PRN      pantoprazole 40 MG EC tablet  Commonly known as: PROTONIX   40 mg, Oral, 2 Times Daily Before Meals      phenylephrine-mineral oil-petrolatum 0.25-14-74.9 % ointment hemorrhoidal ointment  Commonly known as: PREPARATION H   1 Application, Rectal, 2 Times Daily PRN      polyethylene glycol 17 g packet  Commonly known as: MIRALAX   17 g, Oral, Daily      sennosides-docusate 8.6-50 MG per tablet  Commonly known as: PERICOLACE   1 tablet, Oral, 2 Times Daily             Stop These Medications      doxycycline 100 MG capsule  Commonly known as: VIBRAMYCIN              Allergies   Allergen Reactions    Cortisone Hives    Penicillins Hives     Beta lactam allergy details  Antibiotic reaction: hives  Age at reaction: unknown  Dose to reaction time: unknown  Reason for antibiotic: unknown  Epinephrine required for reaction?: no  Tolerated antibiotics: unknown    Tolerated Zosyn    Iodinated Contrast Media Unknown - Low Severity     Patient  passed out, she has had studies with contact  recently        Discharge Disposition:  Skilled Nursing Facility (DC - External)      Discharge Diet:  Diet Order   Procedures    Diet: Regular/House; Fluid Consistency: Thin (IDDSI 0)       Discharge Activity:   Activity Instructions       Activity as Tolerated              CODE STATUS:    Code Status and Medical Interventions:   Ordered at: 06/22/24 0342     Code Status (Patient has no pulse and is not breathing):    CPR (Attempt to Resuscitate)     Medical Interventions (Patient has pulse or is breathing):    Full Support       Future Appointments   Date Time Provider Department Center   7/17/2024 11:15 AM [facility]   7/30/2024  8:00 AM LAB CHAIR 5 [facility] [place]  LAB [facility]   7/30/2024  8:20  AM Augustus Wilde MD [facility]      Contact information for follow-up providers       Christopher Leyva DO .    Specialty: Internal Medicine  Contact information:  [address]  [facility] [address] [phone]                       Contact information for after-discharge care       Destination       [facility] .    Service: Skilled Nursing  Contact information:  [address] [facility] [id number] [phone]                                   Time Spent on Discharge:  Greater than 30 minutes spent on discharge management including final examination, discussion of hospital stay and patient education, preparation of records, medication reconciliation, follow up planning      Pascual Reyna MD  [facility]  06/24/24  12:18 EDT

## 2024-06-24 NOTE — PROGRESS NOTES
Continued Stay Note  [facility]     Patient Name: Vonnie Coppola  MRN: [id number]  Today's Date: 6/24/2024    Admit Date: 6/21/2024    Plan: [facility] skilled rehab   Discharge Plan       Row Name 06/24/24 1217       Plan    Plan [facility] rehab    Plan Comments Per Ha, pre-cert approved. Informed MD                   Discharge Codes    No documentation.                 Expected Discharge Date and Time       Expected Discharge Date Expected Discharge Time    Jun 24, 2024               Marina Alexander RN

## 2024-06-24 NOTE — PLAN OF CARE
Goal Outcome Evaluation:  Plan of Care Reviewed With: patient           Outcome Evaluation: VSS, on falls with bed alarm, up with asst and walker, voiding freely and continues to have BMs this evening, no c/o pain or nausea, was upset due to family not visiting, dressing//wraps on legs CDI, tolerating diet, will continue to monitor.

## 2024-06-24 NOTE — CASE MANAGEMENT/SOCIAL WORK
Post-Acute Authorization Submission      Post Acute Pre-Cert Documentation  Request Submitted by Facility - Type:: Hospital  Post-Acute Authorization Type Submitted:: SNF  Date Post Acute Pre-Cert Inititated per Facility: 06/23/24  Date Post Acute Pre-Cert Completed: 06/24/24  Accepting Facility: [facility] Discharge Date Requested: 06/23/24  All Clinicals Submitted?: Yes  Had Accepting Facility at Time of Submission: Yes  Response Received from Insurance?: Approval  Response Communicated to:: , Accepting Facility Liaison, Accepting Facility Auth Department  Authorization Number:: [id number]  Post Acute Pre-Cert Initiated Comment: VALID TO ADMIT UPTO 11:59PM ON  6/28/24.              Ha Abdullahi, PCT

## 2024-06-24 NOTE — NURSING NOTE
"Wound/ostomy - consult received regarding a wound to RLE that was POA and was reason for hospital admit 5/31-6/12. Wound is a result of a hematoma requiring debridement of skin and subcutaneous tissue measuring 11 x 10 cm on 5/31 per Dr. Pedraza, wound care orders were per Dr. Pedraza with that admission and was being treated with daily dressing changes - pack wound with Kerlix fluff gauze moistened with normal saline, ABD pad over then wrap leg from toes to knee with 2 kerlix rolls and ace toes to knee with applying gentle compression.During that admission OT was seeing and applying lymphedema wrapping to LLE. Patient was d/c to SNF.     Patient saw Dr. Pedraza in the office for f/u on 6/19. Per office note he wanted to continue with the same daily dressing of continuing wet-to-dry dressing changes with Kerlix gauze moist with NS, cover with an ABD pad, wrap the leg loosely with Kerlix gauze up to just below the knee. Then wrap the leg with mild compression from the toes to just below the knee with an Ace bandage and to continue to provide ace wrap and kerlix to LLE for edema management. Plan was for patient to f/u with Dr. Pedraza in 4 weeks and may consider management for wound VAC placement at that time.     Patient in bed, legs wrapped about care home up each lower leg, legs are quite large in size with hx of lymphedema and BMI 38. Legs should each be wrapped daily with kerlix and multiple ace wraps (possibly 3 of the 4\" rolls) for adequate coverage of entire calf up to knee. Patient reports that leg dressings and wraps have been being changed daily at the [facility]. This will be continued while here.           "

## 2024-06-24 NOTE — THERAPY EVALUATION
Patient Name: Vonnie Coppola  : 1935    MRN: [id number]                              Today's Date: 2024       Admit Date: 2024    Visit Dx:     ICD-10-CM ICD-9-CM   1. Lower abdominal pain  R10.30 789.09   2. Fecal impaction  K56.41 560.32   3. Coagulopathy: Eliquis induced  D68.9 286.9   4. Chronic anemia  D64.9 285.9     Patient Active Problem List   Diagnosis    GI bleed    Anemia    HTN (hypertension)    History of breast cancer    Asthma    History of CVA (cerebrovascular accident)    Dehydration    Renal insufficiency    Pulmonary nodule    Adnexal cyst    Nausea    Atrial fibrillation    History of pulmonary embolism    Class 2 severe obesity with serious comorbidity in adult    Thrombocytopenia    Cellulitis of right leg    Acute cystitis    Leg hematoma, right, subsequent encounter    Obesity (BMI 35.0-39.9 without comorbidity)    Morbid (severe) obesity due to excess calories (*specify comorbidity)     Past Medical History:   Diagnosis Date    Arthritis     Asthma     Atrial fibrillation     per pt's daughter.States hx of a-fib in the past when stressed or tired.    Breast cancer     LEFT BREAST CA, LUMPECTOMY & RADS    History of cataract     Hx of radiation therapy     APPROXIMATELY 40 TREATMENTS FOR LEFT BREAST CA    Hypertension     Pneumonia     Stroke      Past Surgical History:   Procedure Laterality Date    APPENDECTOMY      BLADDER SURGERY      BREAST BIOPSY Left     MALIGNANT    BREAST LUMPECTOMY Left     MALIGNANT    CATARACT EXTRACTION      COLONOSCOPY N/A 2024    Procedure: COLONOSCOPY to cecum with cold snare polypectomies;  Surgeon: Harshad Gaston MD;  Location: [facility] ENDOSCOPY;  Service: Gastroenterology;  Laterality: N/A;  pre- gi bleed, anemia  post- diverticulosis, polyps    ENDOSCOPY N/A 2024    Procedure: ESOPHAGOGASTRODUODENOSCOPY with biospy;  Surgeon: Harshad Gaston MD;  Location: [facility] ENDOSCOPY;  Service:  Gastroenterology;  Laterality: N/A;  pre- gi bleed, anemia  post- erosive gastirits    GALLBLADDER SURGERY      HERNIA REPAIR      HYSTERECTOMY      INCISION AND DRAINAGE LEG Right 5/31/2024    Procedure: RIGHT LOWER EXTREMITY WOUND EXPLORATION, DEBRIDEMENT AND CONTROL OF BLEEDING;  Surgeon: Gerald Pedraza MD;  Location: [facility] OR;  Service: General;  Laterality: Right;    LASIK      LYMPHADENECTOMY      OOPHORECTOMY        General Information       Row Name 06/24/24 1559          OT Time and Intention    Document Type evaluation  -RB     Mode of Treatment individual therapy;occupational therapy  -RB       Row Name 06/24/24 5216          General Information    Patient Profile Reviewed yes  -RB     Prior Level of Function --  the pt was admitted to a SNF and she reports staff were assisting her with ADL's and mobility  -RB     Existing Precautions/Restrictions fall  -RB     Barriers to Rehab none identified  -RB       Row Name 06/24/24 1227          Living Environment    People in Home facility resident;other (see comments)  was living alone prior to her last hospital admission  -RB       Row Name 06/24/24 8260          Cognition    Orientation Status (Cognition) oriented x 3  -RB       Row Name 06/24/24 8906          Safety Issues, Functional Mobility    Safety Issues Affecting Function (Mobility) safety precautions follow-through/compliance;sequencing abilities;safety precaution awareness  -RB     Impairments Affecting Function (Mobility) endurance/activity tolerance;strength;range of motion (ROM);balance;pain  -RB               User Key  (r) = Recorded By, (t) = Taken By, (c) = Cosigned By      Initials Name Provider Type    RB Jessica Maciel OT Occupational Therapist                     Mobility/ADL's       Row Name 06/24/24 1600          Bed Mobility    Bed Mobility rolling right;rolling left  -RB     Rolling Left [place] (Bed Mobility) minimum assist (75% patient effort)  -RB     Rolling Right  [place] (Bed Mobility) minimum assist (75% patient effort)  -RB     Assistive Device (Bed Mobility) bed rails  -RB     Comment, (Bed Mobility) minimally able to participate  -RB       [facility] Name 06/24/24 1600          Transfers    Comment, (Transfers) adamantly declined all transfers or standing  -RB       [facility] Name 06/24/24 1600          Sit-Stand Transfer    Sit-Stand [place] (Transfers) unable to assess;not tested  -RB       Row Name 06/24/24 1600          Activities of Daily Living    BADL Assessment/Intervention lower body dressing;grooming  -RB       Row Name 06/24/24 1600          Lower Body Dressing Assessment/Training    [place] Level (Lower Body Dressing) lower body dressing skills;dependent (less than 25% patient effort)  -RB       [facility] Name 06/24/24 1600          Grooming Assessment/Training    Comment, (Grooming) the pt was able to complete facial hygiene and eat her meal today after set-up  -RB               User Key  (r) = Recorded By, (t) = Taken By, (c) = Cosigned By      Initials Name Provider Type    RB Jessica Maciel OT Occupational Therapist                   Obj/Interventions       Row Name 06/24/24 1601          Sensory Assessment (Somatosensory)    Sensory Assessment (Somatosensory) sensation intact  -RB       Row Name 06/24/24 1601          Vision Assessment/Intervention    Visual Impairment/Limitations WFL  -[facility] Name 06/24/24 1601          Range of Motion Comprehensive    General Range of Motion bilateral upper extremity ROM WFL  -[facility] Name 06/24/24 1601          Strength Comprehensive (MMT)    General Manual Muscle Testing (MMT) Assessment lower extremity strength deficits identified  -[facility] Name 06/24/24 1601          Motor Skills    Motor Skills functional endurance  -RB     Functional Endurance poor - completing most tasks from bed level only  -[facility] Name 06/24/24 1601          Balance    Comment, Balance The pt declined any testing of  balance - unable to sit up fully or stand  -RB               User Key  (r) = Recorded By, (t) = Taken By, (c) = Cosigned By      Initials Name Provider Type    Jessica Radford OT Occupational Therapist                   Goals/Plan       Row Name 06/24/24 1603          Bed Mobility Goal 1 (OT)    Activity/Assistive Device (Bed Mobility Goal 1, OT) bed mobility activities, all  -RB     [place] Level/Cues Needed (Bed Mobility Goal 1, OT) standby assist  -RB     Time Frame (Bed Mobility Goal 1, OT) short term goal (STG);2 weeks  -RB     Progress/Outcomes (Bed Mobility Goal 1, OT) new goal  -RB       Row Name 06/24/24 1603          Transfer Goal 1 (OT)    Activity/Assistive Device (Transfer Goal 1, OT) transfers, all  -RB     [place] Level/Cues Needed (Transfer Goal 1, OT) standby assist  -RB     Time Frame (Transfer Goal 1, OT) short term goal (STG);2 weeks  -RB     Progress/Outcome (Transfer Goal 1, OT) new goal  -RB       Row Name 06/24/24 1603          Bathing Goal 1 (OT)    Activity/Device (Bathing Goal 1, OT) bathing skills, all  -RB     [place] Level/Cues Needed (Bathing Goal 1, OT) standby assist  -RB     Time Frame (Bathing Goal 1, OT) short term goal (STG);2 weeks  -RB     Progress/Outcomes (Bathing Goal 1, OT) new goal  -RB       Row Name 06/24/24 1603          Dressing Goal 1 (OT)    Activity/Device (Dressing Goal 1, OT) dressing skills, all  -RB     [place]/Cues Needed (Dressing Goal 1, OT) standby assist  -RB     Time Frame (Dressing Goal 1, OT) short term goal (STG);2 weeks  -RB     Progress/Outcome (Dressing Goal 1, OT) new goal  -RB       Row Name 06/24/24 1603          Toileting Goal 1 (OT)    Activity/Device (Toileting Goal 1, OT) toileting skills, all  -RB     [place] Level/Cues Needed (Toileting Goal 1, OT) standby assist  -RB     Time Frame (Toileting Goal 1, OT) short term goal (STG);2 weeks  -RB     Progress/Outcome (Toileting Goal 1, OT) new goal  -RB       Row Name  06/24/24 1603          Grooming Goal 1 (OT)    Activity/Device (Grooming Goal 1, OT) grooming skills, all  -RB     [place] (Grooming Goal 1, OT) standby assist  -RB     Time Frame (Grooming Goal 1, OT) short term goal (STG);2 weeks  -RB     Progress/Outcome (Grooming Goal 1, OT) new goal  -RB       Row Name 06/24/24 1603          Therapy Assessment/Plan (OT)    Planned Therapy Interventions (OT) transfer/mobility retraining;strengthening exercise;ROM/therapeutic exercise;activity tolerance training;adaptive equipment training;BADL retraining;functional balance retraining;occupation/activity based interventions;patient/caregiver education/training  -RB               User Key  (r) = Recorded By, (t) = Taken By, (c) = Cosigned By      Initials Name Provider Type    Jessica Radford OT Occupational Therapist                   Clinical Impression       Row Name 06/24/24 1603          Pain Assessment    Pre/Posttreatment Pain Comment she complained of abdominal discomfort and nausea  -RB       Row Name 06/24/24 1603          Plan of Care Review    Plan of Care Reviewed With patient  -RB     Progress no change  -RB       Row Name 06/24/24 1603          Therapy Assessment/Plan (OT)    Criteria for Skilled Therapeutic Interventions Met (OT) yes  -RB     Therapy Frequency (OT) 3 times/wk  -RB       Row Name 06/24/24 1603          Therapy Plan Review/Discharge Plan (OT)    Anticipated Discharge Disposition (OT) skilled nursing facility  -RB       Row Name 06/24/24 1603          Positioning and Restraints    Pre-Treatment Position in bed  -RB     Post Treatment Position bed  -RB     In Bed notified nsg;supine;fowlers;call light within reach;encouraged to call for assist;exit alarm on  -RB               User Key  (r) = Recorded By, (t) = Taken By, (c) = Cosigned By      Initials Name Provider Type    Jessica Radford OT Occupational Therapist                   Outcome Measures       Row Name 06/24/24 1606           How much help from another is currently needed...    Putting on and taking off regular lower body clothing? 1  -RB     Bathing (including washing, rinsing, and drying) 2  -RB     Toileting (which includes using toilet bed pan or urinal) 1  -RB     Putting on and taking off regular upper body clothing 2  -RB     Taking care of personal grooming (such as brushing teeth) 3  -RB     Eating meals 3  -RB     AM-PAC 6 Clicks Score (OT) 12  -RB       Row Name 06/24/24 0807          How much help from another person do you currently need...    Turning from your back to your side while in flat bed without using bedrails? 3  -MA     Moving from lying on back to sitting on the side of a flat bed without bedrails? 3  -MA     Moving to and from a bed to a chair (including a wheelchair)? 3  -MA     Standing up from a chair using your arms (e.g., wheelchair, bedside chair)? 3  -MA     Climbing 3-5 steps with a railing? 2  -MA     To walk in hospital room? 3  -MA     AM-PAC 6 Clicks Score (PT) 17  -MA     Highest Level of Mobility Goal 5 --> Static standing  -MA       Row Name 06/24/24 1605          Modified [place] Scale    Modified [place] Scale 4 - Moderately severe disability.  Unable to walk without assistance, and unable to attend to own bodily needs without assistance.  -RB       Row Name 06/24/24 1605          Functional Assessment    Outcome Measure Options AM-PAC 6 Clicks Daily Activity (OT);Modified [place]  -RB               User Key  (r) = Recorded By, (t) = Taken By, (c) = Cosigned By      Initials Name Provider Type    Inna Bush RN Registered Nurse    Jessica Radford OT Occupational Therapist                    Occupational Therapy Education       Title: PT OT SLP Therapies (In Progress)       Topic: Occupational Therapy (Not Started)       Point: ADL training (Not Started)       Description:   Instruct learner(s) on proper safety adaptation and remediation techniques during self care or transfers.    Instruct in proper use of assistive devices.                  Learner Progress:  Not documented in this visit.              Point: Home exercise program (Not Started)       Description:   Instruct learner(s) on appropriate technique for monitoring, assisting and/or progressing therapeutic exercises/activities.                  Learner Progress:  Not documented in this visit.              Point: Precautions (Not Started)       Description:   Instruct learner(s) on prescribed precautions during self-care and functional transfers.                  Learner Progress:  Not documented in this visit.              Point: Body mechanics (Not Started)       Description:   Instruct learner(s) on proper positioning and spine alignment during self-care, functional mobility activities and/or exercises.                  Learner Progress:  Not documented in this visit.                                  OT Recommendation and Plan  Planned Therapy Interventions (OT): transfer/mobility retraining, strengthening exercise, ROM/therapeutic exercise, activity tolerance training, adaptive equipment training, BADL retraining, functional balance retraining, occupation/activity based interventions, patient/caregiver education/training  Therapy Frequency (OT): 3 times/wk  Plan of Care Review  Plan of Care Reviewed With: patient  Progress: no change     Time Calculation:   Evaluation Complexity (OT)  Review Occupational Profile/Medical/Therapy History Complexity: brief/low complexity  Assessment, Occupational Performance/Identification of Deficit Complexity: 3-5 performance deficits  Clinical Decision Making Complexity (OT): detailed assessment/moderate complexity  Overall Complexity of Evaluation (OT): low complexity     Time Calculation- OT       Row Name 06/24/24 1556             Time Calculation- OT    OT Start Time 1145  -RB      OT Stop Time 1209  -RB      OT Time Calculation (min) 24 min  -RB      Total Timed Code Minutes- OT 8 minute(s)   -RB         Timed Charges    88942 - OT Self Care/Mgmt Minutes 8  -RB         Untimed Charges    OT Eval/Re-eval Minutes 16  -RB         Total Minutes    Timed Charges Total Minutes 8  -RB      Untimed Charges Total Minutes 16  -RB       Total Minutes 24  -RB                User Key  (r) = Recorded By, (t) = Taken By, (c) = Cosigned By      Initials Name Provider Type    RB Jesisca Maciel OT Occupational Therapist                  Therapy Charges for Today       Code Description Service Date Service Provider Modifiers Qty    75895038287 HC OT SELF CARE/MGMT/TRAIN EA 15 MIN 6/24/2024 Jessica Maciel OT GO 1    19223385809 HC OT EVAL LOW COMPLEXITY 3 6/24/2024 Jessica Maciel OT GO 1                 Jessica Maciel OT  6/24/2024

## 2024-06-24 NOTE — PLAN OF CARE
The pt was admitted to [facility] 2/2 to abdominal pain and constipation. Dx includes a fecal impaction. Pmhx significant for a LE hematoma s/p evacuation. The pt was d/c to a SNF following her last hospitalization. Today, the pt was only able to roll L<>R minimally. She reports anxiety today and not doing well due to her nausea. OT provided therapeutic listening and she was able to complete grooming with S/S. The pt adamantly refused all further transfers and ADL's. SNF planned.

## 2024-06-24 NOTE — PROGRESS NOTES
Name: Vonnie Coppola ADMIT: 2024   : 1935  PCP: Christopher Leyva DO    MRN: [id number] LOS: 1 days   AGE/SEX: 88 y.o. female  ROOM: [facility]     Subjective   Subjective   No events overnight, no complaints this morning.    Objective   Objective   Vital Signs  Temp:  [97.2 °F (36.2 °C)-97.5 °F (36.4 °C)] 97.2 °F (36.2 °C)  Heart Rate:  [72-88] 88  Resp:  [16-18] 18  BP: (106-131)/(67-83) 113/78  SpO2:  [93 %-97 %] 95 %  on  Flow (L/min):  [1-2] 1;   Device (Oxygen Therapy): nasal cannula  Body mass index is 38.48 kg/m².  Physical Exam  Constitutional:       General: She is not in acute distress.  Pulmonary:      Effort: Pulmonary effort is normal. No respiratory distress.      Breath sounds: No stridor.   Skin:     Coloration: Skin is not jaundiced.      Findings: No bruising.   Neurological:      General: No focal deficit present.      Mental Status: She is alert.   Psychiatric:         Mood and Affect: Mood normal.         Behavior: Behavior normal.         Results Review     I reviewed the patient's new clinical results.  Results from last 7 days   Lab Units 24  04324  0814 24  2354   WBC 10*3/mm3 7.09 7.04 8.70 8.87   HEMOGLOBIN g/dL 8.1* 8.7* 8.1* 9.0*   PLATELETS 10*3/mm3 277 297 259 321     Results from last 7 days   Lab Units 24  0431 24  0449 24  0814 24  2354   SODIUM mmol/L 140 142 140 142   POTASSIUM mmol/L 3.9 4.2 4.0 3.4*   CHLORIDE mmol/L 109* 108* 108* 106   CO2 mmol/L 24.0 27.0 22.8 25.6   BUN mg/dL 13 13 13 12   CREATININE mg/dL 0.99 0.96 1.04* 0.95   GLUCOSE mg/dL 70 68 88 98   EGFR mL/min/1.73 55.6* 57.7* 52.5* 58.5*     Results from last 7 days   Lab Units 24  0814 24  2354   ALBUMIN g/dL 2.5* 3.2*   BILIRUBIN mg/dL 0.3 0.4   ALK PHOS U/L 57 68   AST (SGOT) U/L 18 15   ALT (SGPT) U/L 8 10     Results from last 7 days   Lab Units 24  0431 24  0449 24  0814 24  2354   CALCIUM mg/dL  "8.2* 8.2* 7.9* 8.2*   ALBUMIN g/dL  --   --  2.5* 3.2*   MAGNESIUM mg/dL 2.5* 2.8*  --  2.2   PHOSPHORUS mg/dL 3.1 3.3  --   --      Results from last 7 days   Lab Units 06/21/24  2354   LACTATE mmol/L 1.5     No results found for: \"HGBA1C\", \"POCGLU\"    No radiology results for the last day  Scheduled Medications  apixaban, 2.5 mg, Oral, Q12H  arformoterol, 15 mcg, Nebulization, BID - RT  budesonide, 0.5 mg, Nebulization, BID  dilTIAZem CD, 120 mg, Oral, Daily  pantoprazole, 40 mg, Oral, BID AC  senna-docusate sodium, 2 tablet, Oral, BID   And  polyethylene glycol, 17 g, Oral, BID  sodium chloride, 10 mL, Intravenous, Q12H    Infusions   Diet  Diet: Regular/House; Fluid Consistency: Thin (IDDSI 0)       Assessment/Plan     Active Hospital Problems    Diagnosis  POA    Obesity (BMI 35.0-39.9 without comorbidity) [E66.9]  Yes    History of pulmonary embolism [Z86.711]  Yes    Thrombocytopenia [D69.6]  Yes    Atrial fibrillation [I48.91]  Yes    History of CVA (cerebrovascular accident) [Z86.73]  Not Applicable    Anemia [D64.9]  Yes    HTN (hypertension) [I10]  Yes      Resolved Hospital Problems    Diagnosis Date Resolved POA    **Fecal impaction [K56.41] 06/23/2024 Yes       88 y.o. female admitted with Fecal impaction.      06/24/24  Will continue current bowel regimen upon discharge.  Pending pre-CERT back to facility.    Fecal impaction  -improved after fleet enema  -bowel regimen including MiraLAX and Colace scheduled     A-fib/hypertension/history of DVT  -Continue Eliquis 2.5 mg     Recent hematoma evacuation  -Consult wound care for right lower extremity wound and bilateral lower extremity edema    Flow (L/min):  [1-2] 1    DVT prophylaxis: Eliquis (home med)  Discussed with patient.  Anticipated discharge SNF, once precert obtained            Pascual Reyna MD  [facility]  06/24/24  11:02 EDT      "

## 2024-06-24 NOTE — PROGRESS NOTES
Continued Stay Note  [facility]     Patient Name: Vonnie Coppola  MRN: [id number]  Today's Date: 6/24/2024    Admit Date: 6/21/2024    Plan: [facility] PENDING Pre-cert   Discharge Plan       Row Name 06/24/24 0914       Plan    Plan [facility] PENDING Pre-cert    Plan Comments Msg sent to [facility] regarding Pre-cert.                   Discharge Codes    No documentation.                 Expected Discharge Date and Time       Expected Discharge Date Expected Discharge Time    Jun 24, 2024               Marina Alexander RN

## 2024-06-24 NOTE — PROGRESS NOTES
Continued Stay Note  [facility]     Patient Name: Vonnie Coppola  MRN: [id number]  Today's Date: 6/24/2024    Admit Date: 6/21/2024    Plan: [facility]   Discharge Plan       Row Name 06/24/24 1339       Plan    Plan [facility]    Plan Comments Recieved call from [facility] changing transportation time to 2pm. Updated RN and Mark with [facility].      Row Name 06/24/24 1217       Plan    Plan [facility] rehab    Plan Comments Per [facility] pre-cert approved. Informed MD                   Discharge Codes    No documentation.                 Expected Discharge Date and Time       Expected Discharge Date Expected Discharge Time    Jun 24, 2024               Marina Alexander RN

## 2024-06-24 NOTE — PROGRESS NOTES
Case Management Discharge Note      Final Note: Discharged to [facility]. Marina Alexander RN         Selected Continued Care - Discharged on 6/24/2024 Admission date: 6/21/2024 - Discharge disposition: Skilled Nursing Facility (DC - External)      Destination Coordination complete.      Service Provider Selected Services Address Phone Fax Patient Preferred    [facility] [address] [address] [phone] --                  Discharged on 6/12/2024 Admission date: 5/31/2024 - Discharge disposition: Skilled Nursing Facility (DC - External)      Destination       Service Provider Selected Services Address Phone Fax Patient Preferred    [facility] [address] [id number] [phone] --                      Discharged on 3/24/2024 Admission date: 3/21/2024 - Discharge disposition: Home-Health Care Svc      Home Medical Care       Service Provider Selected Services Address Phone Fax Patient Preferred     [facility] [address] [address] [phone] --                          Transportation Services  W/C Van[facility]    Final Discharge Disposition Code: 03 - skilled nursing facility (SNF)

## 2024-07-17 ENCOUNTER — OFFICE VISIT (OUTPATIENT)
Dept: SURGERY | Facility: CLINIC | Age: 89
End: 2024-07-17
Payer: MEDICARE

## 2024-07-17 VITALS
WEIGHT: 234 LBS | HEIGHT: 66 IN | BODY MASS INDEX: 37.61 KG/M2 | SYSTOLIC BLOOD PRESSURE: 130 MMHG | DIASTOLIC BLOOD PRESSURE: 60 MMHG

## 2024-07-17 DIAGNOSIS — S80.11XD LEG HEMATOMA, RIGHT, SUBSEQUENT ENCOUNTER: Primary | ICD-10-CM

## 2024-07-17 PROCEDURE — 99213 OFFICE O/P EST LOW 20 MIN: CPT | Performed by: SURGERY

## 2024-07-17 PROCEDURE — 1160F RVW MEDS BY RX/DR IN RCRD: CPT | Performed by: SURGERY

## 2024-07-17 PROCEDURE — 1159F MED LIST DOCD IN RCRD: CPT | Performed by: SURGERY

## 2024-07-23 NOTE — PROGRESS NOTES
ASSESSMENT/PLAN:    88-year-old lady presenting in follow-up after undergoing right lower extremity wound exploration, debridement, control of hemorrhage on 5/31/2024.  Wound is much improved compared to last evaluation.  Total size has reduced by half of what it is before and there is minimal depth to the wound.    Recommend continuing wet-to-dry dressing changes with Kerlix gauze send with saline.  Place gauze over the wound and then cover with an ABD pad.  Wrap the leg loosely with Kerlix gauze up to just below the knee.  Then wrap the leg with mild compression from the toes to just below the knee with an Ace bandage.  She also has left lower extremity edema and when she was in the hospital it was recommended that the left leg be wrapped as well.  Left leg should be wrapped with mild compression and these wrappings can remain in place for 2 to 3 days.  Wrapping of the left lower extremity should spanned from the toes to just below the knee.     I do not think she would have benefit from wound VAC placement since there is minimal depth to the wound.  I will see her back in 4 weeks.         CC:     Chief Complaint   Patient presents with    Follow-up     Wound check        HPI:    Dawood doing the wound care diligently at her rehab facility.  The wound has greatly decreased in size down the swelling in her feet and legs bilaterally has decreased.    SOCIAL HISTORY:   Social Determinants of Health     Tobacco Use: Medium Risk (7/23/2024)    Patient History     Smoking Tobacco Use: Former     Smokeless Tobacco Use: Never     Passive Exposure: Not on file   Alcohol Use: Not At Risk (6/22/2024)    AUDIT-C     Frequency of Alcohol Consumption: Never     Average Number of Drinks: Patient does not drink     Frequency of Binge Drinking: Never   Financial Resource Strain: Not on file   Food Insecurity: Not on file   Transportation Needs: No Transportation Needs (5/24/2024)    OASIS : Transportation     Lack of  Transportation (Medical): No     Lack of Transportation (Non-Medical): No     Patient Unable or Declines to Respond: No   Physical Activity: Not on file   Stress: Not on file   Social Connections: Feeling Socially Integrated (5/24/2024)    OASIS : Social Isolation     Frequency of experiencing loneliness or isolation: Rarely   Recent Concern: Social Connections - Feeling Somewhat Isolated (3/25/2024)    OASIS : Social Isolation     Frequency of experiencing loneliness or isolation: Sometimes   Interpersonal Safety: Not At Risk (6/21/2024)    Abuse Screen     Unsafe at Home or Work/School: no     Feels Threatened by Someone?: no     Does Anyone Keep You from Contacting Others or Doint Things Outside the Home?: no     Physical Sign of Abuse Present: no   Depression: Not on file   Housing Stability: Not At Risk (6/22/2024)    Housing Stability     Current Living Arrangements: residential facility     Potentially Unsafe Housing Conditions: none   Utilities: Not on file   Health Literacy: Unknown (6/3/2024)    Education     Help with school or training?: Not on file     Preferred Language: English   Recent Concern: Health Literacy - Inadequate Health Literacy (5/24/2024)    OASIS : Health Literacy     Frequency of needing help to read materials from doctor or pharmacy: Sometimes   Employment: Unknown (10/9/2023)    Employment     Do you want help finding or keeping work or a job?: Not on file   Disabilities: At Risk (6/22/2024)    Disabilities     Concentrating, Remembering, or Making Decisions Difficulty: no     Doing Errands Independently Difficulty: yes        FAMILY HISTORY:    Family History   Problem Relation Age of Onset    Other Mother         Cardiac disorder    Osteoarthritis Mother     Cataracts Mother     Macular degeneration Mother     Hypertension Father     Other Father         Cardiac disorder    Ovarian cancer Sister         70'S    Cancer Sister     Diabetes Sister     Hypertension Sister      Osteoarthritis Sister     Cancer Brother     Hypertension Brother     Osteoarthritis Brother     Colon cancer Maternal Grandmother     Cancer Maternal Grandmother     Ovarian cancer Paternal Grandmother         unknown    Cancer Other     Stroke Other     Breast cancer Neg Hx         OTHER SURGERY:  Past Surgical History:   Procedure Laterality Date    APPENDECTOMY      BLADDER SURGERY      BREAST BIOPSY Left 1999    MALIGNANT    BREAST LUMPECTOMY Left 1999    MALIGNANT    CATARACT EXTRACTION      COLONOSCOPY N/A 01/19/2024    Procedure: COLONOSCOPY to cecum with cold snare polypectomies;  Surgeon: Harshad Gaston MD;  Location: [facility] ENDOSCOPY;  Service: Gastroenterology;  Laterality: N/A;  pre- gi bleed, anemia  post- diverticulosis, polyps    ENDOSCOPY N/A 01/19/2024    Procedure: ESOPHAGOGASTRODUODENOSCOPY with biospy;  Surgeon: Harshad Gaston MD;  Location: [facility] ENDOSCOPY;  Service: Gastroenterology;  Laterality: N/A;  pre- gi bleed, anemia  post- erosive gastirits    GALLBLADDER SURGERY      HERNIA REPAIR      HYSTERECTOMY      INCISION AND DRAINAGE LEG Right 5/31/2024    Procedure: RIGHT LOWER EXTREMITY WOUND EXPLORATION, DEBRIDEMENT AND CONTROL OF BLEEDING;  Surgeon: Gerald Pedraza MD;  Location: [facility] MAIN OR;  Service: General;  Laterality: Right;    LASIK      LYMPHADENECTOMY      OOPHORECTOMY          PAST MEDICAL HISTORY:    Past Medical History:   Diagnosis Date    Arthritis     Asthma     Atrial fibrillation     per pt's daughter.States hx of a-fib in the past when stressed or tired.    Blind left eye     Breast cancer 1999    LEFT BREAST CA, LUMPECTOMY & RADS    Deep vein thrombosis     History of cataract     Hx of radiation therapy 1999    APPROXIMATELY 40 TREATMENTS FOR LEFT BREAST CA    Hypertension     Pneumonia     PONV (postoperative nausea and vomiting)     Stroke         MEDICATIONS:   Current Outpatient Medications on File Prior to Visit   Medication Sig Dispense  Refill    acetaminophen (TYLENOL) 325 MG tablet Take 2 tablets by mouth Every 6 (Six) Hours As Needed for Mild Pain.      albuterol (ACCUNEB) 0.63 MG/3ML nebulizer solution Take 3 mL by nebulization Every 6 (Six) Hours As Needed for Shortness of Air.      apixaban (ELIQUIS) 2.5 MG tablet tablet Take 1 tablet by mouth Every 12 (Twelve) Hours. Indications: Atrial Fibrillation      arformoterol (BROVANA) 15 MCG/2ML nebulizer solution Take 2 mL by nebulization 2 (Two) Times a Day. 120 mL 11    benzonatate (TESSALON) 100 MG capsule Take 1 capsule by mouth 3 (Three) Times a Day As Needed for Cough. 20 capsule 0    bisacodyl (Dulcolax) 10 MG suppository Insert 1 suppository into the rectum Daily As Needed for Constipation.      budesonide (PULMICORT) 0.5 MG/2ML nebulizer solution Take 2 mL by nebulization 2 (Two) Times a Day. 360 mL 2    coenzyme Q10 50 MG capsule capsule Take 1 capsule by mouth Daily. Indications: nutritional support      diazePAM (VALIUM) 2 MG tablet Take 1 tablet by mouth 2 (Two) Times a Day.      dilTIAZem CD (CARDIZEM CD) 120 MG 24 hr capsule Take 1 capsule by mouth Daily.      glucosamine-chondroitin 500-400 MG capsule capsule Take 1 capsule by mouth 3 (Three) Times a Day With Meals. Indications: Joint Damage causing Pain and Loss of Function      Magnesium 500 MG tablet Take 1 tablet by mouth Daily.      ondansetron ODT (ZOFRAN-ODT) 4 MG disintegrating tablet Place 1 tablet on the tongue Daily As Needed for Nausea or Vomiting.      oxyCODONE (ROXICODONE) 5 MG immediate release tablet Take 1 tablet by mouth Every 8 (Eight) Hours As Needed for Moderate Pain.      pantoprazole (PROTONIX) 40 MG EC tablet Take 1 tablet by mouth 2 (Two) Times a Day Before Meals. 60 tablet 0    phenylephrine-mineral oil-petrolatum (PREPARATION H) 0.25-14-74.9 % ointment hemorrhoidal ointment Insert 1 Application into the rectum 2 (Two) Times a Day As Needed.      polyethylene glycol (MIRALAX) 17 g packet Take 17 g by mouth  "Daily. Indications: Constipation      sennosides-docusate (PERICOLACE) 8.6-50 MG per tablet Take 1 tablet by mouth 2 (Two) Times a Day.       No current facility-administered medications on file prior to visit.        ALLERGIES:   Allergies   Allergen Reactions    Cortisone Hives    Penicillins Hives     Beta lactam allergy details  Antibiotic reaction: hives  Age at reaction: unknown  Dose to reaction time: unknown  Reason for antibiotic: unknown  Epinephrine required for reaction?: no  Tolerated antibiotics: unknown    Tolerated Zosyn    Iodinated Contrast Media Unknown - Low Severity     Patient  passed out, she has had studies with contact  recently         Body mass index is 38.35 kg/m².  166.4 cm (65.5\")  106 kg (234 lb)    PHYSICAL EXAM:   Right lower extremity: Granulation tissue within wound bed which has greatly decreased in size and measures approximately 7 x 5 cm with minimal depth         Gerald Pedraza M.D.  General and Endoscopic Surgery  [facility]    [address], [address]  [place], [place], [address]  P: [phone]  F: [phone]     "

## 2024-07-25 NOTE — PROGRESS NOTES
"[facility] GROUP OUTPATIENT FOLLOW UP CLINIC VISIT    REASON FOR FOLLOW-UP:    History of venous thromboembolism  Recurrent bleeding on Eliquis    HISTORY OF PRESENT ILLNESS:  Vonnie Coppola is a 88 y.o. female who returns today for follow up of the above issue.      She remains at the rehabilitation center.  Her right leg wound is doing well.  She may have a small wound developing on the left leg and she will be seen in the surgical office tomorrow to assess this.  She does not have no vision in the left eye presumably due to a clotting event.  She is on Eliquis 2.5 mg twice daily.  She has not had any epistaxis.  She has some mild bruising.    REVIEW OF SYSTEMS:  As per the HPI    PHYSICAL EXAMINATION:    Vitals:    07/30/24 0827   BP: 126/83   Pulse: 83   Resp: 16   Temp: 97.9 °F (36.6 °C)   TempSrc: Oral   SpO2: 99%   Weight: Comment: PT in wheelchair could not weight today   Height: 166.4 cm (65.51\")   PainSc: 0-No pain         General:  No acute distress, awake, alert and oriented.  Sitting in a wheelchair.  Skin:  Warm and dry, no visible rash  HEENT:  Normocephalic/atraumatic.   Chest:  Normal respiratory effort.  Scattered rhonchi bilaterally.  Heart: Regular rate and rhythm  Extremities: Both lower extremities are wrapped  Neuro/psych:  Grossly nonfocal.  Normal mood and affect.        DIAGNOSTIC DATA:  CBC & Differential (07/30/2024 08:12)     IMAGING:    None reviewed    ASSESSMENT:  This is a 88 y.o. female with:     *History of breast cancer  L partial mastectomy, adjuvant radiation, 3 years of adjuvant anti-estrogen therapy, stopped early due to poor tolerance     *Atrial fibrillation     *HTN     *H/o CVA     *History of GI bleeding  Admission 1/16/2024 through 1/27/2024 with GI bleeding and subsequent venous thromboembolism.  She had an EGD and colonoscopy on 1/19/2024 with no obvious malignancy and no obvious source of bleeding.  Not bleeding currently--unclear source though could have been " diverticular     *Venous thromboembolism  Recent pulmonary embolism diagnosed on 1/22/2024 with CT angiogram showing bilateral pulmonary thromboembolic disease with right heart strain as well as a 5 mm and 11 mm right middle lobe pulmonary nodules.  Bilateral lower extremity venous duplex on 1/23/2024 showed acute right lower extremity DVT in the posterior tibial, peroneal, and soleal veins with acute left lower extremity DVT in the posterior tibial, peroneal, gastrocnemius, and soleal veins.  Presumably provoked by her hospitalization  Anticoagulated with Eliquis, daughter has been administering 5 mg BID with no missed doses  Follow-up right lower extremity venous duplex on 3/14/2024 showed no evidence of DVT.  She presented to the outside emergency department with worsening right lower extremity edema, redness, and pain below the knee that started abruptly a couple of days ago.  Right lower extremity venous duplex done in the outside emergency department 3/21/2024 shows now a DVT to the mid segment of the right femoral vein which is occlusive.  She was transferred to the hospital and admitted.   Subsequent duplex showed no DVT.  Eliquis was resumed.  Eliquis now continued at 2.5 mg twice daily for extended prophylaxis     *RLE hematoma  Traumatic  Status post debridement  Healing nicely     *Recurrent epistaxis from the left nare  Has not recurred since decreasing Eliquis dose     *Lung nodules  CT chest 5/9/2024 stable.  5 to 6 mm nodule in the right lower lobe, unchanged.  5 x 10 mm nodule, unchanged from January.  CT imaging to be repeated in 7 months suggested.     *Anemia  Hgb improved at 9.5, from 8.1  Related to blood loss, chronic disease    PLAN:  She will be seen in the surgical clinic tomorrow for possible developing left leg wound  Continue Eliquis 2.5 mg twice daily  She has had an ophthalmology evaluation  Wound care to the right lower extremity continues  Follow-up CT chest without contrast in  January and I will see her back after that with labs

## 2024-07-29 ENCOUNTER — TELEPHONE (OUTPATIENT)
Dept: SURGERY | Facility: CLINIC | Age: 89
End: 2024-07-29
Payer: MEDICARE

## 2024-07-29 NOTE — TELEPHONE ENCOUNTER
Pt's daughter called to let us know a new spot had popped on her mother's left leg. I called [facility] to get more clarification. The Nurse said that a new spot has shown up on her left leg it looks like a blown up varicose vein. The spot is circular and black and blue. She said the black and blue part is 6 cm with about 10 cm of red around it. Would you like for the patient to be seen in the office?

## 2024-07-30 ENCOUNTER — OFFICE VISIT (OUTPATIENT)
Dept: ONCOLOGY | Facility: CLINIC | Age: 89
End: 2024-07-30
Payer: MEDICARE

## 2024-07-30 ENCOUNTER — LAB (OUTPATIENT)
Dept: LAB | Facility: HOSPITAL | Age: 89
End: 2024-07-30
Payer: MEDICARE

## 2024-07-30 VITALS
SYSTOLIC BLOOD PRESSURE: 126 MMHG | HEART RATE: 83 BPM | HEIGHT: 66 IN | OXYGEN SATURATION: 99 % | RESPIRATION RATE: 16 BRPM | DIASTOLIC BLOOD PRESSURE: 83 MMHG | TEMPERATURE: 97.9 F | BODY MASS INDEX: 38.33 KG/M2

## 2024-07-30 DIAGNOSIS — R91.8 PULMONARY NODULES: ICD-10-CM

## 2024-07-30 DIAGNOSIS — Z86.718 HISTORY OF DVT (DEEP VEIN THROMBOSIS): ICD-10-CM

## 2024-07-30 DIAGNOSIS — R91.8 PULMONARY NODULES: Primary | ICD-10-CM

## 2024-07-30 DIAGNOSIS — D64.9 NORMOCYTIC ANEMIA: ICD-10-CM

## 2024-07-30 LAB
BASOPHILS # BLD AUTO: 0.07 10*3/MM3 (ref 0–0.2)
BASOPHILS NFR BLD AUTO: 0.9 % (ref 0–1.5)
DEPRECATED RDW RBC AUTO: 53.4 FL (ref 37–54)
EOSINOPHIL # BLD AUTO: 0.39 10*3/MM3 (ref 0–0.4)
EOSINOPHIL NFR BLD AUTO: 4.8 % (ref 0.3–6.2)
ERYTHROCYTE [DISTWIDTH] IN BLOOD BY AUTOMATED COUNT: 17 % (ref 12.3–15.4)
HCT VFR BLD AUTO: 31.5 % (ref 34–46.6)
HGB BLD-MCNC: 9.5 G/DL (ref 12–15.9)
IMM GRANULOCYTES # BLD AUTO: 0.05 10*3/MM3 (ref 0–0.05)
IMM GRANULOCYTES NFR BLD AUTO: 0.6 % (ref 0–0.5)
LYMPHOCYTES # BLD AUTO: 3.09 10*3/MM3 (ref 0.7–3.1)
LYMPHOCYTES NFR BLD AUTO: 37.9 % (ref 19.6–45.3)
MCH RBC QN AUTO: 26.2 PG (ref 26.6–33)
MCHC RBC AUTO-ENTMCNC: 30.2 G/DL (ref 31.5–35.7)
MCV RBC AUTO: 86.8 FL (ref 79–97)
MONOCYTES # BLD AUTO: 0.77 10*3/MM3 (ref 0.1–0.9)
MONOCYTES NFR BLD AUTO: 9.4 % (ref 5–12)
NEUTROPHILS NFR BLD AUTO: 3.78 10*3/MM3 (ref 1.7–7)
NEUTROPHILS NFR BLD AUTO: 46.4 % (ref 42.7–76)
NRBC BLD AUTO-RTO: 0.2 /100 WBC (ref 0–0.2)
PLATELET # BLD AUTO: 250 10*3/MM3 (ref 140–450)
PMV BLD AUTO: 10.5 FL (ref 6–12)
RBC # BLD AUTO: 3.63 10*6/MM3 (ref 3.77–5.28)
WBC NRBC COR # BLD AUTO: 8.15 10*3/MM3 (ref 3.4–10.8)

## 2024-07-30 PROCEDURE — 85025 COMPLETE CBC W/AUTO DIFF WBC: CPT

## 2024-07-30 PROCEDURE — 1160F RVW MEDS BY RX/DR IN RCRD: CPT | Performed by: INTERNAL MEDICINE

## 2024-07-30 PROCEDURE — 99214 OFFICE O/P EST MOD 30 MIN: CPT | Performed by: INTERNAL MEDICINE

## 2024-07-30 PROCEDURE — 1126F AMNT PAIN NOTED NONE PRSNT: CPT | Performed by: INTERNAL MEDICINE

## 2024-07-30 PROCEDURE — 36415 COLL VENOUS BLD VENIPUNCTURE: CPT

## 2024-07-30 PROCEDURE — 1159F MED LIST DOCD IN RCRD: CPT | Performed by: INTERNAL MEDICINE

## 2024-07-31 ENCOUNTER — OFFICE VISIT (OUTPATIENT)
Dept: SURGERY | Facility: CLINIC | Age: 89
End: 2024-07-31
Payer: MEDICARE

## 2024-07-31 VITALS
WEIGHT: 234 LBS | HEIGHT: 66 IN | BODY MASS INDEX: 37.61 KG/M2 | DIASTOLIC BLOOD PRESSURE: 80 MMHG | SYSTOLIC BLOOD PRESSURE: 132 MMHG

## 2024-07-31 DIAGNOSIS — S80.11XD LEG HEMATOMA, RIGHT, SUBSEQUENT ENCOUNTER: ICD-10-CM

## 2024-07-31 DIAGNOSIS — S80.12XA LEG HEMATOMA, LEFT, INITIAL ENCOUNTER: Primary | ICD-10-CM

## 2024-07-31 PROCEDURE — 99024 POSTOP FOLLOW-UP VISIT: CPT

## 2024-07-31 NOTE — PROGRESS NOTES
Assessment/Plan:    88 y.o. female with a skin lesion on the left lower extremity, just superior to the medial malleolus.  This appears to be consistent with a small superficial hematoma/ecchymosis.  This appears benign on exam.  This was first noticed on Monday, no increase in size or worsening symptoms since then.  No surgical intervention warranted at this time. Recommend continued observation.     Regarding the wound on her right lower extremity, she underwent right lower extremity wound exploration, debridement, and control of hemorrhage on 5/31/2024 with Dr. Pedraza.  She was last seen in the office on 7/17/2024.  Today, the wound has continued to heal, there is thickened, dry skin surrounding the wound.  Recommend she begin applying bacitracin ointment surrounding the wound edges to ensure this stays moist to prevent eschar.  One piece of saline moistened gauze was applied to the wound bed today and covered with an ABD pad, followed by wrapping with Kerlix gauze and ACE wrap.    Recommend continuing wet-to-dry dressing changes with saline moistened gauze. Apply Bacitracin ointment to the surrounding skin edge. Place gauze over the wound and then cover with an ABD pad.  Wrap the leg loosely with Kerlix gauze up to just below the knee.  Then wrap the leg with mild compression from toes to just below the knee with an Ace bandage.    She has a history of left lower extremity edema, recommend the left leg be wrapped as well.  Left leg should be wrapped with mild compression, these wrappings can remain in place for 2 to 3 days.  Wrapping of the left lower extremity should span from the toes to just below the knee.    She has a follow-up appointment with Dr. Pedraza in 2 weeks, recommend she keep this.  She knows to call sooner with any questions or concerns.    Chief Complaint:    Wound check, new spot on left leg    History of Present Illness:    88 y.o. female who presents today for evaluation of a lesion on her  left lower leg.  She is here today with her daughter.  She states that she first noticed this skin changes on her left inner lower leg on Monday morning.  Denies any known trauma.  She states she may have hit the area but does not recall any specific episode.  Reports the area was not there on Saturday.  Denies any increase in size.  Denies any erythema, warmth, or drainage from the area.  She states that this looks like a bruise, but due to her history, she was concerned and wanted to be evaluated.  She underwent exploration of right lower extremity hematoma with control bleeding and debridement of skin and subcutaneous tissue measuring 11 x 10 cm on 5/31/2024 with Dr. Pedraza.  She has been undergoing dressing changes at her rehab facility.  Denies any concerns with this.  States she has developed a little bit of skin thickening surrounding the wound.  Otherwise, no significant changes since she was last seen in the office.  She is eager to be discharged from the rehab facility and go home.    Past Medical History:   Past Medical History:   Diagnosis Date    Arthritis     Asthma     Atrial fibrillation     per pt's daughter.States hx of a-fib in the past when stressed or tired.    Blind left eye     Breast cancer 1999    LEFT BREAST CA, LUMPECTOMY & RADS    Deep vein thrombosis     History of cataract     Hx of radiation therapy 1999    APPROXIMATELY 40 TREATMENTS FOR LEFT BREAST CA    Hypertension     Pneumonia     PONV (postoperative nausea and vomiting)     Stroke       Past Surgical History:    Past Surgical History:   Procedure Laterality Date    APPENDECTOMY      BLADDER SURGERY      BREAST BIOPSY Left 1999    MALIGNANT    BREAST LUMPECTOMY Left 1999    MALIGNANT    CATARACT EXTRACTION      COLONOSCOPY N/A 01/19/2024    Procedure: COLONOSCOPY to cecum with cold snare polypectomies;  Surgeon: Harshad Gaston MD;  Location: [facility] ENDOSCOPY;  Service: Gastroenterology;  Laterality: N/A;  pre- gi bleed,  anemia  post- diverticulosis, polyps    ENDOSCOPY N/A 01/19/2024    Procedure: ESOPHAGOGASTRODUODENOSCOPY with biospy;  Surgeon: Harshad Gaston MD;  Location: [facility] ENDOSCOPY;  Service: Gastroenterology;  Laterality: N/A;  pre- gi bleed, anemia  post- erosive gastirits    GALLBLADDER SURGERY      HERNIA REPAIR      HYSTERECTOMY      INCISION AND DRAINAGE LEG Right 5/31/2024    Procedure: RIGHT LOWER EXTREMITY WOUND EXPLORATION, DEBRIDEMENT AND CONTROL OF BLEEDING;  Surgeon: Gerald Pedraza MD;  Location: [facility] MAIN OR;  Service: General;  Laterality: Right;    LASIK      LYMPHADENECTOMY      OOPHORECTOMY       Family History:    Family History   Problem Relation Age of Onset    Other Mother         Cardiac disorder    Osteoarthritis Mother     Cataracts Mother     Macular degeneration Mother     Hypertension Father     Other Father         Cardiac disorder    Ovarian cancer Sister         70'S    Cancer Sister     Diabetes Sister     Hypertension Sister     Osteoarthritis Sister     Cancer Brother     Hypertension Brother     Osteoarthritis Brother     Colon cancer Maternal Grandmother     Cancer Maternal Grandmother     Ovarian cancer Paternal Grandmother         unknown    Cancer Other     Stroke Other     Breast cancer Neg Hx      Social History:    Social History     Socioeconomic History    Marital status:    Tobacco Use    Smoking status: Former     Current packs/day: 0.50     Average packs/day: 0.5 packs/day for 2.0 years (1.0 ttl pk-yrs)     Types: Cigarettes    Smokeless tobacco: Never    Tobacco comments:     quit 40 years ago   Vaping Use    Vaping status: Never Used   Substance and Sexual Activity    Alcohol use: No    Drug use: No    Sexual activity: Defer     Allergies:   Allergies   Allergen Reactions    Cortisone Hives    Penicillins Hives     Beta lactam allergy details  Antibiotic reaction: hives  Age at reaction: unknown  Dose to reaction time: unknown  Reason for antibiotic:  unknown  Epinephrine required for reaction?: no  Tolerated antibiotics: unknown    Tolerated Zosyn    Iodinated Contrast Media Unknown - Low Severity     Patient  passed out, she has had studies with contact  recently      Medications:     Current Outpatient Medications:     acetaminophen (TYLENOL) 325 MG tablet, Take 2 tablets by mouth Every 6 (Six) Hours As Needed for Mild Pain., Disp: , Rfl:     albuterol (ACCUNEB) 0.63 MG/3ML nebulizer solution, Take 3 mL by nebulization Every 6 (Six) Hours As Needed for Shortness of Air., Disp: , Rfl:     apixaban (ELIQUIS) 2.5 MG tablet tablet, Take 1 tablet by mouth Every 12 (Twelve) Hours. Indications: Atrial Fibrillation, Disp: , Rfl:     arformoterol (BROVANA) 15 MCG/2ML nebulizer solution, Take 2 mL by nebulization 2 (Two) Times a Day., Disp: 120 mL, Rfl: 11    benzonatate (TESSALON) 100 MG capsule, Take 1 capsule by mouth 3 (Three) Times a Day As Needed for Cough., Disp: 20 capsule, Rfl: 0    bisacodyl (Dulcolax) 10 MG suppository, Insert 1 suppository into the rectum Daily As Needed for Constipation., Disp: , Rfl:     budesonide (PULMICORT) 0.5 MG/2ML nebulizer solution, Take 2 mL by nebulization 2 (Two) Times a Day., Disp: 360 mL, Rfl: 2    coenzyme Q10 50 MG capsule capsule, Take 1 capsule by mouth Daily. Indications: nutritional support, Disp: , Rfl:     diazePAM (VALIUM) 2 MG tablet, Take 1 tablet by mouth 2 (Two) Times a Day., Disp: , Rfl:     dilTIAZem CD (CARDIZEM CD) 120 MG 24 hr capsule, Take 1 capsule by mouth Daily., Disp: , Rfl:     glucosamine-chondroitin 500-400 MG capsule capsule, Take 1 capsule by mouth 3 (Three) Times a Day With Meals. Indications: Joint Damage causing Pain and Loss of Function, Disp: , Rfl:     Magnesium 500 MG tablet, Take 1 tablet by mouth Daily., Disp: , Rfl:     ondansetron ODT (ZOFRAN-ODT) 4 MG disintegrating tablet, Place 1 tablet on the tongue Daily As Needed for Nausea or Vomiting., Disp: , Rfl:     oxyCODONE (ROXICODONE) 5 MG  "immediate release tablet, Take 1 tablet by mouth Every 8 (Eight) Hours As Needed for Moderate Pain., Disp: , Rfl:     pantoprazole (PROTONIX) 40 MG EC tablet, Take 1 tablet by mouth 2 (Two) Times a Day Before Meals., Disp: 60 tablet, Rfl: 0    phenylephrine-mineral oil-petrolatum (PREPARATION H) 0.25-14-74.9 % ointment hemorrhoidal ointment, Insert 1 Application into the rectum 2 (Two) Times a Day As Needed., Disp: , Rfl:     polyethylene glycol (MIRALAX) 17 g packet, Take 17 g by mouth Daily. Indications: Constipation, Disp: , Rfl:     sennosides-docusate (PERICOLACE) 8.6-50 MG per tablet, Take 1 tablet by mouth 2 (Two) Times a Day., Disp: , Rfl:     Radiology:  No recent pertinent imaging.     Labs:    7/24/2024 WBC 8.2, Hgb 8.9, Platelets 230, GFR 59, Cr 0.90    Review of Systems:   No cough, chest pain, shortness of air.  All other systems reviewed and negative other than presenting complaints.     Physical Exam:   Constitutional: Well-developed, well-nourished, no acute distress  Ht: 166.4cm (65.5\")  Wt: 106kg (234lb)  BMI: 38.35  Eyes: Conjunctiva normal, sclera nonicteric  ENMT: Hearing grossly normal, oral mucosa moist  Respiratory: Normal inspiratory effort  Cardiovascular: No jugular venous distention  Skin: Warm, dry, no rash on visualized skin surfaces  See attached images.   Right lower extremity: granulation tissue within the wound bed, surrounding wound edges are thickened with dry skin, no surrounding erythema, warmth, or drainage  Left lower extremity: superior to medial malleolus, superficial hematoma/ecchymosis, no surrounding erythema, warmth, or drainage  Musculoskeletal: Symmetric strength, normal gait  Psychiatric: Alert and oriented ×3, normal affect     Jojo Corbett PA-C    [facility] - General Surgery   [address], [address]  [place] [address]    [address]  [place] [address]    Office: [phone]  Fax: [phone]   "

## 2024-08-01 ENCOUNTER — TELEPHONE (OUTPATIENT)
Dept: SURGERY | Facility: CLINIC | Age: 89
End: 2024-08-01
Payer: MEDICARE

## 2024-08-01 NOTE — TELEPHONE ENCOUNTER
Liza with [facility] called needing clarification or orders for the patients wound care. Please advise. Thank you

## 2024-08-01 NOTE — TELEPHONE ENCOUNTER
Order faxed to [facility] with wound care orders per Jojo-apply bacitracin to the skin edges around the wound during dressing changes. Continue wet-to-dry dressings.

## 2024-08-14 ENCOUNTER — OFFICE VISIT (OUTPATIENT)
Dept: SURGERY | Facility: CLINIC | Age: 89
End: 2024-08-14
Payer: MEDICARE

## 2024-08-14 VITALS
SYSTOLIC BLOOD PRESSURE: 128 MMHG | BODY MASS INDEX: 39.7 KG/M2 | HEIGHT: 66 IN | DIASTOLIC BLOOD PRESSURE: 86 MMHG | WEIGHT: 247 LBS

## 2024-08-14 DIAGNOSIS — S80.11XD LEG HEMATOMA, RIGHT, SUBSEQUENT ENCOUNTER: Primary | ICD-10-CM

## 2024-08-15 ENCOUNTER — TELEPHONE (OUTPATIENT)
Dept: SURGERY | Facility: CLINIC | Age: 89
End: 2024-08-15
Payer: MEDICARE

## 2024-08-15 NOTE — PROGRESS NOTES
ASSESSMENT/PLAN:    88-year-old lady status post right lower extremity wound exploration, debridement, control of hemorrhage on 5/31/2024.  Wound has almost completely healed.  There is no need for further packing.  Recommend applying bacitracin to the wound once daily and covering with nonadherent gauze.  She can then have her lower extremity wrapped from the toes to the knee with mild compression from an Ace bandage for edema.  I will plan to see her back in 4 weeks.  If this is completely healed her daughter will send us a picture and cancel the appointment.        CC:     No chief complaint on file.       HPI:    She is at home now and her brother is helping take care of the wound.  He is a nurse with decades of experience.  There is no longer any room to pack the wound and so he has been placing bacitracin over it and covering it with gauze daily.  Continues to wrap both legs.  The areas of concern on her left leg have resolved per his report.      SOCIAL HISTORY:   Social Determinants of Health     Tobacco Use: Medium Risk (8/14/2024)    Patient History     Smoking Tobacco Use: Former     Smokeless Tobacco Use: Never     Passive Exposure: Not on file   Alcohol Use: Not At Risk (6/22/2024)    AUDIT-C     Frequency of Alcohol Consumption: Never     Average Number of Drinks: Patient does not drink     Frequency of Binge Drinking: Never   Financial Resource Strain: Not on file   Food Insecurity: Not on file   Transportation Needs: No Transportation Needs (5/24/2024)    OASIS : Transportation     Lack of Transportation (Medical): No     Lack of Transportation (Non-Medical): No     Patient Unable or Declines to Respond: No   Physical Activity: Not on file   Stress: Not on file   Social Connections: Feeling Socially Integrated (5/24/2024)    OASIS : Social Isolation     Frequency of experiencing loneliness or isolation: Rarely   Recent Concern: Social Connections - Feeling Somewhat Isolated (3/25/2024)     OASIS : Social Isolation     Frequency of experiencing loneliness or isolation: Sometimes   Interpersonal Safety: Not At Risk (7/30/2024)    Abuse Screen     Unsafe at Home or Work/School: no     Feels Threatened by Someone?: no     Does Anyone Keep You from Contacting Others or Doint Things Outside the Home?: no     Physical Sign of Abuse Present: no   Depression: Not at risk (7/30/2024)    PHQ-2     PHQ-2 Score: 0   Housing Stability: Not At Risk (6/22/2024)    Housing Stability     Current Living Arrangements: residential facility     Potentially Unsafe Housing Conditions: none   Utilities: Not on file   Health Literacy: Unknown (6/3/2024)    Education     Help with school or training?: Not on file     Preferred Language: English   Recent Concern: Health Literacy - Inadequate Health Literacy (5/24/2024)    OASIS : Health Literacy     Frequency of needing help to read materials from doctor or pharmacy: Sometimes   Employment: Unknown (10/9/2023)    Employment     Do you want help finding or keeping work or a job?: Not on file   Disabilities: At Risk (6/22/2024)    Disabilities     Concentrating, Remembering, or Making Decisions Difficulty: no     Doing Errands Independently Difficulty: yes        FAMILY HISTORY:    Family History   Problem Relation Age of Onset    Other Mother         Cardiac disorder    Osteoarthritis Mother     Cataracts Mother     Macular degeneration Mother     Hypertension Father     Other Father         Cardiac disorder    Ovarian cancer Sister         70'S    Cancer Sister     Diabetes Sister     Hypertension Sister     Osteoarthritis Sister     Cancer Brother     Hypertension Brother     Osteoarthritis Brother     Colon cancer Maternal Grandmother     Cancer Maternal Grandmother     Ovarian cancer Paternal Grandmother         unknown    Cancer Other     Stroke Other     Breast cancer Neg Hx         OTHER SURGERY:  Past Surgical History:   Procedure Laterality Date    APPENDECTOMY       BLADDER SURGERY      BREAST BIOPSY Left 1999    MALIGNANT    BREAST LUMPECTOMY Left 1999    MALIGNANT    CATARACT EXTRACTION      COLONOSCOPY N/A 01/19/2024    Procedure: COLONOSCOPY to cecum with cold snare polypectomies;  Surgeon: Harshad Gaston MD;  Location: [facility] ENDOSCOPY;  Service: Gastroenterology;  Laterality: N/A;  pre- gi bleed, anemia  post- diverticulosis, polyps    ENDOSCOPY N/A 01/19/2024    Procedure: ESOPHAGOGASTRODUODENOSCOPY with biospy;  Surgeon: Harshad Gaston MD;  Location: [facility] ENDOSCOPY;  Service: Gastroenterology;  Laterality: N/A;  pre- gi bleed, anemia  post- erosive gastirits    GALLBLADDER SURGERY      HERNIA REPAIR      HYSTERECTOMY      INCISION AND DRAINAGE LEG Right 5/31/2024    Procedure: RIGHT LOWER EXTREMITY WOUND EXPLORATION, DEBRIDEMENT AND CONTROL OF BLEEDING;  Surgeon: Gerald Pedraza MD;  Location: [facility] MAIN OR;  Service: General;  Laterality: Right;    LASIK      LYMPHADENECTOMY      OOPHORECTOMY          PAST MEDICAL HISTORY:    Past Medical History:   Diagnosis Date    Arthritis     Asthma     Atrial fibrillation     per pt's daughter.States hx of a-fib in the past when stressed or tired.    Blind left eye     Breast cancer 1999    LEFT BREAST CA, LUMPECTOMY & RADS    Deep vein thrombosis     History of cataract     Hx of radiation therapy 1999    APPROXIMATELY 40 TREATMENTS FOR LEFT BREAST CA    Hypertension     Pneumonia     PONV (postoperative nausea and vomiting)     Stroke         MEDICATIONS:   Current Outpatient Medications on File Prior to Visit   Medication Sig Dispense Refill    acetaminophen (TYLENOL) 325 MG tablet Take 2 tablets by mouth Every 6 (Six) Hours As Needed for Mild Pain.      albuterol (ACCUNEB) 0.63 MG/3ML nebulizer solution Take 3 mL by nebulization Every 6 (Six) Hours As Needed for Shortness of Air.      apixaban (ELIQUIS) 2.5 MG tablet tablet Take 1 tablet by mouth Every 12 (Twelve) Hours. Indications: Atrial Fibrillation       arformoterol (BROVANA) 15 MCG/2ML nebulizer solution Take 2 mL by nebulization 2 (Two) Times a Day. 120 mL 11    benzonatate (TESSALON) 100 MG capsule Take 1 capsule by mouth 3 (Three) Times a Day As Needed for Cough. 20 capsule 0    bisacodyl (Dulcolax) 10 MG suppository Insert 1 suppository into the rectum Daily As Needed for Constipation.      budesonide (PULMICORT) 0.5 MG/2ML nebulizer solution Take 2 mL by nebulization 2 (Two) Times a Day. 360 mL 2    coenzyme Q10 50 MG capsule capsule Take 1 capsule by mouth Daily. Indications: nutritional support      diazePAM (VALIUM) 2 MG tablet Take 1 tablet by mouth 2 (Two) Times a Day.      dilTIAZem CD (CARDIZEM CD) 120 MG 24 hr capsule Take 1 capsule by mouth Daily.      glucosamine-chondroitin 500-400 MG capsule capsule Take 1 capsule by mouth 3 (Three) Times a Day With Meals. Indications: Joint Damage causing Pain and Loss of Function      Magnesium 500 MG tablet Take 1 tablet by mouth Daily.      ondansetron ODT (ZOFRAN-ODT) 4 MG disintegrating tablet Place 1 tablet on the tongue Daily As Needed for Nausea or Vomiting.      oxyCODONE (ROXICODONE) 5 MG immediate release tablet Take 1 tablet by mouth Every 8 (Eight) Hours As Needed for Moderate Pain.      pantoprazole (PROTONIX) 40 MG EC tablet Take 1 tablet by mouth 2 (Two) Times a Day Before Meals. 60 tablet 0    phenylephrine-mineral oil-petrolatum (PREPARATION H) 0.25-14-74.9 % ointment hemorrhoidal ointment Insert 1 Application into the rectum 2 (Two) Times a Day As Needed.      polyethylene glycol (MIRALAX) 17 g packet Take 17 g by mouth Daily. Indications: Constipation      sennosides-docusate (PERICOLACE) 8.6-50 MG per tablet Take 1 tablet by mouth 2 (Two) Times a Day.       No current facility-administered medications on file prior to visit.        ALLERGIES:   Allergies   Allergen Reactions    Cortisone Hives    Penicillins Hives     Beta lactam allergy details  Antibiotic reaction: hives  Age at  "reaction: unknown  Dose to reaction time: unknown  Reason for antibiotic: unknown  Epinephrine required for reaction?: no  Tolerated antibiotics: unknown    Tolerated Zosyn    Iodinated Contrast Media Unknown - Low Severity     Patient  passed out, she has had studies with contact  recently         Body mass index is 40.48 kg/m².  166.4 cm (65.5\")  112 kg (247 lb)    PHYSICAL EXAM:   Constitutional: Well-developed well-nourished, no acute distress  Skin: 1 x 4 cm superficial area of granulation tissue without any significant depth overlying the medial right leg         Gerald Pedraza M.D.  General and Endoscopic Surgery  [facility]    [address], [address]  [place], [place], [address]  P: [phone]  F: [phone]     "

## 2024-08-15 NOTE — TELEPHONE ENCOUNTER
Caller: MARY    Relationship: DAUGHTER ON [employer]    Best call back number: [phone]    What orders are you requesting (i.e. lab or imaging): HOME HEALTH    In what timeframe would the patient need to come in: ASAP    Where will you receive your lab/imaging services:     Additional notes: PT WAS SEEN IN OFFICE ON 8-14-24 AND DAUGHTER SAID ORDERS WERE TO BE PLACED FOR HOME HEALTH AT  [facility] A HOME [facility].      PLEASE CALL MARY WITH ANY QUESTIONS.

## 2024-08-16 NOTE — TELEPHONE ENCOUNTER
Called [facility] [phone] to give verbal order for wound care per Dr. Pedraza.  Was asked to fax orders to [phone].  Most recent office note printed which contains wound care instructions.  Note faxed to number provided.

## 2024-09-09 PROCEDURE — 80048 BASIC METABOLIC PNL TOTAL CA: CPT | Performed by: INTERNAL MEDICINE

## 2024-09-28 ENCOUNTER — APPOINTMENT (OUTPATIENT)
Dept: CT IMAGING | Facility: HOSPITAL | Age: 89
End: 2024-09-28
Payer: MEDICARE

## 2024-09-28 ENCOUNTER — HOSPITAL ENCOUNTER (OUTPATIENT)
Facility: HOSPITAL | Age: 89
Setting detail: OBSERVATION
Discharge: HOME | End: 2024-10-01
Attending: EMERGENCY MEDICINE | Admitting: STUDENT IN AN ORGANIZED HEALTH CARE EDUCATION/TRAINING PROGRAM
Payer: MEDICARE

## 2024-09-28 DIAGNOSIS — N39.0 ACUTE UTI: ICD-10-CM

## 2024-09-28 DIAGNOSIS — K59.00 CONSTIPATION, UNSPECIFIED CONSTIPATION TYPE: Primary | ICD-10-CM

## 2024-09-28 DIAGNOSIS — K62.89 RECTAL PAIN: ICD-10-CM

## 2024-09-28 LAB
ALBUMIN SERPL-MCNC: 3.3 G/DL (ref 3.5–5.2)
ALBUMIN/GLOB SERPL: 1.1 G/DL
ALP SERPL-CCNC: 89 U/L (ref 39–117)
ALT SERPL W P-5'-P-CCNC: 9 U/L (ref 1–33)
ANION GAP SERPL CALCULATED.3IONS-SCNC: 9.8 MMOL/L (ref 5–15)
AST SERPL-CCNC: 16 U/L (ref 1–32)
BACTERIA UR QL AUTO: ABNORMAL /HPF
BASOPHILS # BLD AUTO: 0.06 10*3/MM3 (ref 0–0.2)
BASOPHILS NFR BLD AUTO: 0.5 % (ref 0–1.5)
BILIRUB SERPL-MCNC: 0.2 MG/DL (ref 0–1.2)
BILIRUB UR QL STRIP: NEGATIVE
BUN SERPL-MCNC: 16 MG/DL (ref 8–23)
BUN/CREAT SERPL: 15.4 (ref 7–25)
CALCIUM SPEC-SCNC: 9.1 MG/DL (ref 8.6–10.5)
CHLORIDE SERPL-SCNC: 105 MMOL/L (ref 98–107)
CLARITY UR: CLEAR
CO2 SERPL-SCNC: 25.2 MMOL/L (ref 22–29)
COLOR UR: YELLOW
CREAT SERPL-MCNC: 1.04 MG/DL (ref 0.57–1)
D-LACTATE SERPL-SCNC: 1.2 MMOL/L (ref 0.5–2)
DEPRECATED RDW RBC AUTO: 43.9 FL (ref 37–54)
EGFRCR SERPLBLD CKD-EPI 2021: 52.1 ML/MIN/1.73
EOSINOPHIL # BLD AUTO: 0.37 10*3/MM3 (ref 0–0.4)
EOSINOPHIL NFR BLD AUTO: 3.1 % (ref 0.3–6.2)
ERYTHROCYTE [DISTWIDTH] IN BLOOD BY AUTOMATED COUNT: 14.6 % (ref 12.3–15.4)
GLOBULIN UR ELPH-MCNC: 3 GM/DL
GLUCOSE SERPL-MCNC: 88 MG/DL (ref 65–99)
GLUCOSE UR STRIP-MCNC: NEGATIVE MG/DL
HCT VFR BLD AUTO: 32.1 % (ref 34–46.6)
HGB BLD-MCNC: 9.5 G/DL (ref 12–15.9)
HGB UR QL STRIP.AUTO: NEGATIVE
HOLD SPECIMEN: NORMAL
HOLD SPECIMEN: NORMAL
HYALINE CASTS UR QL AUTO: ABNORMAL /LPF
IMM GRANULOCYTES # BLD AUTO: 0.05 10*3/MM3 (ref 0–0.05)
IMM GRANULOCYTES NFR BLD AUTO: 0.4 % (ref 0–0.5)
KETONES UR QL STRIP: NEGATIVE
LEUKOCYTE ESTERASE UR QL STRIP.AUTO: ABNORMAL
LIPASE SERPL-CCNC: 13 U/L (ref 13–60)
LYMPHOCYTES # BLD AUTO: 2.96 10*3/MM3 (ref 0.7–3.1)
LYMPHOCYTES NFR BLD AUTO: 24.6 % (ref 19.6–45.3)
MCH RBC QN AUTO: 24.3 PG (ref 26.6–33)
MCHC RBC AUTO-ENTMCNC: 29.6 G/DL (ref 31.5–35.7)
MCV RBC AUTO: 82.1 FL (ref 79–97)
MONOCYTES # BLD AUTO: 1.36 10*3/MM3 (ref 0.1–0.9)
MONOCYTES NFR BLD AUTO: 11.3 % (ref 5–12)
NEUTROPHILS NFR BLD AUTO: 60.1 % (ref 42.7–76)
NEUTROPHILS NFR BLD AUTO: 7.23 10*3/MM3 (ref 1.7–7)
NITRITE UR QL STRIP: POSITIVE
NRBC BLD AUTO-RTO: 0 /100 WBC (ref 0–0.2)
PH UR STRIP.AUTO: 7 [PH] (ref 5–8)
PLATELET # BLD AUTO: 296 10*3/MM3 (ref 140–450)
PMV BLD AUTO: 10.1 FL (ref 6–12)
POTASSIUM SERPL-SCNC: 3.9 MMOL/L (ref 3.5–5.2)
PROT SERPL-MCNC: 6.3 G/DL (ref 6–8.5)
PROT UR QL STRIP: NEGATIVE
RBC # BLD AUTO: 3.91 10*6/MM3 (ref 3.77–5.28)
RBC # UR STRIP: ABNORMAL /HPF
REF LAB TEST METHOD: ABNORMAL
SODIUM SERPL-SCNC: 140 MMOL/L (ref 136–145)
SP GR UR STRIP: 1.01 (ref 1–1.03)
SQUAMOUS #/AREA URNS HPF: ABNORMAL /HPF
UROBILINOGEN UR QL STRIP: ABNORMAL
WBC # UR STRIP: ABNORMAL /HPF
WBC NRBC COR # BLD AUTO: 12.03 10*3/MM3 (ref 3.4–10.8)
WHOLE BLOOD HOLD COAG: NORMAL
WHOLE BLOOD HOLD SPECIMEN: NORMAL

## 2024-09-28 PROCEDURE — 25810000003 SODIUM CHLORIDE 0.9 % SOLUTION: Performed by: PHYSICIAN ASSISTANT

## 2024-09-28 PROCEDURE — 80053 COMPREHEN METABOLIC PANEL: CPT | Performed by: PHYSICIAN ASSISTANT

## 2024-09-28 PROCEDURE — 74176 CT ABD & PELVIS W/O CONTRAST: CPT

## 2024-09-28 PROCEDURE — 36415 COLL VENOUS BLD VENIPUNCTURE: CPT

## 2024-09-28 PROCEDURE — 83605 ASSAY OF LACTIC ACID: CPT | Performed by: PHYSICIAN ASSISTANT

## 2024-09-28 PROCEDURE — 81001 URINALYSIS AUTO W/SCOPE: CPT | Performed by: PHYSICIAN ASSISTANT

## 2024-09-28 PROCEDURE — 99285 EMERGENCY DEPT VISIT HI MDM: CPT

## 2024-09-28 PROCEDURE — 85025 COMPLETE CBC W/AUTO DIFF WBC: CPT | Performed by: PHYSICIAN ASSISTANT

## 2024-09-28 PROCEDURE — 83690 ASSAY OF LIPASE: CPT | Performed by: PHYSICIAN ASSISTANT

## 2024-09-28 RX ORDER — ACETAMINOPHEN 500 MG
1000 TABLET ORAL ONCE
Status: COMPLETED | OUTPATIENT
Start: 2024-09-28 | End: 2024-09-28

## 2024-09-28 RX ORDER — SODIUM CHLORIDE 0.9 % (FLUSH) 0.9 %
10 SYRINGE (ML) INJECTION AS NEEDED
Status: DISCONTINUED | OUTPATIENT
Start: 2024-09-28 | End: 2024-10-01 | Stop reason: HOSPADM

## 2024-09-28 RX ADMIN — SODIUM CHLORIDE 500 ML: 9 INJECTION, SOLUTION INTRAVENOUS at 21:35

## 2024-09-28 RX ADMIN — ACETAMINOPHEN 1000 MG: 500 TABLET ORAL at 23:12

## 2024-09-29 PROBLEM — N39.0 UTI (URINARY TRACT INFECTION): Status: ACTIVE | Noted: 2024-09-29

## 2024-09-29 PROBLEM — K59.00 CONSTIPATION: Status: ACTIVE | Noted: 2024-09-29

## 2024-09-29 LAB
ANION GAP SERPL CALCULATED.3IONS-SCNC: 8.6 MMOL/L (ref 5–15)
BUN SERPL-MCNC: 14 MG/DL (ref 8–23)
BUN/CREAT SERPL: 15.4 (ref 7–25)
CALCIUM SPEC-SCNC: 8.3 MG/DL (ref 8.6–10.5)
CHLORIDE SERPL-SCNC: 108 MMOL/L (ref 98–107)
CO2 SERPL-SCNC: 24.4 MMOL/L (ref 22–29)
CREAT SERPL-MCNC: 0.91 MG/DL (ref 0.57–1)
DEPRECATED RDW RBC AUTO: 42.9 FL (ref 37–54)
EGFRCR SERPLBLD CKD-EPI 2021: 61.2 ML/MIN/1.73
ERYTHROCYTE [DISTWIDTH] IN BLOOD BY AUTOMATED COUNT: 14.5 % (ref 12.3–15.4)
GLUCOSE SERPL-MCNC: 84 MG/DL (ref 65–99)
HCT VFR BLD AUTO: 30.5 % (ref 34–46.6)
HGB BLD-MCNC: 8.9 G/DL (ref 12–15.9)
MCH RBC QN AUTO: 23.6 PG (ref 26.6–33)
MCHC RBC AUTO-ENTMCNC: 29.2 G/DL (ref 31.5–35.7)
MCV RBC AUTO: 80.9 FL (ref 79–97)
PLATELET # BLD AUTO: 268 10*3/MM3 (ref 140–450)
PMV BLD AUTO: 10.5 FL (ref 6–12)
POTASSIUM SERPL-SCNC: 3.5 MMOL/L (ref 3.5–5.2)
RBC # BLD AUTO: 3.77 10*6/MM3 (ref 3.77–5.28)
SODIUM SERPL-SCNC: 141 MMOL/L (ref 136–145)
WBC NRBC COR # BLD AUTO: 10.6 10*3/MM3 (ref 3.4–10.8)

## 2024-09-29 PROCEDURE — G0378 HOSPITAL OBSERVATION PER HR: HCPCS

## 2024-09-29 PROCEDURE — 80048 BASIC METABOLIC PNL TOTAL CA: CPT

## 2024-09-29 PROCEDURE — 96365 THER/PROPH/DIAG IV INF INIT: CPT

## 2024-09-29 PROCEDURE — 94640 AIRWAY INHALATION TREATMENT: CPT

## 2024-09-29 PROCEDURE — 96361 HYDRATE IV INFUSION ADD-ON: CPT

## 2024-09-29 PROCEDURE — 96375 TX/PRO/DX INJ NEW DRUG ADDON: CPT

## 2024-09-29 PROCEDURE — 94799 UNLISTED PULMONARY SVC/PX: CPT

## 2024-09-29 PROCEDURE — 36415 COLL VENOUS BLD VENIPUNCTURE: CPT

## 2024-09-29 PROCEDURE — 85027 COMPLETE CBC AUTOMATED: CPT

## 2024-09-29 PROCEDURE — 25010000002 ONDANSETRON PER 1 MG

## 2024-09-29 PROCEDURE — 25010000002 CEFTRIAXONE PER 250 MG: Performed by: PHYSICIAN ASSISTANT

## 2024-09-29 PROCEDURE — 25810000003 SODIUM CHLORIDE 0.9 % SOLUTION

## 2024-09-29 RX ORDER — SODIUM CHLORIDE 9 MG/ML
40 INJECTION, SOLUTION INTRAVENOUS AS NEEDED
Status: DISCONTINUED | OUTPATIENT
Start: 2024-09-29 | End: 2024-09-29

## 2024-09-29 RX ORDER — ARFORMOTEROL TARTRATE 15 UG/2ML
15 SOLUTION RESPIRATORY (INHALATION)
Status: DISCONTINUED | OUTPATIENT
Start: 2024-09-29 | End: 2024-10-01 | Stop reason: HOSPADM

## 2024-09-29 RX ORDER — AMOXICILLIN 250 MG
2 CAPSULE ORAL 2 TIMES DAILY PRN
Status: DISCONTINUED | OUTPATIENT
Start: 2024-09-29 | End: 2024-09-29

## 2024-09-29 RX ORDER — ONDANSETRON 2 MG/ML
4 INJECTION INTRAMUSCULAR; INTRAVENOUS EVERY 6 HOURS PRN
Status: DISCONTINUED | OUTPATIENT
Start: 2024-09-29 | End: 2024-10-01 | Stop reason: HOSPADM

## 2024-09-29 RX ORDER — ALBUTEROL SULFATE 0.63 MG/3ML
0.63 SOLUTION RESPIRATORY (INHALATION) EVERY 6 HOURS PRN
Status: DISCONTINUED | OUTPATIENT
Start: 2024-09-29 | End: 2024-10-01 | Stop reason: HOSPADM

## 2024-09-29 RX ORDER — DILTIAZEM HYDROCHLORIDE 120 MG/1
120 CAPSULE, COATED, EXTENDED RELEASE ORAL DAILY
Status: DISCONTINUED | OUTPATIENT
Start: 2024-09-29 | End: 2024-10-01 | Stop reason: HOSPADM

## 2024-09-29 RX ORDER — ACETAMINOPHEN 325 MG/1
650 TABLET ORAL EVERY 4 HOURS PRN
Status: DISCONTINUED | OUTPATIENT
Start: 2024-09-29 | End: 2024-10-01 | Stop reason: HOSPADM

## 2024-09-29 RX ORDER — BUDESONIDE 0.5 MG/2ML
0.5 INHALANT ORAL
Status: DISCONTINUED | OUTPATIENT
Start: 2024-09-29 | End: 2024-10-01 | Stop reason: HOSPADM

## 2024-09-29 RX ORDER — DEXTROSE MONOHYDRATE 25 G/50ML
10-50 INJECTION, SOLUTION INTRAVENOUS
Status: DISCONTINUED | OUTPATIENT
Start: 2024-09-29 | End: 2024-09-29

## 2024-09-29 RX ORDER — BISACODYL 5 MG/1
5 TABLET, DELAYED RELEASE ORAL DAILY PRN
Status: DISCONTINUED | OUTPATIENT
Start: 2024-09-29 | End: 2024-09-29

## 2024-09-29 RX ORDER — POLYETHYLENE GLYCOL 3350 17 G/17G
17 POWDER, FOR SOLUTION ORAL DAILY PRN
Status: DISCONTINUED | OUTPATIENT
Start: 2024-09-29 | End: 2024-09-29

## 2024-09-29 RX ORDER — AMOXICILLIN 250 MG
2 CAPSULE ORAL 2 TIMES DAILY
Status: DISCONTINUED | OUTPATIENT
Start: 2024-09-29 | End: 2024-09-29

## 2024-09-29 RX ORDER — PANTOPRAZOLE SODIUM 40 MG/1
40 TABLET, DELAYED RELEASE ORAL
Status: DISCONTINUED | OUTPATIENT
Start: 2024-09-29 | End: 2024-10-01 | Stop reason: HOSPADM

## 2024-09-29 RX ORDER — SODIUM CHLORIDE 9 MG/ML
75 INJECTION, SOLUTION INTRAVENOUS CONTINUOUS
Status: ACTIVE | OUTPATIENT
Start: 2024-09-29 | End: 2024-09-29

## 2024-09-29 RX ORDER — SODIUM CHLORIDE 0.9 % (FLUSH) 0.9 %
10 SYRINGE (ML) INJECTION EVERY 12 HOURS SCHEDULED
Status: DISCONTINUED | OUTPATIENT
Start: 2024-09-29 | End: 2024-09-29

## 2024-09-29 RX ORDER — OXYCODONE HYDROCHLORIDE 5 MG/1
5 TABLET ORAL EVERY 8 HOURS PRN
Status: DISCONTINUED | OUTPATIENT
Start: 2024-09-29 | End: 2024-10-01 | Stop reason: HOSPADM

## 2024-09-29 RX ORDER — CALCIUM CARBONATE 500 MG/1
2 TABLET, CHEWABLE ORAL 3 TIMES DAILY PRN
Status: DISCONTINUED | OUTPATIENT
Start: 2024-09-29 | End: 2024-10-01 | Stop reason: HOSPADM

## 2024-09-29 RX ORDER — DIAZEPAM 2 MG
2 TABLET ORAL 2 TIMES DAILY
Status: DISCONTINUED | OUTPATIENT
Start: 2024-09-29 | End: 2024-10-01 | Stop reason: HOSPADM

## 2024-09-29 RX ORDER — POLYETHYLENE GLYCOL 3350 17 G/17G
17 POWDER, FOR SOLUTION ORAL 2 TIMES DAILY
Status: DISCONTINUED | OUTPATIENT
Start: 2024-09-29 | End: 2024-10-01 | Stop reason: HOSPADM

## 2024-09-29 RX ORDER — AMOXICILLIN 250 MG
2 CAPSULE ORAL 2 TIMES DAILY
Status: DISCONTINUED | OUTPATIENT
Start: 2024-09-29 | End: 2024-10-01 | Stop reason: HOSPADM

## 2024-09-29 RX ORDER — SODIUM CHLORIDE 0.9 % (FLUSH) 0.9 %
10 SYRINGE (ML) INJECTION AS NEEDED
Status: DISCONTINUED | OUTPATIENT
Start: 2024-09-29 | End: 2024-09-29

## 2024-09-29 RX ORDER — BISACODYL 5 MG/1
5 TABLET, DELAYED RELEASE ORAL DAILY PRN
Status: DISCONTINUED | OUTPATIENT
Start: 2024-09-29 | End: 2024-10-01 | Stop reason: HOSPADM

## 2024-09-29 RX ORDER — IBUPROFEN 600 MG/1
1 TABLET ORAL
Status: DISCONTINUED | OUTPATIENT
Start: 2024-09-29 | End: 2024-09-29

## 2024-09-29 RX ORDER — BISACODYL 10 MG
10 SUPPOSITORY, RECTAL RECTAL DAILY PRN
Status: DISCONTINUED | OUTPATIENT
Start: 2024-09-29 | End: 2024-09-29

## 2024-09-29 RX ORDER — ONDANSETRON 4 MG/1
4 TABLET, ORALLY DISINTEGRATING ORAL EVERY 6 HOURS PRN
Status: DISCONTINUED | OUTPATIENT
Start: 2024-09-29 | End: 2024-10-01 | Stop reason: HOSPADM

## 2024-09-29 RX ORDER — NICOTINE POLACRILEX 4 MG
15 LOZENGE BUCCAL
Status: DISCONTINUED | OUTPATIENT
Start: 2024-09-29 | End: 2024-09-29

## 2024-09-29 RX ORDER — BISACODYL 10 MG
10 SUPPOSITORY, RECTAL RECTAL DAILY PRN
Status: DISCONTINUED | OUTPATIENT
Start: 2024-09-29 | End: 2024-10-01 | Stop reason: HOSPADM

## 2024-09-29 RX ADMIN — POLYETHYLENE GLYCOL 3350 17 G: 17 POWDER, FOR SOLUTION ORAL at 08:38

## 2024-09-29 RX ADMIN — ACETAMINOPHEN 325MG 650 MG: 325 TABLET ORAL at 08:37

## 2024-09-29 RX ADMIN — APIXABAN 2.5 MG: 2.5 TABLET, FILM COATED ORAL at 08:38

## 2024-09-29 RX ADMIN — ACETAMINOPHEN 325MG 650 MG: 325 TABLET ORAL at 04:43

## 2024-09-29 RX ADMIN — PANTOPRAZOLE SODIUM 40 MG: 40 TABLET, DELAYED RELEASE ORAL at 08:38

## 2024-09-29 RX ADMIN — ONDANSETRON 4 MG: 2 INJECTION, SOLUTION INTRAMUSCULAR; INTRAVENOUS at 21:18

## 2024-09-29 RX ADMIN — ARFORMOTEROL TARTRATE 15 MCG: 15 SOLUTION RESPIRATORY (INHALATION) at 19:16

## 2024-09-29 RX ADMIN — DIAZEPAM 2 MG: 2 TABLET ORAL at 08:37

## 2024-09-29 RX ADMIN — DIAZEPAM 2 MG: 2 TABLET ORAL at 21:16

## 2024-09-29 RX ADMIN — ANTACID TABLETS 2 TABLET: 500 TABLET, CHEWABLE ORAL at 18:21

## 2024-09-29 RX ADMIN — POLYETHYLENE GLYCOL 3350 17 G: 17 POWDER, FOR SOLUTION ORAL at 20:50

## 2024-09-29 RX ADMIN — APIXABAN 2.5 MG: 2.5 TABLET, FILM COATED ORAL at 21:24

## 2024-09-29 RX ADMIN — DILTIAZEM HYDROCHLORIDE 120 MG: 120 CAPSULE, COATED, EXTENDED RELEASE ORAL at 13:40

## 2024-09-29 RX ADMIN — BUDESONIDE 0.5 MG: 0.5 INHALANT RESPIRATORY (INHALATION) at 19:16

## 2024-09-29 RX ADMIN — SODIUM CHLORIDE 75 ML/HR: 9 INJECTION, SOLUTION INTRAVENOUS at 03:07

## 2024-09-29 RX ADMIN — ACETAMINOPHEN 325MG 650 MG: 325 TABLET ORAL at 21:17

## 2024-09-29 RX ADMIN — PANTOPRAZOLE SODIUM 40 MG: 40 TABLET, DELAYED RELEASE ORAL at 17:28

## 2024-09-29 RX ADMIN — SENNOSIDES AND DOCUSATE SODIUM 2 TABLET: 50; 8.6 TABLET ORAL at 21:17

## 2024-09-29 RX ADMIN — CEFTRIAXONE 2000 MG: 2 INJECTION, POWDER, FOR SOLUTION INTRAMUSCULAR; INTRAVENOUS at 03:08

## 2024-09-29 NOTE — SIGNIFICANT NOTE
09/29/24 1225   OTHER   Discipline physical therapist   Rehab Time/Intention   Session Not Performed other (see comments)  (Pt requested PT to be held off until pt is cleaned due to BM in bed. Will f/u in the afternoon if time allows. Otherwise will f/u tomorrow.)   Recommendation   PT - Next Appointment 09/30/24

## 2024-09-29 NOTE — ED PROVIDER NOTES
EMERGENCY DEPARTMENT ENCOUNTER  Room Number:  04/04  PCP: Christopher Leyva DO  Independent Historians: Patient      HPI:  Chief Complaint: had concerns including Abdominal Pain.     A complete HPI/ROS/PMH/PSH/SH/FH are unobtainable due to: None    Chronic or social conditions impacting patient care (Social Determinants of Health): None      Context: Vonnie Coppola is a 89 y.o. female with a medical history of hypertension, CVA, A-fib, pulmonary embolism, and renal insufficiency presents emergency department today complaining that she has not had a bowel movement in 4 days.  She reports that she had diarrhea about a week ago and took over-the-counter Imodium to stop the diarrhea.  She says that stopped and then she had normal bowel movements for a few days and now she is constipated.  She says she feels like she has to go but has a lot of pressure and is unable to move her bowels.  She denies nausea or vomiting.  She denies fever or chills.  She denies chest pain or shortness of breath.  She denies history of bowel obstruction.  She has had a hysterectomy, appendectomy, cholecystectomy, and hernia repairs in the abdomen.      Review of prior external notes (non-ED) -and- Review of prior external test results outside of this encounter:   Patient was admitted to the hospital on 6/21/2024 and discharged on 6/24/2024 for abdominal pain and constipation.  She had large volume stool on CT and fecal impaction.  She had rectal wall thickening as well as perirectal stranding and presacral edema suggesting proctitis.  She was given an enema in the ED and rectal exam showed some formed stool was removed digitally.  She was started on aggressive bowel regimen postdischarge with as needed suppositories.    I reviewed labs from 9/9/2024, creatinine 0.95    PAST MEDICAL HISTORY  Active Ambulatory Problems     Diagnosis Date Noted    GI bleed 01/16/2024    Anemia 01/16/2024    HTN (hypertension) 01/16/2024    History of  breast cancer 01/16/2024    Asthma 01/16/2024    History of CVA (cerebrovascular accident) 01/17/2024    Dehydration 01/17/2024    Renal insufficiency 01/17/2024    Pulmonary nodule 01/17/2024    Adnexal cyst 01/17/2024    Nausea 01/16/2024    Atrial fibrillation 01/21/2024    History of pulmonary embolism 03/22/2024    Class 2 severe obesity with serious comorbidity in adult 03/22/2024    Thrombocytopenia 03/22/2024    Cellulitis of right leg 03/23/2024    Acute cystitis 03/23/2024    Leg hematoma, right, subsequent encounter 05/31/2024    Obesity (BMI 35.0-39.9 without comorbidity) 06/19/2024    Morbid (severe) obesity due to excess calories (*specify comorbidity) 06/19/2024     Resolved Ambulatory Problems     Diagnosis Date Noted    Fecal impaction 06/22/2024     Past Medical History:   Diagnosis Date    Arthritis     Blind left eye     Breast cancer 1999    Deep vein thrombosis     History of cataract     Hx of radiation therapy 1999    Hypertension     Pneumonia     PONV (postoperative nausea and vomiting)     Stroke          PAST SURGICAL HISTORY  Past Surgical History:   Procedure Laterality Date    APPENDECTOMY      BLADDER SURGERY      BREAST BIOPSY Left 1999    MALIGNANT    BREAST LUMPECTOMY Left 1999    MALIGNANT    CATARACT EXTRACTION      COLONOSCOPY N/A 01/19/2024    Procedure: COLONOSCOPY to cecum with cold snare polypectomies;  Surgeon: Harshad Gaston MD;  Location: [facility] ENDOSCOPY;  Service: Gastroenterology;  Laterality: N/A;  pre- gi bleed, anemia  post- diverticulosis, polyps    ENDOSCOPY N/A 01/19/2024    Procedure: ESOPHAGOGASTRODUODENOSCOPY with biospy;  Surgeon: Harshad Gaston MD;  Location: [facility] ENDOSCOPY;  Service: Gastroenterology;  Laterality: N/A;  pre- gi bleed, anemia  post- erosive gastirits    GALLBLADDER SURGERY      HERNIA REPAIR      HYSTERECTOMY      INCISION AND DRAINAGE LEG Right 5/31/2024    Procedure: RIGHT LOWER EXTREMITY WOUND EXPLORATION, DEBRIDEMENT AND  CONTROL OF BLEEDING;  Surgeon: Gerald Pedraza MD;  Location: [facility] OR;  Service: General;  Laterality: Right;    LASIK      LYMPHADENECTOMY      OOPHORECTOMY           FAMILY HISTORY  Family History   Problem Relation Age of Onset    Other Mother         Cardiac disorder    Osteoarthritis Mother     Cataracts Mother     Macular degeneration Mother     Hypertension Father     Other Father         Cardiac disorder    Ovarian cancer Sister         70'S    Cancer Sister     Diabetes Sister     Hypertension Sister     Osteoarthritis Sister     Cancer Brother     Hypertension Brother     Osteoarthritis Brother     Colon cancer Maternal Grandmother     Cancer Maternal Grandmother     Ovarian cancer Paternal Grandmother         unknown    Cancer Other     Stroke Other     Breast cancer Neg Hx          SOCIAL HISTORY  Social History     Socioeconomic History    Marital status:    Tobacco Use    Smoking status: Former     Current packs/day: 0.50     Average packs/day: 0.5 packs/day for 2.0 years (1.0 ttl pk-yrs)     Types: Cigarettes    Smokeless tobacco: Never    Tobacco comments:     quit 40 years ago   Vaping Use    Vaping status: Never Used   Substance and Sexual Activity    Alcohol use: No    Drug use: No    Sexual activity: Defer         ALLERGIES  Cortisone, Penicillins, and Iodinated contrast media      REVIEW OF SYSTEMS  Included in HPI  All systems reviewed and negative except for those discussed in HPI.      PHYSICAL EXAM    I have reviewed the triage vital signs and nursing notes.    ED Triage Vitals   Temp Heart Rate Resp BP SpO2   09/28/24 2019 09/28/24 2019 09/28/24 2020 09/28/24 2019 09/28/24 2019   97.5 °F (36.4 °C) 74 18 127/72 93 %      Temp src Heart Rate Source Patient Position BP Location FiO2 (%)   -- -- -- -- --              Physical Exam  Constitutional:       General: She is not in acute distress.     Appearance: Normal appearance. She is obese.      Comments: Appears to feel unwell  but nontoxic   HENT:      Head: Normocephalic and atraumatic.      Nose: Nose normal.      Mouth/Throat:      Mouth: Mucous membranes are moist.   Eyes:      Extraocular Movements: Extraocular movements intact.      Pupils: Pupils are equal, round, and reactive to light.   Cardiovascular:      Rate and Rhythm: Normal rate and regular rhythm.      Pulses: Normal pulses.      Heart sounds: Normal heart sounds.   Pulmonary:      Effort: Pulmonary effort is normal. No respiratory distress.      Breath sounds: Normal breath sounds.   Abdominal:      General: Abdomen is flat. There is no distension.      Palpations: Abdomen is soft.      Tenderness: There is abdominal tenderness (Generally tender to palpation with no significant localized peritonitis). There is no guarding or rebound.   Musculoskeletal:         General: Normal range of motion.      Cervical back: Normal range of motion and neck supple.   Skin:     General: Skin is warm and dry.      Capillary Refill: Capillary refill takes less than 2 seconds.   Neurological:      General: No focal deficit present.      Mental Status: She is alert and oriented to person, place, and time.   Psychiatric:         Mood and Affect: Mood normal.         Behavior: Behavior normal.         LAB RESULTS  Recent Results (from the past 24 hour(s))   Urinalysis With Microscopic If Indicated (No Culture) - Urine, Clean Catch    Collection Time: 09/28/24  8:47 PM    Specimen: Urine, Clean Catch   Result Value Ref Range    Color, UA Yellow Yellow, Straw    Appearance, UA Clear Clear    pH, UA 7.0 5.0 - 8.0    Specific Gravity, UA 1.011 1.005 - 1.030    Glucose, UA Negative Negative    Ketones, UA Negative Negative    Bilirubin, UA Negative Negative    Blood, UA Negative Negative    Protein, UA Negative Negative    Leuk Esterase, UA Small (1+) (A) Negative    Nitrite, UA Positive (A) Negative    Urobilinogen, UA 0.2 E.U./dL 0.2 - 1.0 E.U./dL   Urinalysis, Microscopic Only - Urine, Clean  Catch    Collection Time: 09/28/24  8:47 PM    Specimen: Urine, Clean Catch   Result Value Ref Range    RBC, UA None Seen None Seen, 0-2 /HPF    WBC, UA 6-10 (A) None Seen, 0-2 /HPF    Bacteria, UA Trace (A) None Seen /HPF    Squamous Epithelial Cells, UA 0-2 None Seen, 0-2 /HPF    Hyaline Casts, UA None Seen None Seen /LPF    Methodology Manual Light Microscopy    Comprehensive Metabolic Panel    Collection Time: 09/28/24  9:16 PM    Specimen: Blood   Result Value Ref Range    Glucose 88 65 - 99 mg/dL    BUN 16 8 - 23 mg/dL    Creatinine 1.04 (H) 0.57 - 1.00 mg/dL    Sodium 140 136 - 145 mmol/L    Potassium 3.9 3.5 - 5.2 mmol/L    Chloride 105 98 - 107 mmol/L    CO2 25.2 22.0 - 29.0 mmol/L    Calcium 9.1 8.6 - 10.5 mg/dL    Total Protein 6.3 6.0 - 8.5 g/dL    Albumin 3.3 (L) 3.5 - 5.2 g/dL    ALT (SGPT) 9 1 - 33 U/L    AST (SGOT) 16 1 - 32 U/L    Alkaline Phosphatase 89 39 - 117 U/L    Total Bilirubin 0.2 0.0 - 1.2 mg/dL    Globulin 3.0 gm/dL    A/G Ratio 1.1 g/dL    BUN/Creatinine Ratio 15.4 7.0 - 25.0    Anion Gap 9.8 5.0 - 15.0 mmol/L    eGFR 52.1 (L) >60.0 mL/min/1.73   Lipase    Collection Time: 09/28/24  9:16 PM    Specimen: Blood   Result Value Ref Range    Lipase 13 13 - 60 U/L   Lactic Acid, Plasma    Collection Time: 09/28/24  9:16 PM    Specimen: Blood   Result Value Ref Range    Lactate 1.2 0.5 - 2.0 mmol/L   Green Top (Gel)    Collection Time: 09/28/24  9:16 PM   Result Value Ref Range    Extra Tube Hold for add-ons.    Lavender Top    Collection Time: 09/28/24  9:16 PM   Result Value Ref Range    Extra Tube hold for add-on    Gold Top - SST    Collection Time: 09/28/24  9:16 PM   Result Value Ref Range    Extra Tube Hold for add-ons.    Light Blue Top    Collection Time: 09/28/24  9:16 PM   Result Value Ref Range    Extra Tube Hold for add-ons.    CBC Auto Differential    Collection Time: 09/28/24  9:16 PM    Specimen: Blood   Result Value Ref Range    WBC 12.03 (H) 3.40 - 10.80 10*3/mm3    RBC 3.91  3.77 - 5.28 10*6/mm3    Hemoglobin 9.5 (L) 12.0 - 15.9 g/dL    Hematocrit 32.1 (L) 34.0 - 46.6 %    MCV 82.1 79.0 - 97.0 fL    MCH 24.3 (L) 26.6 - 33.0 pg    MCHC 29.6 (L) 31.5 - 35.7 g/dL    RDW 14.6 12.3 - 15.4 %    RDW-SD 43.9 37.0 - 54.0 fl    MPV 10.1 6.0 - 12.0 fL    Platelets 296 140 - 450 10*3/mm3    Neutrophil % 60.1 42.7 - 76.0 %    Lymphocyte % 24.6 19.6 - 45.3 %    Monocyte % 11.3 5.0 - 12.0 %    Eosinophil % 3.1 0.3 - 6.2 %    Basophil % 0.5 0.0 - 1.5 %    Immature Grans % 0.4 0.0 - 0.5 %    Neutrophils, Absolute 7.23 (H) 1.70 - 7.00 10*3/mm3    Lymphocytes, Absolute 2.96 0.70 - 3.10 10*3/mm3    Monocytes, Absolute 1.36 (H) 0.10 - 0.90 10*3/mm3    Eosinophils, Absolute 0.37 0.00 - 0.40 10*3/mm3    Basophils, Absolute 0.06 0.00 - 0.20 10*3/mm3    Immature Grans, Absolute 0.05 0.00 - 0.05 10*3/mm3    nRBC 0.0 0.0 - 0.2 /100 WBC         RADIOLOGY  CT Abdomen Pelvis Without Contrast    Result Date: 9/28/2024  CT OF THE ABDOMEN PELVIS WITHOUT CONTRAST  HISTORY: Abdominal pain  COMPARISON: June 22, 2024  TECHNIQUE: Axial CT imaging was obtained through the abdomen and pelvis. No IV contrast was administered.  FINDINGS: Images through the lung bases demonstrate chronic scarring. Tiny nodules within the right middle lobe are stable when compared to January 2024. Consider CT follow-up in January 2026 to document a full 2 years of stability. No suspicious hepatic lesions are seen. The gallbladder is absent. Common bile duct is mildly prominent in size, measuring up to 1 cm. It may be postcholecystectomy in nature. Low-attenuation lesion is again noted within the spleen. Calcified granulomata are seen within the spleen. There is a small hiatal hernia. The duodenum and adrenal glands are normal. Pancreas also appears within normal limits. Nonobstructing stones are noted within the left kidney. There are multiple parapelvic cysts noted on the right. There is also a simple appearing cortical cyst on the right. There  are aortoiliac calcifications. Uterus is absent. There is mild distention of the rectum with stool, suggesting fecal impaction. There is some perirectal stranding and wall thickening, although improved when compared to prior exam. Urinary bladder appears unremarkable. There is colonic diverticulosis. No bowel obstruction is seen. The appendix is absent. Overall, stool burden throughout the colon appears increased. No acute osseous abnormalities are seen.      Mildly increased stool burden within the rectum, along with some rectal wall thickening and perirectal stranding. Appearance may reflect fecal impaction and proctitis, although it is improved when compared to prior study. Overall, stool burden throughout the colon is increased.  Radiation dose reduction techniques were utilized, including automated exposure control and exposure modulation based on body size.   This report was finalized on 9/28/2024 10:31 PM by Dr. Robyn Wood M.D on Workstation: [id number]         MEDICATIONS GIVEN IN ER  Medications   sodium chloride 0.9 % flush 10 mL (has no administration in time range)   cefTRIAXone (ROCEPHIN) 2,000 mg in sodium chloride 0.9 % 100 mL MBP (has no administration in time range)   sodium chloride 0.9 % bolus 500 mL (0 mL Intravenous Stopped 9/28/24 2205)   acetaminophen (TYLENOL) tablet 1,000 mg (1,000 mg Oral Given 9/28/24 2312)           OUTPATIENT MEDICATION MANAGEMENT:  Current Facility-Administered Medications Ordered in Epic   Medication Dose Route Frequency Provider Last Rate Last Admin    cefTRIAXone (ROCEPHIN) 2,000 mg in sodium chloride 0.9 % 100 mL MBP  2,000 mg Intravenous Once Merari Garcias PA-C        sodium chloride 0.9 % flush 10 mL  10 mL Intravenous PRN Merari Garcias PA-C         Current Outpatient Medications Ordered in Epic   Medication Sig Dispense Refill    acetaminophen (TYLENOL) 325 MG tablet Take 2 tablets by mouth Every 6 (Six) Hours As Needed for Mild Pain.       albuterol (ACCUNEB) 0.63 MG/3ML nebulizer solution Take 3 mL by nebulization Every 6 (Six) Hours As Needed for Shortness of Air.      apixaban (ELIQUIS) 2.5 MG tablet tablet Take 1 tablet by mouth Every 12 (Twelve) Hours. Indications: Atrial Fibrillation      arformoterol (BROVANA) 15 MCG/2ML nebulizer solution Take 2 mL by nebulization 2 (Two) Times a Day. 120 mL 11    benzonatate (TESSALON) 100 MG capsule Take 1 capsule by mouth 3 (Three) Times a Day As Needed for Cough. 20 capsule 0    bisacodyl (Dulcolax) 10 MG suppository Insert 1 suppository into the rectum Daily As Needed for Constipation.      budesonide (PULMICORT) 0.5 MG/2ML nebulizer solution Take 2 mL by nebulization 2 (Two) Times a Day. 360 mL 2    coenzyme Q10 50 MG capsule capsule Take 1 capsule by mouth Daily. Indications: nutritional support      diazePAM (VALIUM) 2 MG tablet Take 1 tablet by mouth 2 (Two) Times a Day.      dilTIAZem CD (CARDIZEM CD) 120 MG 24 hr capsule Take 1 capsule by mouth Daily.      glucosamine-chondroitin 500-400 MG capsule capsule Take 1 capsule by mouth 3 (Three) Times a Day With Meals. Indications: Joint Damage causing Pain and Loss of Function      Magnesium 500 MG tablet Take 1 tablet by mouth Daily.      ondansetron ODT (ZOFRAN-ODT) 4 MG disintegrating tablet Place 1 tablet on the tongue Daily As Needed for Nausea or Vomiting.      oxyCODONE (ROXICODONE) 5 MG immediate release tablet Take 1 tablet by mouth Every 8 (Eight) Hours As Needed for Moderate Pain.      pantoprazole (PROTONIX) 40 MG EC tablet Take 1 tablet by mouth 2 (Two) Times a Day Before Meals. 60 tablet 0    phenylephrine-mineral oil-petrolatum (PREPARATION H) 0.25-14-74.9 % ointment hemorrhoidal ointment Insert 1 Application into the rectum 2 (Two) Times a Day As Needed.      polyethylene glycol (MIRALAX) 17 g packet Take 17 g by mouth Daily. Indications: Constipation      sennosides-docusate (PERICOLACE) 8.6-50 MG per tablet Take 1 tablet by mouth 2  (Two) Times a Day.           PROGRESS, DATA ANALYSIS, CONSULTS, AND MEDICAL DECISION MAKING  ORDERS PLACED DURING THIS VISIT:  Orders Placed This Encounter   Procedures    CT Abdomen Pelvis Without Contrast    [place] Draw    Comprehensive Metabolic Panel    Lipase    Urinalysis With Microscopic If Indicated (No Culture) - Urine, Clean Catch    Lactic Acid, Plasma    CBC Auto Differential    Urinalysis, Microscopic Only - Urine, Clean Catch    Urinalysis With Culture If Indicated -    Soap suds enema    [facility] (on-call MD unless specified) Details    Insert Peripheral IV    Initiate Observation Status    CBC & Differential    Green Top (Gel)    Lavender Top    Gold Top - SST    Light Blue Top       All labs have been independently interpreted by me.  All radiology studies have been reviewed by me. All EKG's have been independently viewed and interpreted by me.  Discussion below represents my analysis of pertinent findings related to patient's condition, differential diagnosis, treatment plan and final disposition.    Differential diagnosis includes but is not limited to:   My differential diagnosis for abdominal pain includes but is not limited to:  Gastritis, gastroenteritis, peptic ulcer disease, GERD, esophageal perforation, acute appendicitis, mesenteric adenitis, Meckel’s diverticulum, epiploic appendagitis, diverticulitis, colon cancer, ulcerative colitis, Crohn’s disease, intussusception, small bowel obstruction, adhesions, ischemic bowel, perforated viscus, ileus, obstipation, biliary colic, cholecystitis, cholelithiasis, Garrett-Gee Coleman, hepatitis, pancreatitis, common bile duct obstruction, cholangitis, bile leak, splenic trauma, splenic rupture, splenic infarction, splenic abscess, abdominal abscess, ascites, spontaneous bacterial peritonitis, hernia, UTI, cystitis, prostatitis, ureterolithiasis, urinary obstruction, ovarian cyst, torsion, pregnancy, ectopic pregnancy, PID, pelvic abscess, mittelschmerz,  endometriosis, AAA, myocardial infarction, pneumonia, cancer, porphyria, DKA, medications, sickle cell, viral syndrome, zoster      ED Course:  ED Course as of 09/29/24 0003   Sat Sep 28, 2024   2033 I discussed the case with Dr. Martinez and he agrees to evaluate the patient at the bedside.    [CC]   2127 WBC(!): 12.03 [CC]   2127 Hemoglobin(!): 9.5  At baseline [CC]   2127 Bacteria, UA(!): Trace [CC]   2127 WBC, UA(!): 6-10 [CC]   2152 CT Abdomen Pelvis Without Contrast  My independent interpretation of the CT of the abdomen pelvis is moderate stool burden, some thickening of the rectum concerning for stercoral proctitis.  No large fecal impaction [CC]   2345 Patient is receiving an enema at this time.  Patient's daughter called and says that she recently moved in with them.  She says that she is ill and is unable to take care of her mother.  It appears that she will need to be admitted to the hospital for possible placement given that the family is unable to care for her at home. [CC]   2359 Spoke with DUGLAS Malone with [facility].  Reviewed history, exam, results, treatments.  She agrees admit the patient to Dr. Durbin.      [CC]      ED Course User Index  [CC] Merari Garcias PA-C           AS OF 00:03 EDT VITALS:    BP - 126/79  HR - 84  TEMP - 97.5 °F (36.4 °C)  O2 SATS - 95%      MDM:  Patient is an 89-year-old female presents emergency department today complaining of rectal pain and constipation.  On arrival here in the emergency department vitals are reassuring, she is afebrile.  My exam the patient appears uncomfortable but nontoxic.  She has generalized abdominal tenderness to palpation but no localized peritonitis.  Patient was evaluated with labs which are overall reassuring.  Her urine did have nitrates and bacteria in the urine, will send for culture and plan to treat.  Patient was given an enema here in the emergency department and tolerated well.  After speaking with her daughter about discharge home the  daughter was concerned as they do not feel they can come get her or care for her at home.  Patient recently moved in with her daughter.  I fear that she may need placement upon discharge.  Because of that she will need admission to the hospital.  Patient is stable at the time of admission.      COMPLEXITY OF CARE  The patient requires admission.        DIAGNOSIS  Final diagnoses:   Constipation, unspecified constipation type   Rectal pain   Acute UTI         DISPOSITION  ED Disposition       ED Disposition   Decision to Admit    Condition   --    Comment   Level of Care: Med/Surg [1]   Diagnosis: Constipation [525691]   Admitting Physician: HENRRY FOLEY [[id number]]   Attending Physician: HENRRY FOLEY [[id number]]   Is patient appropriate for Inpatient Observation Unit?: No [0]                      Please note that portions of this document were completed with a voice recognition program.    Note Disclaimer: At [facility], we believe that sharing information builds trust and better relationships. You are receiving this note because you recently visited [facility]. It is possible you will see health information before a provider has talked with you about it. This kind of information can be easy to misunderstand. To help you fully understand what it means for your health, we urge you to discuss this note with your provider.     Merari Garcias PA-C  09/29/24 0005

## 2024-09-29 NOTE — H&P
Patient Name:  Vonnie Coppola  YOB: 1935  MRN:  [id number]  Admit Date:  9/28/2024  Patient Care Team:  Christopher Leyva DO as PCP - General  Christopher Leyva DO as PCP - Family Medicine  Augustus Wilde MD as Consulting Physician (Hematology and Oncology)  Daniel Watson MD as Referring Physician (Hospitalist)      Subjective   History Present Illness     Chief Complaint   Patient presents with    Abdominal Pain     History of Present Illness    Ms. Coppola is a 89-year-old female with a past medical history of asthma, A-fib, hypertension, pulmonary embolism presents to [facility] ED due to not having a bowel movement in 4 days.  Patient is alert and oriented to person and time.  Reports that she recently sold her home and moved in with her daughter.  She denies any shortness of breath, chest pain, nausea, vomiting, diarrhea, abdominal pain, or dysuria.  She does report that she uses O2 at 2-1/2 L per nasal cannula every night or her oxygen saturation will drop into the 80s.  She reports that she developed a wound on her right leg by simply moving her right leg across the bed.  She has dressing changes done every day to her right leg and wraps bilateral lower extremities with Ace wraps.  Family not available at bedside.    Review of Systems   Respiratory:  Negative for shortness of breath.    Cardiovascular:  Negative for chest pain.   Gastrointestinal:  Positive for constipation. Negative for abdominal pain, diarrhea, nausea and vomiting.   Genitourinary:  Negative for difficulty urinating and dysuria.   Skin:  Positive for wound.        Personal History     Past Medical History:   Diagnosis Date    Arthritis     Asthma     Atrial fibrillation     per pt's daughter.States hx of a-fib in the past when stressed or tired.    Blind left eye     Breast cancer 1999    LEFT BREAST CA, LUMPECTOMY & RADS    Deep vein thrombosis     History of cataract     Hx of  radiation therapy 1999    APPROXIMATELY 40 TREATMENTS FOR LEFT BREAST CA    Hypertension     Pneumonia     PONV (postoperative nausea and vomiting)     Stroke      Past Surgical History:   Procedure Laterality Date    APPENDECTOMY      BLADDER SURGERY      BREAST BIOPSY Left 1999    MALIGNANT    BREAST LUMPECTOMY Left 1999    MALIGNANT    CATARACT EXTRACTION      COLONOSCOPY N/A 01/19/2024    Procedure: COLONOSCOPY to cecum with cold snare polypectomies;  Surgeon: Harshad Gaston MD;  Location: [facility] ENDOSCOPY;  Service: Gastroenterology;  Laterality: N/A;  pre- gi bleed, anemia  post- diverticulosis, polyps    ENDOSCOPY N/A 01/19/2024    Procedure: ESOPHAGOGASTRODUODENOSCOPY with biospy;  Surgeon: Harshad Gaston MD;  Location: [facility] ENDOSCOPY;  Service: Gastroenterology;  Laterality: N/A;  pre- gi bleed, anemia  post- erosive gastirits    GALLBLADDER SURGERY      HERNIA REPAIR      HYSTERECTOMY      INCISION AND DRAINAGE LEG Right 5/31/2024    Procedure: RIGHT LOWER EXTREMITY WOUND EXPLORATION, DEBRIDEMENT AND CONTROL OF BLEEDING;  Surgeon: Gerald Pedraza MD;  Location: [facility] MAIN OR;  Service: General;  Laterality: Right;    LASIK      LYMPHADENECTOMY      OOPHORECTOMY       Family History   Problem Relation Age of Onset    Other Mother         Cardiac disorder    Osteoarthritis Mother     Cataracts Mother     Macular degeneration Mother     Hypertension Father     Other Father         Cardiac disorder    Ovarian cancer Sister         70'S    Cancer Sister     Diabetes Sister     Hypertension Sister     Osteoarthritis Sister     Cancer Brother     Hypertension Brother     Osteoarthritis Brother     Colon cancer Maternal Grandmother     Cancer Maternal Grandmother     Ovarian cancer Paternal Grandmother         unknown    Cancer Other     Stroke Other     Breast cancer Neg Hx      Social History     Tobacco Use    Smoking status: Former     Current packs/day: 0.50     Average packs/day: 0.5  packs/day for 2.0 years (1.0 ttl pk-yrs)     Types: Cigarettes    Smokeless tobacco: Never    Tobacco comments:     quit 40 years ago   Vaping Use    Vaping status: Never Used   Substance Use Topics    Alcohol use: No    Drug use: No     No current facility-administered medications on file prior to encounter.     Current Outpatient Medications on File Prior to Encounter   Medication Sig Dispense Refill    acetaminophen (TYLENOL) 325 MG tablet Take 2 tablets by mouth Every 6 (Six) Hours As Needed for Mild Pain.      albuterol (ACCUNEB) 0.63 MG/3ML nebulizer solution Take 3 mL by nebulization Every 6 (Six) Hours As Needed for Shortness of Air.      apixaban (ELIQUIS) 2.5 MG tablet tablet Take 1 tablet by mouth Every 12 (Twelve) Hours. Indications: Atrial Fibrillation      arformoterol (BROVANA) 15 MCG/2ML nebulizer solution Take 2 mL by nebulization 2 (Two) Times a Day. 120 mL 11    benzonatate (TESSALON) 100 MG capsule Take 1 capsule by mouth 3 (Three) Times a Day As Needed for Cough. 20 capsule 0    bisacodyl (Dulcolax) 10 MG suppository Insert 1 suppository into the rectum Daily As Needed for Constipation.      budesonide (PULMICORT) 0.5 MG/2ML nebulizer solution Take 2 mL by nebulization 2 (Two) Times a Day. 360 mL 2    coenzyme Q10 50 MG capsule capsule Take 1 capsule by mouth Daily. Indications: nutritional support      diazePAM (VALIUM) 2 MG tablet Take 1 tablet by mouth 2 (Two) Times a Day.      dilTIAZem CD (CARDIZEM CD) 120 MG 24 hr capsule Take 1 capsule by mouth Daily.      glucosamine-chondroitin 500-400 MG capsule capsule Take 1 capsule by mouth 3 (Three) Times a Day With Meals. Indications: Joint Damage causing Pain and Loss of Function      Magnesium 500 MG tablet Take 1 tablet by mouth Daily.      ondansetron ODT (ZOFRAN-ODT) 4 MG disintegrating tablet Place 1 tablet on the tongue Daily As Needed for Nausea or Vomiting.      oxyCODONE (ROXICODONE) 5 MG immediate release tablet Take 1 tablet by mouth  Every 8 (Eight) Hours As Needed for Moderate Pain.      pantoprazole (PROTONIX) 40 MG EC tablet Take 1 tablet by mouth 2 (Two) Times a Day Before Meals. 60 tablet 0    phenylephrine-mineral oil-petrolatum (PREPARATION H) 0.25-14-74.9 % ointment hemorrhoidal ointment Insert 1 Application into the rectum 2 (Two) Times a Day As Needed.      polyethylene glycol (MIRALAX) 17 g packet Take 17 g by mouth Daily. Indications: Constipation      sennosides-docusate (PERICOLACE) 8.6-50 MG per tablet Take 1 tablet by mouth 2 (Two) Times a Day.       Allergies   Allergen Reactions    Cortisone Hives    Penicillins Hives     Beta lactam allergy details  Antibiotic reaction: hives  Age at reaction: unknown  Dose to reaction time: unknown  Reason for antibiotic: unknown  Epinephrine required for reaction?: no  Tolerated antibiotics: unknown    Tolerated Zosyn    Iodinated Contrast Media Unknown - Low Severity     Patient  passed out, she has had studies with contact  recently        Objective    Objective     Vital Signs  Temp:  [97.5 °F (36.4 °C)] 97.5 °F (36.4 °C)  Heart Rate:  [74-88] 79  Resp:  [18] 18  BP: (119-159)/(72-97) 120/75  SpO2:  [92 %-95 %] 94 %  on   ;   Device (Oxygen Therapy): room air  Body mass index is 36.28 kg/m².    Physical Exam  Vitals and nursing note reviewed.   Constitutional:       General: She is not in acute distress.  Cardiovascular:      Rate and Rhythm: Normal rate and regular rhythm.      Pulses: Normal pulses.      Heart sounds: Normal heart sounds.   Pulmonary:      Effort: Pulmonary effort is normal. No respiratory distress.      Breath sounds: Normal breath sounds.   Abdominal:      General: Bowel sounds are normal. There is no distension.      Palpations: Abdomen is soft.      Tenderness: There is no abdominal tenderness.   Musculoskeletal:      Right lower le+ Edema present.      Left lower le+ Edema present.   Skin:     General: Skin is warm and dry.      Comments: Right lower  extremity wrapped with Ace wrap.   Neurological:      Mental Status: She is alert. She is disoriented.      Comments: Alert and oriented to person and time.  When asked to state where she currently is, she states that she lives with her daughter.         Results Review:  I reviewed the patient's new clinical results.  I reviewed the patient's new imaging results.  Discussed with ED provider.    Lab Results (last 24 hours)       Procedure Component Value Units Date/Time    Urinalysis With Microscopic If Indicated (No Culture) - Urine, Clean Catch [[id number]]  (Abnormal) Collected: 09/28/24 2047    Specimen: Urine, Clean Catch Updated: 09/28/24 2110     Color, UA Yellow     Appearance, UA Clear     pH, UA 7.0     Specific Gravity, UA 1.011     Glucose, UA Negative     Ketones, UA Negative     Bilirubin, UA Negative     Blood, UA Negative     Protein, UA Negative     Leuk Esterase, UA Small (1+)     Nitrite, UA Positive     Urobilinogen, UA 0.2 E.U./dL    Urinalysis, Microscopic Only - Urine, Clean Catch [[id number]]  (Abnormal) Collected: 09/28/24 2047    Specimen: Urine, Clean Catch Updated: 09/28/24 2124     RBC, UA None Seen /HPF      WBC, UA 6-10 /HPF      Bacteria, UA Trace /HPF      Squamous Epithelial Cells, UA 0-2 /HPF      Hyaline Casts, UA None Seen /LPF      Methodology Manual Light Microscopy    CBC & Differential [[id number]]  (Abnormal) Collected: 09/28/24 2116    Specimen: Blood Updated: 09/28/24 2126    Narrative:      The following orders were created for panel order CBC & Differential.  Procedure                               Abnormality         Status                     ---------                               -----------         ------                     CBC Auto Differential[[id number]]        Abnormal            Final result                 Please view results for these tests on the individual orders.    Comprehensive Metabolic Panel [[id number]]  (Abnormal) Collected: 09/28/24 2116    Specimen:  Blood Updated: 09/28/24 2151     Glucose 88 mg/dL      BUN 16 mg/dL      Creatinine 1.04 mg/dL      Sodium 140 mmol/L      Potassium 3.9 mmol/L      Comment: Slight hemolysis detected by analyzer. Result may be falsely elevated.        Chloride 105 mmol/L      CO2 25.2 mmol/L      Calcium 9.1 mg/dL      Total Protein 6.3 g/dL      Albumin 3.3 g/dL      ALT (SGPT) 9 U/L      AST (SGOT) 16 U/L      Comment: Slight hemolysis detected by analyzer. Result may be falsely elevated.        Alkaline Phosphatase 89 U/L      Total Bilirubin 0.2 mg/dL      Globulin 3.0 gm/dL      A/G Ratio 1.1 g/dL      BUN/Creatinine Ratio 15.4     Anion Gap 9.8 mmol/L      eGFR 52.1 mL/min/1.73     Narrative:      GFR Normal >60  Chronic Kidney Disease <60  Kidney Failure <15    The GFR formula is only valid for adults with stable renal function between ages 18 and 70.    Lipase [[id number]]  (Normal) Collected: 09/28/24 2116    Specimen: Blood Updated: 09/28/24 2149     Lipase 13 U/L     Lactic Acid, Plasma [[id number]]  (Normal) Collected: 09/28/24 2116    Specimen: Blood Updated: 09/28/24 2146     Lactate 1.2 mmol/L     CBC Auto Differential [[id number]]  (Abnormal) Collected: 09/28/24 2116    Specimen: Blood Updated: 09/28/24 2126     WBC 12.03 10*3/mm3      RBC 3.91 10*6/mm3      Hemoglobin 9.5 g/dL      Hematocrit 32.1 %      MCV 82.1 fL      MCH 24.3 pg      MCHC 29.6 g/dL      RDW 14.6 %      RDW-SD 43.9 fl      MPV 10.1 fL      Platelets 296 10*3/mm3      Neutrophil % 60.1 %      Lymphocyte % 24.6 %      Monocyte % 11.3 %      Eosinophil % 3.1 %      Basophil % 0.5 %      Immature Grans % 0.4 %      Neutrophils, Absolute 7.23 10*3/mm3      Lymphocytes, Absolute 2.96 10*3/mm3      Monocytes, Absolute 1.36 10*3/mm3      Eosinophils, Absolute 0.37 10*3/mm3      Basophils, Absolute 0.06 10*3/mm3      Immature Grans, Absolute 0.05 10*3/mm3      nRBC 0.0 /100 WBC             Imaging Results (Last 24 Hours)       Procedure Component Value  Units Date/Time    CT Abdomen Pelvis Without Contrast [[id number]] Collected: 09/28/24 2223     Updated: 09/28/24 2234    Narrative:      CT OF THE ABDOMEN PELVIS WITHOUT CONTRAST     HISTORY: Abdominal pain     COMPARISON: June 22, 2024     TECHNIQUE: Axial CT imaging was obtained through the abdomen and pelvis.  No IV contrast was administered.     FINDINGS:  Images through the lung bases demonstrate chronic scarring. Tiny nodules  within the right middle lobe are stable when compared to January 2024.  Consider CT follow-up in January 2026 to document a full 2 years of  stability. No suspicious hepatic lesions are seen. The gallbladder is  absent. Common bile duct is mildly prominent in size, measuring up to 1  cm. It may be postcholecystectomy in nature. Low-attenuation lesion is  again noted within the spleen. Calcified granulomata are seen within the  spleen. There is a small hiatal hernia. The duodenum and adrenal glands  are normal. Pancreas also appears within normal limits. Nonobstructing  stones are noted within the left kidney. There are multiple parapelvic  cysts noted on the right. There is also a simple appearing cortical cyst  on the right. There are aortoiliac calcifications. Uterus is absent.  There is mild distention of the rectum with stool, suggesting fecal  impaction. There is some perirectal stranding and wall thickening,  although improved when compared to prior exam. Urinary bladder appears  unremarkable. There is colonic diverticulosis. No bowel obstruction is  seen. The appendix is absent. Overall, stool burden throughout the colon  appears increased. No acute osseous abnormalities are seen.       Impression:      Mildly increased stool burden within the rectum, along with some rectal  wall thickening and perirectal stranding. Appearance may reflect fecal  impaction and proctitis, although it is improved when compared to prior  study. Overall, stool burden throughout the colon is  increased.     Radiation dose reduction techniques were utilized, including automated  exposure control and exposure modulation based on body size.        This report was finalized on 9/28/2024 10:31 PM by Dr. Robyn Wood M.D on Workstation: [id number]               Results for orders placed during the hospital encounter of 01/16/24    Adult Transthoracic Echo Complete W/ Cont if Necessary Per Protocol    Interpretation Summary    Left ventricular systolic function is hyperdynamic (EF > 70%). Calculated left ventricular EF = 78.7% Left ventricular ejection fraction appears to be 61 - 65%.    Left ventricular diastolic function was normal.    The right ventricular cavity is borderline dilated.    The right atrial cavity is borderline dilated.    Estimated right ventricular systolic pressure from tricuspid regurgitation is markedly elevated (>55 mmHg).    No orders to display     Assessment/Plan   Assessment & Plan   Active Hospital Problems    Diagnosis  POA    **Constipation [K59.00]  Yes    UTI (urinary tract infection) [N39.0]  Yes    HTN (hypertension) [I10]  Yes    Anemia [D64.9]  Yes      Resolved Hospital Problems   No resolved problems to display.       Ms. Coppola is a 89-year-old female with a past medical history of asthma, A-fib, hypertension, pulmonary embolism presents to [facility] ED due to not having a bowel movement in 4 days.     Constipation  - Patient endorsing constipation reports no bowel movement for 4 days.   -CT abdomen and pelvis moderate stool burden, rectal wall thickening and perirectal stranding which could reflect fecal impaction or proctitis  -Received enema in the ED   -Routine and as needed bowel regimen    Urinary tract infection  -WBC elevated at 12.03  -Urinalysis positive for nitrate, small amount leukocytes, trace bacteria  -1 dose ceftriaxone ordered in the ED, will continue  -Urine culture pending  - Acute urinary retention protocol.    Anemia  -  Hemoglobin low on most recent labs, however, stable from prior. No evidence of overt blood loss. No indications for acute intervention at this time.    -Check CBC in the morning    Hypertension   -Most recent blood pressure 120/75, stable   -Home medication reconciliation not complete yet but has Cardizem listed for hypertension  -Will monitor blood pressure      Will consult CCP to assist with discharge planning needs.        SCDs for DVT prophylaxis.  Full code.  Discussed with patient.      CHAPO Jin  [facility]  09/29/24  03:20 EDT

## 2024-09-29 NOTE — PLAN OF CARE
Goal Outcome Evaluation:           Progress: no change  Outcome Evaluation: A&O. RA. Tylenol given for pain. Purewick in place. Reg diet. Pt had BM per ED RN after enema. Pt reports feeling better. IVF and IV abx per order. ACE wrap to lower extremities per pt request. States she was told to wear it at all times per her doctor.

## 2024-09-29 NOTE — ED PROVIDER NOTES
EMERGENCY DEPARTMENT MD ATTESTATION NOTE    Room Number:  04/04  PCP: Christopher Leyva DO  Independent Historians: Patient and EMS    HPI:  A complete HPI/ROS/PMH/PSH/SH/FH are unobtainable due to: None    Chronic or social conditions impacting patient care (Social Determinants of Health): None      Context: Vonnie Coppola is a 89 y.o. female with a medical history of hypertension, atrial fibrillation, renal insufficiency and CVA who presents to the ED c/o acute constipation with abdominal discomfort.  She has not had any fevers or vomiting.  She denies any chest pain or difficulties breathing.  She is currently living with family members.  She has been trying to manage her symptoms at home but the constipation and discomfort was escalating beyond her tolerance tonight so she came here for further evaluation.        Review of prior external notes (non-ED) -and- Review of prior external test results outside of this encounter: I independently reviewed the hematology and oncology progress note from July 30, 2024.  Patient had follow-up for concerns of venous thromboembolism as well as recurrent bleeding (epistaxis ) on Eliquis therapy.    Prescription drug monitoring program review:     N/A and COLLIN query complete and reviewed. Patient receives regular prescriptions for controlled substances.      PHYSICAL EXAM    I have reviewed the triage vital signs and nursing notes.    ED Triage Vitals   Temp Heart Rate Resp BP SpO2   09/28/24 2019 09/28/24 2019 09/28/24 2020 09/28/24 2019 09/28/24 2019   97.5 °F (36.4 °C) 74 18 127/72 93 %      Temp src Heart Rate Source Patient Position BP Location FiO2 (%)   -- -- -- -- --              Physical Exam  GENERAL: alert, appears uncomfortable, anxious  SKIN: Warm, dry, no rashes  HENT: Normocephalic, atraumatic  EYES: no scleral icterus  CV: regular rhythm, regular rate  RESPIRATORY: normal effort, lungs clear  ABDOMEN: soft, obese, mild to moderate tenderness to  palpation in the left lower quadrant but no peritoneal features elicited.  MUSCULOSKELETAL: no deformity, no asymmetry of the lower extremities  NEURO: alert, moves all extremities, follows commands      MEDICATIONS GIVEN IN ER  Medications   sodium chloride 0.9 % flush 10 mL (has no administration in time range)   sodium chloride 0.9 % bolus 500 mL (0 mL Intravenous Stopped 9/28/24 2205)   acetaminophen (TYLENOL) tablet 1,000 mg (1,000 mg Oral Given 9/28/24 2312)         ORDERS PLACED DURING THIS VISIT:  Orders Placed This Encounter   Procedures    CT Abdomen Pelvis Without Contrast    [place] Draw    Comprehensive Metabolic Panel    Lipase    Urinalysis With Microscopic If Indicated (No Culture) - Urine, Clean Catch    Lactic Acid, Plasma    CBC Auto Differential    Urinalysis, Microscopic Only - Urine, Clean Catch    Soap suds enema    [facility] (on-call MD unless specified) Details    Insert Peripheral IV    CBC & Differential    Green Top (Gel)    Lavender Top    Gold Top - SST    Light Blue Top         PROCEDURES  Procedures            PROGRESS, DATA ANALYSIS, CONSULTS, AND MEDICAL DECISION MAKING  All labs have been independently interpreted by me.  All radiology studies have been reviewed by me. All EKG's have been independently viewed and interpreted by me.  Discussion below represents my analysis of pertinent findings related to patient's condition, differential diagnosis, treatment plan and final disposition.    Differential diagnosis includes but is not limited to constipation, diverticulitis, bowel obstruction, UTI.    Clinical Scores:                   ED Course as of 09/29/24 0402   Sat Sep 28, 2024   2033 I discussed the case with Dr. Martinez and he agrees to evaluate the patient at the bedside.    [CC]   2127 WBC(!): 12.03 [CC]   2127 Hemoglobin(!): 9.5  At baseline [CC]   2127 Bacteria, UA(!): Trace [CC]   2127 WBC, UA(!): 6-10 [CC]   2152 CT Abdomen Pelvis Without Contrast  My independent interpretation  of the CT of the abdomen pelvis is moderate stool burden, some thickening of the rectum concerning for stercoral proctitis.  No large fecal impaction [CC]   2345 Patient is receiving an enema at this time.  Patient's daughter called and says that she recently moved in with them.  She says that she is ill and is unable to take care of her mother.  It appears that she will need to be admitted to the hospital for possible placement given that the family is unable to care for her at home. [CC]   2508 Spoke with DUGLAS Malone with [facility].  Reviewed history, exam, results, treatments.  She agrees admit the patient to Dr. Durbin.      [CC]      ED Course User Index  [CC] Merari Garcias, JEANETH       MDM:   I independently interpreted the abdomen CT study and my findings are: No free air, no small bowel obstruction pattern    Patient struggling at home with increasing constipation problems.  We also find her to have a UTI here.  Ordering some antibiotics to treat the UTI and an enema to see if we can help alleviate some of her stool burden.  Otherwise, she is afebrile and nontoxic-appearing.  There do not appear to be any metabolic derangements of concern.  Family is indicating that they are not able to care for her needs safely at home right now.  Will arrange to admit her to the hospitalist service for continued care and management tonight.      COMPLEXITY OF CARE  The patient requires admission.    Please note that portions of this document were completed with a voice recognition program.    Note Disclaimer: At [facility], we believe that sharing information builds trust and better relationships. You are receiving this note because you recently visited [facility]. It is possible you will see health information before a provider has talked with you about it. This kind of information can be easy to misunderstand. To help you fully understand what it means for your health, we urge you to discuss this note with your  provider.       Darron Martinez MD  09/29/24 6607

## 2024-09-29 NOTE — ED NOTES
.Nursing report ED to floor  Vonnie Coppola  89 y.o.  female    HPI :  HPI (Adult)  Stated Reason for Visit: pt also reports left knee pain 7/10 from fall approx 8 month sago.  History Obtained From: patient, EMS    Chief Complaint  Chief Complaint   Patient presents with    Abdominal Pain       Admitting doctor:   Adán Durbin MD    Admitting diagnosis:   The primary encounter diagnosis was Constipation, unspecified constipation type. Diagnoses of Rectal pain and Acute UTI were also pertinent to this visit.    Code status:   Current Code Status       Date Active Code Status Order ID Comments User Context       Prior            Allergies:   Cortisone, Penicillins, and Iodinated contrast media    Isolation:   No active isolations    Intake and Output    Intake/Output Summary (Last 24 hours) at 9/29/2024 0057  Last data filed at 9/28/2024 2205  Gross per 24 hour   Intake 500 ml   Output --   Net 500 ml       Weight:       09/28/24 2020   Weight: 104 kg (229 lb)       Most recent vitals:   Vitals:    09/28/24 2236 09/28/24 2336 09/29/24 0036 09/29/24 0037   BP: 151/81 126/79 119/97    Pulse: 88 84 84    Resp:       Temp:       SpO2: 95% 95%  92%   Weight:       Height:           Active LDAs/IV Access:   Lines, Drains & Airways       Active LDAs       Name Placement date Placement time Site Days    External Urinary Catheter 05/31/24  1505  --  120                    Labs (abnormal labs have a star):   Labs Reviewed   COMPREHENSIVE METABOLIC PANEL - Abnormal; Notable for the following components:       Result Value    Creatinine 1.04 (*)     Albumin 3.3 (*)     eGFR 52.1 (*)     All other components within normal limits    Narrative:     GFR Normal >60  Chronic Kidney Disease <60  Kidney Failure <15    The GFR formula is only valid for adults with stable renal function between ages 18 and 70.   URINALYSIS W/ MICROSCOPIC IF INDICATED (NO CULTURE) - Abnormal; Notable for the following components:    Leuk Esterase, UA  Small (1+) (*)     Nitrite, UA Positive (*)     All other components within normal limits   CBC WITH AUTO DIFFERENTIAL - Abnormal; Notable for the following components:    WBC 12.03 (*)     Hemoglobin 9.5 (*)     Hematocrit 32.1 (*)     MCH 24.3 (*)     MCHC 29.6 (*)     Neutrophils, Absolute 7.23 (*)     Monocytes, Absolute 1.36 (*)     All other components within normal limits   URINALYSIS, MICROSCOPIC ONLY - Abnormal; Notable for the following components:    WBC, UA 6-10 (*)     Bacteria, UA Trace (*)     All other components within normal limits   LIPASE - Normal   LACTIC ACID, PLASMA - Normal   RAINBOW DRAW    Narrative:     The following orders were created for panel order [facility] Draw.  Procedure                               Abnormality         Status                     ---------                               -----------         ------                     Green Top (Gel)[[id number]]                                  Final result               Lavender Top[[id number]]                                     Final result               Gold Top - SST[[id number]]                                   Final result               Light Blue Top[[id number]]                                   Final result                 Please view results for these tests on the individual orders.   URINALYSIS W/ CULTURE IF INDICATED   CBC AND DIFFERENTIAL    Narrative:     The following orders were created for panel order CBC & Differential.  Procedure                               Abnormality         Status                     ---------                               -----------         ------                     CBC Auto Differential[[id number]]        Abnormal            Final result                 Please view results for these tests on the individual orders.   GREEN TOP   LAVENDER TOP   GOLD TOP - SST   LIGHT BLUE TOP       EKG:   No orders to display       Meds given in ED:   Medications   sodium chloride 0.9 % flush 10 mL (has no  administration in time range)   cefTRIAXone (ROCEPHIN) 2,000 mg in sodium chloride 0.9 % 100 mL MBP (has no administration in time range)   sodium chloride 0.9 % bolus 500 mL (0 mL Intravenous Stopped 9/28/24 2205)   acetaminophen (TYLENOL) tablet 1,000 mg (1,000 mg Oral Given 9/28/24 2312)       Imaging results:  CT Abdomen Pelvis Without Contrast    Result Date: 9/28/2024  Mildly increased stool burden within the rectum, along with some rectal wall thickening and perirectal stranding. Appearance may reflect fecal impaction and proctitis, although it is improved when compared to prior study. Overall, stool burden throughout the colon is increased.  Radiation dose reduction techniques were utilized, including automated exposure control and exposure modulation based on body size.   This report was finalized on 9/28/2024 10:31 PM by Dr. Robyn Wood M.D on Workstation: [id number]         Social issues:   Social History     Socioeconomic History    Marital status:    Tobacco Use    Smoking status: Former     Current packs/day: 0.50     Average packs/day: 0.5 packs/day for 2.0 years (1.0 ttl pk-yrs)     Types: Cigarettes    Smokeless tobacco: Never    Tobacco comments:     quit 40 years ago   Vaping Use    Vaping status: Never Used   Substance and Sexual Activity    Alcohol use: No    Drug use: No    Sexual activity: Defer       Peripheral Neurovascular  Peripheral Neurovascular (Adult)  Peripheral Neurovascular WDL: WDL    Neuro Cognitive  Neuro Cognitive (Adult)  Cognitive/Neuro/Behavioral WDL: WDL, level of consciousness, orientation, speech, mood/behavior  Level of Consciousness: Alert  Orientation: oriented x 4  Mood/Behavior: anxious    Learning       Respiratory  Respiratory WDL  Respiratory WDL: WDL    Abdominal Pain       Pain Assessments  Pain (Adult)  (0-10) Pain Rating: Rest: 7  (0-10) Pain Rating: Activity: 7    NIH Stroke Scale       Vernell Jarrett RN  09/29/24 00:57 EDT

## 2024-09-29 NOTE — PLAN OF CARE
Problem: Adult Inpatient Plan of Care  Goal: Plan of Care Review  Outcome: Progressing  Flowsheets (Taken 9/29/2024 1719)  Progress: no change  Plan of Care Reviewed With: patient  Outcome Evaluation: pt slept between care. ace wraps ariel LE  removed from MD today and RN replaced them closer to the end of shift. IV fluid DC. pt tolerating diet well. pt has had a few BM today. tylenol x1. tolerated well. pure wick in place. spoke with daughter and she stated after she is feeling better she will living with her. will continue to treat per MD.     Problem: Adult Inpatient Plan of Care  Goal: Patient-Specific Goal (Individualized)  Outcome: Progressing  Flowsheets (Taken 9/29/2024 1719)  Patient-Specific Goals (Include Timeframe): to feel better  Individualized Care Needs: PRN and scheduled meds, pure wick, assist x1 with walker.

## 2024-09-30 LAB
ANION GAP SERPL CALCULATED.3IONS-SCNC: 9.1 MMOL/L (ref 5–15)
BASOPHILS # BLD AUTO: 0.03 10*3/MM3 (ref 0–0.2)
BASOPHILS NFR BLD AUTO: 0.3 % (ref 0–1.5)
BUN SERPL-MCNC: 11 MG/DL (ref 8–23)
BUN/CREAT SERPL: 11.8 (ref 7–25)
CALCIUM SPEC-SCNC: 8.5 MG/DL (ref 8.6–10.5)
CHLORIDE SERPL-SCNC: 111 MMOL/L (ref 98–107)
CO2 SERPL-SCNC: 23.9 MMOL/L (ref 22–29)
CREAT SERPL-MCNC: 0.93 MG/DL (ref 0.57–1)
DEPRECATED RDW RBC AUTO: 43.7 FL (ref 37–54)
EGFRCR SERPLBLD CKD-EPI 2021: 59.6 ML/MIN/1.73
EOSINOPHIL # BLD AUTO: 0.36 10*3/MM3 (ref 0–0.4)
EOSINOPHIL NFR BLD AUTO: 3.9 % (ref 0.3–6.2)
ERYTHROCYTE [DISTWIDTH] IN BLOOD BY AUTOMATED COUNT: 14.4 % (ref 12.3–15.4)
GLUCOSE SERPL-MCNC: 129 MG/DL (ref 65–99)
HCT VFR BLD AUTO: 29.9 % (ref 34–46.6)
HGB BLD-MCNC: 8.8 G/DL (ref 12–15.9)
IMM GRANULOCYTES # BLD AUTO: 0.02 10*3/MM3 (ref 0–0.05)
IMM GRANULOCYTES NFR BLD AUTO: 0.2 % (ref 0–0.5)
LYMPHOCYTES # BLD AUTO: 2.34 10*3/MM3 (ref 0.7–3.1)
LYMPHOCYTES NFR BLD AUTO: 25.3 % (ref 19.6–45.3)
MCH RBC QN AUTO: 24.3 PG (ref 26.6–33)
MCHC RBC AUTO-ENTMCNC: 29.4 G/DL (ref 31.5–35.7)
MCV RBC AUTO: 82.6 FL (ref 79–97)
MONOCYTES # BLD AUTO: 0.99 10*3/MM3 (ref 0.1–0.9)
MONOCYTES NFR BLD AUTO: 10.7 % (ref 5–12)
NEUTROPHILS NFR BLD AUTO: 5.5 10*3/MM3 (ref 1.7–7)
NEUTROPHILS NFR BLD AUTO: 59.6 % (ref 42.7–76)
NRBC BLD AUTO-RTO: 0 /100 WBC (ref 0–0.2)
PLATELET # BLD AUTO: 267 10*3/MM3 (ref 140–450)
PMV BLD AUTO: 10.1 FL (ref 6–12)
POTASSIUM SERPL-SCNC: 3.7 MMOL/L (ref 3.5–5.2)
RBC # BLD AUTO: 3.62 10*6/MM3 (ref 3.77–5.28)
SODIUM SERPL-SCNC: 144 MMOL/L (ref 136–145)
WBC NRBC COR # BLD AUTO: 9.24 10*3/MM3 (ref 3.4–10.8)

## 2024-09-30 PROCEDURE — G0378 HOSPITAL OBSERVATION PER HR: HCPCS

## 2024-09-30 PROCEDURE — 94799 UNLISTED PULMONARY SVC/PX: CPT

## 2024-09-30 PROCEDURE — 96366 THER/PROPH/DIAG IV INF ADDON: CPT

## 2024-09-30 PROCEDURE — 97530 THERAPEUTIC ACTIVITIES: CPT

## 2024-09-30 PROCEDURE — 97162 PT EVAL MOD COMPLEX 30 MIN: CPT

## 2024-09-30 PROCEDURE — 85025 COMPLETE CBC W/AUTO DIFF WBC: CPT | Performed by: STUDENT IN AN ORGANIZED HEALTH CARE EDUCATION/TRAINING PROGRAM

## 2024-09-30 PROCEDURE — 80048 BASIC METABOLIC PNL TOTAL CA: CPT | Performed by: STUDENT IN AN ORGANIZED HEALTH CARE EDUCATION/TRAINING PROGRAM

## 2024-09-30 PROCEDURE — 25010000002 CEFTRIAXONE PER 250 MG: Performed by: HOSPITALIST

## 2024-09-30 PROCEDURE — 94664 DEMO&/EVAL PT USE INHALER: CPT

## 2024-09-30 RX ADMIN — BUDESONIDE 0.5 MG: 0.5 INHALANT RESPIRATORY (INHALATION) at 08:55

## 2024-09-30 RX ADMIN — PANTOPRAZOLE SODIUM 40 MG: 40 TABLET, DELAYED RELEASE ORAL at 16:46

## 2024-09-30 RX ADMIN — DIAZEPAM 2 MG: 2 TABLET ORAL at 09:19

## 2024-09-30 RX ADMIN — SENNOSIDES AND DOCUSATE SODIUM 2 TABLET: 50; 8.6 TABLET ORAL at 20:06

## 2024-09-30 RX ADMIN — ARFORMOTEROL TARTRATE 15 MCG: 15 SOLUTION RESPIRATORY (INHALATION) at 20:53

## 2024-09-30 RX ADMIN — ARFORMOTEROL TARTRATE 15 MCG: 15 SOLUTION RESPIRATORY (INHALATION) at 08:52

## 2024-09-30 RX ADMIN — PANTOPRAZOLE SODIUM 40 MG: 40 TABLET, DELAYED RELEASE ORAL at 07:20

## 2024-09-30 RX ADMIN — ACETAMINOPHEN 325MG 650 MG: 325 TABLET ORAL at 16:46

## 2024-09-30 RX ADMIN — BUDESONIDE 0.5 MG: 0.5 INHALANT RESPIRATORY (INHALATION) at 20:53

## 2024-09-30 RX ADMIN — DIAZEPAM 2 MG: 2 TABLET ORAL at 20:06

## 2024-09-30 RX ADMIN — ACETAMINOPHEN 325MG 650 MG: 325 TABLET ORAL at 09:31

## 2024-09-30 RX ADMIN — ACETAMINOPHEN 325MG 650 MG: 325 TABLET ORAL at 22:43

## 2024-09-30 RX ADMIN — POLYETHYLENE GLYCOL 3350 17 G: 17 POWDER, FOR SOLUTION ORAL at 20:06

## 2024-09-30 RX ADMIN — APIXABAN 2.5 MG: 2.5 TABLET, FILM COATED ORAL at 20:06

## 2024-09-30 RX ADMIN — APIXABAN 2.5 MG: 2.5 TABLET, FILM COATED ORAL at 09:19

## 2024-09-30 RX ADMIN — DILTIAZEM HYDROCHLORIDE 120 MG: 120 CAPSULE, COATED, EXTENDED RELEASE ORAL at 09:19

## 2024-09-30 RX ADMIN — CEFTRIAXONE SODIUM 1000 MG: 1 INJECTION, POWDER, FOR SOLUTION INTRAMUSCULAR; INTRAVENOUS at 03:22

## 2024-09-30 NOTE — PLAN OF CARE
Goal Outcome Evaluation:  Plan of Care Reviewed With: patient        Progress: no change  Outcome Evaluation: Pt is alert and oriented x4, occasionally confused. Takes pills whole. x1 tylenol given today. Pt was up in the chair today, purewick discontinued. Pt says she just wants to go home. Will cont plan of care.

## 2024-09-30 NOTE — PROGRESS NOTES
Name: Vonnie Coppola ADMIT: 2024   : 1935  PCP: Christopher Leyva DO    MRN: [id number] LOS: 0 days   AGE/SEX: 89 y.o. female  ROOM: [facility]     Subjective   Subjective   Patient seen and examined this morning.  Hospital day 2.  She is awake and alert, resting in bed.  She states she is feeling much better today when compared to prior.       Objective   Objective   Vital Signs  Temp:  [97.1 °F (36.2 °C)-97.7 °F (36.5 °C)] 97.6 °F (36.4 °C)  Heart Rate:  [72-77] 74  Resp:  [16-18] 18  BP: (113-126)/(64-80) 126/80  SpO2:  [92 %-98 %] 98 %  on  Flow (L/min):  [2] 2;   Device (Oxygen Therapy): nasal cannula  Body mass index is 36.28 kg/m².  Physical Exam  Vitals and nursing note reviewed.   Constitutional:       General: She is not in acute distress.     Appearance: She is overweight.      Comments: Chronically ill-appearing   HENT:      Head: Atraumatic.      Right Ear: External ear normal.      Left Ear: External ear normal.   Cardiovascular:      Rate and Rhythm: Normal rate and regular rhythm.      Pulses: Normal pulses.      Heart sounds: Normal heart sounds.   Pulmonary:      Effort: Pulmonary effort is normal.      Breath sounds: Normal breath sounds. No wheezing.   Abdominal:      General: There is no distension.      Palpations: Abdomen is soft.      Tenderness: There is no abdominal tenderness.   Musculoskeletal:      Comments: Bilateral lower extremity wounds, wrapped with dressing, C/D/I   Skin:     General: Skin is warm and dry.   Neurological:      General: No focal deficit present.      Mental Status: She is alert and oriented to person, place, and time.       Results Review     I reviewed the patient's new clinical results.  Results from last 7 days   Lab Units 24   WBC 10*3/mm3 10.60 12.03*   HEMOGLOBIN g/dL 8.9* 9.5*   PLATELETS 10*3/mm3 268 296     Results from last 7 days   Lab Units 24   SODIUM mmol/L 141 140   POTASSIUM  "mmol/L 3.5 3.9   CHLORIDE mmol/L 108* 105   CO2 mmol/L 24.4 25.2   BUN mg/dL 14 16   CREATININE mg/dL 0.91 1.04*   GLUCOSE mg/dL 84 88   EGFR mL/min/1.73 61.2 52.1*     Results from last 7 days   Lab Units 09/28/24  2116   ALBUMIN g/dL 3.3*   BILIRUBIN mg/dL 0.2   ALK PHOS U/L 89   AST (SGOT) U/L 16   ALT (SGPT) U/L 9     Results from last 7 days   Lab Units 09/29/24  0411 09/28/24  2116   CALCIUM mg/dL 8.3* 9.1   ALBUMIN g/dL  --  3.3*     Results from last 7 days   Lab Units 09/28/24  2116   LACTATE mmol/L 1.2     No results found for: \"HGBA1C\", \"POCGLU\"    CT Abdomen Pelvis Without Contrast    Result Date: 9/28/2024  Mildly increased stool burden within the rectum, along with some rectal wall thickening and perirectal stranding. Appearance may reflect fecal impaction and proctitis, although it is improved when compared to prior study. Overall, stool burden throughout the colon is increased.  Radiation dose reduction techniques were utilized, including automated exposure control and exposure modulation based on body size.   This report was finalized on 9/28/2024 10:31 PM by Dr. Rboyn Wood M.D on Workstation: [id number]       I have personally reviewed all medications:  Scheduled Medications  apixaban, 2.5 mg, Oral, Q12H  arformoterol, 15 mcg, Nebulization, BID - RT  budesonide, 0.5 mg, Nebulization, BID - RT  cefTRIAXone, 1,000 mg, Intravenous, Q24H  diazePAM, 2 mg, Oral, BID  dilTIAZem CD, 120 mg, Oral, Daily  pantoprazole, 40 mg, Oral, BID AC  senna-docusate sodium, 2 tablet, Oral, BID   And  polyethylene glycol, 17 g, Oral, BID    Infusions   Diet  Diet: Gastrointestinal; Lactose-Controlled; Fluid Consistency: Thin (IDDSI 0)    I have personally reviewed:  [x]  Laboratory   [x]  Microbiology   [x]  Radiology   []  EKG/Telemetry  []  Cardiology/Vascular   []  Pathology    []  Records       Assessment/Plan     Active Hospital Problems    Diagnosis  POA    **Constipation [K59.00]  Yes    Generalized " abdominal pain [R10.84]  Yes    UTI (urinary tract infection) [N39.0]  Yes    Atrial fibrillation [I48.91]  Yes    HTN (hypertension) [I10]  Yes    Anemia [D64.9]  Yes      Resolved Hospital Problems   No resolved problems to display.       89 y.o. female admitted with Constipation.    Imaging obtained on arrival reviewed, showing stool burden, possibly proctitis. Bowel regimen adjusted, she has since had bowel movements, continue this as prescribed and monitor. May need to consider repeat imaging prior to discharge, based on clinical course. Generalized abdominal pain improving.    She is being treated for urinary tract infection, responding well to therapy, WBC count has improved, continue treatments as prescribed.    Blood pressure acceptable, continue treatments and continue to monitor.    Hemoglobin low on most recent labs, repeat labs pending, no evidence of bleeding, will follow up to guide management. Repeat CBC in AM.    Patient with chronic lower extremity wounds, wound care consulted, follow up their evaluation.    Generalized weakness, PT is consulted, follow up their recommendations.    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    Asthma appears stable, no acute exacerbation, continue treatments as ordered for now and monitor.    All workup, problems and plans new to me today. First day taking care of patient.        SCDs for DVT prophylaxis.  Full code.  Discussed with patient and nursing staff.  Anticipate discharge home with home health  today if cleared by consultants.  Expected discharge date/ time has not been documented.      Riley Mathew DO  [facility]  09/30/24

## 2024-09-30 NOTE — THERAPY EVALUATION
Patient Name: Vonnie Coppola  : 1935    MRN: [id number]                              Today's Date: 2024       Admit Date: 2024    Visit Dx:     ICD-10-CM ICD-9-CM   1. Constipation, unspecified constipation type  K59.00 564.00   2. Rectal pain  K62.89 569.42   3. Acute UTI  N39.0 599.0     Patient Active Problem List   Diagnosis    GI bleed    Anemia    HTN (hypertension)    History of breast cancer    Asthma    History of CVA (cerebrovascular accident)    Dehydration    Renal insufficiency    Pulmonary nodule    Adnexal cyst    Nausea    Atrial fibrillation    History of pulmonary embolism    Class 2 severe obesity with serious comorbidity in adult    Thrombocytopenia    Cellulitis of right leg    Acute cystitis    Leg hematoma, right, subsequent encounter    Obesity (BMI 35.0-39.9 without comorbidity)    Morbid (severe) obesity due to excess calories (*specify comorbidity)    Constipation    UTI (urinary tract infection)    Deep vein thrombosis     Past Medical History:   Diagnosis Date    Arthritis     Asthma     Atrial fibrillation     per pt's daughter.States hx of a-fib in the past when stressed or tired.    Blind left eye     Breast cancer     LEFT BREAST CA, LUMPECTOMY & RADS    Deep vein thrombosis     History of cataract     Hx of radiation therapy     APPROXIMATELY 40 TREATMENTS FOR LEFT BREAST CA    Hypertension     Pneumonia     PONV (postoperative nausea and vomiting)     Stroke      Past Surgical History:   Procedure Laterality Date    APPENDECTOMY      BLADDER SURGERY      BREAST BIOPSY Left     MALIGNANT    BREAST LUMPECTOMY Left     MALIGNANT    CATARACT EXTRACTION      COLONOSCOPY N/A 2024    Procedure: COLONOSCOPY to cecum with cold snare polypectomies;  Surgeon: Harshad Gaston MD;  Location: [facility] ENDOSCOPY;  Service: Gastroenterology;  Laterality: N/A;  pre- gi bleed, anemia  post- diverticulosis, polyps    ENDOSCOPY N/A 2024    Procedure:  ESOPHAGOGASTRODUODENOSCOPY with biospy;  Surgeon: Harshad Gaston MD;  Location: [facility] ENDOSCOPY;  Service: Gastroenterology;  Laterality: N/A;  pre- gi bleed, anemia  post- erosive gastirits    GALLBLADDER SURGERY      HERNIA REPAIR      HYSTERECTOMY      INCISION AND DRAINAGE LEG Right 5/31/2024    Procedure: RIGHT LOWER EXTREMITY WOUND EXPLORATION, DEBRIDEMENT AND CONTROL OF BLEEDING;  Surgeon: Gerald Pedraza MD;  Location: [facility] MAIN OR;  Service: General;  Laterality: Right;    LASIK      LYMPHADENECTOMY      OOPHORECTOMY        General Information       Row Name 09/30/24 1119          Physical Therapy Time and Intention    Document Type evaluation  -EJ     Mode of Treatment physical therapy  -       Row Name 09/30/24 1119          General Information    Patient Profile Reviewed yes  -EJ     Prior Level of Function independent:;min assist:;all household mobility;ADL's  uses rollator, ambulates short distances, daughter assists w ADLs if needed but pt reports mostly independent  -EJ     Existing Precautions/Restrictions fall  -EJ     Barriers to Rehab none identified  -EJ       Row Name 09/30/24 1119          Living Environment    People in Home child(christine), adult  daughter  -EJ       Row Name 09/30/24 1119          Home Main Entrance    Number of Stairs, Main Entrance none  -EJ       Row Name 09/30/24 1119          Stairs Within Home, Primary    Number of Stairs, Within Home, Primary none  -EJ       Row Name 09/30/24 1119          Cognition    Orientation Status (Cognition) oriented x 4  -       Row Name 09/30/24 1119          Safety Issues, Functional Mobility    Impairments Affecting Function (Mobility) strength;endurance/activity tolerance  -EJ               User Key  (r) = Recorded By, (t) = Taken By, (c) = Cosigned By      Initials Name Provider Type    EJ Ivory Adams, PT Physical Therapist                   Mobility       Row Name 09/30/24 1120          Bed Mobility    Bed Mobility  supine-sit  -EJ     Supine-Sit [place] (Bed Mobility) verbal cues;standby assist;contact guard  -EJ     Assistive Device (Bed Mobility) head of bed elevated;bed rails  -EJ       Row Name 09/30/24 1120          Sit-Stand Transfer    Sit-Stand [place] (Transfers) verbal cues;moderate assist (50% patient effort);2 person assist  -EJ     Assistive Device (Sit-Stand Transfers) walker, front-wheeled  -EJ     Comment, (Sit-Stand Transfer) cues for hand placement and upright posture  -EJ       Row Name 09/30/24 1120          Gait/Stairs (Locomotion)    [place] Level (Gait) verbal cues;nonverbal cues (demo/gesture);minimum assist (75% patient effort)  -EJ     Assistive Device (Gait) walker, front-wheeled  -EJ     Distance in Feet (Gait) 10  x 2  -EJ     Deviations/Abnormal Patterns (Gait) prashanth decreased;stride length decreased  -EJ     Bilateral Gait Deviations forward flexed posture;heel strike decreased  -EJ     Comment, (Gait/Stairs) slow pace, forward flexed, able to ambulate to BR and then to chair  -EJ               User Key  (r) = Recorded By, (t) = Taken By, (c) = Cosigned By      Initials Name Provider Type     Ivory Adams, PT Physical Therapist                   Obj/Interventions       Row Name 09/30/24 1121          Range of Motion Comprehensive    General Range of Motion no range of motion deficits identified  -EJ       Row Name 09/30/24 1121          Strength Comprehensive (MMT)    Comment, General Manual Muscle Testing (MMT) Assessment generalized weakness  -EJ       Row Name 09/30/24 1121          Balance    Balance Assessment sitting static balance;standing static balance;standing dynamic balance  -EJ     Static Sitting Balance standby assist;supervision  -EJ     Position, Sitting Balance sitting edge of bed  -EJ     Static Standing Balance contact guard;minimal assist  -EJ     Dynamic Standing Balance minimal assist  -EJ     Position/Device Used, Standing Balance walker,  front-wheeled  -EJ               User Key  (r) = Recorded By, (t) = Taken By, (c) = Cosigned By      Initials Name Provider Type    Ivory Velasquez, PT Physical Therapist                   Goals/Plan       Row Name 09/30/24 1124          Bed Mobility Goal 1 (PT)    Activity/Assistive Device (Bed Mobility Goal 1, PT) bed mobility activities, all  -EJ     [place] Level/Cues Needed (Bed Mobility Goal 1, PT) standby assist  -EJ     Time Frame (Bed Mobility Goal 1, PT) 1 week  -EJ       Row Name 09/30/24 1124          Transfer Goal 1 (PT)    Activity/Assistive Device (Transfer Goal 1, PT) transfers, all;walker, rolling  -EJ     [place] Level/Cues Needed (Transfer Goal 1, PT) contact guard required  -EJ     Time Frame (Transfer Goal 1, PT) 1 week  -EJ       Row Name 09/30/24 1124          Gait Training Goal 1 (PT)    Activity/Assistive Device (Gait Training Goal 1, PT) gait (walking locomotion);walker, rolling  -EJ     [place] Level (Gait Training Goal 1, PT) contact guard required  -EJ     Distance (Gait Training Goal 1, PT) 50  -EJ     Time Frame (Gait Training Goal 1, PT) 1 week  -EJ       Row Name 09/30/24 1124          Therapy Assessment/Plan (PT)    Planned Therapy Interventions (PT) balance training;bed mobility training;gait training;home exercise program;stretching;strengthening;stair training;ROM (range of motion);patient/family education;transfer training  -EJ               User Key  (r) = Recorded By, (t) = Taken By, (c) = Cosigned By      Initials Name Provider Type    Ivory Velasquez, PT Physical Therapist                   Clinical Impression       Row Name 09/30/24 1121          Pain    Pretreatment Pain Rating 0/10 - no pain  -EJ     Posttreatment Pain Rating 0/10 - no pain  -EJ       Row Name 09/30/24 1121          Plan of Care Review    Plan of Care Reviewed With patient  -EJ     Outcome Evaluation Pt seen for PT this am. SHe was admitted to [facility] on 9/28/24 w constipation. PT w  hx of HTN, CVA, A fib, and PE. At baseline, pt lives at home w her daughter. She uses rollator for ambulation and states she is mostly independent w ADLs, but daughter is able to assist if needed. Today, pt presents w generalized weakness, decreased activity tolerance, and overall decreased functional mobility. She was able to transition to EOB w SBA/CGA. She stood w Mod A x 2 using Rwx. Very forward flexed w cues for upright posture. Pt ambulated approx 10 ft x 2 to BR and then to chair. She is hopeful to return home w family assistance, but may benefit from SNU pending progress. Pt will continue to benefit from skilled PT to maximize safety, strength, and independence w mobility.  -EJ       Row Name 09/30/24 1121          Therapy Assessment/Plan (PT)    Rehab Potential (PT) good, to achieve stated therapy goals  -EJ     Criteria for Skilled Interventions Met (PT) yes  -EJ     Therapy Frequency (PT) 5 times/wk  -EJ       Row Name 09/30/24 1121          Positioning and Restraints    Pre-Treatment Position in bed  -EJ     Post Treatment Position chair  -EJ     In Chair notified nsg;reclined;call light within reach;encouraged to call for assist;exit alarm on  -EJ               User Key  (r) = Recorded By, (t) = Taken By, (c) = Cosigned By      Initials Name Provider Type    Ivory Velasquez, PT Physical Therapist                   Outcome Measures       Row Name 09/30/24 1124          How much help from another person do you currently need...    Turning from your back to your side while in flat bed without using bedrails? 3  -EJ     Moving from lying on back to sitting on the side of a flat bed without bedrails? 3  -EJ     Moving to and from a bed to a chair (including a wheelchair)? 2  -EJ     Standing up from a chair using your arms (e.g., wheelchair, bedside chair)? 2  -EJ     Climbing 3-5 steps with a railing? 1  -EJ     To walk in hospital room? 3  -EJ     AM-PAC 6 Clicks Score (PT) 14  -EJ     Highest Level  of Mobility Goal 4 --> Transfer to chair/commode  -VERONICA               User Key  (r) = Recorded By, (t) = Taken By, (c) = Cosigned By      Initials Name Provider Type    Ivory Velasquez, PT Physical Therapist                                 Physical Therapy Education       Title: PT OT SLP Therapies (Not Started)       Topic: Physical Therapy (Not Started)       Point: Mobility training (Not Started)       Learner Progress:  Not documented in this visit.              Point: Home exercise program (Not Started)       Learner Progress:  Not documented in this visit.              Point: Body mechanics (Not Started)       Learner Progress:  Not documented in this visit.              Point: Precautions (Not Started)       Learner Progress:  Not documented in this visit.                                  PT Recommendation and Plan  Planned Therapy Interventions (PT): balance training, bed mobility training, gait training, home exercise program, stretching, strengthening, stair training, ROM (range of motion), patient/family education, transfer training  Plan of Care Reviewed With: patient  Outcome Evaluation: Pt seen for PT this am. SHe was admitted to [facility] on 9/28/24 w constipation. PT w hx of HTN, CVA, A fib, and PE. At baseline, pt lives at home w her daughter. She uses rollator for ambulation and states she is mostly independent w ADLs, but daughter is able to assist if needed. Today, pt presents w generalized weakness, decreased activity tolerance, and overall decreased functional mobility. She was able to transition to EOB w SBA/CGA. She stood w Mod A x 2 using Rwx. Very forward flexed w cues for upright posture. Pt ambulated approx 10 ft x 2 to BR and then to chair. She is hopeful to return home w family assistance, but may benefit from SNU pending progress. Pt will continue to benefit from skilled PT to maximize safety, strength, and independence w mobility.     Time Calculation:         PT Charges       Row Name  09/30/24 1125             Time Calculation    Start Time 1045  -EJ      Stop Time 1110  -EJ      Time Calculation (min) 25 min  -EJ      PT Received On 09/30/24  -EJ      PT - Next Appointment 10/01/24  -EJ      PT Goal Re-Cert Due Date 10/07/24  -EJ         Time Calculation- PT    Total Timed Code Minutes- PT 20 minute(s)  -EJ                User Key  (r) = Recorded By, (t) = Taken By, (c) = Cosigned By      Initials Name Provider Type     Ivory Adams, PT Physical Therapist                  Therapy Charges for Today       Code Description Service Date Service Provider Modifiers Qty    95196793439  PT EVAL MOD COMPLEXITY 3 9/30/2024 Ivory Adams, PT GP 1    [id number]  PT THERAPEUTIC ACT EA 15 MIN 9/30/2024 Ivory Adams, PT GP 1            PT G-Codes  AM-PAC 6 Clicks Score (PT): 14  PT Discharge Summary  Anticipated Discharge Disposition (PT): home with assist, home with 24/7 care, skilled nursing facility    Ivory Adams, PT  9/30/2024

## 2024-09-30 NOTE — NURSING NOTE
CWCN: We see patient per request of the floor nurse regarding skin issue on rt lower leg. Patient is sitting in chair with legs elevated, hx of lymphedema and compression wrap. ACE bandages were removed. Upon assessment is noted mild swollen appearance on both lower extremities, superficial partial-thickness skin loss on rt lower leg, and scab x 2 present, as a result of a hematoma that required debridement in the past but is now nearly healed.  Vaseline gauze on small open wound, 4 x 4 gauze, Kerlix dressing were applied and compression wrap loosely applied too on bilateral lower leg/feet.  Wound care order have been implemented into Epic.   Please re-consult for any additional needs.

## 2024-09-30 NOTE — PLAN OF CARE
Goal Outcome Evaluation:           Progress: no change  Outcome Evaluation: VSS. Pt is A&Ox4, occasionally forgetful. Pt has purewick for nocturia. IV abx running per order. PRN tylenol x1 and PRN IV zofran x1. She has rested comfortably through the shift. Several occurences of watery diarrhea through the night. No further complaints voiced.

## 2024-09-30 NOTE — PLAN OF CARE
Goal Outcome Evaluation:  Plan of Care Reviewed With: patient           Outcome Evaluation: Pt seen for PT this am. SHe was admitted to [facility] on 9/28/24 w constipation. PT w hx of HTN, CVA, A fib, and PE. At baseline, pt lives at home w her daughter. She uses rollator for ambulation and states she is mostly independent w ADLs, but daughter is able to assist if needed. Today, pt presents w generalized weakness, decreased activity tolerance, and overall decreased functional mobility. She was able to transition to EOB w SBA/CGA. She stood w Mod A x 2 using Rwx. Very forward flexed w cues for upright posture. Pt ambulated approx 10 ft x 2 to BR and then to chair. She is hopeful to return home w family assistance, but may benefit from SNU pending progress. Pt will continue to benefit from skilled PT to maximize safety, strength, and independence w mobility.      Anticipated Discharge Disposition (PT): home with assist, home with 24/7 care, skilled nursing facility

## 2024-10-01 ENCOUNTER — READMISSION MANAGEMENT (OUTPATIENT)
Dept: CALL CENTER | Facility: HOSPITAL | Age: 89
End: 2024-10-01
Payer: MEDICARE

## 2024-10-01 VITALS
TEMPERATURE: 97.1 F | WEIGHT: 218.03 LBS | DIASTOLIC BLOOD PRESSURE: 64 MMHG | HEART RATE: 74 BPM | HEIGHT: 65 IN | OXYGEN SATURATION: 93 % | BODY MASS INDEX: 36.33 KG/M2 | SYSTOLIC BLOOD PRESSURE: 116 MMHG | RESPIRATION RATE: 16 BRPM

## 2024-10-01 PROBLEM — R10.84 GENERALIZED ABDOMINAL PAIN: Status: ACTIVE | Noted: 2024-10-01

## 2024-10-01 LAB
ANION GAP SERPL CALCULATED.3IONS-SCNC: 7 MMOL/L (ref 5–15)
BASOPHILS # BLD AUTO: 0.04 10*3/MM3 (ref 0–0.2)
BASOPHILS NFR BLD AUTO: 0.5 % (ref 0–1.5)
BUN SERPL-MCNC: 13 MG/DL (ref 8–23)
BUN/CREAT SERPL: 14.6 (ref 7–25)
CALCIUM SPEC-SCNC: 8.3 MG/DL (ref 8.6–10.5)
CHLORIDE SERPL-SCNC: 112 MMOL/L (ref 98–107)
CO2 SERPL-SCNC: 26 MMOL/L (ref 22–29)
CREAT SERPL-MCNC: 0.89 MG/DL (ref 0.57–1)
DEPRECATED RDW RBC AUTO: 42.9 FL (ref 37–54)
EGFRCR SERPLBLD CKD-EPI 2021: 62.8 ML/MIN/1.73
EOSINOPHIL # BLD AUTO: 0.39 10*3/MM3 (ref 0–0.4)
EOSINOPHIL NFR BLD AUTO: 5.3 % (ref 0.3–6.2)
ERYTHROCYTE [DISTWIDTH] IN BLOOD BY AUTOMATED COUNT: 14.5 % (ref 12.3–15.4)
GLUCOSE SERPL-MCNC: 78 MG/DL (ref 65–99)
HCT VFR BLD AUTO: 27.2 % (ref 34–46.6)
HGB BLD-MCNC: 8.2 G/DL (ref 12–15.9)
IMM GRANULOCYTES # BLD AUTO: 0.02 10*3/MM3 (ref 0–0.05)
IMM GRANULOCYTES NFR BLD AUTO: 0.3 % (ref 0–0.5)
LYMPHOCYTES # BLD AUTO: 1.87 10*3/MM3 (ref 0.7–3.1)
LYMPHOCYTES NFR BLD AUTO: 25.4 % (ref 19.6–45.3)
MCH RBC QN AUTO: 24.5 PG (ref 26.6–33)
MCHC RBC AUTO-ENTMCNC: 30.1 G/DL (ref 31.5–35.7)
MCV RBC AUTO: 81.2 FL (ref 79–97)
MONOCYTES # BLD AUTO: 0.87 10*3/MM3 (ref 0.1–0.9)
MONOCYTES NFR BLD AUTO: 11.8 % (ref 5–12)
NEUTROPHILS NFR BLD AUTO: 4.18 10*3/MM3 (ref 1.7–7)
NEUTROPHILS NFR BLD AUTO: 56.7 % (ref 42.7–76)
NRBC BLD AUTO-RTO: 0 /100 WBC (ref 0–0.2)
PLATELET # BLD AUTO: 238 10*3/MM3 (ref 140–450)
PMV BLD AUTO: 9.8 FL (ref 6–12)
POTASSIUM SERPL-SCNC: 3.6 MMOL/L (ref 3.5–5.2)
RBC # BLD AUTO: 3.35 10*6/MM3 (ref 3.77–5.28)
SODIUM SERPL-SCNC: 145 MMOL/L (ref 136–145)
WBC NRBC COR # BLD AUTO: 7.37 10*3/MM3 (ref 3.4–10.8)

## 2024-10-01 PROCEDURE — 80048 BASIC METABOLIC PNL TOTAL CA: CPT | Performed by: STUDENT IN AN ORGANIZED HEALTH CARE EDUCATION/TRAINING PROGRAM

## 2024-10-01 PROCEDURE — G0378 HOSPITAL OBSERVATION PER HR: HCPCS

## 2024-10-01 PROCEDURE — 85025 COMPLETE CBC W/AUTO DIFF WBC: CPT | Performed by: STUDENT IN AN ORGANIZED HEALTH CARE EDUCATION/TRAINING PROGRAM

## 2024-10-01 PROCEDURE — 96366 THER/PROPH/DIAG IV INF ADDON: CPT

## 2024-10-01 PROCEDURE — 25010000002 CEFTRIAXONE PER 250 MG: Performed by: HOSPITALIST

## 2024-10-01 RX ORDER — POLYETHYLENE GLYCOL 3350 17 G/17G
17 POWDER, FOR SOLUTION ORAL 2 TIMES DAILY
Qty: 30 EACH | Refills: 0 | Status: SHIPPED | OUTPATIENT
Start: 2024-10-01

## 2024-10-01 RX ORDER — AMOXICILLIN 250 MG
2 CAPSULE ORAL 2 TIMES DAILY
Qty: 60 TABLET | Refills: 0 | Status: SHIPPED | OUTPATIENT
Start: 2024-10-01

## 2024-10-01 RX ORDER — CEFDINIR 300 MG/1
300 CAPSULE ORAL 2 TIMES DAILY
Qty: 8 CAPSULE | Refills: 0 | Status: SHIPPED | OUTPATIENT
Start: 2024-10-01 | End: 2024-10-05

## 2024-10-01 RX ADMIN — DIAZEPAM 2 MG: 2 TABLET ORAL at 09:42

## 2024-10-01 RX ADMIN — DILTIAZEM HYDROCHLORIDE 120 MG: 120 CAPSULE, COATED, EXTENDED RELEASE ORAL at 09:42

## 2024-10-01 RX ADMIN — ACETAMINOPHEN 325MG 650 MG: 325 TABLET ORAL at 05:40

## 2024-10-01 RX ADMIN — PANTOPRAZOLE SODIUM 40 MG: 40 TABLET, DELAYED RELEASE ORAL at 09:41

## 2024-10-01 RX ADMIN — APIXABAN 2.5 MG: 2.5 TABLET, FILM COATED ORAL at 09:42

## 2024-10-01 RX ADMIN — ACETAMINOPHEN 325MG 650 MG: 325 TABLET ORAL at 09:42

## 2024-10-01 RX ADMIN — CEFTRIAXONE SODIUM 1000 MG: 1 INJECTION, POWDER, FOR SOLUTION INTRAMUSCULAR; INTRAVENOUS at 04:38

## 2024-10-01 NOTE — CASE MANAGEMENT/SOCIAL WORK
Discharge Planning Assessment  [facility]     Patient Name: Vonnie Coppola  MRN: [id number]  Today's Date: 10/1/2024    Admit Date: 9/28/2024    Plan: Home with family and [facility] home health pending acceptance.   Discharge Needs Assessment       Row Name 10/01/24 0928       Living Environment    People in Home child(christine), adult    Name(s) of People in Home maude Rodríguez [phone]    Current Living Arrangements home    Potentially Unsafe Housing Conditions none    In the past 12 months has the electric, gas, oil, or water company threatened to shut off services in your home? No    Primary Care Provided by self;child(christine)    Provides Primary Care For no one    Family Caregiver if Needed child(christine), adult    Family Caregiver Names Elizabeth Rodríguez [phone]    Quality of Family Relationships supportive    Able to Return to Prior Arrangements yes       Resource/Environmental Concerns    Resource/Environmental Concerns none    Transportation Concerns none       Transportation Needs    In the past 12 months, has lack of transportation kept you from medical appointments or from getting medications? no    In the past 12 months, has lack of transportation kept you from meetings, work, or from getting things needed for daily living? No       Food Insecurity    Within the past 12 months, you worried that your food would run out before you got the money to buy more. Never true    Within the past 12 months, the food you bought just didn't last and you didn't have money to get more. Never true       Transition Planning    Patient/Family Anticipates Transition to home with family    Patient/Family Anticipated Services at Transition none    Transportation Anticipated family or friend will provide       Discharge Needs Assessment    Readmission Within the Last 30 Days no previous admission in last 30 days    Equipment Currently Used at Home walker, rolling;oxygen;bath bench;commode    Concerns to be Addressed discharge  planning    Anticipated Changes Related to Illness none    Outpatient/Agency/Support Group Needs homecare agency    Provided Post Acute Provider List? N/A    N/A Provider List Comment Requested [facility] home to follow again    Provided Post Acute Provider Quality & Resource List? N/A    N/A Quality & Resource List Comment Requested [facility] home to follow again    Current Discharge Risk chronically ill                   Discharge Plan       Row Name 10/01/24 0935       Plan    Plan Home with family and [facility] home [facility] pending acceptance.    Patient/Family in Agreement with Plan yes    Plan Comments [facility] 9/29/2024. Met with patient at bedside, she requested to call daughter . Spoke with , face sheet verified. Prior to admission patient was living with her daughter and getting home health services through [facility].  Patient has home oxygen, rolling walker, BSC and shower chair. Patient uses [facility] in [place], denies any issues affording medications, daughter provides medication reminders. Declined Meds to Beds. Patient does not have POA or living will on file. Discharge plans are to return home with family support. Daughter requested that [facility] home continue to provide home health services.  Left message for Bianca with [facility]. Daughter will provide transportation home. Will continue to monitor for new or changing discharge needs. Sydnee GARRISON RN CCP                  Continued Care and Services - Admitted Since 9/28/2024       Home Medical Care       Service Provider Request Status Selected Services Address Phone Fax Patient Preferred    [facility] Pending - Request Sent N/A [address] [facility] [place] [address] [address] [phone] --                  Expected Discharge Date and Time       Expected Discharge Date Expected Discharge Time    Oct 1, 2024  8:39 AM           Demographic Summary       Row Name 10/01/24 0927       General Information    Admission Type  observation    Arrived From emergency department    Required Notices Provided Observation Status Notice    Referral Source admission list    Reason for Consult discharge planning    Preferred Language English       Contact Information    Permission Granted to Share Info With family/designee  Elizabeth Rodríguez [phone]                   Functional Status       Row Name 10/01/24 0928       Functional Status    Usual Activity Tolerance moderate    Current Activity Tolerance moderate       Functional Status, IADL    Medications assistive person    Meal Preparation assistive person    Housekeeping assistive person    Laundry assistive person    Shopping assistive person       Mental Status    General Appearance WDL WDL       Mental Status Summary    Recent Changes in Mental Status/Cognitive Functioning no changes       Employment/    Employment Status retired                   Psychosocial    No documentation.                  Abuse/Neglect    No documentation.                  Legal    No documentation.                  Substance Abuse    No documentation.                  Patient Forms    No documentation.                     Sydnee Rojas RN

## 2024-10-01 NOTE — NURSING NOTE
GUS, , called primary RN after discharge and was upset she did not have instructions for wound care to her mom's skin tear on right arm. Primary RN allowed me to speak with the GUS, number taken so, I could call her back after clarifying with MD. Spoke to Dr. Mathew for instructions, and shared the following instructions with GUS. Follow up with PCP in one week and have him check the arm for healing. If there are signs of infection to the arm, such as redness, swelling, worsening, please seek care, and to change the dressing please use the vaseline gauze and wrap with kerlex. Education provided that the vaseline gauze is designed to not stick to the skin. If any reason they are sticking saline may be used to remove the gauze or seek medical care to have it removed. WOCN had orders for BLE in chart, but GUS was comfortable with those dressings as they are not new to her. Patient relations number given to GUS. Reassured GUS she can call with any further questions. GUS understood verbal directions.

## 2024-10-01 NOTE — DISCHARGE SUMMARY
Patient Name: Vonnie Coppola  : 1935  MRN: [id number]    Date of Admission: 2024  Date of Discharge:  10/1/2024  Primary Care Physician: Christopher Leyva DO      Chief Complaint:   Abdominal Pain      Discharge Diagnoses     Active Hospital Problems    Diagnosis  POA    **Constipation [K59.00]  Yes    Generalized abdominal pain [R10.84]  Yes    UTI (urinary tract infection) [N39.0]  Yes    Atrial fibrillation [I48.91]  Yes    HTN (hypertension) [I10]  Yes    Anemia [D64.9]  Yes      Resolved Hospital Problems   No resolved problems to display.        Hospital Course     Ms. Coppola is a 89 y.o. female who presented to [facility] initially complaining of generalized abdominal pain, constipation.  Please see the admitting history and physical for further details.  She was admitted to the hospital for further evaluation and treatment.  After presenting with generalized abdominal pain and constipation, further workup obtained, Imaging obtained on arrival reviewed, showing stool burden, but no obstruction, her bowel regimen was adjusted, patient subsequently had bowel movements which led to resolution of abdominal pain. She was noted to have urinary tract infection, she was treated with IV antibiotics during hospital stay and responded well to therapy, will continue oral antibiotic therapy at discharge, to be taken as prescribed, to complete antibiotic course. She had wounds on lower extremities and had skin tear on forearm that reportedly occurred in ED, these areas were treated with medications and dressing changes, wound care also evaluated. Continue with local wound care after discharge. Other chronic medical conditions appeared stable, continue home medications as previously prescribed. PT evaluated, patient elected to go home with home health at discharge, which was ordered. Recommend close follow up with PCP within 1 week after discharge for reassessment and repeat BMP and  CBC to guide ongoing management decisions.    Day of Discharge     Subjective:  Patient awake and resting in bed, she feels much better today overall, having bowel movements, she is asking about going home.    Physical Exam:  Temp:  [96.7 °F (35.9 °C)-97.8 °F (36.6 °C)] 97.3 °F (36.3 °C)  Heart Rate:  [69-86] 86  Resp:  [16-18] 18  BP: (107-139)/(67-78) 139/73  Body mass index is 36.28 kg/m².  Physical Exam  Vitals and nursing note reviewed.   Constitutional:       General: She is not in acute distress.     Appearance: She is overweight.      Comments: Chronically ill-appearing   HENT:      Head: Atraumatic.      Right Ear: External ear normal.      Left Ear: External ear normal.   Cardiovascular:      Rate and Rhythm: Normal rate and regular rhythm.      Pulses: Normal pulses.      Heart sounds: Normal heart sounds.   Pulmonary:      Effort: Pulmonary effort is normal. No respiratory distress.      Breath sounds: Normal breath sounds. No wheezing.   Abdominal:      General: There is no distension.      Palpations: Abdomen is soft.      Tenderness: There is no abdominal tenderness.   Musculoskeletal:      Comments: Right forearm covered with dressing C/D/I, Bilateral lower extremities wrapped with dressing, C/D/I   Skin:     General: Skin is warm and dry.   Neurological:      General: No focal deficit present.      Mental Status: She is alert and oriented to person, place, and time.         Consultants     Consult Orders (all) (From admission, onward)       Start     Ordered    09/29/24 0323  Inpatient Case Management  Consult  Once        Provider:  (Not yet assigned)    09/29/24 0322    09/28/24 2347  [facility] (on-call MD unless specified) Details  Once        Specialty:  Hospitalist  Provider:  (Not yet assigned)    09/28/24 2346                  Procedures     * Surgery not found *    Imaging Results (All)       Procedure Component Value Units Date/Time    CT Abdomen Pelvis Without Contrast [[id number]]  Collected: 09/28/24 2223     Updated: 09/28/24 2234    Narrative:      CT OF THE ABDOMEN PELVIS WITHOUT CONTRAST     HISTORY: Abdominal pain     COMPARISON: June 22, 2024     TECHNIQUE: Axial CT imaging was obtained through the abdomen and pelvis.  No IV contrast was administered.     FINDINGS:  Images through the lung bases demonstrate chronic scarring. Tiny nodules  within the right middle lobe are stable when compared to January 2024.  Consider CT follow-up in January 2026 to document a full 2 years of  stability. No suspicious hepatic lesions are seen. The gallbladder is  absent. Common bile duct is mildly prominent in size, measuring up to 1  cm. It may be postcholecystectomy in nature. Low-attenuation lesion is  again noted within the spleen. Calcified granulomata are seen within the  spleen. There is a small hiatal hernia. The duodenum and adrenal glands  are normal. Pancreas also appears within normal limits. Nonobstructing  stones are noted within the left kidney. There are multiple parapelvic  cysts noted on the right. There is also a simple appearing cortical cyst  on the right. There are aortoiliac calcifications. Uterus is absent.  There is mild distention of the rectum with stool, suggesting fecal  impaction. There is some perirectal stranding and wall thickening,  although improved when compared to prior exam. Urinary bladder appears  unremarkable. There is colonic diverticulosis. No bowel obstruction is  seen. The appendix is absent. Overall, stool burden throughout the colon  appears increased. No acute osseous abnormalities are seen.       Impression:      Mildly increased stool burden within the rectum, along with some rectal  wall thickening and perirectal stranding. Appearance may reflect fecal  impaction and proctitis, although it is improved when compared to prior  study. Overall, stool burden throughout the colon is increased.     Radiation dose reduction techniques were utilized, including  automated  exposure control and exposure modulation based on body size.        This report was finalized on 9/28/2024 10:31 PM by Dr. Robyn Wood M.D on Workstation: [id number]             Results for orders placed during the hospital encounter of 05/31/24    Duplex Venous Lower Extremity - Left CAR    Interpretation Summary    Normal left lower extremity venous duplex scan.    Results for orders placed during the hospital encounter of 01/16/24    Adult Transthoracic Echo Complete W/ Cont if Necessary Per Protocol    Interpretation Summary    Left ventricular systolic function is hyperdynamic (EF > 70%). Calculated left ventricular EF = 78.7% Left ventricular ejection fraction appears to be 61 - 65%.    Left ventricular diastolic function was normal.    The right ventricular cavity is borderline dilated.    The right atrial cavity is borderline dilated.    Estimated right ventricular systolic pressure from tricuspid regurgitation is markedly elevated (>55 mmHg).    Pertinent Labs     Results from last 7 days   Lab Units 10/01/24  0709 09/30/24  0935 09/29/24  0411 09/28/24  2116   WBC 10*3/mm3 7.37 9.24 10.60 12.03*   HEMOGLOBIN g/dL 8.2* 8.8* 8.9* 9.5*   PLATELETS 10*3/mm3 238 267 268 296     Results from last 7 days   Lab Units 10/01/24  0709 09/30/24  0935 09/29/24  0411 09/28/24  2116   SODIUM mmol/L 145 144 141 140   POTASSIUM mmol/L 3.6 3.7 3.5 3.9   CHLORIDE mmol/L 112* 111* 108* 105   CO2 mmol/L 26.0 23.9 24.4 25.2   BUN mg/dL 13 11 14 16   CREATININE mg/dL 0.89 0.93 0.91 1.04*   GLUCOSE mg/dL 78 129* 84 88   EGFR mL/min/1.73 62.8 59.6* 61.2 52.1*     Results from last 7 days   Lab Units 09/28/24  2116   ALBUMIN g/dL 3.3*   BILIRUBIN mg/dL 0.2   ALK PHOS U/L 89   AST (SGOT) U/L 16   ALT (SGPT) U/L 9     Results from last 7 days   Lab Units 10/01/24  0709 09/30/24  0935 09/29/24  0411 09/28/24  2116   CALCIUM mg/dL 8.3* 8.5* 8.3* 9.1   ALBUMIN g/dL  --   --   --  3.3*     Results from last 7 days  "  Lab Units 09/28/24  2116   LIPASE U/L 13             Invalid input(s): \"LDLCALC\"          Test Results Pending at Discharge     Pending Results       None              Discharge Details        Discharge Medications        New Medications        Instructions Start Date   cefdinir 300 MG capsule  Commonly known as: OMNICEF   300 mg, Oral, 2 Times Daily             Changes to Medications        Instructions Start Date   polyethylene glycol 17 g packet  Commonly known as: MIRALAX  What changed: Another medication with the same name was added. Make sure you understand how and when to take each.   17 g, Oral, Daily      polyethylene glycol 17 g packet  Commonly known as: MIRALAX  What changed: You were already taking a medication with the same name, and this prescription was added. Make sure you understand how and when to take each.   17 g, Oral, 2 Times Daily      sennosides-docusate 8.6-50 MG per tablet  Commonly known as: PERICOLACE  What changed: You were already taking a medication with the same name, and this prescription was added. Make sure you understand how and when to take each.   2 tablets, Oral, 2 Times Daily      sennosides-docusate 8.6-50 MG per tablet  Commonly known as: PERICOLACE  What changed: Another medication with the same name was added. Make sure you understand how and when to take each.   1 tablet, Oral, 2 Times Daily             Continue These Medications        Instructions Start Date   acetaminophen 325 MG tablet  Commonly known as: TYLENOL   650 mg, Oral, Every 6 Hours PRN      albuterol 0.63 MG/3ML nebulizer solution  Commonly known as: ACCUNEB   0.63 mg, Nebulization, Every 6 Hours PRN      apixaban 2.5 MG tablet tablet  Commonly known as: ELIQUIS   2.5 mg, Oral, Every 12 Hours Scheduled      arformoterol 15 MCG/2ML nebulizer solution  Commonly known as: Brovana   15 mcg, Nebulization, 2 Times Daily - RT      benzonatate 100 MG capsule  Commonly known as: TESSALON   100 mg, Oral, 3 Times " Daily PRN      budesonide 0.5 MG/2ML nebulizer solution  Commonly known as: PULMICORT   0.5 mg, Nebulization, 2 Times Daily      coenzyme Q10 50 MG capsule capsule   1 capsule, Oral, Daily      diazePAM 2 MG tablet  Commonly known as: VALIUM   2 mg, Oral, 2 Times Daily      dilTIAZem  MG 24 hr capsule  Commonly known as: CARDIZEM CD   120 mg, Oral, Daily      Dulcolax 10 MG suppository  Generic drug: bisacodyl   10 mg, Rectal, Daily PRN      glucosamine-chondroitin 500-400 MG capsule capsule   1 capsule, Oral, 3 Times Daily With Meals      Magnesium 500 MG tablet   500 mg, Oral, Daily      ondansetron ODT 4 MG disintegrating tablet  Commonly known as: ZOFRAN-ODT   4 mg, Translingual, Daily PRN      oxyCODONE 5 MG immediate release tablet  Commonly known as: ROXICODONE   5 mg, Oral, Every 8 Hours PRN      pantoprazole 40 MG EC tablet  Commonly known as: PROTONIX   40 mg, Oral, 2 Times Daily Before Meals      phenylephrine-mineral oil-petrolatum 0.25-14-74.9 % ointment hemorrhoidal ointment  Commonly known as: PREPARATION H   1 Application, Rectal, 2 Times Daily PRN               Allergies   Allergen Reactions    Cortisone Hives    Penicillins Hives     Beta lactam allergy details  Antibiotic reaction: hives  Age at reaction: unknown  Dose to reaction time: unknown  Reason for antibiotic: unknown  Epinephrine required for reaction?: no  Tolerated antibiotics: unknown    Tolerated Zosyn    Iodinated Contrast Media Unknown - Low Severity     Patient  passed out, she has had studies with contact  recently     Lactose Intolerance (Gi) Hives       Discharge Disposition:  Home or Self Care      Discharge Diet:  Diet Order   Procedures    Diet: Gastrointestinal; Lactose-Controlled; Fluid Consistency: Thin (IDDSI 0)       Discharge Activity:   Activity Instructions       Gradually Increase Activity Until at Pre-Hospitalization Level              CODE STATUS:    Code Status and Medical Interventions: CPR (Attempt to  Resuscitate); Full   Ordered at: 09/29/24 0125     Code Status (Patient has no pulse and is not breathing):    CPR (Attempt to Resuscitate)     Medical Interventions (Patient has pulse or is breathing):    Full       Future Appointments   Date Time Provider Department Center   1/21/2025 11:00 AM AC CT 2  AC [facility]   1/28/2025 12:40 PM LAB CHAIR 3 [facility]   1/28/2025  1:00 PM Augustus Wilde MD [facility]     Additional Instructions for the Follow-ups that You Need to Schedule       Discharge Follow-up with PCP   As directed       Currently Documented PCP:    Christopher Leyva DO    PCP Phone Number:    [phone]     Follow Up Details: within 1 week for reassessment, medication and symptom review, BMP, CBC               Follow-up Information       Christopher Leyva DO .    Specialty: Internal Medicine  Why: within 1 week for reassessment, medication and symptom review, BMP, CBC  Contact information:  [address] [place] [address]  [phone]                             Additional Instructions for the Follow-ups that You Need to Schedule       Discharge Follow-up with PCP   As directed       Currently Documented PCP:    Christopher Leyva DO    PCP Phone Number:    [phone]     Follow Up Details: within 1 week for reassessment, medication and symptom review, BMP, CBC            Time Spent on Discharge:  Greater than 30 minutes      Riley Mathew DO  [facility]  10/01/24  08:42 EDT

## 2024-10-01 NOTE — PLAN OF CARE
Goal Outcome Evaluation:         Pt A&Ox4, R/A until approx 2200 and pt required 2L n/c to maintain WDL O2 sats; remind to turn and pillow placement as needed or requested; PRN Tylenol given for pain - pt c/o pain in moderate to severe range but when offered stronger medication would only choose Tylenol as she advised it has been sufficient.  IV abx as ordered for UTI, up with x1 assist, walker and gait belt to B/R.  Incontinent at times due to forgetfulness/thinking she had a purewick on when she did not.  R arm skin tear redressed with vaseline gauze, dry gauze and kerlix; not actively bleeding at time of change and no drainage noted but pt does have BLE edema as well as some mild to moderate edema of her BUE arms and wrist.          Problem: Adult Inpatient Plan of Care  Goal: Plan of Care Review  Outcome: Progressing  Goal: Absence of Hospital-Acquired Illness or Injury  Outcome: Progressing  Intervention: Identify and Manage Fall Risk  Description: Perform standard risk assessment on admission using a validated tool or comprehensive approach appropriate to the patient; reassess fall risk frequently, with change in status or transfer to another level of care.  Communicate fall injury risk to interprofessional healthcare team.  Determine need for increased observation, equipment and environmental modification, such as low bed, signage and supportive, nonskid footwear.  Adjust safety measures to individual developmental age, stage and identified risk factors.  Reinforce the importance of safety and physical activity with patient and family.  Perform regular intentional rounding to assess need for position change, pain assessment and personal needs, including assistance with toileting.  Recent Flowsheet Documentation  Taken 10/1/2024 0600 by Shikha Puente, RN  Safety Promotion/Fall Prevention: safety round/check completed  Taken 10/1/2024 0400 by Shikha Puente, RN  Safety Promotion/Fall Prevention: safety  round/check completed  Taken 10/1/2024 0200 by Shikha Puente RN  Safety Promotion/Fall Prevention: safety round/check completed  Taken 10/1/2024 0000 by Shikha Puente RN  Safety Promotion/Fall Prevention: safety round/check completed  Taken 9/30/2024 2200 by Shikha Puente RN  Safety Promotion/Fall Prevention: safety round/check completed  Taken 9/30/2024 2000 by Shikha Puente RN  Safety Promotion/Fall Prevention: safety round/check completed  Intervention: Prevent Skin Injury  Description: Perform a screening for skin injury risk, such as pressure or moisture associated skin damage on admission and at regular intervals throughout hospital stay.  Keep all areas of skin (especially folds) clean and dry.  Maintain adequate skin hydration.  Relieve and redistribute pressure and protect bony prominences; implement measures based on patient-specific risk factors.  Match turning and repositioning schedule to clinical condition.  Encourage weight shift frequently; assist with reposition if unable to complete independently.  Float heels off bed; avoid pressure on the Achilles tendon.  Keep skin free from extended contact with medical devices.  Encourage functional activity and mobility, as early as tolerated.  Use aids (e.g., slide boards, mechanical lift) during transfer.  Recent Flowsheet Documentation  Taken 10/1/2024 0600 by Shikha Puente RN  Body Position:   position changed independently   sitting up in bed  Taken 10/1/2024 0400 by Shikha Puente RN  Body Position:   right   tilted   position changed independently  Taken 10/1/2024 0200 by Shikha Puente RN  Body Position:   supine   position changed independently  Taken 10/1/2024 0000 by Shikha Puente RN  Body Position:   position changed independently   right  Taken 9/30/2024 2200 by Shikha Puente RN  Body Position:   position changed independently   sitting up in bed  Taken 9/30/2024 2000 by Shikha Puente RN  Body Position:    position changed independently   right  Skin Protection:   adhesive use limited   incontinence pads utilized   tubing/devices free from skin contact   transparent dressing maintained  Intervention: Prevent and Manage VTE (Venous Thromboembolism) Risk  Description: Assess for VTE (venous thromboembolism) risk.  Encourage and assist with early ambulation.  Initiate and maintain compression or other therapy, as indicated, based on identified risk in accordance with organizational protocol and provider order.  Encourage both active and passive leg exercises while in bed, if unable to ambulate.  Recent Flowsheet Documentation  Taken 9/30/2024 2000 by Shikha Puente RN  Activity Management: activity encouraged  VTE Prevention/Management: (Eliquis) other (see comments)  Intervention: Prevent Infection  Description: Maintain skin and mucous membrane integrity; promote hand, oral and pulmonary hygiene.  Optimize fluid balance, nutrition, sleep and glycemic control to maximize infection resistance.  Identify potential sources of infection early to prevent or mitigate progression of infection (e.g., wound, lines, devices).  Evaluate ongoing need for invasive devices; remove promptly when no longer indicated.  Recent Flowsheet Documentation  Taken 10/1/2024 0600 by Shikha Puente RN  Infection Prevention:   rest/sleep promoted   single patient room provided   visitors restricted/screened   personal protective equipment utilized   hand hygiene promoted  Taken 10/1/2024 0400 by Shikha Puente RN  Infection Prevention:   single patient room provided   visitors restricted/screened   personal protective equipment utilized   hand hygiene promoted   rest/sleep promoted  Taken 10/1/2024 0200 by Shikha Puente RN  Infection Prevention:   visitors restricted/screened   single patient room provided   rest/sleep promoted   hand hygiene promoted   personal protective equipment utilized  Taken 10/1/2024 0000 by Shikha Puente  RN  Infection Prevention:   visitors restricted/screened   rest/sleep promoted   personal protective equipment utilized   single patient room provided   hand hygiene promoted  Taken 9/30/2024 2200 by Shikha Puente RN  Infection Prevention:   hand hygiene promoted   rest/sleep promoted   single patient room provided   visitors restricted/screened   personal protective equipment utilized  Taken 9/30/2024 2000 by Shikha Puente RN  Infection Prevention:   visitors restricted/screened   single patient room provided   rest/sleep promoted   personal protective equipment utilized   hand hygiene promoted  Goal: Optimal Comfort and Wellbeing  Outcome: Progressing  Intervention: Monitor Pain and Promote Comfort  Description: Assess pain level, treatment efficacy and patient response at regular intervals using a consistent pain scale.  Consider the presence and impact of preexisting chronic pain.  Encourage patient and caregiver involvement in pain assessment, interventions and safety measures.  Recent Flowsheet Documentation  Taken 9/30/2024 2000 by Shikha Puente RN  Pain Management Interventions:   care clustered   diversional activity provided   pillow support provided   position adjusted   quiet environment facilitated   medication offered but refused  Intervention: Provide Person-Centered Care  Description: Use a family-focused approach to care.  Develop trust and rapport by proactively providing information, encouraging questions, addressing concerns and offering reassurance.  Acknowledge emotional response to hospitalization.  Recognize and utilize personal coping strategies.  Honor spiritual and cultural preferences.  Recent Flowsheet Documentation  Taken 9/30/2024 2000 by Shikha Puente RN  Trust Relationship/Rapport:   care explained   choices provided   questions encouraged   reassurance provided   thoughts/feelings acknowledged   questions answered   empathic listening provided  Goal: Readiness for  Transition of Care  Outcome: Progressing     Problem: Skin Injury Risk Increased  Goal: Skin Health and Integrity  Outcome: Progressing  Intervention: Promote and Optimize Oral Intake  Description: Perform a nutrition assessment; include a nutrition-focused physical exam.  Determine calorie, protein, vitamin, mineral and fluid requirements.  Assess for micronutrient deficiencies; supplement if depleted.  Assess need and assist with meal set-up and feeding.  Adjust diet or meal schedule based on preferences and tolerance.  Offer oral supplemental food or drinks to enhance calorie and protein intake.  Establish bowel elimination program to increase comfort and appetite.  Minimize unnecessary dietary restrictions to increase oral intake.  Provide and encourage oral hygiene to enhance desire to eat.  Consider enteral nutrition support if oral intake remains inadequate; provide parenteral nutrition if enteral is contraindicated.  Recent Flowsheet Documentation  Taken 9/30/2024 2000 by Shikha Puente RN  Oral Nutrition Promotion:   safe use of adaptive equipment encouraged   rest periods promoted  Intervention: Optimize Skin Protection  Description: Perform a full pressure injury risk assessment, as indicated by screening, upon admission to care unit.  Reassess skin (injury risk, full inspection) frequently (e.g., scheduled interval, with change in condition) to provide optimal early detection and prevention.  Maintain adequate tissue perfusion (e.g., encourage fluid balance; avoid crossing legs, constrictive clothing or devices) to promote tissue oxygenation.  Maintain head of bed at lowest degree of elevation tolerated, considering medical condition and other restrictions.  Avoid positioning onto an area that remains reddened.  Minimize incontinence and moisture (e.g., toileting schedule; moisture-wicking pad, diaper or incontinence collection device; skin moisture barrier).  Cleanse skin promptly and gently when  soiled utilizing a pH-balanced cleanser.  Relieve and redistribute pressure (e.g., scheduled position changes, weight shifts, use of support surface, medical device repositioning, protective dressing application, use of positioning device, microclimate control, use of pressure-injury-monitor  Encourage increased activity, such as sitting in a chair at the bedside or early mobilization, when able to tolerate.  Recent Flowsheet Documentation  Taken 10/1/2024 0600 by Shikha Puente RN  Head of Bed (HOB) Positioning: HOB elevated  Taken 10/1/2024 0400 by Shikha Puente RN  Head of Bed ([facility] [place]) Positioning: HOB elevated  Taken 10/1/2024 0200 by Shikha Puente RN  Head of Bed (HOB) Positioning: HOB lowered  Taken 10/1/2024 0000 by Shikha Puente RN  Head of Bed ([facility] [place]) Positioning: HOB elevated  Taken 9/30/2024 2200 by Shikha Puente RN  Head of Bed (HOB) Positioning: HOB elevated  Taken 9/30/2024 2000 by Shikha Puente RN  Pressure Reduction Techniques:   frequent weight shift encouraged   weight shift assistance provided  Head of Bed (HOB) Positioning: HOB elevated  Pressure Reduction Devices:   alternating pressure pump (ADD)   positioning supports utilized   pressure-redistributing mattress utilized  Skin Protection:   adhesive use limited   incontinence pads utilized   tubing/devices free from skin contact   transparent dressing maintained     Problem: Pain Acute  Goal: Acceptable Pain Control and Functional Ability  Outcome: Progressing  Intervention: Prevent or Manage Pain  Description: Evaluate pain level, effect of treatment and patient response at regular intervals.  Minimize painful stimuli; coordinate care and adjust environment (e.g., light, noise, unnecessary movement); promote sleep/rest.  Match pharmacologic analgesia to severity and type of pain mechanism (e.g., neuropathic, muscle, inflammatory); consider multimodal approach (e.g., nonopioid, opioid, adjuvant).  Provide medication at regular  intervals; titrate to patient response; premedicate for painful procedures.  Manage breakthrough pain with additional doses; consider rotation or switching medication.  Monitor for signs of substance tolerance (increased dose to reach desired effect, decreased effect with same dose).  Manage medication-induced effects, such as constipation, nausea, pruritus, urinary retention, somnolence and dizziness.  Provide multimodal interventions, such as as physical activity, therapeutic exercise, yoga, TENS (transcutaneous electrical nerve stimulation) and manual therapy.  Train in functional activity modifications, such as body mechanics, posture, ergonomics, energy conservation and activity pacing.  Consider addition of complementary or alternative therapy, such as acupuncture, hypnosis or therapeutic touch.  Recent Flowsheet Documentation  Taken 10/1/2024 0600 by Shikha Puente RN  Medication Review/Management:   medications reviewed   high-risk medications identified  Taken 10/1/2024 0400 by Shikha Puente RN  Medication Review/Management:   medications reviewed   high-risk medications identified  Taken 10/1/2024 0200 by Shikha Puente RN  Medication Review/Management:   medications reviewed   high-risk medications identified  Taken 10/1/2024 0000 by Shikha Puente RN  Medication Review/Management:   medications reviewed   high-risk medications identified  Taken 9/30/2024 2200 by Shikha Puente RN  Medication Review/Management:   medications reviewed   high-risk medications identified  Taken 9/30/2024 2000 by Shikha Puente RN  Sensory Stimulation Regulation:   care clustered   lighting decreased   television on  Bowel Elimination Promotion: adequate fluid intake promoted  Sleep/Rest Enhancement:   awakenings minimized   consistent schedule promoted   room darkened  Medication Review/Management:   medications reviewed   high-risk medications identified  Intervention: Develop Pain Management  Plan  Description: Acknowledge patient as the expert in pain self-management.  Use a consistent, validated tool for pain assessment; include function and quality of life.  Evaluate risk for opioid use and dependence.  Set pain management goals; determine acceptable level of discomfort to allow for maximal functioning.  Determine [id number] pain management plan, including both pharmacologic and nonpharmacologic measures; integrate management of chronic (persistent) pain.  Identify and integrate past successful treatment measures, if able.  Encourage patient and caregiver involvement in pain assessment, interventions and safety measures.  Re-evaluate plan regularly.  Recent Flowsheet Documentation  Taken 9/30/2024 2000 by Shikha Puente, RN  Pain Management Interventions:   care clustered   diversional activity provided   pillow support provided   position adjusted   quiet environment facilitated   medication offered but refused  Intervention: Optimize Psychosocial Wellbeing  Description: Facilitate patient’s self-control over pain by providing pain information and allowing choices in treatment.  Consider and address emotional response to pain.  Explore and promote use of coping strategies; address barriers to successful coping.  Evaluate and assist with psychosocial, cultural and spiritual factors impacting pain.  Modify pain perception using techniques, such as distraction, mindfulness, guided imagery, meditation or music.  Assess for risk factors for developing chronic pain, such as depression, fear, pain avoidance and pain catastrophizing.  Consider referral for ongoing coping support, such as education, relaxation training and role of thoughts.  Recent Flowsheet Documentation  Taken 9/30/2024 2000 by Shikha Puente, RN  Supportive Measures: active listening utilized  Diversional Activities:   television   smartphone     Problem: Fall Injury Risk  Goal: Absence of Fall and Fall-Related  Injury  Outcome: Progressing  Intervention: Identify and Manage Contributors  Description: Develop a fall prevention plan with the patient and caregiver/family.  Provide reorientation, appropriate sensory stimulation and routines with changes in mental status to decrease risk of fall.  Promote use of personal vision and auditory aids.  Assess assistance level required for safe and effective self-care; provide support as needed, such as toileting, mobilization. For age 65 and older, implement timed toileting with assistance.  Encourage physical activity, such as performance of mobility and self-care at highest level of patient ability, multicomponent exercise program and provision of appropriate assistive devices.  If fall occurs, assess the severity of injury; implement fall injury protocol. Determine the cause and revise fall injury prevention plan.  Regularly review medication contribution to fall risk; adjust medication administration times to minimize risk of falling.  Consider risk related to polypharmacy and age.  Balance adequate pain management with potential for oversedation.  Recent Flowsheet Documentation  Taken 10/1/2024 0600 by Shikha Puente RN  Medication Review/Management:   medications reviewed   high-risk medications identified  Taken 10/1/2024 0400 by Shikha Puente RN  Medication Review/Management:   medications reviewed   high-risk medications identified  Taken 10/1/2024 0200 by Shikha Puente RN  Medication Review/Management:   medications reviewed   high-risk medications identified  Taken 10/1/2024 0000 by Shikha Puente RN  Medication Review/Management:   medications reviewed   high-risk medications identified  Taken 9/30/2024 2200 by Shikha Puente RN  Medication Review/Management:   medications reviewed   high-risk medications identified  Taken 9/30/2024 2000 by Shikha Puente RN  Medication Review/Management:   medications reviewed   high-risk medications  identified  Self-Care Promotion:   independence encouraged   BADL personal objects within reach   safe use of adaptive equipment encouraged  Intervention: Promote Injury-Free Environment  Description: Provide a safe, barrier-free environment that encourages independent activity.  Keep care area uncluttered and well-lighted.  Determine need for increased observation or monitoring.  Avoid use of devices that minimize mobility, such as restraints or indwelling urinary catheter.  Recent Flowsheet Documentation  Taken 10/1/2024 0600 by Shikha Puente RN  Safety Promotion/Fall Prevention: safety round/check completed  Taken 10/1/2024 0400 by Shikha Puente RN  Safety Promotion/Fall Prevention: safety round/check completed  Taken 10/1/2024 0200 by Shikha Puente, RN  Safety Promotion/Fall Prevention: safety round/check completed  Taken 10/1/2024 0000 by Shikha Puente, RN  Safety Promotion/Fall Prevention: safety round/check completed  Taken 9/30/2024 2200 by Shikha Puente, RN  Safety Promotion/Fall Prevention: safety round/check completed  Taken 9/30/2024 2000 by Shikha Puente, RN  Safety Promotion/Fall Prevention: safety round/check completed

## 2024-10-01 NOTE — CASE MANAGEMENT/SOCIAL WORK
Case Management Discharge Note      Final Note: Discharge to home with [facility] Sydnee GARRISON RN CCP    Provided Post Acute Provider List?: N/A  N/A Provider List Comment: [facility]ed [facility] follow [facility]  Provided Post Acute Provider Quality & Resource List?: N/A  N/A Quality & Resource List Comment: [facility]ed [facility]    Selected Continued Care - Discharged on 10/1/2024 Admission date: 9/28/2024 - Discharge disposition: Home or Self Care      Destination    No services have been selected for the patient.                Durable Medical Equipment    No services have been selected for the patient.                Dialysis/Infusion    No services have been selected for the patient.                Home Medical Care       Service Provider Selected Services Address Phone Fax Patient Preferred    [facility] [address] [place] [address], [address], [place] [id number] [phone] --              Therapy    No services have been selected for the patient.                Community Resources    No services have been selected for the patient.                Community & DME    No services have been selected for the patient.                    Transportation Services  Private: Car    Final Discharge Disposition Code: 06 - home with home health care

## 2024-10-01 NOTE — DISCHARGE PLACEMENT REQUEST
"Vonnie Hitchcock (89 y.o. Female)       Date of Birth   1935    Social Security Number       Address   [address] [place] [id number]    Home Phone   [phone]    MRN   [id number]       Caodaism   None    Marital Status                               Admission Date   9/28/24    Admission Type   Emergency    Admitting Provider   Adán Durbin MD    Attending Provider   Riley Mathew DO    Department, Room/Bed   [address], [address]       Discharge Date       Discharge Disposition   Home or Self Care    Discharge Destination                                 Attending Provider: Riley Mathew DO    Allergies: Cortisone, Penicillins, Iodinated Contrast Media, Lactose Intolerance (Gi)    Isolation: None   Infection: None   Code Status: CPR    Ht: 165.1 cm (65\")   Wt: 98.9 kg (218 lb 0.6 oz)    Admission Cmt: None   Principal Problem: Constipation [K59.00]                   Active Insurance as of 9/28/2024       Primary Coverage       Payor Plan Insurance Group Employer/Plan Group    HUMANA MEDICARE REPLACEMENT HUMANA MED ADV PPO [id number]       Payor Plan Address Payor Plan Phone Number Payor Plan Fax Number Effective Dates    [address] [address] [phone]  3/1/2023 - None Entered    [facility] [id number]         Subscriber Name Subscriber Birth Date Member ID       VONNIE HITCHCOCK 1935 [id number]                     Emergency Contacts        (Rel.) Home Phone Work Phone Mobile Phone    MargaritaKirsten (Sister) [phone] -- [phone]    Elizabeth Rodríguez (Daughter) [phone] -- [phone]    Hudson Hitchcock (Son) [phone] -- [phone]                "

## 2024-10-02 NOTE — OUTREACH NOTE
Prep Survey      Flowsheet Row Responses   [facility] discharged [facility]? [place]   Is LACE score < 7 ? No   Eligibility Readm Mgmt   Discharge diagnosis Constipation   Does the patient have one of the following disease processes/diagnoses(primary or secondary)? Other   Does the patient have Home health ordered? Yes   What is the Home health agency?  [facility]   Is there a DME ordered? No   Prep survey completed? Yes            MING SADLER - Registered Nurse

## 2024-10-17 ENCOUNTER — READMISSION MANAGEMENT (OUTPATIENT)
Dept: CALL CENTER | Facility: HOSPITAL | Age: 89
End: 2024-10-17
Payer: MEDICARE

## 2024-10-17 NOTE — OUTREACH NOTE
Medical Week 3 Survey      Flowsheet Row Responses   [facility] patient discharged from? [place]   Does the patient have one of the following disease processes/diagnoses(primary or secondary)? Other   Week 3 attempt successful? Yes   Call start time 1545   Call end time 1558   Discharge diagnosis Constipation   Meds reviewed with patient/caregiver? Yes   Is the patient having any side effects they believe may be caused by any medication additions or changes? No   Is the patient taking all medications as directed (includes completed medication regime)? Yes   Does the patient have a primary care provider?  Yes   Does the patient have an appointment with their PCP within 7 days of discharge? Yes   Has the patient kept scheduled appointments due by today? Yes   What is the Home health agency?  [facility]   Has home health visited the patient within 72 hours of discharge? Yes   DME comments wears oxygen at night, prn throughout the day, sats 88-91% on O2   Psychosocial issues? No   What is the patient's perception of their health status since discharge? Same  [Lt leg edema]   Is the patient/caregiver able to teach back the hierarchy of who to call/visit for symptoms/problems? PCP, Specialist, Home health nurse, Urgent Care, ED, 911 Yes   Week 3 Call Completed? Yes   Graduated Yes   Is the patient interested in additional calls from an ambulatory ? No   Would this patient benefit from a Referral to Amb Social Work? No   Call end time 1558            Inés FREED - Registered Nurse

## 2024-11-20 ENCOUNTER — LAB REQUISITION (OUTPATIENT)
Dept: LAB | Facility: HOSPITAL | Age: 89
End: 2024-11-20
Payer: MEDICARE

## 2024-11-20 DIAGNOSIS — R62.7 ADULT FAILURE TO THRIVE: ICD-10-CM

## 2024-11-20 DIAGNOSIS — N39.0 URINARY TRACT INFECTION, SITE NOT SPECIFIED: ICD-10-CM

## 2024-11-20 DIAGNOSIS — R33.9 RETENTION OF URINE, UNSPECIFIED: ICD-10-CM

## 2024-11-20 LAB
BILIRUB UR QL STRIP: NEGATIVE
CLARITY UR: ABNORMAL
COLOR UR: YELLOW
GLUCOSE UR STRIP-MCNC: NEGATIVE MG/DL
HGB UR QL STRIP.AUTO: NEGATIVE
KETONES UR QL STRIP: NEGATIVE
LEUKOCYTE ESTERASE UR QL STRIP.AUTO: NEGATIVE
NITRITE UR QL STRIP: NEGATIVE
PH UR STRIP.AUTO: 7.5 [PH] (ref 5–8)
PROT UR QL STRIP: NEGATIVE
SP GR UR STRIP: 1.02 (ref 1–1.03)
UROBILINOGEN UR QL STRIP: ABNORMAL

## 2024-11-20 PROCEDURE — 81003 URINALYSIS AUTO W/O SCOPE: CPT | Performed by: INTERNAL MEDICINE

## 2024-11-20 PROCEDURE — 87186 SC STD MICRODIL/AGAR DIL: CPT | Performed by: INTERNAL MEDICINE

## 2024-11-20 PROCEDURE — 87077 CULTURE AEROBIC IDENTIFY: CPT | Performed by: INTERNAL MEDICINE

## 2024-11-20 PROCEDURE — 87086 URINE CULTURE/COLONY COUNT: CPT | Performed by: INTERNAL MEDICINE

## 2024-11-22 LAB — BACTERIA SPEC AEROBE CULT: ABNORMAL

## 2024-12-02 ENCOUNTER — LAB REQUISITION (OUTPATIENT)
Dept: LAB | Facility: HOSPITAL | Age: 89
End: 2024-12-02
Payer: MEDICARE

## 2024-12-02 DIAGNOSIS — N39.0 URINARY TRACT INFECTION, SITE NOT SPECIFIED: ICD-10-CM

## 2024-12-02 LAB
BACTERIA UR QL AUTO: ABNORMAL /HPF
BILIRUB UR QL STRIP: NEGATIVE
CLARITY UR: ABNORMAL
COLOR UR: YELLOW
GLUCOSE UR STRIP-MCNC: NEGATIVE MG/DL
HGB UR QL STRIP.AUTO: ABNORMAL
KETONES UR QL STRIP: NEGATIVE
LEUKOCYTE ESTERASE UR QL STRIP.AUTO: ABNORMAL
NITRITE UR QL STRIP: POSITIVE
PH UR STRIP.AUTO: 6 [PH] (ref 5–8)
PROT UR QL STRIP: ABNORMAL
RBC # UR STRIP: ABNORMAL /HPF
REF LAB TEST METHOD: ABNORMAL
SP GR UR STRIP: 1.02 (ref 1–1.03)
SQUAMOUS #/AREA URNS HPF: ABNORMAL /HPF
UROBILINOGEN UR QL STRIP: ABNORMAL
WBC # UR STRIP: ABNORMAL /HPF

## 2024-12-02 PROCEDURE — 87086 URINE CULTURE/COLONY COUNT: CPT | Performed by: INTERNAL MEDICINE

## 2024-12-02 PROCEDURE — 87077 CULTURE AEROBIC IDENTIFY: CPT | Performed by: INTERNAL MEDICINE

## 2024-12-02 PROCEDURE — 87186 SC STD MICRODIL/AGAR DIL: CPT | Performed by: INTERNAL MEDICINE

## 2024-12-02 PROCEDURE — 81001 URINALYSIS AUTO W/SCOPE: CPT | Performed by: INTERNAL MEDICINE

## 2024-12-04 LAB — BACTERIA SPEC AEROBE CULT: ABNORMAL

## 2024-12-07 ENCOUNTER — HOSPITAL ENCOUNTER (INPATIENT)
Facility: HOSPITAL | Age: 89
LOS: 6 days | Discharge: NURSING, REHAB OR LONG-TERM CARE | DRG: 884 | End: 2024-12-14
Attending: EMERGENCY MEDICINE | Admitting: INTERNAL MEDICINE
Payer: MEDICARE

## 2024-12-07 ENCOUNTER — APPOINTMENT (OUTPATIENT)
Dept: GENERAL RADIOLOGY | Facility: HOSPITAL | Age: 89
DRG: 884 | End: 2024-12-07
Payer: MEDICARE

## 2024-12-07 ENCOUNTER — APPOINTMENT (OUTPATIENT)
Dept: CT IMAGING | Facility: HOSPITAL | Age: 89
DRG: 884 | End: 2024-12-07
Payer: MEDICARE

## 2024-12-07 DIAGNOSIS — R41.82 ALTERED MENTAL STATUS, UNSPECIFIED ALTERED MENTAL STATUS TYPE: Primary | ICD-10-CM

## 2024-12-07 DIAGNOSIS — R29.898 RIGHT ARM WEAKNESS: ICD-10-CM

## 2024-12-07 LAB
ABO GROUP BLD: NORMAL
ALBUMIN SERPL-MCNC: 3.3 G/DL (ref 3.5–5.2)
ALBUMIN/GLOB SERPL: 1.1 G/DL
ALP SERPL-CCNC: 78 U/L (ref 39–117)
ALT SERPL W P-5'-P-CCNC: 16 U/L (ref 1–33)
ANION GAP SERPL CALCULATED.3IONS-SCNC: 10 MMOL/L (ref 5–15)
APTT PPP: 34.2 SECONDS (ref 22.7–35.4)
AST SERPL-CCNC: 30 U/L (ref 1–32)
BASOPHILS # BLD AUTO: 0.03 10*3/MM3 (ref 0–0.2)
BASOPHILS NFR BLD AUTO: 0.3 % (ref 0–1.5)
BILIRUB SERPL-MCNC: 0.4 MG/DL (ref 0–1.2)
BILIRUB UR QL STRIP: NEGATIVE
BLD GP AB SCN SERPL QL: NEGATIVE
BUN SERPL-MCNC: 18 MG/DL (ref 8–23)
BUN/CREAT SERPL: 18.2 (ref 7–25)
CALCIUM SPEC-SCNC: 9.1 MG/DL (ref 8.6–10.5)
CHLORIDE SERPL-SCNC: 109 MMOL/L (ref 98–107)
CLARITY UR: ABNORMAL
CO2 SERPL-SCNC: 26 MMOL/L (ref 22–29)
COLOR UR: YELLOW
CREAT SERPL-MCNC: 0.99 MG/DL (ref 0.57–1)
DEPRECATED RDW RBC AUTO: 46.4 FL (ref 37–54)
EGFRCR SERPLBLD CKD-EPI 2021: 55.3 ML/MIN/1.73
EOSINOPHIL # BLD AUTO: 0.13 10*3/MM3 (ref 0–0.4)
EOSINOPHIL NFR BLD AUTO: 1.2 % (ref 0.3–6.2)
ERYTHROCYTE [DISTWIDTH] IN BLOOD BY AUTOMATED COUNT: 16 % (ref 12.3–15.4)
GLOBULIN UR ELPH-MCNC: 2.9 GM/DL
GLUCOSE BLDC GLUCOMTR-MCNC: 85 MG/DL (ref 70–130)
GLUCOSE SERPL-MCNC: 90 MG/DL (ref 65–99)
GLUCOSE UR STRIP-MCNC: NEGATIVE MG/DL
HCT VFR BLD AUTO: 35.1 % (ref 34–46.6)
HGB BLD-MCNC: 10.6 G/DL (ref 12–15.9)
HGB UR QL STRIP.AUTO: NEGATIVE
HOLD SPECIMEN: NORMAL
HOLD SPECIMEN: NORMAL
IMM GRANULOCYTES # BLD AUTO: 0.03 10*3/MM3 (ref 0–0.05)
IMM GRANULOCYTES NFR BLD AUTO: 0.3 % (ref 0–0.5)
INR PPP: 1.43 (ref 0.9–1.1)
KETONES UR QL STRIP: ABNORMAL
LEUKOCYTE ESTERASE UR QL STRIP.AUTO: NEGATIVE
LYMPHOCYTES # BLD AUTO: 2.49 10*3/MM3 (ref 0.7–3.1)
LYMPHOCYTES NFR BLD AUTO: 23.4 % (ref 19.6–45.3)
MCH RBC QN AUTO: 24.3 PG (ref 26.6–33)
MCHC RBC AUTO-ENTMCNC: 30.2 G/DL (ref 31.5–35.7)
MCV RBC AUTO: 80.5 FL (ref 79–97)
MONOCYTES # BLD AUTO: 1.23 10*3/MM3 (ref 0.1–0.9)
MONOCYTES NFR BLD AUTO: 11.6 % (ref 5–12)
NEUTROPHILS NFR BLD AUTO: 6.72 10*3/MM3 (ref 1.7–7)
NEUTROPHILS NFR BLD AUTO: 63.2 % (ref 42.7–76)
NITRITE UR QL STRIP: NEGATIVE
NRBC BLD AUTO-RTO: 0 /100 WBC (ref 0–0.2)
PH UR STRIP.AUTO: 5.5 [PH] (ref 5–8)
PLATELET # BLD AUTO: 251 10*3/MM3 (ref 140–450)
PMV BLD AUTO: 11.4 FL (ref 6–12)
POTASSIUM SERPL-SCNC: 4.2 MMOL/L (ref 3.5–5.2)
PROT SERPL-MCNC: 6.2 G/DL (ref 6–8.5)
PROT UR QL STRIP: ABNORMAL
PROTHROMBIN TIME: 17.7 SECONDS (ref 11.7–14.2)
QT INTERVAL: 411 MS
QTC INTERVAL: 466 MS
RBC # BLD AUTO: 4.36 10*6/MM3 (ref 3.77–5.28)
RH BLD: NEGATIVE
SODIUM SERPL-SCNC: 145 MMOL/L (ref 136–145)
SP GR UR STRIP: >1.03 (ref 1–1.03)
T&S EXPIRATION DATE: NORMAL
TROPONIN T SERPL HS-MCNC: 26 NG/L
UROBILINOGEN UR QL STRIP: ABNORMAL
WBC NRBC COR # BLD AUTO: 10.63 10*3/MM3 (ref 3.4–10.8)
WHOLE BLOOD HOLD COAG: NORMAL
WHOLE BLOOD HOLD SPECIMEN: NORMAL

## 2024-12-07 PROCEDURE — 85730 THROMBOPLASTIN TIME PARTIAL: CPT | Performed by: EMERGENCY MEDICINE

## 2024-12-07 PROCEDURE — 86901 BLOOD TYPING SEROLOGIC RH(D): CPT | Performed by: EMERGENCY MEDICINE

## 2024-12-07 PROCEDURE — 84484 ASSAY OF TROPONIN QUANT: CPT | Performed by: EMERGENCY MEDICINE

## 2024-12-07 PROCEDURE — 25810000003 SODIUM CHLORIDE 0.9 % SOLUTION: Performed by: INTERNAL MEDICINE

## 2024-12-07 PROCEDURE — 71045 X-RAY EXAM CHEST 1 VIEW: CPT

## 2024-12-07 PROCEDURE — P9612 CATHETERIZE FOR URINE SPEC: HCPCS

## 2024-12-07 PROCEDURE — 86900 BLOOD TYPING SEROLOGIC ABO: CPT | Performed by: EMERGENCY MEDICINE

## 2024-12-07 PROCEDURE — G0378 HOSPITAL OBSERVATION PER HR: HCPCS

## 2024-12-07 PROCEDURE — 85610 PROTHROMBIN TIME: CPT | Performed by: EMERGENCY MEDICINE

## 2024-12-07 PROCEDURE — 70498 CT ANGIOGRAPHY NECK: CPT

## 2024-12-07 PROCEDURE — 94761 N-INVAS EAR/PLS OXIMETRY MLT: CPT

## 2024-12-07 PROCEDURE — 80053 COMPREHEN METABOLIC PANEL: CPT | Performed by: EMERGENCY MEDICINE

## 2024-12-07 PROCEDURE — 82948 REAGENT STRIP/BLOOD GLUCOSE: CPT

## 2024-12-07 PROCEDURE — 93005 ELECTROCARDIOGRAM TRACING: CPT | Performed by: EMERGENCY MEDICINE

## 2024-12-07 PROCEDURE — 70496 CT ANGIOGRAPHY HEAD: CPT

## 2024-12-07 PROCEDURE — 0042T HC CT CEREBRAL PERFUSION W/WO CONTRAST: CPT

## 2024-12-07 PROCEDURE — 25510000001 IOPAMIDOL PER 1 ML: Performed by: EMERGENCY MEDICINE

## 2024-12-07 PROCEDURE — 4A03X5D MEASUREMENT OF ARTERIAL FLOW, INTRACRANIAL, EXTERNAL APPROACH: ICD-10-PCS | Performed by: RADIOLOGY

## 2024-12-07 PROCEDURE — 85025 COMPLETE CBC W/AUTO DIFF WBC: CPT | Performed by: EMERGENCY MEDICINE

## 2024-12-07 PROCEDURE — 94760 N-INVAS EAR/PLS OXIMETRY 1: CPT

## 2024-12-07 PROCEDURE — 99285 EMERGENCY DEPT VISIT HI MDM: CPT

## 2024-12-07 PROCEDURE — 81003 URINALYSIS AUTO W/O SCOPE: CPT | Performed by: EMERGENCY MEDICINE

## 2024-12-07 PROCEDURE — 94799 UNLISTED PULMONARY SVC/PX: CPT

## 2024-12-07 PROCEDURE — 86850 RBC ANTIBODY SCREEN: CPT | Performed by: EMERGENCY MEDICINE

## 2024-12-07 PROCEDURE — 36415 COLL VENOUS BLD VENIPUNCTURE: CPT

## 2024-12-07 PROCEDURE — 93010 ELECTROCARDIOGRAM REPORT: CPT | Performed by: INTERNAL MEDICINE

## 2024-12-07 RX ORDER — IOPAMIDOL 755 MG/ML
150 INJECTION, SOLUTION INTRAVASCULAR
Status: COMPLETED | OUTPATIENT
Start: 2024-12-07 | End: 2024-12-07

## 2024-12-07 RX ORDER — POLYETHYLENE GLYCOL 3350 17 G/17G
17 POWDER, FOR SOLUTION ORAL DAILY PRN
Status: DISCONTINUED | OUTPATIENT
Start: 2024-12-07 | End: 2024-12-07 | Stop reason: SDUPTHER

## 2024-12-07 RX ORDER — BUDESONIDE 0.5 MG/2ML
0.5 INHALANT ORAL
Status: DISCONTINUED | OUTPATIENT
Start: 2024-12-07 | End: 2024-12-14 | Stop reason: HOSPADM

## 2024-12-07 RX ORDER — SODIUM CHLORIDE 9 MG/ML
75 INJECTION, SOLUTION INTRAVENOUS CONTINUOUS
Status: ACTIVE | OUTPATIENT
Start: 2024-12-07 | End: 2024-12-08

## 2024-12-07 RX ORDER — ACETAMINOPHEN 160 MG/5ML
650 SOLUTION ORAL EVERY 4 HOURS PRN
Status: DISCONTINUED | OUTPATIENT
Start: 2024-12-07 | End: 2024-12-14 | Stop reason: HOSPADM

## 2024-12-07 RX ORDER — SODIUM CHLORIDE 0.9 % (FLUSH) 0.9 %
10 SYRINGE (ML) INJECTION AS NEEDED
Status: DISCONTINUED | OUTPATIENT
Start: 2024-12-07 | End: 2024-12-14 | Stop reason: HOSPADM

## 2024-12-07 RX ORDER — ARFORMOTEROL TARTRATE 15 UG/2ML
15 SOLUTION RESPIRATORY (INHALATION)
Status: DISCONTINUED | OUTPATIENT
Start: 2024-12-07 | End: 2024-12-14 | Stop reason: HOSPADM

## 2024-12-07 RX ORDER — ONDANSETRON 2 MG/ML
4 INJECTION INTRAMUSCULAR; INTRAVENOUS EVERY 6 HOURS PRN
Status: DISCONTINUED | OUTPATIENT
Start: 2024-12-07 | End: 2024-12-14 | Stop reason: HOSPADM

## 2024-12-07 RX ORDER — BISACODYL 10 MG
10 SUPPOSITORY, RECTAL RECTAL DAILY PRN
Status: DISCONTINUED | OUTPATIENT
Start: 2024-12-07 | End: 2024-12-07 | Stop reason: SDUPTHER

## 2024-12-07 RX ORDER — DIAZEPAM 2 MG/1
2 TABLET ORAL 2 TIMES DAILY
Status: DISCONTINUED | OUTPATIENT
Start: 2024-12-07 | End: 2024-12-08

## 2024-12-07 RX ORDER — BISACODYL 5 MG/1
5 TABLET, DELAYED RELEASE ORAL DAILY PRN
Status: DISCONTINUED | OUTPATIENT
Start: 2024-12-07 | End: 2024-12-09

## 2024-12-07 RX ORDER — POLYETHYLENE GLYCOL 3350 17 G/17G
17 POWDER, FOR SOLUTION ORAL DAILY PRN
Status: DISCONTINUED | OUTPATIENT
Start: 2024-12-07 | End: 2024-12-09

## 2024-12-07 RX ORDER — ALBUTEROL SULFATE 0.63 MG/3ML
0.63 SOLUTION RESPIRATORY (INHALATION) EVERY 6 HOURS PRN
Status: DISCONTINUED | OUTPATIENT
Start: 2024-12-07 | End: 2024-12-14 | Stop reason: HOSPADM

## 2024-12-07 RX ORDER — ACETAMINOPHEN 325 MG/1
650 TABLET ORAL EVERY 4 HOURS PRN
Status: DISCONTINUED | OUTPATIENT
Start: 2024-12-07 | End: 2024-12-14 | Stop reason: HOSPADM

## 2024-12-07 RX ORDER — ASPIRIN 300 MG/1
300 SUPPOSITORY RECTAL DAILY
Status: DISCONTINUED | OUTPATIENT
Start: 2024-12-08 | End: 2024-12-09

## 2024-12-07 RX ORDER — ACETAMINOPHEN 650 MG/1
650 SUPPOSITORY RECTAL EVERY 4 HOURS PRN
Status: DISCONTINUED | OUTPATIENT
Start: 2024-12-07 | End: 2024-12-14 | Stop reason: HOSPADM

## 2024-12-07 RX ORDER — ONDANSETRON 2 MG/ML
4 INJECTION INTRAMUSCULAR; INTRAVENOUS EVERY 6 HOURS PRN
Status: DISCONTINUED | OUTPATIENT
Start: 2024-12-07 | End: 2024-12-07 | Stop reason: SDUPTHER

## 2024-12-07 RX ORDER — ATORVASTATIN CALCIUM 80 MG/1
80 TABLET, FILM COATED ORAL NIGHTLY
Status: DISCONTINUED | OUTPATIENT
Start: 2024-12-07 | End: 2024-12-14 | Stop reason: HOSPADM

## 2024-12-07 RX ORDER — AMOXICILLIN 250 MG
2 CAPSULE ORAL 2 TIMES DAILY PRN
Status: DISCONTINUED | OUTPATIENT
Start: 2024-12-07 | End: 2024-12-09

## 2024-12-07 RX ORDER — ASPIRIN 325 MG
325 TABLET ORAL DAILY
Status: DISCONTINUED | OUTPATIENT
Start: 2024-12-08 | End: 2024-12-09

## 2024-12-07 RX ORDER — BISACODYL 5 MG/1
5 TABLET, DELAYED RELEASE ORAL DAILY PRN
Status: DISCONTINUED | OUTPATIENT
Start: 2024-12-07 | End: 2024-12-07 | Stop reason: SDUPTHER

## 2024-12-07 RX ORDER — ONDANSETRON 4 MG/1
4 TABLET, ORALLY DISINTEGRATING ORAL EVERY 6 HOURS PRN
Status: DISCONTINUED | OUTPATIENT
Start: 2024-12-07 | End: 2024-12-07 | Stop reason: SDUPTHER

## 2024-12-07 RX ORDER — BISACODYL 10 MG
10 SUPPOSITORY, RECTAL RECTAL DAILY PRN
Status: DISCONTINUED | OUTPATIENT
Start: 2024-12-07 | End: 2024-12-09

## 2024-12-07 RX ORDER — AMOXICILLIN 250 MG
2 CAPSULE ORAL 2 TIMES DAILY PRN
Status: DISCONTINUED | OUTPATIENT
Start: 2024-12-07 | End: 2024-12-07 | Stop reason: SDUPTHER

## 2024-12-07 RX ADMIN — SODIUM CHLORIDE 75 ML/HR: 9 INJECTION, SOLUTION INTRAVENOUS at 23:15

## 2024-12-07 RX ADMIN — IOPAMIDOL 150 ML: 755 INJECTION, SOLUTION INTRAVENOUS at 15:00

## 2024-12-07 NOTE — ED PROVIDER NOTES
EMERGENCY DEPARTMENT ENCOUNTER  Room Number:  06/06  PCP: Christopher Leyva DO  Independent Historians: Family      HPI:  Chief Complaint: had concerns including Altered Mental Status and Weakness - Generalized.     A complete HPI/ROS/PMH/PSH/SH/FH are unobtainable due to: Altered Mental Status    Chronic or social conditions impacting patient care (Social Determinants of Health): None      Context: Vonnie Coppola is a 89 y.o. female with a medical history of anemia, hypertension, asthma, stroke, atrial fibrillation, PE, and morbid obesity who presents to the ED c/o acute altered mental status and weakness.  Patient was at her baseline last night.  Today, family had to assist her getting up and putting her on her walker.  She is normally able to pull herself up and ambulate with her walker independently.  Noted that she kept falling to the left.  Reports she is not acting like herself.  Did not have a fall today that they are aware of.  She is anticoagulated on Eliquis for history of atrial fibrillation.  Was recently started on ciprofloxacin for possible UTI.  Family had to call EMS to help get patient up and into the car.  After patient was placed in car family noted significant decline in brought her here to the emergency department.  History otherwise limited as patient is altered and a poor historian.      Review of prior external notes (non-ED) -and- Review of prior external test results outside of this encounter:  Patient seen in office by general surgery on 8/14/2024 for leg hematoma.  Reviewed assessment and plan.  Will treat wound with bacitracin and nonadherent gauze.  Will follow-up in 4 weeks.  Reviewed labs collected on 10/1/2024.  CBC with hemoglobin 8.2, BMP with creatinine 0.89.    Prescription drug monitoring program review:     N/A    PAST MEDICAL HISTORY  Active Ambulatory Problems     Diagnosis Date Noted    GI bleed 01/16/2024    Anemia 01/16/2024    HTN (hypertension) 01/16/2024     History of breast cancer 01/16/2024    Asthma 01/16/2024    History of CVA (cerebrovascular accident) 01/17/2024    Dehydration 01/17/2024    Renal insufficiency 01/17/2024    Pulmonary nodule 01/17/2024    Adnexal cyst 01/17/2024    Nausea 01/16/2024    Atrial fibrillation 01/21/2024    History of pulmonary embolism 03/22/2024    Class 2 severe obesity with serious comorbidity in adult 03/22/2024    Thrombocytopenia 03/22/2024    Cellulitis of right leg 03/23/2024    Acute cystitis 03/23/2024    Leg hematoma, right, subsequent encounter 05/31/2024    Obesity (BMI 35.0-39.9 without comorbidity) 06/19/2024    Morbid (severe) obesity due to excess calories (*specify comorbidity) 06/19/2024    Constipation 09/29/2024    UTI (urinary tract infection) 09/29/2024    Deep vein thrombosis     Generalized abdominal pain 10/01/2024     Resolved Ambulatory Problems     Diagnosis Date Noted    Fecal impaction 06/22/2024     Past Medical History:   Diagnosis Date    Arthritis     Blind left eye     Breast cancer 1999    History of cataract     Hx of radiation therapy 1999    Hypertension     Pneumonia     PONV (postoperative nausea and vomiting)     Stroke          PAST SURGICAL HISTORY  Past Surgical History:   Procedure Laterality Date    APPENDECTOMY      BLADDER SURGERY      BREAST BIOPSY Left 1999    MALIGNANT    BREAST LUMPECTOMY Left 1999    MALIGNANT    CATARACT EXTRACTION      COLONOSCOPY N/A 01/19/2024    Procedure: COLONOSCOPY to cecum with cold snare polypectomies;  Surgeon: Harshad Gaston MD;  Location: [facility] ENDOSCOPY;  Service: Gastroenterology;  Laterality: N/A;  pre- gi bleed, anemia  post- diverticulosis, polyps    ENDOSCOPY N/A 01/19/2024    Procedure: ESOPHAGOGASTRODUODENOSCOPY with biospy;  Surgeon: Harshad Gaston MD;  Location: [facility] ENDOSCOPY;  Service: Gastroenterology;  Laterality: N/A;  pre- gi bleed, anemia  post- erosive gastirits    GALLBLADDER SURGERY      HERNIA REPAIR       HYSTERECTOMY      INCISION AND DRAINAGE LEG Right 5/31/2024    Procedure: RIGHT LOWER EXTREMITY WOUND EXPLORATION, DEBRIDEMENT AND CONTROL OF BLEEDING;  Surgeon: Gerald Pedraza MD;  Location: [facility];  Service: General;  Laterality: Right;    LASIK      LYMPHADENECTOMY      OOPHORECTOMY           FAMILY HISTORY  Family History   Problem Relation Age of Onset    Other Mother         Cardiac disorder    Osteoarthritis Mother     Cataracts Mother     Macular degeneration Mother     Hypertension Father     Other Father         Cardiac disorder    Ovarian cancer Sister         70'S    Cancer Sister     Diabetes Sister     Hypertension Sister     Osteoarthritis Sister     Cancer Brother     Hypertension Brother     Osteoarthritis Brother     Colon cancer Maternal Grandmother     Cancer Maternal Grandmother     Ovarian cancer Paternal Grandmother         unknown    Cancer Other     Stroke Other     Breast cancer Neg Hx          SOCIAL HISTORY  Social History     Socioeconomic History    Marital status:    Tobacco Use    Smoking status: Former     Current packs/day: 0.50     Average packs/day: 0.5 packs/day for 2.0 years (1.0 ttl pk-yrs)     Types: Cigarettes    Smokeless tobacco: Never    Tobacco comments:     quit 40 years ago   Vaping Use    Vaping status: Never Used   Substance and Sexual Activity    Alcohol use: No    Drug use: No    Sexual activity: Defer         ALLERGIES  Cortisone, Penicillins, Iodinated contrast media, and Lactose intolerance (gi)      REVIEW OF SYSTEMS  Included in HPI  All systems reviewed and negative except for those discussed in HPI.      PHYSICAL EXAM    I have reviewed the triage vital signs and nursing notes.    ED Triage Vitals   Temp Pulse Resp BP SpO2   -- -- -- -- --      Temp src Heart Rate Source Patient Position BP Location FiO2 (%)   -- -- -- -- --       Physical Exam  Constitutional:       General: She is not in acute distress.     Appearance: She is  well-developed.   HENT:      Head: Normocephalic and atraumatic.   Eyes:      Extraocular Movements: Extraocular movements intact.   Cardiovascular:      Rate and Rhythm: Normal rate and regular rhythm.      Heart sounds: Normal heart sounds.   Pulmonary:      Effort: Pulmonary effort is normal.      Breath sounds: Normal breath sounds.   Abdominal:      General: There is no distension.   Skin:     General: Skin is warm.   Neurological:      General: No focal deficit present.      Mental Status: She is alert.      Comments: Patient oriented to self only.  She is leaning to the left side.  She has weakness of the right arm as well as bilateral lower extremities.  No facial asymmetry.   Psychiatric:         Mood and Affect: Mood normal.             LAB RESULTS  Recent Results (from the past 24 hours)   Comprehensive Metabolic Panel    Collection Time: 12/07/24  3:08 PM    Specimen: Arm, Right; Blood   Result Value Ref Range    Glucose 90 65 - 99 mg/dL    BUN 18 8 - 23 mg/dL    Creatinine 0.99 0.57 - 1.00 mg/dL    Sodium 145 136 - 145 mmol/L    Potassium 4.2 3.5 - 5.2 mmol/L    Chloride 109 (H) 98 - 107 mmol/L    CO2 26.0 22.0 - 29.0 mmol/L    Calcium 9.1 8.6 - 10.5 mg/dL    Total Protein 6.2 6.0 - 8.5 g/dL    Albumin 3.3 (L) 3.5 - 5.2 g/dL    ALT (SGPT) 16 1 - 33 U/L    AST (SGOT) 30 1 - 32 U/L    Alkaline Phosphatase 78 39 - 117 U/L    Total Bilirubin 0.4 0.0 - 1.2 mg/dL    Globulin 2.9 gm/dL    A/G Ratio 1.1 g/dL    BUN/Creatinine Ratio 18.2 7.0 - 25.0    Anion Gap 10.0 5.0 - 15.0 mmol/L    eGFR 55.3 (L) >60.0 mL/min/1.73   Protime-INR    Collection Time: 12/07/24  3:08 PM    Specimen: Arm, Right; Blood   Result Value Ref Range    Protime 17.7 (H) 11.7 - 14.2 Seconds    INR 1.43 (H) 0.90 - 1.10   aPTT    Collection Time: 12/07/24  3:08 PM    Specimen: Arm, Right; Blood   Result Value Ref Range    PTT 34.2 22.7 - 35.4 seconds   Single High Sensitivity Troponin T    Collection Time: 12/07/24  3:08 PM    Specimen: Arm,  Right; Blood   Result Value Ref Range    HS Troponin T 26 (H) <14 ng/L   Type & Screen    Collection Time: 12/07/24  3:08 PM    Specimen: Arm, Right; Blood   Result Value Ref Range    ABO Type O     RH type Negative     Antibody Screen Negative     T&S Expiration Date 12/10/2024 11:59:59 PM    Green Top (Gel)    Collection Time: 12/07/24  3:08 PM   Result Value Ref Range    Extra Tube Hold for add-ons.    Lavender Top    Collection Time: 12/07/24  3:08 PM   Result Value Ref Range    Extra Tube hold for add-on    Gold Top - SST    Collection Time: 12/07/24  3:08 PM   Result Value Ref Range    Extra Tube Hold for add-ons.    Light Blue Top    Collection Time: 12/07/24  3:08 PM   Result Value Ref Range    Extra Tube Hold for add-ons.    CBC Auto Differential    Collection Time: 12/07/24  3:08 PM    Specimen: Arm, Right; Blood   Result Value Ref Range    WBC 10.63 3.40 - 10.80 10*3/mm3    RBC 4.36 3.77 - 5.28 10*6/mm3    Hemoglobin 10.6 (L) 12.0 - 15.9 g/dL    Hematocrit 35.1 34.0 - 46.6 %    MCV 80.5 79.0 - 97.0 fL    MCH 24.3 (L) 26.6 - 33.0 pg    MCHC 30.2 (L) 31.5 - 35.7 g/dL    RDW 16.0 (H) 12.3 - 15.4 %    RDW-SD 46.4 37.0 - 54.0 fl    MPV 11.4 6.0 - 12.0 fL    Platelets 251 140 - 450 10*3/mm3    Neutrophil % 63.2 42.7 - 76.0 %    Lymphocyte % 23.4 19.6 - 45.3 %    Monocyte % 11.6 5.0 - 12.0 %    Eosinophil % 1.2 0.3 - 6.2 %    Basophil % 0.3 0.0 - 1.5 %    Immature Grans % 0.3 0.0 - 0.5 %    Neutrophils, Absolute 6.72 1.70 - 7.00 10*3/mm3    Lymphocytes, Absolute 2.49 0.70 - 3.10 10*3/mm3    Monocytes, Absolute 1.23 (H) 0.10 - 0.90 10*3/mm3    Eosinophils, Absolute 0.13 0.00 - 0.40 10*3/mm3    Basophils, Absolute 0.03 0.00 - 0.20 10*3/mm3    Immature Grans, Absolute 0.03 0.00 - 0.05 10*3/mm3    nRBC 0.0 0.0 - 0.2 /100 WBC   ECG 12 Lead Stroke Evaluation    Collection Time: 12/07/24  3:24 PM   Result Value Ref Range    QT Interval 411 ms    QTC Interval 466 ms         RADIOLOGY  XR Chest 1 View    Result Date:  12/7/2024  XR CHEST 1 VW-  HISTORY: 89-year-old female with acute stroke protocol.  FINDINGS: There is central pulmonary vascular congestion, but there is no evidence for CHF. No focal airspace consolidations are seen.  This report was finalized on 12/7/2024 3:34 PM by Dr. Samanta Lucas M.D on Workstation: [id number]      CT Angiogram Head w AI Analysis of LVO, CT Angiogram Neck, CT CEREBRAL PERFUSION WITH & WITHOUT CONTRAST    Result Date: 12/7/2024  CT ANGIOGRAM HEAD W AI ANALYSIS OF LVO-, CT ANGIOGRAM NECK-, CT CEREBRAL PERFUSION W WO CONTRAST-  INDICATION: Stroke  COMPARISON: CT head March 14, 2024  TECHNIQUE: CTA head and neck with IV contrast. Noncontrast head CT. CT perfusion with rapid analysis. Coronal and sagittal reformats. Three dimensional reconstructions. Radiation dose reduction techniques were utilized, including automated exposure control and exposure modulation based on body size.  FINDINGS:  Noncontrast head CT: No intraparenchymal hemorrhage. Preserved gray-white differentiation. No mass effect or midline shift. Chronic small vessel ischemic changes. No hydrocephalus. No intraventricular hemorrhage. No extra-axial hemorrhage or fluid collection. Bilateral cataract extractions. No fracture. Hyperostosis frontalis interna. Opacification of the left mastoid air cells and partial opacification of the left middle ear. Mucosal thickening in the ethmoid air cells. Mucosal thickening in the maxillary sinuses.  CTA NECK: Right common and internal carotid artery are patent, without stenosis. Tortuous right internal carotid artery. Left common and internal carotid artery are patent, without stenosis. Tortuous left common and internal carotid artery. Dominant left vertebral artery. Bilateral vertebral arteries are patent, without stenosis.  CTA HEAD: Small amount of calcific atherosclerotic disease in the right cavernous internal carotid artery, without stenosis. Right internal carotid, middle cerebral and  anterior cerebral arteries appear patent, without stenoses. Left internal carotid, middle cerebral and anterior cerebral arteries appear patent, without stenosis. Dominant left vertebral artery. Bilateral vertebral arteries, left posterior inferior cerebellar artery, basilar artery, superior cerebellar arteries and posterior cerebral arteries appear patent, without stenoses seen. Right posterior inferior cerebellar artery not identified. No aneurysm identified. Dural sinuses appear patent.  CT perfusion: CBF less than 30%: 0 mL. Tmax greater than 6 seconds: 0 mL. Mismatch volume: 0 mL. Tmax greater than 4 seconds: 96 mL, seen in the bilateral occipital lobes, right medial temporal lobe, posterior parietal lobes, right superior parietal lobe and right centrum semiovale, may be artifactual, with motion present.  Other: Small amount of secretions seen in the trachea. Right glenohumeral osteoarthritis.       1. No acute intracranial process. 2. No core infarct or ischemia seen on CT perfusion. 3. No large vessel occlusion, aneurysm or dissection identified. 4. Opacification of left mastoid air cells and partial opacification of the left middle ear. Finding can be correlated for otomastoiditis.  Call Report: Dr. Reddy was notified by telephone of the above findings on December 7, 2024 at 3:20 p.m.      This report was finalized on 12/7/2024 3:23 PM by Dr. Godfrey Joseph M.D on Workstation: [id number]         MEDICATIONS GIVEN IN ER  Medications   sodium chloride 0.9 % flush 10 mL (has no administration in time range)   iopamidol (ISOVUE-370) 76 % injection 150 mL (150 mL Intravenous Given by Other 12/7/24 1500)         ORDERS PLACED DURING THIS VISIT:  Orders Placed This Encounter   Procedures    CT Angiogram Head w AI Analysis of LVO    CT Angiogram Neck    CT CEREBRAL PERFUSION WITH & WITHOUT CONTRAST    XR Chest 1 View    [place] Draw    Comprehensive Metabolic Panel    Protime-INR    aPTT    Single High  Sensitivity Troponin T    CBC Auto Differential    Urinalysis With Culture If Indicated - Urine, Catheter    NPO Diet NPO Type: Strict NPO    Initiate Department's Acute Stroke Process (Team D, Code 19, etc.)    Perform NIH Stroke Scale    Measure Actual Weight    Notify Provider    Notify Provider for SBP Greater Than 140 for Hemorrhagic Stroke Patient    Head of Bed 30 Degrees or Less    Undress and Gown    Continuous Pulse Oximetry    Vital Signs    Neuro Checks    No Hypotonic Fluids    Nursing Dysphagia Screening (Complete Prior to Giving anything PO)    RN to Place Order SLP Consult (IF swallow screen failed) - Eval & Treat Choosing Reason of RN Dysphagia Screen Failed    Inpatient Neurology Consult Stroke    Inpatient Neurology Consult Stroke    [facility] (on-call MD unless specified) Details    Oxygen Therapy- Nasal Cannula; Titrate 1-6 LPM Per SpO2; 90 - 95%    POC Glucose Once    ECG 12 Lead Stroke Evaluation    Type & Screen    Insert Large-Bore Peripheral IV - RIGHT AC Preferred    Initiate Observation Status    CBC & Differential    Green Top (Gel)    Lavender Top    Gold Top - SST    Light Blue Top         OUTPATIENT MEDICATION MANAGEMENT:  Current Facility-Administered Medications Ordered in Epic   Medication Dose Route Frequency Provider Last Rate Last Admin    sodium chloride 0.9 % flush 10 mL  10 mL Intravenous PRN Lui Hardwick MD         Current Outpatient Medications Ordered in Epic   Medication Sig Dispense Refill    acetaminophen (TYLENOL) 325 MG tablet Take 2 tablets by mouth Every 6 (Six) Hours As Needed for Mild Pain.      albuterol (ACCUNEB) 0.63 MG/3ML nebulizer solution Take 3 mL by nebulization Every 6 (Six) Hours As Needed for Shortness of Air.      apixaban (ELIQUIS) 2.5 MG tablet tablet Take 1 tablet by mouth Every 12 (Twelve) Hours. Indications: Atrial Fibrillation      arformoterol (BROVANA) 15 MCG/2ML nebulizer solution Take 2 mL by nebulization 2 (Two) Times a Day. 120 mL 11     benzonatate (TESSALON) 100 MG capsule Take 1 capsule by mouth 3 (Three) Times a Day As Needed for Cough. 20 capsule 0    bisacodyl (Dulcolax) 10 MG suppository Insert 1 suppository into the rectum Daily As Needed for Constipation.      budesonide (PULMICORT) 0.5 MG/2ML nebulizer solution Take 2 mL by nebulization 2 (Two) Times a Day. 360 mL 2    coenzyme Q10 50 MG capsule capsule Take 1 capsule by mouth Daily. Indications: nutritional support      diazePAM (VALIUM) 2 MG tablet Take 1 tablet by mouth 2 (Two) Times a Day.      dilTIAZem CD (CARDIZEM CD) 120 MG 24 hr capsule Take 1 capsule by mouth Daily.      glucosamine-chondroitin 500-400 MG capsule capsule Take 1 capsule by mouth 3 (Three) Times a Day With Meals. Indications: Joint Damage causing Pain and Loss of Function      Magnesium 500 MG tablet Take 1 tablet by mouth Daily.      ondansetron ODT (ZOFRAN-ODT) 4 MG disintegrating tablet Place 1 tablet on the tongue Daily As Needed for Nausea or Vomiting.      oxyCODONE (ROXICODONE) 5 MG immediate release tablet Take 1 tablet by mouth Every 8 (Eight) Hours As Needed for Moderate Pain.      pantoprazole (PROTONIX) 40 MG EC tablet Take 1 tablet by mouth 2 (Two) Times a Day Before Meals. 60 tablet 0    phenylephrine-mineral oil-petrolatum (PREPARATION H) 0.25-14-74.9 % ointment hemorrhoidal ointment Insert 1 Application into the rectum 2 (Two) Times a Day As Needed.      polyethylene glycol (MIRALAX) 17 g packet Take 17 g by mouth Daily. Indications: Constipation      polyethylene glycol (MIRALAX) 17 g packet Take 17 g by mouth 2 (Two) Times a Day. 30 each 0    sennosides-docusate (PERICOLACE) 8.6-50 MG per tablet Take 1 tablet by mouth 2 (Two) Times a Day.      sennosides-docusate (PERICOLACE) 8.6-50 MG per tablet Take 2 tablets by mouth 2 (Two) Times a Day. 60 tablet 0           PROGRESS, DATA ANALYSIS, CONSULTS, AND MEDICAL DECISION MAKING  All labs have been independently interpreted by me.  All radiology  studies have been reviewed by me. All EKG's have been independently viewed and interpreted by me.  Discussion below represents my analysis of pertinent findings related to patient's condition, differential diagnosis, treatment plan and final disposition.    Differential diagnosis includes but is not limited to CVA, UTI, metabolic encephalopathy, intracranial bleed.        ED Course as of 12/07/24 1648   Sat Dec 07, 2024   1430 I discussed with Dr. Chavez with stroke neurology.  Reviewed patient presentation and ED findings.  He recommends proceeding with team D.  Will address elevated blood pressure after scans have been performed. [MP]   1436 I reviewed workup from 5/31/2024 where she had a CTA of her lower extremity with IV contrast and did not have any reaction. [TR]   1554 EKG          EKG time: 1524  Rhythm/Rate: Atrial fibrillation, rate 77  P waves and VT: Absent  QRS, axis: Narrow QRS, left axis  ST and T waves: No acute    Independently Interpreted by me  New A-fib changed compared to prior 6/9/2024   [TR]   1554 XR Chest 1 View  My independent interpretation of the imaging study is no pneumothorax [TR]   1647 I spoke with Dr. Carranza with [facility].  Reviewed patient presentation and ED findings.  She agrees admit patient to a telemetry bed. [MP]      ED Course User Index  [MP] Catalina Crawford PA-C  [TR] Lui Hardwick MD             AS OF 16:48 EST VITALS:    BP - 116/72  HR - 60  TEMP - 98.3 °F (36.8 °C) (Axillary)  O2 SATS - 100%    COMPLEXITY OF CARE  The patient requires admission.      DIAGNOSIS  Final diagnoses:   Altered mental status, unspecified altered mental status type   Right arm weakness         DISPOSITION  ED Disposition       ED Disposition   Decision to Admit    Condition   --    Comment   Level of Care: Telemetry [5]   Diagnosis: Altered mental status [780.97.ICD-9-CM]   Admitting Physician: KARINE CARRANZA [[id number]]   Attending Physician: KARINE CARRANZA [[id number]]   Is patient  appropriate for Inpatient Observation Unit?: No [0]                  Please note that portions of this document were completed with a voice recognition program.    Note Disclaimer: At [facility], we believe that sharing information builds trust and better relationships. You are receiving this note because you recently visited [facility]. It is possible you will see health information before a provider has talked with you about it. This kind of information can be easy to misunderstand. To help you fully understand what it means for your health, we urge you to discuss this note with your provider.     Catalina Crawford PA-C  12/07/24 [id number]

## 2024-12-07 NOTE — ED PROVIDER NOTES
EMERGENCY DEPARTMENT MD ATTESTATION NOTE    Room Number:  S413/1  PCP: Christopher Leyva DO  Independent Historians: Patient and Family    HPI:  A complete HPI/ROS/PMH/PSH/SH/FH are unobtainable due to: None    Chronic or social conditions impacting patient care (Social Determinants of Health): None      Context: Vonnie Coppola is a 89 y.o. female with a medical history of stroke, renal sufficiency, atrial fibrillation who presents to the ED c/o acute altered mental status and weakness.  Family report the patient was last seen normal last night.  This morning she had right arm weakness and was falling to the left.  She had altered mental status.  She is on Eliquis.  Family reports that she was recently told that she might have a UTI by her primary care doctor.  She was started on Cipro.        Review of prior external notes (non-ED) -and- Review of prior external test results outside of this encounter:  Evaluation 10/1/2024 shows normal BMP    Prescription drug monitoring program review:           PHYSICAL EXAM    I have reviewed the triage vital signs and nursing notes.    ED Triage Vitals   Temp Heart Rate Resp BP SpO2   12/07/24 1435 12/07/24 1434 12/07/24 1434 12/07/24 1434 12/07/24 1434   98.3 °F (36.8 °C) 77 16 (!) 202/130 93 %      Temp src Heart Rate Source Patient Position BP Location FiO2 (%)   12/07/24 1435 -- -- -- --   Axillary           Physical Exam  GENERAL: Awake, alert, ill-appearing  SKIN: Warm, dry  HENT: Normocephalic, atraumatic  EYES: no scleral icterus  CV: regular rhythm, regular rate  RESPIRATORY: normal effort, lungs clear  ABDOMEN: soft, nontender, nondistended  MUSCULOSKELETAL: no deformity  NEURO: alert, moves all extremities, follows commands, moderate to severe right arm weakness, bilateral lower extremity weakness, falling to the left.  No facial asymmetry            MEDICATIONS GIVEN IN ER  Medications   sodium chloride 0.9 % flush 10 mL (has no administration in time range)    acetaminophen (TYLENOL) tablet 650 mg (has no administration in time range)     Or   acetaminophen (TYLENOL) 160 MG/5ML oral solution 650 mg (has no administration in time range)     Or   acetaminophen (TYLENOL) suppository 650 mg (has no administration in time range)   ondansetron ODT (ZOFRAN-ODT) disintegrating tablet 4 mg (has no administration in time range)     Or   ondansetron (ZOFRAN) injection 4 mg (has no administration in time range)   melatonin tablet 2.5 mg (has no administration in time range)   sennosides-docusate (PERICOLACE) 8.6-50 MG per tablet 2 tablet (has no administration in time range)     And   polyethylene glycol (MIRALAX) packet 17 g (has no administration in time range)     And   bisacodyl (DULCOLAX) EC tablet 5 mg (has no administration in time range)     And   bisacodyl (DULCOLAX) suppository 10 mg (has no administration in time range)   iopamidol (ISOVUE-370) 76 % injection 150 mL (150 mL Intravenous Given by Other 12/7/24 1500)         ORDERS PLACED DURING THIS VISIT:  Orders Placed This Encounter   Procedures    CT Angiogram Head w AI Analysis of LVO    CT Angiogram Neck    CT CEREBRAL PERFUSION WITH & WITHOUT CONTRAST    XR Chest 1 View    [place] Draw    Comprehensive Metabolic Panel    Protime-INR    aPTT    Single High Sensitivity Troponin T    CBC Auto Differential    Urinalysis With Culture If Indicated - Urine, Catheter    Basic Metabolic Panel    CBC (No Diff)    Diet: Cardiac; Healthy Heart (2-3 Na+); Fluid Consistency: Thin (IDDSI 0)    Initiate Department's Acute Stroke Process (Team D, Code 19, etc.)    Perform NIH Stroke Scale    Measure Actual Weight    Notify Provider    Notify Provider for SBP Greater Than 140 for Hemorrhagic Stroke Patient    Head of Bed 30 Degrees or Less    Undress and Gown    Continuous Pulse Oximetry    Vital Signs    Neuro Checks    No Hypotonic Fluids    Nursing Dysphagia Screening (Complete Prior to Giving anything PO)    RN to Place Order SLP  Consult (IF swallow screen failed) - Eval & Treat Choosing Reason of RN Dysphagia Screen Failed    Vital Signs    Up with assistance    Daily Weights    Strict Intake & Output    Oral Care    Place Sequential Compression Device    Maintain Sequential Compression Device    Code Status and Medical Interventions: CPR (Attempt to Resuscitate); Full    Inpatient Neurology Consult Stroke    Inpatient Neurology Consult Stroke    [facility] (on-call MD unless specified) Details    Oxygen Therapy- Nasal Cannula; Titrate 1-6 LPM Per SpO2; 90 - 95%    POC Glucose Once    ECG 12 Lead Stroke Evaluation    Type & Screen    Insert Large-Bore Peripheral IV - RIGHT AC Preferred    Initiate Observation Status    CBC & Differential    Green Top (Gel)    Lavender Top    Gold Top - SST    Light Blue Top         PROCEDURES  Procedures      Critical care provider statement:    Critical care time (minutes): 45.   Critical care time was exclusive of:  Separately billable procedures and treating other patients   Critical care was necessary to treat or prevent imminent or life-threatening deterioration of the following conditions:  CNS Failure   Critical care was time spent personally by me on the following activities:  Development of treatment plan with patient or surrogate, discussions with consultants, evaluation of patient's response to treatment, examination of patient, obtaining history from patient or surrogate, ordering and performing treatments and interventions, ordering and review of laboratory studies, ordering and review of radiographic studies, pulse oximetry, re-evaluation of patient's condition and review of old charts. Critical Care indicators:        PROGRESS, DATA ANALYSIS, CONSULTS, AND MEDICAL DECISION MAKING  All labs have been independently interpreted by me.  All radiology studies have been reviewed by me. All EKG's have been independently viewed and interpreted by me.  Discussion below represents my analysis of pertinent  findings related to patient's condition, differential diagnosis, treatment plan and final disposition.    Differential diagnosis includes but is not limited to stroke, TIA, hypertensive urgency, hypertensive emergency.    Clinical Scores:                                     ED Course as of 12/07/24 1929   Sat Dec 07, 2024   1430 I discussed with Dr. Chavez with stroke neurology.  Reviewed patient presentation and ED findings.  He recommends proceeding with team D.  Will address elevated blood pressure after scans have been performed. [MP]   1436 I reviewed workup from 5/31/2024 where she had a CTA of her lower extremity with IV contrast and did not have any reaction. [TR]   1554 EKG          EKG time: 1524  Rhythm/Rate: Atrial fibrillation, rate 77  P waves and WA: Absent  QRS, axis: Narrow QRS, left axis  ST and T waves: No acute    Independently Interpreted by me  New A-fib changed compared to prior 6/9/2024   [TR]   1554 XR Chest 1 View  My independent interpretation of the imaging study is no pneumothorax [TR]   1647 I spoke with Dr. Carranza with [facility].  Reviewed patient presentation and ED findings.  She agrees admit patient to a telemetry bed. [MP]      ED Course User Index  [MP] Catalina Crawford PA-C  [TR] Lui Hardwick MD       MDM: The patient is ill-appearing.  She has right arm weakness.  She is hypertensive.  We will discuss with stroke and initiate stroke process.  Given that she recently was potentially having a UTI we will obtain urine for testing.  She will require admission to the hospital today.      COMPLEXITY OF CARE  The patient requires admission.    Please note that portions of this document were completed with a voice recognition program.    Note Disclaimer: At [facility], we believe that sharing information builds trust and better relationships. You are receiving this note because you recently visited [facility]. It is possible you will see health information before a provider  has talked with you about it. This kind of information can be easy to misunderstand. To help you fully understand what it means for your health, we urge you to discuss this note with your provider.         Lui Hardwick MD  12/07/24 1929

## 2024-12-07 NOTE — ED NOTES
Nursing report ED to floor  Vonnie Coppola  89 y.o.  female    HPI :  HPI  Stated Reason for Visit: AMS and weakness from home  History Obtained From: patient, family    Chief Complaint  Chief Complaint   Patient presents with    Altered Mental Status    Weakness - Generalized       Admitting doctor:   Maryuri Carranza MD    Admitting diagnosis:   The primary encounter diagnosis was Altered mental status, unspecified altered mental status type. A diagnosis of Right arm weakness was also pertinent to this visit.    Code status:   Current Code Status       Date Active Code Status Order ID Comments User Context       Prior            Allergies:   Cortisone, Penicillins, Iodinated contrast media, and Lactose intolerance (gi)    Isolation:   No active isolations    Intake and Output  No intake or output data in the 24 hours ending 12/07/24 1701    Weight:   There were no vitals filed for this visit.    Most recent vitals:   Vitals:    12/07/24 1434 12/07/24 1435 12/07/24 1631 12/07/24 1633   BP: (!) 202/130  116/72    Pulse: 77  65 60   Resp: 16      Temp:  98.3 °F (36.8 °C)     TempSrc:  Axillary     SpO2: 93%   100%       Active LDAs/IV Access:   Lines, Drains & Airways       Active LDAs       Name Placement date Placement time Site Days    Peripheral IV 12/07/24 1427 Right Antecubital 12/07/24  1427  Antecubital  less than 1                    Labs (abnormal labs have a star):   Labs Reviewed   COMPREHENSIVE METABOLIC PANEL - Abnormal; Notable for the following components:       Result Value    Chloride 109 (*)     Albumin 3.3 (*)     eGFR 55.3 (*)     All other components within normal limits    Narrative:     GFR Normal >60  Chronic Kidney Disease <60  Kidney Failure <15    The GFR formula is only valid for adults with stable renal function between ages 18 and 70.   PROTIME-INR - Abnormal; Notable for the following components:    Protime 17.7 (*)     INR 1.43 (*)     All other components within normal limits    SINGLE HS TROPONIN T - Abnormal; Notable for the following components:    HS Troponin T 26 (*)     All other components within normal limits    Narrative:     High Sensitive Troponin T Reference Range:  <14.0 ng/L- Negative Female for AMI  <22.0 ng/L- Negative Male for AMI  >=14 - Abnormal Female indicating possible myocardial injury.  >=22 - Abnormal Male indicating possible myocardial injury.   Clinicians would have to utilize clinical acumen, EKG, Troponin, and serial changes to determine if it is an Acute Myocardial Infarction or myocardial injury due to an underlying chronic condition.        CBC WITH AUTO DIFFERENTIAL - Abnormal; Notable for the following components:    Hemoglobin 10.6 (*)     MCH 24.3 (*)     MCHC 30.2 (*)     RDW 16.0 (*)     Monocytes, Absolute 1.23 (*)     All other components within normal limits   APTT - Normal   RAINBOW DRAW    Narrative:     The following orders were created for panel order [place] Draw.  Procedure                               Abnormality         Status                     ---------                               -----------         ------                     Green Top (Gel)[[id number]]                                  Final result               Lavender Top[[id number]]                                     Final result               Gold Top - SST[[id number]]                                   Final result               Light Blue Top[[id number]]                                   Final result                 Please view results for these tests on the individual orders.   URINALYSIS W/ CULTURE IF INDICATED   POCT GLUCOSE FINGERSTICK   TYPE AND SCREEN   CBC AND DIFFERENTIAL    Narrative:     The following orders were created for panel order CBC & Differential.  Procedure                               Abnormality         Status                     ---------                               -----------         ------                     CBC Auto Differential[[id number]]         Abnormal            Final result                 Please view results for these tests on the individual orders.   GREEN TOP   LAVENDER TOP   GOLD TOP - SST   LIGHT BLUE TOP       EKG:   ECG 12 Lead Stroke Evaluation   Preliminary Result   HEART RATE=77  bpm   RR Davbqefp=565  ms   MT Interval=  ms   P Horizontal Axis=  deg   P Front Axis=  deg   QRSD Lwlkqird=295  ms   QT Pkuwjlrg=390  ms   ALaA=910  ms   QRS Axis=-50  deg   T Wave Axis=13  deg   - ABNORMAL ECG -   Atrial fibrillation   Left ventricular hypertrophy   Inferior infarct, age indeterminate   Anterior infarct, old   Date and Time of Study:2024-12-07 15:24:33          Meds given in ED:   Medications   sodium chloride 0.9 % flush 10 mL (has no administration in time range)   iopamidol (ISOVUE-370) 76 % injection 150 mL (150 mL Intravenous Given by Other 12/7/24 1500)       Imaging results:  CT Angiogram Head w AI Analysis of LVO    Result Date: 12/7/2024   1. No acute intracranial process. 2. No core infarct or ischemia seen on CT perfusion. 3. No large vessel occlusion, aneurysm or dissection identified. 4. Opacification of left mastoid air cells and partial opacification of the left middle ear. Finding can be correlated for otomastoiditis.  Call Report: Dr. Reddy was notified by telephone of the above findings on December 7, 2024 at 3:20 p.m.      This report was finalized on 12/7/2024 3:23 PM by Dr. Godfrey Joseph M.D on Workstation: [id number]      CT Angiogram Neck    Result Date: 12/7/2024   1. No acute intracranial process. 2. No core infarct or ischemia seen on CT perfusion. 3. No large vessel occlusion, aneurysm or dissection identified. 4. Opacification of left mastoid air cells and partial opacification of the left middle ear. Finding can be correlated for otomastoiditis.  Call Report: Dr. Reddy was notified by telephone of the above findings on December 7, 2024 at 3:20 p.m.      This report was finalized on 12/7/2024 3:23 PM by   Godfrey Joseph M.D on Workstation: [id number]      CT CEREBRAL PERFUSION WITH & WITHOUT CONTRAST    Result Date: 12/7/2024   1. No acute intracranial process. 2. No core infarct or ischemia seen on CT perfusion. 3. No large vessel occlusion, aneurysm or dissection identified. 4. Opacification of left mastoid air cells and partial opacification of the left middle ear. Finding can be correlated for otomastoiditis.  Call Report: Dr. Reddy was notified by telephone of the above findings on December 7, 2024 at 3:20 p.m.      This report was finalized on 12/7/2024 3:23 PM by Dr. Godfrey Joseph M.D on Workstation: [id number]       Ambulatory status:   - bedrest    Social issues:   Social History     Socioeconomic History    Marital status:    Tobacco Use    Smoking status: Former     Current packs/day: 0.50     Average packs/day: 0.5 packs/day for 2.0 years (1.0 ttl pk-yrs)     Types: Cigarettes    Smokeless tobacco: Never    Tobacco comments:     quit 40 years ago   Vaping Use    Vaping status: Never Used   Substance and Sexual Activity    Alcohol use: No    Drug use: No    Sexual activity: Defer       Peripheral Neurovascular       Neuro Cognitive       Learning       Respiratory       Abdominal Pain       Pain Assessments  Pain (Adult)  Preferred Pain Scale: FACES (Goddard-Baker FACES Pain Rating Scale)  FACES Pain Rating: Rest: 0-->no hurt  FACES Pain Rating: Activity: 0-->no hurt    NIH Stroke Scale       Cherie Mayer RN  12/07/24 17:01 EST

## 2024-12-08 ENCOUNTER — APPOINTMENT (OUTPATIENT)
Dept: NEUROLOGY | Facility: HOSPITAL | Age: 89
DRG: 884 | End: 2024-12-08
Payer: MEDICARE

## 2024-12-08 ENCOUNTER — APPOINTMENT (OUTPATIENT)
Dept: MRI IMAGING | Facility: HOSPITAL | Age: 89
DRG: 884 | End: 2024-12-08
Payer: MEDICARE

## 2024-12-08 PROBLEM — B96.20 E. COLI UTI (URINARY TRACT INFECTION): Status: ACTIVE | Noted: 2024-12-08

## 2024-12-08 PROBLEM — F13.20 BENZODIAZEPINE DEPENDENCE: Status: ACTIVE | Noted: 2024-12-08

## 2024-12-08 PROBLEM — R29.90 STROKE-LIKE SYMPTOMS: Status: ACTIVE | Noted: 2024-12-08

## 2024-12-08 PROBLEM — Z79.01 CHRONIC ANTICOAGULATION: Status: ACTIVE | Noted: 2024-12-08

## 2024-12-08 PROBLEM — E87.6 HYPOKALEMIA: Status: ACTIVE | Noted: 2024-12-08

## 2024-12-08 PROBLEM — F41.9 ANXIETY DISORDER: Status: ACTIVE | Noted: 2024-12-08

## 2024-12-08 PROBLEM — N39.0 E. COLI UTI (URINARY TRACT INFECTION): Status: ACTIVE | Noted: 2024-12-08

## 2024-12-08 LAB
ALBUMIN SERPL-MCNC: 2.4 G/DL (ref 3.5–5.2)
ALBUMIN/GLOB SERPL: 1 G/DL
ALP SERPL-CCNC: 57 U/L (ref 39–117)
ALT SERPL W P-5'-P-CCNC: 13 U/L (ref 1–33)
AMPHET+METHAMPHET UR QL: NEGATIVE
ANION GAP SERPL CALCULATED.3IONS-SCNC: 6 MMOL/L (ref 5–15)
AST SERPL-CCNC: 20 U/L (ref 1–32)
BARBITURATES UR QL SCN: NEGATIVE
BENZODIAZ UR QL SCN: POSITIVE
BILIRUB SERPL-MCNC: 0.3 MG/DL (ref 0–1.2)
BUN SERPL-MCNC: 17 MG/DL (ref 8–23)
BUN/CREAT SERPL: 22.4 (ref 7–25)
CALCIUM SPEC-SCNC: 7.7 MG/DL (ref 8.6–10.5)
CANNABINOIDS SERPL QL: NEGATIVE
CHLORIDE SERPL-SCNC: 113 MMOL/L (ref 98–107)
CHOLEST SERPL-MCNC: 100 MG/DL (ref 0–200)
CO2 SERPL-SCNC: 26 MMOL/L (ref 22–29)
COCAINE UR QL: NEGATIVE
CREAT SERPL-MCNC: 0.76 MG/DL (ref 0.57–1)
DEPRECATED RDW RBC AUTO: 46.1 FL (ref 37–54)
EGFRCR SERPLBLD CKD-EPI 2021: 75.9 ML/MIN/1.73
ERYTHROCYTE [DISTWIDTH] IN BLOOD BY AUTOMATED COUNT: 16.2 % (ref 12.3–15.4)
FENTANYL UR-MCNC: NEGATIVE NG/ML
GLOBULIN UR ELPH-MCNC: 2.3 GM/DL
GLUCOSE BLDC GLUCOMTR-MCNC: 100 MG/DL (ref 70–130)
GLUCOSE BLDC GLUCOMTR-MCNC: 73 MG/DL (ref 70–130)
GLUCOSE BLDC GLUCOMTR-MCNC: 83 MG/DL (ref 70–130)
GLUCOSE SERPL-MCNC: 67 MG/DL (ref 65–99)
HBA1C MFR BLD: 4.8 % (ref 4.8–5.6)
HCT VFR BLD AUTO: 27.4 % (ref 34–46.6)
HDLC SERPL-MCNC: 40 MG/DL (ref 40–60)
HGB BLD-MCNC: 8.4 G/DL (ref 12–15.9)
LDLC SERPL CALC-MCNC: 46 MG/DL (ref 0–100)
LDLC/HDLC SERPL: 1.18 {RATIO}
MAGNESIUM SERPL-MCNC: 2 MG/DL (ref 1.6–2.4)
MCH RBC QN AUTO: 24.3 PG (ref 26.6–33)
MCHC RBC AUTO-ENTMCNC: 30.7 G/DL (ref 31.5–35.7)
MCV RBC AUTO: 79.4 FL (ref 79–97)
METHADONE UR QL SCN: NEGATIVE
OPIATES UR QL: NEGATIVE
OXYCODONE UR QL SCN: NEGATIVE
PLATELET # BLD AUTO: 185 10*3/MM3 (ref 140–450)
PMV BLD AUTO: 11 FL (ref 6–12)
POTASSIUM SERPL-SCNC: 3.4 MMOL/L (ref 3.5–5.2)
POTASSIUM SERPL-SCNC: 4.3 MMOL/L (ref 3.5–5.2)
PROT SERPL-MCNC: 4.7 G/DL (ref 6–8.5)
RBC # BLD AUTO: 3.45 10*6/MM3 (ref 3.77–5.28)
SODIUM SERPL-SCNC: 145 MMOL/L (ref 136–145)
TRIGL SERPL-MCNC: 65 MG/DL (ref 0–150)
TSH SERPL DL<=0.05 MIU/L-ACNC: 1.46 UIU/ML (ref 0.27–4.2)
VLDLC SERPL-MCNC: 14 MG/DL (ref 5–40)
WBC NRBC COR # BLD AUTO: 7.21 10*3/MM3 (ref 3.4–10.8)

## 2024-12-08 PROCEDURE — 80307 DRUG TEST PRSMV CHEM ANLYZR: CPT | Performed by: STUDENT IN AN ORGANIZED HEALTH CARE EDUCATION/TRAINING PROGRAM

## 2024-12-08 PROCEDURE — 25010000002 ENOXAPARIN PER 10 MG: Performed by: HOSPITALIST

## 2024-12-08 PROCEDURE — 36415 COLL VENOUS BLD VENIPUNCTURE: CPT | Performed by: INTERNAL MEDICINE

## 2024-12-08 PROCEDURE — 82948 REAGENT STRIP/BLOOD GLUCOSE: CPT

## 2024-12-08 PROCEDURE — 94761 N-INVAS EAR/PLS OXIMETRY MLT: CPT

## 2024-12-08 PROCEDURE — 84443 ASSAY THYROID STIM HORMONE: CPT | Performed by: INTERNAL MEDICINE

## 2024-12-08 PROCEDURE — 80053 COMPREHEN METABOLIC PANEL: CPT | Performed by: INTERNAL MEDICINE

## 2024-12-08 PROCEDURE — 83735 ASSAY OF MAGNESIUM: CPT | Performed by: HOSPITALIST

## 2024-12-08 PROCEDURE — 95816 EEG AWAKE AND DROWSY: CPT

## 2024-12-08 PROCEDURE — 94640 AIRWAY INHALATION TREATMENT: CPT

## 2024-12-08 PROCEDURE — 85027 COMPLETE CBC AUTOMATED: CPT | Performed by: INTERNAL MEDICINE

## 2024-12-08 PROCEDURE — 84132 ASSAY OF SERUM POTASSIUM: CPT | Performed by: HOSPITALIST

## 2024-12-08 PROCEDURE — 94799 UNLISTED PULMONARY SVC/PX: CPT

## 2024-12-08 PROCEDURE — 70551 MRI BRAIN STEM W/O DYE: CPT

## 2024-12-08 PROCEDURE — 94664 DEMO&/EVAL PT USE INHALER: CPT

## 2024-12-08 PROCEDURE — 95819 EEG AWAKE AND ASLEEP: CPT | Performed by: PSYCHIATRY & NEUROLOGY

## 2024-12-08 PROCEDURE — 99222 1ST HOSP IP/OBS MODERATE 55: CPT | Performed by: STUDENT IN AN ORGANIZED HEALTH CARE EDUCATION/TRAINING PROGRAM

## 2024-12-08 PROCEDURE — 80061 LIPID PANEL: CPT | Performed by: INTERNAL MEDICINE

## 2024-12-08 PROCEDURE — 25010000002 POTASSIUM CHLORIDE 10 MEQ/100ML SOLUTION: Performed by: HOSPITALIST

## 2024-12-08 PROCEDURE — 83036 HEMOGLOBIN GLYCOSYLATED A1C: CPT | Performed by: INTERNAL MEDICINE

## 2024-12-08 RX ORDER — DIAZEPAM 2 MG/1
1 TABLET ORAL 2 TIMES DAILY
Status: DISCONTINUED | OUTPATIENT
Start: 2024-12-08 | End: 2024-12-13

## 2024-12-08 RX ORDER — POTASSIUM CHLORIDE 7.45 MG/ML
10 INJECTION INTRAVENOUS
Status: DISPENSED | OUTPATIENT
Start: 2024-12-08 | End: 2024-12-08

## 2024-12-08 RX ORDER — DEXTROSE MONOHYDRATE, SODIUM CHLORIDE, AND POTASSIUM CHLORIDE 50; 1.49; 9 G/1000ML; G/1000ML; G/1000ML
75 INJECTION, SOLUTION INTRAVENOUS CONTINUOUS
Status: DISPENSED | OUTPATIENT
Start: 2024-12-08 | End: 2024-12-09

## 2024-12-08 RX ORDER — ENOXAPARIN SODIUM 100 MG/ML
1 INJECTION SUBCUTANEOUS EVERY 12 HOURS
Status: DISCONTINUED | OUTPATIENT
Start: 2024-12-08 | End: 2024-12-09

## 2024-12-08 RX ADMIN — ARFORMOTEROL TARTRATE 15 MCG: 15 SOLUTION RESPIRATORY (INHALATION) at 07:43

## 2024-12-08 RX ADMIN — POTASSIUM CHLORIDE 10 MEQ: 7.46 INJECTION, SOLUTION INTRAVENOUS at 13:29

## 2024-12-08 RX ADMIN — ASPIRIN 300 MG: 300 SUPPOSITORY RECTAL at 12:21

## 2024-12-08 RX ADMIN — DEXTROSE MONOHYDRATE, SODIUM CHLORIDE, AND POTASSIUM CHLORIDE 75 ML/HR: 50; 9; 1.49 INJECTION, SOLUTION INTRAVENOUS at 08:53

## 2024-12-08 RX ADMIN — POTASSIUM CHLORIDE 10 MEQ: 7.46 INJECTION, SOLUTION INTRAVENOUS at 11:24

## 2024-12-08 RX ADMIN — POTASSIUM CHLORIDE 10 MEQ: 7.46 INJECTION, SOLUTION INTRAVENOUS at 08:53

## 2024-12-08 RX ADMIN — DEXTROSE MONOHYDRATE, SODIUM CHLORIDE, AND POTASSIUM CHLORIDE 75 ML/HR: 50; 9; 1.49 INJECTION, SOLUTION INTRAVENOUS at 23:05

## 2024-12-08 RX ADMIN — ENOXAPARIN SODIUM 90 MG: 100 INJECTION SUBCUTANEOUS at 17:56

## 2024-12-08 RX ADMIN — BUDESONIDE 0.5 MG: 0.5 INHALANT RESPIRATORY (INHALATION) at 07:41

## 2024-12-08 NOTE — PROGRESS NOTES
"    Name: Vonnie Coppola ADMIT: 2024   : 1935  PCP: Christopher Leyva DO    MRN: [id number] LOS: 0 days   AGE/SEX: 89 y.o. female  ROOM: [facility]     Subjective   Subjective   Pt unable to participate in history or exam due to decreased LOC. She will wake to voice and mild sternal rub, says \"I'm fine\" then closes eyes again.        Objective   Objective   Vital Signs  Temp:  [98.2 °F (36.8 °C)-98.8 °F (37.1 °C)] 98.2 °F (36.8 °C)  Heart Rate:  [60-76] 76  Resp:  [16-18] 18  BP: ()/(47-72) 112/55  SpO2:  [96 %-100 %] 96 %  on  Flow (L/min) (Oxygen Therapy):  [2] 2;   Device (Oxygen Therapy): nasal cannula  Body mass index is 31.99 kg/m².  Physical Exam  Vitals and nursing note reviewed. Exam conducted with a chaperone present (family x 2).   Constitutional:       General: She is not in acute distress.     Appearance: She is ill-appearing (chronically). She is not toxic-appearing or diaphoretic.   HENT:      Head: Normocephalic.      Mouth/Throat:      Mouth: Mucous membranes are moist.      Pharynx: Oropharynx is clear.   Eyes:      General: No scleral icterus.        Right eye: No discharge.         Left eye: No discharge.      Conjunctiva/sclera: Conjunctivae normal.   Cardiovascular:      Rate and Rhythm: Normal rate and regular rhythm.      Pulses: Normal pulses.   Pulmonary:      Effort: Pulmonary effort is normal. No respiratory distress.      Breath sounds: Normal breath sounds. No wheezing or rales.   Abdominal:      General: Bowel sounds are normal. There is no distension.      Palpations: Abdomen is soft.   Musculoskeletal:         General: No swelling.      Cervical back: Neck supple.   Skin:     General: Skin is warm and dry.      Capillary Refill: Capillary refill takes less than 2 seconds.      Coloration: Skin is not jaundiced.   Neurological:      Comments: Withdraws all 4 to noxious stimuli   Psychiatric:      Comments: Unable to assess       Results Review     I reviewed the " patient's new clinical results.  Results from last 7 days   Lab Units 12/08/24  0745 12/07/24  1508   WBC 10*3/mm3 7.21 10.63   HEMOGLOBIN g/dL 8.4* 10.6*   PLATELETS 10*3/mm3 185 251     Results from last 7 days   Lab Units 12/08/24  0745 12/07/24  1508   SODIUM mmol/L 145 145   POTASSIUM mmol/L 3.4* 4.2   CHLORIDE mmol/L 113* 109*   CO2 mmol/L 26.0 26.0   BUN mg/dL 17 18   CREATININE mg/dL 0.76 0.99   GLUCOSE mg/dL 67 90   EGFR mL/min/1.73 75.9 55.3*     Results from last 7 days   Lab Units 12/08/24  0745 12/07/24  1508   ALBUMIN g/dL 2.4* 3.3*   BILIRUBIN mg/dL 0.3 0.4   ALK PHOS U/L 57 78   AST (SGOT) U/L 20 30   ALT (SGPT) U/L 13 16     Results from last 7 days   Lab Units 12/08/24  0745 12/07/24  1508   CALCIUM mg/dL 7.7* 9.1   ALBUMIN g/dL 2.4* 3.3*   MAGNESIUM mg/dL 2.0  --        Hemoglobin A1C   Date/Time Value Ref Range Status   12/08/2024 0745 4.80 4.80 - 5.60 % Final     Glucose   Date/Time Value Ref Range Status   12/08/2024 1105 100 70 - 130 mg/dL Final   12/08/2024 0635 73 70 - 130 mg/dL Final   12/07/2024 2342 85 70 - 130 mg/dL Final       CT Angiogram Head w AI Analysis of LVO    Result Date: 12/7/2024   1. No acute intracranial process. 2. No core infarct or ischemia seen on CT perfusion. 3. No large vessel occlusion, aneurysm or dissection identified. 4. Opacification of left mastoid air cells and partial opacification of the left middle ear. Finding can be correlated for otomastoiditis.  Call Report: Dr. Reddy was notified by telephone of the above findings on December 7, 2024 at 3:20 p.m.      This report was finalized on 12/7/2024 3:23 PM by Dr. Godfrey Joseph M.D on Workstation: [id number]      CT Angiogram Neck    Result Date: 12/7/2024   1. No acute intracranial process. 2. No core infarct or ischemia seen on CT perfusion. 3. No large vessel occlusion, aneurysm or dissection identified. 4. Opacification of left mastoid air cells and partial opacification of the left middle ear.  Finding can be correlated for otomastoiditis.  Call Report: Dr. Reddy was notified by telephone of the above findings on December 7, 2024 at 3:20 p.m.      This report was finalized on 12/7/2024 3:23 PM by Dr. Godfrey Joseph M.D on Workstation: [id number]      CT CEREBRAL PERFUSION WITH & WITHOUT CONTRAST    Result Date: 12/7/2024   1. No acute intracranial process. 2. No core infarct or ischemia seen on CT perfusion. 3. No large vessel occlusion, aneurysm or dissection identified. 4. Opacification of left mastoid air cells and partial opacification of the left middle ear. Finding can be correlated for otomastoiditis.  Call Report: Dr. Reddy was notified by telephone of the above findings on December 7, 2024 at 3:20 p.m.      This report was finalized on 12/7/2024 3:23 PM by Dr. Godfrey Joseph M.D on Workstation: [id number]       I have personally reviewed all medications:  Scheduled Medications  apixaban, 2.5 mg, Oral, Q12H  arformoterol, 15 mcg, Nebulization, BID - RT  aspirin, 325 mg, Oral, Daily   Or  aspirin, 300 mg, Rectal, Daily  atorvastatin, 80 mg, Oral, Nightly  budesonide, 0.5 mg, Nebulization, BID - RT  diazePAM, 1 mg, Oral, BID    Infusions  dextrose 5 % and sodium chloride 0.9 % with KCl 20 mEq, 75 mL/hr, Last Rate: 75 mL/hr (12/08/24 0853)  sodium chloride, 75 mL/hr, Last Rate: 75 mL/hr (12/07/24 2315)    Diet  NPO Diet NPO Type: Strict NPO    I have personally reviewed:  [x]  Laboratory   [x]  Microbiology   [x]  Radiology   [x]  EKG/Telemetry  []  Cardiology/Vascular   []  Pathology    [x]  Records       Assessment/Plan     Active Hospital Problems    Diagnosis  POA   • **Stroke-like symptoms [R29.90]  Yes   • Chronic anticoagulation [Z79.01]  Not Applicable   • Anxiety disorder [F41.9]  Yes   • Benzodiazepine dependence [F13.20]  Yes   • Hypokalemia [E87.6]  No   • Altered mental status [R41.82]  Yes   • History of pulmonary embolism [Z86.711]  Yes   • Atrial fibrillation [I48.91]   Yes   • History of CVA (cerebrovascular accident) [Z86.73]  Not Applicable   • HTN (hypertension) [I10]  Yes   • History of breast cancer [Z85.3]  Not Applicable   • Asthma [J45.909]  Yes   • Anemia [D64.9]  Yes      Resolved Hospital Problems   No resolved problems to display.       88yo woman with AFib (Eliquis), HTN, asthma, benzodiazepine dependence, chronic anemia, h/o PE, prior CVA, and recent diagnosis of E.coli UTI (taking Cipro), who presented to ER with report of confusion, weakness of BLEs, and leaning to left side. She was admitted for CVA/TIA workup.    She remains poorly responsive  D/w Neuro and looks most c/w encephalopathy NOS as CVA w/u neg so far  -?hypertensive  -?toxic (Valium)  Could this be psychiatric?  EEG and MRI ordered  TSH wnl, no evidence of infection (recently diagnosed with UTI and was on Cipro but UA here looks fine)  Family reiterate that if she does not improve they would be interested in palliative care  They confirm that she is a DNR--per her repeatedly stated wishes  Replace K+ with protocol and have added KCl to IVFs  Continue IVFs while NPO  Continue NPO while too lethargic to safely swallow  Start Lovenox for her h/o VTE and AFib--when taking PO can resume Eliquis      Pharmacy to dose Lovenox for DVT prophylaxis.  DNR.  Discussed with patient, family, and nursing staff. D/w Dr. Reddy.  Anticipate discharge home with HH vs SNU facility, timing yet to be determined.  Expected discharge date/ time has not been documented.      Adán Durbin MD  [facility]  12/08/24  15:31 EST

## 2024-12-08 NOTE — SIGNIFICANT NOTE
12/08/24 1340   OTHER   Discipline occupational therapist   Therapy Assessment/Plan (PT)   Criteria for Skilled Interventions Met (PT) no problems identified which require skilled intervention  (patient too lethargic this date, and unable to participate in OT eval. OT to follow up at next service date.)   Recommendation   OT - Next Appointment 12/09/24

## 2024-12-08 NOTE — SIGNIFICANT NOTE
PT note: Per RN pt is lethargic and unable to take medications this morning. She is  not able to participate with PT today. PT will check on pt tomorrow.

## 2024-12-08 NOTE — CONSULTS
"Neurology Consult Note    Consult Date: 12/8/2024    Referring MD: Maryuri Carranza, *    Reason for Consult I have been asked to see the patient in neurological consultation to render advice and opinion regarding right arm weakness and altered mental status    Vonnie Coppola is a 89 y.o. female past medical history of chronic anemia, hypertension, asthma previous history of stroke, atrial fibrillation on Eliquis 2.5 twice daily, PE and morbid obesity presented to the ER with chief complaints of altered mental status.  Patient was at her baseline on 12/6/2024 but on 12/7/2024 she was unable to walk or ambulate with her walker she kept drifting towards the left.    Patient had multiple admissions over the past 6 months for generalized weakness abdominal pain urinary tract infection.  Most recent admission was in September and discharged on 10/1/2024.    Today morning patient is very lethargic drowsy hard to wake up, she follows simple commands but does not follow complex commands she states a word or 2 not in a complete sentence.  Lateral upper extremities strength at least 4 -/5 bilateral lower extremity strength at least 3+/5  Could not appreciate any neglect    Past Medical History:   Diagnosis Date    Arthritis     Asthma     Atrial fibrillation     per pt's daughter.States hx of a-fib in the past when stressed or tired.    Blind left eye     Breast cancer 1999    LEFT BREAST CA, LUMPECTOMY & RADS    Deep vein thrombosis     History of cataract     Hx of radiation therapy 1999    APPROXIMATELY 40 TREATMENTS FOR LEFT BREAST CA    Hypertension     Pneumonia     PONV (postoperative nausea and vomiting)     Stroke        Exam  /55 (BP Location: Right arm, Patient Position: Lying)   Pulse 76   Temp 98.2 °F (36.8 °C) (Oral)   Resp 18   Ht 165.1 cm (65\")   Wt 87.2 kg (192 lb 3.9 oz)   SpO2 96%   BMI 31.99 kg/m²   Today morning patient is very lethargic drowsy hard to wake up, she follows simple " commands but does not follow complex commands she states a word or 2 not in a complete sentence.  Lateral upper extremities strength at least 4 -/5 bilateral lower extremity strength at least 3+/5  Mild dysarthria  Unsure if this is just confusion or also aphasia  Could not appreciate any neglect    DATA:    Lab Results   Component Value Date    GLUCOSE 67 12/08/2024    CALCIUM 7.7 (L) 12/08/2024     12/08/2024    K 3.4 (L) 12/08/2024    CO2 26.0 12/08/2024     (H) 12/08/2024    BUN 17 12/08/2024    CREATININE 0.76 12/08/2024    BCR 22.4 12/08/2024    ANIONGAP 6.0 12/08/2024     Lab Results   Component Value Date    WBC 7.21 12/08/2024    HGB 8.4 (L) 12/08/2024    HCT 27.4 (L) 12/08/2024    MCV 79.4 12/08/2024     12/08/2024       Lab review:   Sodium 145  Creatinine 0.76  Normal LFT    A1c 4.8  TSH 1.46  LDL 46    WBC 7.21  Hemoglobin 8.4 and platelets 185    Urine analysis cloudy ketones and trace blood    Imaging review:   CT head without no acute hypodensity or hyperdensity, could not appreciate any chronic large strokes    CT angio head and neck no acute vessel stenosis atherosclerosis or aneurysm, left vertebral artery dominant right vertebral nondominant, good posterior middle anterior circulation    CT perfusion no core or penumbra    Diagnoses:  Altered mental status    Hypertension  Previous history of stroke ?  Atrial fibrillation on Eliquis at home  History of DVT  History of recurrent UTI      Pre-stroke MRS: 3  NIHSS: 20    Plan   -Unsure about the etiology of patient's altered mental status  -Given her examination of generalized weakness and no cranial nerve deficits I suspect this is acute metabolic encephalopathy.  -Blood pressure is in 200s on arrival this might be hypertensive encephalopathy but within normal limits as of now, she is also on diazepam and oxycodone at home, checking urine drug screen might be polypharmacy related problem too..  -Will give thiamine 500 mg daily  for 2 days  MRI brain with and without and EEG pending    No family at bedside to answer any questions.  Spoke to the nurse and also Dr. Duribn about the plan.

## 2024-12-08 NOTE — H&P
HISTORY AND PHYSICAL   [facility]        Date of Admission: 2024  Patient Identification:  Name: Vonnie Cpopola  Age: 89 y.o.  Sex: female  :  1935  MRN: [id number]                     Primary Care Physician: Christopher Leyva DO    Chief Complaint:  89 year old female presented to the emergency room with weakness of her right arm and altered mental status; she was not able to walk and leaning to the left; she usually uses a walker; she is drowsy and not able to answer questions presently; no visitors are present    History of Present Illness:   As above    Past Medical History:  Past Medical History:   Diagnosis Date    Arthritis     Asthma     Atrial fibrillation     per pt's daughter.States hx of a-fib in the past when stressed or tired.    Blind left eye     Breast cancer     LEFT BREAST CA, LUMPECTOMY & RADS    Deep vein thrombosis     History of cataract     Hx of radiation therapy     APPROXIMATELY 40 TREATMENTS FOR LEFT BREAST CA    Hypertension     Pneumonia     PONV (postoperative nausea and vomiting)     Stroke      Past Surgical History:  Past Surgical History:   Procedure Laterality Date    APPENDECTOMY      BLADDER SURGERY      BREAST BIOPSY Left     MALIGNANT    BREAST LUMPECTOMY Left     MALIGNANT    CATARACT EXTRACTION      COLONOSCOPY N/A 2024    Procedure: COLONOSCOPY to cecum with cold snare polypectomies;  Surgeon: Harshad Gaston MD;  Location: [facility] ENDOSCOPY;  Service: Gastroenterology;  Laterality: N/A;  pre- gi bleed, anemia  post- diverticulosis, polyps    ENDOSCOPY N/A 2024    Procedure: ESOPHAGOGASTRODUODENOSCOPY with biospy;  Surgeon: Harshad Gaston MD;  Location: [facility] ENDOSCOPY;  Service: Gastroenterology;  Laterality: N/A;  pre- gi bleed, anemia  post- erosive gastirits    GALLBLADDER SURGERY      HERNIA REPAIR      HYSTERECTOMY      INCISION AND DRAINAGE LEG Right 2024    Procedure: RIGHT LOWER  EXTREMITY WOUND EXPLORATION, DEBRIDEMENT AND CONTROL OF BLEEDING;  Surgeon: Gerald Pedraza MD;  Location: [facility] OR;  Service: General;  Laterality: Right;    LASIK      LYMPHADENECTOMY      OOPHORECTOMY        Home Meds:  Medications Prior to Admission   Medication Sig Dispense Refill Last Dose/Taking    acetaminophen (TYLENOL) 325 MG tablet Take 2 tablets by mouth Every 6 (Six) Hours As Needed for Mild Pain.   12/6/2024    apixaban (ELIQUIS) 2.5 MG tablet tablet Take 1 tablet by mouth Every 12 (Twelve) Hours. Indications: Atrial Fibrillation   12/6/2024 Evening    diazePAM (VALIUM) 2 MG tablet Take 1 tablet by mouth 2 (Two) Times a Day.   12/6/2024    dilTIAZem CD (CARDIZEM CD) 120 MG 24 hr capsule Take 1 capsule by mouth Daily.   12/6/2024    Magnesium 500 MG tablet Take 1 tablet by mouth Daily.   12/6/2024    ondansetron ODT (ZOFRAN-ODT) 4 MG disintegrating tablet Place 1 tablet on the tongue Daily As Needed for Nausea or Vomiting.   Past Week    sennosides-docusate (PERICOLACE) 8.6-50 MG per tablet Take 2 tablets by mouth 2 (Two) Times a Day. 60 tablet 0 12/6/2024 Morning    albuterol (ACCUNEB) 0.63 MG/3ML nebulizer solution Take 3 mL by nebulization Every 6 (Six) Hours As Needed for Shortness of Air.       arformoterol (BROVANA) 15 MCG/2ML nebulizer solution Take 2 mL by nebulization 2 (Two) Times a Day. 120 mL 11     benzonatate (TESSALON) 100 MG capsule Take 1 capsule by mouth 3 (Three) Times a Day As Needed for Cough. 20 capsule 0     bisacodyl (Dulcolax) 10 MG suppository Insert 1 suppository into the rectum Daily As Needed for Constipation.       budesonide (PULMICORT) 0.5 MG/2ML nebulizer solution Take 2 mL by nebulization 2 (Two) Times a Day. 360 mL 2     coenzyme Q10 50 MG capsule capsule Take 1 capsule by mouth Daily. Indications: nutritional support       glucosamine-chondroitin 500-400 MG capsule capsule Take 1 capsule by mouth 3 (Three) Times a Day With Meals. Indications: Joint Damage  causing Pain and Loss of Function       oxyCODONE (ROXICODONE) 5 MG immediate release tablet Take 1 tablet by mouth Every 8 (Eight) Hours As Needed for Moderate Pain.   Unknown    pantoprazole (PROTONIX) 40 MG EC tablet Take 1 tablet by mouth 2 (Two) Times a Day Before Meals. 60 tablet 0     phenylephrine-mineral oil-petrolatum (PREPARATION H) 0.25-14-74.9 % ointment hemorrhoidal ointment Insert 1 Application into the rectum 2 (Two) Times a Day As Needed.       polyethylene glycol (MIRALAX) 17 g packet Take 17 g by mouth Daily. Indications: Constipation       polyethylene glycol (MIRALAX) 17 g packet Take 17 g by mouth 2 (Two) Times a Day. 30 each 0     sennosides-docusate (PERICOLACE) 8.6-50 MG per tablet Take 1 tablet by mouth 2 (Two) Times a Day.          Allergies:  Allergies   Allergen Reactions    Cortisone Hives    Penicillins Hives     Beta lactam allergy details  Antibiotic reaction: hives  Age at reaction: unknown  Dose to reaction time: unknown  Reason for antibiotic: unknown  Epinephrine required for reaction?: no  Tolerated antibiotics: unknown    Tolerated Zosyn    Iodinated Contrast Media Unknown - Low Severity     Patient  passed out, she has had studies with contact  recently     Lactose Intolerance (Gi) Hives     Immunizations:  Immunization History   Administered Date(s) Administered    COVID-19 (MODERNA) 1st,2nd,3rd Dose Monovalent 03/03/2021, 03/31/2021    COVID-19 (MODERNA) Monovalent Original Booster 12/22/2021    Pneumococcal, Unspecified 06/20/2007    Tdap 05/28/2024    Tetanus 07/30/2016     Social History:   Social History     Social History Narrative    Not on file     Social History     Socioeconomic History    Marital status:    Tobacco Use    Smoking status: Former     Current packs/day: 0.50     Average packs/day: 0.5 packs/day for 2.0 years (1.0 ttl pk-yrs)     Types: Cigarettes    Smokeless tobacco: Never    Tobacco comments:     quit 40 years ago   Vaping Use    Vaping  "status: Never Used   Substance and Sexual Activity    Alcohol use: No    Drug use: No    Sexual activity: Defer       Family History:  Family History   Problem Relation Age of Onset    Other Mother         Cardiac disorder    Osteoarthritis Mother     Cataracts Mother     Macular degeneration Mother     Hypertension Father     Other Father         Cardiac disorder    Ovarian cancer Sister         70'S    Cancer Sister     Diabetes Sister     Hypertension Sister     Osteoarthritis Sister     Cancer Brother     Hypertension Brother     Osteoarthritis Brother     Colon cancer Maternal Grandmother     Cancer Maternal Grandmother     Ovarian cancer Paternal Grandmother         unknown    Cancer Other     Stroke Other     Breast cancer Neg Hx         Review of Systems  Not obtainable from the patient    Objective:  T Max 24 hrs: Temp (24hrs), Av.3 °F (36.8 °C), Min:98.2 °F (36.8 °C), Max:98.3 °F (36.8 °C)    Vitals Ranges:   Temp:  [98.2 °F (36.8 °C)-98.3 °F (36.8 °C)] 98.2 °F (36.8 °C)  Heart Rate:  [60-77] 70  Resp:  [16-18] 18  BP: (102-202)/() 102/47      Exam:  /47 (BP Location: Right arm, Patient Position: Lying)   Pulse 70   Temp 98.2 °F (36.8 °C) (Oral)   Resp 18   Ht 165.1 cm (65\")   Wt 86.4 kg (190 lb 7.6 oz)   SpO2 96%   BMI 31.70 kg/m²     General Appearance:    Drowsy, no distress, appears stated age   Head:    Normocephalic, without obvious abnormality, atraumatic   Eyes:    PERRL, conjunctivae/corneas clear, EOM's intact, both eyes   Ears:    Normal external ear canals, both ears   Nose:   Nares normal, septum midline, mucosa normal, no drainage    or sinus tenderness   Throat:   Lips, mucosa, and tongue normal   Neck:   Supple, symmetrical, trachea midline, no adenopathy;     thyroid:  no enlargement/tenderness/nodules; no carotid    bruit or JVD   Back:     Symmetric, no curvature, ROM normal, no CVA tenderness   Lungs:     Decreased breath sounds bilaterally, respirations unlabored "   Chest Wall:    No tenderness or deformity    Heart:    Regular rate and rhythm, S1 and S2 normal, no murmur, rub   or gallop   Abdomen:     Soft, nontender, bowel sounds active all four quadrants,     no masses, no hepatomegaly, no splenomegaly   Extremities:   Extremities normal, atraumatic, no cyanosis or edema                       .    Data Review:  Labs in chart were reviewed.  WBC   Date Value Ref Range Status   12/07/2024 10.63 3.40 - 10.80 10*3/mm3 Final     Hemoglobin   Date Value Ref Range Status   12/07/2024 10.6 (L) 12.0 - 15.9 g/dL Final     Hematocrit   Date Value Ref Range Status   12/07/2024 35.1 34.0 - 46.6 % Final     Platelets   Date Value Ref Range Status   12/07/2024 251 140 - 450 10*3/mm3 Final     Sodium   Date Value Ref Range Status   12/07/2024 145 136 - 145 mmol/L Final     Potassium   Date Value Ref Range Status   12/07/2024 4.2 3.5 - 5.2 mmol/L Final     Chloride   Date Value Ref Range Status   12/07/2024 109 (H) 98 - 107 mmol/L Final     CO2   Date Value Ref Range Status   12/07/2024 26.0 22.0 - 29.0 mmol/L Final     BUN   Date Value Ref Range Status   12/07/2024 18 8 - 23 mg/dL Final     Creatinine   Date Value Ref Range Status   12/07/2024 0.99 0.57 - 1.00 mg/dL Final     Glucose   Date Value Ref Range Status   12/07/2024 90 65 - 99 mg/dL Final     Calcium   Date Value Ref Range Status   12/07/2024 9.1 8.6 - 10.5 mg/dL Final     AST (SGOT)   Date Value Ref Range Status   12/07/2024 30 1 - 32 U/L Final     ALT (SGPT)   Date Value Ref Range Status   12/07/2024 16 1 - 33 U/L Final     Alkaline Phosphatase   Date Value Ref Range Status   12/07/2024 78 39 - 117 U/L Final                Imaging Results (All)       Procedure Component Value Units Date/Time    XR Chest 1 View [[id number]] Collected: 12/07/24 1533     Updated: 12/07/24 1537    Narrative:      XR CHEST 1 VW-     HISTORY: 89-year-old female with acute stroke protocol.     FINDINGS: There is central pulmonary vascular congestion,  but there is  no evidence for CHF. No focal airspace consolidations are seen.     This report was finalized on 12/7/2024 3:34 PM by Dr. Samanta Lucas M.D on  Workstation: [id number]       CT Angiogram Head w AI Analysis of LVO [[id number]] Collected: 12/07/24 1502     Updated: 12/07/24 1526    Narrative:      CT ANGIOGRAM HEAD W AI ANALYSIS OF LVO-, CT ANGIOGRAM NECK-, CT CEREBRAL  PERFUSION W WO CONTRAST-     INDICATION: Stroke     COMPARISON: CT head March 14, 2024     TECHNIQUE:  CTA head and neck with IV contrast. Noncontrast head CT. CT perfusion  with rapid analysis. Coronal and sagittal reformats. Three dimensional  reconstructions. Radiation dose reduction techniques were utilized,  including automated exposure control and exposure modulation based on  body size.     FINDINGS:      Noncontrast head CT: No intraparenchymal hemorrhage. Preserved  gray-white differentiation. No mass effect or midline shift. Chronic  small vessel ischemic changes. No hydrocephalus. No intraventricular  hemorrhage. No extra-axial hemorrhage or fluid collection. Bilateral  cataract extractions. No fracture. Hyperostosis frontalis interna.  Opacification of the left mastoid air cells and partial opacification of  the left middle ear. Mucosal thickening in the ethmoid air cells.  Mucosal thickening in the maxillary sinuses.     CTA NECK: Right common and internal carotid artery are patent, without  stenosis. Tortuous right internal carotid artery. Left common and  internal carotid artery are patent, without stenosis. Tortuous left  common and internal carotid artery. Dominant left vertebral artery.  Bilateral vertebral arteries are patent, without stenosis.     CTA HEAD: Small amount of calcific atherosclerotic disease in the right  cavernous internal carotid artery, without stenosis. Right internal  carotid, middle cerebral and anterior cerebral arteries appear patent,  without stenoses. Left internal carotid, middle cerebral and  anterior  cerebral arteries appear patent, without stenosis. Dominant left  vertebral artery. Bilateral vertebral arteries, left posterior inferior  cerebellar artery, basilar artery, superior cerebellar arteries and  posterior cerebral arteries appear patent, without stenoses seen. Right  posterior inferior cerebellar artery not identified. No aneurysm  identified. Dural sinuses appear patent.     CT perfusion: CBF less than 30%: 0 mL. Tmax greater than 6 seconds: 0  mL. Mismatch volume: 0 mL. Tmax greater than 4 seconds: 96 mL, seen in  the bilateral occipital lobes, right medial temporal lobe, posterior  parietal lobes, right superior parietal lobe and right centrum  semiovale, may be artifactual, with motion present.     Other: Small amount of secretions seen in the trachea. Right  glenohumeral osteoarthritis.       Impression:         1. No acute intracranial process.  2. No core infarct or ischemia seen on CT perfusion.  3. No large vessel occlusion, aneurysm or dissection identified.  4. Opacification of left mastoid air cells and partial opacification of  the left middle ear. Finding can be correlated for otomastoiditis.     Call Report: Dr. Reddy was notified by telephone of the above  findings on December 7, 2024 at 3:20 p.m.                 This report was finalized on 12/7/2024 3:23 PM by Dr. Godfrey Joseph M.D on Workstation: [id number]       CT Angiogram Neck [[id number]] Collected: 12/07/24 1502     Updated: 12/07/24 1526    Narrative:      CT ANGIOGRAM HEAD W AI ANALYSIS OF LVO-, CT ANGIOGRAM NECK-, CT CEREBRAL  PERFUSION W WO CONTRAST-     INDICATION: Stroke     COMPARISON: CT head March 14, 2024     TECHNIQUE:  CTA head and neck with IV contrast. Noncontrast head CT. CT perfusion  with rapid analysis. Coronal and sagittal reformats. Three dimensional  reconstructions. Radiation dose reduction techniques were utilized,  including automated exposure control and exposure modulation based  on  body size.     FINDINGS:      Noncontrast head CT: No intraparenchymal hemorrhage. Preserved  gray-white differentiation. No mass effect or midline shift. Chronic  small vessel ischemic changes. No hydrocephalus. No intraventricular  hemorrhage. No extra-axial hemorrhage or fluid collection. Bilateral  cataract extractions. No fracture. Hyperostosis frontalis interna.  Opacification of the left mastoid air cells and partial opacification of  the left middle ear. Mucosal thickening in the ethmoid air cells.  Mucosal thickening in the maxillary sinuses.     CTA NECK: Right common and internal carotid artery are patent, without  stenosis. Tortuous right internal carotid artery. Left common and  internal carotid artery are patent, without stenosis. Tortuous left  common and internal carotid artery. Dominant left vertebral artery.  Bilateral vertebral arteries are patent, without stenosis.     CTA HEAD: Small amount of calcific atherosclerotic disease in the right  cavernous internal carotid artery, without stenosis. Right internal  carotid, middle cerebral and anterior cerebral arteries appear patent,  without stenoses. Left internal carotid, middle cerebral and anterior  cerebral arteries appear patent, without stenosis. Dominant left  vertebral artery. Bilateral vertebral arteries, left posterior inferior  cerebellar artery, basilar artery, superior cerebellar arteries and  posterior cerebral arteries appear patent, without stenoses seen. Right  posterior inferior cerebellar artery not identified. No aneurysm  identified. Dural sinuses appear patent.     CT perfusion: CBF less than 30%: 0 mL. Tmax greater than 6 seconds: 0  mL. Mismatch volume: 0 mL. Tmax greater than 4 seconds: 96 mL, seen in  the bilateral occipital lobes, right medial temporal lobe, posterior  parietal lobes, right superior parietal lobe and right centrum  semiovale, may be artifactual, with motion present.     Other: Small amount of secretions  seen in the trachea. Right  glenohumeral osteoarthritis.       Impression:         1. No acute intracranial process.  2. No core infarct or ischemia seen on CT perfusion.  3. No large vessel occlusion, aneurysm or dissection identified.  4. Opacification of left mastoid air cells and partial opacification of  the left middle ear. Finding can be correlated for otomastoiditis.     Call Report: Dr. Reddy was notified by telephone of the above  findings on December 7, 2024 at 3:20 p.m.                 This report was finalized on 12/7/2024 3:23 PM by Dr. Godfrey Joseph M.D on Workstation: [id number]       CT CEREBRAL PERFUSION WITH & WITHOUT CONTRAST [[id number]] Collected: 12/07/24 1502     Updated: 12/07/24 1526    Narrative:      CT ANGIOGRAM HEAD W AI ANALYSIS OF LVO-, CT ANGIOGRAM NECK-, CT CEREBRAL  PERFUSION W WO CONTRAST-     INDICATION: Stroke     COMPARISON: CT head March 14, 2024     TECHNIQUE:  CTA head and neck with IV contrast. Noncontrast head CT. CT perfusion  with rapid analysis. Coronal and sagittal reformats. Three dimensional  reconstructions. Radiation dose reduction techniques were utilized,  including automated exposure control and exposure modulation based on  body size.     FINDINGS:      Noncontrast head CT: No intraparenchymal hemorrhage. Preserved  gray-white differentiation. No mass effect or midline shift. Chronic  small vessel ischemic changes. No hydrocephalus. No intraventricular  hemorrhage. No extra-axial hemorrhage or fluid collection. Bilateral  cataract extractions. No fracture. Hyperostosis frontalis interna.  Opacification of the left mastoid air cells and partial opacification of  the left middle ear. Mucosal thickening in the ethmoid air cells.  Mucosal thickening in the maxillary sinuses.     CTA NECK: Right common and internal carotid artery are patent, without  stenosis. Tortuous right internal carotid artery. Left common and  internal carotid artery are patent,  without stenosis. Tortuous left  common and internal carotid artery. Dominant left vertebral artery.  Bilateral vertebral arteries are patent, without stenosis.     CTA HEAD: Small amount of calcific atherosclerotic disease in the right  cavernous internal carotid artery, without stenosis. Right internal  carotid, middle cerebral and anterior cerebral arteries appear patent,  without stenoses. Left internal carotid, middle cerebral and anterior  cerebral arteries appear patent, without stenosis. Dominant left  vertebral artery. Bilateral vertebral arteries, left posterior inferior  cerebellar artery, basilar artery, superior cerebellar arteries and  posterior cerebral arteries appear patent, without stenoses seen. Right  posterior inferior cerebellar artery not identified. No aneurysm  identified. Dural sinuses appear patent.     CT perfusion: CBF less than 30%: 0 mL. Tmax greater than 6 seconds: 0  mL. Mismatch volume: 0 mL. Tmax greater than 4 seconds: 96 mL, seen in  the bilateral occipital lobes, right medial temporal lobe, posterior  parietal lobes, right superior parietal lobe and right centrum  semiovale, may be artifactual, with motion present.     Other: Small amount of secretions seen in the trachea. Right  glenohumeral osteoarthritis.       Impression:         1. No acute intracranial process.  2. No core infarct or ischemia seen on CT perfusion.  3. No large vessel occlusion, aneurysm or dissection identified.  4. Opacification of left mastoid air cells and partial opacification of  the left middle ear. Finding can be correlated for otomastoiditis.     Call Report: Dr. Reddy was notified by telephone of the above  findings on December 7, 2024 at 3:20 p.m.                 This report was finalized on 12/7/2024 3:23 PM by Dr. Godfrey Joseph M.D on Workstation: [id number]                 Assessment:  Active Hospital Problems    Diagnosis  POA    **Altered mental status [R41.82]  Yes      Resolved  Hospital Problems   No resolved problems to display.   Hypertension  A fib  Anemia  Asthma      Plan:  Mri to be done  Neurology to see  Hold valium and other po meds for now  Therapies evaluation  Monitor on telemetry  Dw ed provider    Maryuri Carranza MD  12/7/2024  22:08 EST

## 2024-12-08 NOTE — PLAN OF CARE
Problem: Adult Inpatient Plan of Care  Goal: Plan of Care Review  Outcome: Progressing  Flowsheets (Taken 12/8/2024 0810)  Progress: no change  Outcome Evaluation: Pt alert to self and time. Admission assessment and documentation completed with family members and caregiver. Nurse unable to complete bedside swallow screen because pt is very lethargic. IV fluids infusing @ 75 ml/hr. MRI screen to be completed with family.  Plan of Care Reviewed With: patient  Goal: Patient-Specific Goal (Individualized)  Outcome: Progressing  Goal: Absence of Hospital-Acquired Illness or Injury  Outcome: Progressing  Intervention: Identify and Manage Fall Risk  Recent Flowsheet Documentation  Taken 12/8/2024 0604 by Rakel Rosenberg RN  Safety Promotion/Fall Prevention: safety round/check completed  Taken 12/8/2024 0419 by Rakel Rosenberg RN  Safety Promotion/Fall Prevention: safety round/check completed  Taken 12/8/2024 0202 by Rakel Rosenberg RN  Safety Promotion/Fall Prevention: safety round/check completed  Taken 12/8/2024 0046 by Rakel Rosenberg RN  Safety Promotion/Fall Prevention:   activity supervised   assistive device/personal items within reach   clutter free environment maintained   fall prevention program maintained   room organization consistent   safety round/check completed  Taken 12/7/2024 2216 by Rakel Rosenberg RN  Safety Promotion/Fall Prevention: safety round/check completed  Taken 12/7/2024 2018 by Rakel Rosenberg RN  Safety Promotion/Fall Prevention:   activity supervised   assistive device/personal items within reach   clutter free environment maintained   fall prevention program maintained   room organization consistent   safety round/check completed  Intervention: Prevent Skin Injury  Recent Flowsheet Documentation  Taken 12/8/2024 0604 by Rakel Rosenberg RN  Body Position:   supine   legs elevated  Taken 12/8/2024 0419 by Rakel Rosenberg RN  Body Position:   turned   right   legs elevated  Taken 12/8/2024 0202 by Shakira  REGINA Ellington  Body Position:   turned   left   legs elevated  Taken 12/8/2024 0046 by Rakel Rosenberg RN  Body Position:   turned   right   legs elevated  Skin Protection: incontinence pads utilized  Taken 12/7/2024 2216 by Rakel Rosenberg RN  Body Position:   turned   left   legs elevated  Taken 12/7/2024 2018 by Rakel Rosenberg RN  Body Position:   supine   legs elevated  Skin Protection: incontinence pads utilized  Intervention: Prevent Infection  Recent Flowsheet Documentation  Taken 12/8/2024 0046 by Rakel Rosenberg RN  Infection Prevention: environmental surveillance performed  Taken 12/7/2024 2018 by Rakel Rosenberg RN  Infection Prevention:   environmental surveillance performed   rest/sleep promoted   single patient room provided  Goal: Optimal Comfort and Wellbeing  Outcome: Progressing  Intervention: Provide Person-Centered Care  Recent Flowsheet Documentation  Taken 12/8/2024 0046 by Rakel Rosenberg RN  Trust Relationship/Rapport:   care explained   choices provided  Taken 12/7/2024 2018 by Rakel Rosenberg RN  Trust Relationship/Rapport: (communicated w/ family members)   care explained   choices provided   questions answered   questions encouraged   reassurance provided   empathic listening provided   other (see comments)  Goal: Readiness for Transition of Care  Outcome: Progressing  Intervention: Mutually Develop Transition Plan  Recent Flowsheet Documentation  Taken 12/7/2024 2034 by Rakel Rosenberg RN  Transportation Anticipated: family or friend will provide  Patient/Family Anticipated Services at Transition: home health care  Patient/Family Anticipates Transition to: home with family  Taken 12/7/2024 2031 by Rakel Rosenberg RN  Equipment Currently Used at Home:   walker, standard   shower chair   oxygen     Problem: Violence Risk or Actual  Goal: Anger and Impulse Control  Outcome: Progressing  Intervention: Minimize Safety Risk  Recent Flowsheet Documentation  Taken 12/8/2024 0604 by Rakel Rosenberg RN  Enhanced Safety  Measures: bed alarm set  Taken 12/8/2024 0419 by Rakel Rosenberg RN  Enhanced Safety Measures: bed alarm set  Taken 12/8/2024 0202 by Rakel Rosenberg RN  Enhanced Safety Measures: bed alarm set  Taken 12/8/2024 0046 by Rakel Rosenberg RN  Enhanced Safety Measures: bed alarm set  Taken 12/7/2024 2216 by Rakel Rosenberg RN  Enhanced Safety Measures: bed alarm set  Taken 12/7/2024 2018 by Rakel Rosenberg RN  Enhanced Safety Measures: bed alarm set     Problem: Comorbidity Management  Goal: Maintenance of Asthma Control  Outcome: Progressing  Intervention: Maintain Asthma Symptom Control  Recent Flowsheet Documentation  Taken 12/8/2024 0604 by Rakel Rosenberg RN  Medication Review/Management: medications reviewed  Taken 12/8/2024 0419 by Rakel Rosenberg RN  Medication Review/Management: medications reviewed  Taken 12/8/2024 0202 by Rakel Rosenberg RN  Medication Review/Management: medications reviewed  Taken 12/8/2024 0046 by Rakel Rosenberg RN  Medication Review/Management: medications reviewed  Taken 12/7/2024 2216 by Rakel Rosenberg RN  Medication Review/Management: medications reviewed  Taken 12/7/2024 2018 by Rakel Rosenberg RN  Medication Review/Management: medications reviewed     Problem: Skin Injury Risk Increased  Goal: Skin Health and Integrity  Outcome: Progressing  Intervention: Optimize Skin Protection  Recent Flowsheet Documentation  Taken 12/8/2024 0604 by Rakel Rosenberg RN  Head of Bed (HOB) Positioning: HOB at 20-30 degrees  Taken 12/8/2024 0419 by Rakel Rosenberg RN  Head of Bed (HOB) Positioning: HOB at 20-30 degrees  Taken 12/8/2024 0202 by Rakel Rosenberg RN  Head of Bed (HOB) Positioning: HOB at 20-30 degrees  Taken 12/8/2024 0046 by Rakel Rosenberg RN  Pressure Reduction Techniques:   pressure points protected   weight shift assistance provided  Head of Bed (HOB) Positioning: HOB at 20-30 degrees  Pressure Reduction Devices: pressure-redistributing mattress utilized  Skin Protection: incontinence pads utilized  Taken  12/7/2024 2216 by Rakel Rosenberg, RN  Head of Bed (HOB) Positioning: HOB at 20-30 degrees  Taken 12/7/2024 2018 by Rakel Rosenberg, RN  Pressure Reduction Techniques:   pressure points protected   weight shift assistance provided  Head of Bed (HOB) Positioning: HOB at 20-30 degrees  Pressure Reduction Devices: pressure-redistributing mattress utilized  Skin Protection: incontinence pads utilized   Goal Outcome Evaluation:  Plan of Care Reviewed With: patient        Progress: no change  Outcome Evaluation: Pt alert to self and time. Admission assessment and documentation completed with family members and caregiver. Nurse unable to complete bedside swallow screen because pt is very lethargic. IV fluids infusing @ 75 ml/hr. MRI screen to be completed with family. Safety precautions in place, plan of care ongoing.

## 2024-12-08 NOTE — PROGRESS NOTES
"[facility] Clinical Pharmacy Services: Enoxaparin Consult    Vonnie Coppola has a pharmacy consult to dose full-dose enoxaparin per Dr. Adán Durbin's request.     Indication: A Fib - requiring full anticoagulation  Home Anticoagulation: Apixaban 2.5 mg BID     Relevant clinical data and objective history reviewed:  89 y.o. female 165.1 cm (65\") 87.2 kg (192 lb 3.9 oz)   Body mass index is 31.99 kg/m².   Results from last 7 days   Lab Units 12/08/24  0745   PLATELETS 10*3/mm3 185     Estimated Creatinine Clearance: 56.9 mL/min (by C-G formula based on SCr of 0.76 mg/dL).    Assessment/Plan    Will start patient on  90 mg (1mg/kg) subcutaneous every 12 hours, adjusted for renal function. Consult order will be discontinued but pharmacy will continue to follow.     Chai Arndt, PharmD  Clinical Pharmacist   "

## 2024-12-09 ENCOUNTER — APPOINTMENT (OUTPATIENT)
Dept: GENERAL RADIOLOGY | Facility: HOSPITAL | Age: 89
DRG: 884 | End: 2024-12-09
Payer: MEDICARE

## 2024-12-09 LAB
ALBUMIN SERPL-MCNC: 2.7 G/DL (ref 3.5–5.2)
ALBUMIN/GLOB SERPL: 1 G/DL
ALP SERPL-CCNC: 63 U/L (ref 39–117)
ALT SERPL W P-5'-P-CCNC: 16 U/L (ref 1–33)
ANION GAP SERPL CALCULATED.3IONS-SCNC: 7 MMOL/L (ref 5–15)
AST SERPL-CCNC: 24 U/L (ref 1–32)
BILIRUB SERPL-MCNC: 0.4 MG/DL (ref 0–1.2)
BUN SERPL-MCNC: 17 MG/DL (ref 8–23)
BUN/CREAT SERPL: 23 (ref 7–25)
CALCIUM SPEC-SCNC: 8.4 MG/DL (ref 8.6–10.5)
CHLORIDE SERPL-SCNC: 114 MMOL/L (ref 98–107)
CO2 SERPL-SCNC: 25 MMOL/L (ref 22–29)
CREAT SERPL-MCNC: 0.74 MG/DL (ref 0.57–1)
DEPRECATED RDW RBC AUTO: 45.7 FL (ref 37–54)
EGFRCR SERPLBLD CKD-EPI 2021: 78.4 ML/MIN/1.73
ERYTHROCYTE [DISTWIDTH] IN BLOOD BY AUTOMATED COUNT: 16.2 % (ref 12.3–15.4)
GLOBULIN UR ELPH-MCNC: 2.6 GM/DL
GLUCOSE BLDC GLUCOMTR-MCNC: 107 MG/DL (ref 70–130)
GLUCOSE BLDC GLUCOMTR-MCNC: 109 MG/DL (ref 70–130)
GLUCOSE BLDC GLUCOMTR-MCNC: 97 MG/DL (ref 70–130)
GLUCOSE BLDC GLUCOMTR-MCNC: 98 MG/DL (ref 70–130)
GLUCOSE SERPL-MCNC: 93 MG/DL (ref 65–99)
HCT VFR BLD AUTO: 29.3 % (ref 34–46.6)
HGB BLD-MCNC: 8.9 G/DL (ref 12–15.9)
MAGNESIUM SERPL-MCNC: 2.1 MG/DL (ref 1.6–2.4)
MCH RBC QN AUTO: 24.1 PG (ref 26.6–33)
MCHC RBC AUTO-ENTMCNC: 30.4 G/DL (ref 31.5–35.7)
MCV RBC AUTO: 79.4 FL (ref 79–97)
PHOSPHATE SERPL-MCNC: 2.2 MG/DL (ref 2.5–4.5)
PLATELET # BLD AUTO: 216 10*3/MM3 (ref 140–450)
PMV BLD AUTO: 11.2 FL (ref 6–12)
POTASSIUM SERPL-SCNC: 3.9 MMOL/L (ref 3.5–5.2)
PROT SERPL-MCNC: 5.3 G/DL (ref 6–8.5)
RBC # BLD AUTO: 3.69 10*6/MM3 (ref 3.77–5.28)
SODIUM SERPL-SCNC: 146 MMOL/L (ref 136–145)
WBC NRBC COR # BLD AUTO: 7.06 10*3/MM3 (ref 3.4–10.8)

## 2024-12-09 PROCEDURE — 80053 COMPREHEN METABOLIC PANEL: CPT | Performed by: HOSPITALIST

## 2024-12-09 PROCEDURE — 99232 SBSQ HOSP IP/OBS MODERATE 35: CPT | Performed by: PHYSICIAN ASSISTANT

## 2024-12-09 PROCEDURE — 83735 ASSAY OF MAGNESIUM: CPT | Performed by: HOSPITALIST

## 2024-12-09 PROCEDURE — 94760 N-INVAS EAR/PLS OXIMETRY 1: CPT

## 2024-12-09 PROCEDURE — 25010000002 ENOXAPARIN PER 10 MG: Performed by: HOSPITALIST

## 2024-12-09 PROCEDURE — 97530 THERAPEUTIC ACTIVITIES: CPT

## 2024-12-09 PROCEDURE — 97166 OT EVAL MOD COMPLEX 45 MIN: CPT

## 2024-12-09 PROCEDURE — 92610 EVALUATE SWALLOWING FUNCTION: CPT

## 2024-12-09 PROCEDURE — 85027 COMPLETE CBC AUTOMATED: CPT | Performed by: HOSPITALIST

## 2024-12-09 PROCEDURE — 94799 UNLISTED PULMONARY SVC/PX: CPT

## 2024-12-09 PROCEDURE — 84100 ASSAY OF PHOSPHORUS: CPT | Performed by: HOSPITALIST

## 2024-12-09 PROCEDURE — 82948 REAGENT STRIP/BLOOD GLUCOSE: CPT

## 2024-12-09 PROCEDURE — 74018 RADEX ABDOMEN 1 VIEW: CPT

## 2024-12-09 PROCEDURE — 97162 PT EVAL MOD COMPLEX 30 MIN: CPT

## 2024-12-09 RX ORDER — SUCRALFATE 1 G/1
1 TABLET ORAL
Status: DISCONTINUED | OUTPATIENT
Start: 2024-12-09 | End: 2024-12-11

## 2024-12-09 RX ORDER — POLYETHYLENE GLYCOL 3350 17 G/17G
17 POWDER, FOR SOLUTION ORAL DAILY
Status: DISCONTINUED | OUTPATIENT
Start: 2024-12-09 | End: 2024-12-14 | Stop reason: HOSPADM

## 2024-12-09 RX ORDER — AMOXICILLIN 250 MG
2 CAPSULE ORAL 2 TIMES DAILY
Status: DISCONTINUED | OUTPATIENT
Start: 2024-12-09 | End: 2024-12-10

## 2024-12-09 RX ADMIN — ENOXAPARIN SODIUM 90 MG: 100 INJECTION SUBCUTANEOUS at 05:21

## 2024-12-09 RX ADMIN — POLYETHYLENE GLYCOL 3350 17 G: 17 POWDER, FOR SOLUTION ORAL at 20:03

## 2024-12-09 RX ADMIN — ATORVASTATIN CALCIUM 80 MG: 80 TABLET, FILM COATED ORAL at 20:02

## 2024-12-09 RX ADMIN — SENNOSIDES AND DOCUSATE SODIUM 2 TABLET: 50; 8.6 TABLET ORAL at 20:03

## 2024-12-09 RX ADMIN — SUCRALFATE 1 G: 1 TABLET ORAL at 20:02

## 2024-12-09 RX ADMIN — POLYVINYL ALCOHOL, POVIDONE 2 DROP: 14; 6 SOLUTION/ DROPS OPHTHALMIC at 17:01

## 2024-12-09 RX ADMIN — SUCRALFATE 1 G: 1 TABLET ORAL at 17:00

## 2024-12-09 RX ADMIN — DIAZEPAM 1 MG: 2 TABLET ORAL at 20:02

## 2024-12-09 NOTE — DISCHARGE PLACEMENT REQUEST
"Vonnie Hitchcock (89 y.o. Female)       Date of Birth   1935    Social Security Number       Address   [address] [place] [id number]    Home Phone   [phone]    MRN   [id number]       Jewish   Scientologist    Marital Status                               Admission Date   12/7/24    Admission Type   Emergency    Admitting Provider   Maryuri Carranza MD    Attending Provider   Adán Durbin MD    Department, Room/Bed   [address], [address]/1       Discharge Date       Discharge Disposition       Discharge Destination                                 Attending Provider: Adán Durbin MD    Allergies: Cortisone, Penicillins, Iodinated Contrast Media, Lactose Intolerance (Gi)    Isolation: None   Infection: None   Code Status: No CPR    Ht: 165.1 cm (65\")   Wt: 87.2 kg (192 lb 3.9 oz)    Admission Cmt: None   Principal Problem: Stroke-like symptoms [R29.90]                   Active Insurance as of 12/7/2024       Primary Coverage       Payor Plan Insurance Group Employer/Plan Group    HUMANA MEDICARE REPLACEMENT HUMANA MED ADV PPO [id number]       Payor Plan Address Payor Plan Phone Number Payor Plan Fax Number Effective Dates    [address] [address] [phone]  3/1/2023 - None Entered    [facility] [address]         Subscriber Name Subscriber Birth Date Member ID       VONNIE HITCHCOCK 1935 [id number]                     Emergency Contacts        (Rel.) Home Phone Work Phone Mobile Phone    Marcos (Daughter) [phone] -- [phone]    Hudson Hitchcock (Son) [phone] -- [phone]                "

## 2024-12-09 NOTE — PLAN OF CARE
Goal Outcome Evaluation:           Progress: no change  Outcome Evaluation: Patient alert x1-2. States pain in abdomen but unable to verify what is causing pain. Order for carafate was obtained and administered per family request. Patient is having loose bowel movements. Total assist.

## 2024-12-09 NOTE — PROGRESS NOTES
Name: Vonnie Coppola ADMIT: 2024   : 1935  PCP: Christopher Leyva DO    MRN: [id number] LOS: 1 days   AGE/SEX: 89 y.o. female  ROOM: [facility]     Subjective   Subjective   Pt awake and alert today. C/o pain in lower abdomen and mild N. No F/C/NS. No SOA or CP. No HA or vis changes. Still confused a bit per dtr at bedside but overall much improved since yesterday.       Objective   Objective   Vital Signs  Temp:  [97.9 °F (36.6 °C)-98.2 °F (36.8 °C)] 98.2 °F (36.8 °C)  Heart Rate:  [65-80] 72  Resp:  [18] 18  BP: (112-123)/(52-55) 121/52  SpO2:  [86 %-96 %] 93 %  on  Flow (L/min) (Oxygen Therapy):  [2] 2;   Device (Oxygen Therapy): room air  Body mass index is 31.99 kg/m².  Physical Exam  Vitals and nursing note reviewed. Exam conducted with a chaperone present (family x 2).   Constitutional:       General: She is not in acute distress.     Appearance: She is ill-appearing (chronically). She is not toxic-appearing or diaphoretic.   HENT:      Head: Normocephalic.      Mouth/Throat:      Mouth: Mucous membranes are moist.      Pharynx: Oropharynx is clear.   Eyes:      General: No scleral icterus.        Right eye: No discharge.         Left eye: No discharge.      Conjunctiva/sclera: Conjunctivae normal.   Cardiovascular:      Rate and Rhythm: Normal rate and regular rhythm.      Pulses: Normal pulses.   Pulmonary:      Effort: Pulmonary effort is normal. No respiratory distress.      Breath sounds: Normal breath sounds. No wheezing or rales.   Abdominal:      General: Bowel sounds are normal. There is no distension.      Palpations: Abdomen is soft.      Tenderness: There is no abdominal tenderness.   Musculoskeletal:         General: No swelling.      Cervical back: Neck supple.   Skin:     General: Skin is warm and dry.      Capillary Refill: Capillary refill takes less than 2 seconds.      Coloration: Skin is not jaundiced.   Neurological:      General: No focal deficit present.      Mental  Status: She is alert.      Cranial Nerves: No cranial nerve deficit.      Coordination: Coordination normal.      Comments: Moves all 4 equally  Oriented to person and year   Psychiatric:         Behavior: Behavior normal.      Comments: Flat affect        Results Review     I reviewed the patient's new clinical results.  Results from last 7 days   Lab Units 12/09/24  0345 12/08/24  0745 12/07/24  1508   WBC 10*3/mm3 7.06 7.21 10.63   HEMOGLOBIN g/dL 8.9* 8.4* 10.6*   PLATELETS 10*3/mm3 216 185 251     Results from last 7 days   Lab Units 12/09/24  0345 12/08/24  1829 12/08/24  0745 12/07/24  1508   SODIUM mmol/L 146*  --  145 145   POTASSIUM mmol/L 3.9 4.3 3.4* 4.2   CHLORIDE mmol/L 114*  --  113* 109*   CO2 mmol/L 25.0  --  26.0 26.0   BUN mg/dL 17  --  17 18   CREATININE mg/dL 0.74  --  0.76 0.99   GLUCOSE mg/dL 93  --  67 90   EGFR mL/min/1.73 78.4  --  75.9 55.3*     Results from last 7 days   Lab Units 12/09/24  0345 12/08/24  0745 12/07/24  1508   ALBUMIN g/dL 2.7* 2.4* 3.3*   BILIRUBIN mg/dL 0.4 0.3 0.4   ALK PHOS U/L 63 57 78   AST (SGOT) U/L 24 20 30   ALT (SGPT) U/L 16 13 16     Results from last 7 days   Lab Units 12/09/24  0345 12/08/24  0745 12/07/24  1508   CALCIUM mg/dL 8.4* 7.7* 9.1   ALBUMIN g/dL 2.7* 2.4* 3.3*   MAGNESIUM mg/dL 2.1 2.0  --    PHOSPHORUS mg/dL 2.2*  --   --        Hemoglobin A1C   Date/Time Value Ref Range Status   12/08/2024 0745 4.80 4.80 - 5.60 % Final     Glucose   Date/Time Value Ref Range Status   12/09/2024 1137 107 70 - 130 mg/dL Final   12/09/2024 0618 98 70 - 130 mg/dL Final   12/08/2024 1617 83 70 - 130 mg/dL Final   12/08/2024 1105 100 70 - 130 mg/dL Final   12/08/2024 0635 73 70 - 130 mg/dL Final   12/07/2024 2342 85 70 - 130 mg/dL Final       MRI Brain Without Contrast    Result Date: 12/8/2024  1. No infarction or evidence for acute intracranial abnormality. Mild chronic small vessel ischemic white matter change. 2. Patchy fluid signal bilateral mastoid air cells.   This report was finalized on 12/8/2024 4:44 PM by Archie Ortiz M.D on Workstation: [id number]      CT Angiogram Head w AI Analysis of LVO    Result Date: 12/7/2024   1. No acute intracranial process. 2. No core infarct or ischemia seen on CT perfusion. 3. No large vessel occlusion, aneurysm or dissection identified. 4. Opacification of left mastoid air cells and partial opacification of the left middle ear. Finding can be correlated for otomastoiditis.  Call Report: Dr. Reddy was notified by telephone of the above findings on December 7, 2024 at 3:20 p.m.      This report was finalized on 12/7/2024 3:23 PM by Dr. Godfrey Joseph M.D on Workstation: [id number]      CT Angiogram Neck    Result Date: 12/7/2024   1. No acute intracranial process. 2. No core infarct or ischemia seen on CT perfusion. 3. No large vessel occlusion, aneurysm or dissection identified. 4. Opacification of left mastoid air cells and partial opacification of the left middle ear. Finding can be correlated for otomastoiditis.  Call Report: Dr. Reddy was notified by telephone of the above findings on December 7, 2024 at 3:20 p.m.      This report was finalized on 12/7/2024 3:23 PM by Dr. Godfrey Joseph M.D on Workstation: [id number]      CT CEREBRAL PERFUSION WITH & WITHOUT CONTRAST    Result Date: 12/7/2024   1. No acute intracranial process. 2. No core infarct or ischemia seen on CT perfusion. 3. No large vessel occlusion, aneurysm or dissection identified. 4. Opacification of left mastoid air cells and partial opacification of the left middle ear. Finding can be correlated for otomastoiditis.  Call Report: Dr. Reddy was notified by telephone of the above findings on December 7, 2024 at 3:20 p.m.      This report was finalized on 12/7/2024 3:23 PM by Dr. Godfrey Joseph M.D on Workstation: [id number]       I have personally reviewed all medications:  Scheduled Medications  [Held by provider] apixaban, 2.5 mg, Oral,  Q12H  arformoterol, 15 mcg, Nebulization, BID - RT  aspirin, 325 mg, Oral, Daily   Or  aspirin, 300 mg, Rectal, Daily  atorvastatin, 80 mg, Oral, Nightly  budesonide, 0.5 mg, Nebulization, BID - RT  diazePAM, 1 mg, Oral, BID  enoxaparin, 1 mg/kg, Subcutaneous, Q12H    Infusions  Pharmacy to Dose enoxaparin (LOVENOX),     Diet  Diet: Regular/House; Feeding Assistance - Nursing; Texture: Mechanical Ground (NDD 2); Fluid Consistency: Thin (IDDSI 0)    I have personally reviewed:  [x]  Laboratory   [x]  Microbiology   [x]  Radiology   [x]  EKG/Telemetry  []  Cardiology/Vascular   []  Pathology    []  Records       Assessment/Plan     Active Hospital Problems    Diagnosis  POA    **Stroke-like symptoms [R29.90]  Yes    Chronic anticoagulation [Z79.01]  Not Applicable    Anxiety disorder [F41.9]  Yes    Benzodiazepine dependence [F13.20]  Yes    Hypokalemia [E87.6]  No    Altered mental status [R41.82]  Yes    History of pulmonary embolism [Z86.711]  Yes    Atrial fibrillation [I48.91]  Yes    History of CVA (cerebrovascular accident) [Z86.73]  Not Applicable    HTN (hypertension) [I10]  Yes    History of breast cancer [Z85.3]  Not Applicable    Asthma [J45.909]  Yes    Anemia [D64.9]  Yes      Resolved Hospital Problems   No resolved problems to display.       88yo woman with AFib (Eliquis), HTN, asthma, benzodiazepine dependence, chronic anemia, h/o PE, prior CVA, and recent diagnosis of E.coli UTI (taking Cipro), who presented to ER with report of confusion, weakness of BLEs, and leaning to left side. She was admitted for CVA/TIA workup.    She is much more awake and alert today  D/w Neuro and looks most c/w encephalopathy NOS as CVA w/u neg so far  -?hypertensive  -?toxic (Valium)  Could this be psychiatric?  EEG now c/w seizure  MRI neg for CVA  TSH wnl, no evidence of infection (recently diagnosed with UTI and was on Cipro but UA here looks fine)  UDS positive only for benzos  Continue Valium at half her home dose  for now  Replaced K+ with protocol   SLP eval reassuring today  Can resume Eliquis now that she's taking PO  Na+ high, stop IVFs, encourage po water, recheck in AM  Have asked OT to wrap legs  O2 at baseline (2L/min)  Restart her Carafate at family request, check KUB      Eliquis for DVT prophylaxis.  DNR.  Discussed with patient and family x 2. D/w RN, CCP, and Pharm at morning huddle.  Anticipate discharge home with HH vs SNU facility, PT and OT recommending SNF at [place].  Expected discharge date/ time has not been documented.      Adán Durbin MD  [facility]  12/09/24  14:43 EST

## 2024-12-09 NOTE — THERAPY EVALUATION
Patient Name: Vonnie Coppola  : 1935    MRN: [id number]                              Today's Date: 2024       Admit Date: 2024    Visit Dx:     ICD-10-CM ICD-9-CM   1. Altered mental status, unspecified altered mental status type  R41.82 780.97   2. Right arm weakness  R29.898 729.89     Patient Active Problem List   Diagnosis    GI bleed    Anemia    HTN (hypertension)    History of breast cancer    Asthma    History of CVA (cerebrovascular accident)    Dehydration    Renal insufficiency    Pulmonary nodule    Adnexal cyst    Nausea    Atrial fibrillation    History of pulmonary embolism    Class 2 severe obesity with serious comorbidity in adult    Thrombocytopenia    Cellulitis of right leg    Acute cystitis    Leg hematoma, right, subsequent encounter    Obesity (BMI 35.0-39.9 without comorbidity)    Morbid (severe) obesity due to excess calories (*specify comorbidity)    Constipation    UTI (urinary tract infection)    Deep vein thrombosis    Generalized abdominal pain    Altered mental status    Chronic anticoagulation    Anxiety disorder    Benzodiazepine dependence    E. coli UTI (urinary tract infection)    Stroke-like symptoms    Hypokalemia     Past Medical History:   Diagnosis Date    Arthritis     Asthma     Atrial fibrillation     per pt's daughter.States hx of a-fib in the past when stressed or tired.    Blind left eye     Breast cancer     LEFT BREAST CA, LUMPECTOMY & RADS    Deep vein thrombosis     History of cataract     Hx of radiation therapy     APPROXIMATELY 40 TREATMENTS FOR LEFT BREAST CA    Hypertension     Pneumonia     PONV (postoperative nausea and vomiting)     Stroke      Past Surgical History:   Procedure Laterality Date    APPENDECTOMY      BLADDER SURGERY      BREAST BIOPSY Left     MALIGNANT    BREAST LUMPECTOMY Left     MALIGNANT    CATARACT EXTRACTION      COLONOSCOPY N/A 2024    Procedure: COLONOSCOPY to cecum with cold snare  polypectomies;  Surgeon: Harshad Gaston MD;  Location: [facility] ENDOSCOPY;  Service: Gastroenterology;  Laterality: N/A;  pre- gi bleed, anemia  post- diverticulosis, polyps    ENDOSCOPY N/A 01/19/2024    Procedure: ESOPHAGOGASTRODUODENOSCOPY with biospy;  Surgeon: Harshad Gaston MD;  Location: [facility] ENDOSCOPY;  Service: Gastroenterology;  Laterality: N/A;  pre- gi bleed, anemia  post- erosive gastirits    GALLBLADDER SURGERY      HERNIA REPAIR      HYSTERECTOMY      INCISION AND DRAINAGE LEG Right 5/31/2024    Procedure: RIGHT LOWER EXTREMITY WOUND EXPLORATION, DEBRIDEMENT AND CONTROL OF BLEEDING;  Surgeon: Gerald Pedraza MD;  Location: [facility] MAIN OR;  Service: General;  Laterality: Right;    LASIK      LYMPHADENECTOMY      OOPHORECTOMY        General Information       Row Name 12/09/24 1209          Physical Therapy Time and Intention    Document Type evaluation  -MG     Mode of Treatment co-treatment;physical therapy;occupational therapy;other (see comments)  -MG       Row Name 12/09/24 1209          General Information    Patient Profile Reviewed yes  -MG     Prior Level of Function independent:;min assist:  Pt/dtr states shes (I) w/ RW/rollator for short distances. Dtr only helps w/ shower tsfs, cooking and cleaning.  -MG     Existing Precautions/Restrictions fall  -MG     Barriers to Rehab visual deficit;hearing deficit  Legally blind in L eye. Chronic bilateral knee pain.  -MG       Row Name 12/09/24 1209          Living Environment    People in Home child(christine), adult;grandchild(christine)  -MG       Row Name 12/09/24 1209          Home Main Entrance    Number of Stairs, Main Entrance none  Ramp  -MG       Row Name 12/09/24 1209          Stairs Within Home, Primary    Number of Stairs, Within Home, Primary none  -MG       Row Name 12/09/24 1209          Cognition    Orientation Status (Cognition) oriented x 3  -MG       Row Name 12/09/24 1209          Safety Issues/Impairments Affecting Functional  Mobility    Safety Issues Affecting Function (Mobility) insight into deficits/self-awareness;awareness of need for assistance;judgment;safety precaution awareness;sequencing abilities;friction/shear risk  -MG     Impairments Affecting Function (Mobility) balance;endurance/activity tolerance;range of motion (ROM);strength;pain  -MG     Comment, Safety Issues/Impairments (Mobility) Co treatment medically appropriate and necessary due to patient acuity level, activity tolerance and safety of patient and staff. Evaluation established to achieve all goals in POC. Gait belt and non-skid socks donned.  -MG               User Key  (r) = Recorded By, (t) = Taken By, (c) = Cosigned By      Initials Name Provider Type    MG Mariaelena Daley PT Physical Therapist                   Mobility       Row Name 12/09/24 1211          Bed Mobility    Bed Mobility supine-sit;sit-supine  -MG     Supine-Sit Larue (Bed Mobility) maximum assist (25% patient effort);2 person assist;verbal cues  -MG     Sit-Supine Larue (Bed Mobility) maximum assist (25% patient effort);2 person assist;verbal cues  -MG     Assistive Device (Bed Mobility) head of bed elevated;bed rails  -MG     Comment, (Bed Mobility) Increased time and effort to complete. Cues for sequencing and hand placement.  -MG       Row Name 12/09/24 1211          Sit-Stand Transfer    Sit-Stand Larue (Transfers) maximum assist (25% patient effort);dependent (less than 25% patient effort);2 person assist;verbal cues  -MG     Assistive Device (Sit-Stand Transfers) walker, front-wheeled  -MG     Comment, (Sit-Stand Transfer) x1 from EOB. Assist w/ hand placement on RW. Cues for sequencing and posture. Able to clear the bed, but unable to come to full standing.  -MG               User Key  (r) = Recorded By, (t) = Taken By, (c) = Cosigned By      Initials Name Provider Type    Mariaelena Tyson, PT Physical Therapist                   Obj/Interventions       Row Name  12/09/24 1213          Range of Motion Comprehensive    General Range of Motion lower extremity range of motion deficits identified  -MG     Comment, General Range of Motion Grossly limited d/t chronic OA and weakness, able to achieve 50-75% of ROM.  -MG       Row Name 12/09/24 1213          Strength Comprehensive (MMT)    General Manual Muscle Testing (MMT) Assessment lower extremity strength deficits identified  -MG     Comment, General Manual Muscle Testing (MMT) Assessment Generalized weakness. Grossly 3+/5.  -MG       Row Name 12/09/24 1213          Balance    Balance Assessment sitting static balance;sitting dynamic balance;standing static balance  -MG     Static Sitting Balance contact guard;standby assist  -MG     Dynamic Sitting Balance contact guard;minimal assist  -MG     Position, Sitting Balance sitting edge of bed  -MG     Static Standing Balance maximum assist;dependent;2-person assist;verbal cues  -MG     Position/Device Used, Standing Balance supported;walker, front-wheeled  -MG     Comment, Balance Tolerated sitting EOB for several minutes w/ CG/SBA. No L lean noted. During STS pt was unable to come to full standing.  -       Row Name 12/09/24 1213          Sensory Assessment (Somatosensory)    Sensory Assessment (Somatosensory) sensation intact  -MG               User Key  (r) = Recorded By, (t) = Taken By, (c) = Cosigned By      Initials Name Provider Type    MG Mariaelena Daley, PT Physical Therapist                   Goals/Plan       [facility] Name 12/09/24 1217          Bed Mobility Goal 1 (PT)    Activity/Assistive Device (Bed Mobility Goal 1, PT) bed mobility activities, all  -MG     [place] Level/Cues Needed (Bed Mobility Goal 1, PT) moderate assist (50-74% patient effort)  -MG     Time Frame (Bed Mobility Goal 1, PT) 1 week  -MG       Row Name 12/09/24 1217          Transfer Goal 1 (PT)    Activity/Assistive Device (Transfer Goal 1, PT) transfers, all  -MG     [place] Level/Cues Needed  (Transfer Goal 1, PT) moderate assist (50-74% patient effort)  -MG     Time Frame (Transfer Goal 1, PT) 1 week  -MG       Row Name 12/09/24 1217          Gait Training Goal 1 (PT)    Activity/Assistive Device (Gait Training Goal 1, PT) gait (walking locomotion)  -MG     [place] Level (Gait Training Goal 1, PT) moderate assist (50-74% patient effort)  -MG     Distance (Gait Training Goal 1, PT) 10  -MG     Time Frame (Gait Training Goal 1, PT) 1 week  -MG       Row Name 12/09/24 1217          Therapy Assessment/Plan (PT)    Planned Therapy Interventions (PT) balance training;bed mobility training;gait training;home exercise program;patient/family education;stretching;strengthening;ROM (range of motion);neuromuscular re-education;transfer training;postural re-education  -MG               User Key  (r) = Recorded By, (t) = Taken By, (c) = Cosigned By      Initials Name Provider Type    MG Mariaelena Daley, PT Physical Therapist                   Clinical Impression       Row Name 12/09/24 1216          Pain    Pain Location knee;abdomen  -MG     Pain Side/Orientation bilateral  -MG     Pain Management Interventions nursing notified;positioning techniques utilized;exercise or physical activity utilized  -MG     Response to Pain Interventions activity participation with tolerable pain  -MG     Pre/Posttreatment Pain Comment Has c/o chronic knee pain and abdominal pain, but does not rate.  -MG       Row Name 12/09/24 1216          Plan of Care Review    Plan of Care Reviewed With patient;family  -MG     Progress improving  -MG     Outcome Evaluation Pt is a 89 y.o. female with h/o HTN, asthma, chronic knee pain, CVA, a-fib and PE who was admitted to [facility] on 12/7/2024 with c/o AMS, L lean and weakness. Imaging (-). Patient is (I) at baseline, with use of a RW/rollator for short house-hold distances and lives with her dtr and grandchildren in a house w/ a ramped entrance. Pts dtr states she helps w/ shower tsfs, cooking  and cleaning only. Today, pt performed bed mobility with MaxA x2 and required Max/DepA x2 w/ RW for one STS. Pt was able to clear the bed, but unable to come to full standing. Pt may benefit from skilled PT services acutely to address their impaired strength, balance and endurance, as well as, improve their level of independence prior to discharge. KELLY w/ RN. Rec SNF upon DC.  -MG       Row Name 12/09/24 1216          Therapy Assessment/Plan (PT)    Rehab Potential (PT) fair  -MG     Criteria for Skilled Interventions Met (PT) yes  -MG     Therapy Frequency (PT) 5 times/wk  -MG       Row Name 12/09/24 1216          Vital Signs    O2 Delivery Pre Treatment room air  -MG     O2 Delivery Intra Treatment room air  -MG     O2 Delivery Post Treatment room air  -MG     Pre Patient Position Supine  -MG     Intra Patient Position Standing  -MG     Post Patient Position Supine  -MG       Row Name 12/09/24 1216          Positioning and Restraints    Pre-Treatment Position in bed  -MG     Post Treatment Position bed  -MG     In Bed notified nsg;supine;fowlers;call light within reach;encouraged to call for assist;exit alarm on;with family/caregiver;side rails up x3;with SLP;heels elevated  -MG               User Key  (r) = Recorded By, (t) = Taken By, (c) = Cosigned By      Initials Name Provider Type    Mariaelena Tyson, PT Physical Therapist                   Outcome Measures       Row Name 12/09/24 1218 12/09/24 0855       How much help from another person do you currently need...    Turning from your back to your side while in flat bed without using bedrails? 2  -MG 2  -TP    Moving from lying on back to sitting on the side of a flat bed without bedrails? 2  -MG 2  -TP    Moving to and from a bed to a chair (including a wheelchair)? 1  -MG 2  -TP    Standing up from a chair using your arms (e.g., wheelchair, bedside chair)? 2  -MG 2  -TP    Climbing 3-5 steps with a railing? 1  -MG 2  -TP    To walk in hospital room? 1   -MG 2  -TP    AM-PAC 6 Clicks Score (PT) 9  -MG 12  -TP              User Key  (r) = Recorded By, (t) = Taken By, (c) = Cosigned By      Initials Name Provider Type    MG Mariaelena Daley, PT Physical Therapist    TP Zoey Garcia, RN Registered Nurse                                 Physical Therapy Education       Title: PT OT SLP Therapies (In Progress)       Topic: Physical Therapy (In Progress)       Point: Mobility training (Done)       Learning Progress Summary            Patient Acceptance, E,D, VU,NR by  at 12/9/2024 1218                      Point: Home exercise program (Not Started)       Learner Progress:  Not documented in this visit.              Point: Body mechanics (Done)       Learning Progress Summary            Patient Acceptance, E,D, VU,NR by  at 12/9/2024 1218                      Point: Precautions (Done)       Learning Progress Summary            Patient Acceptance, E,D, VU,NR by  at 12/9/2024 1218                                      User Key       Initials Effective Dates Name Provider Type Discipline     05/24/22 -  Mariaelena Daley, PT Physical Therapist PT                  PT Recommendation and Plan  Planned Therapy Interventions (PT): balance training, bed mobility training, gait training, home exercise program, patient/family education, stretching, strengthening, ROM (range of motion), neuromuscular re-education, transfer training, postural re-education  Progress: improving  Outcome Evaluation: Pt is a 89 y.o. female with h/o HTN, asthma, chronic knee pain, CVA, a-fib and PE who was admitted to [facility] on 12/7/2024 with c/o AMS, L lean and weakness. Imaging (-). Patient is (I) at baseline, with use of a RW/rollator for short house-hold distances and lives with her dtr and grandchildren in a house w/ a ramped entrance. Pts dtr states she helps w/ shower tsfs, cooking and cleaning only. Today, pt performed bed mobility with MaxA x2 and required Max/DepA x2 w/ RW for one STS. Pt was able  to clear the bed, but unable to come to full standing. Pt may benefit from skilled PT services acutely to address their impaired strength, balance and endurance, as well as, improve their level of independence prior to discharge. KELLY w/ RN. Rec SNF upon DC.     Time Calculation:         PT Charges       Row Name 12/09/24 1218             Time Calculation    Start Time 1100  -MG      Stop Time 1127  -MG      Time Calculation (min) 27 min  -MG      PT Received On 12/09/24  -MG      PT - Next Appointment 12/10/24  -MG      PT Goal Re-Cert Due Date 12/23/24  -MG         Time Calculation- PT    Total Timed Code Minutes- PT 15 minute(s)  -MG                User Key  (r) = Recorded By, (t) = Taken By, (c) = Cosigned By      Initials Name Provider Type    MG Mariaelena Daley, PT Physical Therapist                  Therapy Charges for Today       Code Description Service Date Service Provider Modifiers Qty    13991567603 HC PT EVAL MOD COMPLEXITY 3 12/9/2024 Mariaelena Daley, PT GP 1    88921013097 HC PT THERAPEUTIC ACT EA 15 MIN 12/9/2024 Mariaelena Daley, PT GP 1            PT G-Codes  AM-PAC 6 Clicks Score (PT): 9  PT Discharge Summary  Anticipated Discharge Disposition (PT): skilled nursing facility    Mariaelena Daley PT  12/9/2024

## 2024-12-09 NOTE — CASE MANAGEMENT/SOCIAL WORK
Discharge Planning Assessment  [facility]     Patient Name: Vonnie Coppola  MRN: [id number]  Today's Date: 12/9/2024    Admit Date: 12/7/2024    Plan: TBD pending clinical course and PT recs.   Discharge Needs Assessment       Row Name 12/09/24 1033       Living Environment    People in Home child(christine), adult;grandchild(christine)    Current Living Arrangements home    Family Caregiver if Needed child(christine), adult    Quality of Family Relationships helpful;involved;supportive    Able to Return to Prior Arrangements other (see comments)       Transition Planning    Patient/Family Anticipates Transition to home with family;inpatient rehabilitation facility    Patient/Family Anticipated Services at Transition     Transportation Anticipated health plan transportation;family or friend will provide       Discharge Needs Assessment    Readmission Within the Last 30 Days no previous admission in last 30 days    Current Outpatient/Agency/Support Group homecare agency    Equipment Currently Used at Home walker, rolling;rollator;shower chair;commode;oxygen    Concerns to be Addressed discharge planning    Equipment Needed After Discharge none    Outpatient/Agency/Support Group Needs skilled nursing facility;homecare agency    Discharge Facility/Level of Care Needs home with home health;nursing facility, skilled                   Discharge Plan       Row Name 12/09/24 1034       Plan    Plan TBD pending clinical course and PT recs.    Patient/Family in Agreement with Plan yes    Plan Comments CCP s/w , pts daughter, via phone d/t pts confusion. Introduced self and role of CCP. Face sheet information and pharmacy verified. Pt lives in a single-story home with a ramp to enter with Sarah Johnson spouse and son. Pt does not drive, mostly IADL's,  assists with cooking and pt has a walker, shower chair, commode, grab bars and oxygen as needed at with sleep.  denies pt having a living will.  Pt is enrolled in meds to beds and has trouble affording her Eliquis. Pt has used [facility] in the past and has been to [facility] and [facility] [place] DC plan is TBD pending clinical course.  stated if pt able to ambulate with a walker plan is home otherwise may need SNF. LVM for [facility] to notify.  Deep RN/CCP                  Continued Care and Services - Admitted Since 12/7/2024       Home Medical Care       Service Provider Request Status Services Address Phone Fax Patient Preferred    [facility] -- [phone] [facility], [address] [address] [phone] --                  Selected Continued Care - Prior Encounters Includes continued care and service providers with selected services from prior encounters from 9/8/2024 to 12/9/2024      Discharged on 10/1/2024 Admission date: 9/28/2024 - Discharge disposition: Home or Self Care      Home Medical Care       Service Provider Services Address Phone Fax Patient Preferred    [facility] [address], [address] [address] [phone] --                             Demographic Summary       Row Name 12/09/24 1032       General Information    Admission Type inpatient    Arrived From home    Required Notices Provided Important Message from Medicare    Referral Source case finding;admission list    Reason for Consult discharge planning    Preferred Language English       Contact Information    Permission Granted to Share Info With family/designee;                   Functional Status       Row Name 12/09/24 1033       Functional Status    Usual Activity Tolerance good    Current Activity Tolerance poor       Assessment of Health Literacy    How often do you have someone help you read hospital materials? Often    How often do you have problems learning about your medical condition  because of difficulty understanding written information? Often    How often do you have a problem understanding what is told to you about your medical condition? Often    How confident are you filling out medical forms by yourself? A little bit    Health Literacy Fair       Functional Status, IADL    Medications assistive person    Meal Preparation assistive person    Housekeeping assistive person    Laundry assistive person    Shopping assistive person                               Arline Jones, RN

## 2024-12-09 NOTE — PROGRESS NOTES
"DOS: 2024  NAME: Vonnie Coppola   : 1935  PCP: Christopher Leyva DO  Chief Complaint   Patient presents with    Altered Mental Status    Weakness - Generalized       Chief complaint: AMS  Subjective: No family at bedside.  Pt confused.  She denies pain.  Speech non sensical    Objective:  Vital signs: /70 (BP Location: Right arm, Patient Position: Lying)   Pulse 72   Temp 97.3 °F (36.3 °C) (Oral)   Resp 18   Ht 165.1 cm (65\")   Wt 87.2 kg (192 lb 3.9 oz)   SpO2 99%   BMI 31.99 kg/m²      Gen: NAD, vitals reviewed  MS: oriented to self only, recent/remote memory impaired, intermittently FC to show tongue and 2 fingers, attention/concentration fair, no obvious neglect.  CN: visual acuity grossly normal with blink to threat, PERRL, EOMI, no facial droop, no dysarthria  Motor: no pronator drift, paratonia, resting tremor seen  Sensory: intact to light touch all 4 ext.    ROS:  No weakness, numbness  No fevers, chills      Laboratory results:  Lab Results   Component Value Date    GLUCOSE 93 2024    CALCIUM 8.4 (L) 2024     (H) 2024    K 3.9 2024    CO2 25.0 2024     (H) 2024    BUN 17 2024    CREATININE 0.74 2024    BCR 23.0 2024    ANIONGAP 7.0 2024     Lab Results   Component Value Date    WBC 7.06 2024    HGB 8.9 (L) 2024    HCT 29.3 (L) 2024    MCV 79.4 2024     2024     Lab Results   Component Value Date    LDL 46 2024         Lab 24  0745   HEMOGLOBIN A1C 4.80        Review of labs: Troponin 26, sodium 145, chloride 109, potassium 4.2, creatinine 0.99, magnesium 2, albumin 3.3, WBCs 10,000, hemoglobin 10, platelets 250,000, UA with elevated specific gravity, 15 ketones, protein trace.  TSH in range, B12 greater than 2000 last year    Review and interpretation of imaging:   MRI brain     FINDINGS: There are no abnormal foci of restricted diffusion to diagnose  an " acute infarction. There is mild confluent periventricular white  matter FLAIR hyperintense signal, as well as minimal deep cerebral white  matter FLAIR hyperintense signal. This is nonspecific, though is  consistent with chronic small vessel ischemic white matter change.  Evaluation of detail is limited on this exam by motion related artifact.  Per the technologist, these were the best images possible due to patient  condition and fast protocol was run which decreases signal-to-noise  ratio.     No extra-axial fluid collection is demonstrated. Ventricles appear  within normal limits for size and configuration. Major intracranial flow  voids appear within normal limits.     There is patchy fluid signal within bilateral mastoid air cells.     IMPRESSION:  1. No infarction or evidence for acute intracranial abnormality. Mild  chronic small vessel ischemic white matter change.  2. Patchy fluid signal bilateral mastoid air cells.  Workup to date:  Spot EEG     Impression:  Abnormal EEG being moderately diffusely slow during wakefulness and stage II sleep consistent with a moderate diffuse encephalopathy versus bihemispheric structural lesions.  No epileptiform activities were seen        Diagnoses:  Subacute encephalopathy  Afib  Recurrent UTIs    Impression: 89-year-old female with past medical history of A-fib on Eliquis 2.5 twice daily, PE, elevated BMI, chronic anemia, hypertension, asthma, prior stroke fairly recent UTI takes Valium and hydrocodone at home.  Her neurowork-up has been unrevealing.  Unknown etiology for her presentation presently.  Polypharmacy is a thought.  Dtr says she was sleeping non stop since Friday, wasn't taking medicine or eating.  She did have headaches at home.  Dtr doresn't recall headache history, but perhaps had migraines before she was born.     Daughter thinks she is more awake today, but still not herself.  At baseline walks with walker 2/2 arthritis for her knees, has had health  issues since January, no dementia, drt thinks she would be more independent if not for her arthritis.  There have been memory problems with recall (phone numbers) in the last 2-3 mos.  Dtr does her meds and tells she hasn't had oxycodone in months, but valium is a regular med       Plan  LP for basic CSF studies to ensure no CNS contribution to her AMS      Thank you for this consultation.  Discussed above plan with neuro attending, Dr. Powell who agrees with above plan.  Neurology team is available for concerns or questions.

## 2024-12-09 NOTE — THERAPY EVALUATION
Acute Care - Speech Language Pathology   Swallow Initial Evaluation [facility]     Patient Name: Vonnie Coppola  : 1935  MRN: [id number]  Today's Date: 2024               Admit Date: 2024    Visit Dx:     ICD-10-CM ICD-9-CM   1. Altered mental status, unspecified altered mental status type  R41.82 780.97   2. Right arm weakness  R29.898 729.89     Patient Active Problem List   Diagnosis    GI bleed    Anemia    HTN (hypertension)    History of breast cancer    Asthma    History of CVA (cerebrovascular accident)    Dehydration    Renal insufficiency    Pulmonary nodule    Adnexal cyst    Nausea    Atrial fibrillation    History of pulmonary embolism    Class 2 severe obesity with serious comorbidity in adult    Thrombocytopenia    Cellulitis of right leg    Acute cystitis    Leg hematoma, right, subsequent encounter    Obesity (BMI 35.0-39.9 without comorbidity)    Morbid (severe) obesity due to excess calories (*specify comorbidity)    Constipation    UTI (urinary tract infection)    Deep vein thrombosis    Generalized abdominal pain    Altered mental status    Chronic anticoagulation    Anxiety disorder    Benzodiazepine dependence    E. coli UTI (urinary tract infection)    Stroke-like symptoms    Hypokalemia     Past Medical History:   Diagnosis Date    Arthritis     Asthma     Atrial fibrillation     per pt's daughter.States hx of a-fib in the past when stressed or tired.    Blind left eye     Breast cancer     LEFT BREAST CA, LUMPECTOMY & RADS    Deep vein thrombosis     History of cataract     Hx of radiation therapy     APPROXIMATELY 40 TREATMENTS FOR LEFT BREAST CA    Hypertension     Pneumonia     PONV (postoperative nausea and vomiting)     Stroke      Past Surgical History:   Procedure Laterality Date    APPENDECTOMY      BLADDER SURGERY      BREAST BIOPSY Left     MALIGNANT    BREAST LUMPECTOMY Left     MALIGNANT    CATARACT EXTRACTION      COLONOSCOPY N/A 2024     Procedure: COLONOSCOPY to cecum with cold snare polypectomies;  Surgeon: Harshad Gaston MD;  Location: [facility] ENDOSCOPY;  Service: Gastroenterology;  Laterality: N/A;  pre- gi bleed, anemia  post- diverticulosis, polyps    ENDOSCOPY N/A 01/19/2024    Procedure: ESOPHAGOGASTRODUODENOSCOPY with biospy;  Surgeon: Harshad Gaston MD;  Location: [facility] ENDOSCOPY;  Service: Gastroenterology;  Laterality: N/A;  pre- gi bleed, anemia  post- erosive gastirits    GALLBLADDER SURGERY      HERNIA REPAIR      HYSTERECTOMY      INCISION AND DRAINAGE LEG Right 5/31/2024    Procedure: RIGHT LOWER EXTREMITY WOUND EXPLORATION, DEBRIDEMENT AND CONTROL OF BLEEDING;  Surgeon: Gerald Pedraza MD;  Location: [facility] MAIN OR;  Service: General;  Laterality: Right;    LASIK      LYMPHADENECTOMY      OOPHORECTOMY         SLP Recommendation and Plan  SLP Swallowing Diagnosis: R/O pharyngeal dysphagia (12/09/24 1030)  SLP Diet Recommendation: mechanical ground textures, thin liquids (12/09/24 1030)  Recommended Precautions and Strategies: upright posture during/after eating, small bites of food and sips of liquid (12/09/24 1030)  SLP Rec. for Method of Medication Administration: meds whole, meds crushed, with thin liquids, with puree, as tolerated (12/09/24 1030)     Monitor for Signs of Aspiration: yes, notify SLP if any concerns (12/09/24 1030)  Recommended Diagnostics: reassess via clinical swallow evaluation (12/09/24 1030)           Therapy Frequency (Swallow): PRN (12/09/24 1030)  Predicted Duration Therapy Intervention (Days): until discharge (12/09/24 1030)  Oral Care Recommendations: Oral Care BID/PRN (12/09/24 1030)                                        Outcome Evaluation: Patient seen for clinical swallow assessment. Family present and denies baseline dysphagia. Pt feeds self, but daughter cooks all meals. Pt initially refused to participate in oral mech exam or accept PO trials, but with encouragement from family,  she accepted ice. Suspected premature spill and swallow delay with ice and thins via spoon. No overt s/s of aspiration with thins via cup/straw, puree, or mixed. Prolonged mastication time. SLP recs [facility] soft and thins. Meds as omar. Will follow for diet tolerance. If concern for aspiration, please order VFSSS.      SWALLOW EVALUATION (Last 72 Hours)       SLP Adult Swallow Evaluation       Row Name 12/09/24 1030                   Rehab Evaluation    Document Type evaluation  -        Patient Effort adequate  -           General Information    Patient Profile Reviewed yes  -SH        Pertinent History Of Current Problem TME  -        Current Method of Nutrition NPO  -        Precautions/Limitations, Vision difficult to assess  -        Precautions/Limitations, Hearing difficult to assess  -        Prior Level of Function-Communication WFL  -        Prior Level of Function-Swallowing no diet consistency restrictions  -        Plans/Goals Discussed with patient;family;agreed upon  -        Barriers to Rehab medically complex  -           Pain    Pretreatment Pain Rating 0/10 - no pain  -        Posttreatment Pain Rating 0/10 - no pain  -           Oral Motor Structure and Function    Dentition Assessment missing teeth  -           Oral Musculature and Cranial Nerve Assessment    Oral Motor General Assessment generalized oral motor weakness;unable to assess  -           Clinical Swallow Eval    Clinical Swallow Evaluation Summary Patient seen for clinical swallow assessment. Family present and denies baseline dysphagia. Pt feeds self, but daughter cooks all meals. Pt initially refused to participate in oral mech exam or accept PO trials, but with encouragement from family, she accepted ice. Suspected premature spill and swallow delay with ice and thins via spoon. No overt s/s of aspiration with thins via cup/straw, puree, or mixed. Prolonged mastication time. SLP recs mec soft and thins.  Straws okay. Meds as omar. Will follow for diet tolerance. If concern for aspiration, please order VFSS.  -           SLP Evaluation Clinical Impression    SLP Swallowing Diagnosis R/O pharyngeal dysphagia  -           Recommendations    Therapy Frequency (Swallow) PRN  -        Predicted Duration Therapy Intervention (Days) until discharge  -        SLP Diet Recommendation mechanical ground textures;thin liquids  -        Recommended Diagnostics reassess via clinical swallow evaluation  -        Recommended Precautions and Strategies upright posture during/after eating;small bites of food and sips of liquid  -        Oral Care Recommendations Oral Care BID/PRN  -        SLP Rec. for Method of Medication Administration meds whole;meds crushed;with thin liquids;with puree;as tolerated  -        Monitor for Signs of Aspiration yes;notify SLP if any concerns  -           Swallow Goals (SLP)    Swallow LTGs Patient will demonstrate functional swallow for  -           (LTG) Patient will demonstrate functional swallow for    Diet Texture (Demonstrate functional swallow) mechanical ground textures  -        Liquid viscosity (Demonstrate functional swallow) thin liquids  -        [place] (Demonstrate functional swallow) independently (over 90% accuracy)  -        Time Frame (Demonstrate functional swallow) by discharge  -                  User Key  (r) = Recorded By, (t) = Taken By, (c) = Cosigned By      Initials Name Effective Dates     Melanie Adams, SLP 01/05/24 -                     EDUCATION  The patient has been educated in the following areas:   Dysphagia (Swallowing Impairment).        SLP GOALS       Row Name 12/09/24 1030             (LTG) Patient will demonstrate functional swallow for    Diet Texture (Demonstrate functional swallow) mechanical ground textures  -      Liquid viscosity (Demonstrate functional swallow) thin liquids  -      [place] (Demonstrate  functional swallow) independently (over 90% accuracy)  -      Time Frame (Demonstrate functional swallow) by discharge  -                User Key  (r) = Recorded By, (t) = Taken By, (c) = Cosigned By      Initials Name Provider Type    Melanie Cheng SLP Speech and Language Pathologist                         Time Calculation:    Time Calculation- SLP       Row Name 12/09/24 1410             Time Calculation- [facility]    SLP Start Time 1030  -      SLP Received On 12/09/24  -                User Key  (r) = Recorded By, (t) = Taken By, (c) = Cosigned By      Initials Name Provider Type    Melanie Cheng SLP Speech and Language Pathologist                    Therapy Charges for Today       Code Description Service Date Service Provider Modifiers Qty    [id number] HC ST EVAL ORAL PHARYNG SWALLOW 4 12/9/2024 Melanie Adams SLP GN 1                 HARRIS Bustos  12/9/2024

## 2024-12-09 NOTE — PLAN OF CARE
Problem: Adult Inpatient Plan of Care  Goal: Plan of Care Review  Outcome: Progressing  Flowsheets (Taken 12/9/2024 0700)  Progress: improving  Outcome Evaluation: Pt Alert to self and time, more alert tonight but very agitated and verbally abusive to staff. VSS on 2L NC, no c/o pain or discomfort. Nurse attempted swallow screen, pt refused to follow proper directions. Safety precauions in place, plan of care ongoing.  Plan of Care Reviewed With: patient  Goal: Patient-Specific Goal (Individualized)  Outcome: Progressing  Goal: Absence of Hospital-Acquired Illness or Injury  Outcome: Progressing  Intervention: Identify and Manage Fall Risk  Recent Flowsheet Documentation  Taken 12/9/2024 0634 by Rakel Rosenberg RN  Safety Promotion/Fall Prevention: safety round/check completed  Taken 12/9/2024 0409 by Rakel Rosenberg RN  Safety Promotion/Fall Prevention: safety round/check completed  Taken 12/9/2024 0238 by Rakel Rosenberg RN  Safety Promotion/Fall Prevention: safety round/check completed  Taken 12/9/2024 0013 by Rakel Rosenberg RN  Safety Promotion/Fall Prevention:   activity supervised   assistive device/personal items within reach   clutter free environment maintained   fall prevention program maintained   room organization consistent   safety round/check completed  Taken 12/8/2024 2239 by Rakel Rosenberg RN  Safety Promotion/Fall Prevention: safety round/check completed  Taken 12/8/2024 2004 by Rakel Rosenberg RN  Safety Promotion/Fall Prevention:   activity supervised   assistive device/personal items within reach   clutter free environment maintained   fall prevention program maintained   room organization consistent   safety round/check completed  Intervention: Prevent Skin Injury  Recent Flowsheet Documentation  Taken 12/9/2024 0634 by Rakel Rosenberg RN  Body Position:   supine   legs elevated  Taken 12/9/2024 0409 by Rakel Rosenberg RN  Body Position:   turned   right   legs elevated  Taken 12/9/2024 0238 by Shakira  REGINA Ellington  Body Position:   turned   left   legs elevated  Taken 12/9/2024 0013 by Rakel Rosenberg RN  Body Position:   turned   left   legs elevated  Taken 12/8/2024 2239 by Rakel Rosenberg RN  Body Position:   turned   right   legs elevated  Taken 12/8/2024 2004 by Rakel Rosenberg RN  Body Position:   turned   right   legs elevated  Skin Protection: incontinence pads utilized  Intervention: Prevent and Manage VTE (Venous Thromboembolism) Risk  Recent Flowsheet Documentation  Taken 12/9/2024 0013 by Rakel Rosenberg RN  VTE Prevention/Management: (Lovenox) other (see comments)  Taken 12/8/2024 2004 by Rakel Rosenberg RN  VTE Prevention/Management: (Lovenox) other (see comments)  Intervention: Prevent Infection  Recent Flowsheet Documentation  Taken 12/9/2024 0013 by Rakel Rosenberg RN  Infection Prevention:   environmental surveillance performed   rest/sleep promoted   single patient room provided  Taken 12/8/2024 2004 by Rakel Rosenberg RN  Infection Prevention:   environmental surveillance performed   rest/sleep promoted   single patient room provided  Goal: Optimal Comfort and Wellbeing  Outcome: Progressing  Intervention: Provide Person-Centered Care  Recent Flowsheet Documentation  Taken 12/9/2024 0013 by Rakel Rosenberg RN  Trust Relationship/Rapport:   care explained   choices provided  Taken 12/8/2024 2004 by Rakel Rosenberg RN  Trust Relationship/Rapport:   care explained   choices provided   empathic listening provided   questions answered   reassurance provided   thoughts/feelings acknowledged  Goal: Readiness for Transition of Care  Outcome: Progressing     Problem: Violence Risk or Actual  Goal: Anger and Impulse Control  Outcome: Progressing  Intervention: Minimize Safety Risk  Recent Flowsheet Documentation  Taken 12/9/2024 0634 by Rakel Rosenberg RN  Enhanced Safety Measures: bed alarm set  Taken 12/9/2024 0409 by Rakel Rosenberg RN  Enhanced Safety Measures: bed alarm set  Taken 12/9/2024 0238 by Rakel Rosenberg  RN  Enhanced Safety Measures: bed alarm set  Taken 12/9/2024 0013 by Rakel Rosenberg RN  Enhanced Safety Measures: bed alarm set  Taken 12/8/2024 2239 by Rakel Rosenberg RN  Enhanced Safety Measures: bed alarm set  Taken 12/8/2024 2004 by Rakel Rosenberg RN  Enhanced Safety Measures: bed alarm set     Problem: Comorbidity Management  Goal: Maintenance of Asthma Control  Outcome: Progressing  Intervention: Maintain Asthma Symptom Control  Recent Flowsheet Documentation  Taken 12/9/2024 0634 by Rakel Rosenberg RN  Medication Review/Management: medications reviewed  Taken 12/9/2024 0409 by Rakel Rosenberg RN  Medication Review/Management: medications reviewed  Taken 12/9/2024 0238 by Rakel Rosenberg RN  Medication Review/Management: medications reviewed  Taken 12/9/2024 0013 by Rakel Rosenberg RN  Medication Review/Management: medications reviewed  Taken 12/8/2024 2239 by Rakel Rosenberg RN  Medication Review/Management: medications reviewed  Taken 12/8/2024 2004 by Rakel Rosenberg RN  Medication Review/Management: medications reviewed     Problem: Skin Injury Risk Increased  Goal: Skin Health and Integrity  Outcome: Progressing  Intervention: Optimize Skin Protection  Recent Flowsheet Documentation  Taken 12/9/2024 0634 by Rakel Rosenberg RN  Head of Bed (HOB) Positioning: HOB at 20-30 degrees  Taken 12/9/2024 0409 by Rakel Rosenberg RN  Head of Bed (HOB) Positioning: HOB at 20-30 degrees  Taken 12/9/2024 0238 by Rakel Rosenberg RN  Head of Bed (HOB) Positioning: HOB at 20-30 degrees  Taken 12/9/2024 0013 by Rakel Rosenberg RN  Head of Bed (HOB) Positioning: HOB at 20-30 degrees  Taken 12/8/2024 2239 by Rakel Rosenberg RN  Head of Bed (HOB) Positioning: HOB at 20-30 degrees  Taken 12/8/2024 2004 by Rakel Rosenberg RN  Pressure Reduction Techniques:   frequent weight shift encouraged   pressure points protected   weight shift assistance provided  Head of Bed (HOB) Positioning: HOB at 20-30 degrees  Pressure Reduction Devices:  pressure-redistributing mattress utilized  Skin Protection: incontinence pads utilized     Problem: Fall Injury Risk  Goal: Absence of Fall and Fall-Related Injury  Outcome: Progressing  Intervention: Identify and Manage Contributors  Recent Flowsheet Documentation  Taken 12/9/2024 0634 by Rakel Rosenberg RN  Medication Review/Management: medications reviewed  Taken 12/9/2024 0409 by Rakel Rosenberg RN  Medication Review/Management: medications reviewed  Taken 12/9/2024 0238 by Rakel Rosenberg RN  Medication Review/Management: medications reviewed  Taken 12/9/2024 0013 by Rakel Rosenberg RN  Medication Review/Management: medications reviewed  Taken 12/8/2024 2239 by Rakel Rosenberg RN  Medication Review/Management: medications reviewed  Taken 12/8/2024 2004 by Rakel Rosenberg RN  Medication Review/Management: medications reviewed  Intervention: Promote Injury-Free Environment  Recent Flowsheet Documentation  Taken 12/9/2024 0634 by Rakel Rosenberg RN  Safety Promotion/Fall Prevention: safety round/check completed  Taken 12/9/2024 0409 by Rakel Rosenberg RN  Safety Promotion/Fall Prevention: safety round/check completed  Taken 12/9/2024 0238 by Rakel Rosenberg RN  Safety Promotion/Fall Prevention: safety round/check completed  Taken 12/9/2024 0013 by Rakel Rosenberg RN  Safety Promotion/Fall Prevention:   activity supervised   assistive device/personal items within reach   clutter free environment maintained   fall prevention program maintained   room organization consistent   safety round/check completed  Taken 12/8/2024 2239 by Rakel Rosenberg RN  Safety Promotion/Fall Prevention: safety round/check completed  Taken 12/8/2024 2004 by Rakel Rosenberg RN  Safety Promotion/Fall Prevention:   activity supervised   assistive device/personal items within reach   clutter free environment maintained   fall prevention program maintained   room organization consistent   safety round/check completed   Goal Outcome Evaluation:  Plan of Care Reviewed  With: patient        Progress: improving  Outcome Evaluation: Pt Alert to self and time, more alert tonight but very agitated and verbally abusive to staff. VSS on 2L NC, no c/o pain or discomfort. Nurse attempted swallow screen, pt refused to follow proper directions. Safety precauions in place, plan of care ongoing.

## 2024-12-09 NOTE — PLAN OF CARE
Goal Outcome Evaluation:  Plan of Care Reviewed With: patient           Outcome Evaluation: Patient seen for clinical swallow assessment. Family present and denies baseline dysphagia. Pt feeds self, but daughter cooks all meals. Pt initially refused to participate in oral mec exam or accept PO trials, but with encouragement from family, she accepted ice. Suspected premature spill and swallow delay with ice and thins via spoon. No overt s/s of aspiration with thins via cup/straw, puree, or mixed. Prolonged mastication time. SLP recs [facility] soft and thins. Straws okay. Meds as omar. Will follow for diet tolerance. If concern for aspiration, please order VFSS.

## 2024-12-09 NOTE — PLAN OF CARE
Goal Outcome Evaluation:  Plan of Care Reviewed With: patient, family        Progress: improving     Pt is a 89 y.o. female with h/o HTN, asthma, chronic knee pain, CVA, a-fib and PE who was admitted to [facility] on 12/7/2024 with c/o AMS, L lean and weakness. Imaging (-). Patient is (I) at baseline, with use of a RW/rollator for short house-hold distances and lives with her dtr and grandchildren in a house w/ a ramped entrance. Pts dtr states she helps w/ shower tsfs, cooking and cleaning only. Today, pt performed bed mobility with MaxA x2 and required Max/DepA x2 w/ RW for one STS. Pt was able to clear the bed, but unable to come to full standing. Pt may benefit from skilled PT services acutely to address their impaired strength, balance and endurance, as well as, improve their level of independence prior to discharge. SBAR w/ RN. Rec SNF upon DC.      Anticipated Discharge Disposition (PT): skilled nursing facility

## 2024-12-09 NOTE — PLAN OF CARE
Goal Outcome Evaluation:  Plan of Care Reviewed With: patient, family        Progress: improving  Outcome Evaluation: 89 y.o. female admitted to [facility] with AMS, Left side weakness.  At baseline, patient lives with daughter/grandchildren at home (ramp) (I) ADLS and functional mobility (Rw). Daughter helps with cooking, cleaning and tub transfers.  Patient resting in bed upon arrival.  Patient able to transition to EOB with Max A x2.  Max/Dep A x2 for STS from EOB and Rw.  Patient was able to clear bed but remained in crouching postion.  Patient returned to bed with Max A x2.  OT would be beneficial to address underlying physical deficits and increase (I) ADLs.  Anticipate patient d/c to SNU for continued progress.  Family in agreement.    Anticipated Discharge Disposition (OT): skilled nursing facility

## 2024-12-10 ENCOUNTER — APPOINTMENT (OUTPATIENT)
Dept: GENERAL RADIOLOGY | Facility: HOSPITAL | Age: 89
DRG: 884 | End: 2024-12-10
Payer: MEDICARE

## 2024-12-10 LAB
ALBUMIN SERPL-MCNC: 2.2 G/DL (ref 3.5–5.2)
ALBUMIN/GLOB SERPL: 0.6 G/DL
ALP SERPL-CCNC: 64 U/L (ref 39–117)
ALT SERPL W P-5'-P-CCNC: 14 U/L (ref 1–33)
ANION GAP SERPL CALCULATED.3IONS-SCNC: 8.3 MMOL/L (ref 5–15)
APPEARANCE CSF: CLEAR
APPEARANCE CSF: CLEAR
AST SERPL-CCNC: 26 U/L (ref 1–32)
BILIRUB SERPL-MCNC: 0.3 MG/DL (ref 0–1.2)
BUN SERPL-MCNC: 15 MG/DL (ref 8–23)
BUN/CREAT SERPL: 16.9 (ref 7–25)
CALCIUM SPEC-SCNC: 8.4 MG/DL (ref 8.6–10.5)
CHLORIDE SERPL-SCNC: 112 MMOL/L (ref 98–107)
CO2 SERPL-SCNC: 24.7 MMOL/L (ref 22–29)
COLOR CSF: COLORLESS
COLOR CSF: COLORLESS
CREAT SERPL-MCNC: 0.89 MG/DL (ref 0.57–1)
DEPRECATED RDW RBC AUTO: 46.8 FL (ref 37–54)
EGFRCR SERPLBLD CKD-EPI 2021: 62.8 ML/MIN/1.73
ERYTHROCYTE [DISTWIDTH] IN BLOOD BY AUTOMATED COUNT: 16.4 % (ref 12.3–15.4)
GLOBULIN UR ELPH-MCNC: 3.6 GM/DL
GLUCOSE BLDC GLUCOMTR-MCNC: 81 MG/DL (ref 70–130)
GLUCOSE CSF-MCNC: 55 MG/DL (ref 40–70)
GLUCOSE SERPL-MCNC: 85 MG/DL (ref 65–99)
HCT VFR BLD AUTO: 30 % (ref 34–46.6)
HGB BLD-MCNC: 9.1 G/DL (ref 12–15.9)
HOLD SPECIMEN: NORMAL
MAGNESIUM SERPL-MCNC: 2 MG/DL (ref 1.6–2.4)
MCH RBC QN AUTO: 23.9 PG (ref 26.6–33)
MCHC RBC AUTO-ENTMCNC: 30.3 G/DL (ref 31.5–35.7)
MCV RBC AUTO: 78.9 FL (ref 79–97)
METHOD: ABNORMAL
METHOD: ABNORMAL
NUC CELL # CSF MANUAL: 1 /MM3 (ref 0–5)
NUC CELL # CSF MANUAL: 2 /MM3 (ref 0–5)
PHOSPHATE SERPL-MCNC: 2.2 MG/DL (ref 2.5–4.5)
PLATELET # BLD AUTO: 199 10*3/MM3 (ref 140–450)
PMV BLD AUTO: 11.2 FL (ref 6–12)
POTASSIUM SERPL-SCNC: 4 MMOL/L (ref 3.5–5.2)
PROT CSF-MCNC: 28 MG/DL (ref 15–45)
PROT SERPL-MCNC: 5.8 G/DL (ref 6–8.5)
RBC # BLD AUTO: 3.8 10*6/MM3 (ref 3.77–5.28)
RBC # CSF MANUAL: 1 /MM3 (ref 0–0)
RBC # CSF MANUAL: 8 /MM3 (ref 0–0)
SODIUM SERPL-SCNC: 145 MMOL/L (ref 136–145)
SPECIMEN VOL CSF: 6 ML
SPECIMEN VOL CSF: 6 ML
TUBE # CSF: 1
TUBE # CSF: 4
WBC NRBC COR # BLD AUTO: 7.29 10*3/MM3 (ref 3.4–10.8)

## 2024-12-10 PROCEDURE — 80053 COMPREHEN METABOLIC PANEL: CPT | Performed by: HOSPITALIST

## 2024-12-10 PROCEDURE — 82948 REAGENT STRIP/BLOOD GLUCOSE: CPT

## 2024-12-10 PROCEDURE — B01B1ZZ FLUOROSCOPY OF SPINAL CORD USING LOW OSMOLAR CONTRAST: ICD-10-PCS | Performed by: RADIOLOGY

## 2024-12-10 PROCEDURE — 89050 BODY FLUID CELL COUNT: CPT | Performed by: PHYSICIAN ASSISTANT

## 2024-12-10 PROCEDURE — 84157 ASSAY OF PROTEIN OTHER: CPT | Performed by: PHYSICIAN ASSISTANT

## 2024-12-10 PROCEDURE — 94799 UNLISTED PULMONARY SVC/PX: CPT

## 2024-12-10 PROCEDURE — 85027 COMPLETE CBC AUTOMATED: CPT | Performed by: HOSPITALIST

## 2024-12-10 PROCEDURE — 94760 N-INVAS EAR/PLS OXIMETRY 1: CPT

## 2024-12-10 PROCEDURE — 94664 DEMO&/EVAL PT USE INHALER: CPT

## 2024-12-10 PROCEDURE — 84100 ASSAY OF PHOSPHORUS: CPT | Performed by: HOSPITALIST

## 2024-12-10 PROCEDURE — 97168 OT RE-EVAL EST PLAN CARE: CPT

## 2024-12-10 PROCEDURE — 82945 GLUCOSE OTHER FLUID: CPT | Performed by: PHYSICIAN ASSISTANT

## 2024-12-10 PROCEDURE — 009U3ZX DRAINAGE OF SPINAL CANAL, PERCUTANEOUS APPROACH, DIAGNOSTIC: ICD-10-PCS | Performed by: RADIOLOGY

## 2024-12-10 PROCEDURE — 29581 APPL MULTLAYER CMPRN SYS LEG: CPT

## 2024-12-10 PROCEDURE — 99232 SBSQ HOSP IP/OBS MODERATE 35: CPT | Performed by: NURSE PRACTITIONER

## 2024-12-10 PROCEDURE — 25010000002 LIDOCAINE 1 % SOLUTION: Performed by: HOSPITALIST

## 2024-12-10 PROCEDURE — 83735 ASSAY OF MAGNESIUM: CPT | Performed by: HOSPITALIST

## 2024-12-10 PROCEDURE — 94761 N-INVAS EAR/PLS OXIMETRY MLT: CPT

## 2024-12-10 RX ORDER — DOCUSATE SODIUM 50 MG/5 ML
100 LIQUID (ML) ORAL 2 TIMES DAILY
Status: DISCONTINUED | OUTPATIENT
Start: 2024-12-10 | End: 2024-12-11

## 2024-12-10 RX ORDER — LIDOCAINE HYDROCHLORIDE 10 MG/ML
10 INJECTION, SOLUTION INFILTRATION; PERINEURAL ONCE
Status: COMPLETED | OUTPATIENT
Start: 2024-12-10 | End: 2024-12-10

## 2024-12-10 RX ORDER — SENNOSIDES 8.8 MG/5ML
10 LIQUID ORAL 2 TIMES DAILY
Status: DISCONTINUED | OUTPATIENT
Start: 2024-12-10 | End: 2024-12-11

## 2024-12-10 RX ADMIN — Medication 2.5 MG: at 19:54

## 2024-12-10 RX ADMIN — SENNOSIDES 10 ML: 8.8 LIQUID ORAL at 16:53

## 2024-12-10 RX ADMIN — SUCRALFATE 1 G: 1 TABLET ORAL at 19:27

## 2024-12-10 RX ADMIN — SUCRALFATE 1 G: 1 TABLET ORAL at 14:09

## 2024-12-10 RX ADMIN — POLYETHYLENE GLYCOL 3350 17 G: 17 POWDER, FOR SOLUTION ORAL at 09:57

## 2024-12-10 RX ADMIN — Medication 1 PACKET: at 14:09

## 2024-12-10 RX ADMIN — BUDESONIDE 0.5 MG: 0.5 INHALANT RESPIRATORY (INHALATION) at 07:15

## 2024-12-10 RX ADMIN — ARFORMOTEROL TARTRATE 15 MCG: 15 SOLUTION RESPIRATORY (INHALATION) at 07:10

## 2024-12-10 RX ADMIN — DIAZEPAM 1 MG: 2 TABLET ORAL at 19:54

## 2024-12-10 RX ADMIN — ARFORMOTEROL TARTRATE 15 MCG: 15 SOLUTION RESPIRATORY (INHALATION) at 20:02

## 2024-12-10 RX ADMIN — BUDESONIDE 0.5 MG: 0.5 INHALANT RESPIRATORY (INHALATION) at 20:02

## 2024-12-10 RX ADMIN — ATORVASTATIN CALCIUM 80 MG: 80 TABLET, FILM COATED ORAL at 19:54

## 2024-12-10 RX ADMIN — DIAZEPAM 1 MG: 2 TABLET ORAL at 09:56

## 2024-12-10 RX ADMIN — LIDOCAINE HYDROCHLORIDE 3.5 ML: 10 INJECTION, SOLUTION INFILTRATION; PERINEURAL at 11:39

## 2024-12-10 RX ADMIN — SUCRALFATE 1 G: 1 TABLET ORAL at 09:56

## 2024-12-10 RX ADMIN — DOCUSATE SODIUM 100 MG: 50 LIQUID ORAL at 16:55

## 2024-12-10 NOTE — PLAN OF CARE
Goal Outcome Evaluation:   Pt seen this date to address OT lymph orders input this date, she reports hx of BLE wrapping by her family. Confusion noted throughout wrapping this date. BLE wrapped and assessed. Once completed, pt reports no pain or discomfort. Skin intact prior to wraps being donned. Will follow Mon-Fri for inpatient lymph, RN aware, continue to follow for both OT lymph and OT consult and tx orders.

## 2024-12-10 NOTE — PLAN OF CARE
Problem: Adult Inpatient Plan of Care  Goal: Absence of Hospital-Acquired Illness or Injury  Intervention: Identify and Manage Fall Risk  Recent Flowsheet Documentation  Taken 12/10/2024 1434 by Aurelia Núñez RN  Safety Promotion/Fall Prevention:   activity supervised   room organization consistent   safety round/check completed   assistive device/personal items within reach   nonskid shoes/slippers when out of bed  Taken 12/10/2024 0900 by Aurelia Núñez RN  Safety Promotion/Fall Prevention:   safety round/check completed   room organization consistent   nonskid shoes/slippers when out of bed   assistive device/personal items within reach   activity supervised     Problem: Adult Inpatient Plan of Care  Goal: Absence of Hospital-Acquired Illness or Injury  Intervention: Prevent Skin Injury  Recent Flowsheet Documentation  Taken 12/10/2024 1434 by Aurelia Núñez RN  Body Position:   turned   right   tilted   lower extremity elevated  Taken 12/10/2024 0900 by Aurelia Núñez RN  Body Position:   turned   right   tilted   lower extremity elevated   Goal Outcome Evaluation:  Plan of Care Reviewed With: patient        Progress: no change  Outcome Evaluation: Patient AO x 2 confused in time and situation VSS, need to crush medication refused to eat poor appetite, given enema this afternoon

## 2024-12-10 NOTE — PROGRESS NOTES
"    Name: Vonnie Coppola ADMIT: 2024   : 1935  PCP: Christopher Leyva DO    MRN: [id number] LOS: 2 days   AGE/SEX: 89 y.o. female  ROOM: [facility]     Subjective   Subjective   Pt awake and alert today. Pain in lower abdomen and mild N are much improved today. Moving bowels but stool is \"loose\" per staff. No F/C/NS. No SOA or CP. No HA or vis changes.       Objective   Objective   Vital Signs  Temp:  [97.3 °F (36.3 °C)-98.2 °F (36.8 °C)] 98.2 °F (36.8 °C)  Heart Rate:  [67-85] 67  Resp:  [16-18] 16  BP: (126-141)/(66-80) 132/76  SpO2:  [98 %-100 %] 98 %  on  Flow (L/min) (Oxygen Therapy):  [2] 2;   Device (Oxygen Therapy): nasal cannula  Body mass index is 31.99 kg/m².  Physical Exam  Vitals and nursing note reviewed.   Constitutional:       General: She is not in acute distress.     Appearance: She is ill-appearing (chronically). She is not toxic-appearing or diaphoretic.   HENT:      Head: Normocephalic.      Mouth/Throat:      Mouth: Mucous membranes are moist.      Pharynx: Oropharynx is clear.   Eyes:      General: No scleral icterus.        Right eye: No discharge.         Left eye: No discharge.      Conjunctiva/sclera: Conjunctivae normal.   Cardiovascular:      Rate and Rhythm: Normal rate and regular rhythm.      Pulses: Normal pulses.   Pulmonary:      Effort: Pulmonary effort is normal. No respiratory distress.      Breath sounds: Normal breath sounds. No wheezing or rales.   Abdominal:      General: There is no distension.      Palpations: Abdomen is soft.      Tenderness: There is no abdominal tenderness.      Comments: Hyperactive BS   Musculoskeletal:         General: No swelling.      Cervical back: Neck supple.   Skin:     General: Skin is warm and dry.      Capillary Refill: Capillary refill takes less than 2 seconds.      Coloration: Skin is not jaundiced.   Neurological:      General: No focal deficit present.      Mental Status: She is alert and oriented to person, place, and " time.      Cranial Nerves: No cranial nerve deficit.      Coordination: Coordination normal.      Comments: Moves all 4 equally   Psychiatric:         Behavior: Behavior normal.      Comments: Flat affect        Results Review     I reviewed the patient's new clinical results.  Results from last 7 days   Lab Units 12/10/24  0348 12/09/24  0345 12/08/24  0745 12/07/24  1508   WBC 10*3/mm3 7.29 7.06 7.21 10.63   HEMOGLOBIN g/dL 9.1* 8.9* 8.4* 10.6*   PLATELETS 10*3/mm3 199 216 185 251     Results from last 7 days   Lab Units 12/10/24  0348 12/09/24 0345 12/08/24  1829 12/08/24  0745 12/07/24  1508   SODIUM mmol/L 145 146*  --  145 145   POTASSIUM mmol/L 4.0 3.9 4.3 3.4* 4.2   CHLORIDE mmol/L 112* 114*  --  113* 109*   CO2 mmol/L 24.7 25.0  --  26.0 26.0   BUN mg/dL 15 17  --  17 18   CREATININE mg/dL 0.89 0.74  --  0.76 0.99   GLUCOSE mg/dL 85 93  --  67 90   EGFR mL/min/1.73 62.8 78.4  --  75.9 55.3*     Results from last 7 days   Lab Units 12/10/24  0348 12/09/24  0345 12/08/24  0745 12/07/24  1508   ALBUMIN g/dL 2.2* 2.7* 2.4* 3.3*   BILIRUBIN mg/dL 0.3 0.4 0.3 0.4   ALK PHOS U/L 64 63 57 78   AST (SGOT) U/L 26 24 20 30   ALT (SGPT) U/L 14 16 13 16     Results from last 7 days   Lab Units 12/10/24  0348 12/09/24  0345 12/08/24  0745 12/07/24  1508   CALCIUM mg/dL 8.4* 8.4* 7.7* 9.1   ALBUMIN g/dL 2.2* 2.7* 2.4* 3.3*   MAGNESIUM mg/dL 2.0 2.1 2.0  --    PHOSPHORUS mg/dL 2.2* 2.2*  --   --        Hemoglobin A1C   Date/Time Value Ref Range Status   12/08/2024 0745 4.80 4.80 - 5.60 % Final     Glucose   Date/Time Value Ref Range Status   12/10/2024 0622 81 70 - 130 mg/dL Final   12/09/2024 2105 97 70 - 130 mg/dL Final   12/09/2024 1646 109 70 - 130 mg/dL Final   12/09/2024 1137 107 70 - 130 mg/dL Final   12/09/2024 0618 98 70 - 130 mg/dL Final   12/08/2024 1617 83 70 - 130 mg/dL Final   12/08/2024 1105 100 70 - 130 mg/dL Final       XR Abdomen KUB    Result Date: 12/9/2024   As described.   This report was finalized  on 12/9/2024 4:16 PM by Dr. Rod Villavicencio M.D on Workstation: [id number]      MRI Brain Without Contrast    Result Date: 12/8/2024  1. No infarction or evidence for acute intracranial abnormality. Mild chronic small vessel ischemic white matter change. 2. Patchy fluid signal bilateral mastoid air cells.  This report was finalized on 12/8/2024 4:44 PM by Archie Ortiz M.D on Workstation: [id number]       I have personally reviewed all medications:  Scheduled Medications  [Held by provider] apixaban, 2.5 mg, Oral, Q12H  arformoterol, 15 mcg, Nebulization, BID - RT  atorvastatin, 80 mg, Oral, Nightly  budesonide, 0.5 mg, Nebulization, BID - RT  diazePAM, 1 mg, Oral, BID  docusate sodium, 100 mg, Oral, BID  polyethylene glycol, 17 g, Oral, Daily  potassium & sodium phosphates, 1 packet, Oral, Once  Senna, 10 mL, Oral, BID  sucralfate, 1 g, Oral, 4x Daily AC & at Bedtime    Infusions  Pharmacy to Dose enoxaparin (LOVENOX),     Diet  Diet: Regular/House; Feeding Assistance - Nursing; Texture: Mechanical Ground (NDD 2); Fluid Consistency: Thin (IDDSI 0)    I have personally reviewed:  [x]  Laboratory   []  Microbiology   [x]  Radiology   [x]  EKG/Telemetry  []  Cardiology/Vascular   []  Pathology    []  Records       Assessment/Plan     Active Hospital Problems    Diagnosis  POA    **Stroke-like symptoms [R29.90]  Yes    Chronic anticoagulation [Z79.01]  Not Applicable    Anxiety disorder [F41.9]  Yes    Benzodiazepine dependence [F13.20]  Yes    Hypokalemia [E87.6]  No    Altered mental status [R41.82]  Yes    History of pulmonary embolism [Z86.711]  Yes    Atrial fibrillation [I48.91]  Yes    History of CVA (cerebrovascular accident) [Z86.73]  Not Applicable    HTN (hypertension) [I10]  Yes    History of breast cancer [Z85.3]  Not Applicable    Asthma [J45.909]  Yes    Anemia [D64.9]  Yes      Resolved Hospital Problems   No resolved problems to display.       89yo woman with AFib (Eliquis), HTN, asthma,  benzodiazepine dependence, chronic anemia, h/o PE, prior CVA, and recent diagnosis of E.coli UTI (taking Cipro), who presented to ER with report of confusion, weakness of BLEs, and leaning to left side. She was admitted for CVA/TIA workup.    She is much more awake and alert today  D/w Neuro and looks most c/w encephalopathy NOS as CVA w/u neg so far  -?hypertensive  -?toxic (Valium)  Could this be in part psychiatric?  EEG not c/w seizure  MRI neg for CVA  TSH wnl, no evidence of infection (recently diagnosed with UTI and was on Cipro but UA here looks fine)  UDS positive only for benzos  Continue Valium at half her home dose for now  Neuro is obtaining an LP today    Replaced K+ with protocol     SLP eval reassuring    Can resume Eliquis now that she's taking PO--on hold today for LP, can restart tomorrow    Na+ high, stopped IVFs, encouraged po water, recheck wnl this AM    Leg wrapped    O2 at baseline (2L/min)    Restarted her Carafate at family request, checked KUB for c/o lower abd pain and pt appeared to have a fecal impaction  Pt having loose stools per RN, suspect overflow, will give enema today to address fecal impaction      Eliquis/SCDs for DVT prophylaxis.  DNR.  Discussed with patient. No family present. D/w RN, CCP, and Pharm at morning huddle.  Anticipate discharge home with HH vs SNU facility, PT and OT recommending SNF at [place].  Expected Discharge Date: 12/12/2024; Expected Discharge Time:       Adán Durbin MD  [facility]  12/10/24  12:33 EST

## 2024-12-10 NOTE — SIGNIFICANT NOTE
PT note: pt off the floor this am for lumbar puncture. Per RN pt on bedrest. PT will check on pt tomorrow.

## 2024-12-10 NOTE — THERAPY RE-EVALUATION
Patient Name: Vonnie Coppola  : 1935    MRN: [id number]                              Today's Date: 12/10/2024       Admit Date: 2024    Visit Dx:     ICD-10-CM ICD-9-CM   1. Altered mental status, unspecified altered mental status type  R41.82 780.97   2. Right arm weakness  R29.898 729.89     Patient Active Problem List   Diagnosis    GI bleed    Anemia    HTN (hypertension)    History of breast cancer    Asthma    History of CVA (cerebrovascular accident)    Dehydration    Renal insufficiency    Pulmonary nodule    Adnexal cyst    Nausea    Atrial fibrillation    History of pulmonary embolism    Class 2 severe obesity with serious comorbidity in adult    Thrombocytopenia    Cellulitis of right leg    Acute cystitis    Leg hematoma, right, subsequent encounter    Obesity (BMI 35.0-39.9 without comorbidity)    Morbid (severe) obesity due to excess calories (*specify comorbidity)    Constipation    UTI (urinary tract infection)    Deep vein thrombosis    Generalized abdominal pain    Altered mental status    Chronic anticoagulation    Anxiety disorder    Benzodiazepine dependence    E. coli UTI (urinary tract infection)    Stroke-like symptoms    Hypokalemia     Past Medical History:   Diagnosis Date    Arthritis     Asthma     Atrial fibrillation     per pt's daughter.States hx of a-fib in the past when stressed or tired.    Blind left eye     Breast cancer     LEFT BREAST CA, LUMPECTOMY & RADS    Deep vein thrombosis     History of cataract     Hx of radiation therapy     APPROXIMATELY 40 TREATMENTS FOR LEFT BREAST CA    Hypertension     Pneumonia     PONV (postoperative nausea and vomiting)     Stroke      Past Surgical History:   Procedure Laterality Date    APPENDECTOMY      BLADDER SURGERY      BREAST BIOPSY Left     MALIGNANT    BREAST LUMPECTOMY Left     MALIGNANT    CATARACT EXTRACTION      COLONOSCOPY N/A 2024    Procedure: COLONOSCOPY to cecum with cold snare  polypectomies;  Surgeon: Harshad Gaston MD;  Location: [facility] ENDOSCOPY;  Service: Gastroenterology;  Laterality: N/A;  pre- gi bleed, anemia  post- diverticulosis, polyps    ENDOSCOPY N/A 01/19/2024    Procedure: ESOPHAGOGASTRODUODENOSCOPY with biospy;  Surgeon: Harshad Gaston MD;  Location: [facility] ENDOSCOPY;  Service: Gastroenterology;  Laterality: N/A;  pre- gi bleed, anemia  post- erosive gastirits    GALLBLADDER SURGERY      HERNIA REPAIR      HYSTERECTOMY      INCISION AND DRAINAGE LEG Right 5/31/2024    Procedure: RIGHT LOWER EXTREMITY WOUND EXPLORATION, DEBRIDEMENT AND CONTROL OF BLEEDING;  Surgeon: Gerald Pedraza MD;  Location: [facility] MAIN OR;  Service: General;  Laterality: Right;    LASIK      LYMPHADENECTOMY      OOPHORECTOMY        General Information    No documentation.                  Lymphedema       Row Name 12/10/24 1500             Subjective Pain    Able to rate subjective pain? yes  -MM      Pre-Treatment Pain Level 0  -MM      Post-Treatment Pain Level 0  -MM      Recorded by [MM] Sharifa Vega OT              Subjective    Subjective Comments pt reports her  and family assist with lymph wraps at home, noted confusion this date with conversation, unsure of accuracy of information provided and carryover. RN aware of lymph wraps donned and frequency OT will continue to follow until d/c, plans for SNF most likely  -MM      Recorded by [MM] Sharifa Vega OT              Lymphedema Sensation    Lymphedema Sensation Reports RLE:;LLE:;normal  -MM      Recorded by [MM] Sharifa Vega OT              Compression/Skin Care    Compression/Skin Care skin care;wrapping location;bandaging;compression garment;remove bandages  -MM      Skin Care washed/dried  -MM      Wrapping Location lower extremity  -MM      Wrapping Location LE bilateral:;foot to knee  -MM      Bandage Layers cotton liner;padding/fluff layer;short-stretch bandages (comment size/quantity)  -MM      Bandaging  Comments TG9 stockinette, 10 and 15 artiflex per leg, RLE x8 and x10 cm, and LLE x2 8cm and x1 10 cm  -MM      Bandaging Technique light compression;moderate compression  -MM      Compression Garment Comments pt reports no pain or discomfort once wraps donned  -MM      Recorded by [MM] Sharifa Vega OT                User Key  (r) = Recorded By, (t) = Taken By, (c) = Cosigned By      Initials Name Effective Dates    MM Sharifa Vega OT 05/31/24 -                    Mobility/ADL's    No documentation.                  Obj/Interventions    No documentation.                  Goals/Plan    No documentation.                  Clinical Impression    No documentation.                  Outcome Measures       Row Name 12/10/24 1434 12/10/24 0900       How much help from another person do you currently need...    Turning from your back to your side while in flat bed without using bedrails? 2  -MM 2  -MM    Moving from lying on back to sitting on the side of a flat bed without bedrails? 2  -MM 2  -MM    Moving to and from a bed to a chair (including a wheelchair)? 1  -MM 2  -MM    Standing up from a chair using your arms (e.g., wheelchair, bedside chair)? 2  -MM 2  -MM    Climbing 3-5 steps with a railing? 1  -MM 1  -MM    To walk in hospital room? 1  -MM 1  -MM    AM-PAC 6 Clicks Score (PT) 9  -MM 10  -MM              User Key  (r) = Recorded By, (t) = Taken By, (c) = Cosigned By      Initials Name Provider Type    Aurelia Yang, RN Registered Nurse                    Occupational Therapy Education       Title: PT OT SLP Therapies (In Progress)       Topic: Occupational Therapy (Done)       Point: ADL training (Done)       Description:   Instruct learner(s) on proper safety adaptation and remediation techniques during self care or transfers.   Instruct in proper use of assistive devices.                  Learning Progress Summary            Patient JESSICA Trevino, VU by  at 12/9/2024 1403    Comment: Role of OT                       Point: Home exercise program (Done)       Description:   Instruct learner(s) on appropriate technique for monitoring, assisting and/or progressing therapeutic exercises/activities.                  Learning Progress Summary            Patient JESSICA Trevino PHUC by  at 12/9/2024 1358    Comment: Role of OT                      Point: Precautions (Done)       Description:   Instruct learner(s) on prescribed precautions during self-care and functional transfers.                  Learning Progress Summary            Patient JESSICA Trevino VU by  at 12/9/2024 1358    Comment: Role of OT                      Point: Body mechanics (Done)       Description:   Instruct learner(s) on proper positioning and spine alignment during self-care, functional mobility activities and/or exercises.                  Learning Progress Summary            Patient JESSICA Trevino PHUC by  at 12/9/2024 1358    Comment: Role of OT                                      User Key       Initials Effective Dates Name Provider Type Discipline     07/24/24 -  Adán Stephenson OT Occupational Therapist OT                  OT Recommendation and Plan           Time Calculation:         Time Calculation- OT       Row Name 12/10/24 1518             Time Calculation- OT    OT Start Time 1420  -MM      OT Stop Time 1451  -MM      OT Time Calculation (min) 31 min  -MM      Total Timed Code Minutes- OT 23 minute(s)  -MM      OT Received On 12/10/24  -MM      OT - Next Appointment 12/11/24  -MM                User Key  (r) = Recorded By, (t) = Taken By, (c) = Cosigned By      Initials Name Provider Type    Sharifa Mcdonald OT Occupational Therapist                  Therapy Charges for Today       Code Description Service Date Service Provider Modifiers Qty    23441050354 HC OT MULTI LAYER COMP SYS BELOW KNEE BILATERAL 12/10/2024 Sharifa Vega OT  2    [id number] HC OT RE-EVAL 2 12/10/2024 Sharifa Vega OT GO 1                 Sharifa Vega OT  12/10/2024

## 2024-12-10 NOTE — PROGRESS NOTES
"DOS: 12/10/2024  NAME: Vonnie Coppola   : 1935  PCP: Christopher Leyva DO  Chief Complaint   Patient presents with    Altered Mental Status    Weakness - Generalized     Stroke    Subjective: No family at bedside. Patient alert but confused. A little more conversant today. Pt seen in follow up today, however the problem is new to the examiner.      Objective:  Vital signs: /76 (BP Location: Right arm, Patient Position: Lying)   Pulse 67   Temp 98.2 °F (36.8 °C) (Oral)   Resp 16   Ht 165.1 cm (65\")   Wt 87.2 kg (192 lb 3.9 oz)   SpO2 98%   BMI 31.99 kg/m²       Gen: NAD, V/S reviewed  HEENT: Normocephalic, atraumatic   COR: RRR  Resp: Even and unlabored, clear to auscultation bilaterally  Extremities: No edema    Neurological:   MS: AO to self and month.  Recent/remote memory impaired, following multiple simple commands but not complex commands.  No neglect.  CN: Visual fields intact to blink to threat bilaterally, PERRL, extraocular movements intact, normal facial sensation, no facial droop, tongue midline, no dysarthria  Motor: Strength approximately 4 out of 5 in upper extremities, briefly antigravity in bilateral lower extremities, limited range of motion in shoulders bilaterally, no abnormal movements, bilateral upper extremity postural tremor seen but no obvious asterixis  Sensory: Intact to LT x4  Coordination: No dysmetria from FTN    Scheduled Meds:[Held by provider] apixaban, 2.5 mg, Oral, Q12H  arformoterol, 15 mcg, Nebulization, BID - RT  atorvastatin, 80 mg, Oral, Nightly  budesonide, 0.5 mg, Nebulization, BID - RT  diazePAM, 1 mg, Oral, BID  polyethylene glycol, 17 g, Oral, Daily  potassium & sodium phosphates, 1 packet, Oral, Once  senna-docusate sodium, 2 tablet, Oral, BID  sucralfate, 1 g, Oral, 4x Daily AC & at Bedtime      Continuous Infusions:Pharmacy to Dose enoxaparin (LOVENOX),       PRN Meds:.  acetaminophen **OR** acetaminophen **OR** acetaminophen    acetaminophen " **OR** acetaminophen    albuterol    melatonin    ondansetron    Pharmacy to Dose enoxaparin (LOVENOX)    Polyvinyl Alcohol-Povidone PF    Potassium Replacement - Follow Nurse / BPA Driven Protocol    sodium chloride      Laboratory results:  Lab Results   Component Value Date    GLUCOSE 85 12/10/2024    CALCIUM 8.4 (L) 12/10/2024     12/10/2024    K 4.0 12/10/2024    CO2 24.7 12/10/2024     (H) 12/10/2024    BUN 15 12/10/2024    CREATININE 0.89 12/10/2024    BCR 16.9 12/10/2024    ANIONGAP 8.3 12/10/2024     Lab Results   Component Value Date    WBC 7.29 12/10/2024    HGB 9.1 (L) 12/10/2024    HCT 30.0 (L) 12/10/2024    MCV 78.9 (L) 12/10/2024     12/10/2024     Lab Results   Component Value Date    CHOL 100 12/08/2024     Lab Results   Component Value Date    HDL 40 12/08/2024     Lab Results   Component Value Date    LDL 46 12/08/2024     Lab Results   Component Value Date    TRIG 65 12/08/2024         Lab 12/08/24  0745   HEMOGLOBIN A1C 4.80   ECG: afib  Review and interpretation of imaging:  IR LUMBAR PUNCTURE DIAGNOSTIC    Result Date: 12/10/2024   Exam: FLUOROSCOPICALLY GUIDED DIAGNOSTIC LUMBAR PUNCTURE:  DATE: 12/10/2024 11:12 AM  HISTORY: AMS; R41.82-Altered mental status, unspecified; R29.898-Other symptoms and signs involving the musculoskeletal system   TECHNIQUE: Informed consent was obtained. The lower back was prepped with betadine and draped in the usual sterile fashion. Pre-cutaneous handwashing and sterile gloves were worn. 1% Lidocaine solution was used for local anesthesia. Using fluoroscopic guidance, a 22-gauge spinal needle was advanced into the thecal sac at L3.  *  Dose Area Product: 59.25 uGym2  FINDINGS: *  Hard copy fluoroscopic image shows good position of the needle in the thecal sac. *  Procedure and pressures were obtained with the patient in the prone position (needle length accounted for during pressure measurements). *  12 mL of clear CSF fluid was sent for  routine laboratory assessment. *  Opening pressure: 18 cm H2O *  Closing pressure: Less than 12 cm H2O      Successful fluoroscopically guided diagnostic lumbar puncture.   Procedure performed by CHAPO Lopez. By electronically signing this report, I, the supervising radiologist, attest that I was not present for the procedure(s) but agree with the final edited report.      XR Abdomen KUB    Result Date: 12/9/2024  XR ABDOMEN KUB-  INDICATIONS: Abdominal pain  TECHNIQUE: Supine views of the abdomen  COMPARISON: 1/18/2024  FINDINGS:   The bowel gas pattern is nonspecific, with gas seen in small bowel and in the colon. Some loops of small bowel show borderline caliber. Much stool in the rectum is noted, suggesting rectal stool impaction, correlate clinically. No supine evidence for free intraperitoneal gas. Arterial calcifications are apparent. Follow-up as indications persist. If there is further clinical concern, CT can be obtained for further evaluation.       As described.   This report was finalized on 12/9/2024 4:16 PM by Dr. Rod Villavicencio M.D on Workstation: [email or web address]      EEG    Result Date: 12/8/2024  Table formatting from the original result was not included. EEG Report             # Indication: Altered mental status History: 89-year-old with altered mental status Medical diagnoses: Breast cancer, stroke, pneumonia, hypertension, atrial fibrillation, DVT Current Facility-Administered Medications Medication Dose Route Frequency Provider Last Rate Last Admin  acetaminophen (TYLENOL) tablet 650 mg  650 mg Oral Q4H PRN Maryuri Carranza MD      Or  acetaminophen (TYLENOL) 160 MG/5ML oral solution 650 mg  650 mg Oral Q4H PRN Maryuri Carranza MD      Or  acetaminophen (TYLENOL) suppository 650 mg  650 mg Rectal Q4H PRN Maryuri Carranza MD      acetaminophen (TYLENOL) tablet 650 mg  650 mg Oral Q4H PRN Maryuri Carranza MD      Or  acetaminophen (TYLENOL) suppository 650 mg  650  mg Rectal Q4H PRN Maryuri Carranza MD      albuterol (ACCUNEB) nebulizer solution 0.63 mg  0.63 mg Nebulization Q6H PRN Maryuri Carranza MD      [Held by provider] apixaban (ELIQUIS) tablet 2.5 mg  2.5 mg Oral Q12H Maryuri Carranza MD      arformoterol (BROVANA) nebulizer solution 15 mcg  15 mcg Nebulization BID - RT Maryuri Carranza MD   15 mcg at 12/08/24 0743  aspirin tablet 325 mg  325 mg Oral Daily Maryuri Carranza MD      Or  aspirin suppository 300 mg  300 mg Rectal Daily Maryuri Carranza MD   300 mg at 12/08/24 1221  atorvastatin (LIPITOR) tablet 80 mg  80 mg Oral Nightly Maryuri Carranza MD      sennosides-docusate (PERICOLACE) 8.6-50 MG per tablet 2 tablet  2 tablet Oral BID PRN Tere, Maryuri Nathan MD      And  polyethylene glycol (MIRALAX) packet 17 g  17 g Oral Daily PRN Tere, Maryuri Nathan MD      And  bisacodyl (DULCOLAX) EC tablet 5 mg  5 mg Oral Daily PRN Maryuri Carranza MD      And  bisacodyl (DULCOLAX) suppository 10 mg  10 mg Rectal Daily PRN Tere, Maryuri Nathan MD      budesonide (PULMICORT) nebulizer solution 0.5 mg  0.5 mg Nebulization BID - RT Maryuri Carranza MD   0.5 mg at 12/08/24 0741  dextrose 5 % and sodium chloride 0.9 % with KCl 20 mEq/L infusion  75 mL/hr Intravenous Continuous Adán Durbin MD 75 mL/hr at 12/08/24 0853 75 mL/hr at 12/08/24 0853  diazePAM (VALIUM) tablet 1 mg  1 mg Oral BID Adán Durbin MD      Enoxaparin Sodium (LOVENOX) syringe 90 mg  1 mg/kg Subcutaneous Q12H Adán Durbin MD   90 mg at 12/08/24 1756  melatonin tablet 2.5 mg  2.5 mg Oral Nightly PRN Tere, Maryuri Nathan MD      ondansetron (ZOFRAN) injection 4 mg  4 mg Intravenous Q6H PRN Maryuri Carranza MD      Pharmacy to Dose enoxaparin (LOVENOX)   Not Applicable Continuous PRN Adán Durbin MD      Potassium Replacement - Follow Nurse / BPA Driven Protocol   Not Applicable PRN Adán Durbin MD      sodium chloride 0.9 % flush 10  mL  10 mL Intravenous PRN Lui Hardwick MD      sodium chloride 0.9 % infusion  75 mL/hr Intravenous Continuous Stingevi, Maryuri Nathan MD 75 mL/hr at 12/07/24 2315 75 mL/hr at 12/07/24 2315 Time of study: 26 minutes 16 seconds Technical summary: The 10-20 system was used for electrode placement  Background: The background rhythm was composed of 6 Hz moderate amplitude poorly regulated and sustained posteriorly dominant rhythm.  There are slower activity seen interspersed.  Sleep: The patient became drowsy as noted by attenuation of the alpha rhythm and an increased proportion of slower activities were seen.  Vertex sharp transients and sleep spindles were seen  Hyperventilation: Not obtained  Photic stimulation: Photic stimulation was performed at various flash frequencies showing no significant change in the background activity  EKG: Sinus rhythm in the 60s  Video: No unusual involuntary movements were seen on the video clips reviewed Impression: Abnormal EEG being moderately diffusely slow during wakefulness and stage II sleep consistent with a moderate diffuse encephalopathy versus bihemispheric structural lesions.  No epileptiform activities were seen Dictated utilizing Dragon dictation.      MRI Brain Without Contrast    Result Date: 12/8/2024  MRI BRAIN WITHOUT CONTRAST  HISTORY: Altered mental status. Weakness.  TECHNIQUE: Brain MRI includes sagittal T1 as well as axial diffusion, FLAIR, T1, T2, gradient echo sequences.  COMPARISON: CT angiogram head and neck 12/07/2024 at 2:43 p.m., CT head without contrast 03/14/2024. No previous MRI for comparison.  FINDINGS: There are no abnormal foci of restricted diffusion to diagnose an acute infarction. There is mild confluent periventricular white matter FLAIR hyperintense signal, as well as minimal deep cerebral white matter FLAIR hyperintense signal. This is nonspecific, though is consistent with chronic small vessel ischemic white matter change. Evaluation of  detail is limited on this exam by motion related artifact. Per the technologist, these were the best images possible due to patient condition and fast protocol was run which decreases signal-to-noise ratio.  No extra-axial fluid collection is demonstrated. Ventricles appear within normal limits for size and configuration. Major intracranial flow voids appear within normal limits.  There is patchy fluid signal within bilateral mastoid air cells.      1. No infarction or evidence for acute intracranial abnormality. Mild chronic small vessel ischemic white matter change. 2. Patchy fluid signal bilateral mastoid air cells.  This report was finalized on 12/8/2024 4:44 PM by Archie Ortiz M.D on Workstation: [id number]      XR Chest 1 View    Result Date: 12/7/2024  XR CHEST 1 VW-  HISTORY: 89-year-old female with acute stroke protocol.  FINDINGS: There is central pulmonary vascular congestion, but there is no evidence for CHF. No focal airspace consolidations are seen.  This report was finalized on 12/7/2024 3:34 PM by Dr. Samanta Lucas M.D on Workstation: [id number]      CT Angiogram Head w AI Analysis of LVO    Result Date: 12/7/2024  CT ANGIOGRAM HEAD W AI ANALYSIS OF LVO-, CT ANGIOGRAM NECK-, CT CEREBRAL PERFUSION W WO CONTRAST-  INDICATION: Stroke  COMPARISON: CT head March 14, 2024  TECHNIQUE: CTA head and neck with IV contrast. Noncontrast head CT. CT perfusion with rapid analysis. Coronal and sagittal reformats. Three dimensional reconstructions. Radiation dose reduction techniques were utilized, including automated exposure control and exposure modulation based on body size.  FINDINGS:  Noncontrast head CT: No intraparenchymal hemorrhage. Preserved gray-white differentiation. No mass effect or midline shift. Chronic small vessel ischemic changes. No hydrocephalus. No intraventricular hemorrhage. No extra-axial hemorrhage or fluid collection. Bilateral cataract extractions. No fracture. Hyperostosis  frontalis interna. Opacification of the left mastoid air cells and partial opacification of the left middle ear. Mucosal thickening in the ethmoid air cells. Mucosal thickening in the maxillary sinuses.  CTA NECK: Right common and internal carotid artery are patent, without stenosis. Tortuous right internal carotid artery. Left common and internal carotid artery are patent, without stenosis. Tortuous left common and internal carotid artery. Dominant left vertebral artery. Bilateral vertebral arteries are patent, without stenosis.  CTA HEAD: Small amount of calcific atherosclerotic disease in the right cavernous internal carotid artery, without stenosis. Right internal carotid, middle cerebral and anterior cerebral arteries appear patent, without stenoses. Left internal carotid, middle cerebral and anterior cerebral arteries appear patent, without stenosis. Dominant left vertebral artery. Bilateral vertebral arteries, left posterior inferior cerebellar artery, basilar artery, superior cerebellar arteries and posterior cerebral arteries appear patent, without stenoses seen. Right posterior inferior cerebellar artery not identified. No aneurysm identified. Dural sinuses appear patent.  CT perfusion: CBF less than 30%: 0 mL. Tmax greater than 6 seconds: 0 mL. Mismatch volume: 0 mL. Tmax greater than 4 seconds: 96 mL, seen in the bilateral occipital lobes, right medial temporal lobe, posterior parietal lobes, right superior parietal lobe and right centrum semiovale, may be artifactual, with motion present.  Other: Small amount of secretions seen in the trachea. Right glenohumeral osteoarthritis.       1. No acute intracranial process. 2. No core infarct or ischemia seen on CT perfusion. 3. No large vessel occlusion, aneurysm or dissection identified. 4. Opacification of left mastoid air cells and partial opacification of the left middle ear. Finding can be correlated for otomastoiditis.  Call Report: Dr. Reddy was  notified by telephone of the above findings on December 7, 2024 at 3:20 p.m.      This report was finalized on 12/7/2024 3:23 PM by Dr. Godfrey Joseph M.D on Workstation: [id number]      CT Angiogram Neck    Result Date: 12/7/2024  CT ANGIOGRAM HEAD W AI ANALYSIS OF LVO-, CT ANGIOGRAM NECK-, CT CEREBRAL PERFUSION W WO CONTRAST-  INDICATION: Stroke  COMPARISON: CT head March 14, 2024  TECHNIQUE: CTA head and neck with IV contrast. Noncontrast head CT. CT perfusion with rapid analysis. Coronal and sagittal reformats. Three dimensional reconstructions. Radiation dose reduction techniques were utilized, including automated exposure control and exposure modulation based on body size.  FINDINGS:  Noncontrast head CT: No intraparenchymal hemorrhage. Preserved gray-white differentiation. No mass effect or midline shift. Chronic small vessel ischemic changes. No hydrocephalus. No intraventricular hemorrhage. No extra-axial hemorrhage or fluid collection. Bilateral cataract extractions. No fracture. Hyperostosis frontalis interna. Opacification of the left mastoid air cells and partial opacification of the left middle ear. Mucosal thickening in the ethmoid air cells. Mucosal thickening in the maxillary sinuses.  CTA NECK: Right common and internal carotid artery are patent, without stenosis. Tortuous right internal carotid artery. Left common and internal carotid artery are patent, without stenosis. Tortuous left common and internal carotid artery. Dominant left vertebral artery. Bilateral vertebral arteries are patent, without stenosis.  CTA HEAD: Small amount of calcific atherosclerotic disease in the right cavernous internal carotid artery, without stenosis. Right internal carotid, middle cerebral and anterior cerebral arteries appear patent, without stenoses. Left internal carotid, middle cerebral and anterior cerebral arteries appear patent, without stenosis. Dominant left vertebral artery. Bilateral vertebral  arteries, left posterior inferior cerebellar artery, basilar artery, superior cerebellar arteries and posterior cerebral arteries appear patent, without stenoses seen. Right posterior inferior cerebellar artery not identified. No aneurysm identified. Dural sinuses appear patent.  CT perfusion: CBF less than 30%: 0 mL. Tmax greater than 6 seconds: 0 mL. Mismatch volume: 0 mL. Tmax greater than 4 seconds: 96 mL, seen in the bilateral occipital lobes, right medial temporal lobe, posterior parietal lobes, right superior parietal lobe and right centrum semiovale, may be artifactual, with motion present.  Other: Small amount of secretions seen in the trachea. Right glenohumeral osteoarthritis.       1. No acute intracranial process. 2. No core infarct or ischemia seen on CT perfusion. 3. No large vessel occlusion, aneurysm or dissection identified. 4. Opacification of left mastoid air cells and partial opacification of the left middle ear. Finding can be correlated for otomastoiditis.  Call Report: Dr. Reddy was notified by telephone of the above findings on December 7, 2024 at 3:20 p.m.      This report was finalized on 12/7/2024 3:23 PM by Dr. Godfrey Joseph M.D on Workstation: [id number]      CT CEREBRAL PERFUSION WITH & WITHOUT CONTRAST    Result Date: 12/7/2024  CT ANGIOGRAM HEAD W AI ANALYSIS OF LVO-, CT ANGIOGRAM NECK-, CT CEREBRAL PERFUSION W WO CONTRAST-  INDICATION: Stroke  COMPARISON: CT head March 14, 2024  TECHNIQUE: CTA head and neck with IV contrast. Noncontrast head CT. CT perfusion with rapid analysis. Coronal and sagittal reformats. Three dimensional reconstructions. Radiation dose reduction techniques were utilized, including automated exposure control and exposure modulation based on body size.  FINDINGS:  Noncontrast head CT: No intraparenchymal hemorrhage. Preserved gray-white differentiation. No mass effect or midline shift. Chronic small vessel ischemic changes. No hydrocephalus. No  intraventricular hemorrhage. No extra-axial hemorrhage or fluid collection. Bilateral cataract extractions. No fracture. Hyperostosis frontalis interna. Opacification of the left mastoid air cells and partial opacification of the left middle ear. Mucosal thickening in the ethmoid air cells. Mucosal thickening in the maxillary sinuses.  CTA NECK: Right common and internal carotid artery are patent, without stenosis. Tortuous right internal carotid artery. Left common and internal carotid artery are patent, without stenosis. Tortuous left common and internal carotid artery. Dominant left vertebral artery. Bilateral vertebral arteries are patent, without stenosis.  CTA HEAD: Small amount of calcific atherosclerotic disease in the right cavernous internal carotid artery, without stenosis. Right internal carotid, middle cerebral and anterior cerebral arteries appear patent, without stenoses. Left internal carotid, middle cerebral and anterior cerebral arteries appear patent, without stenosis. Dominant left vertebral artery. Bilateral vertebral arteries, left posterior inferior cerebellar artery, basilar artery, superior cerebellar arteries and posterior cerebral arteries appear patent, without stenoses seen. Right posterior inferior cerebellar artery not identified. No aneurysm identified. Dural sinuses appear patent.  CT perfusion: CBF less than 30%: 0 mL. Tmax greater than 6 seconds: 0 mL. Mismatch volume: 0 mL. Tmax greater than 4 seconds: 96 mL, seen in the bilateral occipital lobes, right medial temporal lobe, posterior parietal lobes, right superior parietal lobe and right centrum semiovale, may be artifactual, with motion present.  Other: Small amount of secretions seen in the trachea. Right glenohumeral osteoarthritis.       1. No acute intracranial process. 2. No core infarct or ischemia seen on CT perfusion. 3. No large vessel occlusion, aneurysm or dissection identified. 4. Opacification of left mastoid air  cells and partial opacification of the left middle ear. Finding can be correlated for otomastoiditis.  Call Report: Dr. Reddy was notified by telephone of the above findings on December 7, 2024 at 3:20 p.m.      This report was finalized on 12/7/2024 3:23 PM by Dr. 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  She has had multiple admissions over the last 6 months for generalized weakness, abdominal pain, and UTI with most recent admission in September 2024.    Patient has been improving some.  At baseline she walks with a walker secondary to arthritis in her knees, has had health issues since January but no dementia though daughter notes some change in recall over the last 2 to 3 months.  Daughter feels patient would be more independent if not for her arthritis.  Patient on hydrocodone and Valium at home, has not been taking oxycodone in months.    CTA head/neck/CTP unremarkable. MRI brain negative for acute findings, showed mild chronic small vessel disease. EEG showed moderate diffuse slowing.  TSH 1.46, LDL 46, hemoglobin A1c 4.8%. UDS + valium.    Diagnoses:  Subacute encephalopathy, etiology not entirely clear, patient improving  A-fib on anticoagulation  Recurrent UTIs    Plan:  F/U LP- planned for today  Primary team giving home Valium at lower dose  Recommend delirium precautions  D/W Dr Powell today. Will follow.

## 2024-12-11 LAB
ALBUMIN SERPL-MCNC: 2.4 G/DL (ref 3.5–5.2)
ALBUMIN/GLOB SERPL: 0.9 G/DL
ALP SERPL-CCNC: 59 U/L (ref 39–117)
ALT SERPL W P-5'-P-CCNC: 17 U/L (ref 1–33)
ANION GAP SERPL CALCULATED.3IONS-SCNC: 9 MMOL/L (ref 5–15)
AST SERPL-CCNC: 22 U/L (ref 1–32)
BILIRUB SERPL-MCNC: 0.4 MG/DL (ref 0–1.2)
BUN SERPL-MCNC: 16 MG/DL (ref 8–23)
BUN/CREAT SERPL: 22.2 (ref 7–25)
CALCIUM SPEC-SCNC: 8.4 MG/DL (ref 8.6–10.5)
CHLORIDE SERPL-SCNC: 112 MMOL/L (ref 98–107)
CO2 SERPL-SCNC: 23 MMOL/L (ref 22–29)
CREAT SERPL-MCNC: 0.72 MG/DL (ref 0.57–1)
DEPRECATED RDW RBC AUTO: 46.1 FL (ref 37–54)
EGFRCR SERPLBLD CKD-EPI 2021: 81 ML/MIN/1.73
ERYTHROCYTE [DISTWIDTH] IN BLOOD BY AUTOMATED COUNT: 16.3 % (ref 12.3–15.4)
GLOBULIN UR ELPH-MCNC: 2.8 GM/DL
GLUCOSE BLDC GLUCOMTR-MCNC: 70 MG/DL (ref 70–130)
GLUCOSE BLDC GLUCOMTR-MCNC: 83 MG/DL (ref 70–130)
GLUCOSE BLDC GLUCOMTR-MCNC: 85 MG/DL (ref 70–130)
GLUCOSE SERPL-MCNC: 101 MG/DL (ref 65–99)
HCT VFR BLD AUTO: 28.2 % (ref 34–46.6)
HGB BLD-MCNC: 8.6 G/DL (ref 12–15.9)
MAGNESIUM SERPL-MCNC: 2.1 MG/DL (ref 1.6–2.4)
MCH RBC QN AUTO: 24.2 PG (ref 26.6–33)
MCHC RBC AUTO-ENTMCNC: 30.5 G/DL (ref 31.5–35.7)
MCV RBC AUTO: 79.2 FL (ref 79–97)
PHOSPHATE SERPL-MCNC: 2.7 MG/DL (ref 2.5–4.5)
PLATELET # BLD AUTO: 184 10*3/MM3 (ref 140–450)
PMV BLD AUTO: 10.8 FL (ref 6–12)
POTASSIUM SERPL-SCNC: 4.1 MMOL/L (ref 3.5–5.2)
PROT SERPL-MCNC: 5.2 G/DL (ref 6–8.5)
RBC # BLD AUTO: 3.56 10*6/MM3 (ref 3.77–5.28)
SODIUM SERPL-SCNC: 144 MMOL/L (ref 136–145)
WBC NRBC COR # BLD AUTO: 6.72 10*3/MM3 (ref 3.4–10.8)

## 2024-12-11 PROCEDURE — 94761 N-INVAS EAR/PLS OXIMETRY MLT: CPT

## 2024-12-11 PROCEDURE — 94664 DEMO&/EVAL PT USE INHALER: CPT

## 2024-12-11 PROCEDURE — 99232 SBSQ HOSP IP/OBS MODERATE 35: CPT | Performed by: NURSE PRACTITIONER

## 2024-12-11 PROCEDURE — 84100 ASSAY OF PHOSPHORUS: CPT | Performed by: HOSPITALIST

## 2024-12-11 PROCEDURE — 80053 COMPREHEN METABOLIC PANEL: CPT | Performed by: HOSPITALIST

## 2024-12-11 PROCEDURE — 83735 ASSAY OF MAGNESIUM: CPT | Performed by: HOSPITALIST

## 2024-12-11 PROCEDURE — 85027 COMPLETE CBC AUTOMATED: CPT | Performed by: HOSPITALIST

## 2024-12-11 PROCEDURE — 82948 REAGENT STRIP/BLOOD GLUCOSE: CPT

## 2024-12-11 PROCEDURE — 94799 UNLISTED PULMONARY SVC/PX: CPT

## 2024-12-11 PROCEDURE — 94760 N-INVAS EAR/PLS OXIMETRY 1: CPT

## 2024-12-11 PROCEDURE — 97530 THERAPEUTIC ACTIVITIES: CPT

## 2024-12-11 PROCEDURE — 97110 THERAPEUTIC EXERCISES: CPT

## 2024-12-11 RX ORDER — AMOXICILLIN 250 MG
2 CAPSULE ORAL 2 TIMES DAILY
Status: DISCONTINUED | OUTPATIENT
Start: 2024-12-11 | End: 2024-12-14 | Stop reason: HOSPADM

## 2024-12-11 RX ADMIN — SENNOSIDES 10 ML: 8.8 LIQUID ORAL at 09:01

## 2024-12-11 RX ADMIN — BUDESONIDE 0.5 MG: 0.5 INHALANT RESPIRATORY (INHALATION) at 09:37

## 2024-12-11 RX ADMIN — ARFORMOTEROL TARTRATE 15 MCG: 15 SOLUTION RESPIRATORY (INHALATION) at 19:47

## 2024-12-11 RX ADMIN — SENNOSIDES AND DOCUSATE SODIUM 2 TABLET: 50; 8.6 TABLET ORAL at 22:31

## 2024-12-11 RX ADMIN — DIAZEPAM 1 MG: 2 TABLET ORAL at 22:31

## 2024-12-11 RX ADMIN — ATORVASTATIN CALCIUM 80 MG: 80 TABLET, FILM COATED ORAL at 22:31

## 2024-12-11 RX ADMIN — DIAZEPAM 1 MG: 2 TABLET ORAL at 09:01

## 2024-12-11 RX ADMIN — APIXABAN 2.5 MG: 2.5 TABLET, FILM COATED ORAL at 22:31

## 2024-12-11 RX ADMIN — DOCUSATE SODIUM 100 MG: 50 LIQUID ORAL at 09:01

## 2024-12-11 RX ADMIN — ARFORMOTEROL TARTRATE 15 MCG: 15 SOLUTION RESPIRATORY (INHALATION) at 09:35

## 2024-12-11 RX ADMIN — BUDESONIDE 0.5 MG: 0.5 INHALANT RESPIRATORY (INHALATION) at 19:46

## 2024-12-11 RX ADMIN — SUCRALFATE 1 G: 1 TABLET ORAL at 09:01

## 2024-12-11 NOTE — PLAN OF CARE
Goal Outcome Evaluation:  Plan of Care Reviewed With: patient        Progress: no change  Outcome Evaluation: Patient seen for OT/Pt co-treat this am.  Patient in bed upon arrival.  Patient agreeable to tx.  BLEs lymphedema wraps intact and functional. No drainage/wet/soiled areas noted.  Patient states the wraps feel ok.  Patient transitioned to EOB with Max A x2.  Once stabilized, patient required CGA/SBA for sitting balance.  Patient performed BUE AROM for all functional planes and patterns.  Patient able to hold OJ container and take a drink with CGA.  Patient returned to bed with Max A x2.  Cont OT as tolerated.    Anticipated Discharge Disposition (OT): skilled nursing facility

## 2024-12-11 NOTE — PROGRESS NOTES
"DOS: 2024  NAME: Vonnie Coppola   : 1935  PCP: Christopher Leyva DO  Chief Complaint   Patient presents with    Altered Mental Status    Weakness - Generalized     Stroke    Subjective: Mental status continuing to improve. Patient hard of hearing. Complaining of bilateral knee pain.     Objective:  Vital signs: /64   Pulse 61   Temp 97.3 °F (36.3 °C) (Oral)   Resp 18   Ht 165.1 cm (65\")   Wt 89 kg (196 lb 3.4 oz)   SpO2 100%   BMI 32.65 kg/m²       Gen: NAD, v/s reviewed  HEENT: Normocephalic, atraumatic   COR: RRR  Resp: Even and unlabored, clear to auscultation bilaterally  Extremities: Knee high ace bandage wraps in place bilaterally     Neurological:   MS: AO to self, Mos[place], and month/year but not exact date.  Recent/remote memory impaired, following following 2 step commands. Language intact. No neglect.  CN: Visual fields intact, PERRL, extraocular movements intact, normal facial sensation, no facial droop, [place], tongue midline, no dysarthria  Motor: Strength approximately 4 out of 5 in upper extremities, limited range of motion in shoulders bilaterally, briefly antigravity in bilateral lower extremities (limited by pain),  no abnormal movements, no asterixis  Sensory: Intact to LT x4  Coordination: No dysmetria from FTN  The patient was reexamined, changes noted.     Scheduled Meds:apixaban, 2.5 mg, Oral, Q12H  arformoterol, 15 mcg, Nebulization, BID - RT  atorvastatin, 80 mg, Oral, Nightly  budesonide, 0.5 mg, Nebulization, BID - RT  diazePAM, 1 mg, Oral, BID  polyethylene glycol, 17 g, Oral, Daily  senna-docusate sodium, 2 tablet, Oral, BID      Continuous Infusions:   PRN Meds:.  acetaminophen **OR** acetaminophen **OR** acetaminophen    acetaminophen **OR** acetaminophen    albuterol    melatonin    ondansetron    Polyvinyl Alcohol-Povidone PF    Potassium Replacement - Follow Nurse / BPA Driven Protocol    sodium chloride    Laboratory results:  Lab Results   Component " Value Date    GLUCOSE 101 (H) 12/11/2024    CALCIUM 8.4 (L) 12/11/2024     12/11/2024    K 4.1 12/11/2024    CO2 23.0 12/11/2024     (H) 12/11/2024    BUN 16 12/11/2024    CREATININE 0.72 12/11/2024    BCR 22.2 12/11/2024    ANIONGAP 9.0 12/11/2024     Lab Results   Component Value Date    WBC 6.72 12/11/2024    HGB 8.6 (L) 12/11/2024    HCT 28.2 (L) 12/11/2024    MCV 79.2 12/11/2024     12/11/2024     Lab Results   Component Value Date    CHOL 100 12/08/2024     Lab Results   Component Value Date    HDL 40 12/08/2024     Lab Results   Component Value Date    LDL 46 12/08/2024     Lab Results   Component Value Date    TRIG 65 12/08/2024         Lab 12/08/24  0745   HEMOGLOBIN A1C 4.80      Review and interpretation of imaging:  IR LUMBAR PUNCTURE DIAGNOSTIC    Result Date: 12/11/2024   Exam: FLUOROSCOPICALLY GUIDED DIAGNOSTIC LUMBAR PUNCTURE:  DATE: 12/10/2024 11:12 AM  HISTORY: AMS; R41.82-Altered mental status, unspecified; R29.898-Other symptoms and signs involving the musculoskeletal system   TECHNIQUE: Informed consent was obtained. The lower back was prepped with betadine and draped in the usual sterile fashion. Pre-cutaneous handwashing and sterile gloves were worn. 1% Lidocaine solution was used for local anesthesia. Using fluoroscopic guidance, a 22-gauge spinal needle was advanced into the thecal sac at L3.  *  Dose Area Product: 59.25 uGym2  FINDINGS: *  Hard copy fluoroscopic image shows good position of the needle in the thecal sac. *  Procedure and pressures were obtained with the patient in the prone position (needle length accounted for during pressure measurements). *  12 mL of clear CSF fluid was sent for routine laboratory assessment. *  Opening pressure: 18 cm H2O *  Closing pressure: Less than 12 cm H2O      Successful fluoroscopically guided diagnostic lumbar puncture.   Procedure performed by CHAPO Lopez. By electronically signing this report, I, the supervising  radiologist, attest that I was not present for the procedure(s) but agree with the final edited report.  This report was finalized on 12/11/2024 11:11 AM by Dr. Luis Davis M.D on Workstation: [id number]      XR Abdomen KUB    Result Date: 12/9/2024  XR ABDOMEN KUB-  INDICATIONS: Abdominal pain  TECHNIQUE: Supine views of the abdomen  COMPARISON: 1/18/2024  FINDINGS:   The bowel gas pattern is nonspecific, with gas seen in small bowel and in the colon. Some loops of small bowel show borderline caliber. Much stool in the rectum is noted, suggesting rectal stool impaction, correlate clinically. No supine evidence for free intraperitoneal gas. Arterial calcifications are apparent. Follow-up as indications persist. If there is further clinical concern, CT can be obtained for further evaluation.       As described.   This report was finalized on 12/9/2024 4:16 PM by Dr. Rod Villavicencio M.D on Workstation: [id number]      EEG    Result Date: 12/8/2024  Table formatting from the original result was not included. EEG Report             # Indication: Altered mental status History: 89-year-old with altered mental status Medical diagnoses: Breast cancer, stroke, pneumonia, hypertension, atrial fibrillation, DVT Current Facility-Administered Medications Medication Dose Route Frequency Provider Last Rate Last Admin  acetaminophen (TYLENOL) tablet 650 mg  650 mg Oral Q4H PRN Maryuri Carranza MD      Or  acetaminophen (TYLENOL) 160 MG/5ML oral solution 650 mg  650 mg Oral Q4H PRN Maryuri Carranza MD      Or  acetaminophen (TYLENOL) suppository 650 mg  650 mg Rectal Q4H PRN Maryuri Carranza MD      acetaminophen (TYLENOL) tablet 650 mg  650 mg Oral Q4H PRN Maryuri Carranza MD      Or  acetaminophen (TYLENOL) suppository 650 mg  650 mg Rectal Q4H PRN Maryuri Carranza MD      albuterol (ACCUNEB) nebulizer solution 0.63 mg  0.63 mg Nebulization Q6H PRN Maryuri Carranza MD      [Held by  provider] apixaban (ELIQUIS) tablet 2.5 mg  2.5 mg Oral Q12H Maryuri Carranza MD      arformoterol (BROVANA) nebulizer solution 15 mcg  15 mcg Nebulization BID - RT Maryuri Carranza MD   15 mcg at 12/08/24 0743  aspirin tablet 325 mg  325 mg Oral Daily Maryuri Carranza MD      Or  aspirin suppository 300 mg  300 mg Rectal Daily Maryuri Carranza MD   300 mg at 12/08/24 1221  atorvastatin (LIPITOR) tablet 80 mg  80 mg Oral Nightly Maryuri Carranza MD      sennosides-docusate (PERICOLACE) 8.6-50 MG per tablet 2 tablet  2 tablet Oral BID PRN Maryuri Carranza MD      And  polyethylene glycol (MIRALAX) packet 17 g  17 g Oral Daily PRN Maryuri Carranza MD      And  bisacodyl (DULCOLAX) EC tablet 5 mg  5 mg Oral Daily PRN Maryuri Carranza MD      And  bisacodyl (DULCOLAX) suppository 10 mg  10 mg Rectal Daily PRN Maryuri Carranza MD      budesonide (PULMICORT) nebulizer solution 0.5 mg  0.5 mg Nebulization BID - RT Maryuri Carranza MD   0.5 mg at 12/08/24 0741  dextrose 5 % and sodium chloride 0.9 % with KCl 20 mEq/L infusion  75 mL/hr Intravenous Continuous Adán Durbin MD 75 mL/hr at 12/08/24 0853 75 mL/hr at 12/08/24 0853  diazePAM (VALIUM) tablet 1 mg  1 mg Oral BID Adán Durbin MD      Enoxaparin Sodium (LOVENOX) syringe 90 mg  1 mg/kg Subcutaneous Q12H Adán Durbin MD   90 mg at 12/08/24 1756  melatonin tablet 2.5 mg  2.5 mg Oral Nightly PRN Maryuri Carranza MD      ondansetron (ZOFRAN) injection 4 mg  4 mg Intravenous Q6H PRN Maryuri Carranza MD      Pharmacy to Dose enoxaparin (LOVENOX)   Not Applicable Continuous PRN Adán Durbin MD      Potassium Replacement - Follow Nurse / BPA Driven Protocol   Not Applicable PRN Adán Durbin MD      sodium chloride 0.9 % flush 10 mL  10 mL Intravenous PRN Lui Hardwick MD      sodium chloride 0.9 % infusion  75 mL/hr Intravenous Continuous Stingl, Maryuri Nathan MD 75 mL/hr at 12/07/24  2315 75 mL/hr at 12/07/24 2315 Time of study: 26 minutes 16 seconds Technical summary: The 10-20 system was used for electrode placement  Background: The background rhythm was composed of 6 Hz moderate amplitude poorly regulated and sustained posteriorly dominant rhythm.  There are slower activity seen interspersed.  Sleep: The patient became drowsy as noted by attenuation of the alpha rhythm and an increased proportion of slower activities were seen.  Vertex sharp transients and sleep spindles were seen  Hyperventilation: Not obtained  Photic stimulation: Photic stimulation was performed at various flash frequencies showing no significant change in the background activity  EKG: Sinus rhythm in the 60s  Video: No unusual involuntary movements were seen on the video clips reviewed Impression: Abnormal EEG being moderately diffusely slow during wakefulness and stage II sleep consistent with a moderate diffuse encephalopathy versus bihemispheric structural lesions.  No epileptiform activities were seen Dictated utilizing Dragon dictation.      MRI Brain Without Contrast    Result Date: 12/8/2024  MRI BRAIN WITHOUT CONTRAST  HISTORY: Altered mental status. Weakness.  TECHNIQUE: Brain MRI includes sagittal T1 as well as axial diffusion, FLAIR, T1, T2, gradient echo sequences.  COMPARISON: CT angiogram head and neck 12/07/2024 at 2:43 p.m., CT head without contrast 03/14/2024. No previous MRI for comparison.  FINDINGS: There are no abnormal foci of restricted diffusion to diagnose an acute infarction. There is mild confluent periventricular white matter FLAIR hyperintense signal, as well as minimal deep cerebral white matter FLAIR hyperintense signal. This is nonspecific, though is consistent with chronic small vessel ischemic white matter change. Evaluation of detail is limited on this exam by motion related artifact. Per the technologist, these were the best images possible due to patient condition and fast protocol was  run which decreases signal-to-noise ratio.  No extra-axial fluid collection is demonstrated. Ventricles appear within normal limits for size and configuration. Major intracranial flow voids appear within normal limits.  There is patchy fluid signal within bilateral mastoid air cells.      1. No infarction or evidence for acute intracranial abnormality. Mild chronic small vessel ischemic white matter change. 2. Patchy fluid signal bilateral mastoid air cells.  This report was finalized on 12/8/2024 4:44 PM by Archie Ortiz M.D on Workstation: [id number]      XR Chest 1 View    Result Date: 12/7/2024  XR CHEST 1 VW-  HISTORY: 89-year-old female with acute stroke protocol.  FINDINGS: There is central pulmonary vascular congestion, but there is no evidence for CHF. No focal airspace consolidations are seen.  This report was finalized on 12/7/2024 3:34 PM by Dr. Samanta Lucas M.D on Workstation: [id number]      CT Angiogram Head w AI Analysis of LVO    Result Date: 12/7/2024  CT ANGIOGRAM HEAD W AI ANALYSIS OF LVO-, CT ANGIOGRAM NECK-, CT CEREBRAL PERFUSION W WO CONTRAST-  INDICATION: Stroke  COMPARISON: CT head March 14, 2024  TECHNIQUE: CTA head and neck with IV contrast. Noncontrast head CT. CT perfusion with rapid analysis. Coronal and sagittal reformats. Three dimensional reconstructions. Radiation dose reduction techniques were utilized, including automated exposure control and exposure modulation based on body size.  FINDINGS:  Noncontrast head CT: No intraparenchymal hemorrhage. Preserved gray-white differentiation. No mass effect or midline shift. Chronic small vessel ischemic changes. No hydrocephalus. No intraventricular hemorrhage. No extra-axial hemorrhage or fluid collection. Bilateral cataract extractions. No fracture. Hyperostosis frontalis interna. Opacification of the left mastoid air cells and partial opacification of the left middle ear. Mucosal thickening in the ethmoid air cells. Mucosal  thickening in the maxillary sinuses.  CTA NECK: Right common and internal carotid artery are patent, without stenosis. Tortuous right internal carotid artery. Left common and internal carotid artery are patent, without stenosis. Tortuous left common and internal carotid artery. Dominant left vertebral artery. Bilateral vertebral arteries are patent, without stenosis.  CTA HEAD: Small amount of calcific atherosclerotic disease in the right cavernous internal carotid artery, without stenosis. Right internal carotid, middle cerebral and anterior cerebral arteries appear patent, without stenoses. Left internal carotid, middle cerebral and anterior cerebral arteries appear patent, without stenosis. Dominant left vertebral artery. Bilateral vertebral arteries, left posterior inferior cerebellar artery, basilar artery, superior cerebellar arteries and posterior cerebral arteries appear patent, without stenoses seen. Right posterior inferior cerebellar artery not identified. No aneurysm identified. Dural sinuses appear patent.  CT perfusion: CBF less than 30%: 0 mL. Tmax greater than 6 seconds: 0 mL. Mismatch volume: 0 mL. Tmax greater than 4 seconds: 96 mL, seen in the bilateral occipital lobes, right medial temporal lobe, posterior parietal lobes, right superior parietal lobe and right centrum semiovale, may be artifactual, with motion present.  Other: Small amount of secretions seen in the trachea. Right glenohumeral osteoarthritis.       1. No acute intracranial process. 2. No core infarct or ischemia seen on CT perfusion. 3. No large vessel occlusion, aneurysm or dissection identified. 4. Opacification of left mastoid air cells and partial opacification of the left middle ear. Finding can be correlated for otomastoiditis.  Call Report: Dr. Reddy was notified by telephone of the above findings on December 7, 2024 at 3:20 p.m.      This report was finalized on 12/7/2024 3:23 PM by Dr. Godfrey Joseph M.D on  Workstation: [id number]      CT Angiogram Neck    Result Date: 12/7/2024  CT ANGIOGRAM HEAD W AI ANALYSIS OF LVO-, CT ANGIOGRAM NECK-, CT CEREBRAL PERFUSION W WO CONTRAST-  INDICATION: Stroke  COMPARISON: CT head March 14, 2024  TECHNIQUE: CTA head and neck with IV contrast. Noncontrast head CT. CT perfusion with rapid analysis. Coronal and sagittal reformats. Three dimensional reconstructions. Radiation dose reduction techniques were utilized, including automated exposure control and exposure modulation based on body size.  FINDINGS:  Noncontrast head CT: No intraparenchymal hemorrhage. Preserved gray-white differentiation. No mass effect or midline shift. Chronic small vessel ischemic changes. No hydrocephalus. No intraventricular hemorrhage. No extra-axial hemorrhage or fluid collection. Bilateral cataract extractions. No fracture. Hyperostosis frontalis interna. Opacification of the left mastoid air cells and partial opacification of the left middle ear. Mucosal thickening in the ethmoid air cells. Mucosal thickening in the maxillary sinuses.  CTA NECK: Right common and internal carotid artery are patent, without stenosis. Tortuous right internal carotid artery. Left common and internal carotid artery are patent, without stenosis. Tortuous left common and internal carotid artery. Dominant left vertebral artery. Bilateral vertebral arteries are patent, without stenosis.  CTA HEAD: Small amount of calcific atherosclerotic disease in the right cavernous internal carotid artery, without stenosis. Right internal carotid, middle cerebral and anterior cerebral arteries appear patent, without stenoses. Left internal carotid, middle cerebral and anterior cerebral arteries appear patent, without stenosis. Dominant left vertebral artery. Bilateral vertebral arteries, left posterior inferior cerebellar artery, basilar artery, superior cerebellar arteries and posterior cerebral arteries appear patent, without stenoses  seen. Right posterior inferior cerebellar artery not identified. No aneurysm identified. Dural sinuses appear patent.  CT perfusion: CBF less than 30%: 0 mL. Tmax greater than 6 seconds: 0 mL. Mismatch volume: 0 mL. Tmax greater than 4 seconds: 96 mL, seen in the bilateral occipital lobes, right medial temporal lobe, posterior parietal lobes, right superior parietal lobe and right centrum semiovale, may be artifactual, with motion present.  Other: Small amount of secretions seen in the trachea. Right glenohumeral osteoarthritis.       1. No acute intracranial process. 2. No core infarct or ischemia seen on CT perfusion. 3. No large vessel occlusion, aneurysm or dissection identified. 4. Opacification of left mastoid air cells and partial opacification of the left middle ear. Finding can be correlated for otomastoiditis.  Call Report: Dr. Reddy was notified by telephone of the above findings on December 7, 2024 at 3:20 p.m.      This report was finalized on 12/7/2024 3:23 PM by Dr. Godfrey Joseph M.D on Workstation: [id number]      CT CEREBRAL PERFUSION WITH & WITHOUT CONTRAST    Result Date: 12/7/2024  CT ANGIOGRAM HEAD W AI ANALYSIS OF LVO-, CT ANGIOGRAM NECK-, CT CEREBRAL PERFUSION W WO CONTRAST-  INDICATION: Stroke  COMPARISON: CT head March 14, 2024  TECHNIQUE: CTA head and neck with IV contrast. Noncontrast head CT. CT perfusion with rapid analysis. Coronal and sagittal reformats. Three dimensional reconstructions. Radiation dose reduction techniques were utilized, including automated exposure control and exposure modulation based on body size.  FINDINGS:  Noncontrast head CT: No intraparenchymal hemorrhage. Preserved gray-white differentiation. No mass effect or midline shift. Chronic small vessel ischemic changes. No hydrocephalus. No intraventricular hemorrhage. No extra-axial hemorrhage or fluid collection. Bilateral cataract extractions. No fracture. Hyperostosis frontalis interna. Opacification of  the left mastoid air cells and partial opacification of the left middle ear. Mucosal thickening in the ethmoid air cells. Mucosal thickening in the maxillary sinuses.  CTA NECK: Right common and internal carotid artery are patent, without stenosis. Tortuous right internal carotid artery. Left common and internal carotid artery are patent, without stenosis. Tortuous left common and internal carotid artery. Dominant left vertebral artery. Bilateral vertebral arteries are patent, without stenosis.  CTA HEAD: Small amount of calcific atherosclerotic disease in the right cavernous internal carotid artery, without stenosis. Right internal carotid, middle cerebral and anterior cerebral arteries appear patent, without stenoses. Left internal carotid, middle cerebral and anterior cerebral arteries appear patent, without stenosis. Dominant left vertebral artery. Bilateral vertebral arteries, left posterior inferior cerebellar artery, basilar artery, superior cerebellar arteries and posterior cerebral arteries appear patent, without stenoses seen. Right posterior inferior cerebellar artery not identified. No aneurysm identified. Dural sinuses appear patent.  CT perfusion: CBF less than 30%: 0 mL. Tmax greater than 6 seconds: 0 mL. Mismatch volume: 0 mL. Tmax greater than 4 seconds: 96 mL, seen in the bilateral occipital lobes, right medial temporal lobe, posterior parietal lobes, right superior parietal lobe and right centrum semiovale, may be artifactual, with motion present.  Other: Small amount of secretions seen in the trachea. Right glenohumeral osteoarthritis.       1. No acute intracranial process. 2. No core infarct or ischemia seen on CT perfusion. 3. No large vessel occlusion, aneurysm or dissection identified. 4. Opacification of left mastoid air cells and partial opacification of the left middle ear. Finding can be correlated for otomastoiditis.  Call Report: Dr. Reddy was notified by telephone of the above  findings on December 7, 2024 at 3:20 p.m.      This report was finalized on 12/7/2024 3:23 PM by Dr. 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  She has had multiple admissions over the last 6 months for generalized weakness, abdominal pain, and UTI with most recent admission in September 2024.      At baseline she walks with a walker secondary to arthritis in her knees, has had health issues since January but no dementia though daughter notes some change in recall over the last 2 to 3 months.  Daughter feels patient would be more independent if not for her arthritis.  Patient on hydrocodone and Valium at home, has not been taking oxycodone.     CTA head/neck/CTP unremarkable. MRI brain negative for acute findings, showed mild chronic small vessel disease. EEG showed moderate diffuse slowing.  TSH 1.46, LDL 46, hemoglobin A1c 4.8%. UDS + valium.LP completed yesterday, unremarkable, nucleated cell count 1, glucose 55, protein 28.      Diagnoses:  Subacute encephalopathy, etiology not entirely clear, improving  A-fib on anticoagulation  Recurrent UTIs  Arthritis with limited mobility     Plan:  Neurology workup largely unremarkable, cause of encephalopathy unclear but patient is improving, at or near baseline.  Recommend she start mobilizing more with PT.   Updated patient's daughter via telephone.   D/W Dr Powell today     The above impression statement reviewed and changes noted.

## 2024-12-11 NOTE — PLAN OF CARE
Goal Outcome Evaluation:  Plan of Care Reviewed With: patient           Outcome Evaluation: Patient seen for PT treatment session this AM. Patient supine in bed upon arrival. Patient agreeable to attempt OOB activity. Patient required maxAx2 to sit up to EOB this date. Patient performed B LE exercises while seated at EOB. Patient required frequent cues for attention to task. MaxAx2 needed to return to supine in bed. Patient would continue to benefit from skilled PT intervention to address deficits in functional mobility. PT will continue to monitor.                              I called the pt to let her know that there is a possibility of her surgery going earlier tomorrow than originally scheduled. If this does happen she will receive a call from the hospital tomorrow. She was not available, I left her a message with this information.

## 2024-12-11 NOTE — PROGRESS NOTES
"    DAILY PROGRESS NOTE  [facility]    Patient Identification:  Name: Vonnie Coppola  Age: 89 y.o.  Sex: female  :  1935  MRN: [id number]         Primary Care Physician: Christopher Leyva, DO    Subjective:  Interval History: Resting comfortably.  No family present.  Easily awakened and pleasantly confused upon waking.  Not complaining of abdominal pain and x-ray imaging yesterday revealed fecal impaction of which she responded well to enemas with large BM and bowel regimen adjusted in multidisciplinary rounds    Objective: Elderly and frail-appearing    Scheduled Meds:[Held by provider] apixaban, 2.5 mg, Oral, Q12H  arformoterol, 15 mcg, Nebulization, BID - RT  atorvastatin, 80 mg, Oral, Nightly  budesonide, 0.5 mg, Nebulization, BID - RT  diazePAM, 1 mg, Oral, BID  polyethylene glycol, 17 g, Oral, Daily  senna-docusate sodium, 2 tablet, Oral, BID  sucralfate, 1 g, Oral, 4x Daily AC & at Bedtime      Continuous Infusions:     Vital signs in last 24 hours:  Temp:  [97.3 °F (36.3 °C)-98.2 °F (36.8 °C)] 97.3 °F (36.3 °C)  Heart Rate:  [61-74] 61  Resp:  [16-18] 18  BP: (120-140)/(64-76) 120/64    Intake/Output:    Intake/Output Summary (Last 24 hours) at 2024 1113  Last data filed at 2024 0628  Gross per 24 hour   Intake 320 ml   Output 400 ml   Net -80 ml       Exam:  /64   Pulse 61   Temp 97.3 °F (36.3 °C) (Oral)   Resp 18   Ht 165.1 cm (65\")   Wt 89 kg (196 lb 3.4 oz)   SpO2 100%   BMI 32.65 kg/m²     General Appearance:  More alert and conversational although confusion easily noted.  Speech is fluent.  No family present at bedside                         Throat:   Oral mucosa pink and moist                           Neck:   Supple, symmetrical, trachea midline, no JVD                         Lungs:    Clear to auscultation bilaterally, respirations unlabored                          Heart:    Regular rate and rhythm, S1 and S2 normal                  Abdomen:  "    Soft, nontender, bowel sounds active                 Extremities: Moving all, no cyanosis or edema                  Neurologic:   CNII-XII intact     Data Review:  Labs in chart were reviewed.    Assessment:  Active Hospital Problems    Diagnosis  POA    **Stroke-like symptoms [R29.90]  Yes    Chronic anticoagulation [Z79.01]  Not Applicable    Anxiety disorder [F41.9]  Yes    Benzodiazepine dependence [F13.20]  Yes    Hypokalemia [E87.6]  No    Altered mental status [R41.82]  Yes    History of pulmonary embolism [Z86.711]  Yes    Atrial fibrillation [I48.91]  Yes    History of CVA (cerebrovascular accident) [Z86.73]  Not Applicable    HTN (hypertension) [I10]  Yes    History of breast cancer [Z85.3]  Not Applicable    Asthma [J45.909]  Yes    Anemia [D64.9]  Yes      Resolved Hospital Problems   No resolved problems to display.       Plan:    Continues to clinically improve.  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  DC Carafate.  Bowel regimen addressed    DC Q6 Accu-Cheks    A-fib-RC-AC to resume with Eliquis      Disposition -PT OT following with CCP for discharge needs anticipate SNF as a limiting factor going forward for further disposition      Tarik Palm MD  12/11/2024  11:13 EST

## 2024-12-11 NOTE — PROGRESS NOTES
"Nutrition Services    Patient Name:  Vonnie Coppola  YOB: 1935  MRN: [id number]  Admit Date:  12/7/2024Assessment Date:  12/11/24    NUTRITION SCREENING      Reason for Encounter MST score 2+   Diagnosis/Problem Stroke like symptoms, AMS  SLP eval 12/9- placed on soft foods   C/o abd pain yesterday- CT showed fecal impaction, enema given and BM todau   PMH A fib, breast cancer s/p radiation, DVT, HTN   PO Diet Diet: Regular/House; Feeding Assistance - Nursing; Texture: Mechanical Ground (NDD 2); Fluid Consistency: Thin (IDDSI 0)   Supplements -   PO Intake % 0-25%       Medications MAR reviewed by RD   Labs  Listed below, reviewed   Physical Findings Alert, disoriented, agitated, nasal cannula, 1+ edema    GI Function BM 12/11   Skin Status Left distal arm skin tear       Height  Weight  BMI  Weight Trend     Height: 165.1 cm (65\")  Weight: 89 kg (196 lb 3.4 oz) (12/11/24 0634)  Body mass index is 32.65 kg/m².  Loss, Amount/Timeframe: 22# x3 months (-10%)       Nutrition Problem (PES) Inadequate oral intake related to stroke as evidence by report of minimal PO intakes <25%.       Intervention/Plan Boost breeze TID (pt did not want regular boost d/t lactose intolerance)  May benefit from appetite stimulant- alerted MD via secure chat    RD to follow up per protocol.     Patient meets ASPEN/AND criteria for nutrition diagnosis of severe malnutrition of acute illness based on: inadequate oral intakes <50% for >5 days and significant weight loss     Estimated/Assessed Needs       Energy Requirements    Weight for Calculation 89kg   Method for Estimation  18 kcal/kg, 20 kcal/kg   EST Needs (kcal/day) 3586-8953       Protein Requirements    Weight for Calculation 89kg   EST Protein Needs (g/kg) 1.0 - 1.2 gm/kg   EST Daily Needs (g/day)        Fluid Requirements     Method for Estimation 1 mL/kcal    Estimated Needs (mL/day)         Results from last 7 days   Lab Units 12/11/24  0427 12/10/24  4616 " 12/09/24  0345   SODIUM mmol/L 144 145 146*   POTASSIUM mmol/L 4.1 4.0 3.9   CHLORIDE mmol/L 112* 112* 114*   CO2 mmol/L 23.0 24.7 25.0   BUN mg/dL 16 15 17   CREATININE mg/dL 0.72 0.89 0.74   CALCIUM mg/dL 8.4* 8.4* 8.4*   BILIRUBIN mg/dL 0.4 0.3 0.4   ALK PHOS U/L 59 64 63   ALT (SGPT) U/L 17 14 16   AST (SGOT) U/L 22 26 24   GLUCOSE mg/dL 101* 85 93     Results from last 7 days   Lab Units 12/11/24  0427 12/10/24  0348 12/09/24  0345 12/08/24  0745 12/08/24  0745   MAGNESIUM mg/dL 2.1 2.0 2.1  --  2.0   PHOSPHORUS mg/dL 2.7 2.2* 2.2*   < >  --    HEMOGLOBIN g/dL 8.6* 9.1* 8.9*  --  8.4*   HEMATOCRIT % 28.2* 30.0* 29.3*  --  27.4*   TRIGLYCERIDES mg/dL  --   --   --   --  65    < > = values in this interval not displayed.     Lab Results   Component Value Date    HGBA1C 4.80 12/08/2024     No clinical signs of muscle wasting or fat loss seen on NFPE     Malnutrition Severity Assessment      Patient meets criteria for : Severe Malnutrition  Malnutrition Type (Last 8 Hours)       Malnutrition Severity Assessment       Row Name 12/11/24 1633       Malnutrition Severity Assessment    Malnutrition Type Acute Disease or Injury - Related Malnutrition      Row Name 12/11/24 1633       Insufficient Energy Intake     Insufficient Energy Intake Findings Severe    Insufficient Energy Intake  < or equal to 50% of est. energy requirement for > or equal to 5d)      Row Name 12/11/24 1633       Unintentional Weight Loss     Unintentional Weight Loss Findings Severe    Unintentional Weight Loss  Weight loss greater than 5% in one month      Row Name 12/11/24 1633       Muscle Loss    Loss of Muscle Mass Findings None      Row Name 12/11/24 1633       Fat Loss    Subcutaneous Fat Loss Findings None      Row Name 12/11/24 1633       Criteria Met (Must meet criteria for severity in at least 2 of these categories: M Wasting, Fat Loss, Fluid, Secondary Signs, Wt. Status, Intake)    Patient meets criteria for  Severe Malnutrition                           Electronically signed by:  Lindy Vazquez RD  12/11/24 13:34 EST

## 2024-12-11 NOTE — THERAPY TREATMENT NOTE
Patient Name: Vonnie Coppola  : 1935    MRN: [id number]                              Today's Date: 2024       Admit Date: 2024    Visit Dx:     ICD-10-CM ICD-9-CM   1. Altered mental status, unspecified altered mental status type  R41.82 780.97   2. Right arm weakness  R29.898 729.89     Patient Active Problem List   Diagnosis    GI bleed    Anemia    HTN (hypertension)    History of breast cancer    Asthma    History of CVA (cerebrovascular accident)    Dehydration    Renal insufficiency    Pulmonary nodule    Adnexal cyst    Nausea    Atrial fibrillation    History of pulmonary embolism    Class 2 severe obesity with serious comorbidity in adult    Thrombocytopenia    Cellulitis of right leg    Acute cystitis    Leg hematoma, right, subsequent encounter    Obesity (BMI 35.0-39.9 without comorbidity)    Morbid (severe) obesity due to excess calories (*specify comorbidity)    Constipation    UTI (urinary tract infection)    Deep vein thrombosis    Generalized abdominal pain    Altered mental status    Chronic anticoagulation    Anxiety disorder    Benzodiazepine dependence    E. coli UTI (urinary tract infection)    Stroke-like symptoms    Hypokalemia     Past Medical History:   Diagnosis Date    Arthritis     Asthma     Atrial fibrillation     per pt's daughter.States hx of a-fib in the past when stressed or tired.    Blind left eye     Breast cancer     LEFT BREAST CA, LUMPECTOMY & RADS    Deep vein thrombosis     History of cataract     Hx of radiation therapy     APPROXIMATELY 40 TREATMENTS FOR LEFT BREAST CA    Hypertension     Pneumonia     PONV (postoperative nausea and vomiting)     Stroke      Past Surgical History:   Procedure Laterality Date    APPENDECTOMY      BLADDER SURGERY      BREAST BIOPSY Left     MALIGNANT    BREAST LUMPECTOMY Left     MALIGNANT    CATARACT EXTRACTION      COLONOSCOPY N/A 2024    Procedure: COLONOSCOPY to cecum with cold snare  polypectomies;  Surgeon: Harshad Gaston MD;  Location: [facility] ENDOSCOPY;  Service: Gastroenterology;  Laterality: N/A;  pre- gi bleed, anemia  post- diverticulosis, polyps    ENDOSCOPY N/A 01/19/2024    Procedure: ESOPHAGOGASTRODUODENOSCOPY with biospy;  Surgeon: Harshad Gaston MD;  Location: [facility] ENDOSCOPY;  Service: Gastroenterology;  Laterality: N/A;  pre- gi bleed, anemia  post- erosive gastirits    GALLBLADDER SURGERY      HERNIA REPAIR      HYSTERECTOMY      INCISION AND DRAINAGE LEG Right 5/31/2024    Procedure: RIGHT LOWER EXTREMITY WOUND EXPLORATION, DEBRIDEMENT AND CONTROL OF BLEEDING;  Surgeon: Gerald Pedraza MD;  Location: [facility] MAIN OR;  Service: General;  Laterality: Right;    LASIK      LYMPHADENECTOMY      OOPHORECTOMY        General Information       Row Name 12/11/24 1301          OT Time and Intention    Document Type therapy note (daily note)  -JR     Mode of Treatment occupational therapy;physical therapy;co-treatment  -JR       Row Name 12/11/24 1301          General Information    Patient Profile Reviewed yes  -JR     Existing Precautions/Restrictions fall  -JR     Barriers to Rehab visual deficit  -JR       Row Name 12/11/24 1301          Cognition    Orientation Status (Cognition) oriented x 3  -JR       Row Name 12/11/24 1301          Safety Issues/Impairments Affecting Functional Mobility    Impairments Affecting Function (Mobility) balance;endurance/activity tolerance;range of motion (ROM);strength;pain  -JR     Comment, Safety Issues/Impairments (Mobility) PT/OT cotreatment medically appropriate and necessary due to patient acuity level, to maximize therapeutic benefit due to impaired act tolerance, and for safety of patient and staff. Treatment focused on progression of care and goals established in POC.  -JR               User Key  (r) = Recorded By, (t) = Taken By, (c) = Cosigned By      Initials Name Provider Type    Adán Murphy OT Occupational Therapist                    Lymphedema       Row Name 12/10/24 1507             Subjective Pain    Able to rate subjective pain? yes  -MM      Pre-Treatment Pain Level 0  -MM      Post-Treatment Pain Level 0  -MM      Recorded by [MM] Sharifa Vega OT              Subjective    Subjective Comments pt reports her  and family assist with lymph wraps at home, noted confusion this date with conversation, unsure of accuracy of information provided and carryover. RN aware of lymph wraps donned and frequency OT will continue to follow until d/c, plans for SNF most likely  -MM      Recorded by [MM] Sharifa Vega OT              Lymphedema Sensation    Lymphedema Sensation Reports RLE:;LLE:;normal  -MM      Recorded by [MM] Sharifa Vega OT              Compression/Skin Care    Compression/Skin Care skin care;wrapping location;bandaging;compression garment;remove bandages  -MM      Skin Care washed/dried  -MM      Wrapping Location lower extremity  -MM      Wrapping Location LE bilateral:;foot to knee  -MM      Bandage Layers cotton liner;padding/fluff layer;short-stretch bandages (comment size/quantity)  -MM      Bandaging Comments TG9 stockinette, 10 and 15 artiflex per leg, RLE x8 and x10 cm, and LLE x2 8cm and x1 10 cm  -MM      Bandaging Technique light compression;moderate compression  -MM      Compression Garment Comments pt reports no pain or discomfort once wraps donned  -MM      Recorded by [MM] Sharifa Vega OT                User Key  (r) = Recorded By, (t) = Taken By, (c) = Cosigned By      Initials Name Effective Dates    MM Sharifa Vega OT 05/31/24 -                    Mobility/ADL's       Row Name 12/11/24 1301          Bed Mobility    Bed Mobility supine-sit;sit-supine  -JR     Supine-Sit [place] (Bed Mobility) maximum assist (25% patient effort);2 person assist;verbal cues  -JR     Sit-Supine [place] (Bed Mobility) maximum assist (25% patient effort);2 person assist;verbal cues  -JR     Assistive  Device (Bed Mobility) head of bed elevated;bed rails  -JR     Comment, (Bed Mobility) increased cuing for attention to task.  -JR               User Key  (r) = Recorded By, (t) = Taken By, (c) = Cosigned By      Initials Name Provider Type    Adán Murphy OT Occupational Therapist                   Obj/Interventions       Row Name 12/11/24 1302          Balance    Balance Assessment sitting static balance;sitting dynamic balance  -JR     Static Sitting Balance contact guard;standby assist  -JR     Dynamic Sitting Balance contact guard  -JR     Position, Sitting Balance sitting edge of bed  -JR               User Key  (r) = Recorded By, (t) = Taken By, (c) = Cosigned By      Initials Name Provider Type    Adán Murphy OT Occupational Therapist                   Goals/Plan    No documentation.                  Clinical Impression       Row Name 12/11/24 1303          Pain Assessment    Pretreatment Pain Rating 0/10 - no pain  -JR     Posttreatment Pain Rating 0/10 - no pain  -JR       Row Name 12/11/24 1303          Plan of Care Review    Plan of Care Reviewed With patient  -JR     Progress no change  -JR     Outcome Evaluation Patient seen for OT/Pt co-treat this am.  Patient in bed upon arrival.  Patient agreeable to tx.  BLEs lymphedema wraps intact and functional. No drainage/wet/soiled areas noted.  Patient states the wraps feel ok.  Patient transitioned to EOB with Max A x2.  Once stabilized, patient required CGA/SBA for sitting balance.  Patient performed BUE AROM for all functional planes and patterns.  Patient able to hold OJ container and take a drink with CGA.  Patient returned to bed with Max A x2.  Cont OT as tolerated.  -       Row Name 12/11/24 1302          Therapy Assessment/Plan (OT)    Rehab Potential (OT) good  -     Criteria for Skilled Therapeutic Interventions Met (OT) yes;skilled treatment is necessary  -JR     Therapy Frequency (OT) 5 times/wk  -JR       Row Name 12/11/24 5392           Therapy Plan Review/Discharge Plan (OT)    Anticipated Discharge Disposition (OT) skilled nursing facility  -       Row Name 12/11/24 1303          Vital Signs    O2 Delivery Pre Treatment room air  -JR     O2 Delivery Intra Treatment room air  -JR     O2 Delivery Post Treatment room air  -JR     Pre Patient Position Supine  -JR     Intra Patient Position Sitting  -JR     Post Patient Position Supine  -JR       Row Name 12/11/24 1303          Positioning and Restraints    Pre-Treatment Position in bed  -JR     Post Treatment Position bed  -JR     In Bed notified nsg;call light within reach;encouraged to call for assist;exit alarm on  -JR               User Key  (r) = Recorded By, (t) = Taken By, (c) = Cosigned By      Initials Name Provider Type    Adán Murphy, MARTIN Occupational Therapist                   Outcome Measures       Row Name 12/11/24 1307          How much help from another is currently needed...    Putting on and taking off regular lower body clothing? 1  -JR     Bathing (including washing, rinsing, and drying) 2  -JR     Toileting (which includes using toilet bed pan or urinal) 2  -JR     Putting on and taking off regular upper body clothing 2  -JR     Taking care of personal grooming (such as brushing teeth) 2  -JR     Eating meals 3  -JR     AM-PAC 6 Clicks Score (OT) 12  -JR       Row Name 12/11/24 0840          How much help from another person do you currently need...    Turning from your back to your side while in flat bed without using bedrails? 2  -TP     Moving from lying on back to sitting on the side of a flat bed without bedrails? 1  -TP     Moving to and from a bed to a chair (including a wheelchair)? 1  -TP     Standing up from a chair using your arms (e.g., wheelchair, bedside chair)? 1  -TP     Climbing 3-5 steps with a railing? 1  -TP     To walk in hospital room? 1  -TP     AM-PAC 6 Clicks Score (PT) 7  -TP       Row Name 12/11/24 1307          Functional Assessment    Outcome  Measure Options AM-PAC 6 Clicks Daily Activity (OT)  -               User Key  (r) = Recorded By, (t) = Taken By, (c) = Cosigned By      Initials Name Provider Type    TP Zoey Garcia, RN Registered Nurse    Adán Murphy OT Occupational Therapist                    Occupational Therapy Education       Title: PT OT SLP Therapies (In Progress)       Topic: Occupational Therapy (Done)       Point: ADL training (Done)       Description:   Instruct learner(s) on proper safety adaptation and remediation techniques during self care or transfers.   Instruct in proper use of assistive devices.                  Learning Progress Summary            Patient JESSICA Trevino VU by  at 12/9/2024 1358    Comment: Role of OT                      Point: Home exercise program (Done)       Description:   Instruct learner(s) on appropriate technique for monitoring, assisting and/or progressing therapeutic exercises/activities.                  Learning Progress Summary            Patient JESSICA Trevino VU by  at 12/9/2024 1355    Comment: Role of OT                      Point: Precautions (Done)       Description:   Instruct learner(s) on prescribed precautions during self-care and functional transfers.                  Learning Progress Summary            Patient JESSICA Trevino VU by  at 12/9/2024 1358    Comment: Role of OT                      Point: Body mechanics (Done)       Description:   Instruct learner(s) on proper positioning and spine alignment during self-care, functional mobility activities and/or exercises.                  Learning Progress Summary            Patient JESSICA Trevino VU by  at 12/9/2024 1350    Comment: Role of OT                                      User Key       Initials Effective Dates Name Provider Type Discipline     07/24/24 -  Adán Stephenson OT Occupational Therapist OT                  OT Recommendation and Plan  Planned Therapy Interventions (OT): activity tolerance training, adaptive equipment training,  BADL retraining, neuromuscular control/coordination retraining, functional balance retraining, occupation/activity based interventions, passive ROM/stretching, transfer/mobility retraining, strengthening exercise, ROM/therapeutic exercise  Therapy Frequency (OT): 5 times/wk  Plan of Care Review  Plan of Care Reviewed With: patient  Progress: no change  Outcome Evaluation: Patient seen for OT/Pt co-treat this am.  Patient in bed upon arrival.  Patient agreeable to tx.  BLEs lymphedema wraps intact and functional. No drainage/wet/soiled areas noted.  Patient states the wraps feel ok.  Patient transitioned to EOB with Max A x2.  Once stabilized, patient required CGA/SBA for sitting balance.  Patient performed BUE AROM for all functional planes and patterns.  Patient able to hold OJ container and take a drink with CGA.  Patient returned to bed with Max A x2.  Cont OT as tolerated.     Time Calculation:   Evaluation Complexity (OT)  Review Occupational Profile/Medical/Therapy History Complexity: expanded/moderate complexity  Assessment, Occupational Performance/Identification of Deficit Complexity: 3-5 performance deficits  Clinical Decision Making Complexity (OT): detailed assessment/moderate complexity  Overall Complexity of Evaluation (OT): moderate complexity     Time Calculation- OT       Row Name 12/11/24 1309             Time Calculation- OT    OT Start Time 0902  -JR      OT Stop Time 0925  -      OT Time Calculation (min) 23 min  -JR      Total Timed Code Minutes- OT 23 minute(s)  -JR      OT Received On 12/11/24  -JR      OT - Next Appointment 12/12/24  -         Timed Charges    40914 - OT Therapeutic Activity Minutes 23  -JR         Total Minutes    Timed Charges Total Minutes 23  -JR       Total Minutes 23  -JR                User Key  (r) = Recorded By, (t) = Taken By, (c) = Cosigned By      Initials Name Provider Type    Adán Murphy OT Occupational Therapist                  Therapy Charges for  Today       Code Description Service Date Service Provider Modifiers Qty    07181918828 HC OT THERAPEUTIC ACT EA 15 MIN 12/11/2024 Adán Stephenson, OT GO 2                 Adán Stephenson OT  12/11/2024

## 2024-12-11 NOTE — THERAPY TREATMENT NOTE
Patient Name: Vonnie Coppola  : 1935    MRN: [id number]                              Today's Date: 2024       Admit Date: 2024    Visit Dx:     ICD-10-CM ICD-9-CM   1. Altered mental status, unspecified altered mental status type  R41.82 780.97   2. Right arm weakness  R29.898 729.89     Patient Active Problem List   Diagnosis    GI bleed    Anemia    HTN (hypertension)    History of breast cancer    Asthma    History of CVA (cerebrovascular accident)    Dehydration    Renal insufficiency    Pulmonary nodule    Adnexal cyst    Nausea    Atrial fibrillation    History of pulmonary embolism    Class 2 severe obesity with serious comorbidity in adult    Thrombocytopenia    Cellulitis of right leg    Acute cystitis    Leg hematoma, right, subsequent encounter    Obesity (BMI 35.0-39.9 without comorbidity)    Morbid (severe) obesity due to excess calories (*specify comorbidity)    Constipation    UTI (urinary tract infection)    Deep vein thrombosis    Generalized abdominal pain    Altered mental status    Chronic anticoagulation    Anxiety disorder    Benzodiazepine dependence    E. coli UTI (urinary tract infection)    Stroke-like symptoms    Hypokalemia     Past Medical History:   Diagnosis Date    Arthritis     Asthma     Atrial fibrillation     per pt's daughter.States hx of a-fib in the past when stressed or tired.    Blind left eye     Breast cancer     LEFT BREAST CA, LUMPECTOMY & RADS    Deep vein thrombosis     History of cataract     Hx of radiation therapy     APPROXIMATELY 40 TREATMENTS FOR LEFT BREAST CA    Hypertension     Pneumonia     PONV (postoperative nausea and vomiting)     Stroke      Past Surgical History:   Procedure Laterality Date    APPENDECTOMY      BLADDER SURGERY      BREAST BIOPSY Left     MALIGNANT    BREAST LUMPECTOMY Left     MALIGNANT    CATARACT EXTRACTION      COLONOSCOPY N/A 2024    Procedure: COLONOSCOPY to cecum with cold snare  polypectomies;  Surgeon: Harshad Gaston MD;  Location: [facility] ENDOSCOPY;  Service: Gastroenterology;  Laterality: N/A;  pre- gi bleed, anemia  post- diverticulosis, polyps    ENDOSCOPY N/A 01/19/2024    Procedure: ESOPHAGOGASTRODUODENOSCOPY with biospy;  Surgeon: Harshad Gaston MD;  Location: [facility] ENDOSCOPY;  Service: Gastroenterology;  Laterality: N/A;  pre- gi bleed, anemia  post- erosive gastirits    GALLBLADDER SURGERY      HERNIA REPAIR      HYSTERECTOMY      INCISION AND DRAINAGE LEG Right 5/31/2024    Procedure: RIGHT LOWER EXTREMITY WOUND EXPLORATION, DEBRIDEMENT AND CONTROL OF BLEEDING;  Surgeon: Gerald Pedraza MD;  Location: [facility] MAIN OR;  Service: General;  Laterality: Right;    LASIK      LYMPHADENECTOMY      OOPHORECTOMY        General Information       Row Name 12/11/24 1345          Physical Therapy Time and Intention    Document Type therapy note (daily note)  -     Mode of Treatment co-treatment;physical therapy  -       Row Name 12/11/24 1345          General Information    Patient Profile Reviewed yes  -SM     Existing Precautions/Restrictions fall  -       Row Name 12/11/24 1345          Cognition    Orientation Status (Cognition) oriented x 3  -       Row Name 12/11/24 1345          Safety Issues/Impairments Affecting Functional Mobility    Impairments Affecting Function (Mobility) balance;endurance/activity tolerance;range of motion (ROM);strength;pain  -     Comment, Safety Issues/Impairments (Mobility) Co treatment medically appropriate and necessary due to patient acuity level, activity tolerance and safety of patient and staff. Treatment is focusing on progression of care and goals established in the POC.  -SM               User Key  (r) = Recorded By, (t) = Taken By, (c) = Cosigned By      Initials Name Provider Type     Maribel Urena PT Physical Therapist                   Mobility       Row Name 12/11/24 4574          Bed Mobility    Bed Mobility  supine-sit;sit-supine  -SM     Supine-Sit [place] (Bed Mobility) maximum assist (25% patient effort);2 person assist;verbal cues  -SM     Sit-Supine [place] (Bed Mobility) maximum assist (25% patient effort);2 person assist;verbal cues  -SM     Assistive Device (Bed Mobility) head of bed elevated;bed rails  -     Comment, (Bed Mobility) Increased cuing for attention to task  -SM               User Key  (r) = Recorded By, (t) = Taken By, (c) = Cosigned By      Initials Name Provider Type     Maribel Urena PT Physical Therapist                   Obj/Interventions       Row Name 12/11/24 1346          Motor Skills    Therapeutic Exercise other (see comments)  AP, LAQs x10  -SM       Row Name 12/11/24 1346          Balance    Balance Assessment sitting static balance;sitting dynamic balance  -     Static Sitting Balance standby assist  -SM     Dynamic Sitting Balance contact guard  -     Position, Sitting Balance sitting edge of bed  -SM     Balance Interventions sitting;static;dynamic  -SM               User Key  (r) = Recorded By, (t) = Taken By, (c) = Cosigned By      Initials Name Provider Type     Maribel Urena, ROLO Physical Therapist                   Goals/Plan    No documentation.                  Clinical Impression       Row Name 12/11/24 1422          Pain    Pretreatment Pain Rating 0/10 - no pain  -     Posttreatment Pain Rating 0/10 - no pain  -SM       Row Name 12/11/24 1422          Plan of Care Review    Plan of Care Reviewed With patient  -     Outcome Evaluation Patient seen for PT treatment session this AM. Patient supine in bed upon arrival. Patient agreeable to attempt OOB activity. Patient required maxAx2 to sit up to EOB this date. Patient performed B LE exercises while seated at EOB. Patient required frequent cues for attention to task. MaxAx2 needed to return to supine in bed. Patient would continue to benefit from skilled PT intervention to address deficits in  functional mobility. PT will continue to monitor.  -       Row Name 12/11/24 1422          Vital Signs    Pre Patient Position Supine  -SM     Intra Patient Position Sitting  -SM     Post Patient Position Supine  -SM       Row Name 12/11/24 1422          Positioning and Restraints    Pre-Treatment Position in bed  -SM     Post Treatment Position bed  -SM     In Bed notified nsg;call light within reach;encouraged to call for assist;exit alarm on;fowlers  -               User Key  (r) = Recorded By, (t) = Taken By, (c) = Cosigned By      Initials Name Provider Type    Maribel Pickard PT Physical Therapist                   Outcome Measures       Row Name 12/11/24 1425 12/11/24 0840       How much help from another person do you currently need...    Turning from your back to your side while in flat bed without using bedrails? 2  -SM 2  -TP    Moving from lying on back to sitting on the side of a flat bed without bedrails? 2  -SM 1  -TP    Moving to and from a bed to a chair (including a wheelchair)? 1  -SM 1  -TP    Standing up from a chair using your arms (e.g., wheelchair, bedside chair)? 1  -SM 1  -TP    Climbing 3-5 steps with a railing? 1  -SM 1  -TP    To walk in hospital room? 1  -SM 1  -TP    AM-PAC 6 Clicks Score (PT) 8  -SM 7  -TP    Highest Level of Mobility Goal 3 --> Sit at edge of bed  -SM 2 --> Bed activities/dependent transfer  -TP      Row Name 12/11/24 1425 12/11/24 1307       Functional Assessment    Outcome Measure Options AM-PAC 6 Clicks Basic Mobility (PT)  - AM-PAC 6 Clicks Daily Activity (OT)  -JR              User Key  (r) = Recorded By, (t) = Taken By, (c) = Cosigned By      Initials Name Provider Type    Maribel Pickard, ROLO Physical Therapist    TP Zoey Garcia RN Registered Nurse    Adán Murphy, OT Occupational Therapist                                 Physical Therapy Education       Title: PT OT SLP Therapies (In Progress)       Topic: Physical Therapy (In Progress)        Point: Mobility training (In Progress)       Learning Progress Summary            Patient Acceptance, E, NR by  at 12/11/2024 1426    Acceptance, E,D, VU,NR by  at 12/9/2024 1218                      Point: Home exercise program (In Progress)       Learning Progress Summary            Patient Acceptance, E, NR by  at 12/11/2024 1426                      Point: Body mechanics (In Progress)       Learning Progress Summary            Patient Acceptance, E, NR by  at 12/11/2024 1426    Acceptance, E,D, VU,NR by  at 12/9/2024 1218                      Point: Precautions (In Progress)       Learning Progress Summary            Patient Acceptance, E, NR by  at 12/11/2024 1426    Acceptance, E,D, VU,NR by  at 12/9/2024 1218                                      User Key       Initials Effective Dates Name Provider Type Discipline     05/24/22 -  Mariaelena Daley, PT Physical Therapist PT     05/02/22 -  Maribel Urena, PT Physical Therapist PT                  PT Recommendation and Plan     Outcome Evaluation: Patient seen for PT treatment session this AM. Patient supine in bed upon arrival. Patient agreeable to attempt OOB activity. Patient required maxAx2 to sit up to EOB this date. Patient performed B LE exercises while seated at EOB. Patient required frequent cues for attention to task. MaxAx2 needed to return to supine in bed. Patient would continue to benefit from skilled PT intervention to address deficits in functional mobility. PT will continue to monitor.     Time Calculation:         PT Charges       Row Name 12/11/24 1426             Time Calculation    Start Time 0903  -      Stop Time 0926  -      Time Calculation (min) 23 min  -      PT Received On 12/11/24  -      PT - Next Appointment 12/12/24  -         Time Calculation- PT    Total Timed Code Minutes- PT 23 minute(s)  -         Timed Charges    79574 - PT Therapeutic Exercise Minutes 8  -SM      96186 - PT Therapeutic  Activity Minutes 15  -SM         Total Minutes    Timed Charges Total Minutes 23  -SM       Total Minutes 23  -SM                User Key  (r) = Recorded By, (t) = Taken By, (c) = Cosigned By      Initials Name Provider Type    Maribel Pickard PT Physical Therapist                  Therapy Charges for Today       Code Description Service Date Service Provider Modifiers Qty    [id number]  PT THER PROC EA 15 MIN 12/11/2024 Maribel Urena, PT GP 1    [id number]  PT THERAPEUTIC ACT EA 15 MIN 12/11/2024 Maribel Urena, PT GP 1            PT G-Codes  Outcome Measure Options: AM-PAC 6 Clicks Basic Mobility (PT)  AM-PAC 6 Clicks Score (PT): 8  AM-PAC 6 Clicks Score (OT): 12       Maribel Urena PT  12/11/2024

## 2024-12-11 NOTE — CASE MANAGEMENT/SOCIAL WORK
Continued Stay Note  [facility]     Patient Name: Vonnie Coppola  MRN: [id number]  Today's Date: 12/11/2024    Admit Date: 12/7/2024    Plan: SNF pending referrals.   Discharge Plan       Row Name 12/11/24 1406       Plan    Plan SNF pending referrals.    Patient/Family in Agreement with Plan yes    Plan Comments CCP reviewed PT notes. CCP met with pt and pt's daughter/ at bedside to discuss PT recommendations of SNF at DC. CCP provided pt and  with a patient choice list. CCP instructed  to call CCP with SNF choices. Plan will be SNF pending referrals. CCP will continue to follow for medical DC readiness and any DC needs. RLutes RN/CCP                   Discharge Codes    No documentation.                 Expected Discharge Date and Time       Expected Discharge Date Expected Discharge Time    Dec 12, 2024               Simi Alexandra RN

## 2024-12-12 PROBLEM — E43 SEVERE PROTEIN-CALORIE MALNUTRITION: Status: ACTIVE | Noted: 2024-12-12

## 2024-12-12 PROCEDURE — 97535 SELF CARE MNGMENT TRAINING: CPT

## 2024-12-12 PROCEDURE — 94761 N-INVAS EAR/PLS OXIMETRY MLT: CPT

## 2024-12-12 PROCEDURE — 92526 ORAL FUNCTION THERAPY: CPT

## 2024-12-12 PROCEDURE — 94799 UNLISTED PULMONARY SVC/PX: CPT

## 2024-12-12 PROCEDURE — 29581 APPL MULTLAYER CMPRN SYS LEG: CPT

## 2024-12-12 PROCEDURE — 97110 THERAPEUTIC EXERCISES: CPT

## 2024-12-12 PROCEDURE — 97530 THERAPEUTIC ACTIVITIES: CPT

## 2024-12-12 PROCEDURE — 94664 DEMO&/EVAL PT USE INHALER: CPT

## 2024-12-12 RX ADMIN — POLYETHYLENE GLYCOL 3350 17 G: 17 POWDER, FOR SOLUTION ORAL at 08:12

## 2024-12-12 RX ADMIN — ACETAMINOPHEN 650 MG: 325 TABLET ORAL at 08:11

## 2024-12-12 RX ADMIN — BUDESONIDE 0.5 MG: 0.5 INHALANT RESPIRATORY (INHALATION) at 10:05

## 2024-12-12 RX ADMIN — SENNOSIDES AND DOCUSATE SODIUM 2 TABLET: 50; 8.6 TABLET ORAL at 21:06

## 2024-12-12 RX ADMIN — DIAZEPAM 1 MG: 2 TABLET ORAL at 21:05

## 2024-12-12 RX ADMIN — ATORVASTATIN CALCIUM 80 MG: 80 TABLET, FILM COATED ORAL at 21:06

## 2024-12-12 RX ADMIN — Medication 2.5 MG: at 21:05

## 2024-12-12 RX ADMIN — APIXABAN 2.5 MG: 2.5 TABLET, FILM COATED ORAL at 08:12

## 2024-12-12 RX ADMIN — BUDESONIDE 0.5 MG: 0.5 INHALANT RESPIRATORY (INHALATION) at 19:33

## 2024-12-12 RX ADMIN — ARFORMOTEROL TARTRATE 15 MCG: 15 SOLUTION RESPIRATORY (INHALATION) at 10:02

## 2024-12-12 RX ADMIN — SENNOSIDES AND DOCUSATE SODIUM 2 TABLET: 50; 8.6 TABLET ORAL at 08:12

## 2024-12-12 RX ADMIN — APIXABAN 2.5 MG: 2.5 TABLET, FILM COATED ORAL at 21:06

## 2024-12-12 RX ADMIN — ARFORMOTEROL TARTRATE 15 MCG: 15 SOLUTION RESPIRATORY (INHALATION) at 19:30

## 2024-12-12 NOTE — THERAPY TREATMENT NOTE
Patient Name: Vonnie Coppola  : 1935    MRN: [id number]                              Today's Date: 2024       Admit Date: 2024    Visit Dx:     ICD-10-CM ICD-9-CM   1. Altered mental status, unspecified altered mental status type  R41.82 780.97   2. Right arm weakness  R29.898 729.89     Patient Active Problem List   Diagnosis    GI bleed    Anemia    HTN (hypertension)    History of breast cancer    Asthma    History of CVA (cerebrovascular accident)    Dehydration    Renal insufficiency    Pulmonary nodule    Adnexal cyst    Nausea    Atrial fibrillation    History of pulmonary embolism    Class 2 severe obesity with serious comorbidity in adult    Thrombocytopenia    Cellulitis of right leg    Acute cystitis    Leg hematoma, right, subsequent encounter    Obesity (BMI 35.0-39.9 without comorbidity)    Morbid (severe) obesity due to excess calories (*specify comorbidity)    Constipation    UTI (urinary tract infection)    Deep vein thrombosis    Generalized abdominal pain    Altered mental status    Chronic anticoagulation    Anxiety disorder    Benzodiazepine dependence    E. coli UTI (urinary tract infection)    Stroke-like symptoms    Hypokalemia    Severe protein-calorie malnutrition     Past Medical History:   Diagnosis Date    Arthritis     Asthma     Atrial fibrillation     per pt's daughter.States hx of a-fib in the past when stressed or tired.    Blind left eye     Breast cancer     LEFT BREAST CA, LUMPECTOMY & RADS    Deep vein thrombosis     History of cataract     Hx of radiation therapy     APPROXIMATELY 40 TREATMENTS FOR LEFT BREAST CA    Hypertension     Pneumonia     PONV (postoperative nausea and vomiting)     Stroke      Past Surgical History:   Procedure Laterality Date    APPENDECTOMY      BLADDER SURGERY      BREAST BIOPSY Left     MALIGNANT    BREAST LUMPECTOMY Left     MALIGNANT    CATARACT EXTRACTION      COLONOSCOPY N/A 2024    Procedure:  COLONOSCOPY to cecum with cold snare polypectomies;  Surgeon: Harshad Gaston MD;  Location: [facility] ENDOSCOPY;  Service: Gastroenterology;  Laterality: N/A;  pre- gi bleed, anemia  post- diverticulosis, polyps    ENDOSCOPY N/A 01/19/2024    Procedure: ESOPHAGOGASTRODUODENOSCOPY with biospy;  Surgeon: Harshad Gaston MD;  Location: [facility] ENDOSCOPY;  Service: Gastroenterology;  Laterality: N/A;  pre- gi bleed, anemia  post- erosive gastirits    GALLBLADDER SURGERY      HERNIA REPAIR      HYSTERECTOMY      INCISION AND DRAINAGE LEG Right 5/31/2024    Procedure: RIGHT LOWER EXTREMITY WOUND EXPLORATION, DEBRIDEMENT AND CONTROL OF BLEEDING;  Surgeon: Gerald Pedraza MD;  Location: [facility] MAIN OR;  Service: General;  Laterality: Right;    LASIK      LYMPHADENECTOMY      OOPHORECTOMY        General Information       Row Name 12/12/24 1220          OT Time and Intention    Document Type therapy note (daily note)  -     Mode of Treatment occupational therapy;individual therapy  -     Patient Effort adequate  -       Row Name 12/12/24 1220          General Information    Patient Profile Reviewed yes  -SM     Existing Precautions/Restrictions fall  -       Row Name 12/12/24 1220          Cognition    Orientation Status (Cognition) oriented x 3  -       Row Name 12/12/24 1220          Safety Issues/Impairments Affecting Functional Mobility    Impairments Affecting Function (Mobility) balance;endurance/activity tolerance;range of motion (ROM);strength  -               User Key  (r) = Recorded By, (t) = Taken By, (c) = Cosigned By      Initials Name Provider Type     Melanie Quiñones OT Occupational Therapist                   Lymphedema       Row Name 12/12/24 1200 12/10/24 2257          Subjective Pain    Able to rate subjective pain? -- yes  -MM     Pre-Treatment Pain Level -- 0  -MM     Post-Treatment Pain Level -- 0  -MM     Recorded by  [MM] Sharifa Vega OT            Subjective    Subjective  Comments -- pt reports her  and family assist with lymph wraps at home, noted confusion this date with conversation, unsure of accuracy of information provided and carryover. RN aware of lymph wraps donned and frequency OT will continue to follow until d/c, plans for SNF most likely  -MM     Recorded by  [MM] Sharifa Vega OT            Skin Changes/Observations    Location/Assessment Lower Extremity  -SM --     Lower Extremity Conditions right:;crust;scab(s)  -SM --     Recorded by [SM] Melanie Quiñones OT             Lymphedema Sensation    Lymphedema Sensation Reports -- RLE:;LLE:;normal  -MM     Recorded by  [MM] Sharifa Vega OT            Compression/Skin Care    Compression/Skin Care skin care;wrapping location;bandaging;remove bandages  -SM skin care;wrapping location;bandaging;compression garment;remove bandages  -MM     Skin Care washed/dried;lotion applied  -SM washed/dried  -MM     Wrapping Location lower extremity  -SM lower extremity  -MM     Wrapping Location LE bilateral:;foot to knee  -SM bilateral:;foot to knee  -MM     Bandage Layers cotton liner;padding/fluff layer;short-stretch bandages (comment size/quantity)  -SM cotton liner;padding/fluff layer;short-stretch bandages (comment size/quantity)  -MM     Bandaging Comments TG9 stockinette, 10 and 15 artiflex per leg, RLE x8 and x10 cm, and LLE x2 8cm and x1 10 cm  -SM TG9 stockinette, 10 and 15 artiflex per leg, RLE x8 and x10 cm, and LLE x2 8cm and x1 10 cm  -MM     Bandaging Technique light compression;moderate compression  -SM light compression;moderate compression  -MM     Compression Garment Comments Pt reports no issues with tolerating wraps  -SM pt reports no pain or discomfort once wraps donned  -MM     Recorded by [SM] Melanie Quiñones OT [MM] Sharifa Vega OT               User Key  (r) = Recorded By, (t) = Taken By, (c) = Cosigned By      Initials Name Effective Dates     Melanie Quiñones OT 04/02/20 -     MM Gary  MARTIN Skaggs 05/31/24 -                    Mobility/ADL's       Row Name 12/12/24 1221          Bed Mobility    Bed Mobility scooting/bridging  -     Scooting/Bridging [place] (Bed Mobility) dependent (less than 25% patient effort);2 person assist  -     Supine-Sit [place] (Bed Mobility) moderate assist (50% patient effort);maximum assist (25% patient effort);verbal cues  -     Sit-Supine [place] (Bed Mobility) maximum assist (25% patient effort);verbal cues  -       Row Name 12/12/24 1221          Transfers    Comment, (Transfers) unable to come up to a stand with max AX1  -       Row Name 12/12/24 1221          Activities of Daily Living    BADL Assessment/Intervention lower body dressing;bathing;grooming;feeding;toileting  -       Row Name 12/12/24 1221          Lower Body Dressing Assessment/Training    [place] Level (Lower Body Dressing) don;socks;doff;dependent (less than 25% patient effort)  -     Position (Lower Body Dressing) edge of bed sitting  -       Row Name 12/12/24 1221          Bathing Assessment/Intervention    [place] Level (Bathing) lower body;maximum assist (25% patient effort)  -     Position (Bathing) edge of bed sitting  -       Row Name 12/12/24 1221          Grooming Assessment/Training    [place] Level (Grooming) hair care, combing/brushing;standby assist  -     Position (Grooming) edge of bed sitting  -       Row Name 12/12/24 1221          Self-Feeding Assessment/Training    [place] Level (Feeding) feeding skills;set up  -     Position (Feeding) sitting up in bed  -       Row Name 12/12/24 1221          Toileting Assessment/Training    [place] Level (Toileting) dependent (less than 25% patient effort)  -     Comment, (Toileting) purewick/brief  -               User Key  (r) = Recorded By, (t) = Taken By, (c) = Cosigned By      Initials Name Provider Type    Melanie Greer OT Occupational Therapist                    Obj/Interventions       Row Name 12/12/24 1222          Balance    Static Sitting Balance standby assist  -     Dynamic Sitting Balance contact guard  -     Position, Sitting Balance sitting edge of bed  -     Static Standing Balance unable to assess  -               User Key  (r) = Recorded By, (t) = Taken By, (c) = Cosigned By      Initials Name Provider Type    Melanie Greer OT Occupational Therapist                   Goals/Plan    No documentation.                  Clinical Impression       Row Name 12/12/24 1223          Pain Assessment    Pretreatment Pain Rating 0/10 - no pain  -SM     Posttreatment Pain Rating 0/10 - no pain  -SM       Row Name 12/12/24 1223          Plan of Care Review    Plan of Care Reviewed With patient  -     Outcome Evaluation OT also saw pt for ADL today as well as lymphedema wrapping. Pt sat EOB throughout the encounter. Pt needs assist to wash and dress LEs. She has good sitting balance throughout encounter and is able to brush her hair in sitting. Pt needs assist to return to bed and open containers in prep to feeding herself lunch. Shoulder ROM limiting her reach to her tray and positioning assist provided by OT prior to meal.  -       Row Name 12/12/24 1223          Therapy Plan Review/Discharge Plan (OT)    Anticipated Discharge Disposition (OT) skilled nursing facility  -       Row Name 12/12/24 1223          Positioning and Restraints    Pre-Treatment Position in bed  -SM     Post Treatment Position bed  -SM     In Bed fowlers;call light within reach;encouraged to call for assist;notified nsg  -               User Key  (r) = Recorded By, (t) = Taken By, (c) = Cosigned By      Initials Name Provider Type    Melanie Greer OT Occupational Therapist                   Outcome Measures       Row Name 12/12/24 1225          How much help from another is currently needed...    Putting on and taking off regular lower body clothing? 1  -      Bathing (including washing, rinsing, and drying) 2  -SM     Toileting (which includes using toilet bed pan or urinal) 1  -SM     Putting on and taking off regular upper body clothing 2  -SM     Taking care of personal grooming (such as brushing teeth) 3  -SM     Eating meals 3  -SM     AM-PAC 6 Clicks Score (OT) 12  -SM       Row Name 12/12/24 0800          How much help from another person do you currently need...    Turning from your back to your side while in flat bed without using bedrails? 2  -JF     Moving from lying on back to sitting on the side of a flat bed without bedrails? 2  -JF     Moving to and from a bed to a chair (including a wheelchair)? 1  -JF     Standing up from a chair using your arms (e.g., wheelchair, bedside chair)? 1  -JF     Climbing 3-5 steps with a railing? 1  -JF     To walk in hospital room? 1  -JF     AM-PAC 6 Clicks Score (PT) 8  -JF     Highest Level of Mobility Goal 3 --> Sit at edge of bed  -JF               User Key  (r) = Recorded By, (t) = Taken By, (c) = Cosigned By      Initials Name Provider Type    Melanie Greer OT Occupational Therapist    Kendra East, RN Registered Nurse                    Occupational Therapy Education       Title: PT OT SLP Therapies (In Progress)       Topic: Occupational Therapy (Done)       Point: ADL training (Done)       Description:   Instruct learner(s) on proper safety adaptation and remediation techniques during self care or transfers.   Instruct in proper use of assistive devices.                  Learning Progress Summary            JESSICA Murray VU by  at 12/9/2024 6822    Comment: Role of OT                      Point: Home exercise program (Done)       Description:   Instruct learner(s) on appropriate technique for monitoring, assisting and/or progressing therapeutic exercises/activities.                  Learning Progress Summary            JESSICA Murray VU by  at 12/9/2024 6293    Comment: Role of OT                       Point: Precautions (Done)       Description:   Instruct learner(s) on prescribed precautions during self-care and functional transfers.                  Learning Progress Summary            Patient JESSICA Trevino VU by  at 12/9/2024 1359    Comment: Role of OT                      Point: Body mechanics (Done)       Description:   Instruct learner(s) on proper positioning and spine alignment during self-care, functional mobility activities and/or exercises.                  Learning Progress Summary            Patient JESSICA Trevino VU by  at 12/9/2024 1833    Comment: Role of OT                                      User Key       Initials Effective Dates Name Provider Type Discipline     07/24/24 -  Adán Stephenson OT Occupational Therapist OT                  OT Recommendation and Plan     Plan of Care Review  Plan of Care Reviewed With: patient  Outcome Evaluation: OT also saw pt for ADL today as well as lymphedema wrapping. Pt sat EOB throughout the encounter. Pt needs assist to wash and dress LEs. She has good sitting balance throughout encounter and is able to brush her hair in sitting. Pt needs assist to return to bed and open containers in prep to feeding herself lunch. Shoulder ROM limiting her reach to her tray and positioning assist provided by OT prior to meal.     Time Calculation:         Time Calculation- OT       Row Name 12/12/24 1225 12/12/24 1219          Time Calculation- OT    OT Start Time 1140  -SM 1117  -SM     OT Stop Time 1153  -SM 1140  -SM     OT Time Calculation (min) 13 min  -SM 23 min  -SM     Total Timed Code Minutes- OT -- 23 minute(s)  -SM     OT Received On 12/12/24  - --     OT - Next Appointment 12/13/24  - 12/13/24  -        Timed Charges    28327 - OT Self Care/Mgmt Minutes 13 -SM --        Total Minutes    Timed Charges Total Minutes 13 -SM --      Total Minutes 13 -SM --               User Key  (r) = Recorded By, (t) = Taken By, (c) = Cosigned By      Initials  Name Provider Type     Melanie Quiñones OT Occupational Therapist                  Therapy Charges for Today       Code Description Service Date Service Provider Modifiers Qty    04480292145 HC OT MULTI LAYER COMP SYS BELOW KNEE BILATERAL 12/12/2024 Melanie Quiñones OT  2    04194848732 HC OT SELF CARE/MGMT/TRAIN EA 15 MIN 12/12/2024 Melanie Quiñones OT GO 1                 Melanie Quiñones OT  12/12/2024

## 2024-12-12 NOTE — PLAN OF CARE
Goal Outcome Evaluation:               Pt alert to self, situation, place currently. Pt's left arm wound has been changed but is still bleeding through dressing. No c/o SOB or CP during shift. Pt VSS on 2 liters/min NC.

## 2024-12-12 NOTE — PLAN OF CARE
Goal Outcome Evaluation:  Plan of Care Reviewed With: patient        Progress: improving  Outcome Evaluation: Pt tolerated treatment fair this date. Required min A for bed mobility, then stood 2x w/ max A x2. Pt was able to clear bottom off bed both times, though unable to maintain very long at all and unable to take side steps. Instructed pt on a few seated LE exercises, and encouraged pt to attempt a few on own in bed.    Anticipated Discharge Disposition (PT): skilled nursing facility

## 2024-12-12 NOTE — DISCHARGE PLACEMENT REQUEST
"Vonnie Hitchcock (89 y.o. Female)       Date of Birth   1935    Social Security Number       Address   [address] [place] [address]    Home Phone   [phone]    MRN   [id number]       Holiness   Religious    Marital Status                               Admission Date   12/7/24    Admission Type   Emergency    Admitting Provider   Maryuri Carranza MD    Attending Provider   Tarik Palm MD    Department, Room/Bed   [address], [address]       Discharge Date       Discharge Disposition       Discharge Destination                                 Attending Provider: Tarik Palm MD    Allergies: Cortisone, Penicillins, Iodinated Contrast Media, Lactose Intolerance (Gi)    Isolation: None   Infection: None   Code Status: No CPR    Ht: 165.1 cm (65\")   Wt: 86.7 kg (191 lb 2.2 oz)    Admission Cmt: None   Principal Problem: Stroke-like symptoms [R29.90]                   Active Insurance as of 12/7/2024       Primary Coverage       Payor Plan Insurance Group Employer/Plan Group    [employer] MEDICARE REPLACEMENT HU[employer] MED ADV PPO [id number]       Payor Plan Address Payor Plan Phone Number Payor Plan Fax Number Effective Dates    [address] [address] [phone]  3/1/2023 - None Entered    [facility] [id number]         Subscriber Name Subscriber Birth Date Member ID       VONNIE HITCHCOCK 1935 [id number]                     Emergency Contacts        (Rel.) Home Phone Work Phone Mobile Phone    Elizabeth Rodríguez (Daughter) [phone] -- [phone]    Hudson Hitchcock (Son) [phone] -- [phone]                "

## 2024-12-12 NOTE — PLAN OF CARE
Goal Outcome Evaluation:                 OT unwrapped then rewrapped BLE. Pt tolerates without issue. Mepilex dressing to RLE. RN reports plan to change during OT lymphedema treatment tomorrow.

## 2024-12-12 NOTE — CASE MANAGEMENT/SOCIAL WORK
Continued Stay Note  [facility]     Patient Name: Vonnie Coppola  MRN: [id number]  Today's Date: 12/12/2024    Admit Date: 12/7/2024    Plan: SNF pending referrals.   Discharge Plan       Row Name 12/12/24 1436       Plan    Plan SNF pending referrals.    Patient/Family in Agreement with Plan yes    Plan Comments CCP followed up with pt’s daughter/ regarding SNF choices.  would like referrals made to [facility], [facility], [facility], [facility], and [facility]. CCP notified [facility], [facility], and [facility] of SNF referrals. CCP will continue to follow for medical DC readiness and any DC needs. RLutes RN/CCP                   Discharge Codes    No documentation.                 Expected Discharge Date and Time       Expected Discharge Date Expected Discharge Time    Dec 12, 2024               Simi Alexandra RN

## 2024-12-12 NOTE — THERAPY TREATMENT NOTE
Acute Care - Occupational Therapy Lymphedema Treatment Note  [facility]     Patient Name: Vonnie Coppola  : 1935  MRN: [id number]  Today's Date: 2024             Admit Date: 2024       ICD-10-CM ICD-9-CM   1. Altered mental status, unspecified altered mental status type  R41.82 780.97   2. Right arm weakness  R29.898 729.89     Patient Active Problem List   Diagnosis    GI bleed    Anemia    HTN (hypertension)    History of breast cancer    Asthma    History of CVA (cerebrovascular accident)    Dehydration    Renal insufficiency    Pulmonary nodule    Adnexal cyst    Nausea    Atrial fibrillation    History of pulmonary embolism    Class 2 severe obesity with serious comorbidity in adult    Thrombocytopenia    Cellulitis of right leg    Acute cystitis    Leg hematoma, right, subsequent encounter    Obesity (BMI 35.0-39.9 without comorbidity)    Morbid (severe) obesity due to excess calories (*specify comorbidity)    Constipation    UTI (urinary tract infection)    Deep vein thrombosis    Generalized abdominal pain    Altered mental status    Chronic anticoagulation    Anxiety disorder    Benzodiazepine dependence    E. coli UTI (urinary tract infection)    Stroke-like symptoms    Hypokalemia    Severe protein-calorie malnutrition     Past Medical History:   Diagnosis Date    Arthritis     Asthma     Atrial fibrillation     per pt's daughter.States hx of a-fib in the past when stressed or tired.    Blind left eye     Breast cancer     LEFT BREAST CA, LUMPECTOMY & RADS    Deep vein thrombosis     History of cataract     Hx of radiation therapy     APPROXIMATELY 40 TREATMENTS FOR LEFT BREAST CA    Hypertension     Pneumonia     PONV (postoperative nausea and vomiting)     Stroke      Past Surgical History:   Procedure Laterality Date    APPENDECTOMY      BLADDER SURGERY      BREAST BIOPSY Left     MALIGNANT    BREAST LUMPECTOMY Left     MALIGNANT    CATARACT EXTRACTION       COLONOSCOPY N/A 01/19/2024    Procedure: COLONOSCOPY to cecum with cold snare polypectomies;  Surgeon: Harshad Gaston MD;  Location: [facility] ENDOSCOPY;  Service: Gastroenterology;  Laterality: N/A;  pre- gi bleed, anemia  post- diverticulosis, polyps    ENDOSCOPY N/A 01/19/2024    Procedure: ESOPHAGOGASTRODUODENOSCOPY with biospy;  Surgeon: Harshad Gaston MD;  Location: [facility] ENDOSCOPY;  Service: Gastroenterology;  Laterality: N/A;  pre- gi bleed, anemia  post- erosive gastirits    GALLBLADDER SURGERY      HERNIA REPAIR      HYSTERECTOMY      INCISION AND DRAINAGE LEG Right 5/31/2024    Procedure: RIGHT LOWER EXTREMITY WOUND EXPLORATION, DEBRIDEMENT AND CONTROL OF BLEEDING;  Surgeon: Gerald Pedraza MD;  Location: [facility] MAIN OR;  Service: General;  Laterality: Right;    LASIK      LYMPHADENECTOMY      OOPHORECTOMY          Lymphedema       Row Name 12/12/24 1200 12/10/24 1502          Subjective Pain    Able to rate subjective pain? -- yes  -MM     Pre-Treatment Pain Level -- 0  -MM     Post-Treatment Pain Level -- 0  -MM     Recorded by  [MM] Sharifa Vega OT            Subjective    Subjective Comments -- pt reports her  and family assist with lymph wraps at home, noted confusion this date with conversation, unsure of accuracy of information provided and carryover. RN aware of lymph wraps donned and frequency OT will continue to follow until d/c, plans for SNF most likely  -MM     Recorded by  [MM] Sharifa Vega OT            Skin Changes/Observations    Location/Assessment Lower Extremity  -SM --     Lower Extremity Conditions right:;crust;scab(s)  -SM --     Recorded by [SM] Melanie Quiñones OT             Lymphedema Sensation    Lymphedema Sensation Reports -- RLE:;LLE:;normal  -MM     Recorded by  [MM] Sharifa Vega OT            Compression/Skin Care    Compression/Skin Care skin care;wrapping location;bandaging;remove bandages  - skin care;wrapping location;bandaging;compression  garment;remove bandages  -MM     Skin Care washed/dried;lotion applied  -SM washed/dried  -MM     Wrapping Location lower extremity  -SM lower extremity  -MM     Wrapping Location LE bilateral:;foot to knee  -SM bilateral:;foot to knee  -MM     Bandage Layers cotton liner;padding/fluff layer;short-stretch bandages (comment size/quantity)  -SM cotton liner;padding/fluff layer;short-stretch bandages (comment size/quantity)  -MM     Bandaging Comments TG9 stockinette, 10 and 15 artiflex per leg, RLE x8 and x10 cm, and LLE x2 8cm and x1 10 cm  -SM TG9 stockinette, 10 and 15 artiflex per leg, RLE x8 and x10 cm, and LLE x2 8cm and x1 10 cm  -MM     Bandaging Technique light compression;moderate compression  -SM light compression;moderate compression  -MM     Compression Garment Comments Pt reports no issues with tolerating wraps  -SM pt reports no pain or discomfort once wraps donned  -MM     Recorded by [SM] Melanie Quiñones OT [MM] Sharifa Vega OT               User Key  (r) = Recorded By, (t) = Taken By, (c) = Cosigned By      Initials Name Effective Dates     Melanie Quiñones OT 04/02/20 -     MM Sharifa Vega OT 05/31/24 -                     OT ASSESSMENT FLOWSHEET (Last 12 Hours)       OT Evaluation and Treatment       Row Name 12/12/24 1200                   OT Time and Intention    Document Type therapy note (daily note)  -SM        Mode of Treatment individual therapy  -SM           Pain Assessment    Pretreatment Pain Rating 0/10 - no pain  -SM        Posttreatment Pain Rating 0/10 - no pain  -SM           Cognition    Orientation Status (Cognition) oriented x 3  -SM           Wound 12/10/24 1208 Left distal arm skin tear    Wound - Properties Group Placement Date: 12/10/24  -CS Placement Time: 1208  -CS, patient arrived in radiology [facility] with skin tear present- dressing changed- optifoam, curlex wrapped around site  Side: Left  -CS Orientation: distal  -CS Location: arm  -CS Primary Wound Type: Skin  tear  -CS Type: skin tear  -CS Additional Comments: [facility]- dressing changed after LP to optifoam dressing and wrapped with curlex  -CS    Retired Wound - Properties Group Placement Date: 12/10/24  -CS Placement Time: 1208  -CS, patient arrived in radiology [facility] with skin tear present- dressing changed- optifoam, curlex wrapped around site  Side: Left  -CS Orientation: distal  -CS Location: arm  -CS Primary Wound Type: Skin tear  -CS Additional Comments: [facility]- dressing changed after LP to optifoam dressing and wrapped with curlex  -CS Type: skin tear  -CS    Retired Wound - Properties Group Placement Date: 12/10/24  -CS Placement Time: 1208  -CS, patient arrived in radiology [facility] with skin tear present- dressing changed- optifoam, curlex wrapped around site  Side: Left  -CS Orientation: distal  -CS Location: arm  -CS Primary Wound Type: Skin tear  -CS Additional Comments: [facility]- dressing changed after LP to optifoam dressing and wrapped with curlex  -CS Type: skin tear  -CS    Retired Wound - Properties Group Date first assessed: 12/10/24  -CS Time first assessed: 1208  -CS, [facility]- dressing changed- optifoam, curlex wrapped around site  Side: Left  -CS Location: arm  -CS Primary Wound Type: Skin tear  -CS Additional Comments: [facility]- dressing changed after LP to optifoam dressing and wrapped with curlex  -CS Type: skin tear  -CS              User Key  (r) = Recorded By, (t) = Taken By, (c) = Cosigned By      Initials Name Effective Dates    Melanie Greer OT 04/02/20 -     Salomón Pacheco RN 11/22/24 -                      Occupational Therapy Education       Title: PT OT SLP Therapies (In Progress)       Topic: Occupational Therapy (Done)       Point: ADL training (Done)        Description:   Instruct learner(s) on proper safety adaptation and remediation techniques during self care or transfers.   Instruct in proper use of assistive devices.                  Learning Progress Summary            Patient JESSICA Trevino PHUC by  at 12/9/2024 1356    Comment: Role of OT                      Point: Home exercise program (Done)       Description:   Instruct learner(s) on appropriate technique for monitoring, assisting and/or progressing therapeutic exercises/activities.                  Learning Progress Summary            Patient Belinda PHUC LOPEZ by  at 12/9/2024 1351    Comment: Role of OT                      Point: Precautions (Done)       Description:   Instruct learner(s) on prescribed precautions during self-care and functional transfers.                  Learning Progress Summary            Patient Belinda PHUC LOPEZ by  at 12/9/2024 1356    Comment: Role of OT                      Point: Body mechanics (Done)       Description:   Instruct learner(s) on proper positioning and spine alignment during self-care, functional mobility activities and/or exercises.                  Learning Progress Summary            Patient JESSICA Trevino PHUC by  at 12/9/2024 135    Comment: Role of OT                                      User Key       Initials Effective Dates Name Provider Type [facility] 07/24/24 -  Adán Stephenson OT Occupational Therapist OT                      OT Recommendation and Plan                  Time Calculation:     Time Calculation- OT       Row Name 12/12/24 1219             Time Calculation- OT    OT Start Time 1117  -      OT Stop Time 1140  -      OT Time Calculation (min) 23 min  -      Total Timed Code Minutes- OT 23 minute(s)  -      OT - Next Appointment 12/13/24  -                User Key  (r) = Recorded By, (t) = Taken By, (c) = Cosigned By      Initials Name Provider Type    Melanie Greer OT Occupational Therapist                    Therapy Charges for Today        Code Description Service Date Service Provider Modifiers Qty    [id number] HC OT MULTI LAYER COMP SYS BELOW KNEE BILATERAL 12/12/2024 Melanie Quiñones, OT  2                        Melanie Quiñones OT  12/12/2024

## 2024-12-12 NOTE — THERAPY DISCHARGE NOTE
Acute Care - Speech Language Pathology   Swallow Initial Evaluation/Discharge [facility]     Patient Name: Vonnie Coppola  : 1935  MRN: [id number]  Today's Date: 2024               Admit Date: 2024    Visit Dx:    ICD-10-CM ICD-9-CM   1. Altered mental status, unspecified altered mental status type  R41.82 780.97   2. Right arm weakness  R29.898 729.89     Patient Active Problem List   Diagnosis    GI bleed    Anemia    HTN (hypertension)    History of breast cancer    Asthma    History of CVA (cerebrovascular accident)    Dehydration    Renal insufficiency    Pulmonary nodule    Adnexal cyst    Nausea    Atrial fibrillation    History of pulmonary embolism    Class 2 severe obesity with serious comorbidity in adult    Thrombocytopenia    Cellulitis of right leg    Acute cystitis    Leg hematoma, right, subsequent encounter    Obesity (BMI 35.0-39.9 without comorbidity)    Morbid (severe) obesity due to excess calories (*specify comorbidity)    Constipation    UTI (urinary tract infection)    Deep vein thrombosis    Generalized abdominal pain    Altered mental status    Chronic anticoagulation    Anxiety disorder    Benzodiazepine dependence    E. coli UTI (urinary tract infection)    Stroke-like symptoms    Hypokalemia    Severe protein-calorie malnutrition     Past Medical History:   Diagnosis Date    Arthritis     Asthma     Atrial fibrillation     per pt's daughter.States hx of a-fib in the past when stressed or tired.    Blind left eye     Breast cancer     LEFT BREAST CA, LUMPECTOMY & RADS    Deep vein thrombosis     History of cataract     Hx of radiation therapy     APPROXIMATELY 40 TREATMENTS FOR LEFT BREAST CA    Hypertension     Pneumonia     PONV (postoperative nausea and vomiting)     Stroke      Past Surgical History:   Procedure Laterality Date    APPENDECTOMY      BLADDER SURGERY      BREAST BIOPSY Left     MALIGNANT    BREAST LUMPECTOMY Left     MALIGNANT     CATARACT EXTRACTION      COLONOSCOPY N/A 01/19/2024    Procedure: COLONOSCOPY to cecum with cold snare polypectomies;  Surgeon: Harshad Gaston MD;  Location: [facility] ENDOSCOPY;  Service: Gastroenterology;  Laterality: N/A;  pre- gi bleed, anemia  post- diverticulosis, polyps    ENDOSCOPY N/A 01/19/2024    Procedure: ESOPHAGOGASTRODUODENOSCOPY with biospy;  Surgeon: Harshad Gaston MD;  Location: [facility] ENDOSCOPY;  Service: Gastroenterology;  Laterality: N/A;  pre- gi bleed, anemia  post- erosive gastirits    GALLBLADDER SURGERY      HERNIA REPAIR      HYSTERECTOMY      INCISION AND DRAINAGE LEG Right 5/31/2024    Procedure: RIGHT LOWER EXTREMITY WOUND EXPLORATION, DEBRIDEMENT AND CONTROL OF BLEEDING;  Surgeon: Gerald Pedraza MD;  Location: [facility] MAIN OR;  Service: General;  Laterality: Right;    LASIK      LYMPHADENECTOMY      OOPHORECTOMY         SLP Recommendation and Plan                                                                      Outcome Evaluation: Patient seen for diet tolerance. Pt denies dysphagia, but voiced frustration that daughter was not present yet. No overt s/s of aspiration with thins via straw, puree, or mixed. Pt refused hard solids despite encouragement. RN reports tolerance of PO. SLP to sign off. Please re consult as indicated. (12/12/24 2920)    SWALLOW EVALUATION (Last 72 Hours)       SLP Adult Swallow Evaluation       Row Name 12/12/24 3024                   Rehab Evaluation    Document Type re-evaluation  -        Patient Effort adequate  -           General Information    Patient Profile Reviewed yes  -SH           Pain    Pretreatment Pain Rating 0/10 - no pain  -SH        Posttreatment Pain Rating 0/10 - no pain  -SH                  User Key  (r) = Recorded By, (t) = Taken By, (c) = Cosigned By      Initials Name Effective Dates     Melanie Adams SLP 01/05/24 -                     EDUCATION  The patient has been educated in the following areas:   Dysphagia  (Swallowing Impairment).         SLP GOALS       Row Name 12/12/24 3000             (LTG) Patient will demonstrate functional swallow for    Diet Texture (Demonstrate functional swallow) mechanical ground textures  -      Liquid viscosity (Demonstrate functional swallow) thin liquids  -      [place] (Demonstrate functional swallow) independently (over 90% accuracy)  -      Time Frame (Demonstrate functional swallow) by discharge  -      Progress/Outcomes (Demonstrate functional swallow) goal met  -      Comment (Demonstrate functional swallow) Patient seen for diet tolerance. Pt denies dysphagia, but voiced frustration that daughter was not present yet. No overt s/s of aspiration with thins via straw, puree, or mixed. Pt refused hard solids despite encouragement. RN reports tolerance of PO. SLP to sign off. Please re cosult as indicated.  -                User Key  (r) = Recorded By, (t) = Taken By, (c) = Cosigned By      Initials Name Provider Type    Melanie Cheng SLP Speech and Language Pathologist                           Time Calculation:    Time Calculation- SLP       Row Name 12/12/24 4502             Time Calculation- [facility]    SLP Start Time 1300  -      SLP Received On 12/12/24  -                User Key  (r) = Recorded By, (t) = Taken By, (c) = Cosigned By      Initials Name Provider Type    Melanie Cheng SLP Speech and Language Pathologist                    Therapy Charges for Today       Code Description Service Date Service Provider Modifiers Qty    [id number]  ST TREATMENT SWALLOW 4 12/12/2024 Melanie Adams SLP GN 1                      HARRIS Bustos  12/12/2024

## 2024-12-12 NOTE — PLAN OF CARE
Goal Outcome Evaluation:  Plan of Care Reviewed With: patient           Outcome Evaluation: Patient seen for diet tolerance. Pt denies dysphagia, but voiced frustration that daughter was not present yet. No overt s/s of aspiration with thins via straw, puree, or mixed. Pt refused hard solids despite encouragement. RN reports tolerance of PO. SLP to sign off. Please re cosult as indicated.

## 2024-12-12 NOTE — PROGRESS NOTES
"    DAILY PROGRESS NOTE  [facility]    Patient Identification:  Name: Vonnie Coppola  Age: 89 y.o.  Sex: female  :  1935  MRN: [id number]         Primary Care Physician: Christopher Leyva, DO    Subjective:  Interval History: Resting upon entering room.  Patient was easily awakened and overall was alert and oriented x 3.  She struggles with some simple questioning at times but she ultimately got all the answers right.  Her affect is a little flat.  Her speech is fluent.  She denies any chest pain or troubles breathing or headache     Objective: Elderly and frail    Scheduled Meds:apixaban, 2.5 mg, Oral, Q12H  arformoterol, 15 mcg, Nebulization, BID - RT  atorvastatin, 80 mg, Oral, Nightly  budesonide, 0.5 mg, Nebulization, BID - RT  diazePAM, 1 mg, Oral, BID  polyethylene glycol, 17 g, Oral, Daily  senna-docusate sodium, 2 tablet, Oral, BID      Continuous Infusions:     Vital signs in last 24 hours:  Temp:  [97.5 °F (36.4 °C)-98.2 °F (36.8 °C)] 97.5 °F (36.4 °C)  Heart Rate:  [72-76] 72  Resp:  [16-18] 18  BP: (120-130)/(65-81) 120/75    Intake/Output:    Intake/Output Summary (Last 24 hours) at 2024 0958  Last data filed at 2024 0800  Gross per 24 hour   Intake --   Output 700 ml   Net -700 ml       Exam:  /75 (BP Location: Right arm, Patient Position: Lying)   Pulse 72   Temp 97.5 °F (36.4 °C) (Oral)   Resp 18   Ht 165.1 cm (65\")   Wt 86.7 kg (191 lb 2.2 oz)   SpO2 99%   BMI 31.81 kg/m²     General Appearance:    Alert, cooperative, NT, AAOx3 for the most part though slow and struggles                         Throat:   Oral mucosa pink and moist                           Neck:   Supple, symmetrical, trachea midline, no JVD                         Lungs:    Clear to auscultation bilaterally, respirations unlabored                          Heart:    Regular rate and rhythm, S1 and S2 normal                  Abdomen:     Soft, nontender, bowel sounds active       "           Extremities: GW, legs wrapped                        Pulses:   Pulses palpable in all extremities                                Data Review:  Labs in chart were reviewed.    Assessment:  Active Hospital Problems    Diagnosis  POA    **Stroke-like symptoms [R29.90]  Yes    Severe protein-calorie malnutrition [E43]  Yes    Chronic anticoagulation [Z79.01]  Not Applicable    Anxiety disorder [F41.9]  Yes    Benzodiazepine dependence [F13.20]  Yes    Hypokalemia [E87.6]  No    Altered mental status [R41.82]  Yes    History of pulmonary embolism [Z86.711]  Yes    Atrial fibrillation [I48.91]  Yes    History of CVA (cerebrovascular accident) [Z86.73]  Not Applicable    HTN (hypertension) [I10]  Yes    History of breast cancer [Z85.3]  Not Applicable    Asthma [J45.909]  Yes    Anemia [D64.9]  Yes      Resolved Hospital Problems   No resolved problems to display.       Plan:    Essentially alert and oriented x 3 for me.  Encephalopathy likely multiply factorial with concern for medications compounded by past history of CVA with subsequent blindness on the left side with probable aspects of evolving vascular dementia compounded by reduced mobility with walker dependence and progressive generalized weakness over the last 6 months compounded by recurrent UTIs with 12/7/2024 UA unremarkable   -Workup per neuro essentially negative   -Chronic changes noted on imaging of the brain   -benzos likely contributory though helpful with mood and anxiety   -Eliquis resumed status post LP    Lymphedema with legs wrapped per OT    Chronic hypoxic respiratory failure-2 L NC    Fecal impaction resolved with chronic constipation    PAF-RC-AC with Eliquis        Disposition -medically ready for SNF once logistics are finalized      Tarik Palm MD  12/12/2024  09:58 EST

## 2024-12-12 NOTE — PLAN OF CARE
Goal Outcome Evaluation:  Plan of Care Reviewed With: patient           Outcome Evaluation: OT also saw pt for ADL today as well as lymphedema wrapping. Pt sat EOB throughout the encounter. Pt needs assist to wash and dress LEs. She has good sitting balance throughout encounter and is able to brush her hair in sitting. Pt needs assist to return to bed and open containers in prep to feeding herself lunch. Shoulder ROM limiting her reach to her tray and positioning assist provided by OT prior to meal.    Anticipated Discharge Disposition (OT): skilled nursing facility

## 2024-12-12 NOTE — THERAPY TREATMENT NOTE
Patient Name: Vonnie Coppola  : 1935    MRN: [id number]                              Today's Date: 2024       Admit Date: 2024    Visit Dx:     ICD-10-CM ICD-9-CM   1. Altered mental status, unspecified altered mental status type  R41.82 780.97   2. Right arm weakness  R29.898 729.89     Patient Active Problem List   Diagnosis    GI bleed    Anemia    HTN (hypertension)    History of breast cancer    Asthma    History of CVA (cerebrovascular accident)    Dehydration    Renal insufficiency    Pulmonary nodule    Adnexal cyst    Nausea    Atrial fibrillation    History of pulmonary embolism    Class 2 severe obesity with serious comorbidity in adult    Thrombocytopenia    Cellulitis of right leg    Acute cystitis    Leg hematoma, right, subsequent encounter    Obesity (BMI 35.0-39.9 without comorbidity)    Morbid (severe) obesity due to excess calories (*specify comorbidity)    Constipation    UTI (urinary tract infection)    Deep vein thrombosis    Generalized abdominal pain    Altered mental status    Chronic anticoagulation    Anxiety disorder    Benzodiazepine dependence    E. coli UTI (urinary tract infection)    Stroke-like symptoms    Hypokalemia    Severe protein-calorie malnutrition     Past Medical History:   Diagnosis Date    Arthritis     Asthma     Atrial fibrillation     per pt's daughter.States hx of a-fib in the past when stressed or tired.    Blind left eye     Breast cancer     LEFT BREAST CA, LUMPECTOMY & RADS    Deep vein thrombosis     History of cataract     Hx of radiation therapy     APPROXIMATELY 40 TREATMENTS FOR LEFT BREAST CA    Hypertension     Pneumonia     PONV (postoperative nausea and vomiting)     Stroke      Past Surgical History:   Procedure Laterality Date    APPENDECTOMY      BLADDER SURGERY      BREAST BIOPSY Left     MALIGNANT    BREAST LUMPECTOMY Left     MALIGNANT    CATARACT EXTRACTION      COLONOSCOPY N/A 2024    Procedure:  COLONOSCOPY to cecum with cold snare polypectomies;  Surgeon: Harshad Gaston MD;  Location: [facility] ENDOSCOPY;  Service: Gastroenterology;  Laterality: N/A;  pre- gi bleed, anemia  post- diverticulosis, polyps    ENDOSCOPY N/A 01/19/2024    Procedure: ESOPHAGOGASTRODUODENOSCOPY with biospy;  Surgeon: Harshad Gaston MD;  Location: [facility] ENDOSCOPY;  Service: Gastroenterology;  Laterality: N/A;  pre- gi bleed, anemia  post- erosive gastirits    GALLBLADDER SURGERY      HERNIA REPAIR      HYSTERECTOMY      INCISION AND DRAINAGE LEG Right 5/31/2024    Procedure: RIGHT LOWER EXTREMITY WOUND EXPLORATION, DEBRIDEMENT AND CONTROL OF BLEEDING;  Surgeon: Gerald Pedraza MD;  Location: [facility] MAIN OR;  Service: General;  Laterality: Right;    LASIK      LYMPHADENECTOMY      OOPHORECTOMY        General Information       Row Name 12/12/24 1254          Physical Therapy Time and Intention    Document Type therapy note (daily note)  -     Mode of Treatment physical therapy  -       Row Name 12/12/24 1254          General Information    Existing Precautions/Restrictions fall;oxygen therapy device and L/min  -       Row Name 12/12/24 1254          Cognition    Orientation Status (Cognition) oriented x 3  -               User Key  (r) = Recorded By, (t) = Taken By, (c) = Cosigned By      Initials Name Provider Type     Melanie Ordaz PTA Physical Therapist Assistant                   Mobility       Row Name 12/12/24 1257          Bed Mobility    Bed Mobility supine-sit;sit-supine  -     Supine-Sit [place] (Bed Mobility) minimum assist (75% patient effort)  -     Sit-Supine [place] (Bed Mobility) minimum assist (75% patient effort)  -     Assistive Device (Bed Mobility) bed rails;head of bed elevated  -       Row Name 12/12/24 1257          Sit-Stand Transfer    Sit-Stand [place] (Transfers) maximum assist (25% patient effort);2 person assist  -     Assistive Device (Sit-Stand  Transfers) --  HHA  -     Comment, (Sit-Stand Transfer) stood 2x, clearing bottom off bed each time though unable to maintain long at all  -               User Key  (r) = Recorded By, (t) = Taken By, (c) = Cosigned By      Initials Name Provider Type    Melanie Goins PTA Physical Therapist Assistant                   Obj/Interventions       Row Name 12/12/24 1307          Motor Skills    Therapeutic Exercise --  seated AP and LAQ x10 reps  -               User Key  (r) = Recorded By, (t) = Taken By, (c) = Cosigned By      Initials Name Provider Type    Melanie Goins PTA Physical Therapist Assistant                   Goals/Plan    No documentation.                  Clinical Impression       Row Name 12/12/24 1308          Pain    Pretreatment Pain Rating 0/10 - no pain  -     Posttreatment Pain Rating 0/10 - no pain  -       Row Name 12/12/24 1308          Positioning and Restraints    Pre-Treatment Position in bed  -     Post Treatment Position bed  -     In Bed supine;call light within reach;encouraged to call for assist;exit alarm on  -               User Key  (r) = Recorded By, (t) = Taken By, (c) = Cosigned By      Initials Name Provider Type    Melanie Goins PTA Physical Therapist Assistant                   Outcome Measures       Row Name 12/12/24 1308 12/12/24 0800       How much help from another person do you currently need...    Turning from your back to your side while in flat bed without using bedrails? 3  -SM 2  -JF    Moving from lying on back to sitting on the side of a flat bed without bedrails? 3  -SM 2  -JF    Moving to and from a bed to a chair (including a wheelchair)? 1  -SM 1  -JF    Standing up from a chair using your arms (e.g., wheelchair, bedside chair)? 2  -SM 1  -JF    Climbing 3-5 steps with a railing? 1  -SM 1  -JF    To walk in hospital room? 1  -SM 1  -JF    AM-PAC 6 Clicks Score (PT) 11  -SM 8  -JF    Highest Level of Mobility Goal 4 -->  Transfer to chair/commode  - 3 --> Sit at edge of bed  -AGUILA      Row Name 12/12/24 1308          Functional Assessment    Outcome Measure Options AM-PAC 6 Clicks Basic Mobility (PT)  -               User Key  (r) = Recorded By, (t) = Taken By, (c) = Cosigned By      Initials Name Provider Type     Melanie Ordaz PTA Physical Therapist Assistant    Kendra East RN Registered Nurse                                 Physical Therapy Education       Title: PT OT SLP Therapies (Done)       Topic: Physical Therapy (Done)       Point: Mobility training (Done)       Learning Progress Summary            Patient Acceptance, E,TB,D, VU,NR by  at 12/12/2024 1309    Acceptance, E, NR by [facility] at 12/11/2024 1426    Acceptance, E,D, VU,NR by  at 12/9/2024 1218                      Point: Home exercise program (Done)       Learning Progress Summary            Patient Acceptance, E,TB,D, VU,NR by  at 12/12/2024 1309    Acceptance, E, NR by [facility] at 12/11/2024 1426                      Point: Body mechanics (Done)       Learning Progress Summary            Patient Acceptance, E,TB,D, VU,NR by  at 12/12/2024 1309    Acceptance, E, NR by [facility] at 12/11/2024 1426    Acceptance, E,D, VU,NR by  at 12/9/2024 1218                      Point: Precautions (Done)       Learning Progress Summary            Patient Acceptance, E,TB,D, VU,NR by  at 12/12/2024 1309    Acceptance, E, NR by [facility] at 12/11/2024 1426    Acceptance, E,D, VU,NR by  at 12/9/2024 1218                                      User Key       Initials Effective Dates Name Provider Type Discipline     03/07/18 -  Melanie Ordaz PTA Physical Therapist Assistant PT     05/24/22 -  Mariaelena Daley, PT Physical Therapist PT    [facility] 05/02/22 -  Maribel Urena, ROLO Physical Therapist PT                  PT Recommendation and Plan     Progress: improving  Outcome Evaluation: Pt tolerated treatment fair this date. Required min A for bed mobility, then  stood 2x w/ max A x2. Pt was able to clear bottom off bed both times, though unable to maintain very long at all and unable to take side steps. Instructed pt on a few seated LE exercises, and encouraged pt to attempt a few on own in bed.     Time Calculation:         PT Charges       Row Name 12/12/24 1323             Time Calculation    Start Time 1041  -      Stop Time 1104  -      Time Calculation (min) 23 min  -      PT Received On 12/12/24  -      PT - Next Appointment 12/13/24  -                User Key  (r) = Recorded By, (t) = Taken By, (c) = Cosigned By      Initials Name Provider Type     Melanie Ordaz PTA Physical Therapist Assistant                  Therapy Charges for Today       Code Description Service Date Service Provider Modifiers Qty    93165773345 HC PT THERAPEUTIC ACT EA 15 MIN 12/12/2024 Melanie Ordaz PTA GP 1    67425203511 HC PT THER PROC EA 15 MIN 12/12/2024 Melanie Ordaz PTA GP 1    43096016634 HC PT THER SUPP EA 15 MIN 12/12/2024 Melanie Ordaz PTA GP 2            PT G-Codes  Outcome Measure Options: AM-PAC 6 Clicks Basic Mobility (PT)  AM-PAC 6 Clicks Score (PT): 11  AM-PAC 6 Clicks Score (OT): 12  PT Discharge Summary  Anticipated Discharge Disposition (PT): skilled nursing facility    Melanie Ordaz PTA  12/12/2024

## 2024-12-13 PROCEDURE — 29581 APPL MULTLAYER CMPRN SYS LEG: CPT

## 2024-12-13 PROCEDURE — 94799 UNLISTED PULMONARY SVC/PX: CPT

## 2024-12-13 PROCEDURE — 94664 DEMO&/EVAL PT USE INHALER: CPT

## 2024-12-13 PROCEDURE — 94761 N-INVAS EAR/PLS OXIMETRY MLT: CPT

## 2024-12-13 PROCEDURE — 94760 N-INVAS EAR/PLS OXIMETRY 1: CPT

## 2024-12-13 PROCEDURE — 97535 SELF CARE MNGMENT TRAINING: CPT

## 2024-12-13 RX ORDER — PANTOPRAZOLE SODIUM 40 MG/1
40 TABLET, DELAYED RELEASE ORAL DAILY
Start: 2024-12-13

## 2024-12-13 RX ORDER — DIAZEPAM 2 MG/1
1 TABLET ORAL DAILY PRN
Status: DISCONTINUED | OUTPATIENT
Start: 2024-12-13 | End: 2024-12-14 | Stop reason: HOSPADM

## 2024-12-13 RX ORDER — DIAZEPAM 2 MG/1
1 TABLET ORAL NIGHTLY
Status: CANCELLED | OUTPATIENT
Start: 2024-12-13

## 2024-12-13 RX ORDER — DIAZEPAM 2 MG/1
1 TABLET ORAL NIGHTLY
Qty: 5 TABLET | Refills: 0 | Status: SHIPPED | OUTPATIENT
Start: 2024-12-13

## 2024-12-13 RX ORDER — ATORVASTATIN CALCIUM 10 MG/1
10 TABLET, FILM COATED ORAL NIGHTLY
Start: 2024-12-13

## 2024-12-13 RX ORDER — DIAZEPAM 2 MG/1
1 TABLET ORAL NIGHTLY
Status: DISCONTINUED | OUTPATIENT
Start: 2024-12-13 | End: 2024-12-14 | Stop reason: HOSPADM

## 2024-12-13 RX ORDER — DIAZEPAM 2 MG/1
1 TABLET ORAL DAILY PRN
Qty: 3 TABLET | Refills: 0 | Status: SHIPPED | OUTPATIENT
Start: 2024-12-13 | End: 2024-12-18

## 2024-12-13 RX ADMIN — DIAZEPAM 1 MG: 2 TABLET ORAL at 08:50

## 2024-12-13 RX ADMIN — BUDESONIDE 0.5 MG: 0.5 INHALANT RESPIRATORY (INHALATION) at 21:05

## 2024-12-13 RX ADMIN — SENNOSIDES AND DOCUSATE SODIUM 2 TABLET: 50; 8.6 TABLET ORAL at 22:13

## 2024-12-13 RX ADMIN — ARFORMOTEROL TARTRATE 15 MCG: 15 SOLUTION RESPIRATORY (INHALATION) at 07:35

## 2024-12-13 RX ADMIN — POLYETHYLENE GLYCOL 3350 17 G: 17 POWDER, FOR SOLUTION ORAL at 08:49

## 2024-12-13 RX ADMIN — APIXABAN 2.5 MG: 2.5 TABLET, FILM COATED ORAL at 22:13

## 2024-12-13 RX ADMIN — SENNOSIDES AND DOCUSATE SODIUM 2 TABLET: 50; 8.6 TABLET ORAL at 08:50

## 2024-12-13 RX ADMIN — BUDESONIDE 0.5 MG: 0.5 INHALANT RESPIRATORY (INHALATION) at 07:33

## 2024-12-13 RX ADMIN — ARFORMOTEROL TARTRATE 15 MCG: 15 SOLUTION RESPIRATORY (INHALATION) at 21:03

## 2024-12-13 RX ADMIN — ATORVASTATIN CALCIUM 80 MG: 80 TABLET, FILM COATED ORAL at 22:13

## 2024-12-13 RX ADMIN — POLYVINYL ALCOHOL, POVIDONE 2 DROP: 14; 6 SOLUTION/ DROPS OPHTHALMIC at 10:56

## 2024-12-13 RX ADMIN — APIXABAN 2.5 MG: 2.5 TABLET, FILM COATED ORAL at 08:50

## 2024-12-13 RX ADMIN — DIAZEPAM 1 MG: 2 TABLET ORAL at 22:16

## 2024-12-13 NOTE — PLAN OF CARE
Goal Outcome Evaluation:  Plan of Care Reviewed With: patient           Outcome Evaluation: Pt is also seen today for ADL following lymphedema treatment. Pt is lethargic initally, and falling asleep as OT wraps her legs so deferred sitting EOB today. OT did position pt in the chair position where pt was more alert while OT performed her LB ADLs with total A. She was able to fed herself after set up but cognitive status limiting as pt very distracted with call light and figiting with her sheets.    Anticipated Discharge Disposition (OT): skilled nursing facility

## 2024-12-13 NOTE — PLAN OF CARE
Goal Outcome Evaluation:         No changes throughout night. Awaiting placement in SNF. No complaints, legs wrapped, no shortness of breath, weaned to Room Air.

## 2024-12-13 NOTE — CASE MANAGEMENT/SOCIAL WORK
Continued Stay Note  [facility]     Patient Name: Vonnie Coppola  MRN: [id number]  Today's Date: 12/13/2024    Admit Date: 12/7/2024    Plan: Plan will be DC to [facility] when precert is approved. Transport will need to be arranged.   Discharge Plan       Row Name 12/13/24 1315       Plan    Plan Plan will be DC to [facility] when precert is approved. Transport will need to be arranged.    Patient/Family in Agreement with Plan yes    Plan Comments [facility] cannot accept pt at [facility], [facility] or [facility], but can accept pt at [facility] and has a bed available on Saturday. [facility] can accept, and the bed is available today. [facility] cannot accept pt. [facility] followed up with pt’s daughter/ at the bedside regarding SNF referrals. [facility] notified  that [facility] can accept pt and has a bed available; [facility] does not have bed available at [facility] or [facility]; [facility] does have a bed available at [facility] a sister [facility] building; and [facility] does not have bed available.  would like to think about [facility] and get back with [facility].  called [facility] and would like pt to go to [facility] for SNF at [place]; she would like transport arranged at [place]. [facility] notified [facility] pt would like to go to [facility] for SNF. [facility] will have the bed available at [facility] tomorrow after 1500 and precert can be started. [facility] submitted pt to Post Acute Authorizations for precert. Plan will be [facility] at [place] with precert pending. Transport will need to be arranged at [place]. [facility] will continue to follow for any DC needs. [facility] RN/CCP                   Discharge Codes    No documentation.                 Expected Discharge Date and Time       Expected Discharge Date Expected Discharge Time    Dec 13, 2024               Simi Alexandra RN

## 2024-12-13 NOTE — THERAPY TREATMENT NOTE
Patient Name: Vonnie Coppola  : 1935    MRN: [id number]                              Today's Date: 2024       Admit Date: 2024    Visit Dx:     ICD-10-CM ICD-9-CM   1. Altered mental status, unspecified altered mental status type  R41.82 780.97   2. Right arm weakness  R29.898 729.89     Patient Active Problem List   Diagnosis    GI bleed    Anemia    HTN (hypertension)    History of breast cancer    Asthma    History of CVA (cerebrovascular accident)    Dehydration    Renal insufficiency    Pulmonary nodule    Adnexal cyst    Nausea    Atrial fibrillation    History of pulmonary embolism    Class 2 severe obesity with serious comorbidity in adult    Thrombocytopenia    Cellulitis of right leg    Acute cystitis    Leg hematoma, right, subsequent encounter    Obesity (BMI 35.0-39.9 without comorbidity)    Morbid (severe) obesity due to excess calories (*specify comorbidity)    Constipation    UTI (urinary tract infection)    Deep vein thrombosis    Generalized abdominal pain    Altered mental status    Chronic anticoagulation    Anxiety disorder    Benzodiazepine dependence    E. coli UTI (urinary tract infection)    Stroke-like symptoms    Hypokalemia    Severe protein-calorie malnutrition     Past Medical History:   Diagnosis Date    Arthritis     Asthma     Atrial fibrillation     per pt's daughter.States hx of a-fib in the past when stressed or tired.    Blind left eye     Breast cancer     LEFT BREAST CA, LUMPECTOMY & RADS    Deep vein thrombosis     History of cataract     Hx of radiation therapy     APPROXIMATELY 40 TREATMENTS FOR LEFT BREAST CA    Hypertension     Pneumonia     PONV (postoperative nausea and vomiting)     Stroke      Past Surgical History:   Procedure Laterality Date    APPENDECTOMY      BLADDER SURGERY      BREAST BIOPSY Left     MALIGNANT    BREAST LUMPECTOMY Left     MALIGNANT    CATARACT EXTRACTION      COLONOSCOPY N/A 2024    Procedure:  "COLONOSCOPY to cecum with cold snare polypectomies;  Surgeon: Harshad Gaston MD;  Location: [facility] ENDOSCOPY;  Service: Gastroenterology;  Laterality: N/A;  pre- gi bleed, anemia  post- diverticulosis, polyps    ENDOSCOPY N/A 01/19/2024    Procedure: ESOPHAGOGASTRODUODENOSCOPY with biospy;  Surgeon: Harshad Gaston MD;  Location: [facility] ENDOSCOPY;  Service: Gastroenterology;  Laterality: N/A;  pre- gi bleed, anemia  post- erosive gastirits    GALLBLADDER SURGERY      HERNIA REPAIR      HYSTERECTOMY      INCISION AND DRAINAGE LEG Right 5/31/2024    Procedure: RIGHT LOWER EXTREMITY WOUND EXPLORATION, DEBRIDEMENT AND CONTROL OF BLEEDING;  Surgeon: Gerald Pedraza MD;  Location: [facility] MAIN OR;  Service: General;  Laterality: Right;    LASIK      LYMPHADENECTOMY      OOPHORECTOMY        General Information       Row Name 12/13/24 0925          OT Time and Intention    Document Type therapy note (daily note)  -     Mode of Treatment occupational therapy;individual therapy  -       Row Name 12/13/24 0925          General Information    Patient Profile Reviewed yes  -SM     Existing Precautions/Restrictions fall;oxygen therapy device and L/min  -       Row Name 12/13/24 0925          Cognition    Orientation Status (Cognition) oriented to;person;place  -       Row Name 12/13/24 0925          Safety Issues/Impairments Affecting Functional Mobility    Impairments Affecting Function (Mobility) balance;endurance/activity tolerance;range of motion (ROM);strength;cognition  -               User Key  (r) = Recorded By, (t) = Taken By, (c) = Cosigned By      Initials Name Provider Type     Melanie Quiñones, OT Occupational Therapist                   Lymphedema       Row Name 12/13/24 0900 12/12/24 1200 12/10/24 3018       Subjective Pain    Able to rate subjective pain? yes  -SM -- yes  -MM    Pre-Treatment Pain Level -- -- 0  -MM    Post-Treatment Pain Level -- -- 0  -MM    Subjective Pain Comment \"hip " "hurting\" pt doesnt rate. RN notified. OT assisted with repositioning in the chair position.  -SM -- --    Recorded by [SM] Melanie Quiñones OT  [MM] Sharifa Vega OT       Subjective    Subjective Comments Pt confused, attempting to use call light as phone to call her daughter. Unable to recall daughters name when OT asked.  -SM -- pt reports her  and family assist with lymph wraps at home, noted confusion this date with conversation, unsure of accuracy of information provided and carryover. RN aware of lymph wraps donned and frequency OT will continue to follow until d/c, plans for SNF most likely  -MM    Recorded by [SM] Melanie Quiñones OT  [MM] Sharifa Vega OT       Skin Changes/Observations    Location/Assessment Lower Extremity  -SM Lower Extremity  -SM --    Lower Extremity Conditions right:;crust;scab(s)  redness around medial ankle. RN addressed wound care on RLE. Redness overall improved. Skin greenish with scattered bruising on BLE.  -SM right:;crust;scab(s)  -SM --    Recorded by [SM] Melanie Quiñones OT [SM] Melanie Quiñones OT        Lymphedema Sensation    Lymphedema Sensation Reports -- -- RLE:;LLE:;normal  -MM    Recorded by   [MM] Sharifa Vega OT       Compression/Skin Care    Compression/Skin Care skin care;wrapping location;bandaging;remove bandages  -SM skin care;wrapping location;bandaging;remove bandages  -SM skin care;wrapping location;bandaging;compression garment;remove bandages  -MM    Skin Care washed/dried;lotion applied  -SM washed/dried;lotion applied  -SM washed/dried  -MM    Wrapping Location lower extremity  -SM lower extremity  -SM lower extremity  -MM    Wrapping Location LE bilateral:;foot to knee  -SM bilateral:;foot to knee  -SM bilateral:;foot to knee  -MM    Bandage Layers cotton liner;padding/fluff layer;short-stretch bandages (comment size/quantity)  -SM cotton liner;padding/fluff layer;short-stretch bandages (comment size/quantity)  -SM cotton " liner;padding/fluff layer;short-stretch bandages (comment size/quantity)  -MM    Bandaging Comments TG9 stockinette, 10 and 15 artiflex per leg, RLE x8 and x10 cm, and LLE x2 8cm and x1 10 cm  -SM TG9 stockinette, 10 and 15 artiflex per leg, RLE x8 and x10 cm, and LLE x2 8cm and x1 10 cm  -SM TG9 stockinette, 10 and 15 artiflex per leg, RLE x8 and x10 cm, and LLE x2 8cm and x1 10 cm  -MM    Bandaging Technique light compression;moderate compression  -SM light compression;moderate compression  -SM light compression;moderate compression  -MM    Compression Garment Comments -- Pt reports no issues with tolerating wraps  - pt reports no pain or discomfort once wraps donned  -MM    Recorded by [SM] Melanie Quiñones OT [SM] Melanie Quiñones OT [MM] Sharifa Vega OT              User Key  (r) = Recorded By, (t) = Taken By, (c) = Cosigned By      Initials Name Effective Dates     Melanie Quiñones OT 04/02/20 -     MM Sharifa Vega OT 05/31/24 -                    Mobility/ADL's       Row Name 12/13/24 26          Lower Body Dressing Assessment/Training    Mills Level (Lower Body Dressing) don;socks;doff;dependent (less than 25% patient effort)  -     Position (Lower Body Dressing) sitting up in bed  -       Row Name 12/13/24 0926          Bathing Assessment/Intervention    Mills Level (Bathing) lower body;dependent (less than 25% patient effort)  -     Position (Bathing) sitting up in bed  -       Row Name 12/13/24 0926          Self-Feeding Assessment/Training    Mills Level (Feeding) feeding skills;set up  -     Position (Feeding) sitting up in bed  -               User Key  (r) = Recorded By, (t) = Taken By, (c) = Cosigned By      Initials Name Provider Type    Melanie Greer OT Occupational Therapist                   Obj/Interventions    No documentation.                  Goals/Plan    No documentation.                  Clinical Impression       Row Name 12/13/24  0927          Pain Assessment    Pre/Posttreatment Pain Comment c/o of hip pain- repositioned in bed.  -       Row Name 12/13/24 0927          Plan of Care Review    Plan of Care Reviewed With patient  -     Progress --  -     Outcome Evaluation Pt is also seen today for ADL following lymphedema treatment. Pt is lethargic initally, and falling asleep as OT wraps her legs so deferred sitting EOB today. OT did position pt in the chair position where pt was more alert while OT performed her LB ADLs with total A. She was able to fed herself after set up but cognitive status limiting as pt very distracted with call light and figiting with her sheets.  -       Row Name 12/13/24 0927          Therapy Plan Review/Discharge Plan (OT)    Anticipated Discharge Disposition (OT) skilled nursing facility  -       Row Name 12/13/24 0927          Positioning and Restraints    Pre-Treatment Position in bed  -     Post Treatment Position bed  -SM     In Bed fowlers;call light within reach;encouraged to call for assist;exit alarm on;notified [facility]  -               User Key  (r) = Recorded By, (t) = Taken By, (c) = Cosigned By      Initials Name Provider Type     Melanie Quiñones OT Occupational Therapist                   Outcome Measures    No documentation.                   Occupational Therapy Education       Title: PT OT SLP Therapies (Done)       Topic: Occupational Therapy (Done)       Point: ADL training (Done)       Description:   Instruct learner(s) on proper safety adaptation and remediation techniques during self care or transfers.   Instruct in proper use of assistive devices.                  Learning Progress Summary            Patient JESSICA Trevino, VU by  at 12/9/2024 0159    Comment: Role of OT                      Point: Home exercise program (Done)       Description:   Instruct learner(s) on appropriate technique for monitoring, assisting and/or progressing therapeutic exercises/activities.                   Learning Progress Summary            Patient JESSICA Trevino VU by  at 12/9/2024 1358    Comment: Role of OT                      Point: Precautions (Done)       Description:   Instruct learner(s) on prescribed precautions during self-care and functional transfers.                  Learning Progress Summary            Patient JESSICA Trevino VU by  at 12/9/2024 1358    Comment: Role of OT                      Point: Body mechanics (Done)       Description:   Instruct learner(s) on proper positioning and spine alignment during self-care, functional mobility activities and/or exercises.                  Learning Progress Summary            Patient JESSICA Trevino VU by  at 12/9/2024 1358    Comment: Role of OT                                      User Key       Initials Effective Dates Name Provider Type Discipline     07/24/24 -  Adán Stephenson OT Occupational Therapist OT                  OT Recommendation and Plan     Plan of Care Review  Plan of Care Reviewed With: patient  Outcome Evaluation: Pt is also seen today for ADL following lymphedema treatment. Pt is lethargic initally, and falling asleep as OT wraps her legs so deferred sitting EOB today. OT did position pt in the chair position where pt was more alert while OT performed her LB ADLs with total A. She was able to fed herself after set up but cognitive status limiting as pt very distracted with call light and figiting with her sheets.     Time Calculation:         Time Calculation- OT       Row Name 12/13/24 0930 12/13/24 0924          Time Calculation- OT    OT Start Time 0855  -SM 0824  -SM     OT Stop Time 0910  -SM 0855  -SM     OT Time Calculation (min) 15 min  -SM 31 min  -SM     OT Received On 12/13/24  - 12/13/24  -     OT - Next Appointment -- 12/16/24  -        Timed Charges    34369 - OT Self Care/Mgmt Minutes 15  -SM --        Total Minutes    Timed Charges Total Minutes 15  -SM --      Total Minutes 15  -SM --               User Key  (r) =  Recorded By, (t) = Taken By, (c) = Cosigned By      Initials Name Provider Type     Melanie Quiñones OT Occupational Therapist                  Therapy Charges for Today       Code Description Service Date Service Provider Modifiers Qty    17449194308 HC OT MULTI LAYER COMP SYS BELOW KNEE BILATERAL 12/12/2024 Melanie Quiñones OT  2    02460254891 HC OT SELF CARE/MGMT/TRAIN EA 15 MIN 12/12/2024 Melanie Quiñones OT GO 1    00207688605 HC OT MULTI LAYER COMP SYS BELOW KNEE BILATERAL 12/13/2024 Melanie Quiñones OT  2    52663689622 HC OT SELF CARE/MGMT/TRAIN EA 15 MIN 12/13/2024 Melanie Quiñones OT GO 1                 Melanie Quiñones OT  12/13/2024

## 2024-12-13 NOTE — PLAN OF CARE
Goal Outcome Evaluation:  Plan of Care Reviewed With: patient         OT removed wraps and rewrapped BLE for lymphedema treatment today. RN present to assess skin conditions and change dressing on RLE. RN notified OT will be back on Monday 12/16 to complete next treatment. Lymphedema wrapping to be completed daily over the weekend by nsg. Instructions left at bedside.

## 2024-12-13 NOTE — PROGRESS NOTES
"    DAILY PROGRESS NOTE  [facility]    Patient Identification:  Name: Vonnie Coppola  Age: 89 y.o.  Sex: female  :  1935  MRN: [id number]         Primary Care Physician: Christopher Leyva DO    Subjective:  Interval History: Not really voicing anything new.  She can answer [employer] orientation questions correctly but she also struggles with simple things as well.  Everything seems consistent with underlying dementia.  Case discussed with daughter at length at bedside who claims to be the main caretaker    Objective:    Scheduled Meds:apixaban, 2.5 mg, Oral, Q12H  arformoterol, 15 mcg, Nebulization, BID - RT  atorvastatin, 80 mg, Oral, Nightly  budesonide, 0.5 mg, Nebulization, BID - RT  diazePAM, 1 mg, Oral, BID  polyethylene glycol, 17 g, Oral, Daily  senna-docusate sodium, 2 tablet, Oral, BID      Continuous Infusions:     Vital signs in last 24 hours:  Temp:  [97.5 °F (36.4 °C)-98.1 °F (36.7 °C)] 98.1 °F (36.7 °C)  Heart Rate:  [63-67] 67  Resp:  [16-18] 18  BP: ()/(58-67) 84/58    Intake/Output:    Intake/Output Summary (Last 24 hours) at 2024 1150  Last data filed at 2024 0520  Gross per 24 hour   Intake --   Output 300 ml   Net -300 ml       Exam:  BP (!) 84/58 (BP Location: Right arm, Patient Position: Lying)   Pulse 67   Temp 98.1 °F (36.7 °C) (Oral)   Resp 18   Ht 165.1 cm (65\")   Wt 85.2 kg (187 lb 13.3 oz)   SpO2 95%   BMI 31.26 kg/m²     General Appearance:    Alert, cooperative, pleasantly demented                          Head:    Normocephalic, without obvious abnormality, atraumatic                           Eyes:    PERRLA,/EOM's intact, both eyes                         Throat:   Oral mucosa pink and moist                           Neck:   No JVD                         Lungs:    Clear to auscultation bilaterally, respirations unlabored                          Heart:    Regular rate and rhythm, S1 and S2 normal                  Abdomen:     " Soft, nontender, bowel sounds active                 Extremities: Generalized weakness                             Data Review:  Labs in chart were reviewed.    Assessment:  Active Hospital Problems    Diagnosis  POA    **Stroke-like symptoms [R29.90]  Yes    Severe protein-calorie malnutrition [E43]  Yes    Chronic anticoagulation [Z79.01]  Not Applicable    Anxiety disorder [F41.9]  Yes    Benzodiazepine dependence [F13.20]  Yes    Hypokalemia [E87.6]  No    Altered mental status [R41.82]  Yes    History of pulmonary embolism [Z86.711]  Yes    Atrial fibrillation [I48.91]  Yes    History of CVA (cerebrovascular accident) [Z86.73]  Not Applicable    HTN (hypertension) [I10]  Yes    History of breast cancer [Z85.3]  Not Applicable    Asthma [J45.909]  Yes    Anemia [D64.9]  Yes      Resolved Hospital Problems   No resolved problems to display.       Plan:    Patient seen and examined.  Case discussed with daughter at bedside.  Very clearly counseled her about my concerns for progressive dementia compounded by her need for chronic benzos.  Suggest changing daily Valium to as needed and she seems to understand of how to adjust dosing this.  Keeping her nightly dose scheduled.  Ultimately she understands the clinical decline and age of 89 and I think is giving consideration for further comfort care pending clinical course.  At this point we are trying to give her additional rehab to see if she can make any additional benefits and [facility] is coordinating    OT wraps/lymphedema     Fecal impaction resolved with chronic constipation    Chronic HRF-2L NC    PAF-RC-AC/Eliquis        Disposition -await to hear back from [facility] but hopeful for discharge today          Tarik Palm MD  12/13/2024  11:50 EST

## 2024-12-13 NOTE — DISCHARGE SUMMARY
Date of Discharge:  12/13/2024    PCP: Christopher Leyva, DO    Discharge Diagnosis:   Active Hospital Problems    Diagnosis  POA    **Stroke-like symptoms [R29.90]  Yes    Severe protein-calorie malnutrition [E43]  Yes    Chronic anticoagulation [Z79.01]  Not Applicable    Anxiety disorder [F41.9]  Yes    Benzodiazepine dependence [F13.20]  Yes    Hypokalemia [E87.6]  No    Altered mental status [R41.82]  Yes    History of pulmonary embolism [Z86.711]  Yes    Atrial fibrillation [I48.91]  Yes    History of CVA (cerebrovascular accident) [Z86.73]  Not Applicable    HTN (hypertension) [I10]  Yes    History of breast cancer [Z85.3]  Not Applicable    Asthma [J45.909]  Yes    Anemia [D64.9]  Yes      Resolved Hospital Problems   No resolved problems to display.          Consults       Date and Time Order Name Status Description    12/7/2024 10:13 PM Inpatient Neurology Consult Stroke Completed     12/7/2024  4:35 PM [facility] (on-call MD unless specified) Details      12/7/2024  2:31 PM Inpatient Neurology Consult Stroke Completed     12/7/2024  2:31 PM Inpatient Neurology Consult Stroke Completed           Hospital Course  Patient is a 89 y.o. female who ultimately has been dealing with acute on chronic decline.  She has had some recurrent issues with UTI but that is not the case on this admission.  Neurology saw in consultation and patient did undergo CT as well as MRI and there was no acute disease but did show chronic changes.  EEG also performed with diffuse slowing but no active epileptiform activity.  Her TSH is normal.  Her UDS is positive for benzos and she does utilize Valium which she has done for many many years prior to this admission.  She underwent an LP per neurorecommendations and that is unremarkable.  Eliquis has since been resumed and no complications are noted.  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  She has reduced mobility as well and is overall clinically declining.  She is walker dependent and has progressive generalized weakness made worse over the last 6 months.  I was able to discuss the case with the daughter who claims to be main caretaker at bedside and she seems to truly understand his clinical decline.  I would not endorse any further workup if clinical decline is noted for this patient.  I think her chronic use of her volume is definitely making things worse at this point in her life.  I have suggested to utilize the daily dose only on an as-needed basis and keep her nightly doses scheduled at 1 mg nightly.  She utilizes leg wraps with the assistance of OT for her chronic lymphedema.  She also has chronic O2 requirements in which she stays on baseline 2 L NC.  She had some fecal impaction noted with chronic constipation issues and that is all resolved.  We did discontinue her Carafate as that is likely making her constipation worse and she can continue use of Protonix/PPI is formally utilized.  She was temporarily placed on high-dose statin but after looking at her lipid panel I would reduce that to just a low-dose of 10 mg and defer further prescribing to PCP.  She has no diabetes and no additional Accu-Cheks were warranted.  Her A-fib is rate controlled and she is anticoagulated on Eliquis.  I think her anticoagulation is going to become more into play in regards to benefits versus risk.  She is already dealing with generalized weakness and hopefully she can make some strides at rehab to get a more stable gait but if that does not occur then I think further discontinuation consideration of anticoagulation should be considered.  All questions answered to patient but more so to daughter at bedside and case has been discussed daily multidisciplinary rounds as I appreciate CCP assistance helping with discharge needs.  Also appreciate the assistance of PT OT and  speech.      Temp:  [97.5 °F (36.4 °C)-98.1 °F (36.7 °C)] 98.1 °F (36.7 °C)  Heart Rate:  [63-67] 67  Resp:  [16-18] 18  BP: ()/(58-67) 84/58  Body mass index is 31.26 kg/m².    Physical Exam  Constitutional:       Comments: Pleasantly demented though can answer a lot of orientations correctly as well at times.  Speech is overall fluent.  Affect is quite flat   HENT:      Head: Normocephalic.      Comments: Mild bitemporal wasting noted     Nose: Nose normal.      Mouth/Throat:      Mouth: Mucous membranes are moist.      Pharynx: Oropharynx is clear.   Eyes:      Pupils: Pupils are equal, round, and reactive to light.   Cardiovascular:      Rate and Rhythm: Normal rate and regular rhythm.   Pulmonary:      Effort: Pulmonary effort is normal. No respiratory distress.      Breath sounds: Normal breath sounds.   Abdominal:      General: Bowel sounds are normal. There is no distension.      Palpations: Abdomen is soft.      Tenderness: There is no abdominal tenderness.   Musculoskeletal:         General: No swelling.   Skin:     General: Skin is warm and dry.   Neurological:      Mental Status: She is alert.      Cranial Nerves: No cranial nerve deficit.      Motor: Weakness present.      Gait: Gait abnormal.       Disposition: Skilled Nursing Facility (DC - External)       Discharge Medications        New Medications        Instructions Start Date   atorvastatin 10 MG tablet  Commonly known as: LIPITOR   10 mg, Oral, Nightly      naloxone 4 MG/0.1ML nasal spray  Commonly known as: NARCAN   Call 911. Don't prime. Garrett in 1 nostril for overdose. Repeat in 2-3 minutes in other nostril if no or minimal breathing/responsiveness.      Polyvinyl Alcohol-Povidone PF 1.4-0.6 % ophthalmic solution  Commonly known as: ARTIFICIAL TEARS   2 drops, Both Eyes, Every 1 Hour PRN             Changes to Medications        Instructions Start Date   diazePAM 2 MG tablet  Commonly known as: VALIUM  What changed:   how much to  take  when to take this   1 mg, Oral, Nightly      diazePAM 2 MG tablet  Commonly known as: VALIUM  What changed: You were already taking a medication with the same name, and this prescription was added. Make sure you understand how and when to take each.   1 mg, Oral, Daily PRN      pantoprazole 40 MG EC tablet  Commonly known as: PROTONIX  What changed: when to take this   40 mg, Oral, Daily      polyethylene glycol 17 g packet  Commonly known as: MIRALAX  What changed: Another medication with the same name was removed. Continue taking this medication, and follow the directions you see here.   17 g, Oral, 2 Times Daily      sennosides-docusate 8.6-50 MG per tablet  Commonly known as: PERICOLACE  What changed: Another medication with the same name was removed. Continue taking this medication, and follow the directions you see here.   2 tablets, Oral, 2 Times Daily             Continue These Medications        Instructions Start Date   albuterol 0.63 MG/3ML nebulizer solution  Commonly known as: ACCUNEB   0.63 mg, Nebulization, Every 6 Hours PRN      apixaban 2.5 MG tablet tablet  Commonly known as: ELIQUIS   2.5 mg, Oral, Every 12 Hours Scheduled      arformoterol 15 MCG/2ML nebulizer solution  Commonly known as: Brovana   15 mcg, Nebulization, 2 Times Daily - RT      budesonide 0.5 MG/2ML nebulizer solution  Commonly known as: PULMICORT   0.5 mg, Nebulization, 2 Times Daily             Stop These Medications      acetaminophen 325 MG tablet  Commonly known as: TYLENOL     benzonatate 100 MG capsule  Commonly known as: TESSALON     coenzyme Q10 50 MG capsule capsule     dilTIAZem  MG 24 hr capsule  Commonly known as: CARDIZEM CD     Dulcolax 10 MG suppository  Generic drug: bisacodyl     glucosamine-chondroitin 500-400 MG capsule capsule     Magnesium 500 MG tablet     ondansetron ODT 4 MG disintegrating tablet  Commonly known as: ZOFRAN-ODT     oxyCODONE 5 MG immediate release tablet  Commonly known as:  ROXICODONE     phenylephrine-mineral oil-petrolatum 0.25-14-74.9 % ointment hemorrhoidal ointment  Commonly known as: PREPARATION H                Contact information for follow-up providers       Christopher Leyva DO .    Specialty: Internal Medicine  Contact information:  [address]  [facility] [address]  [phone]                       Contact information for after-discharge care       Destination       [facility] .    Service: Skilled Nursing  Contact information:  [address] [address] [facility] [address] [phone]                                  Future Appointments   Date Time Provider Department Center   1/21/2025 11:00 AM AC CT 2  AC CT AC   1/28/2025 12:40 PM LAB CHAIR 3 NU BULL  LAB JANIS G[facility]   1/28/2025  1:00 PM Augustus Wilde MD [facility] KRES Sun Palm MD  [facility]  12/13/24    Discharge time spent greater than 30 minutes.

## 2024-12-13 NOTE — CASE MANAGEMENT/SOCIAL WORK
Continued Stay Note  [facility]     Patient Name: Vonnie Coppola  MRN: [id number]  Today's Date: 12/13/2024    Admit Date: 12/7/2024    Plan: DC to [place] via [facility] tomorrow at 1600,   Discharge Plan       Row Name 12/13/24 5374       Plan    Plan DC to [place] via [facility] tomorrow at 1600,    Patient/Family in Agreement with Plan yes    Plan Comments Ha LEMONS/CCP notified CCP pt's precert is approved for [place]. CCP arranged [facility] tomorrow at 1600. CCP notified pt's daughter/MD Elizabeth, RN and [facility] pt's precert is approved at [place] and pt's transport to [place] will be tomorrow at 1600. Pharmacy updated. Packet given to RN. Dominic RN/BETSEY      Row Name 12/13/24 6507       Plan    Plan Plan will be DC to [place] when precert is approved. Transport will need to be arranged.    Patient/Family in Agreement with Plan yes    Plan Comments [facility] cannot accept [facility] at [facility], [facility] or [facility], but can accept pt at [place] and has a bed available on Saturday. [facility] can accept, and the bed is available today. [facility] cannot accept pt. [facility] followed up with pt’s daughter/ at the bedside regarding SNF referrals. [facility] notified  that [place] can accept pt and has a bed available; [facility] does not have bed available at [facility] or [facility]; [facility] does have a bed available at [place] a sister [facility] building; and [facility] does not have bed available.  would like to think about [place] and get back with [facility].  called [facility] and would like pt to go to [place] for SNF at [place]; she would like transport arranged at [place]. CCP notified [facility] pt would like to go to [place] for SNF. [facility] will have the bed available at [place] tomorrow after 1500 and precert can be started. [facility] submitted pt to Post Acute Authorizations for precert. Plan  will be Oniel Dietrich at DC with precert pending. Transport will need to be arranged at DC. CCP will continue to follow for any DC needs. RLutes RN/CCP                   Discharge Codes    No documentation.                 Expected Discharge Date and Time       Expected Discharge Date Expected Discharge Time    Dec 13, 2024               Simi Alexandra RN

## 2024-12-13 NOTE — THERAPY TREATMENT NOTE
Acute Care - Occupational Therapy Lymphedema Treatment Note  [facility]     Patient Name: Vonnie Coppola  : 1935  MRN: [id number]  Today's Date: 2024             Admit Date: 2024       ICD-10-CM ICD-9-CM   1. Altered mental status, unspecified altered mental status type  R41.82 780.97   2. Right arm weakness  R29.898 729.89     Patient Active Problem List   Diagnosis    GI bleed    Anemia    HTN (hypertension)    History of breast cancer    Asthma    History of CVA (cerebrovascular accident)    Dehydration    Renal insufficiency    Pulmonary nodule    Adnexal cyst    Nausea    Atrial fibrillation    History of pulmonary embolism    Class 2 severe obesity with serious comorbidity in adult    Thrombocytopenia    Cellulitis of right leg    Acute cystitis    Leg hematoma, right, subsequent encounter    Obesity (BMI 35.0-39.9 without comorbidity)    Morbid (severe) obesity due to excess calories (*specify comorbidity)    Constipation    UTI (urinary tract infection)    Deep vein thrombosis    Generalized abdominal pain    Altered mental status    Chronic anticoagulation    Anxiety disorder    Benzodiazepine dependence    E. coli UTI (urinary tract infection)    Stroke-like symptoms    Hypokalemia    Severe protein-calorie malnutrition     Past Medical History:   Diagnosis Date    Arthritis     Asthma     Atrial fibrillation     per pt's daughter.States hx of a-fib in the past when stressed or tired.    Blind left eye     Breast cancer     LEFT BREAST CA, LUMPECTOMY & RADS    Deep vein thrombosis     History of cataract     Hx of radiation therapy     APPROXIMATELY 40 TREATMENTS FOR LEFT BREAST CA    Hypertension     Pneumonia     PONV (postoperative nausea and vomiting)     Stroke      Past Surgical History:   Procedure Laterality Date    APPENDECTOMY      BLADDER SURGERY      BREAST BIOPSY Left     MALIGNANT    BREAST LUMPECTOMY Left     MALIGNANT    CATARACT EXTRACTION       "COLONOSCOPY N/A 01/19/2024    Procedure: COLONOSCOPY to cecum with cold snare polypectomies;  Surgeon: Harshad Gaston MD;  [facility] [facility];  Service: Gastroenterology;  Laterality: N/A;  pre- gi bleed, anemia  post- diverticulosis, polyps    ENDOSCOPY N/A 01/19/2024    Procedure: ESOPHAGOGASTRODUODENOSCOPY with biospy;  Surgeon: Harshad Gaston MD;  Location: [facility];  Service: Gastroenterology;  Laterality: N/A;  pre- gi bleed, anemia  post- erosive gastirits    GALLBLADDER SURGERY      HERNIA REPAIR      HYSTERECTOMY      INCISION AND DRAINAGE LEG Right 5/31/2024    Procedure: RIGHT LOWER EXTREMITY WOUND EXPLORATION, DEBRIDEMENT AND CONTROL OF BLEEDING;  Surgeon: Gerald Pedraza MD;  Location[facility];  Service: General;  Laterality: Right;    LASIK      LYMPHADENECTOMY      OOPHORECTOMY          Lymphedema       Row Name 12/13/24 0900 12/12/24 1200 12/10/24 2788       Subjective Pain    Able to rate subjective pain? yes  -SM -- yes  -MM    Pre-Treatment Pain Level -- -- 0  -MM    Post-Treatment Pain Level -- -- 0  -MM    Subjective Pain Comment \"hip hurting\" pt doesnt rate. RN notified. OT assisted with repositioning in the chair position.  -SM -- --    Recorded by [SM] Melanie Quiñones OT  [MM] Sharifa Vega OT       Subjective    Subjective Comments Pt confused, attempting to use call light as phone to call her daughter. Unable to recall daughters name when OT asked.  -SM -- pt reports her  and family assist with lymph wraps at home, noted confusion this date with conversation, unsure of accuracy of information provided and carryover. RN aware of lymph wraps donned and frequency OT will continue to follow until d/c, plans for SNF most likely  -MM    Recorded by [SM] Melanie Quiñones OT  [MM] Sharifa Vega OT       Skin Changes/Observations    Location/Assessment Lower Extremity  -SM Lower Extremity  -SM --    Lower Extremity Conditions right:;crust;scab(s)  " redness around medial ankle. RN addressed wound care on RLE. Redness overall improved. Skin greenish with scattered bruising on BLE.  - right:;crust;scab(s)  -SM --    Recorded by [SM] Melanie Quiñones OT [SM] Melanie Quiñones OT        Lymphedema Sensation    Lymphedema Sensation Reports -- -- RLE:;LLE:;normal  -MM    Recorded by   [MM] Sharifa Vega OT       Compression/Skin Care    Compression/Skin Care skin care;wrapping location;bandaging;remove bandages  -SM skin care;wrapping location;bandaging;remove bandages  -SM skin care;wrapping location;bandaging;compression garment;remove bandages  -MM    Skin Care washed/dried;lotion applied  - washed/dried;lotion applied  -SM washed/dried  -MM    Wrapping Location lower extremity  -SM lower extremity  -SM lower extremity  -MM    Wrapping Location LE bilateral:;foot to knee  - bilateral:;foot to knee  - bilateral:;foot to knee  -MM    Bandage Layers cotton liner;padding/fluff layer;short-stretch bandages (comment size/quantity)  - cotton liner;padding/fluff layer;short-stretch bandages (comment size/quantity)  -SM cotton liner;padding/fluff layer;short-stretch bandages (comment size/quantity)  -MM    Bandaging Comments TG9 stockinette, 10 and 15 artiflex per leg, RLE x8 and x10 cm, and LLE x2 8cm and x1 10 cm  -SM TG9 stockinette, 10 and 15 artiflex per leg, RLE x8 and x10 cm, and LLE x2 8cm and x1 10 cm  -SM TG9 stockinette, 10 and 15 artiflex per leg, RLE x8 and x10 cm, and LLE x2 8cm and x1 10 cm  -MM    Bandaging Technique light compression;moderate compression  - light compression;moderate compression  - light compression;moderate compression  -MM    Compression Garment Comments -- Pt reports no issues with tolerating wraps  - pt reports no pain or discomfort once wraps donned  -MM    Recorded by [SM] Melanie Quiñones OT [SM] Melanie Quiñones OT [MM] Gary, Sharifa, OT              User Key  (r) = Recorded By, (t) = Taken By, (c) =  Cosigned By      Initials Name Effective Dates     Melanie Quiñones, OT 04/02/20 -     MM GarySharifa, OT 05/31/24 -                     OT ASSESSMENT FLOWSHEET (Last 12 Hours)       OT Evaluation and Treatment       Row Name                     Wound 12/10/24 1208 Left distal arm skin tear    Wound - Properties Group Placement Date: 12/10/24  -CS Placement Time: 1208  -CS, patient arrived in radiology tra[facility] with skin tear present- dressing changed- optifoam, curlex wrapped around site  Side: Left  -CS Orientation: distal  -CS Location: arm  -CS Primary Wound Type: Skin tear  -CS Type: skin tear  -CS Additional Comments: Pt arrived in Radiology [place] for LP with wound- dressing changed after LP to optifoam dressing and wrapped with curlex  -CS    Retired Wound - Properties Group Placement Date: 12/10/24  -CS Placement Time: 1208  -CS, patient arrived in radiology [place] with skin tear present- dressing changed- optifoam, curlex wrapped around site  Side: Left  -CS Orientation: distal  -CS Location: arm  -CS Primary Wound Type: Skin tear  -CS Additional Comments: Pt arrived in Radiology [place] for LP with wound- dressing changed after LP to optifoam dressing and wrapped with curlex  -CS Type: skin tear  -CS    Retired Wound - Properties Group Placement Date: 12/10/24  -CS Placement Time: 1208  -CS, patient arrived in radiology [place] with skin tear present- dressing changed- optifoam, curlex wrapped around site  Side: Left  -CS Orientation: distal  -CS Location: arm  -CS Primary Wound Type: Skin tear  -CS Additional Comments: Pt arrived in Radiology [place] for LP with wound- dressing changed after LP to optifoam dressing and wrapped with curlex  -CS Type: skin tear  -CS    Retired Wound - Properties Group Date first assessed: 12/10/24  -CS Time first assessed: 1208  -CS, patient arrived in radiology [place] with skin tear present- dressing changed- optifoam, curlex wrapped around site  Side: Left  -CS Location:  arm  -CS Primary Wound Type: Skin tear  -CS Additional Comments: Pt arrived in Radiology [facility] for LP with wound- dressing changed after LP to optifoam dressing and wrapped with curlex  -CS Type: skin tear  -CS              User Key  (r) = Recorded By, (t) = Taken By, (c) = Cosigned By      Initials Name Effective Dates    CS Salomón Etienne RN 11/22/24 -                      Occupational Therapy Education       Title: PT OT SLP Therapies (Done)       Topic: Occupational Therapy (Done)       Point: ADL training (Done)       Description:   Instruct learner(s) on proper safety adaptation and remediation techniques during self care or transfers.   Instruct in proper use of assistive devices.                  Learning Progress Summary            Patient JESSICA Trevino, VU by  at 12/9/2024 1358    Comment: Role of OT                      Point: Home exercise program (Done)       Description:   Instruct learner(s) on appropriate technique for monitoring, assisting and/or progressing therapeutic exercises/activities.                  Learning Progress Summary            Patient JESSICA Trevino, VU by  at 12/9/2024 1358    Comment: Role of OT                      Point: Precautions (Done)       Description:   Instruct learner(s) on prescribed precautions during self-care and functional transfers.                  Learning Progress Summary            Patient JESSICA Trevino VU by  at 12/9/2024 1358    Comment: Role of OT                      Point: Body mechanics (Done)       Description:   Instruct learner(s) on proper positioning and spine alignment during self-care, functional mobility activities and/or exercises.                  Learning Progress Summary            Patient JESSICA Trevino VU by  at 12/9/2024 1358    Comment: Role of OT                                      User Key       Initials Effective Dates Name Provider Type Discipline     07/24/24 -  Adán Stephenson OT Occupational Therapist OT                      OT  Recommendation and Plan     Plan of Care Review  Plan of Care Reviewed With: patient  Outcome Evaluation: OT also saw pt for ADL today as well as lymphedema wrapping. Pt sat EOB throughout the encounter. Pt needs assist to wash and dress LEs. She has good sitting balance throughout encounter and is able to brush her hair in sitting. Pt needs assist to return to bed and open containers in prep to feeding herself lunch. Shoulder ROM limiting her reach to her tray and positioning assist provided by OT prior to meal.  Plan of Care Reviewed With: patient  Outcome Evaluation: OT also saw pt for ADL today as well as lymphedema wrapping. Pt sat EOB throughout the encounter. Pt needs assist to wash and dress LEs. She has good sitting balance throughout encounter and is able to brush her hair in sitting. Pt needs assist to return to bed and open containers in prep to feeding herself lunch. Shoulder ROM limiting her reach to her tray and positioning assist provided by OT prior to meal.            Time Calculation:     Time Calculation- OT       Row Name 12/13/24 0924             Time Calculation- OT    OT Start Time 0824  -      OT Stop Time 0855  -      OT Time Calculation (min) 31 min  -      OT Received On 12/13/24  -      OT - Next Appointment 12/16/24  -                User Key  (r) = Recorded By, (t) = Taken By, (c) = Cosigned By      Initials Name Provider Type     Melanie Quiñones OT Occupational Therapist                    Therapy Charges for Today       Code Description Service Date Service Provider Modifiers Qty    [id number]  OT MULTI LAYER COMP SYS BELOW KNEE BILATERAL 12/12/2024 Melanie Quiñones OT  2    [id number]  OT SELF CARE/MGMT/TRAIN EA 15 MIN 12/12/2024 Melanie Quiñones OT GO 1    [id number]  OT MULTI LAYER COMP SYS BELOW KNEE BILATERAL 12/13/2024 Melanie Quiñones OT 2 Sarah Montgomery, OT  12/13/2024

## 2024-12-13 NOTE — CASE MANAGEMENT/SOCIAL WORK
Post-Acute Authorization Submission      Post Acute Pre-Cert Documentation  Request Submitted by Facility - Type:: Hospital  Post-Acute Authorization Type Submitted:: SNF  Date Post Acute Pre-Cert Inititated per Facility: 12/13/24  Date Post Acute Pre-Cert Completed: 12/13/24  Accepting Facility: [facility] Discharge Date Requested: 12/14/24  All Clinicals Submitted?: Yes  Had Accepting Facility at Time of Submission: Yes  Response Received from Insurance?: Approval  Response Communicated to:: , Accepting Facility Liaison, Accepting Facility Auth Department  Authorization Number:: [id number]  Post Acute Pre-Cert Initiated Comment: VALID TO ADMIT UPTO 12/18/24.              Ha Abdullahi, PCT

## 2024-12-14 VITALS
HEIGHT: 65 IN | DIASTOLIC BLOOD PRESSURE: 55 MMHG | SYSTOLIC BLOOD PRESSURE: 118 MMHG | BODY MASS INDEX: 31.44 KG/M2 | RESPIRATION RATE: 18 BRPM | WEIGHT: 188.71 LBS | OXYGEN SATURATION: 94 % | TEMPERATURE: 98.1 F | HEART RATE: 76 BPM

## 2024-12-14 PROCEDURE — 94760 N-INVAS EAR/PLS OXIMETRY 1: CPT

## 2024-12-14 PROCEDURE — 94799 UNLISTED PULMONARY SVC/PX: CPT

## 2024-12-14 PROCEDURE — 94664 DEMO&/EVAL PT USE INHALER: CPT

## 2024-12-14 PROCEDURE — 97530 THERAPEUTIC ACTIVITIES: CPT

## 2024-12-14 PROCEDURE — 94761 N-INVAS EAR/PLS OXIMETRY MLT: CPT

## 2024-12-14 RX ADMIN — BUDESONIDE 0.5 MG: 0.5 INHALANT RESPIRATORY (INHALATION) at 07:17

## 2024-12-14 RX ADMIN — APIXABAN 2.5 MG: 2.5 TABLET, FILM COATED ORAL at 08:35

## 2024-12-14 RX ADMIN — SENNOSIDES AND DOCUSATE SODIUM 2 TABLET: 50; 8.6 TABLET ORAL at 08:35

## 2024-12-14 RX ADMIN — ACETAMINOPHEN 650 MG: 650 LIQUID ORAL at 06:51

## 2024-12-14 RX ADMIN — POLYETHYLENE GLYCOL 3350 17 G: 17 POWDER, FOR SOLUTION ORAL at 08:35

## 2024-12-14 RX ADMIN — ARFORMOTEROL TARTRATE 15 MCG: 15 SOLUTION RESPIRATORY (INHALATION) at 07:15

## 2024-12-14 NOTE — DISCHARGE SUMMARY
Date of Discharge:  12/14/2024    PCP: Christopher Leyva, DO    Discharge Diagnosis:   Active Hospital Problems    Diagnosis  POA    **Stroke-like symptoms [R29.90]  Yes    Severe protein-calorie malnutrition [E43]  Yes    Chronic anticoagulation [Z79.01]  Not Applicable    Anxiety disorder [F41.9]  Yes    Benzodiazepine dependence [F13.20]  Yes    Hypokalemia [E87.6]  No    Altered mental status [R41.82]  Yes    History of pulmonary embolism [Z86.711]  Yes    Atrial fibrillation [I48.91]  Yes    History of CVA (cerebrovascular accident) [Z86.73]  Not Applicable    HTN (hypertension) [I10]  Yes    History of breast cancer [Z85.3]  Not Applicable    Asthma [J45.909]  Yes    Anemia [D64.9]  Yes      Resolved Hospital Problems   No resolved problems to display.          Consults       Date and Time Order Name Status Description    12/7/2024 10:13 PM Inpatient Neurology Consult Stroke Completed     12/7/2024  4:35 PM [facility] (on-call MD unless specified) Details      12/7/2024  2:31 PM Inpatient Neurology Consult Stroke Completed     12/7/2024  2:31 PM Inpatient Neurology Consult Stroke Completed           Hospital Course  Patient is a 89 y.o. female who ultimately has been dealing with acute on chronic decline. She has had some recurrent issues with UTI but that is not the case on this admission. Neurology saw in consultation and patient did undergo CT as well as MRI and there was no acute disease but did show chronic changes. EEG also performed with diffuse slowing but no active epileptiform activity. Her TSH is normal. Her UDS is positive for benzos and she does utilize Valium which she has done for many many years prior to this admission. She underwent an LP per neurorecommendations and that is unremarkable. Eliquis has since been resumed and no complications are noted. I think this patient is very clearly dealing with advancing dementia at age 89 likely vascular in etiology given past history of CVA compounded by  blindness under left eye. She has reduced mobility as well and is overall clinically declining. She is walker dependent and has progressive generalized weakness made worse over the last 6 months. I was able to discuss the case with the daughter who claims to be main caretaker at bedside and she seems to truly understand his clinical decline. I would not endorse any further workup if clinical decline is noted for this patient. I think her chronic use of her volume is definitely making things worse at this point in her life. I have suggested to utilize the daily dose only on an as-needed basis and keep her nightly doses scheduled at 1 mg nightly. She utilizes leg wraps with the assistance of OT for her chronic lymphedema. She also has chronic O2 requirements in which she stays on baseline 2 L NC. She had some fecal impaction noted with chronic constipation issues and that is all resolved. We did discontinue her Carafate as that is likely making her constipation worse and she can continue use of Protonix/PPI is formally utilized. She was temporarily placed on high-dose statin but after looking at her lipid panel I would reduce that to just a low-dose of 10 mg and defer further prescribing to PCP. She has no diabetes and no additional Accu-Cheks were warranted. Her A-fib is rate controlled and she is anticoagulated on Eliquis. I think her anticoagulation is going to become more into play in regards to benefits versus risk. She is already dealing with generalized weakness and hopefully she can make some strides at rehab to get a more stable gait but if that does not occur then I think further discontinuation consideration of anticoagulation should be considered. All questions answered to patient but more so to daughter at bedside and case has been discussed daily multidisciplinary rounds as I appreciate CCP assistance helping with discharge needs. Also appreciate the assistance of PT OT and speech.       Addendum  -patient DC held up for logistic reasons with EMS transfer set up today at 1600.  No new medical issues have arisen.  Case discussed with managing RN this morning as no family at bedside.      Temp:  [97.3 °F (36.3 °C)-98.2 °F (36.8 °C)] 98.2 °F (36.8 °C)  Heart Rate:  [66-78] 76  Resp:  [16-18] 18  BP: (103-150)/(63-79) 150/79  Body mass index is 31.4 kg/m².    Physical Exam  Constitutional:       Comments:  Pleasantly demented though can answer a lot of orientations correctly as well at times.  Speech is overall fluent.  Affect is quite flat   HENT:      Head: Normocephalic.      Nose: Nose normal.      Mouth/Throat:      Mouth: Mucous membranes are moist.      Pharynx: Oropharynx is clear.   Cardiovascular:      Rate and Rhythm: Normal rate and regular rhythm.   Pulmonary:      Effort: Pulmonary effort is normal. No respiratory distress.      Breath sounds: Normal breath sounds.   Abdominal:      General: Bowel sounds are normal. There is no distension.      Palpations: Abdomen is soft.      Tenderness: There is no abdominal tenderness.   Musculoskeletal:         General: No swelling.   Neurological:      Motor: Weakness present.      Gait: Gait abnormal.       Disposition: Skilled Nursing Facility (DC - External)       Discharge Medications        New Medications        Instructions Start Date   atorvastatin 10 MG tablet  Commonly known as: LIPITOR   10 mg, Oral, Nightly      naloxone 4 MG/0.1ML nasal spray  Commonly known as: NARCAN   Call 911. Don't prime. [place] in 1 nostril for overdose. Repeat in 2-3 minutes in other nostril if no or minimal breathing/responsiveness.      Polyvinyl Alcohol-Povidone PF 1.4-0.6 % ophthalmic solution  Commonly known as: ARTIFICIAL TEARS   2 drops, Both Eyes, Every 1 Hour PRN             Changes to Medications        Instructions Start Date   diazePAM 2 MG tablet  Commonly known as: VALIUM  What changed:   how much to take  when to take this   1 mg, Oral, Nightly      diazePAM 2  MG tablet  Commonly known as: VALIUM  What changed: You were already taking a medication with the same name, and this prescription was added. Make sure you understand how and when to take each.   1 mg, Oral, Daily PRN      pantoprazole 40 MG EC tablet  Commonly known as: PROTONIX  What changed: when to take this   40 mg, Oral, Daily      polyethylene glycol 17 g packet  Commonly known as: MIRALAX  What changed: Another medication with the same name was removed. Continue taking this medication, and follow the directions you see here.   17 g, Oral, 2 Times Daily      sennosides-docusate 8.6-50 MG per tablet  Commonly known as: PERICOLACE  What changed: Another medication with the same name was removed. Continue taking this medication, and follow the directions you see here.   2 tablets, Oral, 2 Times Daily             Continue These Medications        Instructions Start Date   albuterol 0.63 MG/3ML nebulizer solution  Commonly known as: ACCUNEB   0.63 mg, Nebulization, Every 6 Hours PRN      apixaban 2.5 MG tablet tablet  Commonly known as: ELIQUIS   2.5 mg, Oral, Every 12 Hours Scheduled      arformoterol 15 MCG/2ML nebulizer solution  Commonly known as: Brovana   15 mcg, Nebulization, 2 Times Daily - RT      budesonide 0.5 MG/2ML nebulizer solution  Commonly known as: PULMICORT   0.5 mg, Nebulization, 2 Times Daily             Stop These Medications      acetaminophen 325 MG tablet  Commonly known as: TYLENOL     benzonatate 100 MG capsule  Commonly known as: TESSALON     coenzyme Q10 50 MG capsule capsule     dilTIAZem  MG 24 hr capsule  Commonly known as: CARDIZEM CD     Dulcolax 10 MG suppository  Generic drug: bisacodyl     glucosamine-chondroitin 500-400 MG capsule capsule     Magnesium 500 MG tablet     ondansetron ODT 4 MG disintegrating tablet  Commonly known as: ZOFRAN-ODT     oxyCODONE 5 MG immediate release tablet  Commonly known as: ROXICODONE     phenylephrine-mineral oil-petrolatum 0.25-14-74.9 %  ointment hemorrhoidal ointment  Commonly known as: PREPARATION H                Contact information for follow-up providers       Christopher Leyva DO .    Specialty: Internal Medicine  Contact information:  [address]  [address]  [facility] [address] [phone]                       Contact information for after-discharge care       Destination       [facility] .    Service: Skilled Nursing  Contact information:  [address] [address] [phone]                                  Future Appointments   Date Time Provider Department Center   1/21/2025 11:00 AM AC CT 2  AC CT AC   1/28/2025 12:40 PM LAB CHAIR 3 NU BULL  LAB JANIS G[facility]ia   1/28/2025  1:00 PM Augustus Wilde MD [facility] KRES [place] MD  [facility]  12/14/24    Discharge time spent greater than 30 minutes.

## 2024-12-14 NOTE — THERAPY TREATMENT NOTE
Patient Name: Vonnie Coppola  : 1935    MRN: [id number]                              Today's Date: 2024       Admit Date: 2024    Visit Dx:     ICD-10-CM ICD-9-CM   1. Altered mental status, unspecified altered mental status type  R41.82 780.97   2. Right arm weakness  R29.898 729.89     Patient Active Problem List   Diagnosis    GI bleed    Anemia    HTN (hypertension)    History of breast cancer    Asthma    History of CVA (cerebrovascular accident)    Dehydration    Renal insufficiency    Pulmonary nodule    Adnexal cyst    Nausea    Atrial fibrillation    History of pulmonary embolism    Class 2 severe obesity with serious comorbidity in adult    Thrombocytopenia    Cellulitis of right leg    Acute cystitis    Leg hematoma, right, subsequent encounter    Obesity (BMI 35.0-39.9 without comorbidity)    Morbid (severe) obesity due to excess calories (*specify comorbidity)    Constipation    UTI (urinary tract infection)    Deep vein thrombosis    Generalized abdominal pain    Altered mental status    Chronic anticoagulation    Anxiety disorder    Benzodiazepine dependence    E. coli UTI (urinary tract infection)    Stroke-like symptoms    Hypokalemia    Severe protein-calorie malnutrition     Past Medical History:   Diagnosis Date    Arthritis     Asthma     Atrial fibrillation     per pt's daughter.States hx of a-fib in the past when stressed or tired.    Blind left eye     Breast cancer     LEFT BREAST CA, LUMPECTOMY & RADS    Deep vein thrombosis     History of cataract     Hx of radiation therapy     APPROXIMATELY 40 TREATMENTS FOR LEFT BREAST CA    Hypertension     Pneumonia     PONV (postoperative nausea and vomiting)     Stroke      Past Surgical History:   Procedure Laterality Date    APPENDECTOMY      BLADDER SURGERY      BREAST BIOPSY Left     MALIGNANT    BREAST LUMPECTOMY Left     MALIGNANT    CATARACT EXTRACTION      COLONOSCOPY N/A 2024    Procedure:  COLONOSCOPY to cecum with cold snare polypectomies;  Surgeon: Harshad Gaston MD;  Location: [facility] ENDOSCOPY;  Service: Gastroenterology;  Laterality: N/A;  pre- gi bleed, anemia  post- diverticulosis, polyps    ENDOSCOPY N/A 01/19/2024    Procedure: ESOPHAGOGASTRODUODENOSCOPY with biospy;  Surgeon: Harshad Gaston MD;  Location: [facility] ENDOSCOPY;  Service: Gastroenterology;  Laterality: N/A;  pre- gi bleed, anemia  post- erosive gastirits    GALLBLADDER SURGERY      HERNIA REPAIR      HYSTERECTOMY      INCISION AND DRAINAGE LEG Right 5/31/2024    Procedure: RIGHT LOWER EXTREMITY WOUND EXPLORATION, DEBRIDEMENT AND CONTROL OF BLEEDING;  Surgeon: Gerald Pedraza MD;  Location: [facility] MAIN OR;  Service: General;  Laterality: Right;    LASIK      LYMPHADENECTOMY      OOPHORECTOMY        General Information       Row Name 12/14/24 1042          Physical Therapy Time and Intention    Document Type therapy note (daily note)  -SV     Mode of Treatment individual therapy;physical therapy  -SV       Row Name 12/14/24 1042          General Information    Patient Profile Reviewed yes  -SV               User Key  (r) = Recorded By, (t) = Taken By, (c) = Cosigned By      Initials Name Provider Type    SV Jil Rice, PT Physical Therapist                   Mobility       Row Name 12/14/24 1106          Bed Mobility    Scooting/Bridging [place] (Bed Mobility) dependent (less than 25% patient effort);1 person assist;maximum assist (25% patient effort)  HOB inverted  -SV     Supine-Sit [place] (Bed Mobility) minimum assist (75% patient effort)  -SV     Sit-Supine [place] (Bed Mobility) maximum assist (25% patient effort);moderate assist (50% patient effort)  -SV     Assistive Device (Bed Mobility) bed rails;head of bed elevated  -SV     Comment, (Bed Mobility) increased time, unable to lift BLE  -SV       Row Name 12/14/24 1106          Sit-Stand Transfer    Sit-Stand [place] (Transfers) maximum  assist (25% patient effort);1 person assist  -SV     Comment, (Sit-Stand Transfer) stood x2, bed elevated,  -SV       Row Name 12/14/24 1106          Gait/Stairs (Locomotion)    [place] Level (Gait) minimum assist (75% patient effort);moderate assist (50% patient effort)  -SV     Assistive Device (Gait) walker, front-wheeled  -SV     Distance in Feet (Gait) 0  max cues for erect standing, weight bear with elbows, able to slide her feet several inches to right HOB  -SV               User Key  (r) = Recorded By, (t) = Taken By, (c) = Cosigned By      Initials Name Provider Type    Jil Carson, PT Physical Therapist                   Obj/Interventions       Row Name 12/14/24 1109          Motor Skills    Therapeutic Exercise --  ariel ankle DF prom x2 20 h, seated laq x3 with df brief hold, education on BLE movemen/rom to avoid rom loss  -SV               User Key  (r) = Recorded By, (t) = Taken By, (c) = Cosigned By      Initials Name Provider Type     Jil Rice, PT Physical Therapist                   Goals/Plan    No documentation.                  Clinical Impression       Row Name 12/14/24 1111          Pain    Pretreatment Pain Rating 0/10 - no pain  -SV     Posttreatment Pain Rating 0/10 - no pain  -SV       Row Name 12/14/24 1111          Vital Signs    Pre SpO2 (%) 95  -SV     O2 Delivery Pre Treatment room air  -SV     Intra SpO2 (%) 93  -SV     O2 Delivery Intra Treatment room air  -SV     Post SpO2 (%) 93  -SV     O2 Delivery Post Treatment room air  -SV     Pre Patient Position Side Lying  -SV     Intra Patient Position Standing  -SV     Post Patient Position Side Lying  -SV       Row Name 12/14/24 1111          Positioning and Restraints    Pre-Treatment Position in bed  -SV     Post Treatment Position bed  -SV     In Bed notified nsg;call light within reach;encouraged to call for assist;side lying left;exit alarm on  -SV               User Key  (r) = Recorded By, (t) = Taken By, (c) =  Cosigned By      Initials Name Provider Type    Jil Carson, PT Physical Therapist                   Outcome Measures       Row Name 12/14/24 1112          How much help from another person do you currently need...    Turning from your back to your side while in flat bed without using bedrails? 2  -SV     Moving from lying on back to sitting on the side of a flat bed without bedrails? 2  -SV     Moving to and from a bed to a chair (including a wheelchair)? 1  -SV     Standing up from a chair using your arms (e.g., wheelchair, bedside chair)? 2  -SV     Climbing 3-5 steps with a railing? 1  -SV     To walk in hospital room? 1  -SV     AM-PAC 6 Clicks Score (PT) 9  -SV     Highest Level of Mobility Goal 3 --> Sit at edge of bed  -SV               User Key  (r) = Recorded By, (t) = Taken By, (c) = Cosigned By      Initials Name Provider Type    Jil Carson, PT Physical Therapist                                 Physical Therapy Education       Title: PT OT SLP Therapies (Done)       Topic: Physical Therapy (Done)       Point: Mobility training (Done)       Learning Progress Summary            Patient Acceptance, E,TB,D, VU,NR by  at 12/12/2024 1309    Acceptance, E, NR by [facility] at 12/11/2024 1426    Acceptance, E,D, VU,NR by  at 12/9/2024 1218                      Point: Home exercise program (Done)       Learning Progress Summary            Patient Acceptance, E,TB,D, VU,NR by  at 12/12/2024 1309    Acceptance, E, NR by [facility] at 12/11/2024 1426                      Point: Body mechanics (Done)       Learning Progress Summary            Patient Acceptance, E,TB,D, VU,NR by  at 12/12/2024 1309    Acceptance, E, NR by [facility] at 12/11/2024 1426    Acceptance, E,D, VU,NR by  at 12/9/2024 1218                      Point: Precautions (Done)       Learning Progress Summary            Patient Acceptance, E,TB,D, VU,NR by  at 12/12/2024 1309    Acceptance, E, NR by [facility] at 12/11/2024 1426    Acceptance, E,D,  VU,NR by MG at 12/9/2024 1218                                      User Key       Initials Effective Dates Name Provider Type Discipline     03/07/18 -  Melanie Ordaz, PTA Physical Therapist Assistant PT     05/24/22 -  Maraielena Daley, PT Physical Therapist PT    [facility] 05/02/22 -  Maribel Urena, PT Physical Therapist PT                  PT Recommendation and Plan           Time Calculation:         PT Charges       Row Name 12/14/24 1115             Time Calculation    Start Time 1024  -SV      Stop Time 1041  -SV      Time Calculation (min) 17 min  -SV      PT Received On 12/14/24  -SV      PT - Next Appointment 12/16/24  -                User Key  (r) = Recorded By, (t) = Taken By, (c) = Cosigned By      Initials Name Provider Type    SV Jil Rice, PT Physical Therapist                  Therapy Charges for Today       Code Description Service Date Service Provider Modifiers Qty    38304658507 HC PT THERAPEUTIC ACT EA 15 MIN 12/14/2024 Jil Rice, PT GP 1            PT G-Codes  Outcome Measure Options: AM-PAC 6 Clicks Basic Mobility (PT)  AM-PAC 6 Clicks Score (PT): 9  AM-PAC 6 Clicks Score (OT): 12       Jil Rice PT  12/14/2024

## 2024-12-14 NOTE — PLAN OF CARE
Goal Outcome Evaluation:            Pt is alert but appears fatigued today and reported little sleep last night. HOB elevated pt labored with railing and increased time to eob. STS to rwx with max asst of 1 from bed elevated. Pt was able to move her feet several inches in standing but not able to take a step. Marked fall risk at this time. Recommend asst of 2. [place] ankle prom stretch to increase DF. Pt appears tight and weak BLE.

## 2024-12-16 NOTE — CASE MANAGEMENT/SOCIAL WORK
Case Management Discharge Note      Final Note: [facility] via [facility]. No additional CCP needs.         Selected Continued Care - Discharged on 12/14/2024 Admission date: 12/7/2024 - Discharge disposition: Skilled Nursing Facility (DC - External)      Destination Coordination complete.      Service Provider Services Address Phone Fax Patient Preferred    [facility] [address] [id number] [phone] --              Durable Medical Equipment    No services have been selected for the patient.                Dialysis/Infusion    No services have been selected for the patient.                Home Medical Care    No services have been selected for the patient.                Therapy    No services have been selected for the patient.                Community Resources    No services have been selected for the patient.                Community & DME    No services have been selected for the patient.                    Selected Continued Care - Prior Encounters Includes continued care and service providers with selected services from prior encounters from 9/8/2024 to 12/14/2024      Discharged on 10/1/2024 Admission date: 9/28/2024 - Discharge disposition: Home or Self Care      Home Medical Care       Service Provider Services Address Phone Fax Patient Preferred    [facility], [address] [address] [phone] --                               Final Discharge Disposition Code: 03 - skilled nursing facility (SNF)

## 2025-01-01 ENCOUNTER — HOSPITAL ENCOUNTER (INPATIENT)
Facility: HOSPITAL | Age: 89
LOS: 3 days | End: 2025-04-29
Attending: EMERGENCY MEDICINE | Admitting: INTERNAL MEDICINE
Payer: COMMERCIAL

## 2025-01-01 ENCOUNTER — APPOINTMENT (OUTPATIENT)
Dept: GENERAL RADIOLOGY | Facility: HOSPITAL | Age: 89
End: 2025-01-01
Payer: COMMERCIAL

## 2025-01-01 ENCOUNTER — APPOINTMENT (OUTPATIENT)
Dept: CT IMAGING | Facility: HOSPITAL | Age: 89
End: 2025-01-01
Payer: COMMERCIAL

## 2025-01-01 VITALS — WEIGHT: 170.86 LBS | TEMPERATURE: 102.3 F | OXYGEN SATURATION: 91 % | BODY MASS INDEX: 28.47 KG/M2 | HEIGHT: 65 IN

## 2025-01-01 DIAGNOSIS — I48.20 CHRONIC ATRIAL FIBRILLATION: ICD-10-CM

## 2025-01-01 DIAGNOSIS — G93.40 ACUTE ENCEPHALOPATHY: Primary | ICD-10-CM

## 2025-01-01 LAB
ALBUMIN SERPL-MCNC: 2.7 G/DL (ref 3.5–5.2)
ALBUMIN/GLOB SERPL: 1 G/DL
ALP SERPL-CCNC: 72 U/L (ref 39–117)
ALT SERPL W P-5'-P-CCNC: 12 U/L (ref 1–33)
ANION GAP SERPL CALCULATED.3IONS-SCNC: 9.8 MMOL/L (ref 5–15)
AST SERPL-CCNC: 20 U/L (ref 1–32)
B PARAPERT DNA SPEC QL NAA+PROBE: NOT DETECTED
B PERT DNA SPEC QL NAA+PROBE: NOT DETECTED
BASOPHILS # BLD AUTO: 0.04 10*3/MM3 (ref 0–0.2)
BASOPHILS NFR BLD AUTO: 0.5 % (ref 0–1.5)
BILIRUB SERPL-MCNC: 0.5 MG/DL (ref 0–1.2)
BILIRUB UR QL STRIP: NEGATIVE
BUN SERPL-MCNC: 17 MG/DL (ref 8–23)
BUN/CREAT SERPL: 19.8 (ref 7–25)
C PNEUM DNA NPH QL NAA+NON-PROBE: NOT DETECTED
CALCIUM SPEC-SCNC: 8.1 MG/DL (ref 8.6–10.5)
CHLORIDE SERPL-SCNC: 109 MMOL/L (ref 98–107)
CK SERPL-CCNC: 142 U/L (ref 20–180)
CLARITY UR: CLEAR
CO2 SERPL-SCNC: 24.2 MMOL/L (ref 22–29)
COLOR UR: YELLOW
CREAT SERPL-MCNC: 0.86 MG/DL (ref 0.57–1)
D-LACTATE SERPL-SCNC: 1.3 MMOL/L (ref 0.5–2)
DEPRECATED RDW RBC AUTO: 44.9 FL (ref 37–54)
EGFRCR SERPLBLD CKD-EPI 2021: 65.5 ML/MIN/1.73
EOSINOPHIL # BLD AUTO: 0.1 10*3/MM3 (ref 0–0.4)
EOSINOPHIL NFR BLD AUTO: 1.2 % (ref 0.3–6.2)
ERYTHROCYTE [DISTWIDTH] IN BLOOD BY AUTOMATED COUNT: 14 % (ref 12.3–15.4)
FLUAV SUBTYP SPEC NAA+PROBE: NOT DETECTED
FLUBV RNA ISLT QL NAA+PROBE: NOT DETECTED
GLOBULIN UR ELPH-MCNC: 2.6 GM/DL
GLUCOSE SERPL-MCNC: 88 MG/DL (ref 65–99)
GLUCOSE UR STRIP-MCNC: NEGATIVE MG/DL
HADV DNA SPEC NAA+PROBE: NOT DETECTED
HCOV 229E RNA SPEC QL NAA+PROBE: NOT DETECTED
HCOV HKU1 RNA SPEC QL NAA+PROBE: NOT DETECTED
HCOV NL63 RNA SPEC QL NAA+PROBE: NOT DETECTED
HCOV OC43 RNA SPEC QL NAA+PROBE: NOT DETECTED
HCT VFR BLD AUTO: 32.4 % (ref 34–46.6)
HGB BLD-MCNC: 10.1 G/DL (ref 12–15.9)
HGB UR QL STRIP.AUTO: NEGATIVE
HMPV RNA NPH QL NAA+NON-PROBE: NOT DETECTED
HPIV1 RNA ISLT QL NAA+PROBE: NOT DETECTED
HPIV2 RNA SPEC QL NAA+PROBE: NOT DETECTED
HPIV3 RNA NPH QL NAA+PROBE: NOT DETECTED
HPIV4 P GENE NPH QL NAA+PROBE: NOT DETECTED
IMM GRANULOCYTES # BLD AUTO: 0.02 10*3/MM3 (ref 0–0.05)
IMM GRANULOCYTES NFR BLD AUTO: 0.2 % (ref 0–0.5)
KETONES UR QL STRIP: ABNORMAL
LEUKOCYTE ESTERASE UR QL STRIP.AUTO: NEGATIVE
LYMPHOCYTES # BLD AUTO: 2.83 10*3/MM3 (ref 0.7–3.1)
LYMPHOCYTES NFR BLD AUTO: 33.1 % (ref 19.6–45.3)
M PNEUMO IGG SER IA-ACNC: NOT DETECTED
MCH RBC QN AUTO: 27.1 PG (ref 26.6–33)
MCHC RBC AUTO-ENTMCNC: 31.2 G/DL (ref 31.5–35.7)
MCV RBC AUTO: 86.9 FL (ref 79–97)
MONOCYTES # BLD AUTO: 1.44 10*3/MM3 (ref 0.1–0.9)
MONOCYTES NFR BLD AUTO: 16.9 % (ref 5–12)
NEUTROPHILS NFR BLD AUTO: 4.11 10*3/MM3 (ref 1.7–7)
NEUTROPHILS NFR BLD AUTO: 48.1 % (ref 42.7–76)
NITRITE UR QL STRIP: NEGATIVE
NRBC BLD AUTO-RTO: 0 /100 WBC (ref 0–0.2)
PH UR STRIP.AUTO: 6.5 [PH] (ref 5–8)
PLATELET # BLD AUTO: 185 10*3/MM3 (ref 140–450)
PMV BLD AUTO: 11.6 FL (ref 6–12)
POTASSIUM SERPL-SCNC: 3.3 MMOL/L (ref 3.5–5.2)
PROCALCITONIN SERPL-MCNC: 0.07 NG/ML (ref 0–0.25)
PROT SERPL-MCNC: 5.3 G/DL (ref 6–8.5)
PROT UR QL STRIP: NEGATIVE
QT INTERVAL: 388 MS
QTC INTERVAL: 488 MS
RBC # BLD AUTO: 3.73 10*6/MM3 (ref 3.77–5.28)
RHINOVIRUS RNA SPEC NAA+PROBE: NOT DETECTED
RSV RNA NPH QL NAA+NON-PROBE: NOT DETECTED
SARS-COV-2 RNA NPH QL NAA+NON-PROBE: NOT DETECTED
SODIUM SERPL-SCNC: 143 MMOL/L (ref 136–145)
SP GR UR STRIP: 1.02 (ref 1–1.03)
UROBILINOGEN UR QL STRIP: ABNORMAL
WBC NRBC COR # BLD AUTO: 8.54 10*3/MM3 (ref 3.4–10.8)

## 2025-01-01 PROCEDURE — 25010000002 LORAZEPAM PER 2 MG: Performed by: INTERNAL MEDICINE

## 2025-01-01 PROCEDURE — 84145 PROCALCITONIN (PCT): CPT | Performed by: EMERGENCY MEDICINE

## 2025-01-01 PROCEDURE — 83605 ASSAY OF LACTIC ACID: CPT | Performed by: EMERGENCY MEDICINE

## 2025-01-01 PROCEDURE — 93010 ELECTROCARDIOGRAM REPORT: CPT | Performed by: INTERNAL MEDICINE

## 2025-01-01 PROCEDURE — 25010000002 HYDROMORPHONE 1 MG/ML SOLUTION: Performed by: INTERNAL MEDICINE

## 2025-01-01 PROCEDURE — 71045 X-RAY EXAM CHEST 1 VIEW: CPT

## 2025-01-01 PROCEDURE — 80053 COMPREHEN METABOLIC PANEL: CPT | Performed by: EMERGENCY MEDICINE

## 2025-01-01 PROCEDURE — 73560 X-RAY EXAM OF KNEE 1 OR 2: CPT

## 2025-01-01 PROCEDURE — 25010000002 GLYCOPYRROLATE 0.2 MG/ML SOLUTION: Performed by: INTERNAL MEDICINE

## 2025-01-01 PROCEDURE — 25010000002 MORPHINE PER 10 MG: Performed by: INTERNAL MEDICINE

## 2025-01-01 PROCEDURE — 93005 ELECTROCARDIOGRAM TRACING: CPT | Performed by: EMERGENCY MEDICINE

## 2025-01-01 PROCEDURE — 87040 BLOOD CULTURE FOR BACTERIA: CPT | Performed by: EMERGENCY MEDICINE

## 2025-01-01 PROCEDURE — 70450 CT HEAD/BRAIN W/O DYE: CPT

## 2025-01-01 PROCEDURE — 0202U NFCT DS 22 TRGT SARS-COV-2: CPT | Performed by: EMERGENCY MEDICINE

## 2025-01-01 PROCEDURE — 81003 URINALYSIS AUTO W/O SCOPE: CPT | Performed by: EMERGENCY MEDICINE

## 2025-01-01 PROCEDURE — 82550 ASSAY OF CK (CPK): CPT | Performed by: EMERGENCY MEDICINE

## 2025-01-01 PROCEDURE — 85025 COMPLETE CBC W/AUTO DIFF WBC: CPT | Performed by: EMERGENCY MEDICINE

## 2025-01-01 PROCEDURE — P9612 CATHETERIZE FOR URINE SPEC: HCPCS

## 2025-01-01 PROCEDURE — 72125 CT NECK SPINE W/O DYE: CPT

## 2025-01-01 PROCEDURE — 99285 EMERGENCY DEPT VISIT HI MDM: CPT

## 2025-01-01 PROCEDURE — 36415 COLL VENOUS BLD VENIPUNCTURE: CPT

## 2025-01-01 RX ORDER — GLYCOPYRROLATE 0.2 MG/ML
0.4 INJECTION INTRAMUSCULAR; INTRAVENOUS
Status: DISCONTINUED | OUTPATIENT
Start: 2025-01-01 | End: 2025-01-01 | Stop reason: HOSPADM

## 2025-01-01 RX ORDER — HALOPERIDOL 5 MG/ML
1 INJECTION INTRAMUSCULAR EVERY 6 HOURS PRN
Status: DISCONTINUED | OUTPATIENT
Start: 2025-01-01 | End: 2025-01-01 | Stop reason: HOSPADM

## 2025-01-01 RX ORDER — FAMOTIDINE 20 MG/1
10 TABLET, FILM COATED ORAL 2 TIMES DAILY PRN
Status: DISCONTINUED | OUTPATIENT
Start: 2025-01-01 | End: 2025-01-01 | Stop reason: HOSPADM

## 2025-01-01 RX ORDER — SODIUM CHLORIDE 9 MG/ML
40 INJECTION, SOLUTION INTRAVENOUS AS NEEDED
Status: DISCONTINUED | OUTPATIENT
Start: 2025-01-01 | End: 2025-01-01

## 2025-01-01 RX ORDER — LORAZEPAM 2 MG/ML
0.5 INJECTION INTRAMUSCULAR
Status: DISCONTINUED | OUTPATIENT
Start: 2025-01-01 | End: 2025-01-01 | Stop reason: HOSPADM

## 2025-01-01 RX ORDER — LORAZEPAM 2 MG/ML
2 CONCENTRATE ORAL
Status: DISCONTINUED | OUTPATIENT
Start: 2025-01-01 | End: 2025-01-01 | Stop reason: HOSPADM

## 2025-01-01 RX ORDER — OLANZAPINE 10 MG/1
10 TABLET, ORALLY DISINTEGRATING ORAL ONCE
Status: DISCONTINUED | OUTPATIENT
Start: 2025-01-01 | End: 2025-01-01

## 2025-01-01 RX ORDER — ONDANSETRON 4 MG/1
4 TABLET, ORALLY DISINTEGRATING ORAL EVERY 8 HOURS PRN
COMMUNITY
Start: 2025-01-01

## 2025-01-01 RX ORDER — ONDANSETRON 2 MG/ML
4 INJECTION INTRAMUSCULAR; INTRAVENOUS EVERY 6 HOURS PRN
Status: DISCONTINUED | OUTPATIENT
Start: 2025-01-01 | End: 2025-01-01 | Stop reason: HOSPADM

## 2025-01-01 RX ORDER — SODIUM CHLORIDE 0.9 % (FLUSH) 0.9 %
3 SYRINGE (ML) INJECTION EVERY 12 HOURS SCHEDULED
Status: DISCONTINUED | OUTPATIENT
Start: 2025-01-01 | End: 2025-01-01 | Stop reason: HOSPADM

## 2025-01-01 RX ORDER — ACETAMINOPHEN 160 MG/5ML
650 SOLUTION ORAL EVERY 4 HOURS PRN
Status: DISCONTINUED | OUTPATIENT
Start: 2025-01-01 | End: 2025-01-01 | Stop reason: HOSPADM

## 2025-01-01 RX ORDER — MORPHINE SULFATE 2 MG/ML
4 INJECTION, SOLUTION INTRAMUSCULAR; INTRAVENOUS
Status: DISCONTINUED | OUTPATIENT
Start: 2025-01-01 | End: 2025-01-01 | Stop reason: HOSPADM

## 2025-01-01 RX ORDER — AMOXICILLIN 250 MG
2 CAPSULE ORAL 2 TIMES DAILY PRN
Status: DISCONTINUED | OUTPATIENT
Start: 2025-01-01 | End: 2025-01-01 | Stop reason: HOSPADM

## 2025-01-01 RX ORDER — HYDROMORPHONE HYDROCHLORIDE 1 MG/ML
0.5 INJECTION, SOLUTION INTRAMUSCULAR; INTRAVENOUS; SUBCUTANEOUS
Refills: 0 | Status: DISCONTINUED | OUTPATIENT
Start: 2025-01-01 | End: 2025-01-01 | Stop reason: HOSPADM

## 2025-01-01 RX ORDER — GLYCOPYRROLATE 0.2 MG/ML
0.2 INJECTION INTRAMUSCULAR; INTRAVENOUS
Status: DISCONTINUED | OUTPATIENT
Start: 2025-01-01 | End: 2025-01-01 | Stop reason: HOSPADM

## 2025-01-01 RX ORDER — MORPHINE SULFATE 20 MG/ML
20 SOLUTION ORAL
Refills: 0 | Status: DISCONTINUED | OUTPATIENT
Start: 2025-01-01 | End: 2025-01-01 | Stop reason: HOSPADM

## 2025-01-01 RX ORDER — LORAZEPAM 2 MG/ML
2 INJECTION INTRAMUSCULAR
Status: DISCONTINUED | OUTPATIENT
Start: 2025-01-01 | End: 2025-01-01 | Stop reason: HOSPADM

## 2025-01-01 RX ORDER — LORAZEPAM 2 MG/ML
1 INJECTION INTRAMUSCULAR
Status: DISCONTINUED | OUTPATIENT
Start: 2025-01-01 | End: 2025-01-01 | Stop reason: HOSPADM

## 2025-01-01 RX ORDER — LORAZEPAM 2 MG/ML
1 CONCENTRATE ORAL
Status: DISCONTINUED | OUTPATIENT
Start: 2025-01-01 | End: 2025-01-01 | Stop reason: HOSPADM

## 2025-01-01 RX ORDER — ALBUTEROL SULFATE 0.83 MG/ML
2.5 SOLUTION RESPIRATORY (INHALATION) EVERY 6 HOURS PRN
Status: DISCONTINUED | OUTPATIENT
Start: 2025-01-01 | End: 2025-01-01 | Stop reason: HOSPADM

## 2025-01-01 RX ORDER — POLYETHYLENE GLYCOL 3350 17 G/17G
17 POWDER, FOR SOLUTION ORAL DAILY PRN
Status: DISCONTINUED | OUTPATIENT
Start: 2025-01-01 | End: 2025-01-01 | Stop reason: HOSPADM

## 2025-01-01 RX ORDER — SODIUM CHLORIDE 0.9 % (FLUSH) 0.9 %
3-10 SYRINGE (ML) INJECTION AS NEEDED
Status: DISCONTINUED | OUTPATIENT
Start: 2025-01-01 | End: 2025-01-01

## 2025-01-01 RX ORDER — ACETAMINOPHEN 500 MG
500 TABLET ORAL EVERY 6 HOURS PRN
COMMUNITY

## 2025-01-01 RX ORDER — LORAZEPAM 2 MG/ML
0.5 CONCENTRATE ORAL
Status: DISCONTINUED | OUTPATIENT
Start: 2025-01-01 | End: 2025-01-01 | Stop reason: HOSPADM

## 2025-01-01 RX ORDER — DIPHENOXYLATE HYDROCHLORIDE AND ATROPINE SULFATE 2.5; .025 MG/1; MG/1
1 TABLET ORAL
Status: DISCONTINUED | OUTPATIENT
Start: 2025-01-01 | End: 2025-01-01 | Stop reason: HOSPADM

## 2025-01-01 RX ORDER — ACETAMINOPHEN 650 MG/1
650 SUPPOSITORY RECTAL EVERY 4 HOURS PRN
Status: DISCONTINUED | OUTPATIENT
Start: 2025-01-01 | End: 2025-01-01 | Stop reason: HOSPADM

## 2025-01-01 RX ORDER — METOCLOPRAMIDE 5 MG/1
5 TABLET ORAL 3 TIMES DAILY
COMMUNITY

## 2025-01-01 RX ORDER — DIAZEPAM 5 MG/1
2 TABLET ORAL 3 TIMES DAILY
COMMUNITY

## 2025-01-01 RX ORDER — ACETAMINOPHEN 325 MG/1
650 TABLET ORAL EVERY 4 HOURS PRN
Status: DISCONTINUED | OUTPATIENT
Start: 2025-01-01 | End: 2025-01-01 | Stop reason: HOSPADM

## 2025-01-01 RX ORDER — MORPHINE SULFATE 20 MG/ML
5 SOLUTION ORAL
Refills: 0 | Status: DISCONTINUED | OUTPATIENT
Start: 2025-01-01 | End: 2025-01-01 | Stop reason: HOSPADM

## 2025-01-01 RX ORDER — BISACODYL 5 MG/1
5 TABLET, DELAYED RELEASE ORAL DAILY PRN
Status: DISCONTINUED | OUTPATIENT
Start: 2025-01-01 | End: 2025-01-01 | Stop reason: HOSPADM

## 2025-01-01 RX ORDER — DIAZEPAM 2 MG/1
1 TABLET ORAL NIGHTLY
Status: DISCONTINUED | OUTPATIENT
Start: 2025-01-01 | End: 2025-01-01 | Stop reason: HOSPADM

## 2025-01-01 RX ORDER — MORPHINE SULFATE 20 MG/ML
10 SOLUTION ORAL
Refills: 0 | Status: DISCONTINUED | OUTPATIENT
Start: 2025-01-01 | End: 2025-01-01 | Stop reason: HOSPADM

## 2025-01-01 RX ORDER — ONDANSETRON 4 MG/1
4 TABLET, ORALLY DISINTEGRATING ORAL EVERY 6 HOURS PRN
Status: DISCONTINUED | OUTPATIENT
Start: 2025-01-01 | End: 2025-01-01 | Stop reason: HOSPADM

## 2025-01-01 RX ORDER — SUCRALFATE 1 G/1
1 TABLET ORAL 3 TIMES DAILY
COMMUNITY

## 2025-01-01 RX ORDER — BISACODYL 10 MG
10 SUPPOSITORY, RECTAL RECTAL DAILY PRN
Status: DISCONTINUED | OUTPATIENT
Start: 2025-01-01 | End: 2025-01-01 | Stop reason: HOSPADM

## 2025-01-01 RX ORDER — SODIUM CHLORIDE 0.9 % (FLUSH) 0.9 %
10 SYRINGE (ML) INJECTION AS NEEDED
Status: DISCONTINUED | OUTPATIENT
Start: 2025-01-01 | End: 2025-01-01 | Stop reason: HOSPADM

## 2025-01-01 RX ORDER — MORPHINE SULFATE 2 MG/ML
2 INJECTION, SOLUTION INTRAMUSCULAR; INTRAVENOUS
Status: DISCONTINUED | OUTPATIENT
Start: 2025-01-01 | End: 2025-01-01 | Stop reason: HOSPADM

## 2025-01-01 RX ORDER — PANTOPRAZOLE SODIUM 40 MG/1
40 TABLET, DELAYED RELEASE ORAL DAILY
Status: DISCONTINUED | OUTPATIENT
Start: 2025-01-01 | End: 2025-01-01 | Stop reason: HOSPADM

## 2025-01-01 RX ORDER — MORPHINE SULFATE 10 MG/ML
6 INJECTION, SOLUTION INTRAMUSCULAR; INTRAVENOUS
Status: DISCONTINUED | OUTPATIENT
Start: 2025-01-01 | End: 2025-01-01 | Stop reason: HOSPADM

## 2025-01-01 RX ADMIN — HYDROMORPHONE HYDROCHLORIDE 1 MG: 1 INJECTION, SOLUTION INTRAMUSCULAR; INTRAVENOUS; SUBCUTANEOUS at 18:27

## 2025-01-01 RX ADMIN — MORPHINE SULFATE 4 MG: 2 INJECTION, SOLUTION INTRAMUSCULAR; INTRAVENOUS at 16:45

## 2025-01-01 RX ADMIN — LORAZEPAM 2 MG: 2 INJECTION INTRAMUSCULAR; INTRAVENOUS at 00:05

## 2025-01-01 RX ADMIN — HYDROMORPHONE HYDROCHLORIDE 1 MG: 1 INJECTION, SOLUTION INTRAMUSCULAR; INTRAVENOUS; SUBCUTANEOUS at 05:22

## 2025-01-01 RX ADMIN — MORPHINE SULFATE 4 MG: 2 INJECTION, SOLUTION INTRAMUSCULAR; INTRAVENOUS at 08:31

## 2025-01-01 RX ADMIN — Medication 3 ML: at 09:07

## 2025-01-01 RX ADMIN — HYDROMORPHONE HYDROCHLORIDE 1 MG: 1 INJECTION, SOLUTION INTRAMUSCULAR; INTRAVENOUS; SUBCUTANEOUS at 22:27

## 2025-01-01 RX ADMIN — GLYCOPYRROLATE 0.4 MG: 0.2 INJECTION INTRAMUSCULAR; INTRAVENOUS at 05:22

## 2025-01-01 RX ADMIN — Medication 3 ML: at 08:33

## 2025-01-01 RX ADMIN — GLYCOPYRROLATE 0.4 MG: 0.2 INJECTION INTRAMUSCULAR; INTRAVENOUS at 00:05

## 2025-01-01 RX ADMIN — HYDROMORPHONE HYDROCHLORIDE 1 MG: 1 INJECTION, SOLUTION INTRAMUSCULAR; INTRAVENOUS; SUBCUTANEOUS at 00:05

## 2025-01-01 RX ADMIN — GLYCOPYRROLATE 0.4 MG: 0.2 INJECTION INTRAMUSCULAR; INTRAVENOUS at 16:45

## 2025-01-01 RX ADMIN — GLYCOPYRROLATE 0.4 MG: 0.2 INJECTION INTRAMUSCULAR; INTRAVENOUS at 22:27

## 2025-01-01 RX ADMIN — MORPHINE SULFATE 2 MG: 2 INJECTION, SOLUTION INTRAMUSCULAR; INTRAVENOUS at 01:16

## 2025-01-01 RX ADMIN — LORAZEPAM 2 MG: 2 INJECTION INTRAMUSCULAR; INTRAVENOUS at 05:21

## 2025-01-01 RX ADMIN — LORAZEPAM 0.5 MG: 2 INJECTION INTRAMUSCULAR; INTRAVENOUS at 01:15

## 2025-01-01 RX ADMIN — LORAZEPAM 2 MG: 2 INJECTION INTRAMUSCULAR; INTRAVENOUS at 22:27

## 2025-01-01 RX ADMIN — GLYCOPYRROLATE 0.4 MG: 0.2 INJECTION INTRAMUSCULAR; INTRAVENOUS at 18:27

## 2025-01-01 RX ADMIN — LORAZEPAM 2 MG: 2 INJECTION INTRAMUSCULAR; INTRAVENOUS at 18:27

## 2025-01-01 RX ADMIN — LORAZEPAM 2 MG: 2 INJECTION INTRAMUSCULAR; INTRAVENOUS at 08:31

## 2025-01-01 RX ADMIN — GLYCOPYRROLATE 0.4 MG: 0.2 INJECTION INTRAMUSCULAR; INTRAVENOUS at 09:07

## 2025-01-01 RX ADMIN — Medication 3 ML: at 21:56

## 2025-01-01 RX ADMIN — LORAZEPAM 2 MG: 2 INJECTION INTRAMUSCULAR; INTRAVENOUS at 12:56

## 2025-01-01 RX ADMIN — Medication 3 ML: at 23:43

## 2025-01-01 RX ADMIN — LORAZEPAM 2 MG: 2 INJECTION INTRAMUSCULAR; INTRAVENOUS at 09:07

## 2025-01-01 RX ADMIN — LORAZEPAM 2 MG: 2 INJECTION INTRAMUSCULAR; INTRAVENOUS at 01:03

## 2025-01-01 RX ADMIN — Medication 3 ML: at 21:24

## 2025-01-01 RX ADMIN — MORPHINE SULFATE 2 MG: 2 INJECTION, SOLUTION INTRAMUSCULAR; INTRAVENOUS at 05:14

## 2025-01-01 RX ADMIN — LORAZEPAM 2 MG: 2 INJECTION INTRAMUSCULAR; INTRAVENOUS at 16:45

## 2025-01-01 RX ADMIN — GLYCOPYRROLATE 0.4 MG: 0.2 INJECTION INTRAMUSCULAR; INTRAVENOUS at 01:00

## 2025-01-01 RX ADMIN — MORPHINE SULFATE 4 MG: 2 INJECTION, SOLUTION INTRAMUSCULAR; INTRAVENOUS at 12:56

## 2025-01-01 RX ADMIN — HYDROMORPHONE HYDROCHLORIDE 1 MG: 1 INJECTION, SOLUTION INTRAMUSCULAR; INTRAVENOUS; SUBCUTANEOUS at 09:07

## 2025-01-01 RX ADMIN — HYDROMORPHONE HYDROCHLORIDE 1 MG: 1 INJECTION, SOLUTION INTRAMUSCULAR; INTRAVENOUS; SUBCUTANEOUS at 01:01

## 2025-01-01 RX ADMIN — GLYCOPYRROLATE 0.4 MG: 0.2 INJECTION INTRAMUSCULAR; INTRAVENOUS at 13:14

## 2025-01-23 ENCOUNTER — TELEPHONE (OUTPATIENT)
Dept: ONCOLOGY | Facility: CLINIC | Age: 89
End: 2025-01-23

## 2025-01-23 ENCOUNTER — TELEPHONE (OUTPATIENT)
Dept: ONCOLOGY | Facility: CLINIC | Age: 89
End: 2025-01-23
Payer: MEDICARE

## 2025-01-23 NOTE — TELEPHONE ENCOUNTER
Caller: Marcos - DAUGHTER    Relationship to patient: Emergency Contact    Best call back number: [phone]    Chief complaint: CANC. APPT.S. - PT STILL IN REHAB    Type of visit: LAB & F/U 1    Requested date: WILL CALL TO R/S SCANS & APPTS. WHEN PT GETS OUT OF REHAB     If rescheduling, when is the original appointment: 1/28/25

## 2025-02-17 NOTE — TELEPHONE ENCOUNTER
Called to check on patient. She remains in rehab. Daughter states she will call us when her mom has been discharged to reschedule appt. Henny Elizondo RN

## 2025-04-26 PROBLEM — F03.918 DEMENTIA WITH BEHAVIORAL DISTURBANCE: Status: ACTIVE | Noted: 2025-01-01

## 2025-04-26 PROBLEM — G93.40 ACUTE ENCEPHALOPATHY: Status: ACTIVE | Noted: 2025-01-01

## 2025-04-26 NOTE — Clinical Note
Level of Care: Telemetry [5]   Diagnosis: Acute encephalopathy [259294]   Admitting Physician: AURELIA JESSICA [[id number]]   Attending Physician: AURELIA JESSICA [[id number]]   Certification: I Certify That Inpatient Hospital Services Are Medically Necessary For Greater Than 2 Midnights

## 2025-04-27 PROBLEM — R53.81 PHYSICAL DEBILITY: Status: ACTIVE | Noted: 2025-01-01

## 2025-04-27 PROBLEM — T14.8XXA BRUISING: Status: ACTIVE | Noted: 2025-01-01

## 2025-04-27 PROBLEM — Z51.5 COMFORT MEASURES ONLY STATUS: Status: ACTIVE | Noted: 2025-01-01

## 2025-04-27 PROBLEM — R52 DIFFUSE PAIN: Status: ACTIVE | Noted: 2025-01-01

## 2025-04-27 NOTE — H&P
[facility]  HISTORY AND PHYSICAL    Patient Name: Vonnie Coppola  : 1935  MRN: [id number]  Primary Care Physician: Christopher Leyva DO  Date of admission: 2025    Subjective   Subjective   Chief Complaint:  Altered mental status, knee and shoulder pain, unable to ambulate, told to present to the hospital by hospice care family reports    HPI:  Vonnie Coppola is a 89 y.o. female with a past medical history family reports of dementia and multiple medical problems as outlined below whom family currently report is under hospice care presents the hospital by EMS as family was unable to care for her with persistent progressive agitation with confusion that has been present for several months her daughter tells me.  Family reports she was recently in respite care with hospice and had just returned home.  Patient had become progressively more weak and her family feels overwhelmed.  They also felt they were not adequately able to control her pain and symptoms.  Her daughter Elizabeth Rodríguez states they wish to continue hospice care and focus on comfort and she did not want any additional labs or fluids but would like her to be hospitalized to focus on symptom management including pain and anxiety.  She tells me she just wants her to be comfortable and would like to speak to hospice about the best living situation to go forward.      Review of Systems   Unable to assess due to mental status    Personal History     Past Medical History:   Diagnosis Date   • Arthritis    • Asthma    • Atrial fibrillation     per pt's daughter.States hx of a-fib in the past when stressed or tired.   • Blind left eye    • Breast cancer     LEFT BREAST CA, LUMPECTOMY & RADS   • Deep vein thrombosis    • History of cataract    • Hx of radiation therapy     APPROXIMATELY 40 TREATMENTS FOR LEFT BREAST CA   • Hypertension    • Pneumonia    • PONV (postoperative nausea and vomiting)    • Stroke         Past Surgical History:   Procedure Laterality Date   • APPENDECTOMY     • BLADDER SURGERY     • BREAST BIOPSY Left 1999    MALIGNANT   • BREAST LUMPECTOMY Left 1999    MALIGNANT   • CATARACT EXTRACTION     • COLONOSCOPY N/A 01/19/2024    Procedure: COLONOSCOPY to cecum with cold snare polypectomies;  Surgeon: Harshad Gaston MD;  Location: [facility] ENDOSCOPY;  Service: Gastroenterology;  Laterality: N/A;  pre- gi bleed, anemia  post- diverticulosis, polyps   • ENDOSCOPY N/A 01/19/2024    Procedure: ESOPHAGOGASTRODUODENOSCOPY with biospy;  Surgeon: Harshad Gaston MD;  Location: [facility] ENDOSCOPY;  Service: Gastroenterology;  Laterality: N/A;  pre- gi bleed, anemia  post- erosive gastirits   • GALLBLADDER SURGERY     • HERNIA REPAIR     • HYSTERECTOMY     • INCISION AND DRAINAGE LEG Right 5/31/2024    Procedure: RIGHT LOWER EXTREMITY WOUND EXPLORATION, DEBRIDEMENT AND CONTROL OF BLEEDING;  Surgeon: Gerald Pedraza MD;  Location: [facility] MAIN OR;  Service: General;  Laterality: Right;   • LASIK     • LYMPHADENECTOMY     • OOPHORECTOMY         Family History: family history includes Cancer in her brother, maternal grandmother, sister, and another family member; Cataracts in her mother; Colon cancer in her maternal grandmother; Diabetes in her sister; Hypertension in her brother, father, and sister; Macular degeneration in her mother; Osteoarthritis in her brother, mother, and sister; Other in her father and mother; Ovarian cancer in her paternal grandmother and sister; Stroke in an other family member. Other pertinent FHx was reviewed and unremarkable.     Social History:  reports that she has quit smoking. Her smoking use included cigarettes. She has a 1 pack-year smoking history. She has never used smokeless tobacco. She reports that she does not drink alcohol and does not use drugs.  Social History     Social History Narrative   • Not on file       Medications:  Available home medication information  reviewed.    Allergies   Allergen Reactions   • Cortisone Hives   • Penicillins Hives     Beta lactam allergy details  Antibiotic reaction: hives  Age at reaction: unknown  Dose to reaction time: unknown  Reason for antibiotic: unknown  Epinephrine required for reaction?: no  Tolerated antibiotics: unknown    Tolerated Zosyn   • Iodinated Contrast Media Unknown - Low Severity     Patient  passed out, she has had studies with contact  recently    • Lactose Intolerance (Gi) Hives       Objective   Objective   Vital Signs:   Temp:  [97.7 °F (36.5 °C)-99 °F (37.2 °C)] 97.7 °F (36.5 °C)  Heart Rate:  [] 88  Resp:  [22-24] 24  BP: (109-137)/(64-89) 126/87        Physical Exam   Constitutional: Awake, alert, elderly, acute on chronically ill-appearing  Respiratory: No cough or wheezes, mildly tachypneic with moderate inspiration however breathing is not labored currently on room air  Cardiovascular: Pulse rate is initially tachycardic but has improved, palpable radial pulses  Gastrointestinal:  Soft, nontender, nondistended  Musculoskeletal: Patient reports pain in her legs and arms and diffusely, BMI is 28, severely debilitated in appearance  Psychiatric: Anxious and agitated affect,   Neurologic: Oriented to name only, poor short-term memory, no slurred speech or facial droop, follows commands  Skin: Diffuse bruising noted, no rashes or jaundice, warm      Results from last 7 days   Lab Units 04/26/25 2024   WBC 10*3/mm3 8.54   HEMOGLOBIN g/dL 10.1*   HEMATOCRIT % 32.4*   PLATELETS 10*3/mm3 185     Results from last 7 days   Lab Units 04/26/25 2024   SODIUM mmol/L 143   POTASSIUM mmol/L 3.3*   CHLORIDE mmol/L 109*   CO2 mmol/L 24.2   BUN mg/dL 17   CREATININE mg/dL 0.86   GLUCOSE mg/dL 88   CALCIUM mg/dL 8.1*   ALK PHOS U/L 72   ALT (SGPT) U/L 12   AST (SGOT) U/L 20   LACTATE mmol/L 1.3   PROCALCITONIN ng/mL 0.07     Estimated Creatinine Clearance: 47.4 mL/min (by C-G formula based on SCr of 0.86 mg/dL).  Brief  Urine Lab Results  (Last result in the past 365 days)        Color   Clarity   Blood   Leuk Est   Nitrite   Protein   CREAT   Urine HCG        04/26/25 2032 Yellow   Clear   Negative   Negative   Negative   Negative                 Imaging Results (Last 24 Hours)       Procedure Component Value Units Date/Time    CT Head Without Contrast [[id number]] Collected: 04/26/25 2144     Updated: 04/26/25 2148    Narrative:      CT OF THE HEAD WITHOUT CONTRAST     HISTORY: Altered mental status     COMPARISON: December 7, 2024     TECHNIQUE: Axial CT imaging was obtained through the brain. No IV  contrast was administered.     FINDINGS:  No acute intracranial hemorrhage is seen. There is diffuse atrophy.  There is periventricular and deep white matter microangiopathic change.  There is no midline shift or mass effect. No calvarial fracture is seen.  There is mucosal thickening within the ethmoid sinuses. Trace fluid is  noted within the left mastoid air cells. There are degenerative changes  of the temporomandibular joints.       Impression:      No acute intracranial findings.     Radiation dose reduction techniques were utilized, including automated  exposure control and exposure modulation based on body size.        This report was finalized on 4/26/2025 9:45 PM by Dr. Robyn Wood M.D on Workstation: [id number]       CT Cervical Spine Without Contrast [[id number]] Collected: 04/26/25 2139     Updated: 04/26/25 2146    Narrative:      CT OF THE CERVICAL SPINE     HISTORY: Altered mental status. Fall.     COMPARISON: None available.     TECHNIQUE: Axial CT imaging was obtained through the cervical spine.  Coronal and sagittal reformatted images were obtained.     FINDINGS:  No acute fracture or subluxation of the cervical spine is seen. Images  through the lung apices demonstrate some consolidation in the right lung  apex, which is new when compared to prior study from December 7, 2024.  This area measures 1.5 x 1.2  cm. Relatively rapid development would  suggest an infectious or inflammatory process, but short-term CT  follow-up in 3 months is recommended. There is no prevertebral soft  tissue swelling. Intervertebral disc space narrowing is most significant  at C5-C6. Canal stenosis is most significant at C5-C6. Neural foraminal  narrowing is noted at multiple levels.       Impression:      No acute fracture or subluxation identified.     Radiation dose reduction techniques were utilized, including automated  exposure control and exposure modulation based on body size.        This report was finalized on 4/26/2025 9:43 PM by Dr. Robyn Wood M.D on Workstation: [id number]       XR Chest 1 View [[id number]] Collected: 04/26/25 2134     Updated: 04/26/25 2139    Narrative:      SINGLE VIEW OF THE CHEST     HISTORY: Altered mental status     COMPARISON: December 7, 2024     FINDINGS:  There is cardiomegaly. There is no vascular congestion. No pneumothorax  is seen. There is some scarring noted at the left lung base, with some  blunting of the left costophrenic angle. No large effusion is seen.  There is calcification of the aorta.       Impression:      No acute findings.     This report was finalized on 4/26/2025 9:36 PM by Dr. Robyn Wood M.D on Workstation: [id number]       XR Knee 1 or 2 View Right [[id number]] Collected: 04/26/25 2105     Updated: 04/26/25 2109    Narrative:      2 VIEWS RIGHT KNEE     HISTORY: Right knee pain     COMPARISON: None available.     FINDINGS:  No definite acute fracture or subluxation of the right knee is seen,  although the exam is compromised by the patient's advanced degenerative  changes. These are in a tricompartmental distribution. There is some  fullness within the suprapatellar pouch, which may reflect effusion.       Impression:      No definite acute fracture or subluxation identified.     This report was finalized on 4/26/2025 9:06 PM by Dr. Robyn Wood M.D on  Workstation: [id number]             Results for orders placed during the hospital encounter of 01/16/24    Adult Transthoracic Echo Complete W/ Cont if Necessary Per Protocol    Interpretation Summary  •  Left ventricular systolic function is hyperdynamic (EF > 70%). Calculated left ventricular EF = 78.7% Left ventricular ejection fraction appears to be 61 - 65%.  •  Left ventricular diastolic function was normal.  •  The right ventricular cavity is borderline dilated.  •  The right atrial cavity is borderline dilated.  •  Estimated right ventricular systolic pressure from tricuspid regurgitation is markedly elevated (>55 mmHg).      Assessment & Plan   Assessment & Plan     Active Hospital Problems    Diagnosis  POA   • **Diffuse pain [R52]  Yes   • Bruising [T14.8XXA]  Yes   • Comfort measures only status [Z51.5]  Not Applicable   • Physical debility [R53.81]  Yes   • Dementia with behavioral disturbance [F03.918]  Yes   • Hypokalemia [E87.6]  Yes   • Chronic anticoagulation [Z79.01]  Not Applicable   • Anxiety disorder [F41.9]  Yes   • Benzodiazepine dependence [F13.20]  Yes   • Altered mental status [R41.82]  Yes   • History of pulmonary embolism [Z86.711]  Yes   • Atrial fibrillation [I48.91]  Yes   • History of CVA (cerebrovascular accident) [Z86.73]  Not Applicable   • HTN (hypertension) [I10]  Yes   • Anemia [D64.9]  Yes   • Asthma [J45.909]  Yes     89-year-old female presents to the hospital with diffuse pain, generalized weakness, worsening dementia with behavioral disturbance.  Family reports she is currently under hospice care and feel they are currently unable to care for her in her current physical condition.  Family would like her agitation, pain, and anxiety addressed under comfort measures care.    Diffuse pain:  Patient with diffuse bruising.  Family is uncertain if she is fallen at her recent facility.  No gross deformity.  Family is requesting to focus on comfort treatment of the pain at this  time.  They requested no further workup for now.  Consult hospice.    Dementia with behavioral disturbance, agitation:  Plan for haloperidol and lorazepam as needed for agitation, anxiety.  Her family states they feel she is suffering and would like these issues addressed.  Patient notably has chronic benzodiazepine dependence.  Physical debility is a significant issue.  Consult case management.    Atrial fibrillation chronic anticoagulation, history of pulmonary embolism:  Patient is currently diffusely bruised.  Family states she is not on any current rate or rhythm control.  She has been taking Eliquis which they would like to stop due to extensive bruising and to focus on comfort.  Family is aware of the risks of stopping anticoagulation including blood clots and stroke.    GERD: PPI.  Stable.    Hypokalemia: Noted.  Family is not interested in further laboratory studies at this point and would like to focus on comfort.    Asthma: Stable.  Albuterol as needed.    Case discussed with emergency room physician    DVT prophylaxis:   Comfort care    CODE STATUS:    Code Status and Medical Interventions: No CPR (Do Not Attempt to Resuscitate); Comfort Measures   Ordered at: 04/26/25 2255     Code Status (Patient has no pulse and is not breathing):    No CPR (Do Not Attempt to Resuscitate)     Medical Interventions (Patient has pulse or is breathing):    Comfort Measures         Daniel Watson MD  04/27/25

## 2025-04-27 NOTE — ED NOTES
Pt arrives via ems from home with report of ams. Recently dc from rehab. Co R knee pain, R collar bone, L shoulder pain without known injury

## 2025-04-27 NOTE — PROGRESS NOTES
"    Name: Vonnie Coppola ADMIT: 2025   : 1935  PCP: Christopher Leyva DO    MRN: [id number] LOS: 1 days   AGE/SEX: 89 y.o. female  ROOM: [facility]     Subjective   Subjective   Sleeping comfortably, no family at bedside.    Objective   Objective   Vital Signs  Temp:  [97.7 °F (36.5 °C)-99 °F (37.2 °C)] 98.6 °F (37 °C)  Heart Rate:  [] 80  Resp:  [18-24] 18  BP: (109-137)/(64-89) 127/81  SpO2:  [92 %-98 %] 94 %  on   ;   Device (Oxygen Therapy): room air  Body mass index is 28.43 kg/m².  Physical Exam  Constitutional:       Appearance: She is ill-appearing.   Pulmonary:      Effort: No respiratory distress.      Breath sounds: No stridor.   Skin:     Coloration: Skin is pale. Skin is not jaundiced.   Neurological:      Mental Status: She is lethargic.         Results Review     I reviewed the patient's new clinical results.  Results from last 7 days   Lab Units 25   WBC 10*3/mm3 8.54   HEMOGLOBIN g/dL 10.1*   PLATELETS 10*3/mm3 185     Results from last 7 days   Lab Units 25   SODIUM mmol/L 143   POTASSIUM mmol/L 3.3*   CHLORIDE mmol/L 109*   CO2 mmol/L 24.2   BUN mg/dL 17   CREATININE mg/dL 0.86   GLUCOSE mg/dL 88   EGFR mL/min/1.73 65.5     Results from last 7 days   Lab Units 25   ALBUMIN g/dL 2.7*   BILIRUBIN mg/dL 0.5   ALK PHOS U/L 72   AST (SGOT) U/L 20   ALT (SGPT) U/L 12     Results from last 7 days   Lab Units 25   CALCIUM mg/dL 8.1*   ALBUMIN g/dL 2.7*     Results from last 7 days   Lab Units 25   PROCALCITONIN ng/mL 0.07   LACTATE mmol/L 1.3     No results found for: \"HGBA1C\", \"POCGLU\"    CT Head Without Contrast  Result Date: 2025  No acute intracranial findings.  Radiation dose reduction techniques were utilized, including automated exposure control and exposure modulation based on body size.   This report was finalized on 2025 9:45 PM by Dr. Robyn Wood M.D on Workstation: [id number]      CT Cervical " Spine Without Contrast  Result Date: 4/26/2025  No acute fracture or subluxation identified.  Radiation dose reduction techniques were utilized, including automated exposure control and exposure modulation based on body size.   This report was finalized on 4/26/2025 9:43 PM by Dr. Robyn Wood M.D on Workstation: [email or web address]      XR Chest 1 View  Result Date: 4/26/2025  No acute findings.  This report was finalized on 4/26/2025 9:36 PM by Dr. Robyn Wood M.D on Workstation: [id number]      XR Knee 1 or 2 View Right  Result Date: 4/26/2025  No definite acute fracture or subluxation identified.  This report was finalized on 4/26/2025 9:06 PM by Dr. Robyn Wood M.D on Workstation: [email or web address]      Scheduled Medications  diazePAM, 1 mg, Oral, Nightly  melatonin, 2.5 mg, Oral, Nightly  pantoprazole, 40 mg, Oral, Daily  sodium chloride, 3 mL, Intravenous, Q12H    Infusions   Diet  Diet: Regular/House; Fluid Consistency: Thin (IDDSI 0)       Assessment/Plan     Active Hospital Problems    Diagnosis  POA   • **Diffuse pain [R52]  Yes   • Bruising [T14.8XXA]  Yes   • Comfort measures only status [Z51.5]  Not Applicable   • Physical debility [R53.81]  Yes   • Dementia with behavioral disturbance [F03.918]  Yes   • Hypokalemia [E87.6]  Yes   • Chronic anticoagulation [Z79.01]  Not Applicable   • Anxiety disorder [F41.9]  Yes   • Benzodiazepine dependence [F13.20]  Yes   • Altered mental status [R41.82]  Yes   • History of pulmonary embolism [Z86.711]  Yes   • Atrial fibrillation [I48.91]  Yes   • History of CVA (cerebrovascular accident) [Z86.73]  Not Applicable   • HTN (hypertension) [I10]  Yes   • Anemia [D64.9]  Yes   • Asthma [J45.909]  Yes      Resolved Hospital Problems   No resolved problems to display.       89 y.o. female admitted with Diffuse pain.      04/27/25  [facility] consult is pending, continue with comfort measures.  Has received 2 doses of IV Ativan and 3 doses IV morphine over the last  24 hours.                 Pascual Reyna MD  [facility]  04/27/25  07:10 EDT

## 2025-04-27 NOTE — ED PROVIDER NOTES
EMERGENCY DEPARTMENT ENCOUNTER  Room Number:  17/17  PCP: Christopher Leyva DO  Independent Historians: Patient, EMS      HPI:  Chief Complaint: had concerns including Altered Mental Status.     A complete HPI/ROS/PMH/PSH/SH/FH are unobtainable due to: Nothing      Context: Vonnie Coppola is a 89 y.o. female with a medical history of anemia, renal sufficiency, A-fib, who presents to the ED c/o acute altered mental status.  Is unclear when patient was last known well.  She is complaining of pain in her right knee and her left shoulder.  It is unknown if she had a fall.  EMS reports the patient was oriented x 1 for them prior to arrival with mild hypotension with systolic in the 60s, blood glucose 120.  Temp for EMS was 99.0.            Review of prior external notes (non-ED) -and- Review of prior external test results outside of this encounter: I reviewed discharge summary from 12/14/2024.  Patient had acute on chronic decline.  EEG was performed that showed diffuse slowing but no epileptiform activity.  She underwent LP that was unremarkable.  CT and MRI brain showed no acute abnormality.  She is anticoagulated on Eliquis due to history of A-fib.        PAST MEDICAL HISTORY  Active Ambulatory Problems     Diagnosis Date Noted    GI bleed 01/16/2024    Anemia 01/16/2024    HTN (hypertension) 01/16/2024    History of breast cancer 01/16/2024    Asthma 01/16/2024    History of CVA (cerebrovascular accident) 01/17/2024    Dehydration 01/17/2024    Renal insufficiency 01/17/2024    Pulmonary nodule 01/17/2024    Adnexal cyst 01/17/2024    Nausea 01/16/2024    Atrial fibrillation 01/21/2024    History of pulmonary embolism 03/22/2024    Class 2 severe obesity with serious comorbidity in adult 03/22/2024    Thrombocytopenia 03/22/2024    Cellulitis of right leg 03/23/2024    Acute cystitis 03/23/2024    Leg hematoma, right, subsequent encounter 05/31/2024    Obesity (BMI 35.0-39.9 without comorbidity) 06/19/2024     Morbid (severe) obesity due to excess calories (*specify comorbidity) 06/19/2024    Constipation 09/29/2024    UTI (urinary tract infection) 09/29/2024    Deep vein thrombosis     Generalized abdominal pain 10/01/2024    Altered mental status 12/07/2024    Chronic anticoagulation 12/08/2024    Anxiety disorder 12/08/2024    Benzodiazepine dependence 12/08/2024    E. coli UTI (urinary tract infection) 12/08/2024    Stroke-like symptoms 12/08/2024    Hypokalemia 12/08/2024    Severe protein-calorie malnutrition 12/12/2024     Resolved Ambulatory Problems     Diagnosis Date Noted    Fecal impaction 06/22/2024     Past Medical History:   Diagnosis Date    Arthritis     Blind left eye     Breast cancer 1999    History of cataract     Hx of radiation therapy 1999    Hypertension     Pneumonia     PONV (postoperative nausea and vomiting)     Stroke          PAST SURGICAL HISTORY  Past Surgical History:   Procedure Laterality Date    APPENDECTOMY      BLADDER SURGERY      BREAST BIOPSY Left 1999    MALIGNANT    BREAST LUMPECTOMY Left 1999    MALIGNANT    CATARACT EXTRACTION      COLONOSCOPY N/A 01/19/2024    Procedure: COLONOSCOPY to cecum with cold snare polypectomies;  Surgeon: Harshad Gaston MD;  Location: [facility] ENDOSCOPY;  Service: Gastroenterology;  Laterality: N/A;  pre- gi bleed, anemia  post- diverticulosis, polyps    ENDOSCOPY N/A 01/19/2024    Procedure: ESOPHAGOGASTRODUODENOSCOPY with biospy;  Surgeon: Harshad Gaston MD;  Location: [facility] ENDOSCOPY;  Service: Gastroenterology;  Laterality: N/A;  pre- gi bleed, anemia  post- erosive gastirits    GALLBLADDER SURGERY      HERNIA REPAIR      HYSTERECTOMY      INCISION AND DRAINAGE LEG Right 5/31/2024    Procedure: RIGHT LOWER EXTREMITY WOUND EXPLORATION, DEBRIDEMENT AND CONTROL OF BLEEDING;  Surgeon: Gerald Pedraza MD;  Location: [facility] MAIN OR;  Service: General;  Laterality: Right;    LASIK      LYMPHADENECTOMY      OOPHORECTOMY           FAMILY  HISTORY  Family History   Problem Relation Age of Onset    Other Mother         Cardiac disorder    Osteoarthritis Mother     Cataracts Mother     Macular degeneration Mother     Hypertension Father     Other Father         Cardiac disorder    Ovarian cancer Sister         70'S    Cancer Sister     Diabetes Sister     Hypertension Sister     Osteoarthritis Sister     Cancer Brother     Hypertension Brother     Osteoarthritis Brother     Colon cancer Maternal Grandmother     Cancer Maternal Grandmother     Ovarian cancer Paternal Grandmother         unknown    Cancer Other     Stroke Other     Breast cancer Neg Hx          SOCIAL HISTORY  Social History     Socioeconomic History    Marital status:    Tobacco Use    Smoking status: Former     Current packs/day: 0.50     Average packs/day: 0.5 packs/day for 2.0 years (1.0 ttl pk-yrs)     Types: Cigarettes    Smokeless tobacco: Never    Tobacco comments:     quit 40 years ago   Vaping Use    Vaping status: Never Used   Substance and Sexual Activity    Alcohol use: No    Drug use: No    Sexual activity: Defer         ALLERGIES  Cortisone, Penicillins, Iodinated contrast media, and Lactose intolerance (gi)      REVIEW OF SYSTEMS  Review of systems is limited due to patient's altered mental status      PHYSICAL EXAM    I have reviewed the triage vital signs and nursing notes.    ED Triage Vitals   Temp Pulse Resp BP SpO2   -- -- -- -- --      Temp src Heart Rate Source Patient Position BP Location FiO2 (%)   -- -- -- -- --         GENERAL: awake and alert, patient oriented to self only, poor historian  HENT: Normocephalic, atraumatic.  Lips and oral mucosa appear dry.  EYES: no scleral icterus, pupils 3 mm reactive bilaterally, EOMI  CV: irregular rhythm, regular rate  RESPIRATORY: normal effort  ABDOMEN: soft, nondistended, nontender throughout  MUSCULOSKELETAL: no deformity.  There is point tenderness over the right knee with crepitus noted.  There is no  shortening or rotation of the right lower extremity.  There is mild point tenderness present over the left distal clavicle without deformity or crepitus.  NEURO: alert, oriented to self only, having difficulty following commands, provides minimal history  PSYCH: calm  SKIN: Warm, dry          LAB RESULTS  Recent Results (from the past 24 hours)   Comprehensive Metabolic Panel    Collection Time: 04/26/25  8:24 PM    Specimen: Blood   Result Value Ref Range    Glucose 88 65 - 99 mg/dL    BUN 17 8 - 23 mg/dL    Creatinine 0.86 0.57 - 1.00 mg/dL    Sodium 143 136 - 145 mmol/L    Potassium 3.3 (L) 3.5 - 5.2 mmol/L    Chloride 109 (H) 98 - 107 mmol/L    CO2 24.2 22.0 - 29.0 mmol/L    Calcium 8.1 (L) 8.6 - 10.5 mg/dL    Total Protein 5.3 (L) 6.0 - 8.5 g/dL    Albumin 2.7 (L) 3.5 - 5.2 g/dL    ALT (SGPT) 12 1 - 33 U/L    AST (SGOT) 20 1 - 32 U/L    Alkaline Phosphatase 72 39 - 117 U/L    Total Bilirubin 0.5 0.0 - 1.2 mg/dL    Globulin 2.6 gm/dL    A/G Ratio 1.0 g/dL    BUN/Creatinine Ratio 19.8 7.0 - 25.0    Anion Gap 9.8 5.0 - 15.0 mmol/L    eGFR 65.5 >60.0 mL/min/1.73   Respiratory Panel PCR w/COVID-19(SARS-CoV-2) [facility] In-House, NP Swab in UTM/[facility], 2 HR TAT - Swab, Nasopharynx    Collection Time: 04/26/25  8:24 PM    Specimen: Nasopharynx; Swab   Result Value Ref Range    ADENOVIRUS, PCR Not Detected Not Detected    Coronavirus 229E Not Detected Not Detected    Coronavirus HKU1 Not Detected Not Detected    Coronavirus NL63 Not Detected Not Detected    Coronavirus OC43 Not Detected Not Detected    COVID19 Not Detected Not Detected - Ref. Range    Human Metapneumovirus Not Detected Not Detected    Human Rhinovirus/Enterovirus Not Detected Not Detected    Influenza A PCR Not Detected Not Detected    Influenza B PCR Not Detected Not Detected    Parainfluenza Virus 1 Not Detected Not Detected    Parainfluenza Virus 2 Not Detected Not Detected    Parainfluenza Virus 3 Not Detected Not Detected     Parainfluenza Virus 4 Not Detected Not Detected    RSV, PCR Not Detected Not Detected    Bordetella pertussis pcr Not Detected Not Detected    Bordetella parapertussis PCR Not Detected Not Detected    Chlamydophila pneumoniae PCR Not Detected Not Detected    Mycoplasma pneumo by PCR Not Detected Not Detected   Lactic Acid, Plasma    Collection Time: 04/26/25  8:24 PM    Specimen: Blood   Result Value Ref Range    Lactate 1.3 0.5 - 2.0 mmol/L   Procalcitonin    Collection Time: 04/26/25  8:24 PM    Specimen: Blood   Result Value Ref Range    Procalcitonin 0.07 0.00 - 0.25 ng/mL   CK    Collection Time: 04/26/25  8:24 PM    Specimen: Blood   Result Value Ref Range    Creatine Kinase 142 20 - 180 U/L   CBC Auto Differential    Collection Time: 04/26/25  8:24 PM    Specimen: Blood   Result Value Ref Range    WBC 8.54 3.40 - 10.80 10*3/mm3    RBC 3.73 (L) 3.77 - 5.28 10*6/mm3    Hemoglobin 10.1 (L) 12.0 - 15.9 g/dL    Hematocrit 32.4 (L) 34.0 - 46.6 %    MCV 86.9 79.0 - 97.0 fL    MCH 27.1 26.6 - 33.0 pg    MCHC 31.2 (L) 31.5 - 35.7 g/dL    RDW 14.0 12.3 - 15.4 %    RDW-SD 44.9 37.0 - 54.0 fl    MPV 11.6 6.0 - 12.0 fL    Platelets 185 140 - 450 10*3/mm3    Neutrophil % 48.1 42.7 - 76.0 %    Lymphocyte % 33.1 19.6 - 45.3 %    Monocyte % 16.9 (H) 5.0 - 12.0 %    Eosinophil % 1.2 0.3 - 6.2 %    Basophil % 0.5 0.0 - 1.5 %    Immature Grans % 0.2 0.0 - 0.5 %    Neutrophils, Absolute 4.11 1.70 - 7.00 10*3/mm3    Lymphocytes, Absolute 2.83 0.70 - 3.10 10*3/mm3    Monocytes, Absolute 1.44 (H) 0.10 - 0.90 10*3/mm3    Eosinophils, Absolute 0.10 0.00 - 0.40 10*3/mm3    Basophils, Absolute 0.04 0.00 - 0.20 10*3/mm3    Immature Grans, Absolute 0.02 0.00 - 0.05 10*3/mm3    nRBC 0.0 0.0 - 0.2 /100 WBC   ECG 12 Lead Altered Mental Status    Collection Time: 04/26/25  8:26 PM   Result Value Ref Range    QT Interval 388 ms    QTC Interval 488 ms   Urinalysis With Culture If Indicated - Straight Cath    Collection Time: 04/26/25  8:32 PM     Specimen: Straight Cath; Urine   Result Value Ref Range    Color, UA Yellow Yellow, Straw    Appearance, UA Clear Clear    pH, UA 6.5 5.0 - 8.0    Specific Gravity, UA 1.020 1.005 - 1.030    Glucose, UA Negative Negative    Ketones, UA Trace (A) Negative    Bilirubin, UA Negative Negative    Blood, UA Negative Negative    Protein, UA Negative Negative    Leuk Esterase, UA Negative Negative    Nitrite, UA Negative Negative    Urobilinogen, UA 1.0 E.U./dL 0.2 - 1.0 E.U./dL       The above labs were ordered by me and independently reviewed by me.     RADIOLOGY  CT Head Without Contrast  Result Date: 4/26/2025  CT OF THE HEAD WITHOUT CONTRAST  HISTORY: Altered mental status  COMPARISON: December 7, 2024  TECHNIQUE: Axial CT imaging was obtained through the brain. No IV contrast was administered.  FINDINGS: No acute intracranial hemorrhage is seen. There is diffuse atrophy. There is periventricular and deep white matter microangiopathic change. There is no midline shift or mass effect. No calvarial fracture is seen. There is mucosal thickening within the ethmoid sinuses. Trace fluid is noted within the left mastoid air cells. There are degenerative changes of the temporomandibular joints.      No acute intracranial findings.  Radiation dose reduction techniques were utilized, including automated exposure control and exposure modulation based on body size.   This report was finalized on 4/26/2025 9:45 PM by Dr. Robyn Wood M.D on Workstation: [id number]      CT Cervical Spine Without Contrast  Result Date: 4/26/2025  CT OF THE CERVICAL SPINE  HISTORY: Altered mental status. Fall.  COMPARISON: None available.  TECHNIQUE: Axial CT imaging was obtained through the cervical spine. Coronal and sagittal reformatted images were obtained.  FINDINGS: No acute fracture or subluxation of the cervical spine is seen. Images through the lung apices demonstrate some consolidation in the right lung apex, which is new when  compared to prior study from December 7, 2024. This area measures 1.5 x 1.2 cm. Relatively rapid development would suggest an infectious or inflammatory process, but short-term CT follow-up in 3 months is recommended. There is no prevertebral soft tissue swelling. Intervertebral disc space narrowing is most significant at C5-C6. Canal stenosis is most significant at C5-C6. Neural foraminal narrowing is noted at multiple levels.      No acute fracture or subluxation identified.  Radiation dose reduction techniques were utilized, including automated exposure control and exposure modulation based on body size.   This report was finalized on 4/26/2025 9:43 PM by Dr. Robyn Wood M.D on Workstation: [id number]      XR Chest 1 View  Result Date: 4/26/2025  SINGLE VIEW OF THE CHEST  HISTORY: Altered mental status  COMPARISON: December 7, 2024  FINDINGS: There is cardiomegaly. There is no vascular congestion. No pneumothorax is seen. There is some scarring noted at the left lung base, with some blunting of the left costophrenic angle. No large effusion is seen. There is calcification of the aorta.      No acute findings.  This report was finalized on 4/26/2025 9:36 PM by Dr. Robyn Wood M.D on Workstation: [id number]      XR Knee 1 or 2 View Right  Result Date: 4/26/2025  2 VIEWS RIGHT KNEE  HISTORY: Right knee pain  COMPARISON: None available.  FINDINGS: No definite acute fracture or subluxation of the right knee is seen, although the exam is compromised by the patient's advanced degenerative changes. These are in a tricompartmental distribution. There is some fullness within the suprapatellar pouch, which may reflect effusion.      No definite acute fracture or subluxation identified.  This report was finalized on 4/26/2025 9:06 PM by Dr. Robyn Wood M.D on Workstation: [id number]        The above radiology studies were ordered by me.  See ED course for independent interpretations.     MEDICATIONS  GIVEN IN ER  Medications   sodium chloride 0.9 % flush 10 mL (has no administration in time range)   haloperidol lactate (HALDOL) injection 1 mg (has no administration in time range)   sodium chloride 0.9 % flush 3 mL (has no administration in time range)   sodium chloride 0.9 % flush 3-10 mL (has no administration in time range)   sodium chloride 0.9 % infusion 40 mL (has no administration in time range)   acetaminophen (TYLENOL) tablet 650 mg (has no administration in time range)     Or   acetaminophen (TYLENOL) 160 MG/5ML oral solution 650 mg (has no administration in time range)     Or   acetaminophen (TYLENOL) suppository 650 mg (has no administration in time range)   famotidine (PEPCID) tablet 10 mg (has no administration in time range)   sennosides-docusate (PERICOLACE) 8.6-50 MG per tablet 2 tablet (has no administration in time range)     And   polyethylene glycol (MIRALAX) packet 17 g (has no administration in time range)     And   bisacodyl (DULCOLAX) EC tablet 5 mg (has no administration in time range)     And   bisacodyl (DULCOLAX) suppository 10 mg (has no administration in time range)   ondansetron ODT (ZOFRAN-ODT) disintegrating tablet 4 mg (has no administration in time range)     Or   ondansetron (ZOFRAN) injection 4 mg (has no administration in time range)   melatonin tablet 2.5 mg (has no administration in time range)   LORazepam (ATIVAN) injection 0.5 mg (has no administration in time range)     Or   LORazepam (ATIVAN) injection 0.5 mg (has no administration in time range)     Or   LORazepam (ATIVAN) 2 MG/ML concentrated solution 0.5 mg (has no administration in time range)   LORazepam (ATIVAN) injection 1 mg (has no administration in time range)     Or   LORazepam (ATIVAN) injection 1 mg (has no administration in time range)     Or   LORazepam (ATIVAN) 2 MG/ML concentrated solution 1 mg (has no administration in time range)   LORazepam (ATIVAN) injection 2 mg (has no administration in time  range)     Or   LORazepam (ATIVAN) injection 2 mg (has no administration in time range)     Or   LORazepam (ATIVAN) 2 MG/ML concentrated solution 2 mg (has no administration in time range)   morphine injection 2 mg (has no administration in time range)     Or   morphine concentrated solution 5 mg (has no administration in time range)     Or   HYDROmorphone (DILAUDID) injection 0.5 mg (has no administration in time range)   morphine injection 4 mg (has no administration in time range)     Or   morphine concentrated solution 10 mg (has no administration in time range)     Or   HYDROmorphone (DILAUDID) injection 1 mg (has no administration in time range)   Morphine (PF) injection 6 mg (has no administration in time range)     Or   morphine concentrated solution 20 mg (has no administration in time range)     Or   HYDROmorphone (DILAUDID) injection 1.5 mg (has no administration in time range)   diphenoxylate-atropine (LOMOTIL) 2.5-0.025 MG per tablet 1 tablet (has no administration in time range)   Polyvinyl Alcohol-Povidone PF (ARTIFICIAL TEARS) 1.4-0.6 % ophthalmic solution 1 drop (has no administration in time range)   acetaminophen (TYLENOL) tablet 650 mg (has no administration in time range)     Or   acetaminophen (TYLENOL) 160 MG/5ML oral solution 650 mg (has no administration in time range)     Or   acetaminophen (TYLENOL) suppository 650 mg (has no administration in time range)   glycopyrrolate (ROBINUL) injection 0.2 mg (has no administration in time range)     Or   glycopyrrolate (ROBINUL) injection 0.2 mg (has no administration in time range)     Or   glycopyrrolate (ROBINUL) injection 0.4 mg (has no administration in time range)     Or   glycopyrrolate (ROBINUL) injection 0.4 mg (has no administration in time range)   albuterol (PROVENTIL) nebulizer solution 0.083% 2.5 mg/3mL (has no administration in time range)   pantoprazole (PROTONIX) EC tablet 40 mg (has no administration in time range)   diazePAM  (VALIUM) tablet 1 mg (has no administration in time range)         ORDERS PLACED DURING THIS VISIT:  Orders Placed This Encounter   Procedures    Respiratory Panel PCR w/COVID-19(SARS-CoV-2) [facility] In-House, NP Swab in UTM/VTM, 2 HR TAT - Swab, Nasopharynx    Blood Culture - Blood,    Blood Culture - Blood,    XR Chest 1 View    XR Knee 1 or 2 View Right    CT Head Without Contrast    CT Cervical Spine Without Contrast    Comprehensive Metabolic Panel    Lactic Acid, Plasma    Procalcitonin    CK    CBC Auto Differential    Urinalysis With Culture If Indicated - Straight Cath    Diet: Regular/House; Fluid Consistency: Thin (IDDSI 0)    Continuous Pulse Oximetry    Monitor Blood Pressure    Vital Signs    Notify Provider (With Default Parameters)    Activity - Ad Tamar    Up With Assistance    Intake & Output    Weigh Patient    Oral Care    Maintain IV Access    Up With Assistance    Vital Signs Per Unit Protocol    Discontinue Cardiac Monitoring    Code Status and Medical Interventions: No CPR (Do Not Attempt to Resuscitate); Comfort Measures    [facility] (on-call MD unless specified) Details    Inpatient Hospice / [facility] Consult    Oxygen Therapy- Nasal Cannula; Titrate 1-6 LPM Per SpO2; 90 - 95%    Oxygen Therapy- Nasal Cannula; Titrate 1-6 LPM Per SpO2; 90 - 95%    ECG 12 Lead Altered Mental Status    Insert Peripheral IV    Insert Peripheral IV    Inpatient Admission    CBC & Differential         OUTPATIENT MEDICATION MANAGEMENT:  Current Facility-Administered Medications Ordered in Epic   Medication Dose Route Frequency Provider Last Rate Last Admin    acetaminophen (TYLENOL) tablet 650 mg  650 mg Oral Q4H PRN Daniel Watson MD        Or    acetaminophen (TYLENOL) 160 MG/5ML oral solution 650 mg  650 mg Oral Q4H PRN Daniel Watson MD        Or    acetaminophen (TYLENOL) suppository 650 mg  650 mg Rectal Q4H PRN Daniel Watson MD        acetaminophen (TYLENOL) tablet 650  mg  650 mg Oral Q4H Daniel Solomon MD        Or    acetaminophen (TYLENOL) 160 MG/5ML oral solution 650 mg  650 mg Oral Q4H KARLAN Daniel Watson MD        Or    acetaminophen (TYLENOL) suppository 650 mg  650 mg Rectal Q4H Daniel Solomon MD        albuterol (PROVENTIL) nebulizer solution 0.083% 2.5 mg/3mL  2.5 mg Nebulization Q6H Daniel Solomon MD        sennosides-docusate (PERICOLACE) 8.6-50 MG per tablet 2 tablet  2 tablet Oral BID Daniel Solomon MD        And    polyethylene glycol (MIRALAX) packet 17 g  17 g Oral Daily PRN Daniel Watson MD        And    bisacodyl (DULCOLAX) EC tablet 5 mg  5 mg Oral Daily PRDaniel Martinez MD        And    bisacodyl (DULCOLAX) suppository 10 mg  10 mg Rectal Daily PRN Daniel Wtason MD        diazePAM (VALIUM) tablet 1 mg  1 mg Oral Nightly Daniel Watson MD        diphenoxylate-atropine (LOMOTIL) 2.5-0.025 MG per tablet 1 tablet  1 tablet Oral Q2H PRDaniel Martinez MD        famotidine (PEPCID) tablet 10 mg  10 mg Oral BID Daniel Solomon MD        glycopyrrolate (ROBINUL) injection 0.2 mg  0.2 mg Intravenous Q2H PRDaniel Martinez MD        Or    glycopyrrolate (ROBINUL) injection 0.2 mg  0.2 mg Subcutaneous Q2H Daniel Solomon MD        Or    glycopyrrolate (ROBINUL) injection 0.4 mg  0.4 mg Intravenous Q2H PRN Daniel Watson MD        Or    glycopyrrolate (ROBINUL) injection 0.4 mg  0.4 mg Subcutaneous Q2H Daniel Solomon MD        haloperidol lactate (HALDOL) injection 1 mg  1 mg Intravenous Q6H Daniel Solomon MD        morphine injection 2 mg  2 mg Intravenous Q1H PRDaniel Martinez MD        Or    morphine concentrated solution 5 mg  5 mg Sublingual Q1H PRDaniel Martinez MD        Or    HYDROmorphone (DILAUDID) injection 0.5 mg  0.5 mg Intravenous Q1H PRN Walter,  Daniel White MD        morphine injection 4 mg  4 mg Intravenous Q1H PRN Daniel Watson MD        Or    morphine concentrated solution 10 mg  10 mg Sublingual Q1H PRN Daniel Watson MD        Or    HYDROmorphone (DILAUDID) injection 1 mg  1 mg Intravenous Q1H PRN Daniel Watson MD        Morphine (PF) injection 6 mg  6 mg Intravenous Q1H PRN Daniel Watson MD        Or    morphine concentrated solution 20 mg  20 mg Sublingual Q1H PRN Daniel Watson MD        Or    HYDROmorphone (DILAUDID) injection 1.5 mg  1.5 mg Intravenous Q1H PRN Daniel Watson MD        LORazepam (ATIVAN) injection 0.5 mg  0.5 mg Intravenous Q1H PRN Daniel Watson MD        Or    LORazepam (ATIVAN) injection 0.5 mg  0.5 mg Subcutaneous Q1H PRDaniel Martinez MD        Or    LORazepam (ATIVAN) 2 MG/ML concentrated solution 0.5 mg  0.5 mg Sublingual Q1H PRN Daniel Watson MD        LORazepam (ATIVAN) injection 1 mg  1 mg Intravenous Q1H PRN Daniel Watson MD        Or    LORazepam (ATIVAN) injection 1 mg  1 mg Subcutaneous Q1H PRDaniel Martinez MD        Or    LORazepam (ATIVAN) 2 MG/ML concentrated solution 1 mg  1 mg Sublingual Q1H PRN Daniel Watson MD        LORazepam (ATIVAN) injection 2 mg  2 mg Intravenous Q1H PRN Daniel Watson MD        Or    LORazepam (ATIVAN) injection 2 mg  2 mg Subcutaneous Q1H PRN Daniel Watson MD        Or    LORazepam (ATIVAN) 2 MG/ML concentrated solution 2 mg  2 mg Sublingual Q1H PRN Daniel Watson MD        melatonin tablet 2.5 mg  2.5 mg Oral Nightly Daneil Watson MD        ondansetron ODT (ZOFRAN-ODT) disintegrating tablet 4 mg  4 mg Oral Q6H PRN Daniel Watson MD        Or    ondansetron (ZOFRAN) injection 4 mg  4 mg Intravenous Q6H PRN Daniel Watson MD        [START ON 4/27/2025] pantoprazole (PROTONIX) EC tablet 40 mg  40  mg Oral Daily Daniel Watson MD        Polyvinyl Alcohol-Povidone PF (ARTIFICIAL TEARS) 1.4-0.6 % ophthalmic solution 1 drop  1 drop Both Eyes Q30 Min PRN Daniel Watson MD        sodium chloride 0.9 % flush 10 mL  10 mL Intravenous PRN Garfield Fang MD        sodium chloride 0.9 % flush 3 mL  3 mL Intravenous Q12H Daniel Watson MD        sodium chloride 0.9 % flush 3-10 mL  3-10 mL Intravenous PRN Daniel Watson MD        sodium chloride 0.9 % infusion 40 mL  40 mL Intravenous PRN Daniel Watson MD         Current Outpatient Medications Ordered in Epic   Medication Sig Dispense Refill    albuterol (ACCUNEB) 0.63 MG/3ML nebulizer solution Take 3 mL by nebulization Every 6 (Six) Hours As Needed for Shortness of Air.      apixaban (ELIQUIS) 2.5 MG tablet tablet Take 1 tablet by mouth Every 12 (Twelve) Hours. Indications: Atrial Fibrillation      arformoterol (BROVANA) 15 MCG/2ML nebulizer solution Take 2 mL by nebulization 2 (Two) Times a Day. 120 mL 11    atorvastatin (LIPITOR) 10 MG tablet Take 1 tablet by mouth Every Night.      budesonide (PULMICORT) 0.5 MG/2ML nebulizer solution Take 2 mL by nebulization 2 (Two) Times a Day. 360 mL 2    diazePAM (VALIUM) 2 MG tablet Take 0.5 tablets by mouth Every Night. 5 tablet 0    naloxone (NARCAN) 4 MG/0.1ML nasal spray Call 911. Don't prime. Hercules in 1 nostril for overdose. Repeat in 2-3 minutes in other nostril if no or minimal breathing/responsiveness. 2 each 0    pantoprazole (PROTONIX) 40 MG EC tablet Take 1 tablet by mouth Daily. Indications: gastritis with GI bleed      polyethylene glycol (MIRALAX) 17 g packet Take 17 g by mouth 2 (Two) Times a Day. 30 each 0    Polyvinyl Alcohol-Povidone PF (ARTIFICIAL TEARS) 1.4-0.6 % ophthalmic solution Administer 2 drops to both eyes Every 1 (One) Hour As Needed for Wound Care.      sennosides-docusate (PERICOLACE) 8.6-50 MG per tablet Take 2 tablets by mouth 2 (Two)  Times a Day. 60 tablet 0         PROCEDURES  Procedures            PROGRESS, DATA ANALYSIS, CONSULTS, AND MEDICAL DECISION MAKING  All labs have been independently interpreted by me.  All radiology studies have been reviewed by me. All EKG's have been independently viewed and interpreted by me.  Discussion below represents my analysis of pertinent findings related to patient's condition, differential diagnosis, treatment plan and final disposition.    Differential diagnosis includes but is not limited to:  Traumatic intracranial hemorrhage  Acute ischemic stroke  Infectious or metabolic encephalopathy  Hyponatremia      Clinical Scores:                  ED Course as of 04/26/25 2316   Sat Apr 26, 2025 2027 EKG          EKG time: 8:26 PM  Rhythm/Rate: A-fib, 95  P waves and AK: N/A  QRS, axis: LAFB  ST and T waves: No acute ischemic changes    Interpreted Contemporaneously by me, independently viewed  Similar compared to prior 12/7/2024       [JR]   2113 Chest x-ray independently interpreted PACS.  Lung fields are clear bilaterally.  There is no obvious fracture involving the proximal humerus or distal clavicle bilaterally. [JR]   2114 Plain films of the right knee are negative for acute fracture malalignment. [JR]   2134 CT brain without contrast independently interpreted PACS demonstrates no acute intracranial hemorrhage. [JR]   2134 Nitrite, UA: Negative [JR]   2134 Procalcitonin: 0.07 [JR]   2234 Discussed with Dr. Watson, [facility] hospitalist, who agrees to admit for further evaluation of patient's acute encephalopathy. [JR]   2236 Thus far patient's altered mental status workup has been unrevealing.  It appears that she had a similar admission in December.  I am concerned that she may have more of a component of dementia with behavioral disturbance.  I have ordered oral Zyprexa. [JR]      ED Course User Index  [JR] Garfield Fang MD             AS OF 23:16 EDT VITALS:    BP - 122/72  HR - 94  TEMP - 99 °F  (37.2 °C) (Tympanic)  O2 SATS - 97%    COMPLEXITY OF CARE  The patient requires admission.      Chronic or social conditions impacting patient care (Social Determinants of Health):     DIAGNOSIS  Final diagnoses:   Acute encephalopathy   Chronic atrial fibrillation           DISPOSITION  Admit      Prescription drug monitoring program review:           Please note that portions of this document were completed with a voice recognition program.    Note Disclaimer: At [facility], we believe that sharing information builds trust and better relationships. You are receiving this note because you recently visited [facility]. It is possible you will see health information before a provider has talked with you about it. This kind of information can be easy to misunderstand. To help you fully understand what it means for your health, we urge you to discuss this note with your provider.         Garfield Fang MD  04/26/25 9227

## 2025-04-27 NOTE — PLAN OF CARE
Goal Outcome Evaluation:      Patient has a PPS of 10%.  Minimally responsive to repositioning.  Anxiety and restlessness are present as well.  Respirations even and non labored at this time.  As needed IV medications given for symptom management:  Morphine 4 mg x 3 doses, Ativan 2 mg x 3 doses, Robinul 0.4 mg x 1 dose.  Supportive family at the bedside this day.

## 2025-04-27 NOTE — ED NOTES
"Nursing report ED to floor  Vonnie Coppola  89 y.o.  female    HPI :  HPI  Stated Reason for Visit: AMS/SEPSIS ALERT  History Obtained From: EMS    Chief Complaint  Chief Complaint   Patient presents with    Altered Mental Status       Admitting doctor:   Daniel Watson MD    Admitting diagnosis:   The primary encounter diagnosis was Acute encephalopathy. A diagnosis of Chronic atrial fibrillation was also pertinent to this visit.    Code status:   Current Code Status       Date Active Code Status Order ID Comments User Context       Prior            Allergies:   Cortisone, Penicillins, Iodinated contrast media, and Lactose intolerance (gi)    Isolation:   No active isolations    Intake and Output  No intake or output data in the 24 hours ending 04/26/25 2250    Weight:       04/26/25 2030   Weight: 78.5 kg (173 lb 1 oz)       Most recent vitals:   Vitals:    04/26/25 2139 04/26/25 2211 04/26/25 2236 04/26/25 2237   BP:   122/72    Pulse: 99   94   Resp:       Temp:       TempSrc:       SpO2: 92%   97%   Weight:       Height:  165.1 cm (65\")         Active LDAs/IV Access:   Lines, Drains & Airways       Active LDAs       Name Placement date Placement time Site Days    Peripheral IV 04/26/25 2020 18 G Left Antecubital 04/26/25 2020  Antecubital  less than 1    External Urinary Catheter 12/08/24  2300  --  138                    Labs (abnormal labs have a star):   Labs Reviewed   COMPREHENSIVE METABOLIC PANEL - Abnormal; Notable for the following components:       Result Value    Potassium 3.3 (*)     Chloride 109 (*)     Calcium 8.1 (*)     Total Protein 5.3 (*)     Albumin 2.7 (*)     All other components within normal limits    Narrative:     GFR Categories in Chronic Kidney Disease (CKD)      GFR Category          GFR (mL/min/1.73)    Interpretation  G1                     90 or greater         Normal or high (1)  G2                      60-89                Mild decrease (1)  G3a                   " 45-59                Mild to moderate decrease  G3b                   30-44                Moderate to severe decrease  G4                    15-29                Severe decrease  G5                    14 or less           Kidney failure          (1)In the absence of evidence of kidney disease, neither GFR category G1 or G2 fulfill the criteria for CKD.    eGFR calculation 2021 CKD-EPI creatinine equation, which does not include race as a factor   CBC WITH AUTO DIFFERENTIAL - Abnormal; Notable for the following components:    RBC 3.73 (*)     Hemoglobin 10.1 (*)     Hematocrit 32.4 (*)     MCHC 31.2 (*)     Monocyte % 16.9 (*)     Monocytes, Absolute 1.44 (*)     All other components within normal limits   URINALYSIS W/ CULTURE IF INDICATED - Abnormal; Notable for the following components:    Ketones, UA Trace (*)     All other components within normal limits    Narrative:     In absence of clinical symptoms, the presence of pyuria, bacteria, and/or nitrites on the urinalysis result does not correlate with infection.  Urine microscopic not indicated.   RESPIRATORY PANEL PCR W/ COVID-19 (SARS-COV-2), NP SWAB IN UTM/VTP, 2 HR TAT - Normal    Narrative:     In the setting of a positive respiratory panel with a viral infection PLUS a negative procalcitonin without other underlying concern for bacterial infection, consider observing off antibiotics or discontinuation of antibiotics and continue supportive care. If the respiratory panel is positive for atypical bacterial infection (Bordetella pertussis, Chlamydophila pneumoniae, or Mycoplasma pneumoniae), consider antibiotic de-escalation to target atypical bacterial infection.   LACTIC ACID, PLASMA - Normal   PROCALCITONIN - Normal    Narrative:     As a Marker for Sepsis (Non-Neonates):    1. <0.5 ng/mL represents a low risk of severe sepsis and/or septic shock.  2. >2 ng/mL represents a high risk of severe sepsis and/or septic shock.    As a Marker for Lower  "Respiratory Tract Infections that require antibiotic therapy:    PCT on Admission    Antibiotic Therapy       6-12 Hrs later    >0.5                Strongly Recommended  >0.25 - <0.5        Recommended   0.1 - 0.25          Discouraged              Remeasure/reassess PCT  <0.1                Strongly Discouraged     Remeasure/reassess PCT    As 28 day mortality risk marker: \"Change in Procalcitonin Result\" (>80% or <=80%) if Day 0 (or Day 1) and Day 4 values are available. Refer to [email or web address]    Change in PCT <=80%  A decrease of PCT levels below or equal to 80% defines a positive change in PCT test result representing a higher risk for 28-day all-cause mortality of patients diagnosed with severe sepsis for septic shock.    Change in PCT >80%  A decrease of PCT levels of more than 80% defines a negative change in PCT result representing a lower risk for 28-day all-cause mortality of patients diagnosed with severe sepsis or septic shock.      CK - Normal   BLOOD CULTURE   BLOOD CULTURE   CBC AND DIFFERENTIAL    Narrative:     The following orders were created for panel order CBC & Differential.  Procedure                               Abnormality         Status                     ---------                               -----------         ------                     CBC Auto Differential[[id number]]        Abnormal            Final result                 Please view results for these tests on the individual orders.       EKG:   ECG 12 Lead Altered Mental Status   Preliminary Result   HEART RATE=95  bpm   RR Akezrvfb=931  ms   CT Interval=  ms   P Horizontal Axis=  deg   P Front Axis=  deg   QRSD Interval=79  ms   QT Vaednpyi=076  ms   YHmR=858  ms   QRS Axis=-39  deg   T Wave Axis=32  deg   - ABNORMAL ECG -   Atrial fibrillation   Abnormal R-wave progression, early transition   Inferior infarct, old   Date and Time of Study:2025-04-26 20:26:02          Meds given in ED:   Medications   sodium " chloride 0.9 % flush 10 mL (has no administration in time range)   haloperidol lactate (HALDOL) injection 1 mg (has no administration in time range)       Imaging results:  CT Head Without Contrast  Result Date: 4/26/2025  No acute intracranial findings.  Radiation dose reduction techniques were utilized, including automated exposure control and exposure modulation based on body size.   This report was finalized on 4/26/2025 9:45 PM by Dr. Robyn Wood M.D on Workstation: [email or web address]      CT Cervical Spine Without Contrast  Result Date: 4/26/2025  No acute fracture or subluxation identified.  Radiation dose reduction techniques were utilized, including automated exposure control and exposure modulation based on body size.   This report was finalized on 4/26/2025 9:43 PM by Dr. Robyn Wood M.D on Workstation: [id number]      XR Chest 1 View  Result Date: 4/26/2025  No acute findings.  This report was finalized on 4/26/2025 9:36 PM by Dr. Robyn Wood M.D on Workstation: [id number]      XR Knee 1 or 2 View Right  Result Date: 4/26/2025  No definite acute fracture or subluxation identified.  This report was finalized on 4/26/2025 9:06 PM by Dr. Robyn Wood M.D on Workstation: [id number]        Ambulatory status:   - bedrest      Social issues:   Social History     Socioeconomic History    Marital status:    Tobacco Use    Smoking status: Former     Current packs/day: 0.50     Average packs/day: 0.5 packs/day for 2.0 years (1.0 ttl pk-yrs)     Types: Cigarettes    Smokeless tobacco: Never    Tobacco comments:     quit 40 years ago   Vaping Use    Vaping status: Never Used   Substance and Sexual Activity    Alcohol use: No    Drug use: No    Sexual activity: Defer       Peripheral Neurovascular  Peripheral Neurovascular (Adult)  Peripheral Neurovascular WDL: WDL    Neuro Cognitive  Neuro Cognitive (Adult)  Cognitive/Neuro/Behavioral WDL: level of consciousness  Level of Consciousness:  Responds to Voice    Learning  Learning Assessment  Learning Readiness and Ability: cognitive limitation noted  Education Provided  Person Taught: family member/friend  Teaching Method: verbal instruction    Respiratory  Respiratory WDL  Respiratory WDL: WDL    Abdominal Pain       Pain Assessments  Pain (Adult)  Preferred Pain Scale: FACES (Goddard-Baker FACES Pain Rating Scale)  FACES Pain Rating: Rest: 6-->hurts even more  FACES Pain Rating: Activity: 6-->hurts even more    NIH Stroke Scale       Sylvia Rome RN  04/26/25 22:50 EDT

## 2025-04-27 NOTE — PLAN OF CARE
Problem: Adult Inpatient Plan of Care  Goal: Plan of Care Review  Outcome: Progressing  Flowsheets (Taken 4/27/2025 0403)  Progress: no change  Outcome Evaluation: New admit this shift for AMS and uncontrolled pain. Alert to self only. Speech is difficult to understand at times and is often illogical. Hospice notified of admit, she is an established patient. Pain meds per MAR. Now appears to be resting well. Plan of care updated. Continue safety measures and progress towards goals.  Plan of Care Reviewed With: patient  Goal: Patient-Specific Goal (Individualized)  Outcome: Progressing  Goal: Absence of Hospital-Acquired Illness or Injury  Outcome: Progressing  Intervention: Identify and Manage Fall Risk  Recent Flowsheet Documentation  Taken 4/27/2025 0030 by Aaliyah Anthony RN  Safety Promotion/Fall Prevention:   safety round/check completed   activity supervised   assistive device/personal items within reach   fall prevention program maintained   nonskid shoes/slippers when out of bed  Intervention: Prevent Skin Injury  Recent Flowsheet Documentation  Taken 4/27/2025 0030 by Aaliyah Anthony RN  Body Position: turned  Skin Protection: incontinence pads utilized  Intervention: Prevent Infection  Recent Flowsheet Documentation  Taken 4/27/2025 0030 by Aaliyah Anthony RN  Infection Prevention:   rest/sleep promoted   personal protective equipment utilized  Goal: Optimal Comfort and Wellbeing  Outcome: Progressing  Intervention: Provide Person-Centered Care  Recent Flowsheet Documentation  Taken 4/27/2025 0030 by Aaliyah Anthony RN  Trust Relationship/Rapport:   care explained   choices provided   questions answered   reassurance provided   thoughts/feelings acknowledged  Goal: Readiness for Transition of Care  Outcome: Progressing  Intervention: Mutually Develop Transition Plan  Recent Flowsheet Documentation  Taken 4/27/2025 0030 by Aaliyah Anthony RN  Equipment Currently Used at Home: rollator  Transportation  Anticipated: health plan transportation  Transportation Concerns: none  Patient/Family Anticipated Services at Transition: hospice care  Patient/Family Anticipates Transition to:   inpatient hospice   home with help/services   Goal Outcome Evaluation:  Plan of Care Reviewed With: patient        Progress: no change  Outcome Evaluation: New admit this shift for AMS and uncontrolled pain. Alert to self only. Speech is difficult to understand at times and is often illogical. Hospice notified of admit, she is an established patient. Pain meds per MAR. Now appears to be resting well. Plan of care updated. Continue safety measures and progress towards goals.

## 2025-04-28 PROBLEM — Z51.5 END OF LIFE CARE: Status: ACTIVE | Noted: 2025-01-01

## 2025-04-28 NOTE — NURSING NOTE
[facility] Visit Report    Vonnie Coppola  [id number]  4/28/2025    Admission R/T [facility] [place] Dx: yes    Review of Visit: Patient is an 89 y.o. female with a primary [facility] diagnosis of unspecified sequelae of cerebral infarction. Current [facility] patient at home admitted to [facility] for GIP for symptom management of pain, dyspnea, restlessness, congestion. No active isolations     PPS: 10%    VS:   Temp:  [97.4 °F (36.3 °C)-102.3 °F (39.1 °C)] 102.3 °F (39.1 °C)  Heart Rate:  [] 103  Resp:  [17-18] 17  BP: (84-99)/(52-64) 99/64  SpO2: 91% on room air    Medications in 24 hours:  -IV Robinul 0.4mg PRN x5  -IV Morphine 4mg PRN x1  -IV Dilaudid 1mg PRN x3  -IV Ativan 2mg PRN x4    Recommendations:  Pts daughter, , requests to hold off on medications to see if patient will wake up today. However, if patient shows pain or discomfort she is agreeable to medications being administered. Patient with audible congestion, recommend to reposition into recovery position and maintain, alternating sides. Continue to monitor for signs of decline and provide comfort measures. Contact [facility] at [phone] with any questions or concerns and at TOD.     Assessment:  Patient is bed bound, oral care only, minimally responsive, PPS 10%. Respirations are unlabored with open mouth breathing and audible congestion. Lung sounds coarse throughout, on room air. Abdomen is soft with hypoactive bowel sounds, no PO intake. Pt is hot to touch, peripheral pulses palpable. Color is pale with scattered bruising and skin tear to the right calf. [place] catheter to bedside drainage with scant yessi urine output in 24 hours.     Collaboration:  Spoke with pts daughter via phone with condition update and support provided. Training provided regarding assessment findings, disease progression, symptom management, recommendations and expected length of stay. Family v/u of pts condition and all questions were  answered. Collaborated with [facility] RNShivani and Estefani LEGGETT about pts condition and recommendations.    Disposition:  Patient meets GIP criteria, requiring frequent administration and continued titration of parenteral medications to achieve and maintain symptom management. Patient appears to be unsafe for transport at this time, requiring increasing symptom management and frequent RN assessment. An oral rotation of medications is not reasonable due to pts terminal congestion and the volume of medication required with dosage conversion. If patient were to stabilize she would return home with family and [facility] support.  Will continue [facility] RN visits to monitor for changes, assess needs and provide support.       Melanie Rios RN  [facility] Health Visit Nurse  Scattered Bed Team

## 2025-04-28 NOTE — NURSING NOTE
PPS 10%. Pt received dilaudid 1 mg IV X 2, ativan 2 mg IV X 2 and robinul 0.4 mg IV X 3. Pt daughter at bedside this morning. Pt daughter wanted to hold meds if possible to see if pt would arouse for her to say goodbye. Meds held around 12. Pt was crying with turn at 1745. Pt daughter called and she said it was okay to medicate her. Pt daughter is understanding that her mom may not wake back up. Pt had increased congestion today, pt placed in sidelying position. Pt assisted with oral care, [place] care and turns. Will continue comfort care

## 2025-04-28 NOTE — PLAN OF CARE
Goal Outcome Evaluation:  Plan of Care Reviewed With: patient, child        Progress: declining  Outcome Evaluation: PPS 10%. Pt received dilaudid 1 mg IV X 2, ativan 2 mg IV X 2 and robinul 0.4 mg IV X 3. Pt daughter at bedside this morning. Pt daughter wanted to hold meds if possible to see if pt would arouse for her to say goodbye. Meds held around 12. Pt was crying with turn at 1745. Pt daughter called and she said it was okay to medicate her. Pt daughter is understanding that her mom may not wake back up. Pt had increased congestion today, pt placed in sidelying position. Pt assisted with oral care, [place] care and turns. Will continue comfort care.

## 2025-04-28 NOTE — PLAN OF CARE
Goal Outcome Evaluation:         Pt non-verbal this shift/not interactive, 10% EPS score, york in place with tea-colored urine, not able to eat/drink, no BM this shift, comfort care turns with wedge and premedications.        Problem: Delirium  Goal: Optimal Coping  Outcome: Not Progressing  Intervention: Optimize Psychosocial Adjustment to Delirium  Description: Acknowledge, normalize and validate patient and family/caregiver response to situation.Provide opportunity for expression of thoughts, feelings and concerns; respond with compassion and reassurance.Facilitate support system presence and participation in care; consider providing a diary in an intensive care setting.Decrease stress and anxiety by providing information about patient's status and treatment.Support family coping by recognizing current coping strategies; provide aid in developing new strategies.Assess the patient's capacity for, and support participation in, decision-making; implement a surrogate decision-maker when needed.Assess and monitor for signs and symptoms of psychologic distress, anxiety and depression.  Recent Flowsheet Documentation  Taken 4/27/2025 2000 by Shikha Puente RN  Supportive Measures: verbalization of feelings encouraged  Goal: Improved Attention and Thought Clarity  Outcome: Not Progressing  Intervention: Maximize Cognitive Function  Description: Establish baseline and ongoing mental status using a validated tool.Investigate potential physiologic factors for confusion, such as hypoglycemia, hypoxia, fluid imbalance and infection; advocate for, and provide, treatment.Facilitate safe surroundings; keep needed items within reach, such as call light and personal belongings.Promote use of personal vision and auditory aids.Provide orientation cues, as well as familiar routine and items that may include a calendar, clock and pictures.Promote cognitive stimulation, such as family and friend visits and reminiscence.Establish a  structured routine that is congruent with home schedules when possible, such as toileting, sleep and meal schedules.Provide pain control utilizing nonpharmacologic strategies as first-line therapy; avoid opioid agents.Encourage mobilization and early rehabilitation.  Recent Flowsheet Documentation  Taken 4/27/2025 2000 by Shikha Puente RN  Sensory Stimulation Regulation:   care clustered   lighting decreased  Reorientation Measures:   clock in view   reorientation provided     Problem: Adult Inpatient Plan of Care  Goal: Plan of Care Review  Outcome: Progressing  Goal: Optimal Comfort and Wellbeing  Outcome: Progressing  Intervention: Provide Person-Centered Care  Description: Use a family-focused approach to care; encourage support system presence and participation.Develop trust and rapport by proactively providing information, encouraging questions, addressing concerns and offering reassurance.Acknowledge emotional response to hospitalization.Recognize and utilize personal coping strategies and strengths; develop goals via shared decision-making.Honor spiritual and cultural preferences.  Recent Flowsheet Documentation  Taken 4/27/2025 2000 by Shikha Puente RN  Trust Relationship/Rapport:   care explained   choices provided   thoughts/feelings acknowledged   reassurance provided   questions encouraged  Goal: Readiness for Transition of Care  Outcome: Progressing     Problem: Palliative Care  Goal: Enhanced Quality of Life  Outcome: Progressing  Intervention: Optimize Function  Description: Assess and monitor for fatigue at regular intervals; promote physical activity and energy-conservation strategies to maximize ability to participate in prioritized activities.Consider the addition of a pharmacologic agent for fatigue unresponsive to nonpharmacologic measures.Provide calm, consistent environment and schedule to prevent or minimize confusion and delirium.Identify functional deficit; provide therapeutic  interventions and assistive technology to facilitate mobility and safety (e.g., adaptive activities of daily living equipment, ambulation device).  Recent Flowsheet Documentation  Taken 4/27/2025 2000 by Shikha Puente RN  Sensory Stimulation Regulation:   care clustered   lighting decreased  Sleep/Rest Enhancement:   awakenings minimized   consistent schedule promoted   room darkened  Intervention: Optimize Psychosocial Wellbeing  Description: Encourage expression of emotions; respond with empathy, sensitivity and compassion.Identify and assist patient to prioritize activities which bring meaning (e.g., participation in roles and relationships, spiritual activities); encourage reminiscence and life review.Identify and maximize patient and family/caregiver strengths and coping strategies.Acknowledge and assist with life transitions, grief and loss (e.g., roles, cognitive changes, dependence).Monitor for spiritual concerns and distress; provide support and encourage hope.Identify perceptions regarding caregiving needs, abilities and stress; encourage use of social support.Assess and monitor patient and caregiver for signs and symptoms of behavioral health concerns (e.g., depression, anxiety).Connect with community resources for ongoing support.  Recent Flowsheet Documentation  Taken 4/27/2025 2000 by Shikha Puente, RN  Supportive Measures: verbalization of feelings encouraged     Problem: Delirium  Goal: Improved Behavioral Control  Outcome: Progressing  Intervention: Minimize Safety Risk  Description: Listen actively, observing verbal and nonverbal cues.Maintain a therapeutic presence; utilize calm tone of voice, nonjudgmental attitude and nonthreatening body language.Reassure the patient that they are safe.Utilize supportive and concise communication; set limits, offer choices and propose alternatives.Remove stimuli and objects that may lead to harming self and others.Monitor for elopement risk.Avoid  reinforcing hallucinations or delusions; simply state perception and remain neutral.Utilize de-escalation strategies if warning signs of violent behavior are present.Implement least restrictive measures if attempts to de-escalate aggressive or injurious behavior are unsuccessful.  Recent Flowsheet Documentation  Taken 4/28/2025 0600 by Shikha Puente RN  Enhanced Safety Measures:   bed alarm set   room near unit station  Taken 4/28/2025 0400 by Shikha Puente RN  Enhanced Safety Measures:   bed alarm set   room near unit station  Taken 4/28/2025 0200 by Shikha Puente RN  Enhanced Safety Measures:   bed alarm set   room near unit station  Taken 4/28/2025 0000 by Shikha Puente RN  Enhanced Safety Measures:   bed alarm set   room near unit station  Taken 4/27/2025 2200 by Shikha Puente RN  Enhanced Safety Measures:   bed alarm set   room near unit station  Taken 4/27/2025 2000 by Shikha Puente RN  Trust Relationship/Rapport:   care explained   choices provided   thoughts/feelings acknowledged   reassurance provided   questions encouraged  Enhanced Safety Measures:   bed alarm set   room near unit station  Communication Support Strategies: one-step directions provided  Goal: Improved Sleep  Outcome: Progressing  Intervention: Promote Sleep  Description: Minimize sleep disruption using sleep/wake cycles that align with usual patterns.Provide a sleep environment with limited noise level and light; consider using ear plugs and eye masks.Minimize pharmacologic agents to facilitate sleep; consider melatonin.Provide well-lit environment during waking hours, including natural light.Optimize activity during waking hours to dissipate energy.If mechanically ventilated, consider using assist-control ventilation modes during nighttime sleep.  Recent Flowsheet Documentation  Taken 4/27/2025 2000 by Shikha Puente RN  Sleep/Rest Enhancement:   awakenings minimized   consistent schedule promoted   room  darkened     Problem: Skin Injury Risk Increased  Goal: Skin Health and Integrity  Outcome: Progressing  Intervention: Optimize Skin Protection  Description: Perform a full pressure injury risk assessment, as indicated by screening, upon admission to care unit.Reassess skin (full inspection and injury risk, including skin temperature, consistency and color) frequently (e.g., scheduled interval, with change in condition) to provide optimal early detection and prevention.Maintain adequate tissue perfusion (e.g., encourage fluid balance; avoid crossing legs, constrictive clothing or devices) to promote tissue oxygenation.Maintain head of bed at lowest degree of elevation tolerated, considering medical condition and other restrictions. Use positioning supports to prevent sliding and friction. Consider low friction textiles.Avoid positioning onto an area that remains reddened or on bony prominences.Minimize incontinence and moisture (e.g., toileting schedule; moisture-wicking pad, diaper or incontinence collection device; skin moisture barrier).Cleanse skin promptly and gently, when soiled, utilizing a pH-balanced cleanser.Relieve and redistribute pressure (e.g., scheduled position changes, weight shifts, use of support surface, medical device repositioning, protective dressing application, use of positioning device, microclimate control, use of pressure-injury-monitorEncourage increased activity, such as sitting in a chair at the bedside or early mobilization, when able to tolerate. Avoid prolonged sitting.  Recent Flowsheet Documentation  Taken 4/28/2025 0600 by Shikha Puente RN  Head of Bed (HOB) Positioning: HOB lowered  Taken 4/28/2025 0400 by Shikha Puente RN  Head of Bed (HOB) Positioning: HOB flat  Taken 4/28/2025 0200 by Shikha Puente RN  Head of Bed (HOB) Positioning: HOB flat  Taken 4/28/2025 0000 by Shikha Puente RN  Head of Bed (HOB) Positioning: HOB flat  Taken 4/27/2025 2200 by Kwame  Shikha, RN  Head of Bed (\[facility]\"") Positioning: HOB flat  Taken 4/27/2025 2000 by Shikha Puente, RN  Activity Management:   activity encouraged   bedrest  Pressure Reduction Techniques:   frequent weight shift encouraged   pressure points protected   weight shift assistance provided  Head of Bed (HOB) Positioning: \[facility]\"" lowered  Pressure Reduction Devices:   positioning supports utilized   pressure-redistributing mattress utilized  Skin Protection:   incontinence pads utilized   transparent dressing maintained  Intervention: Promote and Optimize Oral Intake  Description: Perform a nutrition assessment that includes a nutrition-focused physical exam; identify malnutrition risk.Assess for adequate oral intake; if inadequate, offer oral supplemental food or drinks to enhance calorie and protein intake.Assess for vitamin and mineral deficiencies; supplement if depleted.Assess need for, and assist with, meal set-up and feeding.Adjust diet or meal schedule based on preferences and tolerance.Minimize unnecessary dietary restrictions to increase oral intake.Establish bowel elimination program to increase comfort and appetite.Provide and encourage oral hygiene to enhance desire to eat.Consider enteral nutrition support if oral intake remains inadequate; provide parenteral nutrition if enteral is contraindicated.  Recent Flowsheet Documentation  Taken 4/27/2025 2000 by Shikha Puente, RN  Oral Nutrition Promotion: rest periods promoted     Problem: Fall Injury Risk  Goal: Absence of Fall and Fall-Related Injury  Outcome: Progressing  Intervention: Identify and Manage Contributors  Description: Develop a fall prevention plan, considering patient-centered interventions and family/caregiver involvement; identify and address patient's facilitators and barriers.Provide reorientation, appropriate sensory stimulation and routines with changes in mental status to decrease risk of fall.Promote use of personal vision and auditory  aids.Assess assistance level required for safe and effective self-care; provide support as needed, such as toileting and mobilization. For age 65 and older, implement timed toileting with assistance.Encourage physical activity, such as performance of mobility and self-care at highest level of patient ability, multicomponent exercise program and provision of appropriate assistive devices.If fall occurs, assess the severity of injury; implement fall injury protocol. Determine the cause and revise fall injury prevention plan.Regularly review and advocate for medication adjustment to decrease fall risk; consider administration times, polypharmacy and age.Balance adequate pain management with potential for oversedation.  Recent Flowsheet Documentation  Taken 4/28/2025 0600 by Shikha Puente, RN  Medication Review/Management:   high-risk medications identified   medications reviewed  Taken 4/28/2025 0400 by Shikha Puente, RN  Medication Review/Management:   medications reviewed   high-risk medications identified  Taken 4/28/2025 0200 by Shikha Puente, RN  Medication Review/Management:   medications reviewed   high-risk medications identified  Taken 4/28/2025 0000 by Shikha Puente RN  Medication Review/Management:   medications reviewed   high-risk medications identified  Taken 4/27/2025 2200 by Shikha Puente, RN  Medication Review/Management:   medications reviewed   high-risk medications identified  Taken 4/27/2025 2000 by Shikha Puente, RN  Medication Review/Management:   medications reviewed   high-risk medications identified  Self-Care Promotion: BADL personal objects within reach  Intervention: Promote Injury-Free Environment  Description: Provide a safe, barrier-free environment that encourages independent activity.Keep care area uncluttered and well-lighted.Determine need for increased observation or monitoring.Avoid use of devices that minimize mobility, such as restraints or indwelling urinary  catheter.  Recent Flowsheet Documentation  Taken 4/28/2025 0600 by Shikha Puente, RN  Safety Promotion/Fall Prevention: safety round/check completed  Taken 4/28/2025 0400 by Shikha Puente, RN  Safety Promotion/Fall Prevention: safety round/check completed  Taken 4/28/2025 0200 by Shikha Puente, RN  Safety Promotion/Fall Prevention: safety round/check completed  Taken 4/28/2025 0000 by Shikha Puente, RN  Safety Promotion/Fall Prevention: safety round/check completed  Taken 4/27/2025 2200 by Shikha Puente, RN  Safety Promotion/Fall Prevention: safety round/check completed  Taken 4/27/2025 2000 by Shikha Puente, RN  Safety Promotion/Fall Prevention: safety round/check completed

## 2025-04-28 NOTE — PROGRESS NOTES
"    Name: Vonnie Coppola ADMIT: 2025   : 1935  PCP: Christopher Leyva DO    MRN: [id number] LOS: 2 days   AGE/SEX: 89 y.o. female  ROOM: [facility]     Subjective   Subjective   Appears comfortable.    Objective   Objective   Vital Signs  Temp:  [97.4 °F (36.3 °C)-98.9 °F (37.2 °C)] 97.4 °F (36.3 °C)  Heart Rate:  [82-88] 82  Resp:  [18] 18  BP: ()/(52-58) 84/52  SpO2:  [90 %-92 %] 92 %  on   ;   Device (Oxygen Therapy): room air  Body mass index is 28.43 kg/m².  Physical Exam  Constitutional:       Appearance: She is ill-appearing.   Pulmonary:      Effort: No respiratory distress.      Breath sounds: No stridor.   Skin:     Coloration: Skin is pale. Skin is not jaundiced.   Neurological:      Mental Status: She is lethargic.         Results Review     I reviewed the patient's new clinical results.  Results from last 7 days   Lab Units 25   WBC 10*3/mm3 8.54   HEMOGLOBIN g/dL 10.1*   PLATELETS 10*3/mm3 185     Results from last 7 days   Lab Units 25   SODIUM mmol/L 143   POTASSIUM mmol/L 3.3*   CHLORIDE mmol/L 109*   CO2 mmol/L 24.2   BUN mg/dL 17   CREATININE mg/dL 0.86   GLUCOSE mg/dL 88   EGFR mL/min/1.73 65.5     Results from last 7 days   Lab Units 25   ALBUMIN g/dL 2.7*   BILIRUBIN mg/dL 0.5   ALK PHOS U/L 72   AST (SGOT) U/L 20   ALT (SGPT) U/L 12     Results from last 7 days   Lab Units 25   CALCIUM mg/dL 8.1*   ALBUMIN g/dL 2.7*     Results from last 7 days   Lab Units 25   PROCALCITONIN ng/mL 0.07   LACTATE mmol/L 1.3     No results found for: \"HGBA1C\", \"POCGLU\"    CT Head Without Contrast  Result Date: 2025  No acute intracranial findings.  Radiation dose reduction techniques were utilized, including automated exposure control and exposure modulation based on body size.   This report was finalized on 2025 9:45 PM by Dr. Robyn Wood M.D on Workstation: [id number]      CT Cervical Spine Without " Contrast  Result Date: 4/26/2025  No acute fracture or subluxation identified.  Radiation dose reduction techniques were utilized, including automated exposure control and exposure modulation based on body size.   This report was finalized on 4/26/2025 9:43 PM by Dr. Robyn Wood M.D on Workstation: [id number]      XR Chest 1 View  Result Date: 4/26/2025  No acute findings.  This report was finalized on 4/26/2025 9:36 PM by Dr. Robyn Wood M.D on Workstation: [id number]      XR Knee 1 or 2 View Right  Result Date: 4/26/2025  No definite acute fracture or subluxation identified.  This report was finalized on 4/26/2025 9:06 PM by Dr. Robyn Wood M.D on Workstation: [id number]      Scheduled Medications  diazePAM, 1 mg, Oral, Nightly  melatonin, 2.5 mg, Oral, Nightly  pantoprazole, 40 mg, Oral, Daily  sodium chloride, 3 mL, Intravenous, Q12H    Infusions   Diet  Diet: Regular/House; Fluid Consistency: Thin (IDDSI 0)       Assessment/Plan     Active Hospital Problems    Diagnosis  POA    **Diffuse pain [R52]  Yes    Bruising [T14.8XXA]  Yes    Comfort measures only status [Z51.5]  Not Applicable    Physical debility [R53.81]  Yes    Dementia with behavioral disturbance [F03.918]  Yes    Hypokalemia [E87.6]  Yes    Chronic anticoagulation [Z79.01]  Not Applicable    Anxiety disorder [F41.9]  Yes    Benzodiazepine dependence [F13.20]  Yes    Altered mental status [R41.82]  Yes    History of pulmonary embolism [Z86.711]  Yes    Atrial fibrillation [I48.91]  Yes    History of CVA (cerebrovascular accident) [Z86.73]  Not Applicable    HTN (hypertension) [I10]  Yes    Anemia [D64.9]  Yes    Asthma [J45.909]  Yes      Resolved Hospital Problems   No resolved problems to display.       89 y.o. female admitted with Diffuse pain.      04/28/25  [facility] RN following.    Has received 3 doses of IV Ativan and 3 doses IV Dilaudid since midnight.                  Pascual Reyna MD  [facility]  04/28/25  08:43 EDT

## 2025-04-28 NOTE — PROGRESS NOTES
Discharge Planning Assessment  [facility]     Patient Name: Vonnie Coppola  MRN: [id number]  Today's Date: 4/28/2025    Admit Date: 4/26/2025    Plan: Palliative and current with [facility]   Discharge Needs Assessment    No documentation.                  Discharge Plan       Row Name 04/28/25 1508       Plan    Plan Comments The patient transferred to [facility] from ER on 4/27/25 @ 00:14 for palliative care. The patient is current with [facility] and they are covering the stay. Requested pre-auth. The patient is PPS 10% today. The patient's daughter is the NOK. CCP will continue to follow for any needs. ADILENE Ch Rn, [facility]      Row Name 04/28/25 1503       Plan    Plan Palliative and current with [facility]                  Continued Care and Services - Admitted Since 4/26/2025       Destination Coordination complete.      Service Provider Request Status Services Address Phone Fax Patient Preferred    [facility] [address] [place] [address] [phone] --                     Demographic Summary    No documentation.                  Functional Status    No documentation.                  Psychosocial    No documentation.                  Abuse/Neglect    No documentation.                  Legal    No documentation.                  Substance Abuse    No documentation.                  Patient Forms    No documentation.                     Estefani Ch RN

## 2025-04-28 NOTE — NURSING NOTE
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 Admitted to [facility] for GIP for mgmt. of pain, SOA, anxiety/restlessness, congestion & EOL & symptom mgmt. care.   Isolation: N/A  PPS: 10%  Medications in last 24 hours: IV Robinul 0.4mg x1, IV Ativan 0.5mg x1, IV Ativan 2mg x3, IV Morphine 2mg x2, IV Morphine 4mg x3  Recommendations:   Continue to monitor for signs of decline & provide comfort measures. Contact [facility] with any questions/concerns & at TOD.   Assessment:   Patient is lying in bed on side, patient is minimally responsive, on RA, RR even, mildly labored, lungs coarse, BS absent, pedals PW, 2+ edema BLE, FC in place with straw colored urine noted in bag. Patient has an occasional low level moan, SRN reports possibly rotating to Dilaudid & has had a few med titrations today. No other issues noted.    Collaboration:   Spoke with family via phone, condition update provided including disease progression, symptom mgmt. & patient's condition, training provided. Family V/U of patient's condition and all questions answered. Updated staff RN Lizzie, recommendations provided as appropriate. Reviewed patient with Dr Yanelis Dumont, admission is related & GIP.  Disposition:  Patient meets GIP criteria d/t frequent administration & continued titration of IV meds to achieve/maintain symptom mgmt.. Patient appears unstable for transport, requiring increasing symptom mgmt. & frequent nursing interventions. If patient stabilizes & needs to transition to a lower level of care, placement may be needed due to increased daily care needs. Will continue [facility] RN visits to monitor for changes, assess needs & provide support.   Please reach out with any questions/concerns. Thank you for allowing us the opportunity to participate in patient's care.     Elizabeth Miranda RN, BSN  Scatter Bed Team  [facility]

## 2025-04-29 NOTE — PROGRESS NOTES
Case Management Discharge Note      Final Note: The patient  on 25 @ 02:05. ADILENE Ch RN, CCP         Selected Continued Care - Discharged on 2025 Admission date: 2025 - Discharge disposition:       Destination Coordination complete.      Service Provider Services Address Phone Fax Patient Preferred    [place] [id number] [facility] [phone] --              Durable Medical Equipment    No services have been selected for the patient.                Dialysis/Infusion    No services have been selected for the patient.                Home Medical Care    No services have been selected for the patient.                Therapy    No services have been selected for the patient.                Community Resources    No services have been selected for the patient.                Community & DME    No services have been selected for the patient.                         Final Discharge Disposition Code: 20 -

## 2025-04-29 NOTE — DISCHARGE SUMMARY
"Name: Vonnie Coppola  :  1935  MRN: [id number]         Primary Care Physician: Christopher Leyva DO      Date of Admission:  2025  Date and Time of Death:  2025 at 2:05 AM    Principle Cause of Death:   Senile degeneration of the brain    Secondary Diagnoses:     Diffuse pain    Anemia    HTN (hypertension)    Asthma    History of CVA (cerebrovascular accident)    Atrial fibrillation    History of pulmonary embolism    Altered mental status    Chronic anticoagulation    Anxiety disorder    Benzodiazepine dependence    Hypokalemia    Dementia with behavioral disturbance    Bruising    Comfort measures only status    Physical debility    End of life care        Hospital Course  \"Vonnie Coppola is a 89 y.o. female with a past medical history family reports of dementia and multiple medical problems as outlined below whom family currently report is under hospice care presents the hospital by EMS as family was unable to care for her with persistent progressive agitation with confusion that has been present for several months her daughter tells me.  Family reports she was recently in respite care with hospice and had just returned home.  Patient had become progressively more weak and her family feels overwhelmed.  They also felt they were not adequately able to control her pain and symptoms.  Her daughter Elizabeth Rodríguez states they wish to continue hospice care and focus on comfort and she did not want any additional labs or fluids but would like her to be hospitalized to focus on symptom management including pain and anxiety.  She tells me she just wants her to be comfortable and would like to speak to hospice about the best living situation to go forward. \"    Patient was admitted under hospice services in the setting of end-stage dementia.  She was followed by hospice team and  peacefully on 2025.      Pascual Reyna MD  25  07:57 EDT        "
